# Patient Record
Sex: FEMALE | Race: WHITE | Employment: FULL TIME | URBAN - METROPOLITAN AREA
[De-identification: names, ages, dates, MRNs, and addresses within clinical notes are randomized per-mention and may not be internally consistent; named-entity substitution may affect disease eponyms.]

---

## 2017-01-06 ENCOUNTER — HOSPITAL ENCOUNTER (OUTPATIENT)
Dept: CT IMAGING | Age: 57
Discharge: HOME | End: 2017-01-06
Attending: INTERNAL MEDICINE
Payer: COMMERCIAL

## 2017-01-06 DIAGNOSIS — C78.2 BREAST CANCER METASTASIZED TO PLEURA, RIGHT (HCC): ICD-10-CM

## 2017-01-06 DIAGNOSIS — C50.911 BREAST CANCER METASTASIZED TO PLEURA, RIGHT (HCC): ICD-10-CM

## 2017-01-06 DIAGNOSIS — C79.51 BONE METASTASES (HCC): ICD-10-CM

## 2017-01-06 DIAGNOSIS — C50.011 MALIGNANT NEOPLASM OF NIPPLE OF RIGHT BREAST IN FEMALE (HCC): ICD-10-CM

## 2017-01-06 PROCEDURE — 74176 CT ABD & PELVIS W/O CONTRAST: CPT

## 2017-01-06 PROCEDURE — 71250 CT THORAX DX C-: CPT

## 2017-01-11 ENCOUNTER — PATIENT MESSAGE (OUTPATIENT)
Dept: FAMILY MEDICINE CLINIC | Facility: CLINIC | Age: 57
End: 2017-01-11

## 2017-01-12 ENCOUNTER — OFFICE VISIT (OUTPATIENT)
Dept: HEMATOLOGY/ONCOLOGY | Age: 57
End: 2017-01-12
Attending: INTERNAL MEDICINE
Payer: COMMERCIAL

## 2017-01-12 VITALS
RESPIRATION RATE: 20 BRPM | TEMPERATURE: 98 F | HEART RATE: 99 BPM | WEIGHT: 190.38 LBS | BODY MASS INDEX: 31 KG/M2 | SYSTOLIC BLOOD PRESSURE: 127 MMHG | DIASTOLIC BLOOD PRESSURE: 77 MMHG

## 2017-01-12 DIAGNOSIS — C79.51 BONE METASTASES (HCC): ICD-10-CM

## 2017-01-12 DIAGNOSIS — C78.2 BREAST CANCER METASTASIZED TO PLEURA, UNSPECIFIED LATERALITY (HCC): ICD-10-CM

## 2017-01-12 DIAGNOSIS — C50.011 MALIGNANT NEOPLASM OF NIPPLE OF RIGHT BREAST IN FEMALE (HCC): ICD-10-CM

## 2017-01-12 DIAGNOSIS — C79.51 BONE METASTASES (HCC): Primary | ICD-10-CM

## 2017-01-12 DIAGNOSIS — C50.911 BREAST CANCER METASTASIZED TO PLEURA, RIGHT (HCC): Primary | ICD-10-CM

## 2017-01-12 DIAGNOSIS — C78.2 BREAST CANCER METASTASIZED TO PLEURA, RIGHT (HCC): Primary | ICD-10-CM

## 2017-01-12 DIAGNOSIS — C78.2 BREAST CANCER METASTASIZED TO PLEURA, RIGHT (HCC): ICD-10-CM

## 2017-01-12 DIAGNOSIS — C50.911 BREAST CANCER METASTASIZED TO PLEURA, RIGHT (HCC): ICD-10-CM

## 2017-01-12 DIAGNOSIS — C50.919 BREAST CANCER METASTASIZED TO PLEURA, UNSPECIFIED LATERALITY (HCC): ICD-10-CM

## 2017-01-12 LAB
ALBUMIN SERPL-MCNC: 3.7 G/DL (ref 3.5–4.8)
ALP LIVER SERPL-CCNC: 96 U/L (ref 41–108)
ALT SERPL-CCNC: 102 U/L (ref 14–54)
AST SERPL-CCNC: 48 U/L (ref 15–41)
BASOPHILS # BLD AUTO: 0.11 X10(3) UL (ref 0–0.1)
BASOPHILS NFR BLD AUTO: 1.3 %
BILIRUB SERPL-MCNC: 0.3 MG/DL (ref 0.1–2)
BREAST CARCINOMA AG (CA2729): 16.4 U/ML (ref ?–38)
BUN BLD-MCNC: 10 MG/DL (ref 8–20)
CALCIUM BLD-MCNC: 8.6 MG/DL (ref 8.3–10.3)
CANCER AG 15-3 (CA15-3): 8.5 U/ML (ref ?–30)
CHLORIDE: 108 MMOL/L (ref 101–111)
CO2: 26 MMOL/L (ref 22–32)
CREAT BLD-MCNC: 0.83 MG/DL (ref 0.55–1.02)
EOSINOPHIL # BLD AUTO: 0.2 X10(3) UL (ref 0–0.3)
EOSINOPHIL NFR BLD AUTO: 2.3 %
ERYTHROCYTE [DISTWIDTH] IN BLOOD BY AUTOMATED COUNT: 13 % (ref 11.5–16)
GLUCOSE BLD-MCNC: 107 MG/DL (ref 70–99)
HCT VFR BLD AUTO: 40.7 % (ref 34–50)
HGB BLD-MCNC: 13.7 G/DL (ref 12–16)
IMMATURE GRANULOCYTE COUNT: 0.05 X10(3) UL (ref 0–1)
IMMATURE GRANULOCYTE RATIO %: 0.6 %
LYMPHOCYTES # BLD AUTO: 3.58 X10(3) UL (ref 0.9–4)
LYMPHOCYTES NFR BLD AUTO: 41.8 %
M PROTEIN MFR SERPL ELPH: 7.4 G/DL (ref 6.1–8.3)
MCH RBC QN AUTO: 31.8 PG (ref 27–33.2)
MCHC RBC AUTO-ENTMCNC: 33.7 G/DL (ref 31–37)
MCV RBC AUTO: 94.4 FL (ref 81–100)
MONOCYTES # BLD AUTO: 0.47 X10(3) UL (ref 0.1–0.6)
MONOCYTES NFR BLD AUTO: 5.5 %
NEUTROPHIL ABS PRELIM: 4.16 X10 (3) UL (ref 1.3–6.7)
NEUTROPHILS # BLD AUTO: 4.16 X10(3) UL (ref 1.3–6.7)
NEUTROPHILS NFR BLD AUTO: 48.5 %
PLATELET # BLD AUTO: 302 10(3)UL (ref 150–450)
POTASSIUM SERPL-SCNC: 3.7 MMOL/L (ref 3.6–5.1)
RBC # BLD AUTO: 4.31 X10(6)UL (ref 3.8–5.1)
RED CELL DISTRIBUTION WIDTH-SD: 44.7 FL (ref 35.1–46.3)
SODIUM SERPL-SCNC: 141 MMOL/L (ref 136–144)
WBC # BLD AUTO: 8.6 X10(3) UL (ref 4–13)

## 2017-01-12 PROCEDURE — 96401 CHEMO ANTI-NEOPL SQ/IM: CPT

## 2017-01-12 PROCEDURE — 99213 OFFICE O/P EST LOW 20 MIN: CPT | Performed by: INTERNAL MEDICINE

## 2017-01-12 PROCEDURE — 96372 THER/PROPH/DIAG INJ SC/IM: CPT

## 2017-01-12 RX ORDER — HYDROCODONE BITARTRATE AND IBUPROFEN 7.5; 2 MG/1; MG/1
1 TABLET, FILM COATED ORAL EVERY 8 HOURS PRN
Qty: 30 TABLET | Refills: 0 | Status: SHIPPED | OUTPATIENT
Start: 2017-01-12 | End: 2017-04-13

## 2017-01-12 NOTE — TELEPHONE ENCOUNTER
Patient last seen 8/29/2016. Last refill 10/28/2016. Patient requesting 120 tablets.   Please approve if appropriate, thanks

## 2017-01-12 NOTE — TELEPHONE ENCOUNTER
From: Antoni Lin  To: Nickolas Roberts MD  Sent: 1/11/2017 11:01 AM CST  Subject: Prescription Question    I am requesting another refill on my hydrocodone/ibuprofen 7.5-200 mg tab. The rx was for 90 pills but I take about 4 a day.  Can you do a refil

## 2017-01-12 NOTE — PROGRESS NOTES
Education Record    Learner:  Patient and Spouse    Disease / Diagnosis:    Barriers / Limitations:  None   Comments:    Method:  Discussion   Comments:    General Topics:  Medication, Side effects and symptom management and Plan of care reviewed   Comment

## 2017-01-12 NOTE — PROGRESS NOTES
Cancer Center Progress Note  Patient Name: Lorena Hunter   YOB: 1960   Medical Record Number: [id number]     Attending Physician: Nicolasa Mensah M.D. Date of Visit: 1/12/2017    Chief Complaint:  Patient presents with:   Follow - Up Social History Main Topics   Smoking status: Current Every Day Smoker  0.50 Packs/Day     Last Attempt to Quit: 05/18/2015    Smokeless tobacco: Never Used    Alcohol Use: Yes    Comment: rarely    Drug Use: No    Sexual Activity: Not on file   Not on oz)      Physical Examination:  Constitutional Normal - Mood and affect appropriate. Appears close to chronological age. Well nourished. Well developed. Eyes Normal - Conjunctivae and sclerae are clear and without icterus.  Pupils are reactive and equal. she is pre-menopausal.   We discussed Tamoxifen vs a GNRH antagonist and an AI. She is tolerating the Zoladex and arimidex well. Tumor Markers are improving and CT is stable. I will see her back in 3 months with repeat tumor markers and CT.  Continue Mon

## 2017-01-16 RX ORDER — HYDROCODONE BITARTRATE AND IBUPROFEN 7.5; 2 MG/1; MG/1
1 TABLET, FILM COATED ORAL EVERY 8 HOURS PRN
Qty: 90 TABLET | Refills: 0 | Status: CANCELLED | OUTPATIENT
Start: 2017-01-16

## 2017-01-16 NOTE — TELEPHONE ENCOUNTER
From: Palak Lambert  To:  Carlos Carroll MD  Sent: 1/10/2017 12:34 PM CST  Subject: Medication Renewal Request    Original authorizing provider: MD Palak Phoenix would like a refill of the following medications:  hydrocodone-ibupr

## 2017-01-21 ENCOUNTER — OFFICE VISIT (OUTPATIENT)
Dept: FAMILY MEDICINE CLINIC | Facility: CLINIC | Age: 57
End: 2017-01-21

## 2017-01-21 VITALS
BODY MASS INDEX: 30 KG/M2 | RESPIRATION RATE: 16 BRPM | DIASTOLIC BLOOD PRESSURE: 68 MMHG | WEIGHT: 188 LBS | TEMPERATURE: 99 F | SYSTOLIC BLOOD PRESSURE: 108 MMHG | HEART RATE: 88 BPM

## 2017-01-21 DIAGNOSIS — E78.5 HYPERLIPIDEMIA, UNSPECIFIED HYPERLIPIDEMIA TYPE: Primary | ICD-10-CM

## 2017-01-21 DIAGNOSIS — M72.2 PLANTAR FASCIITIS, BILATERAL: ICD-10-CM

## 2017-01-21 DIAGNOSIS — H60.392 BACTERIAL EXTERNAL EAR INFECTION, LEFT: ICD-10-CM

## 2017-01-21 DIAGNOSIS — C79.51 BONE METASTASES (HCC): ICD-10-CM

## 2017-01-21 DIAGNOSIS — E03.9 HYPOTHYROIDISM, UNSPECIFIED TYPE: ICD-10-CM

## 2017-01-21 DIAGNOSIS — C50.911 BREAST CANCER METASTASIZED TO PLEURA, RIGHT (HCC): ICD-10-CM

## 2017-01-21 DIAGNOSIS — C78.2 BREAST CANCER METASTASIZED TO PLEURA, RIGHT (HCC): ICD-10-CM

## 2017-01-21 DIAGNOSIS — C50.011 MALIGNANT NEOPLASM OF NIPPLE OF RIGHT BREAST IN FEMALE (HCC): ICD-10-CM

## 2017-01-21 PROCEDURE — 99215 OFFICE O/P EST HI 40 MIN: CPT | Performed by: FAMILY MEDICINE

## 2017-01-21 RX ORDER — HYDROCODONE BITARTRATE AND ACETAMINOPHEN 7.5; 325 MG/1; MG/1
1 TABLET ORAL EVERY 8 HOURS PRN
Qty: 90 TABLET | Refills: 0 | Status: SHIPPED | OUTPATIENT
Start: 2017-01-21 | End: 2017-03-20

## 2017-01-21 RX ORDER — HYDROCODONE BITARTRATE AND IBUPROFEN 7.5; 2 MG/1; MG/1
1 TABLET, FILM COATED ORAL EVERY 8 HOURS PRN
Qty: 30 TABLET | Refills: 0 | Status: CANCELLED | OUTPATIENT
Start: 2017-01-21

## 2017-01-21 RX ORDER — TEMAZEPAM 15 MG/1
15 CAPSULE ORAL NIGHTLY PRN
Qty: 30 CAPSULE | Refills: 1 | Status: SHIPPED | OUTPATIENT
Start: 2017-01-21 | End: 2017-09-09

## 2017-01-21 RX ORDER — AMOXICILLIN AND CLAVULANATE POTASSIUM 875; 125 MG/1; MG/1
1 TABLET, FILM COATED ORAL 2 TIMES DAILY
Qty: 14 TABLET | Refills: 0 | Status: SHIPPED | OUTPATIENT
Start: 2017-01-21 | End: 2017-01-28

## 2017-01-21 RX ORDER — NEOMYCIN SULFATE, POLYMYXIN B SULFATE AND HYDROCORTISONE 10; 3.5; 1 MG/ML; MG/ML; [USP'U]/ML
4 SUSPENSION/ DROPS AURICULAR (OTIC) 3 TIMES DAILY
Qty: 10 ML | Refills: 0 | Status: SHIPPED | OUTPATIENT
Start: 2017-01-21 | End: 2017-05-01

## 2017-01-21 NOTE — PATIENT INSTRUCTIONS
Try hydrocodone/acetaminophen for pain as needed. Use eardrops 3-4 times per day left ear. Augmentin 1 tablet twice a day taken with food. Take probiotic daily. Start temazepam 15 mg 1 tablet at bedtime-this is sleep helping medication.   Do fasting blo

## 2017-01-21 NOTE — PROGRESS NOTES
Lorena Hunter is a 49year old female. cc  hyperlipidemia, hypothyroidism, breast cancer, muscle aches, bilateral feet pain, left ear pain  HPI:   Patient is coming for follow-up of hyperlipidemia she says that she was taking pravastatin but not regularly. 1 tablet by mouth every 8 (eight) hours as needed for Pain. Disp: 90 tablet Rfl: 0   temazepam 15 MG Oral Cap Take 1 capsule (15 mg total) by mouth nightly as needed for Sleep.  Disp: 30 capsule Rfl: 1   Neomycin-Polymyxin-HC 3.5-62523-5 Otic Suspension Esthela 108/68 mmHg  Pulse 88  Temp(Src) 98.8 °F (37.1 °C) (Oral)  Resp 16  Wt 188 lb  Breastfeeding?  No  GENERAL: well developed, well nourished,in no apparent distress  SKIN: no rashes,no suspicious lesions  HEENT: atraumatic, normocephalic,ears - there is redne results. Imaging & Consults:  None    The patient indicates understanding of these issues and agrees to the plan. The patient is asked to return in 3 months or sooner prn.    Time spent was 40 min, more than 50% was spent on counseling regarding medical

## 2017-02-17 ENCOUNTER — OFFICE VISIT (OUTPATIENT)
Dept: HEMATOLOGY/ONCOLOGY | Age: 57
End: 2017-02-17
Attending: INTERNAL MEDICINE
Payer: COMMERCIAL

## 2017-02-17 DIAGNOSIS — C50.911 BREAST CANCER METASTASIZED TO PLEURA, RIGHT (HCC): ICD-10-CM

## 2017-02-17 DIAGNOSIS — C79.51 BONE METASTASES (HCC): Primary | ICD-10-CM

## 2017-02-17 DIAGNOSIS — C78.2 BREAST CANCER METASTASIZED TO PLEURA, RIGHT (HCC): ICD-10-CM

## 2017-02-17 DIAGNOSIS — C50.919 BREAST CANCER METASTASIZED TO PLEURA, UNSPECIFIED LATERALITY (HCC): ICD-10-CM

## 2017-02-17 DIAGNOSIS — C78.2 BREAST CANCER METASTASIZED TO PLEURA, UNSPECIFIED LATERALITY (HCC): ICD-10-CM

## 2017-02-17 PROCEDURE — 96401 CHEMO ANTI-NEOPL SQ/IM: CPT

## 2017-02-17 PROCEDURE — 96372 THER/PROPH/DIAG INJ SC/IM: CPT

## 2017-02-17 NOTE — PROGRESS NOTES
Education Record    Learner:  Patient    Disease / [facility] cancer    Barriers / Limitations:  None    Method:  Brief focused, printed material and  reinforcement    General Topics:  Plan of care reviewed    Outcome:  Shows understanding

## 2017-02-27 RX ORDER — ANASTROZOLE 1 MG/1
TABLET ORAL
Qty: 90 TABLET | Refills: 2 | Status: SHIPPED | OUTPATIENT
Start: 2017-02-27 | End: 2017-07-06

## 2017-03-17 ENCOUNTER — OFFICE VISIT (OUTPATIENT)
Dept: HEMATOLOGY/ONCOLOGY | Age: 57
End: 2017-03-17
Attending: INTERNAL MEDICINE
Payer: COMMERCIAL

## 2017-03-17 DIAGNOSIS — C50.911 BREAST CANCER METASTASIZED TO PLEURA, RIGHT (HCC): ICD-10-CM

## 2017-03-17 DIAGNOSIS — C78.2 BREAST CANCER METASTASIZED TO PLEURA, RIGHT (HCC): ICD-10-CM

## 2017-03-17 DIAGNOSIS — C79.51 BONE METASTASES (HCC): Primary | ICD-10-CM

## 2017-03-17 DIAGNOSIS — C50.919 BREAST CANCER METASTASIZED TO PLEURA, UNSPECIFIED LATERALITY (HCC): ICD-10-CM

## 2017-03-17 DIAGNOSIS — C78.2 BREAST CANCER METASTASIZED TO PLEURA, UNSPECIFIED LATERALITY (HCC): ICD-10-CM

## 2017-03-17 PROCEDURE — 96402 CHEMO HORMON ANTINEOPL SQ/IM: CPT

## 2017-03-17 PROCEDURE — 96372 THER/PROPH/DIAG INJ SC/IM: CPT

## 2017-03-18 ENCOUNTER — LAB ENCOUNTER (OUTPATIENT)
Dept: LAB | Age: 57
End: 2017-03-18
Attending: FAMILY MEDICINE
Payer: COMMERCIAL

## 2017-03-18 DIAGNOSIS — E78.5 HYPERLIPIDEMIA, UNSPECIFIED HYPERLIPIDEMIA TYPE: ICD-10-CM

## 2017-03-18 DIAGNOSIS — E03.9 HYPOTHYROIDISM, UNSPECIFIED TYPE: ICD-10-CM

## 2017-03-18 DIAGNOSIS — E03.9 ACQUIRED HYPOTHYROIDISM: ICD-10-CM

## 2017-03-18 LAB
ALBUMIN SERPL-MCNC: 3.8 G/DL (ref 3.5–4.8)
ALP LIVER SERPL-CCNC: 102 U/L (ref 41–108)
ALT SERPL-CCNC: 128 U/L (ref 14–54)
AST SERPL-CCNC: 101 U/L (ref 15–41)
BILIRUB SERPL-MCNC: 0.4 MG/DL (ref 0.1–2)
BUN BLD-MCNC: 6 MG/DL (ref 8–20)
CALCIUM BLD-MCNC: 9.5 MG/DL (ref 8.3–10.3)
CHLORIDE: 109 MMOL/L (ref 101–111)
CHOLEST SMN-MCNC: 310 MG/DL (ref ?–200)
CO2: 29 MMOL/L (ref 22–32)
CREAT BLD-MCNC: 0.79 MG/DL (ref 0.55–1.02)
FREE T4: 1.1 NG/DL (ref 0.9–1.8)
GLUCOSE BLD-MCNC: 107 MG/DL (ref 70–99)
HDLC SERPL-MCNC: 30 MG/DL (ref 45–?)
HDLC SERPL: 10.33 {RATIO} (ref ?–4.44)
LDLC SERPL DIRECT ASSAY-MCNC: 173 MG/DL (ref ?–100)
M PROTEIN MFR SERPL ELPH: 7.4 G/DL (ref 6.1–8.3)
NONHDLC SERPL-MCNC: 280 MG/DL (ref ?–130)
POTASSIUM SERPL-SCNC: 4.4 MMOL/L (ref 3.6–5.1)
SODIUM SERPL-SCNC: 142 MMOL/L (ref 136–144)
TRIGLYCERIDES: 582 MG/DL (ref ?–150)
TSI SER-ACNC: 11 MIU/ML (ref 0.35–5.5)

## 2017-03-18 PROCEDURE — 84443 ASSAY THYROID STIM HORMONE: CPT

## 2017-03-18 PROCEDURE — 36415 COLL VENOUS BLD VENIPUNCTURE: CPT

## 2017-03-18 PROCEDURE — 80061 LIPID PANEL: CPT

## 2017-03-18 PROCEDURE — 83721 ASSAY OF BLOOD LIPOPROTEIN: CPT

## 2017-03-18 PROCEDURE — 80053 COMPREHEN METABOLIC PANEL: CPT

## 2017-03-18 PROCEDURE — 84439 ASSAY OF FREE THYROXINE: CPT

## 2017-03-20 DIAGNOSIS — E03.9 HYPOTHYROIDISM, UNSPECIFIED TYPE: Primary | ICD-10-CM

## 2017-03-20 DIAGNOSIS — E78.5 HYPERLIPIDEMIA, UNSPECIFIED HYPERLIPIDEMIA TYPE: Primary | ICD-10-CM

## 2017-03-20 RX ORDER — HYDROCODONE BITARTRATE AND ACETAMINOPHEN 7.5; 325 MG/1; MG/1
1 TABLET ORAL EVERY 8 HOURS PRN
Qty: 90 TABLET | Refills: 0 | Status: SHIPPED | OUTPATIENT
Start: 2017-03-20 | End: 2017-06-26

## 2017-03-20 RX ORDER — LEVOTHYROXINE SODIUM 0.12 MG/1
125 TABLET ORAL
Qty: 90 TABLET | Refills: 0 | Status: SHIPPED | OUTPATIENT
Start: 2017-03-20 | End: 2017-03-20

## 2017-03-20 RX ORDER — OMEGA-3-ACID ETHYL ESTERS 1 G/1
CAPSULE, LIQUID FILLED ORAL
Qty: 120 CAPSULE | Refills: 2 | Status: SHIPPED | OUTPATIENT
Start: 2017-03-20 | End: 2017-06-26

## 2017-03-20 RX ORDER — LEVOTHYROXINE SODIUM 0.12 MG/1
125 TABLET ORAL
Qty: 90 TABLET | Refills: 0 | Status: ON HOLD | OUTPATIENT
Start: 2017-03-31 | End: 2017-06-26

## 2017-03-21 ENCOUNTER — PATIENT MESSAGE (OUTPATIENT)
Dept: FAMILY MEDICINE CLINIC | Facility: CLINIC | Age: 57
End: 2017-03-21

## 2017-03-21 ENCOUNTER — TELEPHONE (OUTPATIENT)
Dept: FAMILY MEDICINE CLINIC | Facility: CLINIC | Age: 57
End: 2017-03-21

## 2017-03-21 NOTE — TELEPHONE ENCOUNTER
Received request for a PA for pt's Omega-3-acid Ethyl Kerry 1 g from [address]. PA completed in [employer]. Approval received from 2/19/17 through 3/20/20. Archived in [employer].

## 2017-03-22 ENCOUNTER — TELEPHONE (OUTPATIENT)
Dept: FAMILY MEDICINE CLINIC | Facility: CLINIC | Age: 57
End: 2017-03-22

## 2017-03-22 NOTE — TELEPHONE ENCOUNTER
Call from yissel/spouse-sts pt has difficulty swallowing pills-is afraid she will choke. sts lovaza is too big for pt to swallow. Discussed trying to take in applesauce, yogurt or pudding-usually makes it easier to swallow larger pills.    Nora Giang pt does th

## 2017-03-22 NOTE — TELEPHONE ENCOUNTER
Yes ok to chew the capsule if cannot swallow medications. This is safer for the liver done other possible meds.

## 2017-03-23 NOTE — TELEPHONE ENCOUNTER
From: Johnny Gonzalez  To: Dirk Lyn MD  Sent: 3/21/2017 5:46 PM CDT  Subject: Test Results Question    I was trying to look up the test results from my blood work that was done on March 18th so I can see the results. They are not posted yet.  Do yo

## 2017-04-08 ENCOUNTER — HOSPITAL ENCOUNTER (OUTPATIENT)
Dept: CT IMAGING | Age: 57
Discharge: HOME | End: 2017-04-08
Attending: INTERNAL MEDICINE
Payer: COMMERCIAL

## 2017-04-08 DIAGNOSIS — C50.911 BREAST CANCER METASTASIZED TO PLEURA, RIGHT (HCC): ICD-10-CM

## 2017-04-08 DIAGNOSIS — C79.51 BONE METASTASES (HCC): ICD-10-CM

## 2017-04-08 DIAGNOSIS — C78.2 BREAST CANCER METASTASIZED TO PLEURA, RIGHT (HCC): ICD-10-CM

## 2017-04-08 DIAGNOSIS — C50.011 MALIGNANT NEOPLASM OF NIPPLE OF RIGHT BREAST IN FEMALE (HCC): ICD-10-CM

## 2017-04-08 PROCEDURE — 71250 CT THORAX DX C-: CPT

## 2017-04-08 PROCEDURE — 74176 CT ABD & PELVIS W/O CONTRAST: CPT

## 2017-04-13 ENCOUNTER — OFFICE VISIT (OUTPATIENT)
Dept: HEMATOLOGY/ONCOLOGY | Age: 57
End: 2017-04-13
Attending: INTERNAL MEDICINE
Payer: COMMERCIAL

## 2017-04-13 VITALS
SYSTOLIC BLOOD PRESSURE: 119 MMHG | TEMPERATURE: 100 F | HEART RATE: 89 BPM | WEIGHT: 185.19 LBS | OXYGEN SATURATION: 99 % | BODY MASS INDEX: 30 KG/M2 | RESPIRATION RATE: 20 BRPM | DIASTOLIC BLOOD PRESSURE: 73 MMHG

## 2017-04-13 DIAGNOSIS — C79.51 BONE METASTASES (HCC): ICD-10-CM

## 2017-04-13 DIAGNOSIS — C78.2 BREAST CANCER METASTASIZED TO PLEURA, RIGHT (HCC): ICD-10-CM

## 2017-04-13 DIAGNOSIS — C50.911 BREAST CANCER METASTASIZED TO PLEURA, RIGHT (HCC): ICD-10-CM

## 2017-04-13 DIAGNOSIS — C50.919 BREAST CANCER METASTASIZED TO PLEURA, UNSPECIFIED LATERALITY (HCC): ICD-10-CM

## 2017-04-13 DIAGNOSIS — C79.51 BONE METASTASES (HCC): Primary | ICD-10-CM

## 2017-04-13 DIAGNOSIS — C78.2 BREAST CANCER METASTASIZED TO PLEURA, UNSPECIFIED LATERALITY (HCC): ICD-10-CM

## 2017-04-13 DIAGNOSIS — C50.011 MALIGNANT NEOPLASM OF NIPPLE OF RIGHT BREAST IN FEMALE (HCC): ICD-10-CM

## 2017-04-13 PROCEDURE — 96402 CHEMO HORMON ANTINEOPL SQ/IM: CPT

## 2017-04-13 PROCEDURE — 96372 THER/PROPH/DIAG INJ SC/IM: CPT

## 2017-04-13 PROCEDURE — 99213 OFFICE O/P EST LOW 20 MIN: CPT | Performed by: INTERNAL MEDICINE

## 2017-04-13 NOTE — PROGRESS NOTES
Cancer Center Progress Note  Patient Name: Rachel Sinha   YOB: 1960   Medical Record Number: [id number]     Attending Physician: Janelle Mcdowell M.D. Date of Visit: 4/13/2017    Chief Complaint:  No chief complaint on file.        O Cigarettes    Smokeless tobacco: Never Used    Alcohol Use: Yes  0.0 oz/week    0 Standard drinks or equivalent per week         Comment: 2 drinks/yr     Drug Use: No    Sexual Activity: Not on file   Not on file  Other Topics Concern    Caffeine Concern Y erica or mood swings. Vital Signs:  /73 mmHg  Pulse 89  Temp(Src) 99.5 °F (37.5 °C) (Tympanic)  Resp 20  Wt 84.006 kg (185 lb 3.2 oz)  SpO2 99%      Physical Examination:  Constitutional Normal - Mood and affect appropriate.  Appears close to ch controlled with endocrine therapy alone. Her labs indicate that she is pre-menopausal.   We discussed Tamoxifen vs a GNRH antagonist and an AI. She is tolerating the Zoladex and arimidex well. Tumor Markers are improving and CT is stable.    I will see he

## 2017-04-14 ENCOUNTER — APPOINTMENT (OUTPATIENT)
Dept: HEMATOLOGY/ONCOLOGY | Age: 57
End: 2017-04-14
Attending: INTERNAL MEDICINE
Payer: COMMERCIAL

## 2017-04-17 ENCOUNTER — PATIENT MESSAGE (OUTPATIENT)
Dept: FAMILY MEDICINE CLINIC | Facility: CLINIC | Age: 57
End: 2017-04-17

## 2017-04-18 RX ORDER — ALPRAZOLAM 0.25 MG/1
0.25 TABLET ORAL 2 TIMES DAILY PRN
Qty: 30 TABLET | Refills: 0 | Status: SHIPPED | OUTPATIENT
Start: 2017-04-18 | End: 2017-06-26 | Stop reason: ALTCHOICE

## 2017-04-18 NOTE — TELEPHONE ENCOUNTER
Contact made with patient, information and recommendations given, understanding verbalized. Appointment scheduled 5/6/2017.   Prescription pending approval.

## 2017-04-18 NOTE — TELEPHONE ENCOUNTER
I Suggest for the patient to set up an appointment so we will can discuss her anxiety.   In the meantime we can try low dose of alprazolam 0.25 mg 1 tablet twice a day as needed, however make her sleepy and it could be addictive okay to call 30 tablets no r

## 2017-04-18 NOTE — TELEPHONE ENCOUNTER
Outgoing call to patient concerning MyChart message. States she has increased stress at home, with additional family members, states she is very mccarthy and short tempered.   No thoughts of harm to self or others, no depression, states she has never taken th

## 2017-05-06 PROBLEM — Z17.0 MALIGNANT NEOPLASM OF NIPPLE OF RIGHT BREAST IN FEMALE, ESTROGEN RECEPTOR POSITIVE (HCC): Status: ACTIVE | Noted: 2017-05-06

## 2017-05-06 PROBLEM — C50.011 MALIGNANT NEOPLASM OF NIPPLE OF RIGHT BREAST IN FEMALE, ESTROGEN RECEPTOR POSITIVE (HCC): Status: ACTIVE | Noted: 2017-05-06

## 2017-05-06 PROBLEM — C50.011: Status: ACTIVE | Noted: 2017-05-06

## 2017-05-06 PROBLEM — Z17.0 MALIGNANT NEOPLASM OF NIPPLE OF RIGHT BREAST IN FEMALE, ESTROGEN RECEPTOR POSITIVE: Status: ACTIVE | Noted: 2017-05-06

## 2017-05-06 PROBLEM — Z17.0: Status: ACTIVE | Noted: 2017-05-06

## 2017-05-06 PROBLEM — C50.011 MALIGNANT NEOPLASM OF NIPPLE OF RIGHT BREAST IN FEMALE, ESTROGEN RECEPTOR POSITIVE: Status: ACTIVE | Noted: 2017-05-06

## 2017-05-06 NOTE — PROGRESS NOTES
Freddy Thapa is a 49year old female. cc anxiety, feet pain, left ear pain breast cancer  HPI:   Patient coming to discuss anxiety she says that she is wearing more she is more irritable getting anxious not being short tempered.   It is happening for couple MG Oral Tab Take 1 tablet (0.25 mg total) by mouth 2 (two) times daily as needed for Sleep.  Disp: 30 tablet Rfl: 0   Omega-3-acid Ethyl Esters (LOVAZA) 1 g Oral Cap Take 2 capsule (total 2G) twice a day Disp: 120 capsule Rfl: 2      Past Medical History B12    Meds & Refills for this Visit:  Signed Prescriptions Disp Refills    escitalopram 10 MG Oral Tab 30 tablet 1      Sig: Take 1 tablet (10 mg total) by mouth daily.          Start escitalopram 10 mg one half of the tablet once a day for 4 days then 1 t

## 2017-05-06 NOTE — PATIENT INSTRUCTIONS
Start escitalopram 10 mg one half of the tablet once a day for 4 days then 1 tablet daily. Use alprazolam as needed. See food Dr.  if needed. Continue ear drops to the left ear as needed. Jelena Murray

## 2017-05-11 ENCOUNTER — OFFICE VISIT (OUTPATIENT)
Dept: HEMATOLOGY/ONCOLOGY | Age: 57
End: 2017-05-11
Attending: INTERNAL MEDICINE
Payer: COMMERCIAL

## 2017-05-11 DIAGNOSIS — C79.51 BONE METASTASES (HCC): Primary | ICD-10-CM

## 2017-05-11 DIAGNOSIS — C78.2 BREAST CANCER METASTASIZED TO PLEURA, UNSPECIFIED LATERALITY (HCC): ICD-10-CM

## 2017-05-11 DIAGNOSIS — C50.919 BREAST CANCER METASTASIZED TO PLEURA, UNSPECIFIED LATERALITY (HCC): ICD-10-CM

## 2017-05-11 DIAGNOSIS — C50.911 BREAST CANCER METASTASIZED TO PLEURA, RIGHT (HCC): ICD-10-CM

## 2017-05-11 DIAGNOSIS — C78.2 BREAST CANCER METASTASIZED TO PLEURA, RIGHT (HCC): ICD-10-CM

## 2017-05-11 PROCEDURE — 96402 CHEMO HORMON ANTINEOPL SQ/IM: CPT

## 2017-05-11 PROCEDURE — 96372 THER/PROPH/DIAG INJ SC/IM: CPT

## 2017-05-18 ENCOUNTER — APPOINTMENT (OUTPATIENT)
Dept: HEMATOLOGY/ONCOLOGY | Age: 57
End: 2017-05-18
Attending: INTERNAL MEDICINE
Payer: COMMERCIAL

## 2017-06-15 ENCOUNTER — OFFICE VISIT (OUTPATIENT)
Dept: HEMATOLOGY/ONCOLOGY | Age: 57
End: 2017-06-15
Attending: INTERNAL MEDICINE
Payer: COMMERCIAL

## 2017-06-15 DIAGNOSIS — C78.2 BREAST CANCER METASTASIZED TO PLEURA, RIGHT (HCC): ICD-10-CM

## 2017-06-15 DIAGNOSIS — C78.2 BREAST CANCER METASTASIZED TO PLEURA, UNSPECIFIED LATERALITY (HCC): ICD-10-CM

## 2017-06-15 DIAGNOSIS — C79.51 BONE METASTASES (HCC): Primary | ICD-10-CM

## 2017-06-15 DIAGNOSIS — C50.919 BREAST CANCER METASTASIZED TO PLEURA, UNSPECIFIED LATERALITY (HCC): ICD-10-CM

## 2017-06-15 DIAGNOSIS — C50.911 BREAST CANCER METASTASIZED TO PLEURA, RIGHT (HCC): ICD-10-CM

## 2017-06-15 PROCEDURE — 96372 THER/PROPH/DIAG INJ SC/IM: CPT

## 2017-06-15 PROCEDURE — 96402 CHEMO HORMON ANTINEOPL SQ/IM: CPT

## 2017-06-21 RX ORDER — ESCITALOPRAM OXALATE 10 MG/1
TABLET ORAL
Qty: 30 TABLET | Refills: 0 | Status: SHIPPED | OUTPATIENT
Start: 2017-06-21 | End: 2017-06-26

## 2017-06-24 ENCOUNTER — APPOINTMENT (OUTPATIENT)
Dept: LAB | Age: 57
End: 2017-06-24
Attending: FAMILY MEDICINE
Payer: COMMERCIAL

## 2017-06-24 DIAGNOSIS — M79.671 PAIN IN BOTH FEET: ICD-10-CM

## 2017-06-24 DIAGNOSIS — M79.672 PAIN IN BOTH FEET: ICD-10-CM

## 2017-06-24 DIAGNOSIS — E03.9 HYPOTHYROIDISM, UNSPECIFIED TYPE: ICD-10-CM

## 2017-06-24 DIAGNOSIS — E78.5 HYPERLIPIDEMIA, UNSPECIFIED HYPERLIPIDEMIA TYPE: ICD-10-CM

## 2017-06-24 PROCEDURE — 80053 COMPREHEN METABOLIC PANEL: CPT

## 2017-06-24 PROCEDURE — 84443 ASSAY THYROID STIM HORMONE: CPT

## 2017-06-24 PROCEDURE — 82607 VITAMIN B-12: CPT

## 2017-06-24 PROCEDURE — 36415 COLL VENOUS BLD VENIPUNCTURE: CPT

## 2017-06-24 PROCEDURE — 84439 ASSAY OF FREE THYROXINE: CPT

## 2017-06-24 PROCEDURE — 80061 LIPID PANEL: CPT

## 2017-06-26 ENCOUNTER — OFFICE VISIT (OUTPATIENT)
Dept: FAMILY MEDICINE CLINIC | Facility: CLINIC | Age: 57
End: 2017-06-26

## 2017-06-26 VITALS
WEIGHT: 172 LBS | BODY MASS INDEX: 28 KG/M2 | OXYGEN SATURATION: 97 % | DIASTOLIC BLOOD PRESSURE: 72 MMHG | RESPIRATION RATE: 16 BRPM | SYSTOLIC BLOOD PRESSURE: 122 MMHG | TEMPERATURE: 99 F | HEART RATE: 97 BPM

## 2017-06-26 DIAGNOSIS — R79.89 ELEVATED LIVER FUNCTION TESTS: ICD-10-CM

## 2017-06-26 DIAGNOSIS — E78.2 MIXED HYPERLIPIDEMIA: Primary | ICD-10-CM

## 2017-06-26 DIAGNOSIS — H92.02 LEFT EAR PAIN: ICD-10-CM

## 2017-06-26 DIAGNOSIS — E03.9 ACQUIRED HYPOTHYROIDISM: ICD-10-CM

## 2017-06-26 DIAGNOSIS — C50.919 METASTATIC BREAST CANCER (HCC): ICD-10-CM

## 2017-06-26 PROCEDURE — 99214 OFFICE O/P EST MOD 30 MIN: CPT | Performed by: FAMILY MEDICINE

## 2017-06-26 RX ORDER — HYDROCODONE BITARTRATE AND ACETAMINOPHEN 7.5; 325 MG/1; MG/1
1 TABLET ORAL EVERY 8 HOURS PRN
Qty: 90 TABLET | Refills: 0 | Status: SHIPPED | OUTPATIENT
Start: 2017-06-26 | End: 2017-09-09

## 2017-06-26 RX ORDER — ESCITALOPRAM OXALATE 10 MG/1
TABLET ORAL
Qty: 30 TABLET | Refills: 5 | Status: SHIPPED | OUTPATIENT
Start: 2017-06-26 | End: 2017-08-25

## 2017-06-26 NOTE — PROGRESS NOTES
Amanda Singh is a 49year old female. cc hyperlipidemia, hypothyroidism, metastatic breast cancer, left ear pain, elevated liver test depression/anxiety,   HPI:   Patient coming for follow-up of hyperlipidemia.   Her triglycerides decreased but they are sti temazepam 15 MG Oral Cap Take 1 capsule (15 mg total) by mouth nightly as needed for Sleep.  Disp: 30 capsule Rfl: 1      Past Medical History:   Diagnosis Date   • Breast cancer ([facility] [address]    • Hypothyroid    • Other and unspecified hyperlipidemia       Socia mg/dL   VLDL 78 (H) 5 - 40 mg/dL   Chol/HDL Ratio 11.85 (H) <4.44   Non HDL Chol 293 (H) <130 mg/dL   -COMP METABOLIC PANEL (14)   Result Value Ref Range   Glucose 101 (H) 70 - 99 mg/dL   BUN 7 (L) 8 - 20 mg/dL   Creatinine 0.77 0.55 - 1.02 mg/dL   GFR 88

## 2017-06-26 NOTE — PATIENT INSTRUCTIONS
Continue current meds. Watch diet for fats and carbs. Stay active. Do fasting blood work one week prior next visit. Use Norco with caution for pain. Call ENT Dr. Nicole Ross for evaluation of the left ear pain.

## 2017-06-27 ENCOUNTER — APPOINTMENT (OUTPATIENT)
Dept: GENERAL RADIOLOGY | Facility: HOSPITAL | Age: 57
DRG: 158 | End: 2017-06-27
Attending: DENTIST
Payer: COMMERCIAL

## 2017-06-27 ENCOUNTER — APPOINTMENT (OUTPATIENT)
Dept: CT IMAGING | Facility: HOSPITAL | Age: 57
DRG: 158 | End: 2017-06-27
Attending: EMERGENCY MEDICINE
Payer: COMMERCIAL

## 2017-06-27 ENCOUNTER — HOSPITAL ENCOUNTER (INPATIENT)
Facility: HOSPITAL | Age: 57
LOS: 6 days | Discharge: HOME | DRG: 158 | End: 2017-07-03
Attending: EMERGENCY MEDICINE | Admitting: STUDENT IN AN ORGANIZED HEALTH CARE EDUCATION/TRAINING PROGRAM
Payer: COMMERCIAL

## 2017-06-27 DIAGNOSIS — R60.9 SUBMANDIBULAR GLAND SWELLING: ICD-10-CM

## 2017-06-27 DIAGNOSIS — L02.91 ABSCESS: ICD-10-CM

## 2017-06-27 DIAGNOSIS — K04.7 DENTAL INFECTION: Primary | ICD-10-CM

## 2017-06-27 DIAGNOSIS — K04.7 PERIAPICAL ABSCESS: ICD-10-CM

## 2017-06-27 PROBLEM — R60.0 SUBMANDIBULAR GLAND SWELLING: Status: ACTIVE | Noted: 2017-06-27

## 2017-06-27 LAB
ALBUMIN SERPL-MCNC: 3.5 G/DL (ref 3.5–4.8)
ALP LIVER SERPL-CCNC: 135 U/L (ref 41–108)
ALT SERPL-CCNC: 109 U/L (ref 14–54)
APTT PPP: 26.7 SECONDS (ref 25–34)
AST SERPL-CCNC: 50 U/L (ref 15–41)
BASOPHILS # BLD AUTO: 0.1 X10(3) UL (ref 0–0.1)
BASOPHILS NFR BLD AUTO: 0.9 %
BILIRUB SERPL-MCNC: 0.9 MG/DL (ref 0.1–2)
BUN BLD-MCNC: 7 MG/DL (ref 8–20)
CALCIUM BLD-MCNC: 9.2 MG/DL (ref 8.3–10.3)
CHLORIDE: 105 MMOL/L (ref 101–111)
CO2: 23 MMOL/L (ref 22–32)
CREAT BLD-MCNC: 0.73 MG/DL (ref 0.55–1.02)
EOSINOPHIL # BLD AUTO: 0.15 X10(3) UL (ref 0–0.3)
EOSINOPHIL NFR BLD AUTO: 1.3 %
ERYTHROCYTE [DISTWIDTH] IN BLOOD BY AUTOMATED COUNT: 13 % (ref 11.5–16)
GLUCOSE BLD-MCNC: 110 MG/DL (ref 70–99)
HCT VFR BLD AUTO: 41.8 % (ref 34–50)
HGB BLD-MCNC: 13.9 G/DL (ref 12–16)
IMMATURE GRANULOCYTE COUNT: 0.05 X10(3) UL (ref 0–1)
IMMATURE GRANULOCYTE RATIO %: 0.4 %
INR BLD: 1.12 (ref 0.89–1.11)
LYMPHOCYTES # BLD AUTO: 3.08 X10(3) UL (ref 0.9–4)
LYMPHOCYTES NFR BLD AUTO: 26.3 %
M PROTEIN MFR SERPL ELPH: 8.4 G/DL (ref 6.1–8.3)
MCH RBC QN AUTO: 30.8 PG (ref 27–33.2)
MCHC RBC AUTO-ENTMCNC: 33.3 G/DL (ref 31–37)
MCV RBC AUTO: 92.7 FL (ref 81–100)
MONOCYTES # BLD AUTO: 0.8 X10(3) UL (ref 0.1–0.6)
MONOCYTES NFR BLD AUTO: 6.8 %
MONOSCREEN: NEGATIVE
NEUTROPHIL ABS PRELIM: 7.55 X10 (3) UL (ref 1.3–6.7)
NEUTROPHILS # BLD AUTO: 7.55 X10(3) UL (ref 1.3–6.7)
NEUTROPHILS NFR BLD AUTO: 64.3 %
PLATELET # BLD AUTO: 346 10(3)UL (ref 150–450)
POTASSIUM SERPL-SCNC: 4.1 MMOL/L (ref 3.6–5.1)
PSA SERPL DL<=0.01 NG/ML-MCNC: 14.5 SECONDS (ref 12–14.3)
RBC # BLD AUTO: 4.51 X10(6)UL (ref 3.8–5.1)
RED CELL DISTRIBUTION WIDTH-SD: 43.8 FL (ref 35.1–46.3)
SODIUM SERPL-SCNC: 136 MMOL/L (ref 136–144)
WBC # BLD AUTO: 11.7 X10(3) UL (ref 4–13)

## 2017-06-27 PROCEDURE — 99223 1ST HOSP IP/OBS HIGH 75: CPT | Performed by: STUDENT IN AN ORGANIZED HEALTH CARE EDUCATION/TRAINING PROGRAM

## 2017-06-27 PROCEDURE — 86403 PARTICLE AGGLUT ANTBDY SCRN: CPT | Performed by: EMERGENCY MEDICINE

## 2017-06-27 PROCEDURE — 70355 PANORAMIC X-RAY OF JAWS: CPT | Performed by: DENTIST

## 2017-06-27 PROCEDURE — 85730 THROMBOPLASTIN TIME PARTIAL: CPT | Performed by: EMERGENCY MEDICINE

## 2017-06-27 PROCEDURE — 80053 COMPREHEN METABOLIC PANEL: CPT | Performed by: EMERGENCY MEDICINE

## 2017-06-27 PROCEDURE — 70490 CT SOFT TISSUE NECK W/O DYE: CPT | Performed by: EMERGENCY MEDICINE

## 2017-06-27 PROCEDURE — 85610 PROTHROMBIN TIME: CPT | Performed by: EMERGENCY MEDICINE

## 2017-06-27 RX ORDER — DEXAMETHASONE SODIUM PHOSPHATE 4 MG/ML
10 VIAL (ML) INJECTION ONCE
Status: COMPLETED | OUTPATIENT
Start: 2017-06-27 | End: 2017-06-27

## 2017-06-27 RX ORDER — SODIUM CHLORIDE 9 MG/ML
1000 INJECTION, SOLUTION INTRAVENOUS ONCE
Status: COMPLETED | OUTPATIENT
Start: 2017-06-27 | End: 2017-06-27

## 2017-06-27 RX ORDER — ANASTROZOLE 1 MG/1
1 TABLET ORAL
Status: DISCONTINUED | OUTPATIENT
Start: 2017-06-28 | End: 2017-07-03

## 2017-06-27 RX ORDER — NEOMYCIN SULFATE, POLYMYXIN B SULFATE AND HYDROCORTISONE 10; 3.5; 1 MG/ML; MG/ML; [USP'U]/ML
4 SUSPENSION/ DROPS AURICULAR (OTIC) 3 TIMES DAILY
Status: DISCONTINUED | OUTPATIENT
Start: 2017-06-28 | End: 2017-07-03

## 2017-06-27 RX ORDER — NICOTINE 21 MG/24HR
1 PATCH, TRANSDERMAL 24 HOURS TRANSDERMAL DAILY
Status: DISCONTINUED | OUTPATIENT
Start: 2017-06-27 | End: 2017-07-03

## 2017-06-27 RX ORDER — TEMAZEPAM 15 MG/1
15 CAPSULE ORAL NIGHTLY PRN
Status: DISCONTINUED | OUTPATIENT
Start: 2017-06-27 | End: 2017-07-03

## 2017-06-27 RX ORDER — KETOROLAC TROMETHAMINE 30 MG/ML
30 INJECTION, SOLUTION INTRAMUSCULAR; INTRAVENOUS EVERY 6 HOURS PRN
Status: ACTIVE | OUTPATIENT
Start: 2017-06-27 | End: 2017-06-29

## 2017-06-27 RX ORDER — SODIUM CHLORIDE 9 MG/ML
INJECTION, SOLUTION INTRAVENOUS CONTINUOUS
Status: CANCELLED | OUTPATIENT
Start: 2017-06-27 | End: 2017-06-27

## 2017-06-27 RX ORDER — SODIUM CHLORIDE 9 MG/ML
INJECTION, SOLUTION INTRAVENOUS CONTINUOUS
Status: DISCONTINUED | OUTPATIENT
Start: 2017-06-27 | End: 2017-06-29

## 2017-06-27 RX ORDER — MORPHINE SULFATE 2 MG/ML
0.5 INJECTION, SOLUTION INTRAMUSCULAR; INTRAVENOUS EVERY 4 HOURS PRN
Status: DISCONTINUED | OUTPATIENT
Start: 2017-06-27 | End: 2017-06-29

## 2017-06-27 RX ORDER — ESCITALOPRAM OXALATE 10 MG/1
10 TABLET ORAL EVERY MORNING
Status: DISCONTINUED | OUTPATIENT
Start: 2017-06-28 | End: 2017-07-03

## 2017-06-27 RX ORDER — ENOXAPARIN SODIUM 100 MG/ML
40 INJECTION SUBCUTANEOUS NIGHTLY
Status: DISCONTINUED | OUTPATIENT
Start: 2017-06-27 | End: 2017-07-03

## 2017-06-27 RX ORDER — ONDANSETRON 2 MG/ML
4 INJECTION INTRAMUSCULAR; INTRAVENOUS EVERY 6 HOURS PRN
Status: DISCONTINUED | OUTPATIENT
Start: 2017-06-27 | End: 2017-07-03

## 2017-06-27 RX ORDER — LEVOTHYROXINE SODIUM 0.12 MG/1
125 TABLET ORAL
Status: DISCONTINUED | OUTPATIENT
Start: 2017-06-28 | End: 2017-07-03

## 2017-06-27 RX ORDER — ONDANSETRON 2 MG/ML
4 INJECTION INTRAMUSCULAR; INTRAVENOUS EVERY 4 HOURS PRN
Status: CANCELLED | OUTPATIENT
Start: 2017-06-27

## 2017-06-27 RX ORDER — KETOROLAC TROMETHAMINE 30 MG/ML
30 INJECTION, SOLUTION INTRAMUSCULAR; INTRAVENOUS ONCE
Status: COMPLETED | OUTPATIENT
Start: 2017-06-27 | End: 2017-06-27

## 2017-06-27 NOTE — H&P
[facility]  History and Physical     Freddy Thapa Patient Status:  Emergency    1960 MRN [id number]   Location [facility] Attending Marylu Cervantes MD   Hosp Day # 0 PCP Sandra Mancia MD     Chief Complain Disp: 90 tablet Rfl: 0   Neomycin-Polymyxin-HC 3.5-76310-1 Otic Suspension Place 4 drops into the left ear 3 (three) times daily.  Disp: 10 mL Rfl: 0   Levothyroxine Sodium 125 MCG Oral Tab Take 1 tablet (125 mcg total) by mouth before breakfast. Disp: 90 t Estimated Creatinine Clearance: 82.5 mL/min (based on SCr of 0.73 mg/dL). Recent Labs   Lab  06/27/17   1536   PTP  14.5*   INR  1.12*       No results for input(s): TROP, CK in the last 72 hours. Imaging: Imaging data reviewed in Epic.   CT hea

## 2017-06-27 NOTE — PROGRESS NOTES
ICU  Critical Care APN Progress Note    NAME: Corrine Carias - ROOM: A1/A1 - MRN: [id number] - Age: 49year old - :1960    History Of Present Illness:  Corrine Carias is a 49year old female with PMHx significant for metastatic breast CA, HPOL and hyp active all four quadrants, no masses, no organomegaly  Extremities: Extremities normal, atraumatic, no cyanosis or edema,capillary refill <3 sec.     Pulses: 2+ and symmetric all extremities  Skin: Skin color, texture, turgor normal for ethnicity, no rashes significant for metastatic breast CA, HPOL and hypothyroidism who presented to the ED from her dentist's office for left sided neck and facial swelling. Admitted to ICU for close monitoring of airway.      Left-sided facial swelling  -CT showed possible too

## 2017-06-27 NOTE — PROGRESS NOTES
[facility] Pharmacy Note:  Renal Adjustment for Unasyn (ampicillin/sulbactam)    Johnny Gonzalez is a 49year old female who has been prescribed Unasyn (ampicillin/sulbactam) 3 gm every 24 hrs.   CrCl is estimated creatinine clearance is 82.5 mL/min (based on SCr of

## 2017-06-27 NOTE — ED INITIAL ASSESSMENT (HPI)
Pt woke up with left sided facial swelling saw dentist today and was sent to er for possible Elias Angina

## 2017-06-27 NOTE — ED PROVIDER NOTES
Patient Seen in: [facility] Emergency Department    History   Patient presents with:  Swelling Edema (cardiovascular, metabolic)    Stated Complaint: swelling to left side of face, sent to r/o Elias scales.     HPI    Patient is a 61-year-old female Family History   Problem Relation Age of Onset   • Adopted:  Yes   • Family history unknown: Yes       Smoking status: Current Every Day Smoker                                                   Packs/day: 0.50      Years: 0.00         Types: Cigarettes  S or S4. No murmur. ABDOMEN: There is no focal tenderness to palpation appreciated anywhere throughout the abdomen. There is no guarding, no rebound, no mass, and no organomegaly appreciated. There is normoactive bowel sounds. There is no hernia.   EXTREMITI CBC W/ DIFFERENTIAL[[id number]]          Abnormal            Final result                 Please view results for these tests on the individual orders.    PROTHROMBIN TIME (PT)   BLOOD CULTURE   BLOOD CULTURE   MRSA CULTURE ONLY       ================ diagnosis)  Periapical abscess  Submandibular gland swelling    Disposition:  Admit    Follow-up:  No follow-up provider specified.     Medications Prescribed:  Current Discharge Medication List        Present on Admission  Date Reviewed: 5/6/2017

## 2017-06-28 ENCOUNTER — ANESTHESIA (OUTPATIENT)
Dept: SURGERY | Facility: HOSPITAL | Age: 57
DRG: 158 | End: 2017-06-28
Payer: COMMERCIAL

## 2017-06-28 ENCOUNTER — ANESTHESIA EVENT (OUTPATIENT)
Dept: SURGERY | Facility: HOSPITAL | Age: 57
DRG: 158 | End: 2017-06-28
Payer: COMMERCIAL

## 2017-06-28 ENCOUNTER — SURGERY (OUTPATIENT)
Age: 57
End: 2017-06-28

## 2017-06-28 LAB
BUN BLD-MCNC: 12 MG/DL (ref 8–20)
CALCIUM BLD-MCNC: 8.5 MG/DL (ref 8.3–10.3)
CHLORIDE: 113 MMOL/L (ref 101–111)
CO2: 25 MMOL/L (ref 22–32)
CREAT BLD-MCNC: 0.56 MG/DL (ref 0.55–1.02)
ERYTHROCYTE [DISTWIDTH] IN BLOOD BY AUTOMATED COUNT: 12.8 % (ref 11.5–16)
GLUCOSE BLD-MCNC: 136 MG/DL (ref 70–99)
HCT VFR BLD AUTO: 38.2 % (ref 34–50)
HGB BLD-MCNC: 12.4 G/DL (ref 12–16)
MCH RBC QN AUTO: 30.4 PG (ref 27–33.2)
MCHC RBC AUTO-ENTMCNC: 32.5 G/DL (ref 31–37)
MCV RBC AUTO: 93.6 FL (ref 81–100)
PLATELET # BLD AUTO: 275 10(3)UL (ref 150–450)
POTASSIUM SERPL-SCNC: 3.9 MMOL/L (ref 3.6–5.1)
RBC # BLD AUTO: 4.08 X10(6)UL (ref 3.8–5.1)
RED CELL DISTRIBUTION WIDTH-SD: 44 FL (ref 35.1–46.3)
SODIUM SERPL-SCNC: 144 MMOL/L (ref 136–144)
WBC # BLD AUTO: 7.1 X10(3) UL (ref 4–13)

## 2017-06-28 PROCEDURE — 87075 CULTR BACTERIA EXCEPT BLOOD: CPT | Performed by: DENTIST

## 2017-06-28 PROCEDURE — 87076 CULTURE ANAEROBE IDENT EACH: CPT | Performed by: DENTIST

## 2017-06-28 PROCEDURE — 99232 SBSQ HOSP IP/OBS MODERATE 35: CPT | Performed by: HOSPITALIST

## 2017-06-28 PROCEDURE — 0NBV0ZX EXCISION OF LEFT MANDIBLE, OPEN APPROACH, DIAGNOSTIC: ICD-10-PCS | Performed by: DENTIST

## 2017-06-28 PROCEDURE — 0CDXXZ1 EXTRACTION OF LOWER TOOTH, MULTIPLE, EXTERNAL APPROACH: ICD-10-PCS | Performed by: DENTIST

## 2017-06-28 PROCEDURE — 0NCV0ZZ EXTIRPATION OF MATTER FROM LEFT MANDIBLE, OPEN APPROACH: ICD-10-PCS | Performed by: DENTIST

## 2017-06-28 RX ORDER — SODIUM CHLORIDE, SODIUM LACTATE, POTASSIUM CHLORIDE, CALCIUM CHLORIDE 600; 310; 30; 20 MG/100ML; MG/100ML; MG/100ML; MG/100ML
INJECTION, SOLUTION INTRAVENOUS CONTINUOUS
Status: DISCONTINUED | OUTPATIENT
Start: 2017-06-28 | End: 2017-06-29

## 2017-06-28 RX ORDER — LIDOCAINE HYDROCHLORIDE AND EPINEPHRINE 10; 10 MG/ML; UG/ML
INJECTION, SOLUTION INFILTRATION; PERINEURAL AS NEEDED
Status: DISCONTINUED | OUTPATIENT
Start: 2017-06-28 | End: 2017-06-28 | Stop reason: HOSPADM

## 2017-06-28 RX ORDER — HYDROMORPHONE HYDROCHLORIDE 1 MG/ML
0.4 INJECTION, SOLUTION INTRAMUSCULAR; INTRAVENOUS; SUBCUTANEOUS EVERY 5 MIN PRN
Status: DISCONTINUED | OUTPATIENT
Start: 2017-06-28 | End: 2017-06-29 | Stop reason: HOSPADM

## 2017-06-28 RX ORDER — HYDROCODONE BITARTRATE AND ACETAMINOPHEN 10; 325 MG/1; MG/1
2 TABLET ORAL AS NEEDED
Status: DISCONTINUED | OUTPATIENT
Start: 2017-06-28 | End: 2017-06-29 | Stop reason: HOSPADM

## 2017-06-28 RX ORDER — MIDAZOLAM HYDROCHLORIDE 1 MG/ML
1 INJECTION INTRAMUSCULAR; INTRAVENOUS EVERY 5 MIN PRN
Status: DISCONTINUED | OUTPATIENT
Start: 2017-06-28 | End: 2017-06-29 | Stop reason: HOSPADM

## 2017-06-28 RX ORDER — HYDROCODONE BITARTRATE AND ACETAMINOPHEN 10; 325 MG/1; MG/1
1 TABLET ORAL AS NEEDED
Status: DISCONTINUED | OUTPATIENT
Start: 2017-06-28 | End: 2017-06-29 | Stop reason: HOSPADM

## 2017-06-28 RX ORDER — BUPIVACAINE HYDROCHLORIDE 5 MG/ML
INJECTION, SOLUTION EPIDURAL; INTRACAUDAL AS NEEDED
Status: DISCONTINUED | OUTPATIENT
Start: 2017-06-28 | End: 2017-06-28 | Stop reason: HOSPADM

## 2017-06-28 RX ORDER — MEPERIDINE HYDROCHLORIDE 25 MG/ML
12.5 INJECTION INTRAMUSCULAR; INTRAVENOUS; SUBCUTANEOUS AS NEEDED
Status: DISCONTINUED | OUTPATIENT
Start: 2017-06-28 | End: 2017-06-29 | Stop reason: HOSPADM

## 2017-06-28 RX ORDER — LEVOTHYROXINE SODIUM 0.12 MG/1
TABLET ORAL
Qty: 90 TABLET | Refills: 0 | Status: SHIPPED | OUTPATIENT
Start: 2017-06-28 | End: 2017-09-09

## 2017-06-28 RX ORDER — NALOXONE HYDROCHLORIDE 0.4 MG/ML
80 INJECTION, SOLUTION INTRAMUSCULAR; INTRAVENOUS; SUBCUTANEOUS AS NEEDED
Status: DISCONTINUED | OUTPATIENT
Start: 2017-06-28 | End: 2017-06-29 | Stop reason: HOSPADM

## 2017-06-28 NOTE — PAYOR COMM NOTE
Progress Notes signed by Denny Angela MD at 6/28/2017  2:08 PM     Author: Denny Angela MD Service: Hospitalist Author Type: Physician   Filed: 6/28/2017  2:08 PM Date of Service: 6/28/2017  2:03 PM Status: Signed   : Denny Angela MD (Physic 1536   PTP  14.5*   INR  1.12*         No results for input(s): TROP, CK in the last 72 hours.           Imaging: Imaging data reviewed in Epic.     Medications:   • enoxaparin  40 mg Subcutaneous Nightly   • ampicillin-sulbactam  3 g Intravenous Q8H   • n

## 2017-06-28 NOTE — PROGRESS NOTES
[facility]  Progress Note     Linda Martínez Patient Status:  Inpatient    1960 MRN [id number]   [facility] 4SW-A Attending Chad Barrera MD   Hosp Day # 1 PCP Patty Jane MD     Chief Complaint: facial cellulitis    S: mg Oral Daily   • escitalopram  10 mg Oral QAM   • Levothyroxine Sodium  125 mcg Oral Before breakfast   • Neomycin-Polymyxin-HC  4 drop Left Ear TID       ASSESSMENT / PLAN:     1. Left Odotontic abscess/celllulitis  1. Cont. unasyn  2. To OR today  3.  Co

## 2017-06-28 NOTE — PAYOR COMM NOTE
--------------  ADMISSION REVIEW     Payor: [address] [place] [address] #:  [id number]  Authorization Number: N/A    Admit date: 6/27/2017  2:22 PM       Admitting Physician: Hugh Montelongo MD  Attending Physician:  Dave Winston MD  [facility] complaint: swelling to left side of face, sent to r/o Elias angina. Other systems are as noted in HPI. Constitutional and vital signs reviewed. All other systems reviewed and negative except as noted above.     PSFH elements reviewed from today and awake, alert and oriented to time place and person. Motor strength is 5 over 5 in all 4 extremities. There are no gross motor or sensory deficits appreciated. Cranial nerves II through XII are intact.   Patient is ambulatory in the ER with steady gait and n Course  ------------------------------------------------------------[OG. 2]  [facility][JZ.1]   CT soft tissue neck shows evidence of what appears to be periapical abscesses around the area to the lower molars on the left side with a significant amount of edema no ICD-10-CM Noted POA    * (Principal)Dental infection K04.7 6/27/2017 Unknown    Periapical abscess K04.7 6/27/2017     Submandibular gland swelling R60.9 6/27/2017[JZ.2]                 Signed by Marylu Cervantes MD on 6/27/2017  6:41 PM   Attribution K encounter. Current Outpatient Prescriptions on File Prior to Encounter:  Goserelin Acetate (ZOLADEX SC) Inject into the skin. Disp:  Rfl:    Denosumab (XGEVA SC) Inject into the skin.  Disp:  Rfl:    escitalopram 10 MG Oral Tab TAKE 1 TABLET(10 MG) BY LORRAINE 1536   WBC  11.7   --    HGB  13.9   --    MCV  92.7   --    PLT  346.0   --    INR   --   1.12*       Recent Labs   Lab  06/27/17   1443  06/27/17   1536   GLU  110*   --    BUN  7*   --    CREATSERUM  0.73   --    CA  9.2   --    ALB  3.5   --    NA  136 potential deep space infection of the neck  5. Unasyn IV  2. Hypothyroid  1. Continue levothyroxine  3. Mets breast ca[GS.1]  1. Continue vazquez anastrazole[GS.3]   4.  Elevated LFT due to 3      Quality:  · DVT Prophylaxis: lovenox  · CODE status: full  · Fo infusion     Date Action Dose Route User    6/27/2017 1726 New Bag 1000 mL Intravenous Ever uHston RN      0.9%  NaCl infusion     Date Action Dose Route User    6/27/2017 2000 Rate/Dose Verify (none) Intravenous Kami Washburn RN    6/27/201

## 2017-06-28 NOTE — PLAN OF CARE
NURSING ADMISSION NOTE      Patient admitted via Cart  Oriented to room. Safety precautions initiated. Bed in low position. Call light in reach. Received patient from ED. A&Ox4. VSS. Denies pain, states face/neck only painful with palpation.  [facility]

## 2017-06-28 NOTE — CONSULTS
Pulmonary / Critical Care H&P/Consult       NAME: [address] [place] [address] Street: [address] - MRN: [id number] - Age: 49year old - :  1960    Date of Admission: 2017  2:22 PM  Admission Diagnosis: Periapical abscess [K04.7]  Dental infection [K04.7]  Sub MOUTH DAILY Disp: 30 tablet Rfl: 5   hydrocodone-acetaminophen (NORCO) 7.5-325 MG Oral Tab Take 1 tablet by mouth every 8 (eight) hours as needed for Pain.  Disp: 90 tablet Rfl: 0   Neomycin-Polymyxin-HC 3.5-48563-8 Otic Suspension Place 4 drops into the le appears stated age   Head:    Normocephalic, without obvious abnormality, atraumatic   Eyes:    PERRL, conjunctiva/corneas clear, EOM's intact   Throat:   Swelling of L neck / parotid area.     Neck:   No stridor   Back:     Symmetric, no curvature, ROM nor Hayder Hensley M.D.  [address] Pulmonary / Mike Doan  6/28/2017

## 2017-06-28 NOTE — CONSULTS
Name: Eleonora Baker  Consulting Physician Shaka Brothers of note sent to]: Dr. Candy Noyola  Reason for Request: Neck swelling    Onset Date: 6/27/17    Medications: (reviewed EMR)   Allergies: (reviewed EMR) Radiology Contrast Iodinated Dyes    PMH/PSH: (reviwed EMR)  F Rfl: 2   temazepam 15 MG Oral Cap Take 1 capsule (15 mg total) by mouth nightly as needed for Sleep.  Disp: 30 capsule Rfl: 1     ALL:   Radiology Contrast *    Hives, Itching      Social History  Social History   Marital status:   Spouse name: N/A midline. PROCEDURE: fiberoptic laryngoscopy. Consent obtained. Topical afrin/lido. See results above. IMAGING: CT neck without contrast.  There is a periapical lucency associated with tooth #18. Tooth #17 has been extracted.  There may be cortical ROBERTO Burns

## 2017-06-28 NOTE — CONSULTS
INFECTIOUS DISEASE CONSULTATION    Jayantjana Topete Patient Status:  Inpatient    1960 MRN [id number]   [facility] 4SW-A Attending Pernell Castleman, MD   [facility] Day # 1 ELDER Feldman Levothyroxine Sodium (SYNTHROID, LEVOTHROID) tab 125 mcg, 125 mcg, Oral, Before breakfast  •  Neomycin-Polymyxin-HC (CORTISPORIN) 3.5-17910-4 otic suspension 4 drop, 4 drop, Left Ear, TID  •  temazepam (RESTORIL) cap 15 mg, 15 mg, Oral, Nightly PRN    Revi chemistry     Allergic rhinitis, cause unspecified     Hyperlipidemia     Hypothyroidism     Pleural effusion     Hypokalemia     At risk for falling     Nipple lesion     Breast mass in female     Breast cancer metastasized to pleura [facility])     Bone metast

## 2017-06-28 NOTE — CONSULTS
[facility]    Report of Consultation    Dottie Poon Patient Status:  Inpatient    1960 MRN [id number]   [facility] 4SW-A Attending Tawanna Griffin MD   Hosp Day # 0 PCP Abhay Robbins MD     Date of Admission:  2017  Kaleb Her temporal temperature is 99.7 °F (37.6 °C). Her blood pressure is 122/82 and her pulse is 84. Her respiration is 21 and oxygen saturation is 93%.    Physical Exam    Results:     Lab Results  Component Value Date   WBC 11.7 06/27/2017   HGB 13.9 06/27/20 Panoramic Of Jaws (flv=40973)    Addendum Date: 6/27/2017    CORRECTION Corrected on: 6/27/2017;   PROCEDURE:  XR PANORAMIC OF JAWS (CPT=70355)  TECHNIQUE:  Single panelipse view of the mandible was obtained. COMPARISON:  None.   INDICATIONS:  swelling to will defer to ENT  2) ok to have a liquid B-Fast in am, NPO after that and will plan for OR removal of teeth and I&D in the afternoon 6/28/17. 3) Anesthesia to preop - poss awake fiberoptic since limited opening.   Montse Silva, CEES  6/27/2017

## 2017-06-28 NOTE — ANESTHESIA PREPROCEDURE EVALUATION
PRE-OP EVALUATION    Patient Name: Jeannette Kenney    Pre-op Diagnosis:    Procedure(s):  ODONTECTOMY    Surgeon(s) and Role:     Max Peers DDS - Primary    Pre-op vitals reviewed.   Temp: 98.2 °F (36.8 °C)  Pulse: 68  Resp: 18  BP: 101/42  SpO2: 93 % hydrocodone-acetaminophen (NORCO) 7.5-325 MG Oral Tab Take 1 tablet by mouth every 8 (eight) hours as needed for Pain. Disp: 90 tablet Rfl: 0   Neomycin-Polymyxin-HC 3.5-63306-0 Otic Suspension Place 4 drops into the left ear 3 (three) times daily.  Disp: 1 Exercise tolerance: good     MET: >4    (+) obesity     (+) hyperlipidemia                                  Endo/Other           (+) hypothyroidism                (+) arthritis (adhesive capsulitis L shoulder)       Pulmonary        COPD: smoker.      Pneum Plan/risks discussed with: patient

## 2017-06-28 NOTE — PROGRESS NOTES
S: patient feels pain and swelling is slightly better. No problems breathing. Tolerated diet without issues yesterday. O:    06/28/17  0600   BP: 108/54   Pulse: 76   Resp: 24   Temp:      Awake and alert. No SOB or stridor. AU: clear. Nares: clear.

## 2017-06-28 NOTE — PLAN OF CARE
Impaired Swallowing    • Minimize aspiration risk Progressing        Patient/Family Goals    • Patient/Family Long Term Goal Progressing    • Patient/Family Short Term Goal Progressing        RESPIRATORY - ADULT    • Achieves optimal ventilation and oxygen

## 2017-06-29 PROBLEM — C50.919 MALIGNANT NEOPLASM OF FEMALE BREAST (HCC): Status: ACTIVE | Noted: 2017-05-06

## 2017-06-29 LAB
ALBUMIN SERPL-MCNC: 2.9 G/DL (ref 3.5–4.8)
ALP LIVER SERPL-CCNC: 109 U/L (ref 41–108)
ALT SERPL-CCNC: 95 U/L (ref 14–54)
AST SERPL-CCNC: 95 U/L (ref 15–41)
BASOPHILS # BLD AUTO: 0.03 X10(3) UL (ref 0–0.1)
BASOPHILS NFR BLD AUTO: 0.3 %
BILIRUB SERPL-MCNC: 0.4 MG/DL (ref 0.1–2)
BUN BLD-MCNC: 8 MG/DL (ref 8–20)
CALCIUM BLD-MCNC: 7.7 MG/DL (ref 8.3–10.3)
CHLORIDE: 113 MMOL/L (ref 101–111)
CO2: 23 MMOL/L (ref 22–32)
CREAT BLD-MCNC: 0.59 MG/DL (ref 0.55–1.02)
EOSINOPHIL # BLD AUTO: 0 X10(3) UL (ref 0–0.3)
EOSINOPHIL NFR BLD AUTO: 0 %
ERYTHROCYTE [DISTWIDTH] IN BLOOD BY AUTOMATED COUNT: 12.7 % (ref 11.5–16)
GLUCOSE BLD-MCNC: 142 MG/DL (ref 70–99)
HAV IGM SER QL: 2.6 MG/DL (ref 1.7–3)
HAV IGM SER QL: NONREACTIVE
HBV CORE IGM SER QL: NONREACTIVE
HBV SURFACE AG SERPL QL IA: NONREACTIVE
HCT VFR BLD AUTO: 37.5 % (ref 34–50)
HEPATITIS C VIRUS AB INTERPRETATION: NONREACTIVE
HGB BLD-MCNC: 12.1 G/DL (ref 12–16)
IMMATURE GRANULOCYTE COUNT: 0.09 X10(3) UL (ref 0–1)
IMMATURE GRANULOCYTE RATIO %: 0.8 %
LYMPHOCYTES # BLD AUTO: 1.1 X10(3) UL (ref 0.9–4)
LYMPHOCYTES NFR BLD AUTO: 9.5 %
M PROTEIN MFR SERPL ELPH: 6.8 G/DL (ref 6.1–8.3)
MCH RBC QN AUTO: 30 PG (ref 27–33.2)
MCHC RBC AUTO-ENTMCNC: 32.3 G/DL (ref 31–37)
MCV RBC AUTO: 93.1 FL (ref 81–100)
MONOCYTES # BLD AUTO: 0.33 X10(3) UL (ref 0.1–0.6)
MONOCYTES NFR BLD AUTO: 2.9 %
NEUTROPHIL ABS PRELIM: 10.01 X10 (3) UL (ref 1.3–6.7)
NEUTROPHILS # BLD AUTO: 10.01 X10(3) UL (ref 1.3–6.7)
NEUTROPHILS NFR BLD AUTO: 86.5 %
PLATELET # BLD AUTO: 321 10(3)UL (ref 150–450)
POTASSIUM SERPL-SCNC: 3.7 MMOL/L (ref 3.6–5.1)
RBC # BLD AUTO: 4.03 X10(6)UL (ref 3.8–5.1)
RED CELL DISTRIBUTION WIDTH-SD: 43.8 FL (ref 35.1–46.3)
SODIUM SERPL-SCNC: 144 MMOL/L (ref 136–144)
WBC # BLD AUTO: 11.6 X10(3) UL (ref 4–13)

## 2017-06-29 PROCEDURE — 99232 SBSQ HOSP IP/OBS MODERATE 35: CPT | Performed by: HOSPITALIST

## 2017-06-29 RX ORDER — MORPHINE SULFATE 4 MG/ML
0.5 INJECTION, SOLUTION INTRAMUSCULAR; INTRAVENOUS EVERY 4 HOURS PRN
Status: DISCONTINUED | OUTPATIENT
Start: 2017-06-29 | End: 2017-07-03

## 2017-06-29 RX ORDER — DOCUSATE SODIUM 100 MG/1
100 CAPSULE, LIQUID FILLED ORAL 2 TIMES DAILY
Status: DISCONTINUED | OUTPATIENT
Start: 2017-06-29 | End: 2017-07-03

## 2017-06-29 RX ORDER — POLYETHYLENE GLYCOL 3350 17 G/17G
17 POWDER, FOR SOLUTION ORAL DAILY
Status: DISCONTINUED | OUTPATIENT
Start: 2017-06-29 | End: 2017-07-03

## 2017-06-29 NOTE — PROGRESS NOTES
[facility]  Progress Note     Mary Murphy Patient Status:  Inpatient    1960 MRN [id number]   [facility] 4SW-A Attending Pasquale Celaya MD   Hosp Day # 2 PCP Kami Gamble MD     Chief Complaint: facial cellulitis    S: Epic.    Medications:   • enoxaparin  40 mg Subcutaneous Nightly   • ampicillin-sulbactam  3 g Intravenous Q8H   • nicotine  1 patch Transdermal Daily   • anastrozole  1 mg Oral Daily   • escitalopram  10 mg Oral QAM   • Levothyroxine Sodium  125 mcg Oral

## 2017-06-29 NOTE — ANESTHESIA POSTPROCEDURE EVALUATION
Wes Patient Status:  Inpatient   Age/Gender 49year old female MRN [id number]   [facility] SURGERY Attending Lu Lamb 148 Day # 1 PCP Janet Victor MD       Anesthesia Post-op Note    Procedure

## 2017-06-29 NOTE — PROGRESS NOTES
S: patient feels pain and swelling is better. Had surgery last night though so unsure if post surgical pain and swelling  No problems breathing. Feels jaw opening may be a bit better.    O:    06/29/17  0406   BP: 101/52   Pulse: 63   Resp: 18   Temp: 98

## 2017-06-29 NOTE — PLAN OF CARE
Patient c/o mild pain on oral incision. Patient afebrile. Full liquid diet tolerated well. Scant serosanguinous drainage noted on incision site, penrose drain intact. Respirations non-labored, lungs sound clear. Left side of face red and swollen.  Patient e

## 2017-06-29 NOTE — PROGRESS NOTES
[facility]                INFECTIOUS DISEASE PROGRESS NOTE    Libiaelver Jefferyqian Patient Status:  Inpatient    1960 MRN [id number]   [facility] 4NW-A Attending Lu Masterson 148 Day # 2 PCP Juana Diaz MD     Ant Preliminary result Updated: 06/29/17 1158   Specimen: Other from Abdomen     Aerobic Culture Result Pending    Aerobic Smear Moderate Neutrophils seen     Many Gram Negative Rods     Many Gram Positive Rods     Anaerobic Culture Once [[id number]] Collected:

## 2017-06-29 NOTE — BRIEF OP NOTE
Pre-Operative Diagnosis: Abscess [L02.91]     Post-Operative Diagnosis: mandibular abscess     Procedure Performed:   Procedure(s):  Removal three teeth lower left jaw , irrigation and debridement left mandibular abscess, biopsy of left mandibular tissu

## 2017-06-29 NOTE — OPERATIVE REPORT
[address]    PATIENT'S NAME: Dayron [place]   ATTENDING PHYSICIAN: Oniel Galindo. LATOSHA Fitzpatrick   OPERATING PHYSICIAN: Oniel Galindo.  LATOSHA Fitzpatrick   PATIENT ACCOUNT#:   [id number]    LOCATION:  PACU 140[facility] PACU 2 EDWP 10  MEDICAL RECORD #:   [id number]       DATE OF BI lateral to tooth #18 looked soft and mottled. Subsequent to this, then debridement of the area was done, and the bone was collected.   This section of bone as well as soft tissue is to be submitted for pathology evaluation to rule out the possibility of me

## 2017-06-30 LAB
ALBUMIN SERPL-MCNC: 2.7 G/DL (ref 3.5–4.8)
ALP LIVER SERPL-CCNC: 94 U/L (ref 41–108)
ALT SERPL-CCNC: 73 U/L (ref 14–54)
AST SERPL-CCNC: 47 U/L (ref 15–41)
BILIRUB SERPL-MCNC: 0.3 MG/DL (ref 0.1–2)
BUN BLD-MCNC: 9 MG/DL (ref 8–20)
CALCIUM BLD-MCNC: 8 MG/DL (ref 8.3–10.3)
CHLORIDE: 113 MMOL/L (ref 101–111)
CO2: 25 MMOL/L (ref 22–32)
CREAT BLD-MCNC: 0.53 MG/DL (ref 0.55–1.02)
GLUCOSE BLD-MCNC: 91 MG/DL (ref 70–99)
HAV IGM SER QL: 2.7 MG/DL (ref 1.7–3)
M PROTEIN MFR SERPL ELPH: 6.3 G/DL (ref 6.1–8.3)
POTASSIUM SERPL-SCNC: 3.5 MMOL/L (ref 3.6–5.1)
SODIUM SERPL-SCNC: 145 MMOL/L (ref 136–144)

## 2017-06-30 PROCEDURE — 99232 SBSQ HOSP IP/OBS MODERATE 35: CPT | Performed by: HOSPITALIST

## 2017-06-30 RX ORDER — AMOXICILLIN AND CLAVULANATE POTASSIUM 875; 125 MG/1; MG/1
1 TABLET, FILM COATED ORAL 2 TIMES DAILY
Qty: 20 TABLET | Refills: 0 | Status: SHIPPED | OUTPATIENT
Start: 2017-06-30 | End: 2017-07-03

## 2017-06-30 NOTE — PROGRESS NOTES
[facility]  Progress Note     Alvin Gomez Patient Status:  Inpatient    1960 MRN [id number]   [facility] 4SW-A Attending Ihsan Nichole MD   Hosp Day # 3 PCP Mohan Shaffer MD     Chief Complaint: facial cellulitis    S: values in this interval not displayed. Recent Labs   Lab  06/27/17   1536   PTP  14.5*   INR  1.12*       No results for input(s): TROP, CK in the last 72 hours. Imaging: Imaging data reviewed in Epic.     Medications:   • docusate sodium  100

## 2017-06-30 NOTE — CM/SW NOTE
06/30/17 1300   CM/SW Screening   Referral Source Social Work (self-referral)   [address] staff; Chart review   Patient's [address]   Patient lives with Spouse   Discharge Needs   Anticipated D/C needs To be determined   Pt has

## 2017-06-30 NOTE — PLAN OF CARE
Impaired Swallowing    • Minimize aspiration risk Progressing        RESPIRATORY - ADULT    • Achieves optimal ventilation and oxygenation Progressing        SKIN/TISSUE INTEGRITY - ADULT    • Skin integrity remains intact Progressing    • Incision(s), wou

## 2017-06-30 NOTE — PROGRESS NOTES
[facility]                INFECTIOUS DISEASE PROGRESS NOTE    Alvin Gomez Patient Status:  Inpatient    1960 MRN [id number]   [facility] 4NW-A Attending Lu [address] Day # 3 PCP Mohan Shaffer MD     Ant values in this interval not displayed.        No results found for: [facility]  Microbiology    Procedure Component Value Units Date/Time   Aerobic Bacterial Culture Once [[id number]] Collected: 06/28/17 2122   Order Status: Completed Lab Status: Final result Upd capsulitis of left shoulder     Malignant neoplasm of female breast ([address]     Dental infection     Dental abscess     Periapical abscess     Submandibular gland swelling      ASSESSMENT/PLAN:  1.  SP I/D left mandibular abscess and biopsy of hard tissue  -g

## 2017-06-30 NOTE — PROGRESS NOTES
S: patient continues to feel improvement. Feels neck swelling is down. No issues breathing. O:    06/30/17  0555   BP: 113/65   Pulse: 61   Resp: 18   Temp: 98.1 °F (36.7 °C)     Awake and alert. No SOB or stridor. AU: clear. Nares: clear.   OC/OP:i

## 2017-06-30 NOTE — PROGRESS NOTES
[facility]    Progress Note    Luis Samuels Patient Status:  Inpatient    1960 MRN [id number]   [facility] 4NW-A Attending Katlyn Grover DDS   Hosp Day # 3 PCP Virgen Hutton MD     Subjective:   Subjective: Feeling bet monitor recovery                 Amaris Darden DDS  6/30/2017

## 2017-07-01 PROCEDURE — 99232 SBSQ HOSP IP/OBS MODERATE 35: CPT | Performed by: INTERNAL MEDICINE

## 2017-07-01 NOTE — PROGRESS NOTES
[facility]  Progress Note     Alvin Gomez Patient Status:  Inpatient    1960 MRN [id number]   [facility] 4SW-A Attending Ihasn Nichole MD   Hosp Day # 4 PCP Mohan Shaffer MD     Chief Complaint: facial cellulitis    S: TID       ASSESSMENT / PLAN:     1. Left Odotontic abscess/celllulitis s/p I&D evac of abscess teeth #20, #19, and #18 with debridement of left madndible on 6/28/17  1. Cont. unasyn  2. Cont. Pain control  3. [address] [place] [address] pathology  4.  Concern for possible osteo

## 2017-07-01 NOTE — DISCHARGE SUMMARY
[facility]  DISCHARGE SUMMARY     Arelis Wray Patient Status:  Inpatient    1960 MRN [id number]   [facility] 4NW-A Attending Ger Gomez MD   Hosp Day # 4 PCP Steffany Howard MD     Date of Admission: 2017  Date debrideent of left mandible    Incidental or significant findings and recommendations (brief descriptions):  • none    Lab/Test results pending at Discharge:   · none    Consultants:  • OMFS, ID    Discharge Medication List:     Discharge Medications [facility] [address]    Phone:  [phone]   · Levothyroxine Sodium 125 MCG Tabs     Please  your prescriptions at the location directed by your doctor or nurse    Bring a paper prescription for each of these medications  · Amoxicillin-Pot Clavulanat

## 2017-07-01 NOTE — PROGRESS NOTES
Assumed care at 0730;pt.asleep;awakened to give am meds;tolerated; rounded and removed penrose drain;MD said pt.  Can eat regular diet;tolerated;no bleeding noted and no pain verbalized;

## 2017-07-01 NOTE — PLAN OF CARE
Pt.alert and oriented;denies any pain in swallowing nor bleeding;tolerating regular diet; called this nurse and said patient has to stay overnite for more antibiotic;informed patient; said she called the patient and spouse to tell them PO

## 2017-07-02 PROCEDURE — 99232 SBSQ HOSP IP/OBS MODERATE 35: CPT | Performed by: INTERNAL MEDICINE

## 2017-07-02 NOTE — PROGRESS NOTES
[facility]                INFECTIOUS DISEASE PROGRESS NOTE    Luis Samuels Patient Status:  Inpatient    1960 MRN [id number]   [facility] 4NW-A Attending Katlyn Grover DDS   Hosp Day # 5 PCP Virgen Hutton MD     Ant Order Status: Completed Lab Status: Preliminary result Updated: 06/29/17 1800   Specimen: Blood from Blood,peripheral     Blood Culture Result No Growth 2 Days   Blood Culture FREQ X 2 [[id number]] Collected: 06/27/17 1740   Order Status: Completed Lab [address] .  All questions answered     MD Aniket [facility]

## 2017-07-02 NOTE — PROGRESS NOTES
Afebrile, remain on IV ABT for dental abscess, tolerating. Mouth pain is improved. Taking  meds with apple sauce but no swallowing difficulty,  Verbalized understanding regarding  Discharge planning, MD. Awaiting pathology result.    Patient stated no luis carlos

## 2017-07-02 NOTE — PROGRESS NOTES
[facility]  Progress Note     Jayantjana Topete Patient Status:  Inpatient    1960 MRN [id number]   [facility] 4SW-A Attending Pernell Castleman, MD   Hosp Day # 5 PCP Sakshi Feldman MD     Chief Complaint: facial cellulitis    S: TID       ASSESSMENT / PLAN:     1. Left Odotontic abscess/celllulitis s/p I&D evac of abscess teeth #20, #19, and #18 with debridement of left madndible on 6/28/17  1. Cont. unasyn  2. Cont. Pain control  3. [address] [place] [address] pathology  4.  Concern for possible osteo

## 2017-07-03 VITALS
RESPIRATION RATE: 18 BRPM | BODY MASS INDEX: 27.56 KG/M2 | SYSTOLIC BLOOD PRESSURE: 107 MMHG | OXYGEN SATURATION: 98 % | TEMPERATURE: 99 F | WEIGHT: 171.5 LBS | HEART RATE: 72 BPM | HEIGHT: 66 IN | DIASTOLIC BLOOD PRESSURE: 57 MMHG

## 2017-07-03 PROCEDURE — 99239 HOSP IP/OBS DSCHRG MGMT >30: CPT | Performed by: INTERNAL MEDICINE

## 2017-07-03 RX ORDER — CLINDAMYCIN HYDROCHLORIDE 300 MG/1
300 CAPSULE ORAL 4 TIMES DAILY
Qty: 56 CAPSULE | Refills: 0 | Status: SHIPPED | OUTPATIENT
Start: 2017-07-03 | End: 2017-07-17

## 2017-07-03 NOTE — PROGRESS NOTES
[facility]                INFECTIOUS DISEASE PROGRESS NOTE    Vannessa Severe Patient Status:  Inpatient    1960 MRN [id number]   [facility] 4NW-A Attending Huber Martin DDS   Hosp Day # 6 PCP Hardin Cooks, MD     Ant Lab Status: Final result Updated: 07/02/17 1704   Specimen: Other from Mandible, left     Anaerobic Culture 1+ growth Prevotella melaninogenica (A)   Aerobic Bacterial Culture Once [[id number]] Collected: 06/28/17 2122   Order Status: Completed Lab Status:

## 2017-07-03 NOTE — PROGRESS NOTES
[facility]  Progress Note     Antoni Lin Patient Status:  Inpatient    1960 MRN [id number]   [facility] 4SW-A Attending Shira Dee MD   Hosp Day # 6 PCP Bryon Smith MD     Chief Complaint: facial cellulitis    S: Odotontic abscess/celllulitis s/p I&D evac of abscess teeth #20, #19, and #18 with debridement of left madndible on 6/28/17  1. Cont. Unasyn, transition to oral abx  2. Cont. Pain control  3. [address] [place] [address] pathology  4.  Concern for possible osteomylitis vs osteon

## 2017-07-03 NOTE — PROGRESS NOTES
Reviewed all discharge instructions with patient and her  explained all medications and follow-up appts. Patient verbalized her understanding of teaching.

## 2017-07-06 RX ORDER — ANASTROZOLE 1 MG/1
1 TABLET ORAL
Qty: 10 TABLET | Refills: 0 | Status: SHIPPED | OUTPATIENT
Start: 2017-07-06 | End: 2017-09-14

## 2017-07-07 ENCOUNTER — HOSPITAL ENCOUNTER (OUTPATIENT)
Dept: CT IMAGING | Age: 57
Discharge: HOME | End: 2017-07-07
Attending: INTERNAL MEDICINE
Payer: COMMERCIAL

## 2017-07-07 DIAGNOSIS — C79.51 BONE METASTASES (HCC): ICD-10-CM

## 2017-07-07 DIAGNOSIS — C50.911 BREAST CANCER METASTASIZED TO PLEURA, RIGHT (HCC): ICD-10-CM

## 2017-07-07 DIAGNOSIS — C50.011 MALIGNANT NEOPLASM OF NIPPLE OF RIGHT BREAST IN FEMALE (HCC): ICD-10-CM

## 2017-07-07 DIAGNOSIS — C78.2 BREAST CANCER METASTASIZED TO PLEURA, RIGHT (HCC): ICD-10-CM

## 2017-07-07 PROCEDURE — 71250 CT THORAX DX C-: CPT | Performed by: INTERNAL MEDICINE

## 2017-07-07 PROCEDURE — 74176 CT ABD & PELVIS W/O CONTRAST: CPT | Performed by: INTERNAL MEDICINE

## 2017-07-11 ENCOUNTER — TELEPHONE (OUTPATIENT)
Dept: FAMILY MEDICINE CLINIC | Facility: CLINIC | Age: 57
End: 2017-07-11

## 2017-07-11 DIAGNOSIS — L02.91 ABSCESS: Primary | ICD-10-CM

## 2017-07-11 DIAGNOSIS — M86.10 ACUTE OSTEOMYELITIS (HCC): ICD-10-CM

## 2017-07-11 DIAGNOSIS — M27.2 ABSCESS OF JAW: ICD-10-CM

## 2017-07-11 NOTE — TELEPHONE ENCOUNTER
Tigre Ruiz called and relayed that the pt needs a medical referral to Dr. Bernie Mir DDS, as she spoke w/Rylie at Dr. Antonia Wheeler office. 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     P

## 2017-07-11 NOTE — TELEPHONE ENCOUNTER
Message received from [facility] today: Please see message below and advise if okay to refer. This is an urgent request, as pt has an appt today w/Dr. Castro Busing at 3:00, per [facility] [place] Referrals.     Reason for the order/referral:

## 2017-07-13 ENCOUNTER — OFFICE VISIT (OUTPATIENT)
Dept: HEMATOLOGY/ONCOLOGY | Age: 57
End: 2017-07-13
Attending: INTERNAL MEDICINE
Payer: COMMERCIAL

## 2017-07-13 VITALS
SYSTOLIC BLOOD PRESSURE: 131 MMHG | RESPIRATION RATE: 20 BRPM | TEMPERATURE: 98 F | BODY MASS INDEX: 27 KG/M2 | WEIGHT: 168 LBS | DIASTOLIC BLOOD PRESSURE: 74 MMHG | HEART RATE: 88 BPM | OXYGEN SATURATION: 98 %

## 2017-07-13 DIAGNOSIS — C50.919 BREAST CANCER METASTASIZED TO PLEURA, UNSPECIFIED LATERALITY (HCC): ICD-10-CM

## 2017-07-13 DIAGNOSIS — C78.2 BREAST CANCER METASTASIZED TO PLEURA, RIGHT (HCC): ICD-10-CM

## 2017-07-13 DIAGNOSIS — C79.51 BONE METASTASES (HCC): ICD-10-CM

## 2017-07-13 DIAGNOSIS — C50.911 BREAST CANCER METASTASIZED TO PLEURA, RIGHT (HCC): ICD-10-CM

## 2017-07-13 DIAGNOSIS — C79.51 BONE METASTASES (HCC): Primary | ICD-10-CM

## 2017-07-13 DIAGNOSIS — C78.2 BREAST CANCER METASTASIZED TO PLEURA, UNSPECIFIED LATERALITY (HCC): ICD-10-CM

## 2017-07-13 DIAGNOSIS — C50.011 MALIGNANT NEOPLASM OF NIPPLE OF RIGHT BREAST IN FEMALE (HCC): ICD-10-CM

## 2017-07-13 LAB
ALBUMIN SERPL-MCNC: 3.6 G/DL (ref 3.5–4.8)
ALP LIVER SERPL-CCNC: 109 U/L (ref 41–108)
ALT SERPL-CCNC: 151 U/L (ref 14–54)
AST SERPL-CCNC: 95 U/L (ref 15–41)
BASOPHILS # BLD AUTO: 0.12 X10(3) UL (ref 0–0.1)
BASOPHILS NFR BLD AUTO: 2 %
BILIRUB SERPL-MCNC: 0.3 MG/DL (ref 0.1–2)
BUN BLD-MCNC: 13 MG/DL (ref 8–20)
CALCIUM BLD-MCNC: 10.1 MG/DL (ref 8.3–10.3)
CHLORIDE: 109 MMOL/L (ref 101–111)
CO2: 27 MMOL/L (ref 22–32)
CREAT BLD-MCNC: 0.78 MG/DL (ref 0.55–1.02)
EOSINOPHIL # BLD AUTO: 0.17 X10(3) UL (ref 0–0.3)
EOSINOPHIL NFR BLD AUTO: 2.8 %
ERYTHROCYTE [DISTWIDTH] IN BLOOD BY AUTOMATED COUNT: 13.3 % (ref 11.5–16)
GLUCOSE BLD-MCNC: 106 MG/DL (ref 70–99)
HCT VFR BLD AUTO: 39.8 % (ref 34–50)
HGB BLD-MCNC: 13.3 G/DL (ref 12–16)
IMMATURE GRANULOCYTE COUNT: 0.01 X10(3) UL (ref 0–1)
IMMATURE GRANULOCYTE RATIO %: 0.2 %
LYMPHOCYTES # BLD AUTO: 2.58 X10(3) UL (ref 0.9–4)
LYMPHOCYTES NFR BLD AUTO: 43.1 %
M PROTEIN MFR SERPL ELPH: 7.4 G/DL (ref 6.1–8.3)
MCH RBC QN AUTO: 31.3 PG (ref 27–33.2)
MCHC RBC AUTO-ENTMCNC: 33.4 G/DL (ref 31–37)
MCV RBC AUTO: 93.6 FL (ref 81–100)
MONOCYTES # BLD AUTO: 0.5 X10(3) UL (ref 0.1–0.6)
MONOCYTES NFR BLD AUTO: 8.4 %
NEUTROPHIL ABS PRELIM: 2.6 X10 (3) UL (ref 1.3–6.7)
NEUTROPHILS # BLD AUTO: 2.6 X10(3) UL (ref 1.3–6.7)
NEUTROPHILS NFR BLD AUTO: 43.5 %
PLATELET # BLD AUTO: 278 10(3)UL (ref 150–450)
POTASSIUM SERPL-SCNC: 4.4 MMOL/L (ref 3.6–5.1)
RBC # BLD AUTO: 4.25 X10(6)UL (ref 3.8–5.1)
RED CELL DISTRIBUTION WIDTH-SD: 45.5 FL (ref 35.1–46.3)
SODIUM SERPL-SCNC: 141 MMOL/L (ref 136–144)
WBC # BLD AUTO: 6 X10(3) UL (ref 4–13)

## 2017-07-13 PROCEDURE — 99213 OFFICE O/P EST LOW 20 MIN: CPT | Performed by: INTERNAL MEDICINE

## 2017-07-13 PROCEDURE — 96402 CHEMO HORMON ANTINEOPL SQ/IM: CPT

## 2017-07-13 NOTE — PROGRESS NOTES
Patient here for MD visit and Zoladex/Xgeva. She was hospitalized recently for tooth issues. Was told that it is related to the Rosemarie-barre injection. Would like to discuss with Dr. Gonzales  today. Oral Chemotherapy Monitoring and Adherence  1.  When and h

## 2017-07-13 NOTE — PATIENT INSTRUCTIONS
For Dr. Yonathan Ron nurse line, call [phone], Monday through Friday 8-4:30. After hours or weekends for emergent needs call [phone].

## 2017-07-13 NOTE — PROGRESS NOTES
Cancer Center Progress Note  Patient Name: Bailey Enamorado   YOB: 1960   Medical Record Number: [id number]     Attending Physician: Pao Renae M.D. Date of Visit: 7/13/2017    Chief Complaint:  No chief complaint on file.        O children: N/A     Occupational History  None on file     Social History Main Topics   Smoking status: Current Every Day Smoker  0.50 Packs/day     Types: Cigarettes    Smokeless tobacco: Never Used    Alcohol use Yes  0.0 oz/week     Comment: 2 drinks/yr No nausea, vomiting, diarrhea, GI bleeding, or constipation. Genitorurinary  No hematuria, dysuria, or incontinence. No abnormal bleeding.    Integumentary No rashes or yellowing of the skin   Neurologic No headache, blurred vision, and no areas of focal have slightly decreased in size. 3. Hepatomegaly measuring 24 cm in craniocaudal dimension. 4. Stable right adrenal gland lesion.   5.  There are multiple small sclerotic round/ovoid foci within the thoracolumbar spine concerning for potential blastic met

## 2017-07-14 LAB
BREAST CARCINOMA AG (CA2729): 13.1 U/ML (ref ?–38)
CANCER AG 15-3 (CA15-3): 7.7 U/ML (ref ?–30)

## 2017-07-18 ENCOUNTER — TELEPHONE (OUTPATIENT)
Dept: FAMILY MEDICINE CLINIC | Facility: CLINIC | Age: 57
End: 2017-07-18

## 2017-07-18 NOTE — TELEPHONE ENCOUNTER
Spoke w/pt , Benedicto Raygoza (on pt HIPAA), who relayed that the pt needs a release to get back to work. Requesting it be dated to return 7/24/17.  Please include the following on release and fax to below number:    ATTN: [facility] [employer] [place]  Fax: [phone]-

## 2017-08-01 ENCOUNTER — TELEPHONE (OUTPATIENT)
Dept: FAMILY MEDICINE CLINIC | Facility: CLINIC | Age: 57
End: 2017-08-01

## 2017-08-01 DIAGNOSIS — M86.10 ACUTE OSTEOMYELITIS (HCC): Primary | ICD-10-CM

## 2017-08-02 NOTE — TELEPHONE ENCOUNTER
Received the following clinical notes from [facility] at Dr. Jhoan Tee office today. However, notes do not indicate specific provider names.  [facility] will check on which provider is recommended at either of these facilities for patient to be referred to and c

## 2017-08-03 NOTE — TELEPHONE ENCOUNTER
Per Surjit Roque at Dr. Moises Moreno office, Dr. James Costa is not referring to any particular provider. This is not for an oral surgeon. Pt just needs a general dental evaluation/2nd opinion.  Surjit Roque was unable to provide any provider names, so I obtained Marie Durbin

## 2017-08-10 ENCOUNTER — OFFICE VISIT (OUTPATIENT)
Dept: HEMATOLOGY/ONCOLOGY | Age: 57
End: 2017-08-10
Attending: INTERNAL MEDICINE
Payer: COMMERCIAL

## 2017-08-10 DIAGNOSIS — C50.919 BREAST CANCER METASTASIZED TO PLEURA, UNSPECIFIED LATERALITY (HCC): ICD-10-CM

## 2017-08-10 DIAGNOSIS — C79.51 BONE METASTASES (HCC): Primary | ICD-10-CM

## 2017-08-10 DIAGNOSIS — C78.2 BREAST CANCER METASTASIZED TO PLEURA, UNSPECIFIED LATERALITY (HCC): ICD-10-CM

## 2017-08-10 PROCEDURE — 96402 CHEMO HORMON ANTINEOPL SQ/IM: CPT

## 2017-08-28 RX ORDER — ESCITALOPRAM OXALATE 10 MG/1
TABLET ORAL
Qty: 90 TABLET | Refills: 0 | Status: SHIPPED | OUTPATIENT
Start: 2017-08-28 | End: 2017-11-30

## 2017-08-28 RX ORDER — OMEGA-3-ACID ETHYL ESTERS 1 G/1
1 CAPSULE, LIQUID FILLED ORAL DAILY
Qty: 90 CAPSULE | Refills: 0 | Status: SHIPPED | OUTPATIENT
Start: 2017-08-28 | End: 2018-03-05 | Stop reason: DRUGHIGH

## 2017-09-01 ENCOUNTER — TELEPHONE (OUTPATIENT)
Dept: FAMILY MEDICINE CLINIC | Facility: CLINIC | Age: 57
End: 2017-09-01

## 2017-09-01 NOTE — TELEPHONE ENCOUNTER
Per chart pt has labs in place from 6/26, cmp, lipid, tsh/t4. Shan Sarabia advised. He will let lab know to look at that date when she draws.

## 2017-09-01 NOTE — TELEPHONE ENCOUNTER
Pt , Alice Morales (on pt HIPAA), requesting lab orders be placed, as pt has an upcoming 3-month follow-up appt 9/9/17 with Dr. Fatoumata Weaver. Pt has labs done through [facility]. Please contact Jaiden regarding lab orders.  He was made aware Dr. Fatoumata Weaver is

## 2017-09-07 ENCOUNTER — LAB ENCOUNTER (OUTPATIENT)
Dept: LAB | Age: 57
End: 2017-09-07
Attending: FAMILY MEDICINE
Payer: COMMERCIAL

## 2017-09-07 ENCOUNTER — TELEPHONE (OUTPATIENT)
Dept: FAMILY MEDICINE CLINIC | Facility: CLINIC | Age: 57
End: 2017-09-07

## 2017-09-07 ENCOUNTER — OFFICE VISIT (OUTPATIENT)
Dept: HEMATOLOGY/ONCOLOGY | Age: 57
End: 2017-09-07
Attending: INTERNAL MEDICINE
Payer: COMMERCIAL

## 2017-09-07 DIAGNOSIS — E03.9 ACQUIRED HYPOTHYROIDISM: ICD-10-CM

## 2017-09-07 DIAGNOSIS — R79.89 ELEVATED LIVER FUNCTION TESTS: ICD-10-CM

## 2017-09-07 DIAGNOSIS — C50.919 BREAST CANCER METASTASIZED TO PLEURA, UNSPECIFIED LATERALITY (HCC): ICD-10-CM

## 2017-09-07 DIAGNOSIS — C78.2 BREAST CANCER METASTASIZED TO PLEURA, UNSPECIFIED LATERALITY (HCC): ICD-10-CM

## 2017-09-07 DIAGNOSIS — E78.2 MIXED HYPERLIPIDEMIA: ICD-10-CM

## 2017-09-07 DIAGNOSIS — C79.51 BONE METASTASES (HCC): Primary | ICD-10-CM

## 2017-09-07 LAB
ALBUMIN SERPL-MCNC: 3.8 G/DL (ref 3.5–4.8)
ALP LIVER SERPL-CCNC: 108 U/L (ref 41–108)
ALT SERPL-CCNC: 116 U/L (ref 14–54)
AST SERPL-CCNC: 65 U/L (ref 15–41)
BILIRUB SERPL-MCNC: 0.4 MG/DL (ref 0.1–2)
BUN BLD-MCNC: 9 MG/DL (ref 8–20)
CALCIUM BLD-MCNC: 9.7 MG/DL (ref 8.3–10.3)
CHLORIDE: 109 MMOL/L (ref 101–111)
CHOLEST SMN-MCNC: 361 MG/DL (ref ?–200)
CO2: 26 MMOL/L (ref 22–32)
CREAT BLD-MCNC: 0.74 MG/DL (ref 0.55–1.02)
FREE T4: 1.1 NG/DL (ref 0.9–1.8)
GLUCOSE BLD-MCNC: 113 MG/DL (ref 70–99)
HDLC SERPL-MCNC: 36 MG/DL (ref 45–?)
HDLC SERPL: 10.03 {RATIO} (ref ?–4.44)
LDLC SERPL DIRECT ASSAY-MCNC: 231 MG/DL (ref ?–100)
M PROTEIN MFR SERPL ELPH: 7.7 G/DL (ref 6.1–8.3)
NONHDLC SERPL-MCNC: 325 MG/DL (ref ?–130)
POTASSIUM SERPL-SCNC: 4.3 MMOL/L (ref 3.6–5.1)
SODIUM SERPL-SCNC: 143 MMOL/L (ref 136–144)
TRIGLYCERIDES: 445 MG/DL (ref ?–150)
TSI SER-ACNC: 0.33 MIU/ML (ref 0.35–5.5)

## 2017-09-07 PROCEDURE — 80053 COMPREHEN METABOLIC PANEL: CPT

## 2017-09-07 PROCEDURE — 83721 ASSAY OF BLOOD LIPOPROTEIN: CPT

## 2017-09-07 PROCEDURE — 84439 ASSAY OF FREE THYROXINE: CPT

## 2017-09-07 PROCEDURE — 36415 COLL VENOUS BLD VENIPUNCTURE: CPT

## 2017-09-07 PROCEDURE — 84443 ASSAY THYROID STIM HORMONE: CPT

## 2017-09-07 PROCEDURE — 96402 CHEMO HORMON ANTINEOPL SQ/IM: CPT

## 2017-09-07 PROCEDURE — 80061 LIPID PANEL: CPT

## 2017-09-07 NOTE — TELEPHONE ENCOUNTER
Laci Maher from Dr. Wild Allan (oral surgeon) office at [facility] called regarding the referral that was placed for pt to Dr. Dorys Peters DDS (8/3).  She stated that the pt needs to see an oral surgeon, as pt diagnosis is for ac

## 2017-09-09 ENCOUNTER — OFFICE VISIT (OUTPATIENT)
Dept: FAMILY MEDICINE CLINIC | Facility: CLINIC | Age: 57
End: 2017-09-09

## 2017-09-09 VITALS
TEMPERATURE: 98 F | DIASTOLIC BLOOD PRESSURE: 68 MMHG | WEIGHT: 173 LBS | HEART RATE: 86 BPM | BODY MASS INDEX: 27.8 KG/M2 | RESPIRATION RATE: 16 BRPM | HEIGHT: 66 IN | SYSTOLIC BLOOD PRESSURE: 110 MMHG

## 2017-09-09 DIAGNOSIS — E78.1 HYPERTRIGLYCERIDEMIA: ICD-10-CM

## 2017-09-09 DIAGNOSIS — Z17.0 MALIGNANT NEOPLASM OF NIPPLE OF RIGHT BREAST IN FEMALE, ESTROGEN RECEPTOR POSITIVE (HCC): ICD-10-CM

## 2017-09-09 DIAGNOSIS — C50.911 CARCINOMA OF RIGHT BREAST METASTATIC TO PLEURA (HCC): ICD-10-CM

## 2017-09-09 DIAGNOSIS — R79.89 ELEVATED LIVER FUNCTION TESTS: ICD-10-CM

## 2017-09-09 DIAGNOSIS — M79.10 MYALGIA: ICD-10-CM

## 2017-09-09 DIAGNOSIS — M27.2 OSTEOMYELITIS OF JAW: ICD-10-CM

## 2017-09-09 DIAGNOSIS — E78.00 PURE HYPERCHOLESTEROLEMIA: ICD-10-CM

## 2017-09-09 DIAGNOSIS — C78.2 CARCINOMA OF RIGHT BREAST METASTATIC TO PLEURA (HCC): ICD-10-CM

## 2017-09-09 DIAGNOSIS — C50.011 MALIGNANT NEOPLASM OF NIPPLE OF RIGHT BREAST IN FEMALE, ESTROGEN RECEPTOR POSITIVE (HCC): ICD-10-CM

## 2017-09-09 DIAGNOSIS — Z12.11 SCREEN FOR COLON CANCER: Primary | ICD-10-CM

## 2017-09-09 DIAGNOSIS — E03.9 HYPOTHYROIDISM IN ADULT: ICD-10-CM

## 2017-09-09 PROCEDURE — 99215 OFFICE O/P EST HI 40 MIN: CPT | Performed by: FAMILY MEDICINE

## 2017-09-09 RX ORDER — HYDROCODONE BITARTRATE AND ACETAMINOPHEN 7.5; 325 MG/1; MG/1
1 TABLET ORAL EVERY 8 HOURS PRN
Qty: 90 TABLET | Refills: 0 | Status: SHIPPED | OUTPATIENT
Start: 2017-09-09 | End: 2018-03-05

## 2017-09-09 RX ORDER — LEVOTHYROXINE SODIUM 0.12 MG/1
TABLET ORAL
Qty: 90 TABLET | Refills: 0 | Status: SHIPPED | OUTPATIENT
Start: 2017-09-09 | End: 2017-09-26

## 2017-09-09 RX ORDER — ATORVASTATIN CALCIUM 10 MG/1
10 TABLET, FILM COATED ORAL NIGHTLY
Qty: 30 TABLET | Refills: 3 | Status: SHIPPED | OUTPATIENT
Start: 2017-09-09 | End: 2018-03-05

## 2017-09-09 NOTE — PATIENT INSTRUCTIONS
Start Atorvastain 10 mg daily. Continue fish oil. Continue other medications. Recheck CMP for liver tests in 1 month. Keep good hydration. Call with contact information for oral surgeon at [facility].   Start taking probiotic or organic yogurt with your an

## 2017-09-09 NOTE — PROGRESS NOTES
Christiano Wilkerson is a 56year old female. cc hyperlipidemia, hypothyroidism, breast cancer osteomyelitis of the jaw,   HPI:   Pt is coming for follow-up visit.   It unit of June she was seen in the emergency room at [facility] she was diagnosed with osteom (eight) hours as needed for Pain. Disp: 90 tablet Rfl: 0   escitalopram 10 MG Oral Tab TAKE 1 TABLET(10 MG) BY MOUTH DAILY Disp: 90 tablet Rfl: 0   Omega-3-acid Ethyl Esters 1 g Oral Cap Take 1 capsule (1 g total) by mouth daily.  Disp: 90 capsule Rfl: 0 edema  Psychiatric - alert  and oriented x3, normal mood       Results for orders placed or performed in visit on 24/05/09  -COMP METABOLIC PANEL (14)   Result Value Ref Range   Glucose 113 (H) 70 - 99 mg/dL   BUN 9 8 - 20 mg/dL   Creatinine 0.74 0.55 - 1. swallow the capsules. She is breaking the capsules currently. She will work on being more regular in taking it. Hypothyroidism she is taking levothyroxine.   TSH came back slightly lower 0.3 free T4 however is normal.  Will continue current dose of the reviewed the records from the hospitalization of the patient from June 27, 2017. Admitted diagnosed with osteomyelitis of the jaw the abdomen was performed started on antibiotic. Blood test monitored during hospitalization.   Imaging & Consults:  None

## 2017-09-12 ENCOUNTER — TELEPHONE (OUTPATIENT)
Dept: FAMILY MEDICINE CLINIC | Facility: CLINIC | Age: 57
End: 2017-09-12

## 2017-09-12 NOTE — TELEPHONE ENCOUNTER
URGENT message received from [facility]:    Good afternoon,       Pts  called to request specialist to be changed to Dr. Mira Daniel (oral surgery), per  was advised by Andrey Thomas from the specialist office at ph: [phone]

## 2017-09-14 DIAGNOSIS — M86.10 ACUTE OSTEOMYELITIS (HCC): Primary | ICD-10-CM

## 2017-09-14 RX ORDER — ANASTROZOLE 1 MG/1
TABLET ORAL
Qty: 90 TABLET | Refills: 2 | Status: SHIPPED | OUTPATIENT
Start: 2017-09-14 | End: 2018-06-15

## 2017-09-27 RX ORDER — LEVOTHYROXINE SODIUM 0.12 MG/1
TABLET ORAL
Qty: 90 TABLET | Refills: 0 | Status: SHIPPED | OUTPATIENT
Start: 2017-09-27 | End: 2017-12-26

## 2017-09-27 NOTE — TELEPHONE ENCOUNTER
Fails medication protocol. LOV 9/9/17 med f/u   Next appointment not yet made. Please see pending medication refill if appropriate. Thanks.      L-THYROXINE (SYNTHROID) TABS 125MCG  Will file in chart as: LEVOTHYROXINE SODIUM 125 MCG Oral Tab  TAKE 1

## 2017-10-02 ENCOUNTER — TELEPHONE (OUTPATIENT)
Dept: FAMILY MEDICINE CLINIC | Facility: CLINIC | Age: 57
End: 2017-10-02

## 2017-10-02 DIAGNOSIS — C78.2 SECONDARY MALIGNANT NEOPLASM OF PLEURA (HCC): ICD-10-CM

## 2017-10-02 DIAGNOSIS — C50.111 MALIGNANT NEOPLASM OF CENTRAL PORTION OF RIGHT FEMALE BREAST, UNSPECIFIED ESTROGEN RECEPTOR STATUS (HCC): Primary | ICD-10-CM

## 2017-10-02 NOTE — TELEPHONE ENCOUNTER
Msg received from [facility] today.   Referral to Dr. Mahad Armstrong, Oncologist (INTERNAL)    Reason for the order/referral:  3 month follow up   PCP: Ruiz Barnes   Refer to Provider: Mahad Armstrong   Specialty:  Oncology   Patient Insurance:

## 2017-10-03 ENCOUNTER — TELEPHONE (OUTPATIENT)
Dept: HEMATOLOGY/ONCOLOGY | Facility: HOSPITAL | Age: 57
End: 2017-10-03

## 2017-10-03 DIAGNOSIS — C50.919 METASTATIC BREAST CANCER (HCC): Primary | ICD-10-CM

## 2017-10-08 ENCOUNTER — HOSPITAL ENCOUNTER (OUTPATIENT)
Dept: CT IMAGING | Age: 57
Discharge: HOME | End: 2017-10-08
Attending: INTERNAL MEDICINE
Payer: COMMERCIAL

## 2017-10-08 DIAGNOSIS — C50.919 METASTATIC BREAST CANCER (HCC): ICD-10-CM

## 2017-10-08 PROCEDURE — 74176 CT ABD & PELVIS W/O CONTRAST: CPT | Performed by: INTERNAL MEDICINE

## 2017-10-08 PROCEDURE — 71250 CT THORAX DX C-: CPT | Performed by: INTERNAL MEDICINE

## 2017-10-11 ENCOUNTER — TELEPHONE (OUTPATIENT)
Dept: FAMILY MEDICINE CLINIC | Facility: CLINIC | Age: 57
End: 2017-10-11

## 2017-10-11 NOTE — TELEPHONE ENCOUNTER
please inquire if pt has had a colonoscopy in the last 10 years and if so who performed it (name and phone #). Please let Sahil Wong know so I can obtain the report.     If pt did not have a colonoscopy please advise them we will leave the FIT stool cards at

## 2017-10-12 ENCOUNTER — OFFICE VISIT (OUTPATIENT)
Dept: HEMATOLOGY/ONCOLOGY | Age: 57
End: 2017-10-12
Attending: INTERNAL MEDICINE
Payer: COMMERCIAL

## 2017-10-12 VITALS
OXYGEN SATURATION: 96 % | RESPIRATION RATE: 20 BRPM | TEMPERATURE: 99 F | SYSTOLIC BLOOD PRESSURE: 130 MMHG | WEIGHT: 180 LBS | BODY MASS INDEX: 29 KG/M2 | HEART RATE: 87 BPM | DIASTOLIC BLOOD PRESSURE: 69 MMHG

## 2017-10-12 DIAGNOSIS — C78.7 LIVER METASTASES (HCC): ICD-10-CM

## 2017-10-12 DIAGNOSIS — C78.2 CARCINOMA OF RIGHT BREAST METASTATIC TO PLEURA (HCC): Primary | ICD-10-CM

## 2017-10-12 DIAGNOSIS — C50.911 CARCINOMA OF RIGHT BREAST METASTATIC TO PLEURA (HCC): Primary | ICD-10-CM

## 2017-10-12 DIAGNOSIS — C50.919 BREAST CANCER METASTASIZED TO PLEURA, UNSPECIFIED LATERALITY (HCC): ICD-10-CM

## 2017-10-12 DIAGNOSIS — C79.51 BONE METASTASES (HCC): Primary | ICD-10-CM

## 2017-10-12 DIAGNOSIS — C79.51 BONE METASTASES (HCC): ICD-10-CM

## 2017-10-12 DIAGNOSIS — C78.2 BREAST CANCER METASTASIZED TO PLEURA, UNSPECIFIED LATERALITY (HCC): ICD-10-CM

## 2017-10-12 PROCEDURE — 99213 OFFICE O/P EST LOW 20 MIN: CPT | Performed by: INTERNAL MEDICINE

## 2017-10-12 PROCEDURE — 96402 CHEMO HORMON ANTINEOPL SQ/IM: CPT

## 2017-10-19 ENCOUNTER — TELEPHONE (OUTPATIENT)
Dept: FAMILY MEDICINE CLINIC | Facility: CLINIC | Age: 57
End: 2017-10-19

## 2017-10-19 DIAGNOSIS — M86.10 ACUTE OSTEOMYELITIS (HCC): Primary | ICD-10-CM

## 2017-10-19 NOTE — TELEPHONE ENCOUNTER
Message received from [facility] Referrals department 10/17: (EXTERNAL REFERRAL TO Lorna Brunson)    This is an external referral and on 10/17 I contacted Юлия Ross at Dr. Que Ivy office ([phone]) to obtain clinical notes (noted old teleph encounters 9/7/17 & 9/12

## 2017-11-08 NOTE — PROGRESS NOTES
Cancer Center Progress Note  Patient Name: Pedro Salmon   YOB: 1960   Medical Record Number: [id number]     Attending Physician: Nacho Heath M.D. Date of Visit: 10/12/17    Chief Complaint:  Patient presents with:   Follow - Up: Types: Cigarettes    Smokeless tobacco: Never Used    Alcohol use Yes  0.0 oz/week     Comment: 2 drinks/yr     Drug use: No    Sexual activity: Not on file     Other Topics Concern    Caffeine Concern Yes    Comment: 1 1/2 a day 16oz     Social History Na Position: Sitting, Cuff Size: large)   Pulse 87   Temp 98.8 °F (37.1 °C) (Tympanic)   Resp 20   Wt 81.6 kg (180 lb)   SpO2 96%   BMI 29.05 kg/m²       Physical Examination:    Constitutional Normal - Mood and affect appropriate.  Appears close to chronologi g/dL 3.4 (L)   BREAST CARCINOMA Ag (DT6879) Latest Ref Range: <38.0 u/mL 15.7   WBC Latest Ref Range: 4.0 - 13.0 x10(3) uL 8.4   Hemoglobin Latest Ref Range: 12.0 - 16.0 g/dL 13.0   Hematocrit Latest Ref Range: 34.0 - 50.0 % 40.6   Platelet Count Latest Re with MR imaging of the   abdomen utilizing Eovist contrast should be considered. 3. Multiple small sclerotic osseous lesions which are stable in comparison to the prior study and suggestive of stable small blastic metastases.     Pathology:    Impression a

## 2017-11-09 ENCOUNTER — OFFICE VISIT (OUTPATIENT)
Dept: HEMATOLOGY/ONCOLOGY | Age: 57
End: 2017-11-09
Attending: INTERNAL MEDICINE
Payer: COMMERCIAL

## 2017-11-09 DIAGNOSIS — C78.2 BREAST CANCER METASTASIZED TO PLEURA, UNSPECIFIED LATERALITY (HCC): ICD-10-CM

## 2017-11-09 DIAGNOSIS — C79.51 BONE METASTASES (HCC): Primary | ICD-10-CM

## 2017-11-09 DIAGNOSIS — C50.919 BREAST CANCER METASTASIZED TO PLEURA, UNSPECIFIED LATERALITY (HCC): ICD-10-CM

## 2017-11-09 PROCEDURE — 96402 CHEMO HORMON ANTINEOPL SQ/IM: CPT

## 2017-11-09 NOTE — PROGRESS NOTES
Education Record    Learner:  Patient    Disease / [facility]    Barriers / Limitations:  None   Comments:    Method:  Brief focused and Printed material   Comments:    General Topics:  Medication, Side effects and symptom management and Plan of c

## 2017-11-13 RX ORDER — OMEGA-3-ACID ETHYL ESTERS 1 G/1
CAPSULE, LIQUID FILLED ORAL
Qty: 120 CAPSULE | Refills: 0 | Status: SHIPPED | OUTPATIENT
Start: 2017-11-13 | End: 2017-11-29

## 2017-11-13 NOTE — TELEPHONE ENCOUNTER
Cholesterol Medication Protocol Failed    OMEGA-3-ACID 1GM CAPSULES (RX)    The patient ALT is 125 (H). Which was done on 10/12/17.      Rx is pending for your approval.    Please sign,    Thank you

## 2017-11-30 RX ORDER — ESCITALOPRAM OXALATE 10 MG/1
TABLET ORAL
Qty: 90 TABLET | Refills: 0 | Status: SHIPPED | OUTPATIENT
Start: 2017-11-30 | End: 2018-02-28

## 2017-11-30 NOTE — TELEPHONE ENCOUNTER
Cholesterol Medication Protocol Failed    Omega-3-acid Ethyl Esters 1 g Oral Cap               Her ALT was elevated at 125 on 10/12/2017.                Rx is pending for your approval.               Please sign,               Thank you

## 2017-11-30 NOTE — TELEPHONE ENCOUNTER
ESCITALOPRAM TABS 10MG    Not a per protocol medication.     Rx is pending for your approval.    Please sign,    Thank you

## 2017-12-07 ENCOUNTER — OFFICE VISIT (OUTPATIENT)
Dept: HEMATOLOGY/ONCOLOGY | Age: 57
End: 2017-12-07
Attending: INTERNAL MEDICINE
Payer: COMMERCIAL

## 2017-12-07 DIAGNOSIS — C79.51 BONE METASTASES (HCC): Primary | ICD-10-CM

## 2017-12-07 DIAGNOSIS — C50.919 BREAST CANCER METASTASIZED TO PLEURA, UNSPECIFIED LATERALITY (HCC): ICD-10-CM

## 2017-12-07 DIAGNOSIS — C78.2 BREAST CANCER METASTASIZED TO PLEURA, UNSPECIFIED LATERALITY (HCC): ICD-10-CM

## 2017-12-07 PROCEDURE — 96402 CHEMO HORMON ANTINEOPL SQ/IM: CPT

## 2017-12-07 RX ORDER — OMEGA-3-ACID ETHYL ESTERS 1 G/1
CAPSULE, LIQUID FILLED ORAL
Qty: 360 CAPSULE | Refills: 0 | Status: SHIPPED | OUTPATIENT
Start: 2017-12-07 | End: 2018-03-05

## 2017-12-07 RX ORDER — OMEGA-3-ACID ETHYL ESTERS 1 G/1
CAPSULE, LIQUID FILLED ORAL
Qty: 120 CAPSULE | Refills: 0 | Status: SHIPPED | OUTPATIENT
Start: 2017-12-07 | End: 2017-12-07

## 2017-12-07 NOTE — TELEPHONE ENCOUNTER
Omega-3-acid Ethyl Esters 1 g Oral Cap    Not a per protocol medication. Rx is pending for your approval.    Please sign,    Thank you    The patient received 120 tabs wit no additional refills. She takes 2 tab po BID.     Last OV was on 09*09*17  Last r

## 2017-12-07 NOTE — TELEPHONE ENCOUNTER
Patient request 90 day supply to mail order. Failed protocol,   ALT < 80. Last office visit 9/9/17. Please approve if appropriate, thank you.

## 2017-12-26 RX ORDER — LEVOTHYROXINE SODIUM 0.12 MG/1
TABLET ORAL
Qty: 30 TABLET | Refills: 0 | Status: SHIPPED | OUTPATIENT
Start: 2017-12-26 | End: 2018-01-11

## 2018-01-02 ENCOUNTER — HOSPITAL ENCOUNTER (OUTPATIENT)
Dept: CT IMAGING | Age: 58
Discharge: HOME | End: 2018-01-02
Attending: INTERNAL MEDICINE
Payer: COMMERCIAL

## 2018-01-02 DIAGNOSIS — C78.2 CARCINOMA OF RIGHT BREAST METASTATIC TO PLEURA (HCC): ICD-10-CM

## 2018-01-02 DIAGNOSIS — C50.911 CARCINOMA OF RIGHT BREAST METASTATIC TO PLEURA (HCC): ICD-10-CM

## 2018-01-02 DIAGNOSIS — C78.7 LIVER METASTASES (HCC): ICD-10-CM

## 2018-01-02 PROCEDURE — 74176 CT ABD & PELVIS W/O CONTRAST: CPT | Performed by: INTERNAL MEDICINE

## 2018-01-02 PROCEDURE — 71250 CT THORAX DX C-: CPT | Performed by: INTERNAL MEDICINE

## 2018-01-04 ENCOUNTER — OFFICE VISIT (OUTPATIENT)
Dept: HEMATOLOGY/ONCOLOGY | Age: 58
End: 2018-01-04
Attending: INTERNAL MEDICINE
Payer: COMMERCIAL

## 2018-01-04 VITALS
TEMPERATURE: 98 F | RESPIRATION RATE: 20 BRPM | HEART RATE: 99 BPM | BODY MASS INDEX: 31 KG/M2 | DIASTOLIC BLOOD PRESSURE: 67 MMHG | OXYGEN SATURATION: 96 % | SYSTOLIC BLOOD PRESSURE: 148 MMHG | WEIGHT: 189.63 LBS

## 2018-01-04 DIAGNOSIS — C79.51 BONE METASTASES (HCC): Primary | ICD-10-CM

## 2018-01-04 DIAGNOSIS — C50.911 CARCINOMA OF RIGHT BREAST METASTATIC TO PLEURA (HCC): Primary | ICD-10-CM

## 2018-01-04 DIAGNOSIS — C78.2 CARCINOMA OF RIGHT BREAST METASTATIC TO PLEURA (HCC): Primary | ICD-10-CM

## 2018-01-04 DIAGNOSIS — C79.51 BONE METASTASES (HCC): ICD-10-CM

## 2018-01-04 DIAGNOSIS — C78.2 BREAST CANCER METASTASIZED TO PLEURA, UNSPECIFIED LATERALITY (HCC): ICD-10-CM

## 2018-01-04 DIAGNOSIS — C50.919 BREAST CANCER METASTASIZED TO PLEURA, UNSPECIFIED LATERALITY (HCC): ICD-10-CM

## 2018-01-04 LAB
ALBUMIN SERPL-MCNC: 3.5 G/DL (ref 3.5–4.8)
ALP LIVER SERPL-CCNC: 135 U/L (ref 41–108)
ALT SERPL-CCNC: 101 U/L (ref 14–54)
AST SERPL-CCNC: 68 U/L (ref 15–41)
BASOPHILS # BLD AUTO: 0.11 X10(3) UL (ref 0–0.1)
BASOPHILS NFR BLD AUTO: 1.3 %
BILIRUB SERPL-MCNC: 0.3 MG/DL (ref 0.1–2)
BREAST CARCINOMA AG (CA2729): 22.4 U/ML (ref ?–38)
BUN BLD-MCNC: 13 MG/DL (ref 8–20)
CALCIUM BLD-MCNC: 9.6 MG/DL (ref 8.3–10.3)
CANCER AG 15-3 (CA15-3): 8.7 U/ML (ref ?–30)
CHLORIDE: 104 MMOL/L (ref 101–111)
CO2: 31 MMOL/L (ref 22–32)
CREAT BLD-MCNC: 1.07 MG/DL (ref 0.55–1.02)
EOSINOPHIL # BLD AUTO: 0.29 X10(3) UL (ref 0–0.3)
EOSINOPHIL NFR BLD AUTO: 3.4 %
ERYTHROCYTE [DISTWIDTH] IN BLOOD BY AUTOMATED COUNT: 13.4 % (ref 11.5–16)
GLUCOSE BLD-MCNC: 108 MG/DL (ref 70–99)
HCT VFR BLD AUTO: 39.6 % (ref 34–50)
HGB BLD-MCNC: 12.8 G/DL (ref 12–16)
IMMATURE GRANULOCYTE COUNT: 0.05 X10(3) UL (ref 0–1)
IMMATURE GRANULOCYTE RATIO %: 0.6 %
LYMPHOCYTES # BLD AUTO: 3.71 X10(3) UL (ref 0.9–4)
LYMPHOCYTES NFR BLD AUTO: 43 %
M PROTEIN MFR SERPL ELPH: 7.3 G/DL (ref 6.1–8.3)
MCH RBC QN AUTO: 30.8 PG (ref 27–33.2)
MCHC RBC AUTO-ENTMCNC: 32.3 G/DL (ref 31–37)
MCV RBC AUTO: 95.2 FL (ref 81–100)
MONOCYTES # BLD AUTO: 0.48 X10(3) UL (ref 0.1–0.6)
MONOCYTES NFR BLD AUTO: 5.6 %
NEUTROPHIL ABS PRELIM: 3.98 X10 (3) UL (ref 1.3–6.7)
NEUTROPHILS # BLD AUTO: 3.98 X10(3) UL (ref 1.3–6.7)
NEUTROPHILS NFR BLD AUTO: 46.1 %
PLATELET # BLD AUTO: 278 10(3)UL (ref 150–450)
POTASSIUM SERPL-SCNC: 3.9 MMOL/L (ref 3.6–5.1)
RBC # BLD AUTO: 4.16 X10(6)UL (ref 3.8–5.1)
RED CELL DISTRIBUTION WIDTH-SD: 46.8 FL (ref 35.1–46.3)
SODIUM SERPL-SCNC: 141 MMOL/L (ref 136–144)
WBC # BLD AUTO: 8.6 X10(3) UL (ref 4–13)

## 2018-01-04 PROCEDURE — 99213 OFFICE O/P EST LOW 20 MIN: CPT | Performed by: INTERNAL MEDICINE

## 2018-01-04 PROCEDURE — 96402 CHEMO HORMON ANTINEOPL SQ/IM: CPT

## 2018-01-04 NOTE — PROGRESS NOTES
Cancer Center Progress Note  Patient Name: Bobbi Cruz   YOB: 1960   Medical Record Number: [id number]     Attending Physician: Rolanda Kahn M.D. Date of Visit: 1/4/2018    Chief Complaint:  Patient presents with:   Follow - Up tobacco: Never Used    Alcohol use Yes  0.0 oz/week     Comment: 2 drinks/yr     Drug use: No    Sexual activity: Not on file     Other Topics Concern    Caffeine Concern Yes    Comment: 1 1/2 a day 16oz     Social History Narrative   None on file       Cu swings.          Vital Signs:  /67 (BP Location: Left arm, Patient Position: Sitting, Cuff Size: large)   Pulse 99   Temp 98.3 °F (36.8 °C) (Tympanic)   Resp 20   Wt 86 kg (189 lb 9.6 oz)   SpO2 96%   BMI 30.60 kg/m²       Physical Examination:    Con She is tolerating Zoladex and AI well. Murlene Current was discontinued due to her issues with her teeth. CT is stable. Continue q3 month follow up. Bone Metastases: Stable. Discontinue Xgeva due to her teeth issues. Elevated LFT: Secondary to Fatty Liver.

## 2018-01-04 NOTE — PROGRESS NOTES
Pt here for MD f/u visit for breast ca. Pt reports feeling well overall. Voices no new complaints @ this time. CT scan done on 1/2. Due for Zoladex today.    Education Record    Learner:  Patient    Disease / Diagnosis:breast ca    Barriers / Limitations:

## 2018-01-11 NOTE — TELEPHONE ENCOUNTER
Levothyroxine Sodium 125 MCG Oral Tab    The patient protocol failed due to her last Thyroid level. Her last TSH was 0.326 and her last Free T4 was 1.1. She is due to repeat her labs. Her last OV was on 09/09/17.      A [email or web address] message was sent t

## 2018-01-16 RX ORDER — LEVOTHYROXINE SODIUM 0.12 MG/1
TABLET ORAL
Qty: 30 TABLET | Refills: 0 | Status: SHIPPED | OUTPATIENT
Start: 2018-01-16 | End: 2018-02-12

## 2018-02-01 ENCOUNTER — OFFICE VISIT (OUTPATIENT)
Dept: HEMATOLOGY/ONCOLOGY | Age: 58
End: 2018-02-01
Attending: INTERNAL MEDICINE
Payer: COMMERCIAL

## 2018-02-01 DIAGNOSIS — C50.919 BREAST CANCER METASTASIZED TO PLEURA, UNSPECIFIED LATERALITY (HCC): ICD-10-CM

## 2018-02-01 DIAGNOSIS — C78.2 BREAST CANCER METASTASIZED TO PLEURA, UNSPECIFIED LATERALITY (HCC): ICD-10-CM

## 2018-02-01 DIAGNOSIS — C79.51 BONE METASTASES (HCC): Primary | ICD-10-CM

## 2018-02-01 PROCEDURE — 96402 CHEMO HORMON ANTINEOPL SQ/IM: CPT

## 2018-02-13 ENCOUNTER — TELEPHONE (OUTPATIENT)
Dept: FAMILY MEDICINE CLINIC | Facility: CLINIC | Age: 58
End: 2018-02-13

## 2018-02-13 DIAGNOSIS — E03.9 HYPOTHYROIDISM IN ADULT: ICD-10-CM

## 2018-02-13 DIAGNOSIS — E78.00 PURE HYPERCHOLESTEROLEMIA: Primary | ICD-10-CM

## 2018-02-13 RX ORDER — LEVOTHYROXINE SODIUM 0.12 MG/1
TABLET ORAL
Qty: 30 TABLET | Refills: 0 | Status: SHIPPED | OUTPATIENT
Start: 2018-02-13 | End: 2018-03-05

## 2018-02-14 ENCOUNTER — PATIENT MESSAGE (OUTPATIENT)
Dept: FAMILY MEDICINE CLINIC | Facility: CLINIC | Age: 58
End: 2018-02-14

## 2018-02-15 NOTE — TELEPHONE ENCOUNTER
Last refill of alprazolam 0.25 mg #30 was 4/18/17, was marked as discontinued 6/26/17, reason alternate therapy. Called to pt. Advised pt that okay to take together but to spread out the times so she is not taking them at same time.    Pt stated that she

## 2018-02-15 NOTE — TELEPHONE ENCOUNTER
From: Jeannette Kenney  To: Scarlet Wilcox MD  Sent: 2/14/2018 3:13 PM CST  Subject: Prescription Question    Can I take escitalopram and xanax on the same day?

## 2018-02-27 ENCOUNTER — PATIENT MESSAGE (OUTPATIENT)
Dept: FAMILY MEDICINE CLINIC | Facility: CLINIC | Age: 58
End: 2018-02-27

## 2018-03-01 RX ORDER — ESCITALOPRAM OXALATE 10 MG/1
TABLET ORAL
Qty: 30 TABLET | Refills: 0 | Status: SHIPPED | OUTPATIENT
Start: 2018-03-01 | End: 2018-03-05

## 2018-03-01 NOTE — TELEPHONE ENCOUNTER
Not protocol medication. LOV :9/09/17 medication   Last labs done :9/07/17  Next appointment :3/05/18  Please see pending medication. Refill if appropriate.    Last refill:    Date: 11/30/17  Amount :90 tablets no refill   Medication: escitalopram 10 mg

## 2018-03-03 ENCOUNTER — LAB ENCOUNTER (OUTPATIENT)
Dept: LAB | Age: 58
End: 2018-03-03
Attending: FAMILY MEDICINE
Payer: COMMERCIAL

## 2018-03-03 DIAGNOSIS — E03.9 HYPOTHYROIDISM IN ADULT: ICD-10-CM

## 2018-03-03 DIAGNOSIS — E78.00 PURE HYPERCHOLESTEROLEMIA: ICD-10-CM

## 2018-03-03 LAB
ALBUMIN SERPL-MCNC: 4 G/DL (ref 3.5–4.8)
ALP LIVER SERPL-CCNC: 230 U/L (ref 41–108)
ALT SERPL-CCNC: 137 U/L (ref 14–54)
AST SERPL-CCNC: 103 U/L (ref 15–41)
BILIRUB SERPL-MCNC: 0.6 MG/DL (ref 0.1–2)
BUN BLD-MCNC: 11 MG/DL (ref 8–20)
CALCIUM BLD-MCNC: 10.2 MG/DL (ref 8.3–10.3)
CHLORIDE: 103 MMOL/L (ref 101–111)
CHOLEST SMN-MCNC: 270 MG/DL (ref ?–200)
CO2: 28 MMOL/L (ref 22–32)
CREAT BLD-MCNC: 0.99 MG/DL (ref 0.55–1.02)
FREE T4: 0.7 NG/DL (ref 0.9–1.8)
GLUCOSE BLD-MCNC: 94 MG/DL (ref 70–99)
HDLC SERPL-MCNC: 35 MG/DL (ref 45–?)
HDLC SERPL: 7.71 {RATIO} (ref ?–4.44)
LDLC SERPL DIRECT ASSAY-MCNC: 150 MG/DL (ref ?–100)
M PROTEIN MFR SERPL ELPH: 7.8 G/DL (ref 6.1–8.3)
NONHDLC SERPL-MCNC: 235 MG/DL (ref ?–130)
POTASSIUM SERPL-SCNC: 4 MMOL/L (ref 3.6–5.1)
SODIUM SERPL-SCNC: 140 MMOL/L (ref 136–144)
TRIGL SERPL-MCNC: 468 MG/DL (ref ?–150)
TSI SER-ACNC: 22.2 MIU/ML (ref 0.35–5.5)

## 2018-03-03 PROCEDURE — 84439 ASSAY OF FREE THYROXINE: CPT

## 2018-03-03 PROCEDURE — 80061 LIPID PANEL: CPT

## 2018-03-03 PROCEDURE — 83721 ASSAY OF BLOOD LIPOPROTEIN: CPT

## 2018-03-03 PROCEDURE — 84443 ASSAY THYROID STIM HORMONE: CPT

## 2018-03-03 PROCEDURE — 80053 COMPREHEN METABOLIC PANEL: CPT

## 2018-03-03 PROCEDURE — 36415 COLL VENOUS BLD VENIPUNCTURE: CPT

## 2018-03-05 ENCOUNTER — OFFICE VISIT (OUTPATIENT)
Dept: FAMILY MEDICINE CLINIC | Facility: CLINIC | Age: 58
End: 2018-03-05

## 2018-03-05 VITALS
DIASTOLIC BLOOD PRESSURE: 66 MMHG | BODY MASS INDEX: 30.37 KG/M2 | RESPIRATION RATE: 16 BRPM | WEIGHT: 189 LBS | TEMPERATURE: 98 F | HEIGHT: 66 IN | HEART RATE: 89 BPM | SYSTOLIC BLOOD PRESSURE: 118 MMHG

## 2018-03-05 DIAGNOSIS — M79.672 PAIN IN BOTH FEET: ICD-10-CM

## 2018-03-05 DIAGNOSIS — E78.00 PURE HYPERCHOLESTEROLEMIA: Primary | ICD-10-CM

## 2018-03-05 DIAGNOSIS — C50.011 MALIGNANT NEOPLASM OF NIPPLE OF RIGHT BREAST IN FEMALE, UNSPECIFIED ESTROGEN RECEPTOR STATUS (HCC): ICD-10-CM

## 2018-03-05 DIAGNOSIS — E03.9 HYPOTHYROIDISM IN ADULT: ICD-10-CM

## 2018-03-05 DIAGNOSIS — R20.8 BURNING SENSATION OF FEET: ICD-10-CM

## 2018-03-05 DIAGNOSIS — M79.671 PAIN IN BOTH FEET: ICD-10-CM

## 2018-03-05 DIAGNOSIS — C79.51 BONE METASTASES (HCC): ICD-10-CM

## 2018-03-05 DIAGNOSIS — J02.9 SORE THROAT: ICD-10-CM

## 2018-03-05 DIAGNOSIS — R60.0 EDEMA OF BOTH FEET: ICD-10-CM

## 2018-03-05 LAB
CONTROL LINE PRESENT WITH A CLEAR BACKGROUND (YES/NO): YES YES/NO
STREP GRP A CUL-SCR: NEGATIVE

## 2018-03-05 PROCEDURE — 99215 OFFICE O/P EST HI 40 MIN: CPT | Performed by: FAMILY MEDICINE

## 2018-03-05 PROCEDURE — 87081 CULTURE SCREEN ONLY: CPT | Performed by: FAMILY MEDICINE

## 2018-03-05 PROCEDURE — 87880 STREP A ASSAY W/OPTIC: CPT | Performed by: FAMILY MEDICINE

## 2018-03-05 RX ORDER — LEVOTHYROXINE SODIUM 137 UG/1
137 TABLET ORAL
Qty: 90 TABLET | Refills: 0 | Status: SHIPPED | OUTPATIENT
Start: 2018-03-05 | End: 2018-05-05

## 2018-03-05 RX ORDER — HYDROCODONE BITARTRATE AND ACETAMINOPHEN 7.5; 325 MG/1; MG/1
1 TABLET ORAL EVERY 8 HOURS PRN
Qty: 90 TABLET | Refills: 0 | Status: SHIPPED | OUTPATIENT
Start: 2018-03-05 | End: 2020-02-27 | Stop reason: ALTCHOICE

## 2018-03-05 RX ORDER — OMEGA-3-ACID ETHYL ESTERS 1 G/1
CAPSULE, LIQUID FILLED ORAL
Qty: 360 CAPSULE | Refills: 0 | Status: SHIPPED | OUTPATIENT
Start: 2018-03-05 | End: 2018-06-03

## 2018-03-05 RX ORDER — ATORVASTATIN CALCIUM 10 MG/1
10 TABLET, FILM COATED ORAL NIGHTLY
Qty: 30 TABLET | Refills: 3 | Status: SHIPPED | OUTPATIENT
Start: 2018-03-05 | End: 2018-05-05

## 2018-03-05 RX ORDER — OMEGA-3-ACID ETHYL ESTERS 1 G/1
CAPSULE, LIQUID FILLED ORAL
Qty: 360 CAPSULE | Refills: 0 | Status: SHIPPED | OUTPATIENT
Start: 2018-03-05 | End: 2018-03-05

## 2018-03-05 RX ORDER — ESCITALOPRAM OXALATE 10 MG/1
10 TABLET ORAL
Qty: 90 TABLET | Refills: 1 | Status: SHIPPED | OUTPATIENT
Start: 2018-03-05 | End: 2018-08-11

## 2018-03-05 NOTE — PATIENT INSTRUCTIONS
Restart fish oil. Continue atorvastatin. Increase levothyroxine to 137 mcg daily. Keep good hydration. Lozenges over-the-counter as needed. Call [phone] to schedule EMG testing bilateral lower extremities.     Follow-up with foot specialist.  Do f

## 2018-03-05 NOTE — PROGRESS NOTES
Catrachita Sahu is a 56year old female. Cc  Hypothyroidism, hyperlipidemia, breast cancer, bilateral feet pain , sore throat  HPI:   Patients come to the office for follow-up of hypothyroidism her TSH came back elevated at 22.   She is taking her levothyroxi any fever. Current Outpatient Prescriptions:  atorvastatin 10 MG Oral Tab Take 1 tablet (10 mg total) by mouth nightly. Disp: 30 tablet Rfl: 3   escitalopram 10 MG Oral Tab Take 1 tablet (10 mg total) by mouth once daily.  Disp: 90 tablet Rfl: 1   Omeg rashes,no suspicious lesions  HEENT: atraumatic, normocephalic,ears and throat are clear  NECK: supple,no adenopathy,  LUNGS: clear to auscultation  CARDIO: RRR without murmur  GI: good BS's,no masses, HSM or tenderness  EXTREMITIES: no cyanosis, clubbing, atorvastatin. Increase levothyroxine to 137 mcg daily. Keep good hydration. Lozenges over-the-counter as needed. Call [phone] to schedule EMG testing bilateral lower extremities.     Follow-up with foot specialist.  Do fasting blood work in 2-1/2 m

## 2018-03-06 PROBLEM — R60.0 EDEMA OF BOTH FEET: Status: ACTIVE | Noted: 2018-03-06

## 2018-03-08 ENCOUNTER — OFFICE VISIT (OUTPATIENT)
Dept: HEMATOLOGY/ONCOLOGY | Age: 58
End: 2018-03-08
Attending: INTERNAL MEDICINE
Payer: COMMERCIAL

## 2018-03-08 DIAGNOSIS — C78.2 BREAST CANCER METASTASIZED TO PLEURA, UNSPECIFIED LATERALITY (HCC): ICD-10-CM

## 2018-03-08 DIAGNOSIS — C50.919 BREAST CANCER METASTASIZED TO PLEURA, UNSPECIFIED LATERALITY (HCC): ICD-10-CM

## 2018-03-08 DIAGNOSIS — C79.51 BONE METASTASES (HCC): Primary | ICD-10-CM

## 2018-03-08 PROCEDURE — 96402 CHEMO HORMON ANTINEOPL SQ/IM: CPT

## 2018-03-09 RX ORDER — LEVOTHYROXINE SODIUM 0.12 MG/1
TABLET ORAL
Qty: 30 TABLET | Refills: 0 | OUTPATIENT
Start: 2018-03-09

## 2018-03-12 ENCOUNTER — TELEPHONE (OUTPATIENT)
Dept: FAMILY MEDICINE CLINIC | Facility: CLINIC | Age: 58
End: 2018-03-12

## 2018-03-12 DIAGNOSIS — M79.672 LEFT FOOT PAIN: ICD-10-CM

## 2018-03-12 DIAGNOSIS — M79.671 RIGHT FOOT PAIN: Primary | ICD-10-CM

## 2018-03-12 NOTE — TELEPHONE ENCOUNTER
Called to pt and spoke with  Michele Coronel, listed on [facility]. Saba Dyson that there is another number to call for the EMG studies, [phone]. Michele Coronel voiced understanding and agreed to plan.

## 2018-03-12 NOTE — TELEPHONE ENCOUNTER
Msg received from [facility].   Referral to Podiatry (please recommend INTERNAL)    Patient Name: Lakshmi Mims   : [birth year]   Reason for the order/referral: burning of both feet   PCP: Yg Noe MD   Refer to Provider (first and last nam

## 2018-03-12 NOTE — TELEPHONE ENCOUNTER
Patient's  states that Dr. Tristen Hollingsworth recommended a bilateral EMG of lower extremities. When they tried to schedule it is not in the system.   Please advise at [phone]

## 2018-03-29 ENCOUNTER — OFFICE VISIT (OUTPATIENT)
Dept: ELECTROPHYSIOLOGY | Facility: HOSPITAL | Age: 58
End: 2018-03-29
Attending: FAMILY MEDICINE
Payer: COMMERCIAL

## 2018-03-29 DIAGNOSIS — M79.672 PAIN IN BOTH FEET: ICD-10-CM

## 2018-03-29 DIAGNOSIS — M79.671 PAIN IN BOTH FEET: ICD-10-CM

## 2018-03-29 DIAGNOSIS — R20.8 BURNING SENSATION OF FEET: ICD-10-CM

## 2018-03-29 PROCEDURE — 95886 MUSC TEST DONE W/N TEST COMP: CPT | Performed by: OTHER

## 2018-03-29 PROCEDURE — 95909 NRV CNDJ TST 5-6 STUDIES: CPT | Performed by: OTHER

## 2018-03-29 NOTE — PROCEDURES
[facility], [address] [place] [address]      PATIENT'S NAME: Abhay Ahuja   REFERRING PHYSICIAN: Samy Lyons M.D.    PATIENT ACCOUNT #: [de-identified] LOCATION: [facility]   MEDICAL RECORD #: [id number] DATE OF BIRTH: [birth year]

## 2018-04-05 ENCOUNTER — PATIENT MESSAGE (OUTPATIENT)
Dept: FAMILY MEDICINE CLINIC | Facility: CLINIC | Age: 58
End: 2018-04-05

## 2018-04-05 DIAGNOSIS — M79.671 PAIN IN BOTH FEET: Primary | ICD-10-CM

## 2018-04-05 DIAGNOSIS — R20.0 NUMBNESS AND TINGLING OF UPPER AND LOWER EXTREMITIES OF BOTH SIDES: ICD-10-CM

## 2018-04-05 DIAGNOSIS — R20.2 NUMBNESS AND TINGLING OF UPPER AND LOWER EXTREMITIES OF BOTH SIDES: ICD-10-CM

## 2018-04-05 DIAGNOSIS — M79.672 PAIN IN BOTH FEET: Primary | ICD-10-CM

## 2018-04-06 ENCOUNTER — PATIENT MESSAGE (OUTPATIENT)
Dept: NEUROLOGY | Facility: CLINIC | Age: 58
End: 2018-04-06

## 2018-04-06 NOTE — TELEPHONE ENCOUNTER
From: Georgina Severe  To: Galen Jeans, MD  Sent: 4/6/2018 4:45 PM CDT  Subject: Test Results Question    I had a Test done on March 29 and they told me the results would be in within two days.  Today is the eighth day and you have not sent any results to my do

## 2018-04-08 NOTE — TELEPHONE ENCOUNTER
From: Mallory Rome  To: Isabella Majano MD  Sent: 4/5/2018 10:10 AM CDT  Subject: Test Results Question    Good Morning. I went and had the EMG test done on my feet and legs etc last Thursday. They said they would have the results in about 2 days.  It

## 2018-04-08 NOTE — TELEPHONE ENCOUNTER
EMG showed sensory neuropathy and uperimposed bilateral lower lumbosacral radiculopathy at L4-5 and L5-S1 levels on both sides to a chronic mild to moderate degree.   I  Would  like patient to get MRI of the lumbar spine without contrast for evaluation of f

## 2018-04-09 ENCOUNTER — TELEPHONE (OUTPATIENT)
Dept: HEMATOLOGY/ONCOLOGY | Facility: HOSPITAL | Age: 58
End: 2018-04-09

## 2018-04-09 DIAGNOSIS — C50.911 CARCINOMA OF RIGHT BREAST METASTATIC TO PLEURA (HCC): Primary | ICD-10-CM

## 2018-04-09 DIAGNOSIS — C78.7 METASTASIS TO LIVER (HCC): ICD-10-CM

## 2018-04-09 DIAGNOSIS — C78.2 CARCINOMA OF RIGHT BREAST METASTATIC TO PLEURA (HCC): Primary | ICD-10-CM

## 2018-04-09 NOTE — TELEPHONE ENCOUNTER
Information and recommendations given to patient  okay per hippa, understanding verbalized. Order entered.

## 2018-04-09 NOTE — TELEPHONE ENCOUNTER
[phone]. Spoke with [employer] and explained the problem. She connected me with Beth [facility].   Eliucourt Glen states that pt and her  were notified of results via phone at 1:04pm this afternoon by their staff (

## 2018-04-09 NOTE — TELEPHONE ENCOUNTER
Pt's spouse left  stating they were trying to schedule CT scan, but there was no order on file. Pt is scheduled to see MD on Thursday. Okay to place order per Dr. Ivon Avery. Spouse notified.

## 2018-04-09 NOTE — TELEPHONE ENCOUNTER
Call from [facility] RN/dr murphy gonzalez's ofc-sts pt contacted their ofc for emg results stating she called our ofc for results but was told to contact dr Gino Sherman. Dania sts EMG was ordered by us, not their ofc. sts our ofc should provide results to pt.   Record

## 2018-04-09 NOTE — TELEPHONE ENCOUNTER
Spoke with patient's  Mila Arshad. He states he spoke with Dr. Jeannette Judge office, and was told that the EMG was ordered by Dr. Lora Barriga, and therefore she would need to provide the results.       Dr. Abelino Fernandes notes from 3/5/18 indicate she int

## 2018-04-10 ENCOUNTER — TELEPHONE (OUTPATIENT)
Dept: FAMILY MEDICINE CLINIC | Facility: CLINIC | Age: 58
End: 2018-04-10

## 2018-04-10 RX ORDER — ALPRAZOLAM 0.25 MG/1
TABLET ORAL
Qty: 10 TABLET | Refills: 0 | OUTPATIENT
Start: 2018-04-10 | End: 2019-07-01

## 2018-04-10 NOTE — TELEPHONE ENCOUNTER
Pt's /Jaiden called our office and requested if his wife could be prescribed a Sedative for her test tomorrow @ [facility]. Pt is having an MRI - 3:30pm tomorrow - and she is claustrophobic.   Pt[facility] [address]

## 2018-04-10 NOTE — TELEPHONE ENCOUNTER
Call to yissel/spouse-listed on hipaa consent-advised of orders noted below from dr Kellie Lobo. Informed will call med order to [facility] as requested. Ashley rodríguez pt has not taken this med previously.  Advised pt to start with one tablet approx 45-60 prior

## 2018-04-11 ENCOUNTER — HOSPITAL ENCOUNTER (OUTPATIENT)
Dept: CT IMAGING | Age: 58
Discharge: HOME | End: 2018-04-11
Attending: INTERNAL MEDICINE
Payer: COMMERCIAL

## 2018-04-11 ENCOUNTER — HOSPITAL ENCOUNTER (OUTPATIENT)
Dept: MRI IMAGING | Age: 58
Discharge: HOME | End: 2018-04-11
Attending: FAMILY MEDICINE
Payer: COMMERCIAL

## 2018-04-11 DIAGNOSIS — C50.911 CARCINOMA OF RIGHT BREAST METASTATIC TO PLEURA (HCC): ICD-10-CM

## 2018-04-11 DIAGNOSIS — M79.671 PAIN IN BOTH FEET: ICD-10-CM

## 2018-04-11 DIAGNOSIS — C78.2 CARCINOMA OF RIGHT BREAST METASTATIC TO PLEURA (HCC): ICD-10-CM

## 2018-04-11 DIAGNOSIS — M79.672 PAIN IN BOTH FEET: ICD-10-CM

## 2018-04-11 DIAGNOSIS — R20.0 NUMBNESS AND TINGLING OF UPPER AND LOWER EXTREMITIES OF BOTH SIDES: ICD-10-CM

## 2018-04-11 DIAGNOSIS — R20.2 NUMBNESS AND TINGLING OF UPPER AND LOWER EXTREMITIES OF BOTH SIDES: ICD-10-CM

## 2018-04-11 DIAGNOSIS — C78.7 METASTASIS TO LIVER (HCC): ICD-10-CM

## 2018-04-11 PROCEDURE — 74176 CT ABD & PELVIS W/O CONTRAST: CPT | Performed by: INTERNAL MEDICINE

## 2018-04-11 PROCEDURE — 71250 CT THORAX DX C-: CPT | Performed by: INTERNAL MEDICINE

## 2018-04-11 PROCEDURE — 72148 MRI LUMBAR SPINE W/O DYE: CPT | Performed by: FAMILY MEDICINE

## 2018-04-12 ENCOUNTER — APPOINTMENT (OUTPATIENT)
Dept: HEMATOLOGY/ONCOLOGY | Age: 58
End: 2018-04-12
Attending: INTERNAL MEDICINE
Payer: COMMERCIAL

## 2018-04-12 ENCOUNTER — OFFICE VISIT (OUTPATIENT)
Dept: HEMATOLOGY/ONCOLOGY | Age: 58
End: 2018-04-12
Attending: INTERNAL MEDICINE
Payer: COMMERCIAL

## 2018-04-12 DIAGNOSIS — R93.89 ABNORMAL MRI: Primary | ICD-10-CM

## 2018-04-12 DIAGNOSIS — C78.2 BREAST CANCER METASTASIZED TO PLEURA, UNSPECIFIED LATERALITY (HCC): ICD-10-CM

## 2018-04-12 DIAGNOSIS — C50.911 CARCINOMA OF RIGHT BREAST METASTATIC TO PLEURA (HCC): Primary | ICD-10-CM

## 2018-04-12 DIAGNOSIS — C50.919 BREAST CANCER METASTASIZED TO PLEURA, UNSPECIFIED LATERALITY (HCC): ICD-10-CM

## 2018-04-12 DIAGNOSIS — C78.2 CARCINOMA OF RIGHT BREAST METASTATIC TO PLEURA (HCC): Primary | ICD-10-CM

## 2018-04-12 DIAGNOSIS — C79.51 BONE METASTASES (HCC): ICD-10-CM

## 2018-04-12 PROCEDURE — 96402 CHEMO HORMON ANTINEOPL SQ/IM: CPT

## 2018-04-12 PROCEDURE — 86300 IMMUNOASSAY TUMOR CA 15-3: CPT

## 2018-04-12 PROCEDURE — 85025 COMPLETE CBC W/AUTO DIFF WBC: CPT

## 2018-04-12 PROCEDURE — 80053 COMPREHEN METABOLIC PANEL: CPT

## 2018-04-12 NOTE — PROGRESS NOTES
Call to pt reaches  Bisi Trejo, listed on HIPPA. Advised yissel of findings and recommendations. Bisi Trejo voiced understanding and agreed to pass information along, contact information given for Dr Florin Haile. Referral for Dr Efrain Chu pended to provider.

## 2018-04-16 ENCOUNTER — TELEPHONE (OUTPATIENT)
Dept: FAMILY MEDICINE CLINIC | Facility: CLINIC | Age: 58
End: 2018-04-16

## 2018-04-16 DIAGNOSIS — C50.111 MALIGNANT NEOPLASM OF CENTRAL PORTION OF RIGHT FEMALE BREAST, UNSPECIFIED ESTROGEN RECEPTOR STATUS (HCC): Primary | ICD-10-CM

## 2018-04-16 DIAGNOSIS — C78.2 SECONDARY MALIGNANT NEOPLASM OF PLEURA (HCC): ICD-10-CM

## 2018-04-16 NOTE — TELEPHONE ENCOUNTER
Dr. Roque Khan office is calling for a follow up referral for pt. Pt has an appointment scheduled for tomorrow at 3:30pm.  Verbal from Dr. Tristen Hollingsworth received ok to enter referral order. Referral has been placed and is approved.

## 2018-04-17 ENCOUNTER — OFFICE VISIT (OUTPATIENT)
Dept: HEMATOLOGY/ONCOLOGY | Facility: HOSPITAL | Age: 58
End: 2018-04-17
Attending: INTERNAL MEDICINE
Payer: COMMERCIAL

## 2018-04-17 VITALS
SYSTOLIC BLOOD PRESSURE: 129 MMHG | WEIGHT: 191 LBS | DIASTOLIC BLOOD PRESSURE: 73 MMHG | HEART RATE: 94 BPM | BODY MASS INDEX: 30.7 KG/M2 | TEMPERATURE: 97 F | HEIGHT: 65.98 IN | RESPIRATION RATE: 18 BRPM | OXYGEN SATURATION: 95 %

## 2018-04-17 DIAGNOSIS — C50.111 MALIGNANT NEOPLASM OF CENTRAL PORTION OF RIGHT FEMALE BREAST, UNSPECIFIED ESTROGEN RECEPTOR STATUS (HCC): Primary | ICD-10-CM

## 2018-04-17 DIAGNOSIS — C78.7 LIVER METASTASES (HCC): ICD-10-CM

## 2018-04-17 DIAGNOSIS — C78.2 SECONDARY MALIGNANT NEOPLASM OF PLEURA (HCC): ICD-10-CM

## 2018-04-17 PROCEDURE — 99213 OFFICE O/P EST LOW 20 MIN: CPT | Performed by: INTERNAL MEDICINE

## 2018-04-26 NOTE — PROGRESS NOTES
Cancer Center Progress Note  Patient Name: Franny Mckeon   YOB: 1960   Medical Record Number: [id number]     Attending Physician: Sandra Otniveros M.D. Date of Visit: 4/17/18    Chief Complaint:  Patient presents with:   Follow - Up tobacco: Never Used    Alcohol use Yes  0.0 oz/week     Comment: 2 drinks/yr     Drug use: No    Sexual activity: Not on file     Other Topics Concern    Caffeine Concern Yes    Comment: 1 1/2 a day 16oz     Social History Narrative   None on file       Cu Psychiatric No insomnia, depression, erica or mood swings. Vital Signs:  /73 (BP Location: Left arm, Patient Position: Sitting, Cuff Size: large)   Pulse 94   Temp (!) 97.3 °F (36.3 °C) (Tympanic)   Resp 18   Ht 1.676 m (5' 5.98\")   Wt 86. Non- Latest Ref Range: >=60   64 64   GFR, -American Latest Ref Range: >=60   74 73   GFR Latest Ref Range: >=60  59 (L)     ALKALINE PHOSPHATASE Latest Ref Range: 41 - 108 U/L 135 (H) 230 (H) 206 (H)   AST (SGOT) Latest Ref Range: 1 her issues with her teeth. CT appears stable, though it is somewhat difficult to interpret without contrast. Her markers, which have been an accurate measurement of disease activity in the past, are lower. Continue q3 month follow up.     Bone Metastases: S

## 2018-04-28 ENCOUNTER — LAB ENCOUNTER (OUTPATIENT)
Dept: LAB | Age: 58
End: 2018-04-28
Attending: FAMILY MEDICINE
Payer: COMMERCIAL

## 2018-04-28 DIAGNOSIS — E78.00 PURE HYPERCHOLESTEROLEMIA: ICD-10-CM

## 2018-04-28 DIAGNOSIS — E03.9 HYPOTHYROIDISM IN ADULT: ICD-10-CM

## 2018-04-28 PROCEDURE — 83721 ASSAY OF BLOOD LIPOPROTEIN: CPT

## 2018-04-28 PROCEDURE — 36415 COLL VENOUS BLD VENIPUNCTURE: CPT

## 2018-04-28 PROCEDURE — 84443 ASSAY THYROID STIM HORMONE: CPT

## 2018-04-28 PROCEDURE — 80061 LIPID PANEL: CPT

## 2018-04-28 PROCEDURE — 84439 ASSAY OF FREE THYROXINE: CPT

## 2018-04-28 PROCEDURE — 80053 COMPREHEN METABOLIC PANEL: CPT

## 2018-05-02 ENCOUNTER — OFFICE VISIT (OUTPATIENT)
Dept: SURGERY | Facility: CLINIC | Age: 58
End: 2018-05-02

## 2018-05-02 VITALS — SYSTOLIC BLOOD PRESSURE: 124 MMHG | HEART RATE: 80 BPM | DIASTOLIC BLOOD PRESSURE: 70 MMHG

## 2018-05-02 DIAGNOSIS — M48.061 LUMBAR FORAMINAL STENOSIS: ICD-10-CM

## 2018-05-02 DIAGNOSIS — G62.9 NEUROPATHY: Primary | ICD-10-CM

## 2018-05-02 PROCEDURE — 99204 OFFICE O/P NEW MOD 45 MIN: CPT | Performed by: PHYSICIAN ASSISTANT

## 2018-05-02 RX ORDER — GABAPENTIN 300 MG/1
CAPSULE ORAL
Qty: 60 CAPSULE | Refills: 0 | Status: SHIPPED | OUTPATIENT
Start: 2018-05-02 | End: 2018-05-20

## 2018-05-02 NOTE — PROGRESS NOTES
Location of Pain: back pain, leg pain radiating into feet, numbness and tingling in feet    Date Pain Began: yrs ago worsening in the past yr    Work Related:   No        Receiving Work Comp/Disability:   No    Numeric Rating Scale:  Pain at Present:  8

## 2018-05-02 NOTE — H&P
Neurosurgery Clinic Visit  2018    Kelin Sharp PCP:  Hever Hernandez MD    1960 MRN [id number]       CC:   Foot Pain    HPI:    Little Starch is a very pleasant 56year old female with PMH of metastatic breast CA who presents with chronic b/l foot thyroid    • Hypothyroid    • Other and unspecified hyperlipidemia      Social History    Marital status:              Spouse name:                       Years of education:                 Number of children:               Social History Main Topic Finger Abduction Finger Extension   Right 5 5 5 5 5 5 5   Left 5 5 5 5 5 5 5     Lower extremity strength:    Iliopsoas Quad Hamstring D-Flexion EHL P-Flexion Eversion Inversion   Right 5 5 5 5 5 5 5 5   Left 5 5 5 5 5 5 5 5     A/P:    1. Neuropathy    2.

## 2018-05-02 NOTE — PATIENT INSTRUCTIONS
Refill policies:    • Allow 2-3 business days for refills; controlled substances may take longer.   • Contact your pharmacy at least 5 days prior to running out of medication and have them send an electronic request or submit request through the “request re entire amount billed. Precertification and Prior Authorizations: If your physician has recommended that you have a procedure or additional testing performed.   [facility]) will contact your insurance carrier to obtain pre-certi

## 2018-05-05 ENCOUNTER — HOSPITAL ENCOUNTER (OUTPATIENT)
Dept: GENERAL RADIOLOGY | Facility: HOSPITAL | Age: 58
Discharge: HOME | End: 2018-05-05
Attending: FAMILY MEDICINE
Payer: COMMERCIAL

## 2018-05-05 ENCOUNTER — HOSPITAL ENCOUNTER (OUTPATIENT)
Dept: GENERAL RADIOLOGY | Facility: HOSPITAL | Age: 58
Discharge: HOME | End: 2018-05-05
Attending: PHYSICIAN ASSISTANT
Payer: COMMERCIAL

## 2018-05-05 ENCOUNTER — OFFICE VISIT (OUTPATIENT)
Dept: FAMILY MEDICINE CLINIC | Facility: CLINIC | Age: 58
End: 2018-05-05

## 2018-05-05 VITALS
DIASTOLIC BLOOD PRESSURE: 60 MMHG | WEIGHT: 192 LBS | HEIGHT: 66 IN | TEMPERATURE: 98 F | RESPIRATION RATE: 16 BRPM | BODY MASS INDEX: 30.86 KG/M2 | HEART RATE: 80 BPM | SYSTOLIC BLOOD PRESSURE: 128 MMHG

## 2018-05-05 DIAGNOSIS — M48.061 LUMBAR FORAMINAL STENOSIS: ICD-10-CM

## 2018-05-05 DIAGNOSIS — M25.561 ACUTE PAIN OF RIGHT KNEE: ICD-10-CM

## 2018-05-05 DIAGNOSIS — C78.2 CARCINOMA OF RIGHT BREAST METASTATIC TO PLEURA (HCC): ICD-10-CM

## 2018-05-05 DIAGNOSIS — M54.16 LUMBAR RADICULOPATHY: ICD-10-CM

## 2018-05-05 DIAGNOSIS — G62.9 PERIPHERAL POLYNEUROPATHY: ICD-10-CM

## 2018-05-05 DIAGNOSIS — G62.9 NEUROPATHY: ICD-10-CM

## 2018-05-05 DIAGNOSIS — C79.51 BONE METASTASES (HCC): ICD-10-CM

## 2018-05-05 DIAGNOSIS — R79.89 ELEVATED LIVER FUNCTION TESTS: ICD-10-CM

## 2018-05-05 DIAGNOSIS — Z17.0 MALIGNANT NEOPLASM OF NIPPLE OF RIGHT BREAST IN FEMALE, ESTROGEN RECEPTOR POSITIVE (HCC): Primary | ICD-10-CM

## 2018-05-05 DIAGNOSIS — M25.461 SWELLING OF JOINT, KNEE, RIGHT: ICD-10-CM

## 2018-05-05 DIAGNOSIS — C50.011 MALIGNANT NEOPLASM OF NIPPLE OF RIGHT BREAST IN FEMALE, ESTROGEN RECEPTOR POSITIVE (HCC): Primary | ICD-10-CM

## 2018-05-05 DIAGNOSIS — F41.9 ANXIETY: ICD-10-CM

## 2018-05-05 DIAGNOSIS — E78.2 HYPERLIPIDEMIA, MIXED: ICD-10-CM

## 2018-05-05 DIAGNOSIS — E03.9 HYPOTHYROIDISM IN ADULT: ICD-10-CM

## 2018-05-05 DIAGNOSIS — R60.0 EDEMA OF BOTH FEET: ICD-10-CM

## 2018-05-05 DIAGNOSIS — C50.911 CARCINOMA OF RIGHT BREAST METASTATIC TO PLEURA (HCC): ICD-10-CM

## 2018-05-05 PROCEDURE — 99215 OFFICE O/P EST HI 40 MIN: CPT | Performed by: FAMILY MEDICINE

## 2018-05-05 PROCEDURE — 72114 X-RAY EXAM L-S SPINE BENDING: CPT | Performed by: PHYSICIAN ASSISTANT

## 2018-05-05 PROCEDURE — 73560 X-RAY EXAM OF KNEE 1 OR 2: CPT | Performed by: FAMILY MEDICINE

## 2018-05-05 RX ORDER — HYDROCODONE BITARTRATE AND ACETAMINOPHEN 7.5; 325 MG/1; MG/1
1 TABLET ORAL EVERY 8 HOURS PRN
Qty: 90 TABLET | Refills: 0 | Status: SHIPPED | OUTPATIENT
Start: 2018-05-05 | End: 2018-06-04

## 2018-05-05 RX ORDER — MOMETASONE FUROATE 1 MG/G
1 CREAM TOPICAL 2 TIMES DAILY PRN
Qty: 30 G | Refills: 1 | Status: SHIPPED | OUTPATIENT
Start: 2018-05-05 | End: 2019-07-01 | Stop reason: ALTCHOICE

## 2018-05-05 RX ORDER — LEVOTHYROXINE SODIUM 0.15 MG/1
150 TABLET ORAL
Qty: 30 TABLET | Refills: 3 | Status: SHIPPED | OUTPATIENT
Start: 2018-05-05 | End: 2018-06-21

## 2018-05-05 RX ORDER — HYDROCODONE BITARTRATE AND ACETAMINOPHEN 7.5; 325 MG/1; MG/1
1 TABLET ORAL EVERY 8 HOURS PRN
Qty: 30 TABLET | Refills: 0 | Status: SHIPPED | OUTPATIENT
Start: 2018-06-04 | End: 2018-06-13

## 2018-05-05 RX ORDER — ATORVASTATIN CALCIUM 20 MG/1
20 TABLET, FILM COATED ORAL NIGHTLY
Qty: 30 TABLET | Refills: 3 | Status: SHIPPED | OUTPATIENT
Start: 2018-05-05 | End: 2018-07-25

## 2018-05-05 NOTE — PATIENT INSTRUCTIONS
Continue gabapentin as prescribed by neurosurgical office. Use [place] with caution as needed for pain. Increase atorvastatin 20 mg daily. Increase Lovaza to 2 capsules in the morning and 2 capsules at night. Try oatmeal and flaxseed daily.   Increase lev

## 2018-05-05 NOTE — PROGRESS NOTES
Luis Samuels is a 56year old female. cc hyperlipidemia, hypothyroidism rash on the elbow, burning pain of the feet, right knee pain, breast cancer elevated liver test.,   HPI:   Patient is coming to the office for follow-up visit.   She has hypercholestero work and she says that she bumped the knee on the metal desk a few times but she is not sure if that happened recently. The  Knee bothers her especially when she is walking. It is swollen.   She would like to have it checked there is only a little bit of Oral Tab tab TAKE 1 TABLET DAILY Disp: 90 tablet Rfl: 2   Goserelin Acetate (ZOLADEX SC) Inject into the skin.  Disp:  Rfl:       Past Medical History:   Diagnosis Date   • Breast cancer ([address]    • Disorder of thyroid    • Hypothyroid    • Other and unspeci cyanosis, clubbing plus 1 edema bilateral lower extremities    PROCEDURE:  MRI SPINE LUMBAR (CPT=72148)     COMPARISON:  [facility], SPINE LUMBOSACRAL COMPL W/ OBL, 12/14/2009, 15:58.      INDICATIONS:  R20.2 Paresthesia of skin R20.0 Anesthesia of skin M79 descending left S1 nerve root and may be impinging on the right S1 nerve root. Right paracentral disc bulge at L4-L5 may be minimally abutting the right L5 nerve root. No high-grade spinal canal or neural foraminal stenosis.            Dictated by: in female, estrogen receptor positive (hcc)  (primary encounter diagnosis)  Carcinoma of right breast metastatic to pleura (hcc)  Bone metastases (hcc)  Acute pain of right knee  Swelling of joint, knee, right  Hyperlipidemia, mixed  Edema of both feet  Pe hydrocodone-acetaminophen 7.5-325 MG Oral Tab 90 tablet 0      Sig: Take 1 tablet by mouth every 8 (eight) hours as needed for Pain.      hydrocodone-acetaminophen 7.5-325 MG Oral Tab 30 tablet 0      Sig: Take 1 tablet by mouth every 8 (eight) hours as ne

## 2018-05-17 ENCOUNTER — OFFICE VISIT (OUTPATIENT)
Dept: HEMATOLOGY/ONCOLOGY | Age: 58
End: 2018-05-17
Attending: INTERNAL MEDICINE
Payer: COMMERCIAL

## 2018-05-17 DIAGNOSIS — C78.2 BREAST CANCER METASTASIZED TO PLEURA, UNSPECIFIED LATERALITY (HCC): ICD-10-CM

## 2018-05-17 DIAGNOSIS — C50.919 BREAST CANCER METASTASIZED TO PLEURA, UNSPECIFIED LATERALITY (HCC): ICD-10-CM

## 2018-05-17 DIAGNOSIS — C79.51 BONE METASTASES (HCC): Primary | ICD-10-CM

## 2018-05-17 PROCEDURE — 96402 CHEMO HORMON ANTINEOPL SQ/IM: CPT

## 2018-05-17 RX ORDER — LEVOTHYROXINE SODIUM 137 UG/1
TABLET ORAL
Qty: 90 TABLET | Refills: 0 | OUTPATIENT
Start: 2018-05-17

## 2018-05-17 NOTE — TELEPHONE ENCOUNTER
L-THYROXINE (SYNTHROID) TABS 137MCG    Thyroid Supplements Protocol Failed    Her last Free T4 and TSH was done on 22*78*96. Labs was discussed at [facility] on 05*05*18. Her Free T4 was 0.7 (L). Her TSH was 10.500 (H).     Rx is pending for your approva

## 2018-05-25 ENCOUNTER — TELEPHONE (OUTPATIENT)
Dept: SURGERY | Facility: CLINIC | Age: 58
End: 2018-05-25

## 2018-05-25 NOTE — TELEPHONE ENCOUNTER
Called pt regarding Gabapentin script:  Attempted cell and home #    Spoke to pt:  Taking 3 tablets daily(1 tablet TID): this dosage did work for her, she experienced no negative side effects and would like to continue on this dose.

## 2018-05-27 RX ORDER — GABAPENTIN 300 MG/1
300 CAPSULE ORAL 3 TIMES DAILY
Qty: 90 CAPSULE | Refills: 1 | Status: SHIPPED | OUTPATIENT
Start: 2018-05-27 | End: 2018-08-11

## 2018-06-06 RX ORDER — OMEGA-3-ACID ETHYL ESTERS 1 G/1
CAPSULE, LIQUID FILLED ORAL
Qty: 360 CAPSULE | Refills: 0 | Status: SHIPPED | OUTPATIENT
Start: 2018-06-06 | End: 2018-08-31

## 2018-06-06 NOTE — TELEPHONE ENCOUNTER
OMEGA 3 ETHYL ANGELA(LOVAZA)CP 1GM    Cholesterol Medication Protocol Failed    Not a per protocol medication.     Rx is pending for your approval.    Please sign,    Thank you

## 2018-06-13 ENCOUNTER — OFFICE VISIT (OUTPATIENT)
Dept: SURGERY | Facility: CLINIC | Age: 58
End: 2018-06-13

## 2018-06-13 VITALS — SYSTOLIC BLOOD PRESSURE: 110 MMHG | DIASTOLIC BLOOD PRESSURE: 60 MMHG | HEART RATE: 82 BPM

## 2018-06-13 DIAGNOSIS — G62.9 NEUROPATHY: Primary | ICD-10-CM

## 2018-06-13 DIAGNOSIS — M79.671 PAIN IN BOTH FEET: ICD-10-CM

## 2018-06-13 DIAGNOSIS — M79.672 PAIN IN BOTH FEET: ICD-10-CM

## 2018-06-13 PROCEDURE — 99213 OFFICE O/P EST LOW 20 MIN: CPT | Performed by: NEUROLOGICAL SURGERY

## 2018-06-13 RX ORDER — GABAPENTIN 300 MG/1
CAPSULE ORAL
Qty: 540 CAPSULE | Refills: 0 | Status: SHIPPED | OUTPATIENT
Start: 2018-06-13 | End: 2019-01-17 | Stop reason: ALTCHOICE

## 2018-06-13 NOTE — PROGRESS NOTES
Pt is here for 6 week follow up with gabapentin and had some xrays done on her spine 5/5/18. Pt states that gabapentin is helping.

## 2018-06-13 NOTE — PROGRESS NOTES
Neurosurgery Clinic Visit  2018    Corrine Carias PCP:  Janet Victor MD    1960 MRN [id number]         CC: Foot Pain    HPI:    Kiley Donato is here for f/u after starting gabapentin. Her foot pain is much better, about 50%.   She can sleep bet may be impinging on the right S1 nerve root.     Right paracentral disc bulge at L4-L5 may be minimally abutting the right L5 nerve root.     No high-grade spinal canal or neural foraminal stenosis.          Past Medical History:   Diagnosis Date   • Brady Non-tender  Thoracic Spinous Processes Non-tender, B/L Thoracic Paraspinals Non-tender  Lumbar Spinous Processes Non-tender, B/L Lumbar Paraspinals Non-tender, B/L SI Joint Non-tender, B/L Trochanter Non-tender  Mild distal LE edema, chronic  Upper extremi

## 2018-06-14 ENCOUNTER — OFFICE VISIT (OUTPATIENT)
Dept: HEMATOLOGY/ONCOLOGY | Age: 58
End: 2018-06-14
Attending: INTERNAL MEDICINE
Payer: COMMERCIAL

## 2018-06-14 DIAGNOSIS — C79.51 BONE METASTASES (HCC): Primary | ICD-10-CM

## 2018-06-14 DIAGNOSIS — C78.2 BREAST CANCER METASTASIZED TO PLEURA, UNSPECIFIED LATERALITY (HCC): ICD-10-CM

## 2018-06-14 DIAGNOSIS — C50.919 BREAST CANCER METASTASIZED TO PLEURA, UNSPECIFIED LATERALITY (HCC): ICD-10-CM

## 2018-06-14 PROCEDURE — 96402 CHEMO HORMON ANTINEOPL SQ/IM: CPT

## 2018-06-18 RX ORDER — ANASTROZOLE 1 MG/1
TABLET ORAL
Qty: 90 TABLET | Refills: 2 | Status: SHIPPED | OUTPATIENT
Start: 2018-06-18 | End: 2019-02-14 | Stop reason: ALTCHOICE

## 2018-06-23 RX ORDER — LEVOTHYROXINE SODIUM 0.15 MG/1
TABLET ORAL
Qty: 30 TABLET | Refills: 0 | OUTPATIENT
Start: 2018-06-23

## 2018-07-06 ENCOUNTER — HOSPITAL ENCOUNTER (OUTPATIENT)
Dept: CT IMAGING | Age: 58
Discharge: HOME | End: 2018-07-06
Attending: INTERNAL MEDICINE
Payer: COMMERCIAL

## 2018-07-06 DIAGNOSIS — C78.7 LIVER METASTASES (HCC): ICD-10-CM

## 2018-07-06 DIAGNOSIS — C50.111 MALIGNANT NEOPLASM OF CENTRAL PORTION OF RIGHT FEMALE BREAST, UNSPECIFIED ESTROGEN RECEPTOR STATUS (HCC): ICD-10-CM

## 2018-07-06 DIAGNOSIS — C78.2 SECONDARY MALIGNANT NEOPLASM OF PLEURA (HCC): ICD-10-CM

## 2018-07-06 PROCEDURE — 74176 CT ABD & PELVIS W/O CONTRAST: CPT | Performed by: INTERNAL MEDICINE

## 2018-07-06 PROCEDURE — 71250 CT THORAX DX C-: CPT | Performed by: INTERNAL MEDICINE

## 2018-07-12 ENCOUNTER — OFFICE VISIT (OUTPATIENT)
Dept: HEMATOLOGY/ONCOLOGY | Age: 58
End: 2018-07-12
Attending: INTERNAL MEDICINE
Payer: COMMERCIAL

## 2018-07-12 VITALS
DIASTOLIC BLOOD PRESSURE: 65 MMHG | RESPIRATION RATE: 20 BRPM | OXYGEN SATURATION: 97 % | HEART RATE: 81 BPM | WEIGHT: 200 LBS | SYSTOLIC BLOOD PRESSURE: 110 MMHG | BODY MASS INDEX: 32 KG/M2 | TEMPERATURE: 99 F

## 2018-07-12 DIAGNOSIS — C50.919 BREAST CANCER METASTASIZED TO PLEURA, UNSPECIFIED LATERALITY (HCC): ICD-10-CM

## 2018-07-12 DIAGNOSIS — C78.2 SECONDARY MALIGNANT NEOPLASM OF PLEURA (HCC): ICD-10-CM

## 2018-07-12 DIAGNOSIS — C78.7 LIVER METASTASES (HCC): ICD-10-CM

## 2018-07-12 DIAGNOSIS — C79.51 BONE METASTASES (HCC): Primary | ICD-10-CM

## 2018-07-12 DIAGNOSIS — C50.111 MALIGNANT NEOPLASM OF CENTRAL PORTION OF RIGHT FEMALE BREAST, UNSPECIFIED ESTROGEN RECEPTOR STATUS (HCC): ICD-10-CM

## 2018-07-12 DIAGNOSIS — C78.2 BREAST CANCER METASTASIZED TO PLEURA, UNSPECIFIED LATERALITY (HCC): ICD-10-CM

## 2018-07-12 LAB
ALBUMIN SERPL-MCNC: 3.6 G/DL (ref 3.5–4.8)
ALP LIVER SERPL-CCNC: 278 U/L (ref 41–108)
ALT SERPL-CCNC: 162 U/L (ref 14–54)
AST SERPL-CCNC: 97 U/L (ref 15–41)
BASOPHILS # BLD AUTO: 0.08 X10(3) UL (ref 0–0.1)
BASOPHILS NFR BLD AUTO: 1 %
BILIRUB SERPL-MCNC: 0.3 MG/DL (ref 0.1–2)
BUN BLD-MCNC: 8 MG/DL (ref 8–20)
CALCIUM BLD-MCNC: 9.1 MG/DL (ref 8.3–10.3)
CHLORIDE: 107 MMOL/L (ref 101–111)
CO2: 26 MMOL/L (ref 22–32)
CREAT BLD-MCNC: 0.89 MG/DL (ref 0.55–1.02)
EOSINOPHIL # BLD AUTO: 0.25 X10(3) UL (ref 0–0.3)
EOSINOPHIL NFR BLD AUTO: 3.1 %
ERYTHROCYTE [DISTWIDTH] IN BLOOD BY AUTOMATED COUNT: 13.7 % (ref 11.5–16)
GLUCOSE BLD-MCNC: 187 MG/DL (ref 70–99)
HCT VFR BLD AUTO: 38.9 % (ref 34–50)
HGB BLD-MCNC: 12.8 G/DL (ref 12–16)
IMMATURE GRANULOCYTE COUNT: 0.04 X10(3) UL (ref 0–1)
IMMATURE GRANULOCYTE RATIO %: 0.5 %
LYMPHOCYTES # BLD AUTO: 3.85 X10(3) UL (ref 0.9–4)
LYMPHOCYTES NFR BLD AUTO: 48.1 %
M PROTEIN MFR SERPL ELPH: 7.3 G/DL (ref 6.1–8.3)
MCH RBC QN AUTO: 31 PG (ref 27–33.2)
MCHC RBC AUTO-ENTMCNC: 32.9 G/DL (ref 31–37)
MCV RBC AUTO: 94.2 FL (ref 81–100)
MONOCYTES # BLD AUTO: 0.44 X10(3) UL (ref 0.1–1)
MONOCYTES NFR BLD AUTO: 5.5 %
NEUTROPHIL ABS PRELIM: 3.34 X10 (3) UL (ref 1.3–6.7)
NEUTROPHILS # BLD AUTO: 3.34 X10(3) UL (ref 1.3–6.7)
NEUTROPHILS NFR BLD AUTO: 41.8 %
PLATELET # BLD AUTO: 244 10(3)UL (ref 150–450)
POTASSIUM SERPL-SCNC: 3.6 MMOL/L (ref 3.6–5.1)
RBC # BLD AUTO: 4.13 X10(6)UL (ref 3.8–5.1)
RED CELL DISTRIBUTION WIDTH-SD: 46.9 FL (ref 35.1–46.3)
SODIUM SERPL-SCNC: 140 MMOL/L (ref 136–144)
WBC # BLD AUTO: 8 X10(3) UL (ref 4–13)

## 2018-07-12 PROCEDURE — 96402 CHEMO HORMON ANTINEOPL SQ/IM: CPT

## 2018-07-12 PROCEDURE — 99213 OFFICE O/P EST LOW 20 MIN: CPT | Performed by: INTERNAL MEDICINE

## 2018-07-12 NOTE — PROGRESS NOTES
Cancer Center Progress Note  Patient Name: Toi Lang   YOB: 1960   Medical Record Number: [id number]     Attending Physician: Mariza Mora M.D. Date of Visit: 4/17/18    Chief Complaint:  Patient presents with:   Follow - Up Never Used    Alcohol use Yes  0.0 oz/week     Comment: 2 drinks/yr     Drug use: No    Sexual activity: Not on file     Other Topics Concern    Caffeine Concern Yes    Comment: 1 1/2 a day 16oz     Social History Narrative   None on file       Current Med Gastrointestinal No nausea, vomiting, diarrhea, GI bleeding, or constipation. NL appetite, No Early Satiety. Genitorurinary No hematuria, dysuria, abnormal bleeding. Integumentary No rashes, No yellowing.    Neurologic No headache, blurred vision, and hepatic metastatic deposits are difficult to delineate on this noncontrast exam.  Morphologically these are likely stable. MRI liver protocol may be helpful. 2.  Pulmonary parenchymal abnormalities and pleural thickening are stable.      3.  Numerous o

## 2018-07-13 LAB
BREAST CARCINOMA AG (CA2729): 19.7 U/ML (ref ?–38)
CANCER AG 15-3: 8.3 U/ML (ref ?–35)

## 2018-07-27 RX ORDER — ATORVASTATIN CALCIUM 20 MG/1
TABLET, FILM COATED ORAL
Qty: 90 TABLET | Refills: 0 | Status: SHIPPED | OUTPATIENT
Start: 2018-07-27 | End: 2018-10-11 | Stop reason: ALTCHOICE

## 2018-07-27 NOTE — TELEPHONE ENCOUNTER
Atorvastatin Calcium Oral Tablet 20 MG    The patient failed protocol due to her ALT being  162 on 07/12/2018.     Rx is pending for your approval.    Please sign,    Thank you

## 2018-08-04 ENCOUNTER — LAB ENCOUNTER (OUTPATIENT)
Dept: LAB | Age: 58
End: 2018-08-04
Attending: FAMILY MEDICINE
Payer: COMMERCIAL

## 2018-08-04 DIAGNOSIS — R79.89 ELEVATED LIVER FUNCTION TESTS: Primary | ICD-10-CM

## 2018-08-06 ENCOUNTER — TELEPHONE (OUTPATIENT)
Dept: FAMILY MEDICINE CLINIC | Facility: CLINIC | Age: 58
End: 2018-08-06

## 2018-08-06 DIAGNOSIS — E78.2 HYPERLIPIDEMIA, MIXED: Primary | ICD-10-CM

## 2018-08-06 DIAGNOSIS — E03.9 HYPOTHYROIDISM IN ADULT: ICD-10-CM

## 2018-08-06 NOTE — TELEPHONE ENCOUNTER
Patients  called requesting Lab order for thryoid and triglycerides, he states they were supposed to be ordered at last visit with Dr. Tristen Hollingsworth (may 5) and she has an appointment with  On Saturday and they should be done prior to this.     Abida

## 2018-08-09 ENCOUNTER — OFFICE VISIT (OUTPATIENT)
Dept: HEMATOLOGY/ONCOLOGY | Age: 58
End: 2018-08-09
Attending: INTERNAL MEDICINE
Payer: COMMERCIAL

## 2018-08-09 ENCOUNTER — LAB ENCOUNTER (OUTPATIENT)
Dept: LAB | Age: 58
End: 2018-08-09
Attending: FAMILY MEDICINE
Payer: COMMERCIAL

## 2018-08-09 DIAGNOSIS — C50.919 BREAST CANCER METASTASIZED TO PLEURA, UNSPECIFIED LATERALITY (HCC): ICD-10-CM

## 2018-08-09 DIAGNOSIS — C78.2 BREAST CANCER METASTASIZED TO PLEURA, UNSPECIFIED LATERALITY (HCC): ICD-10-CM

## 2018-08-09 DIAGNOSIS — E78.2 HYPERLIPIDEMIA, MIXED: ICD-10-CM

## 2018-08-09 DIAGNOSIS — E03.9 HYPOTHYROIDISM IN ADULT: ICD-10-CM

## 2018-08-09 DIAGNOSIS — C79.51 BONE METASTASES (HCC): Primary | ICD-10-CM

## 2018-08-09 LAB
ALBUMIN SERPL-MCNC: 4 G/DL (ref 3.5–4.8)
ALBUMIN/GLOB SERPL: 1.2 {RATIO} (ref 1–2)
ALP LIVER SERPL-CCNC: 309 U/L (ref 46–118)
ALT SERPL-CCNC: 190 U/L (ref 14–54)
ANION GAP SERPL CALC-SCNC: 7 MMOL/L (ref 0–18)
AST SERPL-CCNC: 151 U/L (ref 15–41)
BILIRUB SERPL-MCNC: 0.6 MG/DL (ref 0.1–2)
BUN BLD-MCNC: 7 MG/DL (ref 8–20)
BUN/CREAT SERPL: 8.8 (ref 10–20)
CALCIUM BLD-MCNC: 9.8 MG/DL (ref 8.3–10.3)
CHLORIDE SERPL-SCNC: 108 MMOL/L (ref 101–111)
CHOLEST SMN-MCNC: 324 MG/DL (ref ?–200)
CO2 SERPL-SCNC: 28 MMOL/L (ref 22–32)
CREAT BLD-MCNC: 0.8 MG/DL (ref 0.55–1.02)
GLOBULIN PLAS-MCNC: 3.4 G/DL (ref 2.5–3.7)
GLUCOSE BLD-MCNC: 148 MG/DL (ref 70–99)
HDLC SERPL-MCNC: 25 MG/DL (ref 40–59)
LDLC SERPL DIRECT ASSAY-MCNC: 154 MG/DL (ref ?–100)
M PROTEIN MFR SERPL ELPH: 7.4 G/DL (ref 6.1–8.3)
NONHDLC SERPL-MCNC: 299 MG/DL (ref ?–130)
OSMOLALITY SERPL CALC.SUM OF ELEC: 297 MOSM/KG (ref 275–295)
POTASSIUM SERPL-SCNC: 4.3 MMOL/L (ref 3.6–5.1)
SODIUM SERPL-SCNC: 143 MMOL/L (ref 136–144)
T4 FREE SERPL-MCNC: 0.7 NG/DL (ref 0.9–1.8)
TRIGL SERPL-MCNC: 750 MG/DL (ref 30–149)
TSI SER-ACNC: 42.3 MIU/ML (ref 0.35–5.5)

## 2018-08-09 PROCEDURE — 80053 COMPREHEN METABOLIC PANEL: CPT

## 2018-08-09 PROCEDURE — 84439 ASSAY OF FREE THYROXINE: CPT

## 2018-08-09 PROCEDURE — 96402 CHEMO HORMON ANTINEOPL SQ/IM: CPT

## 2018-08-09 PROCEDURE — 84443 ASSAY THYROID STIM HORMONE: CPT

## 2018-08-09 PROCEDURE — 83721 ASSAY OF BLOOD LIPOPROTEIN: CPT

## 2018-08-09 PROCEDURE — 36415 COLL VENOUS BLD VENIPUNCTURE: CPT

## 2018-08-09 PROCEDURE — 80061 LIPID PANEL: CPT

## 2018-08-11 NOTE — PATIENT INSTRUCTIONS
Stop atorvastatin. Start rosuvastatin 5 mg 1 tablet daily. Increase levothyroxine to 175 mcg daily. Increase the escitalopram to 20 mg daily. Continue using Norco for pain as needed. Healthy diet. Low-fat. Try to have tea instead of coffee.   Call or

## 2018-08-11 NOTE — PROGRESS NOTES
Mary Murphy is a 66year old female. cc neuropathy, right hip pain, breast cancer, hypercholesterolemia, elevated liver function test, hypothyroidism, anxiety  HPI:   Patient is coming to the office for follow-up of multiple medical problems.   Patient has high triglycerides including pancreatitis. Elevated liver function tests are going even higher. She has multiple liver lesions. We will continue monitoring.   Most probably liver tests would be related to combination of metastatic process may be choles Furoate 0.1 % External Cream Apply 1 Application topically 2 (two) times daily as needed. Disp: 30 g Rfl: 1   ALPRAZolam 0.25 MG Oral Tab Take one to two tablets by mouth as directed prior to testing.  Disp: 10 tablet Rfl: 0   hydrocodone-acetaminophen (NOR masses, HSM or tenderness patient has only tenderness at the umbilical hernia,   EXTREMITIES: no cyanosis, clubbing , trace edema bilateral lower extremities.     Musculoskeletal limited  right hip flexion, there is tenderness to palpation lateral right hip polyneuropathy  Hypothyroidism in adult  Acute right hip pain  Metaplastic carcinoma of breast (hcc)      Orders Placed This Encounter      LIPID PANEL      COMP METABOLIC PANEL      TSH and Free T4 [E]    Meds & Refills for this Visit:  Signed Prescriptio

## 2018-08-28 NOTE — TELEPHONE ENCOUNTER
Medication: Gabapentin 300 mg 2 tabs TID    Date of last refill: 6/13/18 #540  [place] 3[place]    Last office visit: 6/13/18   Due back to clinic per last office note:  Return if symptoms worsen or fail to improve, for D

## 2018-08-29 RX ORDER — GABAPENTIN 300 MG/1
CAPSULE ORAL
Qty: 540 CAPSULE | Refills: 0 | OUTPATIENT
Start: 2018-08-29

## 2018-08-29 NOTE — TELEPHONE ENCOUNTER
Spoke with patient's  (Mary Bergman per [employer]) and informed him of below message.  understood and stated they will contact PCP office to take over gabapentin script long term. No further questions at this time. Rx denied.

## 2018-08-29 NOTE — TELEPHONE ENCOUNTER
At last visit we discussed getting refills from PCP if she would like to continue medication long term.

## 2018-08-31 ENCOUNTER — TELEPHONE (OUTPATIENT)
Dept: FAMILY MEDICINE CLINIC | Facility: CLINIC | Age: 58
End: 2018-08-31

## 2018-08-31 NOTE — TELEPHONE ENCOUNTER
Spoke with the patient  and informed him per  Dr. Bing Rubin that there was no Ascites seen on the pt CT scan of her chest, abdomen and pelvis.

## 2018-09-05 RX ORDER — OMEGA-3-ACID ETHYL ESTERS 1 G/1
CAPSULE, LIQUID FILLED ORAL
Qty: 360 CAPSULE | Refills: 0 | Status: SHIPPED | OUTPATIENT
Start: 2018-09-05 | End: 2018-12-02

## 2018-09-05 RX ORDER — ESCITALOPRAM OXALATE 10 MG/1
TABLET ORAL
Qty: 90 TABLET | Refills: 1 | OUTPATIENT
Start: 2018-09-05

## 2018-09-05 NOTE — TELEPHONE ENCOUNTER
OMEGA 3 ACID ETHYL ANGELA CAPS 1GM    Cholesterol Medication Protocol Failed    Pt failed protocol due to her most recent ALT. Her most recent lab was done on 08/09/18 with a level of 190.     Rx is pending for your approval.    Please sign,     Thank yo

## 2018-09-06 ENCOUNTER — OFFICE VISIT (OUTPATIENT)
Dept: HEMATOLOGY/ONCOLOGY | Age: 58
End: 2018-09-06
Attending: INTERNAL MEDICINE
Payer: COMMERCIAL

## 2018-09-06 DIAGNOSIS — C50.919 BREAST CANCER METASTASIZED TO PLEURA, UNSPECIFIED LATERALITY (HCC): ICD-10-CM

## 2018-09-06 DIAGNOSIS — C79.51 BONE METASTASES (HCC): Primary | ICD-10-CM

## 2018-09-06 DIAGNOSIS — C78.2 BREAST CANCER METASTASIZED TO PLEURA, UNSPECIFIED LATERALITY (HCC): ICD-10-CM

## 2018-09-06 PROCEDURE — 96402 CHEMO HORMON ANTINEOPL SQ/IM: CPT

## 2018-09-13 NOTE — TELEPHONE ENCOUNTER
L-THYROXINE (SYNTHROID) TABS 150MCG    Thyroid Supplements Protocol Failed    The pt most recent thyroid test was done on 08/09/18. Free T4 was 0.7    TSH was 42.300    Results were discussed in the office.

## 2018-09-17 RX ORDER — LEVOTHYROXINE SODIUM 0.15 MG/1
TABLET ORAL
Qty: 90 TABLET | Refills: 0 | OUTPATIENT
Start: 2018-09-17

## 2018-10-08 ENCOUNTER — HOSPITAL ENCOUNTER (OUTPATIENT)
Dept: CT IMAGING | Age: 58
Discharge: HOME | End: 2018-10-08
Attending: INTERNAL MEDICINE
Payer: COMMERCIAL

## 2018-10-08 DIAGNOSIS — C78.2 SECONDARY MALIGNANT NEOPLASM OF PLEURA (HCC): ICD-10-CM

## 2018-10-08 DIAGNOSIS — C50.111 MALIGNANT NEOPLASM OF CENTRAL PORTION OF RIGHT FEMALE BREAST (HCC): Primary | ICD-10-CM

## 2018-10-08 DIAGNOSIS — C78.7 LIVER METASTASES (HCC): ICD-10-CM

## 2018-10-08 DIAGNOSIS — C50.111 MALIGNANT NEOPLASM OF CENTRAL PORTION OF RIGHT FEMALE BREAST (HCC): ICD-10-CM

## 2018-10-08 DIAGNOSIS — C78.7 SECONDARY MALIGNANT NEOPLASM OF LIVER (HCC): ICD-10-CM

## 2018-10-08 PROCEDURE — 74176 CT ABD & PELVIS W/O CONTRAST: CPT | Performed by: INTERNAL MEDICINE

## 2018-10-08 PROCEDURE — 71250 CT THORAX DX C-: CPT | Performed by: INTERNAL MEDICINE

## 2018-10-11 ENCOUNTER — OFFICE VISIT (OUTPATIENT)
Dept: HEMATOLOGY/ONCOLOGY | Age: 58
End: 2018-10-11
Attending: INTERNAL MEDICINE
Payer: COMMERCIAL

## 2018-10-11 VITALS
TEMPERATURE: 99 F | BODY MASS INDEX: 33 KG/M2 | WEIGHT: 202.38 LBS | SYSTOLIC BLOOD PRESSURE: 120 MMHG | HEART RATE: 75 BPM | RESPIRATION RATE: 20 BRPM | DIASTOLIC BLOOD PRESSURE: 67 MMHG | OXYGEN SATURATION: 97 %

## 2018-10-11 DIAGNOSIS — C50.919 BREAST CANCER METASTASIZED TO PLEURA, UNSPECIFIED LATERALITY (HCC): ICD-10-CM

## 2018-10-11 DIAGNOSIS — E66.09 OBESITY DUE TO EXCESS CALORIES WITHOUT SERIOUS COMORBIDITY, UNSPECIFIED CLASSIFICATION: ICD-10-CM

## 2018-10-11 DIAGNOSIS — C78.2 BREAST CANCER METASTASIZED TO PLEURA, UNSPECIFIED LATERALITY (HCC): ICD-10-CM

## 2018-10-11 DIAGNOSIS — Z17.0 MALIGNANT NEOPLASM OF NIPPLE OF RIGHT BREAST IN FEMALE, ESTROGEN RECEPTOR POSITIVE (HCC): ICD-10-CM

## 2018-10-11 DIAGNOSIS — C50.011 MALIGNANT NEOPLASM OF NIPPLE OF RIGHT BREAST IN FEMALE (HCC): ICD-10-CM

## 2018-10-11 DIAGNOSIS — K76.0 FATTY LIVER: Primary | ICD-10-CM

## 2018-10-11 DIAGNOSIS — C79.51 BONE METASTASES (HCC): Primary | ICD-10-CM

## 2018-10-11 DIAGNOSIS — C78.7 LIVER METASTASES (HCC): ICD-10-CM

## 2018-10-11 DIAGNOSIS — C50.011 MALIGNANT NEOPLASM OF NIPPLE OF RIGHT BREAST IN FEMALE, ESTROGEN RECEPTOR POSITIVE (HCC): ICD-10-CM

## 2018-10-11 PROCEDURE — 99214 OFFICE O/P EST MOD 30 MIN: CPT | Performed by: INTERNAL MEDICINE

## 2018-10-11 PROCEDURE — 96402 CHEMO HORMON ANTINEOPL SQ/IM: CPT

## 2018-10-11 NOTE — PROGRESS NOTES
Cancer Center Progress Note  Patient Name: Franny Mckeon   YOB: 1960   Medical Record Number: [id number]     Attending Physician: Sandra Ontiveros M.D. Date of Visit: 10/11/2018    Chief Complaint:  Patient presents with:   Follow - U Number of children: Not on file      Years of education: Not on file      Highest education level: Not on file    Social Needs      Financial resource strain: Not on file      Food insecurity - worry: Not on file      Food insecurity - inability: Not on Cream, Apply 1 Application topically 2 (two) times daily as needed. , Disp: 30 g, Rfl: 1  •  ALPRAZolam 0.25 MG Oral Tab, Take one to two tablets by mouth as directed prior to testing., Disp: 10 tablet, Rfl: 0  •  hydrocodone-acetaminophen (NORCO) 7.5-325 M wheezing. Cardiovascular Normal - RRR, no murmurs, gallops or rubs. Abdomen Normal - Non-tender, non-distended, no masses, ascites or hepatosplenomegaly.     Extremities Normal - No C/C/E    Integumentary Normal - No rashes, No Jaundice   Neurologic Nor this illness. She has strongly ER positive disease, raising the possibility that it could be controlled with endocrine therapy alone. Her labs indicated that she was pre-menopausal.   We discussed Tamoxifen vs a GNRH antagonist and an AI.   She is tolerat

## 2018-10-24 ENCOUNTER — TELEPHONE (OUTPATIENT)
Dept: FAMILY MEDICINE CLINIC | Facility: CLINIC | Age: 58
End: 2018-10-24

## 2018-10-24 NOTE — TELEPHONE ENCOUNTER
See below. Pt asking for rx vs a visit. Needs evaluation. Are you able to work in? [facility]?

## 2018-10-24 NOTE — TELEPHONE ENCOUNTER
Pt  called and said his wife sore throat/cough/cold  Wants dr Janelle Mafyield to call in prescription  Told him she has to see  for that   No openning

## 2018-10-24 NOTE — TELEPHONE ENCOUNTER
Pt needs to be seen. May need CXR, has metastatic breast ca. Urgent care will be good place to start .

## 2018-10-24 NOTE — TELEPHONE ENCOUNTER
Discussed below with . He voiced understanding. He said he does not think she will do this but will talk with her and have her go. Reports family is sick at home and wonders if she has it now. I did advise she still needs eval amanda with her hx.

## 2018-10-31 RX ORDER — ATORVASTATIN CALCIUM 20 MG/1
TABLET, FILM COATED ORAL
Qty: 90 TABLET | Refills: 0 | OUTPATIENT
Start: 2018-10-31

## 2018-11-05 ENCOUNTER — OFFICE VISIT (OUTPATIENT)
Dept: FAMILY MEDICINE CLINIC | Facility: CLINIC | Age: 58
End: 2018-11-05

## 2018-11-05 ENCOUNTER — HOSPITAL ENCOUNTER (OUTPATIENT)
Dept: GENERAL RADIOLOGY | Age: 58
Discharge: HOME | End: 2018-11-05
Attending: NURSE PRACTITIONER
Payer: COMMERCIAL

## 2018-11-05 VITALS
DIASTOLIC BLOOD PRESSURE: 72 MMHG | SYSTOLIC BLOOD PRESSURE: 114 MMHG | RESPIRATION RATE: 18 BRPM | HEIGHT: 66 IN | WEIGHT: 199 LBS | OXYGEN SATURATION: 98 % | TEMPERATURE: 98 F | BODY MASS INDEX: 31.98 KG/M2 | HEART RATE: 80 BPM

## 2018-11-05 DIAGNOSIS — F17.200 TOBACCO USE DISORDER: ICD-10-CM

## 2018-11-05 DIAGNOSIS — C50.919 MALIGNANT NEOPLASM OF FEMALE BREAST, UNSPECIFIED ESTROGEN RECEPTOR STATUS, UNSPECIFIED LATERALITY, UNSPECIFIED SITE OF BREAST (HCC): ICD-10-CM

## 2018-11-05 DIAGNOSIS — J40 BRONCHITIS: ICD-10-CM

## 2018-11-05 DIAGNOSIS — J40 BRONCHITIS: Primary | ICD-10-CM

## 2018-11-05 PROCEDURE — 71046 X-RAY EXAM CHEST 2 VIEWS: CPT | Performed by: NURSE PRACTITIONER

## 2018-11-05 PROCEDURE — 99214 OFFICE O/P EST MOD 30 MIN: CPT | Performed by: NURSE PRACTITIONER

## 2018-11-05 RX ORDER — AMOXICILLIN AND CLAVULANATE POTASSIUM 875; 125 MG/1; MG/1
1 TABLET, FILM COATED ORAL 2 TIMES DAILY
Qty: 20 TABLET | Refills: 0 | Status: SHIPPED | OUTPATIENT
Start: 2018-11-05 | End: 2018-11-15 | Stop reason: ALTCHOICE

## 2018-11-05 RX ORDER — BENZONATATE 200 MG/1
200 CAPSULE ORAL 3 TIMES DAILY PRN
Qty: 20 CAPSULE | Refills: 0 | Status: SHIPPED | OUTPATIENT
Start: 2018-11-05 | End: 2018-11-12

## 2018-11-05 RX ORDER — ALBUTEROL SULFATE 90 UG/1
2 AEROSOL, METERED RESPIRATORY (INHALATION) EVERY 4 HOURS PRN
Qty: 1 INHALER | Refills: 0 | Status: SHIPPED | OUTPATIENT
Start: 2018-11-05 | End: 2019-01-03 | Stop reason: ALTCHOICE

## 2018-11-05 RX ORDER — IPRATROPIUM BROMIDE AND ALBUTEROL SULFATE 2.5; .5 MG/3ML; MG/3ML
3 SOLUTION RESPIRATORY (INHALATION) ONCE
Status: DISCONTINUED | OUTPATIENT
Start: 2018-11-05 | End: 2019-07-03 | Stop reason: ALTCHOICE

## 2018-11-05 RX ORDER — PREDNISONE 20 MG/1
TABLET ORAL
Qty: 10 TABLET | Refills: 0 | Status: SHIPPED | OUTPATIENT
Start: 2018-11-05 | End: 2018-11-15 | Stop reason: ALTCHOICE

## 2018-11-05 RX ORDER — LEVOTHYROXINE SODIUM 175 UG/1
TABLET ORAL
Qty: 90 TABLET | Refills: 0 | Status: SHIPPED | OUTPATIENT
Start: 2018-11-05 | End: 2019-01-29

## 2018-11-05 NOTE — PATIENT INSTRUCTIONS
Tumor, Uncertain Cause  The body is constantly growing new cells to replace cells that wear out or are damaged in some way. A tumor occurs when cells of the body begin to grow abnormally and make more cells than needed.   A benign tumor is a growth that i · Unexpected weight loss (more than 10 pounds in less than 3 months)  · Fever or frequent colds or infections  · A skin sore that doesn’t heal  · New lump in the breast or changes in the nipple or skin of your breast  · Indigestion or trouble swallowing  ·

## 2018-11-05 NOTE — TELEPHONE ENCOUNTER
LEVOTHYROXINE 0.175MG (175MCG)TABS  Thyroid Supplements Protocol Failed    Patient is due for her Thyroid. Her last TSH was done on 08/09/2018 with a TSH level of 42.300  And her Free T4 level was 0.7. Please see pended medications.     Please sign i

## 2018-11-08 ENCOUNTER — OFFICE VISIT (OUTPATIENT)
Dept: HEMATOLOGY/ONCOLOGY | Age: 58
End: 2018-11-08
Attending: INTERNAL MEDICINE
Payer: COMMERCIAL

## 2018-11-08 ENCOUNTER — TELEPHONE (OUTPATIENT)
Dept: FAMILY MEDICINE CLINIC | Facility: CLINIC | Age: 58
End: 2018-11-08

## 2018-11-08 DIAGNOSIS — C78.2 BREAST CANCER METASTASIZED TO PLEURA, UNSPECIFIED LATERALITY (HCC): ICD-10-CM

## 2018-11-08 DIAGNOSIS — C79.51 BONE METASTASES (HCC): Primary | ICD-10-CM

## 2018-11-08 DIAGNOSIS — C50.919 BREAST CANCER METASTASIZED TO PLEURA, UNSPECIFIED LATERALITY (HCC): ICD-10-CM

## 2018-11-08 PROCEDURE — 96402 CHEMO HORMON ANTINEOPL SQ/IM: CPT

## 2018-11-08 NOTE — TELEPHONE ENCOUNTER
Left a message for the patient advising to call the office back regarding her  Telephone message. She stated that she may need a note to return to work. Dr. Maury Yun didn't see the pt for her acute illness.     If she needs a note for work she will ne

## 2018-11-09 ENCOUNTER — OFFICE VISIT (OUTPATIENT)
Dept: FAMILY MEDICINE CLINIC | Facility: CLINIC | Age: 58
End: 2018-11-09

## 2018-11-09 VITALS
HEIGHT: 66 IN | RESPIRATION RATE: 16 BRPM | SYSTOLIC BLOOD PRESSURE: 128 MMHG | DIASTOLIC BLOOD PRESSURE: 46 MMHG | BODY MASS INDEX: 32.3 KG/M2 | TEMPERATURE: 99 F | HEART RATE: 79 BPM | WEIGHT: 201 LBS

## 2018-11-09 DIAGNOSIS — J01.00 ACUTE NON-RECURRENT MAXILLARY SINUSITIS: ICD-10-CM

## 2018-11-09 DIAGNOSIS — J20.9 ACUTE BRONCHITIS WITH BRONCHOSPASM: Primary | ICD-10-CM

## 2018-11-09 DIAGNOSIS — C50.911 CARCINOMA OF RIGHT BREAST METASTATIC TO PLEURA (HCC): ICD-10-CM

## 2018-11-09 DIAGNOSIS — C78.2 CARCINOMA OF RIGHT BREAST METASTATIC TO PLEURA (HCC): ICD-10-CM

## 2018-11-09 PROCEDURE — 94640 AIRWAY INHALATION TREATMENT: CPT | Performed by: FAMILY MEDICINE

## 2018-11-09 PROCEDURE — 99214 OFFICE O/P EST MOD 30 MIN: CPT | Performed by: FAMILY MEDICINE

## 2018-11-09 RX ORDER — PREDNISONE 10 MG/1
TABLET ORAL
Qty: 42 TABLET | Refills: 0 | Status: SHIPPED | OUTPATIENT
Start: 2018-11-09 | End: 2019-01-03 | Stop reason: ALTCHOICE

## 2018-11-09 RX ORDER — FLUTICASONE PROPIONATE 50 MCG
2 SPRAY, SUSPENSION (ML) NASAL DAILY
Qty: 1 BOTTLE | Refills: 1 | Status: SHIPPED | OUTPATIENT
Start: 2018-11-09 | End: 2019-07-01 | Stop reason: ALTCHOICE

## 2018-11-09 RX ORDER — BUDESONIDE AND FORMOTEROL FUMARATE DIHYDRATE 160; 4.5 UG/1; UG/1
2 AEROSOL RESPIRATORY (INHALATION) 2 TIMES DAILY
Qty: 1 INHALER | Refills: 0 | COMMUNITY
Start: 2018-11-09 | End: 2019-07-01 | Stop reason: ALTCHOICE

## 2018-11-09 RX ORDER — AZITHROMYCIN 250 MG/1
TABLET, FILM COATED ORAL
Qty: 6 TABLET | Refills: 0 | Status: SHIPPED | OUTPATIENT
Start: 2018-11-09 | End: 2018-11-15 | Stop reason: ALTCHOICE

## 2018-11-09 RX ORDER — ALBUTEROL SULFATE 2.5 MG/3ML
2.5 SOLUTION RESPIRATORY (INHALATION) ONCE
Status: COMPLETED | OUTPATIENT
Start: 2018-11-09 | End: 2018-11-09

## 2018-11-09 RX ADMIN — ALBUTEROL SULFATE 2.5 MG: 2.5 SOLUTION RESPIRATORY (INHALATION) at 11:56:00

## 2018-11-09 NOTE — PATIENT INSTRUCTIONS
Start prednisone 10 mg tablets and take per directions daily try to take them earlier during the day so prevent you from sleeping problems. Start azithromycin today and take per directions. Finish course of Augmentin.   Use Symbicort 2 puffs twice a day u

## 2018-11-09 NOTE — PROGRESS NOTES
Corrine Carias is a 66year old female. cc cough congestion does not feel better. HPI:   Patient is coming to the office complaining of being sick for the past 2 weeks. She is coughing. Having chest congestion wheezing chest stuffiness.   Her chest is hurt PO QAM x 5 days Disp: 10 tablet Rfl: 0   OMEGA-3-ACID ETHYL ESTERS 1 g Oral Cap TAKE 2 CAPSULES TWICE A DAY Disp: 360 capsule Rfl: 0   Rosuvastatin Calcium 5 MG Oral Tab Take 1 tablet (5 mg total) by mouth nightly.  Disp: 90 tablet Rfl: 1   escitalopram 20 Ht 66\"   Wt 201 lb   Breastfeeding?  No   BMI 32.44 kg/m²   GENERAL: well developed, well nourished, looking tired congested  SKIN: no rashes,no suspicious lesions  HEENT: atraumatic, normocephalic,ears-fluid behind bilateral tympanic members, irritation o right breast metastatic to pleura (hcc)    No orders of the defined types were placed in this encounter.       Meds & Refills for this Visit:  Requested Prescriptions     Signed Prescriptions Disp Refills   • predniSONE 10 MG Oral Tab 42 tablet 0     Sig: T

## 2018-11-15 ENCOUNTER — OFFICE VISIT (OUTPATIENT)
Dept: FAMILY MEDICINE CLINIC | Facility: CLINIC | Age: 58
End: 2018-11-15

## 2018-11-15 VITALS
RESPIRATION RATE: 16 BRPM | DIASTOLIC BLOOD PRESSURE: 76 MMHG | TEMPERATURE: 98 F | HEART RATE: 67 BPM | SYSTOLIC BLOOD PRESSURE: 126 MMHG | HEIGHT: 66 IN | WEIGHT: 198 LBS | BODY MASS INDEX: 31.82 KG/M2

## 2018-11-15 DIAGNOSIS — J20.9 ACUTE BRONCHITIS WITH BRONCHOSPASM: Primary | ICD-10-CM

## 2018-11-15 DIAGNOSIS — J01.00 ACUTE NON-RECURRENT MAXILLARY SINUSITIS: ICD-10-CM

## 2018-11-15 DIAGNOSIS — J02.9 SORE THROAT: ICD-10-CM

## 2018-11-15 DIAGNOSIS — B37.0 ORAL THRUSH: ICD-10-CM

## 2018-11-15 PROCEDURE — 99214 OFFICE O/P EST MOD 30 MIN: CPT | Performed by: FAMILY MEDICINE

## 2018-11-15 PROCEDURE — 87081 CULTURE SCREEN ONLY: CPT | Performed by: FAMILY MEDICINE

## 2018-11-15 PROCEDURE — 87880 STREP A ASSAY W/OPTIC: CPT | Performed by: FAMILY MEDICINE

## 2018-11-15 NOTE — PROGRESS NOTES
Layla Del Rosario is a 66year old female. cc cough congestion does not feel better. HPI:   Patient is coming to the office for f/u , Last visit  complaining of being sick for the past 2 weeks. She was  coughing.   Having chest congestion wheezing chest stuffi Take 1 tablet (5 mg total) by mouth nightly. Disp: 90 tablet Rfl: 1   escitalopram 20 MG Oral Tab Take 1 tablet (20 mg total) by mouth daily.  Disp: 90 tablet Rfl: 1   ANASTROZOLE 1 MG Oral Tab tab TAKE 1 TABLET DAILY Disp: 90 tablet Rfl: 2   gabapentin 300 atraumatic, normocephalic,ears-fluid behind bilateral tympanic members, irritation of the posterior throat and irritation of the nasal mucosa pressure of the sinuses maxillary and frontal mucosa, white coat on tongue,   NECK: supple,no adenopathy,  LUNGS:

## 2018-11-15 NOTE — PATIENT INSTRUCTIONS
Start nystatin 5 cc after meals swish and spit 4 times per day. Use Symbicort 1 puffs twice a day for 3 days then 1 puff daily for 3 days. Monitor symptoms. Keep good hydration. Take probiotic daily like organic yogurt.   Fluticasone nasal spray 2 spr

## 2018-12-05 RX ORDER — OMEGA-3-ACID ETHYL ESTERS 1 G/1
CAPSULE, LIQUID FILLED ORAL
Qty: 360 CAPSULE | Refills: 0 | Status: SHIPPED | OUTPATIENT
Start: 2018-12-05 | End: 2019-03-04

## 2018-12-05 NOTE — TELEPHONE ENCOUNTER
OMEGA 3 ACID ETHYL ANGELA CAPS 1GM    The pt failed protocol due to her ALT coming back at a 176 which was done on 10/11/2018. Please see pended medications. Please sign if appropriate.       Thank you

## 2018-12-06 ENCOUNTER — OFFICE VISIT (OUTPATIENT)
Dept: HEMATOLOGY/ONCOLOGY | Age: 58
End: 2018-12-06
Attending: INTERNAL MEDICINE
Payer: COMMERCIAL

## 2018-12-06 DIAGNOSIS — C50.919 BREAST CANCER METASTASIZED TO PLEURA, UNSPECIFIED LATERALITY (HCC): ICD-10-CM

## 2018-12-06 DIAGNOSIS — C79.51 BONE METASTASES (HCC): Primary | ICD-10-CM

## 2018-12-06 DIAGNOSIS — C78.2 BREAST CANCER METASTASIZED TO PLEURA, UNSPECIFIED LATERALITY (HCC): ICD-10-CM

## 2018-12-06 PROCEDURE — 96402 CHEMO HORMON ANTINEOPL SQ/IM: CPT

## 2018-12-28 ENCOUNTER — HOSPITAL ENCOUNTER (OUTPATIENT)
Dept: CT IMAGING | Age: 58
Discharge: HOME | End: 2018-12-28
Attending: INTERNAL MEDICINE
Payer: COMMERCIAL

## 2018-12-28 DIAGNOSIS — C78.7 LIVER METASTASES (HCC): ICD-10-CM

## 2018-12-28 DIAGNOSIS — C50.011 MALIGNANT NEOPLASM OF NIPPLE OF RIGHT BREAST IN FEMALE (HCC): ICD-10-CM

## 2018-12-28 PROCEDURE — 74176 CT ABD & PELVIS W/O CONTRAST: CPT | Performed by: INTERNAL MEDICINE

## 2018-12-28 PROCEDURE — 71250 CT THORAX DX C-: CPT | Performed by: INTERNAL MEDICINE

## 2019-01-03 ENCOUNTER — OFFICE VISIT (OUTPATIENT)
Dept: HEMATOLOGY/ONCOLOGY | Age: 59
End: 2019-01-03
Attending: INTERNAL MEDICINE
Payer: COMMERCIAL

## 2019-01-03 VITALS
DIASTOLIC BLOOD PRESSURE: 60 MMHG | HEART RATE: 78 BPM | RESPIRATION RATE: 20 BRPM | WEIGHT: 195 LBS | OXYGEN SATURATION: 97 % | TEMPERATURE: 98 F | BODY MASS INDEX: 31 KG/M2 | SYSTOLIC BLOOD PRESSURE: 118 MMHG

## 2019-01-03 DIAGNOSIS — C50.919 BREAST CANCER METASTASIZED TO PLEURA, UNSPECIFIED LATERALITY (HCC): ICD-10-CM

## 2019-01-03 DIAGNOSIS — C79.51 BONE METASTASES (HCC): Primary | ICD-10-CM

## 2019-01-03 DIAGNOSIS — C50.011 MALIGNANT NEOPLASM OF NIPPLE OF RIGHT BREAST IN FEMALE (HCC): Primary | ICD-10-CM

## 2019-01-03 DIAGNOSIS — C78.7 LIVER METASTASES (HCC): ICD-10-CM

## 2019-01-03 DIAGNOSIS — C78.2 BREAST CANCER METASTASIZED TO PLEURA, UNSPECIFIED LATERALITY (HCC): ICD-10-CM

## 2019-01-03 LAB
ALBUMIN SERPL-MCNC: 3.8 G/DL (ref 3.1–4.5)
ALBUMIN/GLOB SERPL: 1.1 {RATIO} (ref 1–2)
ALP LIVER SERPL-CCNC: 216 U/L (ref 46–118)
ALT SERPL-CCNC: 125 U/L (ref 14–54)
ANION GAP SERPL CALC-SCNC: 3 MMOL/L (ref 0–18)
AST SERPL-CCNC: 111 U/L (ref 15–41)
BASOPHILS # BLD AUTO: 0.08 X10(3) UL (ref 0–0.1)
BASOPHILS NFR BLD AUTO: 1 %
BILIRUB SERPL-MCNC: 0.4 MG/DL (ref 0.1–2)
BRCA1 C.185DELAG BLD/T QL: 18.4 U/ML (ref ?–38)
BUN BLD-MCNC: 8 MG/DL (ref 8–20)
BUN/CREAT SERPL: 9.2 (ref 10–20)
CALCIUM BLD-MCNC: 9.2 MG/DL (ref 8.3–10.3)
CHLORIDE SERPL-SCNC: 105 MMOL/L (ref 101–111)
CO2 SERPL-SCNC: 32 MMOL/L (ref 22–32)
CREAT BLD-MCNC: 0.87 MG/DL (ref 0.55–1.02)
EOSINOPHIL # BLD AUTO: 0.93 X10(3) UL (ref 0–0.3)
EOSINOPHIL NFR BLD AUTO: 11.1 %
ERYTHROCYTE [DISTWIDTH] IN BLOOD BY AUTOMATED COUNT: 13.8 % (ref 11.5–16)
ESTRADIOL: 13.3 PG/ML
GLOBULIN PLAS-MCNC: 3.6 G/DL (ref 2.8–4.4)
GLUCOSE BLD-MCNC: 142 MG/DL (ref 70–99)
HCT VFR BLD AUTO: 41.7 % (ref 34–50)
HGB BLD-MCNC: 13.1 G/DL (ref 12–16)
IMMATURE GRANULOCYTE COUNT: 0.02 X10(3) UL (ref 0–1)
IMMATURE GRANULOCYTE RATIO %: 0.2 %
LYMPHOCYTES # BLD AUTO: 3.04 X10(3) UL (ref 0.9–4)
LYMPHOCYTES NFR BLD AUTO: 36.2 %
M PROTEIN MFR SERPL ELPH: 7.4 G/DL (ref 6.4–8.2)
MCH RBC QN AUTO: 30.8 PG (ref 27–33.2)
MCHC RBC AUTO-ENTMCNC: 31.4 G/DL (ref 31–37)
MCV RBC AUTO: 97.9 FL (ref 81–100)
MONOCYTES # BLD AUTO: 0.41 X10(3) UL (ref 0.1–1)
MONOCYTES NFR BLD AUTO: 4.9 %
NEUTROPHIL ABS PRELIM: 3.92 X10 (3) UL (ref 1.3–6.7)
NEUTROPHILS # BLD AUTO: 3.92 X10(3) UL (ref 1.3–6.7)
NEUTROPHILS NFR BLD AUTO: 46.6 %
OSMOLALITY SERPL CALC.SUM OF ELEC: 291 MOSM/KG (ref 275–295)
PLATELET # BLD AUTO: 243 10(3)UL (ref 150–450)
POTASSIUM SERPL-SCNC: 4 MMOL/L (ref 3.6–5.1)
RBC # BLD AUTO: 4.26 X10(6)UL (ref 3.8–5.1)
RED CELL DISTRIBUTION WIDTH-SD: 49.9 FL (ref 35.1–46.3)
SODIUM SERPL-SCNC: 140 MMOL/L (ref 136–144)
WBC # BLD AUTO: 8.4 X10(3) UL (ref 4–13)

## 2019-01-03 PROCEDURE — 96402 CHEMO HORMON ANTINEOPL SQ/IM: CPT

## 2019-01-03 PROCEDURE — 99214 OFFICE O/P EST MOD 30 MIN: CPT | Performed by: INTERNAL MEDICINE

## 2019-01-03 NOTE — PROGRESS NOTES
Cancer Center Progress Note  Patient Name: Francisco J Kimbrough   YOB: 1960   Medical Record Number: [id number]     Attending Physician: Santos Don M.D. Date of Visit: 1/3/2019    Chief Complaint:  Patient presents with:   Follow - Up children: Not on file      Years of education: Not on file      Highest education level: Not on file    Social Needs      Financial resource strain: Not on file      Food insecurity - worry: Not on file      Food insecurity - inability: Not on file      Tr tablet (20 mg total) by mouth daily. , Disp: 90 tablet, Rfl: 1  •  ANASTROZOLE 1 MG Oral Tab tab, TAKE 1 TABLET DAILY, Disp: 90 tablet, Rfl: 2  •  gabapentin 300 MG Oral Cap, 2 tabs po TID, Disp: 540 capsule, Rfl: 0  •  Mometasone Furoate 0.1 % External Cre icterus. Pupils are reactive and equal.   Hematologic/Lymphatic Normal - No petechiae or purpura. No tender or palpable lymph nodes in the cervical, supraclavicular, axillary or inguinal area.    Respiratory Normal - Lungs are clear to auscultation, no rho strongly ER positive disease, raising the possibility that it could be controlled with endocrine therapy alone. Her labs indicated that she was pre-menopausal.   We discussed Tamoxifen vs a GNRH antagonist and an AI.   She is tolerating Zoladex and AI well

## 2019-01-07 ENCOUNTER — TELEPHONE (OUTPATIENT)
Dept: HEMATOLOGY/ONCOLOGY | Facility: HOSPITAL | Age: 59
End: 2019-01-07

## 2019-01-07 DIAGNOSIS — C79.51 BONE METASTASES (HCC): ICD-10-CM

## 2019-01-07 DIAGNOSIS — Z17.0 MALIGNANT NEOPLASM OF NIPPLE OF RIGHT BREAST IN FEMALE, ESTROGEN RECEPTOR POSITIVE (HCC): Primary | ICD-10-CM

## 2019-01-07 DIAGNOSIS — C78.2 CARCINOMA OF RIGHT BREAST METASTATIC TO PLEURA (HCC): ICD-10-CM

## 2019-01-07 DIAGNOSIS — C50.011 MALIGNANT NEOPLASM OF NIPPLE OF RIGHT BREAST IN FEMALE, ESTROGEN RECEPTOR POSITIVE (HCC): Primary | ICD-10-CM

## 2019-01-07 DIAGNOSIS — C50.911 CARCINOMA OF RIGHT BREAST METASTATIC TO PLEURA (HCC): ICD-10-CM

## 2019-01-07 NOTE — TELEPHONE ENCOUNTER
Spouse calling to see if MD has reviewed the recent lab work. Pt is awaiting to here next steps in POC. Will have MD advise.

## 2019-01-09 DIAGNOSIS — C78.2 CARCINOMA OF RIGHT BREAST METASTATIC TO PLEURA (HCC): ICD-10-CM

## 2019-01-09 DIAGNOSIS — C50.011 MALIGNANT NEOPLASM OF NIPPLE OF RIGHT BREAST IN FEMALE, ESTROGEN RECEPTOR POSITIVE (HCC): ICD-10-CM

## 2019-01-09 DIAGNOSIS — Z17.0 MALIGNANT NEOPLASM OF NIPPLE OF RIGHT BREAST IN FEMALE, ESTROGEN RECEPTOR POSITIVE (HCC): ICD-10-CM

## 2019-01-09 DIAGNOSIS — C50.911 CARCINOMA OF RIGHT BREAST METASTATIC TO PLEURA (HCC): ICD-10-CM

## 2019-01-09 DIAGNOSIS — C79.51 BONE METASTASES (HCC): ICD-10-CM

## 2019-01-10 DIAGNOSIS — C50.011 MALIGNANT NEOPLASM OF NIPPLE OF RIGHT BREAST IN FEMALE, ESTROGEN RECEPTOR POSITIVE (HCC): ICD-10-CM

## 2019-01-10 DIAGNOSIS — C50.911 CARCINOMA OF RIGHT BREAST METASTATIC TO PLEURA (HCC): ICD-10-CM

## 2019-01-10 DIAGNOSIS — Z17.0 MALIGNANT NEOPLASM OF NIPPLE OF RIGHT BREAST IN FEMALE, ESTROGEN RECEPTOR POSITIVE (HCC): ICD-10-CM

## 2019-01-10 DIAGNOSIS — C78.2 CARCINOMA OF RIGHT BREAST METASTATIC TO PLEURA (HCC): ICD-10-CM

## 2019-01-10 DIAGNOSIS — C79.51 BONE METASTASES (HCC): ICD-10-CM

## 2019-01-17 ENCOUNTER — OFFICE VISIT (OUTPATIENT)
Dept: HEMATOLOGY/ONCOLOGY | Age: 59
End: 2019-01-17
Attending: INTERNAL MEDICINE
Payer: COMMERCIAL

## 2019-01-17 DIAGNOSIS — C79.51 BONE METASTASES (HCC): ICD-10-CM

## 2019-01-17 DIAGNOSIS — C50.011 MALIGNANT NEOPLASM OF NIPPLE OF RIGHT BREAST IN FEMALE, ESTROGEN RECEPTOR POSITIVE (HCC): ICD-10-CM

## 2019-01-17 DIAGNOSIS — C50.911 CARCINOMA OF RIGHT BREAST METASTATIC TO PLEURA (HCC): ICD-10-CM

## 2019-01-17 DIAGNOSIS — Z17.0 MALIGNANT NEOPLASM OF NIPPLE OF RIGHT BREAST IN FEMALE, ESTROGEN RECEPTOR POSITIVE (HCC): ICD-10-CM

## 2019-01-17 DIAGNOSIS — R20.8 BURNING SENSATION OF FEET: Primary | ICD-10-CM

## 2019-01-17 DIAGNOSIS — C78.7 LIVER METASTASES (HCC): ICD-10-CM

## 2019-01-17 DIAGNOSIS — C78.2 CARCINOMA OF RIGHT BREAST METASTATIC TO PLEURA (HCC): ICD-10-CM

## 2019-01-17 DIAGNOSIS — Z17.0 MALIGNANT NEOPLASM OF NIPPLE OF RIGHT BREAST IN FEMALE, ESTROGEN RECEPTOR POSITIVE (HCC): Primary | ICD-10-CM

## 2019-01-17 DIAGNOSIS — C50.011 MALIGNANT NEOPLASM OF NIPPLE OF RIGHT BREAST IN FEMALE, ESTROGEN RECEPTOR POSITIVE (HCC): Primary | ICD-10-CM

## 2019-01-17 DIAGNOSIS — Z71.9 ENCOUNTER FOR HEALTH EDUCATION: ICD-10-CM

## 2019-01-17 PROCEDURE — 99215 OFFICE O/P EST HI 40 MIN: CPT | Performed by: CLINICAL NURSE SPECIALIST

## 2019-01-17 PROCEDURE — 96402 CHEMO HORMON ANTINEOPL SQ/IM: CPT

## 2019-01-17 RX ORDER — PREGABALIN 75 MG/1
75 CAPSULE ORAL 2 TIMES DAILY
Qty: 60 CAPSULE | Refills: 0 | Status: SHIPPED | OUTPATIENT
Start: 2019-01-17 | End: 2019-03-06

## 2019-01-17 RX ORDER — LAMOTRIGINE 25 MG/1
500 TABLET ORAL ONCE
Status: COMPLETED | OUTPATIENT
Start: 2019-01-17 | End: 2019-01-17

## 2019-01-17 RX ORDER — LAMOTRIGINE 25 MG/1
500 TABLET ORAL ONCE
Status: CANCELLED | OUTPATIENT
Start: 2019-01-30

## 2019-01-17 RX ORDER — LOPERAMIDE HYDROCHLORIDE 2 MG/1
2 TABLET ORAL AS NEEDED
Qty: 30 TABLET | Refills: 0 | Status: SHIPPED | OUTPATIENT
Start: 2019-01-17 | End: 2019-06-20

## 2019-01-17 RX ORDER — ONDANSETRON HYDROCHLORIDE 8 MG/1
8 TABLET, FILM COATED ORAL EVERY 8 HOURS PRN
Qty: 30 TABLET | Refills: 3 | Status: SHIPPED | OUTPATIENT
Start: 2019-01-17 | End: 2019-06-20

## 2019-01-17 RX ORDER — PROCHLORPERAZINE MALEATE 10 MG
10 TABLET ORAL EVERY 6 HOURS PRN
Qty: 30 TABLET | Refills: 3 | Status: SHIPPED | OUTPATIENT
Start: 2019-01-17 | End: 2019-06-20

## 2019-01-17 RX ORDER — LAMOTRIGINE 25 MG/1
500 TABLET ORAL ONCE
Status: CANCELLED | OUTPATIENT
Start: 2019-01-17

## 2019-01-17 RX ADMIN — LAMOTRIGINE 500 MG: 25 TABLET ORAL at 16:16:00

## 2019-01-17 NOTE — PROGRESS NOTES
ORAL CHEMOTHERAPY EDUCATION RECORD  Learner:  Patient and     Barriers / Limitations: None     Diagnosis:   Metastatic ER + Breast Cancer     Medication Name:   Verzenio    Dose:    150 mg     Frequency:  BID    Administration Guidelines:  Take With that time was spent counseling patient regarding the above documented side effects and management, when to call provider and contact information.      Hussein Martell ANP-BC  Nurse Practitioner  [facility]  [address]

## 2019-01-21 RX ORDER — ATORVASTATIN CALCIUM 20 MG/1
TABLET, FILM COATED ORAL
Qty: 30 TABLET | Refills: 2 | OUTPATIENT
Start: 2019-01-21

## 2019-01-23 ENCOUNTER — PATIENT MESSAGE (OUTPATIENT)
Dept: FAMILY MEDICINE CLINIC | Facility: CLINIC | Age: 59
End: 2019-01-23

## 2019-01-23 DIAGNOSIS — C50.011 MALIGNANT NEOPLASM OF NIPPLE AND AREOLA OF FEMALE BREAST, RIGHT (HCC): Primary | ICD-10-CM

## 2019-01-23 DIAGNOSIS — C50.911 CARCINOMA OF RIGHT BREAST METASTATIC TO PLEURA (HCC): ICD-10-CM

## 2019-01-23 DIAGNOSIS — C50.911 CARCINOMA OF RIGHT FEMALE BREAST, UNSPECIFIED ESTROGEN RECEPTOR STATUS, UNSPECIFIED SITE OF BREAST (HCC): ICD-10-CM

## 2019-01-23 DIAGNOSIS — Z17.0 ESTROGEN RECEPTOR POSITIVE: ICD-10-CM

## 2019-01-23 DIAGNOSIS — C78.2 CARCINOMA OF RIGHT BREAST METASTATIC TO PLEURA (HCC): ICD-10-CM

## 2019-01-23 DIAGNOSIS — C79.51 BONE METASTASIS (HCC): ICD-10-CM

## 2019-01-24 NOTE — TELEPHONE ENCOUNTER
From: Vidhya Liang  To: Arturo Tsai MD  Sent: 1/23/2019 8:54 AM CST  Subject: Non-Urgent Medical Question    Good morning.  I just received a call from Dr Leah Sim telling me I need more referrals in order to see him for my appointment on [facility]

## 2019-01-26 RX ORDER — ATORVASTATIN CALCIUM 20 MG/1
TABLET, FILM COATED ORAL
Qty: 30 TABLET | Refills: 2 | OUTPATIENT
Start: 2019-01-26

## 2019-01-29 RX ORDER — ROSUVASTATIN CALCIUM 5 MG/1
TABLET, COATED ORAL
Qty: 30 TABLET | Refills: 0 | Status: SHIPPED | OUTPATIENT
Start: 2019-01-29 | End: 2019-02-27

## 2019-01-29 RX ORDER — ESCITALOPRAM OXALATE 20 MG/1
TABLET ORAL
Qty: 30 TABLET | Refills: 0 | Status: SHIPPED | OUTPATIENT
Start: 2019-01-29 | End: 2019-02-27

## 2019-01-29 RX ORDER — LEVOTHYROXINE SODIUM 175 UG/1
TABLET ORAL
Qty: 90 TABLET | Refills: 0 | Status: SHIPPED | OUTPATIENT
Start: 2019-01-29 | End: 2019-03-25 | Stop reason: DRUGHIGH

## 2019-01-29 NOTE — TELEPHONE ENCOUNTER
ROSUVASTATIN 5MG TABLETS  Cholesterol Medication Protocol Failed    She failed protocol due to her elevated ALT level. Please see pended medications. Please sign if appropriate.       Thank you

## 2019-01-31 ENCOUNTER — OFFICE VISIT (OUTPATIENT)
Dept: HEMATOLOGY/ONCOLOGY | Age: 59
End: 2019-01-31
Attending: INTERNAL MEDICINE
Payer: COMMERCIAL

## 2019-01-31 VITALS
RESPIRATION RATE: 20 BRPM | BODY MASS INDEX: 31 KG/M2 | DIASTOLIC BLOOD PRESSURE: 70 MMHG | TEMPERATURE: 98 F | OXYGEN SATURATION: 97 % | SYSTOLIC BLOOD PRESSURE: 120 MMHG | WEIGHT: 191.63 LBS | HEART RATE: 75 BPM

## 2019-01-31 DIAGNOSIS — Z17.0 MALIGNANT NEOPLASM OF NIPPLE OF RIGHT BREAST IN FEMALE, ESTROGEN RECEPTOR POSITIVE (HCC): Primary | ICD-10-CM

## 2019-01-31 DIAGNOSIS — K52.1 CHEMOTHERAPY INDUCED DIARRHEA: ICD-10-CM

## 2019-01-31 DIAGNOSIS — C79.51 BONE METASTASES (HCC): ICD-10-CM

## 2019-01-31 DIAGNOSIS — C78.2 BREAST CANCER METASTASIZED TO PLEURA, UNSPECIFIED LATERALITY (HCC): ICD-10-CM

## 2019-01-31 DIAGNOSIS — C78.2 CARCINOMA OF RIGHT BREAST METASTATIC TO PLEURA (HCC): ICD-10-CM

## 2019-01-31 DIAGNOSIS — R79.89 ELEVATED LFTS: ICD-10-CM

## 2019-01-31 DIAGNOSIS — T45.1X5A CHEMOTHERAPY INDUCED DIARRHEA: ICD-10-CM

## 2019-01-31 DIAGNOSIS — C50.919 BREAST CANCER METASTASIZED TO PLEURA, UNSPECIFIED LATERALITY (HCC): ICD-10-CM

## 2019-01-31 DIAGNOSIS — C50.011 MALIGNANT NEOPLASM OF NIPPLE OF RIGHT BREAST IN FEMALE, ESTROGEN RECEPTOR POSITIVE (HCC): Primary | ICD-10-CM

## 2019-01-31 DIAGNOSIS — C50.911 CARCINOMA OF RIGHT BREAST METASTATIC TO PLEURA (HCC): ICD-10-CM

## 2019-01-31 LAB
ALBUMIN SERPL-MCNC: 3.9 G/DL (ref 3.1–4.5)
ALBUMIN/GLOB SERPL: 1.1 {RATIO} (ref 1–2)
ALP LIVER SERPL-CCNC: 191 U/L (ref 46–118)
ALT SERPL-CCNC: 146 U/L (ref 14–54)
ANION GAP SERPL CALC-SCNC: 6 MMOL/L (ref 0–18)
AST SERPL-CCNC: 151 U/L (ref 15–41)
BASOPHILS # BLD AUTO: 0.08 X10(3) UL (ref 0–0.2)
BASOPHILS NFR BLD AUTO: 1.2 %
BILIRUB SERPL-MCNC: 0.7 MG/DL (ref 0.1–2)
BUN BLD-MCNC: 9 MG/DL (ref 8–20)
BUN/CREAT SERPL: 8.6 (ref 10–20)
CALCIUM BLD-MCNC: 9.1 MG/DL (ref 8.3–10.3)
CHLORIDE SERPL-SCNC: 109 MMOL/L (ref 101–111)
CO2 SERPL-SCNC: 26 MMOL/L (ref 22–32)
CREAT BLD-MCNC: 1.05 MG/DL (ref 0.55–1.02)
DEPRECATED RDW RBC AUTO: 49.6 FL (ref 35.1–46.3)
EOSINOPHIL # BLD AUTO: 0.11 X10(3) UL (ref 0–0.7)
EOSINOPHIL NFR BLD AUTO: 1.7 %
ERYTHROCYTE [DISTWIDTH] IN BLOOD BY AUTOMATED COUNT: 13.7 % (ref 11–15)
GLOBULIN PLAS-MCNC: 3.7 G/DL (ref 2.8–4.4)
GLUCOSE BLD-MCNC: 94 MG/DL (ref 70–99)
HCT VFR BLD AUTO: 41.3 % (ref 35–48)
HGB BLD-MCNC: 13 G/DL (ref 12–16)
IMM GRANULOCYTES # BLD AUTO: 0.02 X10(3) UL (ref 0–1)
IMM GRANULOCYTES NFR BLD: 0.3 %
LYMPHOCYTES # BLD AUTO: 3.47 X10(3) UL (ref 1–4)
LYMPHOCYTES NFR BLD AUTO: 52.7 %
M PROTEIN MFR SERPL ELPH: 7.6 G/DL (ref 6.4–8.2)
MCH RBC QN AUTO: 30.8 PG (ref 26–34)
MCHC RBC AUTO-ENTMCNC: 31.5 G/DL (ref 31–37)
MCV RBC AUTO: 97.9 FL (ref 80–100)
MONOCYTES # BLD AUTO: 0.11 X10(3) UL (ref 0.1–1)
MONOCYTES NFR BLD AUTO: 1.7 %
NEUTROPHILS # BLD AUTO: 2.8 X10 (3) UL (ref 1.5–7.7)
NEUTROPHILS # BLD AUTO: 2.8 X10(3) UL (ref 1.5–7.7)
NEUTROPHILS NFR BLD AUTO: 42.4 %
OSMOLALITY SERPL CALC.SUM OF ELEC: 290 MOSM/KG (ref 275–295)
PLATELET # BLD AUTO: 212 10(3)UL (ref 150–450)
POTASSIUM SERPL-SCNC: 3.8 MMOL/L (ref 3.6–5.1)
RBC # BLD AUTO: 4.22 X10(6)UL (ref 3.8–5.3)
SODIUM SERPL-SCNC: 141 MMOL/L (ref 136–144)
WBC # BLD AUTO: 6.6 X10(3) UL (ref 4–11)

## 2019-01-31 PROCEDURE — 96402 CHEMO HORMON ANTINEOPL SQ/IM: CPT

## 2019-01-31 PROCEDURE — 99214 OFFICE O/P EST MOD 30 MIN: CPT | Performed by: INTERNAL MEDICINE

## 2019-01-31 RX ORDER — LAMOTRIGINE 25 MG/1
500 TABLET ORAL ONCE
Status: COMPLETED | OUTPATIENT
Start: 2019-01-31 | End: 2019-01-31

## 2019-01-31 RX ADMIN — LAMOTRIGINE 500 MG: 25 TABLET ORAL at 15:38:00

## 2019-02-09 NOTE — PROGRESS NOTES
Cancer Center Progress Note  Patient Name: Jose Grande   YOB: 1960   Medical Record Number: [id number]     Attending Physician: Mariza Mora M.D. Date of Visit: 1/31/19    Chief Complaint:  Patient presents with:   Follow - Up History:  Social History    Socioeconomic History      Marital status:       Spouse name: Not on file      Number of children: Not on file      Years of education: Not on file      Highest education level: Not on file    Social Needs      Financial Take 1 tablet (8 mg total) by mouth every 8 (eight) hours as needed for Nausea., Disp: 30 tablet, Rfl: 3  •  Loperamide HCl (IMODIUM A-D) 2 MG Oral Tab, Take 1 tablet (2 mg total) by mouth as needed for Diarrhea.  Begin taking at first sign of loose stools, anginal chest pain, palpitations or orthopnea. Gastrointestinal No nausea, vomiting, GI bleeding, or constipation. NL appetite, No Early Satiety. Genitorurinary No hematuria, dysuria, abnormal bleeding. Integumentary No rashes, No yellowing.    Neurol 0.4   TP  7.4     Radiology:    Pathology:    Impression and Plan:  Breast Cancer: Stage IV, ER+ WI+ HER-2 Neg. She was transitioned to Faslodex and Palbociclib and is tolerating it reasonably well. Continue and monitor the diarrhea.   Continue goserelin

## 2019-02-14 ENCOUNTER — OFFICE VISIT (OUTPATIENT)
Dept: HEMATOLOGY/ONCOLOGY | Age: 59
End: 2019-02-14
Attending: INTERNAL MEDICINE
Payer: COMMERCIAL

## 2019-02-14 VITALS
OXYGEN SATURATION: 97 % | TEMPERATURE: 98 F | HEART RATE: 69 BPM | RESPIRATION RATE: 20 BRPM | BODY MASS INDEX: 32 KG/M2 | WEIGHT: 200.13 LBS | SYSTOLIC BLOOD PRESSURE: 118 MMHG | DIASTOLIC BLOOD PRESSURE: 72 MMHG

## 2019-02-14 DIAGNOSIS — C78.2 CARCINOMA OF RIGHT BREAST METASTATIC TO PLEURA (HCC): Primary | ICD-10-CM

## 2019-02-14 DIAGNOSIS — K59.1 FUNCTIONAL DIARRHEA: ICD-10-CM

## 2019-02-14 DIAGNOSIS — C78.2 CARCINOMA OF RIGHT BREAST METASTATIC TO PLEURA (HCC): ICD-10-CM

## 2019-02-14 DIAGNOSIS — Z17.0 MALIGNANT NEOPLASM OF NIPPLE OF RIGHT BREAST IN FEMALE, ESTROGEN RECEPTOR POSITIVE (HCC): Primary | ICD-10-CM

## 2019-02-14 DIAGNOSIS — C79.51 BONE METASTASES (HCC): ICD-10-CM

## 2019-02-14 DIAGNOSIS — E66.09 OBESITY DUE TO EXCESS CALORIES WITHOUT SERIOUS COMORBIDITY, UNSPECIFIED CLASSIFICATION: ICD-10-CM

## 2019-02-14 DIAGNOSIS — C50.911 CARCINOMA OF RIGHT BREAST METASTATIC TO PLEURA (HCC): ICD-10-CM

## 2019-02-14 DIAGNOSIS — C50.011 MALIGNANT NEOPLASM OF NIPPLE OF RIGHT BREAST IN FEMALE, ESTROGEN RECEPTOR POSITIVE (HCC): ICD-10-CM

## 2019-02-14 DIAGNOSIS — C50.911 CARCINOMA OF RIGHT BREAST METASTATIC TO PLEURA (HCC): Primary | ICD-10-CM

## 2019-02-14 DIAGNOSIS — R79.89 ELEVATED LFTS: ICD-10-CM

## 2019-02-14 DIAGNOSIS — C50.011 MALIGNANT NEOPLASM OF NIPPLE OF RIGHT BREAST IN FEMALE, ESTROGEN RECEPTOR POSITIVE (HCC): Primary | ICD-10-CM

## 2019-02-14 DIAGNOSIS — Z17.0 MALIGNANT NEOPLASM OF NIPPLE OF RIGHT BREAST IN FEMALE, ESTROGEN RECEPTOR POSITIVE (HCC): ICD-10-CM

## 2019-02-14 LAB
ALBUMIN SERPL-MCNC: 3.4 G/DL (ref 3.4–5)
ALBUMIN/GLOB SERPL: 1 {RATIO} (ref 1–2)
ALP LIVER SERPL-CCNC: 168 U/L (ref 46–118)
ALT SERPL-CCNC: 91 U/L (ref 13–56)
ANION GAP SERPL CALC-SCNC: 10 MMOL/L (ref 0–18)
AST SERPL-CCNC: 93 U/L (ref 15–37)
BASOPHILS # BLD AUTO: 0.05 X10(3) UL (ref 0–0.2)
BASOPHILS NFR BLD AUTO: 1.1 %
BILIRUB SERPL-MCNC: 0.3 MG/DL (ref 0.1–2)
BRCA1 C.185DELAG BLD/T QL: 15.4 U/ML (ref ?–38)
BUN BLD-MCNC: 9 MG/DL (ref 7–18)
BUN/CREAT SERPL: 10.1 (ref 10–20)
CALCIUM BLD-MCNC: 8.4 MG/DL (ref 8.5–10.1)
CHLORIDE SERPL-SCNC: 108 MMOL/L (ref 98–107)
CO2 SERPL-SCNC: 26 MMOL/L (ref 21–32)
CREAT BLD-MCNC: 0.89 MG/DL (ref 0.55–1.02)
DEPRECATED RDW RBC AUTO: 52.1 FL (ref 35.1–46.3)
EOSINOPHIL # BLD AUTO: 0.11 X10(3) UL (ref 0–0.7)
EOSINOPHIL NFR BLD AUTO: 2.3 %
ERYTHROCYTE [DISTWIDTH] IN BLOOD BY AUTOMATED COUNT: 14.6 % (ref 11–15)
GLOBULIN PLAS-MCNC: 3.4 G/DL (ref 2.8–4.4)
GLUCOSE BLD-MCNC: 116 MG/DL (ref 70–99)
HCT VFR BLD AUTO: 31.9 % (ref 35–48)
HGB BLD-MCNC: 10.4 G/DL (ref 12–16)
IMM GRANULOCYTES # BLD AUTO: 0.01 X10(3) UL (ref 0–1)
IMM GRANULOCYTES NFR BLD: 0.2 %
LYMPHOCYTES # BLD AUTO: 2.59 X10(3) UL (ref 1–4)
LYMPHOCYTES NFR BLD AUTO: 54.5 %
M PROTEIN MFR SERPL ELPH: 6.8 G/DL (ref 6.4–8.2)
MCH RBC QN AUTO: 32.1 PG (ref 26–34)
MCHC RBC AUTO-ENTMCNC: 32.6 G/DL (ref 31–37)
MCV RBC AUTO: 98.5 FL (ref 80–100)
MONOCYTES # BLD AUTO: 0.11 X10(3) UL (ref 0.1–1)
MONOCYTES NFR BLD AUTO: 2.3 %
NEUTROPHILS # BLD AUTO: 1.88 X10 (3) UL (ref 1.5–7.7)
NEUTROPHILS # BLD AUTO: 1.88 X10(3) UL (ref 1.5–7.7)
NEUTROPHILS NFR BLD AUTO: 39.6 %
OSMOLALITY SERPL CALC.SUM OF ELEC: 298 MOSM/KG (ref 275–295)
PLATELET # BLD AUTO: 187 10(3)UL (ref 150–450)
POTASSIUM SERPL-SCNC: 3.7 MMOL/L (ref 3.5–5.1)
RBC # BLD AUTO: 3.24 X10(6)UL (ref 3.8–5.3)
SODIUM SERPL-SCNC: 144 MMOL/L (ref 136–145)
WBC # BLD AUTO: 4.8 X10(3) UL (ref 4–11)

## 2019-02-14 PROCEDURE — 96402 CHEMO HORMON ANTINEOPL SQ/IM: CPT

## 2019-02-14 PROCEDURE — 99215 OFFICE O/P EST HI 40 MIN: CPT | Performed by: INTERNAL MEDICINE

## 2019-02-14 RX ORDER — LAMOTRIGINE 25 MG/1
500 TABLET ORAL ONCE
Status: CANCELLED | OUTPATIENT
Start: 2019-02-14

## 2019-02-14 RX ORDER — LAMOTRIGINE 25 MG/1
500 TABLET ORAL ONCE
Status: COMPLETED | OUTPATIENT
Start: 2019-02-14 | End: 2019-02-14

## 2019-02-14 RX ADMIN — LAMOTRIGINE 500 MG: 25 TABLET ORAL at 15:51:00

## 2019-02-14 NOTE — PROGRESS NOTES
Pt here for MD f/u. Pt is taking abemaciclib BID. Pt c/o diarrhea usually 2-3 times a day. She takes imodium. Increase bilateral foot pain.    Outpatient Oncology Care Plan  Problem list:  knowledge deficit     Problems related to:    disease/disease progre

## 2019-02-23 NOTE — PROGRESS NOTES
Cancer Center Progress Note  Patient Name: Jose Grande   YOB: 1960   Medical Record Number: [id number]     Attending Physician: Mariza Mora M.D. Date of Visit: 2/14/19    Chief Complaint:  Patient presents with:   Follow - Up History:  Social History    Socioeconomic History      Marital status:       Spouse name: Not on file      Number of children: Not on file      Years of education: Not on file      Highest education level: Not on file    Occupational History      No Asked    Social History Narrative      Not on file      Current Medications:    Current Outpatient Medications:   •  Abemaciclib 150 MG Oral Tab, Take 1 tablet (150 mg total) by mouth 2 (two) times daily.  may take with or without food, Disp: 56 tablet, Rfl 8 (eight) hours as needed for Pain., Disp: 90 tablet, Rfl: 0  •  Goserelin Acetate (ZOLADEX SC), Inject into the skin., Disp: , Rfl:     Allergies:    Radiology Contrast *    HIVES, ITCHING     Review of Systems:    Constitutional No fevers, chills, night function, normal gait, cranial nerves intact. Psychiatric Normal - A&Ox3, Coherent speech. Verbalizes understanding of our discussions today. Results for Ofe Rob (MRN [id number]) as of 2/23/2019 17:06   Ref.  Range 2/14/2019 14:54   Glucose Latest Range:  1.50 - 7.70 x10 (3) uL 1.88   Neutrophils Absolute Latest Ref Range: 1.50 - 7.70 x10(3) uL 1.88   Lymphocytes Absolute Latest Ref Range: 1.00 - 4.00 x10(3) uL 2.59   Monocytes Absolute Latest Ref Range: 0.10 - 1.00 x10(3) uL 0.11   Eosinophils Absol

## 2019-02-28 RX ORDER — ESCITALOPRAM OXALATE 20 MG/1
TABLET ORAL
Qty: 30 TABLET | Refills: 0 | Status: SHIPPED | OUTPATIENT
Start: 2019-02-28 | End: 2019-03-27

## 2019-02-28 RX ORDER — ROSUVASTATIN CALCIUM 5 MG/1
TABLET, COATED ORAL
Qty: 30 TABLET | Refills: 0 | Status: SHIPPED | OUTPATIENT
Start: 2019-02-28 | End: 2019-03-27

## 2019-02-28 NOTE — TELEPHONE ENCOUNTER
Last seen in Nov.I believe cancelling appointments due to chemo appointments. Should I call her? Just refill?

## 2019-03-04 NOTE — TELEPHONE ENCOUNTER
Please call patient to schedule follow up for anxiety, thyroid, hip pain, cholesterol appointment with Dr. Tristen Hollingsworth. LOV 8/11/18. Thanks!

## 2019-03-07 RX ORDER — PREGABALIN 75 MG/1
75 CAPSULE ORAL 2 TIMES DAILY
Qty: 60 CAPSULE | Refills: 0 | Status: SHIPPED | OUTPATIENT
Start: 2019-03-07 | End: 2019-04-10

## 2019-03-13 RX ORDER — ANASTROZOLE 1 MG/1
TABLET ORAL
Qty: 90 TABLET | Refills: 2 | OUTPATIENT
Start: 2019-03-13

## 2019-03-14 ENCOUNTER — OFFICE VISIT (OUTPATIENT)
Dept: HEMATOLOGY/ONCOLOGY | Age: 59
End: 2019-03-14
Attending: INTERNAL MEDICINE
Payer: COMMERCIAL

## 2019-03-14 VITALS
SYSTOLIC BLOOD PRESSURE: 126 MMHG | RESPIRATION RATE: 20 BRPM | WEIGHT: 195.13 LBS | HEART RATE: 81 BPM | BODY MASS INDEX: 31 KG/M2 | OXYGEN SATURATION: 95 % | DIASTOLIC BLOOD PRESSURE: 66 MMHG | TEMPERATURE: 99 F

## 2019-03-14 DIAGNOSIS — C50.011 MALIGNANT NEOPLASM OF NIPPLE OF RIGHT BREAST IN FEMALE, ESTROGEN RECEPTOR POSITIVE (HCC): Primary | ICD-10-CM

## 2019-03-14 DIAGNOSIS — C78.7 LIVER METASTASES (HCC): ICD-10-CM

## 2019-03-14 DIAGNOSIS — T45.1X5A ANEMIA ASSOCIATED WITH CHEMOTHERAPY: ICD-10-CM

## 2019-03-14 DIAGNOSIS — C79.51 BONE METASTASES (HCC): ICD-10-CM

## 2019-03-14 DIAGNOSIS — Z17.0 MALIGNANT NEOPLASM OF NIPPLE OF RIGHT BREAST IN FEMALE, ESTROGEN RECEPTOR POSITIVE (HCC): Primary | ICD-10-CM

## 2019-03-14 DIAGNOSIS — C50.911 CARCINOMA OF RIGHT BREAST METASTATIC TO PLEURA (HCC): ICD-10-CM

## 2019-03-14 DIAGNOSIS — K59.1 FUNCTIONAL DIARRHEA: ICD-10-CM

## 2019-03-14 DIAGNOSIS — D64.81 ANEMIA ASSOCIATED WITH CHEMOTHERAPY: ICD-10-CM

## 2019-03-14 DIAGNOSIS — C78.2 CARCINOMA OF RIGHT BREAST METASTATIC TO PLEURA (HCC): ICD-10-CM

## 2019-03-14 LAB
ALBUMIN SERPL-MCNC: 3.6 G/DL (ref 3.4–5)
ALBUMIN/GLOB SERPL: 1 {RATIO} (ref 1–2)
ALP LIVER SERPL-CCNC: 197 U/L (ref 46–118)
ALT SERPL-CCNC: 71 U/L (ref 13–56)
ANION GAP SERPL CALC-SCNC: 6 MMOL/L (ref 0–18)
AST SERPL-CCNC: 81 U/L (ref 15–37)
BASOPHILS # BLD AUTO: 0.1 X10(3) UL (ref 0–0.2)
BASOPHILS NFR BLD AUTO: 1.7 %
BILIRUB SERPL-MCNC: 0.4 MG/DL (ref 0.1–2)
BUN BLD-MCNC: 9 MG/DL (ref 7–18)
BUN/CREAT SERPL: 10.2 (ref 10–20)
CALCIUM BLD-MCNC: 8.8 MG/DL (ref 8.5–10.1)
CHLORIDE SERPL-SCNC: 109 MMOL/L (ref 98–107)
CO2 SERPL-SCNC: 29 MMOL/L (ref 21–32)
CREAT BLD-MCNC: 0.88 MG/DL (ref 0.55–1.02)
DEPRECATED RDW RBC AUTO: 66.9 FL (ref 35.1–46.3)
EOSINOPHIL # BLD AUTO: 0.11 X10(3) UL (ref 0–0.7)
EOSINOPHIL NFR BLD AUTO: 1.9 %
ERYTHROCYTE [DISTWIDTH] IN BLOOD BY AUTOMATED COUNT: 18.1 % (ref 11–15)
GLOBULIN PLAS-MCNC: 3.5 G/DL (ref 2.8–4.4)
GLUCOSE BLD-MCNC: 107 MG/DL (ref 70–99)
HCT VFR BLD AUTO: 27.5 % (ref 35–48)
HGB BLD-MCNC: 8.9 G/DL (ref 12–16)
IMM GRANULOCYTES # BLD AUTO: 0.02 X10(3) UL (ref 0–1)
IMM GRANULOCYTES NFR BLD: 0.3 %
LYMPHOCYTES # BLD AUTO: 3.02 X10(3) UL (ref 1–4)
LYMPHOCYTES NFR BLD AUTO: 51.4 %
M PROTEIN MFR SERPL ELPH: 7.1 G/DL (ref 6.4–8.2)
MCH RBC QN AUTO: 33.3 PG (ref 26–34)
MCHC RBC AUTO-ENTMCNC: 32.4 G/DL (ref 31–37)
MCV RBC AUTO: 103 FL (ref 80–100)
MONOCYTES # BLD AUTO: 0.19 X10(3) UL (ref 0.1–1)
MONOCYTES NFR BLD AUTO: 3.2 %
NEUTROPHILS # BLD AUTO: 2.44 X10 (3) UL (ref 1.5–7.7)
NEUTROPHILS # BLD AUTO: 2.44 X10(3) UL (ref 1.5–7.7)
NEUTROPHILS NFR BLD AUTO: 41.5 %
OSMOLALITY SERPL CALC.SUM OF ELEC: 297 MOSM/KG (ref 275–295)
PLATELET # BLD AUTO: 226 10(3)UL (ref 150–450)
PLATELET MORPHOLOGY: NORMAL
POTASSIUM SERPL-SCNC: 3 MMOL/L (ref 3.5–5.1)
RBC # BLD AUTO: 2.67 X10(6)UL (ref 3.8–5.3)
SODIUM SERPL-SCNC: 144 MMOL/L (ref 136–145)
WBC # BLD AUTO: 5.9 X10(3) UL (ref 4–11)

## 2019-03-14 PROCEDURE — 99215 OFFICE O/P EST HI 40 MIN: CPT | Performed by: INTERNAL MEDICINE

## 2019-03-14 PROCEDURE — 96402 CHEMO HORMON ANTINEOPL SQ/IM: CPT

## 2019-03-14 RX ORDER — LAMOTRIGINE 25 MG/1
500 TABLET ORAL ONCE
Status: CANCELLED | OUTPATIENT
Start: 2019-03-14

## 2019-03-14 RX ORDER — LAMOTRIGINE 25 MG/1
500 TABLET ORAL ONCE
Status: COMPLETED | OUTPATIENT
Start: 2019-03-14 | End: 2019-03-14

## 2019-03-14 RX ORDER — OMEGA-3-ACID ETHYL ESTERS 1 G/1
CAPSULE, LIQUID FILLED ORAL
Qty: 360 CAPSULE | Refills: 0 | Status: SHIPPED | OUTPATIENT
Start: 2019-03-14 | End: 2019-06-03

## 2019-03-14 RX ADMIN — LAMOTRIGINE 500 MG: 25 TABLET ORAL at 15:31:00

## 2019-03-14 NOTE — PROGRESS NOTES
Cancer Center Progress Note  Patient Name: Ventura Mora   YOB: 1960   Medical Record Number: [id number]     Attending Physician: Christopher Mccarthy M.D. Date of Visit: 3/14/2019    Chief Complaint:  Patient presents with:   Follow - Up Social History:  Social History    Socioeconomic History      Marital status:       Spouse name: Not on file      Number of children: Not on file      Years of education: Not on file      Highest education level: Not on file    Occupational Hi Self-Exams: Not Asked    Social History Narrative      Not on file      Current Medications:    Current Outpatient Medications:   •  OMEGA-3-ACID ETHYL ESTERS 1 g Oral Cap, TAKE 2 CAPSULES TWICE A DAY, Disp: 360 capsule, Rfl: 0  •  pregabalin 75 MG Oral Ca mouth every 8 (eight) hours as needed for Pain., Disp: 90 tablet, Rfl: 0  •  Goserelin Acetate (ZOLADEX SC), Inject into the skin., Disp: , Rfl:     Allergies:    Radiology Contrast *    HIVES, ITCHING     Review of Systems:    Constitutional No fevers, ch Integumentary Normal - No rashes, No Jaundice   Neurologic Normal - No sensory or motor deficits, normal cerebellar function, normal gait, cranial nerves intact. Psychiatric Normal - Alert and oriented times three. Coherent speech.  Verbalizes [employer]

## 2019-03-23 ENCOUNTER — APPOINTMENT (OUTPATIENT)
Dept: LAB | Age: 59
End: 2019-03-23
Attending: FAMILY MEDICINE
Payer: COMMERCIAL

## 2019-03-23 DIAGNOSIS — E78.2 HYPERLIPIDEMIA, MIXED: ICD-10-CM

## 2019-03-23 DIAGNOSIS — E03.9 HYPOTHYROIDISM IN ADULT: ICD-10-CM

## 2019-03-23 LAB
ALBUMIN SERPL-MCNC: 3.4 G/DL (ref 3.4–5)
ALBUMIN/GLOB SERPL: 1 {RATIO} (ref 1–2)
ALP LIVER SERPL-CCNC: 188 U/L (ref 46–118)
ALT SERPL-CCNC: 62 U/L (ref 13–56)
ANION GAP SERPL CALC-SCNC: 6 MMOL/L (ref 0–18)
AST SERPL-CCNC: 92 U/L (ref 15–37)
BILIRUB SERPL-MCNC: 0.4 MG/DL (ref 0.1–2)
BUN BLD-MCNC: 12 MG/DL (ref 7–18)
BUN/CREAT SERPL: 13.6 (ref 10–20)
CALCIUM BLD-MCNC: 8.6 MG/DL (ref 8.5–10.1)
CHLORIDE SERPL-SCNC: 111 MMOL/L (ref 98–107)
CHOLEST SMN-MCNC: 158 MG/DL (ref ?–200)
CO2 SERPL-SCNC: 26 MMOL/L (ref 21–32)
CREAT BLD-MCNC: 0.88 MG/DL (ref 0.55–1.02)
GLOBULIN PLAS-MCNC: 3.4 G/DL (ref 2.8–4.4)
GLUCOSE BLD-MCNC: 94 MG/DL (ref 70–99)
HDLC SERPL-MCNC: 23 MG/DL (ref 40–59)
LDLC SERPL CALC-MCNC: 66 MG/DL (ref ?–100)
M PROTEIN MFR SERPL ELPH: 6.8 G/DL (ref 6.4–8.2)
NONHDLC SERPL-MCNC: 135 MG/DL (ref ?–130)
OSMOLALITY SERPL CALC.SUM OF ELEC: 296 MOSM/KG (ref 275–295)
POTASSIUM SERPL-SCNC: 3.6 MMOL/L (ref 3.5–5.1)
SODIUM SERPL-SCNC: 143 MMOL/L (ref 136–145)
T4 FREE SERPL-MCNC: 0.8 NG/DL (ref 0.8–1.7)
TRIGL SERPL-MCNC: 345 MG/DL (ref 30–149)
TSI SER-ACNC: 25.4 MIU/ML (ref 0.36–3.74)
VLDLC SERPL CALC-MCNC: 69 MG/DL (ref 0–30)

## 2019-03-23 PROCEDURE — 84439 ASSAY OF FREE THYROXINE: CPT

## 2019-03-23 PROCEDURE — 80061 LIPID PANEL: CPT

## 2019-03-23 PROCEDURE — 80053 COMPREHEN METABOLIC PANEL: CPT

## 2019-03-23 PROCEDURE — 84443 ASSAY THYROID STIM HORMONE: CPT

## 2019-03-23 PROCEDURE — 36415 COLL VENOUS BLD VENIPUNCTURE: CPT

## 2019-03-25 RX ORDER — LEVOTHYROXINE SODIUM 0.2 MG/1
200 TABLET ORAL
Qty: 30 TABLET | Refills: 0 | Status: SHIPPED | OUTPATIENT
Start: 2019-03-25 | End: 2019-04-06

## 2019-03-27 RX ORDER — ESCITALOPRAM OXALATE 20 MG/1
TABLET ORAL
Qty: 30 TABLET | Refills: 0 | Status: SHIPPED | OUTPATIENT
Start: 2019-03-27 | End: 2019-04-06

## 2019-03-27 RX ORDER — ROSUVASTATIN CALCIUM 5 MG/1
TABLET, COATED ORAL
Qty: 30 TABLET | Refills: 0 | Status: SHIPPED | OUTPATIENT
Start: 2019-03-27 | End: 2019-03-29

## 2019-03-27 NOTE — TELEPHONE ENCOUNTER
ESCITALOPRAM 20MG TABLETS    Non protocol medication. Please see pended medications. Please sign & print if appropriate. Thank you    The pt has an upcoming appt on 04/06/2019.

## 2019-03-29 RX ORDER — ROSUVASTATIN CALCIUM 5 MG/1
TABLET, COATED ORAL
Qty: 30 TABLET | Refills: 0 | Status: SHIPPED | OUTPATIENT
Start: 2019-03-29 | End: 2019-04-06

## 2019-03-29 NOTE — TELEPHONE ENCOUNTER
Patient's  called, this script is a 30 day supply, they need it to be a 80 day script for insurance purposes. ROSUVASTATIN CALCIUM 5 MG Oral Tab    Thank you.

## 2019-04-06 ENCOUNTER — OFFICE VISIT (OUTPATIENT)
Dept: FAMILY MEDICINE CLINIC | Facility: CLINIC | Age: 59
End: 2019-04-06

## 2019-04-06 VITALS
SYSTOLIC BLOOD PRESSURE: 120 MMHG | RESPIRATION RATE: 16 BRPM | HEART RATE: 78 BPM | HEIGHT: 66 IN | WEIGHT: 198 LBS | TEMPERATURE: 98 F | BODY MASS INDEX: 31.82 KG/M2 | OXYGEN SATURATION: 99 % | DIASTOLIC BLOOD PRESSURE: 60 MMHG

## 2019-04-06 DIAGNOSIS — Z17.0 MALIGNANT NEOPLASM OF NIPPLE OF RIGHT BREAST IN FEMALE, ESTROGEN RECEPTOR POSITIVE (HCC): ICD-10-CM

## 2019-04-06 DIAGNOSIS — E03.9 HYPOTHYROIDISM IN ADULT: ICD-10-CM

## 2019-04-06 DIAGNOSIS — C50.911 CARCINOMA OF RIGHT BREAST METASTATIC TO PLEURA (HCC): ICD-10-CM

## 2019-04-06 DIAGNOSIS — R79.89 ELEVATED LIVER FUNCTION TESTS: ICD-10-CM

## 2019-04-06 DIAGNOSIS — R60.0 EDEMA OF BOTH LEGS: ICD-10-CM

## 2019-04-06 DIAGNOSIS — R89.9 ABNORMAL LABORATORY TEST: ICD-10-CM

## 2019-04-06 DIAGNOSIS — C50.011 MALIGNANT NEOPLASM OF NIPPLE OF RIGHT BREAST IN FEMALE, ESTROGEN RECEPTOR POSITIVE (HCC): ICD-10-CM

## 2019-04-06 DIAGNOSIS — G62.9 PERIPHERAL POLYNEUROPATHY: ICD-10-CM

## 2019-04-06 DIAGNOSIS — F41.9 ANXIETY: ICD-10-CM

## 2019-04-06 DIAGNOSIS — C78.2 CARCINOMA OF RIGHT BREAST METASTATIC TO PLEURA (HCC): ICD-10-CM

## 2019-04-06 DIAGNOSIS — E78.2 HYPERLIPIDEMIA, MIXED: ICD-10-CM

## 2019-04-06 DIAGNOSIS — E78.1 HYPERTRIGLYCERIDEMIA: ICD-10-CM

## 2019-04-06 DIAGNOSIS — R60.0 EDEMA OF BOTH FEET: ICD-10-CM

## 2019-04-06 DIAGNOSIS — M79.672 PAIN IN BOTH FEET: ICD-10-CM

## 2019-04-06 DIAGNOSIS — C79.51 BONE METASTASES (HCC): ICD-10-CM

## 2019-04-06 DIAGNOSIS — M79.671 PAIN IN BOTH FEET: ICD-10-CM

## 2019-04-06 DIAGNOSIS — L60.0 INGROWING NAIL, RIGHT GREAT TOE: Primary | ICD-10-CM

## 2019-04-06 PROCEDURE — 99215 OFFICE O/P EST HI 40 MIN: CPT | Performed by: FAMILY MEDICINE

## 2019-04-06 RX ORDER — FLUTICASONE PROPIONATE 50 MCG
2 SPRAY, SUSPENSION (ML) NASAL DAILY
Qty: 1 BOTTLE | Refills: 1 | Status: SHIPPED | OUTPATIENT
Start: 2019-04-06 | End: 2019-04-11

## 2019-04-06 RX ORDER — HYDROCODONE BITARTRATE AND ACETAMINOPHEN 5; 325 MG/1; MG/1
1 TABLET ORAL EVERY 8 HOURS PRN
Qty: 30 TABLET | Refills: 0 | Status: SHIPPED | OUTPATIENT
Start: 2019-04-06 | End: 2019-05-03

## 2019-04-06 RX ORDER — ROSUVASTATIN CALCIUM 5 MG/1
TABLET, COATED ORAL
Qty: 90 TABLET | Refills: 0 | Status: SHIPPED | OUTPATIENT
Start: 2019-04-06 | End: 2019-07-01

## 2019-04-06 RX ORDER — LEVOTHYROXINE SODIUM 0.03 MG/1
25 TABLET ORAL
Qty: 30 TABLET | Refills: 3 | Status: SHIPPED | OUTPATIENT
Start: 2019-04-06 | End: 2019-05-03

## 2019-04-06 RX ORDER — ESCITALOPRAM OXALATE 20 MG/1
20 TABLET ORAL
Qty: 90 TABLET | Refills: 0 | Status: SHIPPED | OUTPATIENT
Start: 2019-04-06 | End: 2019-07-01

## 2019-04-06 RX ORDER — HYDROCODONE BITARTRATE AND ACETAMINOPHEN 5; 325 MG/1; MG/1
1 TABLET ORAL EVERY 8 HOURS PRN
Qty: 30 TABLET | Refills: 0 | Status: SHIPPED | OUTPATIENT
Start: 2019-06-07 | End: 2019-04-11

## 2019-04-06 RX ORDER — LEVOTHYROXINE SODIUM 0.2 MG/1
200 TABLET ORAL
Qty: 30 TABLET | Refills: 3 | Status: SHIPPED | OUTPATIENT
Start: 2019-04-06 | End: 2019-05-03

## 2019-04-06 RX ORDER — AZITHROMYCIN 250 MG/1
TABLET, FILM COATED ORAL
Qty: 6 TABLET | Refills: 0 | Status: SHIPPED | OUTPATIENT
Start: 2019-04-06 | End: 2019-05-08 | Stop reason: ALTCHOICE

## 2019-04-06 RX ORDER — HYDROCODONE BITARTRATE AND ACETAMINOPHEN 5; 325 MG/1; MG/1
1 TABLET ORAL EVERY 8 HOURS PRN
Qty: 30 TABLET | Refills: 0 | Status: SHIPPED | OUTPATIENT
Start: 2019-05-07 | End: 2019-04-11

## 2019-04-06 NOTE — PROGRESS NOTES
Riddhi Bender is a 66year old female. cc neuropathy, breast cancer, hypercholesterolemia, elevated liver function test, hypothyroidism, anxiety, ingrown toenail, edema of the legs,cough, congestion  HPI:   Patient is coming to the office for follow-up of mul legs.   Will increase dose of levothyroxine. Recheck blood work in 3 months    Anxiety she is feeling more nervous and anxious worrying about things feeling more down. She thinks that medication is wearing off when she is coming home at nigh from work. Take 1 tablet by mouth every 8 (eight) hours as needed for Pain. Disp: 30 tablet Rfl: 0   Fluticasone Furoate-Vilanterol (BREO ELLIPTA) 100-25 MCG/INH Inhalation Aerosol Powder, Breath Activated Inhale 1 puff into the lungs daily.  Disp: 1 each Rfl: 0   mimi (eight) hours as needed for Pain.  Disp: 90 tablet Rfl: 0      Past Medical History:   Diagnosis Date   • Breast cancer ([facility] [address]    • Disorder of thyroid    • Hypothyroid    • Other and unspecified hyperlipidemia       Social History:  Social History    Tobacc Musculoskeletal limited  right hip flexion, there is tenderness to palpation lateral right hip, strength is symmetric bilaterally.     Neurologic symmetric sensation of the extremities with results and sensitivity of the feet bilateral lower extremities tests  Anxiety  Edema of both legs    Orders Placed This Encounter      Lipid Panel      TSH and Free T4 [E]      Comp Metabolic Panel (14) [E]      Hemoglobin A1C [E]      Meds & Refills for this Visit:  Requested Prescriptions     Signed Prescriptions Rachael Becerra spray.  Elevate legs. Try compressive stockings. Imaging & Consults:  PODIATRY - INTERNAL  DME - EXTERNAL     The patient indicates understanding of these issues and agrees to the plan. The patient is asked to return in 3 months.   Time spent was 40 mi

## 2019-04-06 NOTE — PATIENT INSTRUCTIONS
Increase levothyroxine to 225 mcg daily. Recheck blood work before next visit. Healthy diet. Elevate your legs. Soak your feet in soapy warm water. Call Dr. Javon Morelos for evaluation of the ingrowing toenail there is no improvement in your symptoms.   Con

## 2019-04-09 ENCOUNTER — HOSPITAL ENCOUNTER (OUTPATIENT)
Dept: CT IMAGING | Age: 59
Discharge: HOME | End: 2019-04-09
Attending: INTERNAL MEDICINE
Payer: COMMERCIAL

## 2019-04-09 DIAGNOSIS — C79.51 BONE METASTASES (HCC): ICD-10-CM

## 2019-04-09 DIAGNOSIS — Z17.0 MALIGNANT NEOPLASM OF NIPPLE OF RIGHT BREAST IN FEMALE, ESTROGEN RECEPTOR POSITIVE (HCC): ICD-10-CM

## 2019-04-09 DIAGNOSIS — C50.911 CARCINOMA OF RIGHT BREAST METASTATIC TO PLEURA (HCC): ICD-10-CM

## 2019-04-09 DIAGNOSIS — C78.7 LIVER METASTASES (HCC): ICD-10-CM

## 2019-04-09 DIAGNOSIS — C50.011 MALIGNANT NEOPLASM OF NIPPLE OF RIGHT BREAST IN FEMALE, ESTROGEN RECEPTOR POSITIVE (HCC): ICD-10-CM

## 2019-04-09 DIAGNOSIS — C78.2 CARCINOMA OF RIGHT BREAST METASTATIC TO PLEURA (HCC): ICD-10-CM

## 2019-04-09 PROCEDURE — 71250 CT THORAX DX C-: CPT | Performed by: INTERNAL MEDICINE

## 2019-04-09 PROCEDURE — 74176 CT ABD & PELVIS W/O CONTRAST: CPT | Performed by: INTERNAL MEDICINE

## 2019-04-10 ENCOUNTER — TELEPHONE (OUTPATIENT)
Dept: HEMATOLOGY/ONCOLOGY | Facility: HOSPITAL | Age: 59
End: 2019-04-10

## 2019-04-10 ENCOUNTER — TELEPHONE (OUTPATIENT)
Dept: FAMILY MEDICINE CLINIC | Facility: CLINIC | Age: 59
End: 2019-04-10

## 2019-04-10 RX ORDER — PREGABALIN 100 MG/1
100 CAPSULE ORAL 3 TIMES DAILY
Qty: 90 CAPSULE | Refills: 0 | Status: CANCELLED | OUTPATIENT
Start: 2019-04-10

## 2019-04-10 RX ORDER — PREGABALIN 150 MG/1
150 CAPSULE ORAL 3 TIMES DAILY
Qty: 90 CAPSULE | Refills: 0 | Status: CANCELLED | OUTPATIENT
Start: 2019-04-10

## 2019-04-10 RX ORDER — PREGABALIN 75 MG/1
75 CAPSULE ORAL 2 TIMES DAILY
Qty: 180 CAPSULE | Refills: 3 | Status: SHIPPED | OUTPATIENT
Start: 2019-04-10 | End: 2019-04-25

## 2019-04-10 RX ORDER — PREGABALIN 50 MG/1
50 CAPSULE ORAL 3 TIMES DAILY
Qty: 90 CAPSULE | Refills: 0 | Status: CANCELLED | OUTPATIENT
Start: 2019-04-10

## 2019-04-10 RX ORDER — PREGABALIN 75 MG/1
75 CAPSULE ORAL 2 TIMES DAILY
Qty: 180 CAPSULE | Refills: 0 | Status: CANCELLED | OUTPATIENT
Start: 2019-04-10

## 2019-04-10 NOTE — TELEPHONE ENCOUNTER
Spoke w/. Advised referral was approved today and gave him number for Vandriels to call and schedule an appt.

## 2019-04-10 NOTE — TELEPHONE ENCOUNTER
Referral done 4/6/2019 by Dr. Osvaldo Vicente, printed and given to [facility]ID[facility].

## 2019-04-10 NOTE — TELEPHONE ENCOUNTER
Pt  calling stating he spoke to insurance about the compression socks and the insurance stated that a referral should be placed under DME. Please advise.

## 2019-04-11 ENCOUNTER — OFFICE VISIT (OUTPATIENT)
Dept: HEMATOLOGY/ONCOLOGY | Age: 59
End: 2019-04-11
Attending: INTERNAL MEDICINE
Payer: COMMERCIAL

## 2019-04-11 VITALS
RESPIRATION RATE: 20 BRPM | HEART RATE: 72 BPM | DIASTOLIC BLOOD PRESSURE: 48 MMHG | SYSTOLIC BLOOD PRESSURE: 88 MMHG | BODY MASS INDEX: 31 KG/M2 | WEIGHT: 191 LBS | TEMPERATURE: 99 F | OXYGEN SATURATION: 98 %

## 2019-04-11 VITALS
RESPIRATION RATE: 18 BRPM | HEART RATE: 81 BPM | SYSTOLIC BLOOD PRESSURE: 116 MMHG | OXYGEN SATURATION: 96 % | DIASTOLIC BLOOD PRESSURE: 64 MMHG

## 2019-04-11 DIAGNOSIS — C78.2 CARCINOMA OF RIGHT BREAST METASTATIC TO PLEURA (HCC): ICD-10-CM

## 2019-04-11 DIAGNOSIS — C78.2 BREAST CANCER METASTASIZED TO PLEURA, UNSPECIFIED LATERALITY (HCC): ICD-10-CM

## 2019-04-11 DIAGNOSIS — C50.911 CARCINOMA OF RIGHT BREAST METASTATIC TO PLEURA (HCC): ICD-10-CM

## 2019-04-11 DIAGNOSIS — D64.9 ANEMIA, UNSPECIFIED TYPE: Primary | ICD-10-CM

## 2019-04-11 DIAGNOSIS — E87.6 HYPOKALEMIA: ICD-10-CM

## 2019-04-11 DIAGNOSIS — Z17.0 MALIGNANT NEOPLASM OF NIPPLE OF RIGHT BREAST IN FEMALE, ESTROGEN RECEPTOR POSITIVE (HCC): Primary | ICD-10-CM

## 2019-04-11 DIAGNOSIS — C79.51 BONE METASTASES (HCC): ICD-10-CM

## 2019-04-11 DIAGNOSIS — E86.0 DEHYDRATION: ICD-10-CM

## 2019-04-11 DIAGNOSIS — C50.011 MALIGNANT NEOPLASM OF NIPPLE OF RIGHT BREAST IN FEMALE, ESTROGEN RECEPTOR POSITIVE (HCC): ICD-10-CM

## 2019-04-11 DIAGNOSIS — C50.919 BREAST CANCER METASTASIZED TO PLEURA, UNSPECIFIED LATERALITY (HCC): ICD-10-CM

## 2019-04-11 DIAGNOSIS — C78.7 LIVER METASTASES (HCC): ICD-10-CM

## 2019-04-11 DIAGNOSIS — Z17.0 MALIGNANT NEOPLASM OF NIPPLE OF RIGHT BREAST IN FEMALE, ESTROGEN RECEPTOR POSITIVE (HCC): ICD-10-CM

## 2019-04-11 DIAGNOSIS — K52.1 CHEMOTHERAPY INDUCED DIARRHEA: ICD-10-CM

## 2019-04-11 DIAGNOSIS — C50.011 MALIGNANT NEOPLASM OF NIPPLE OF RIGHT BREAST IN FEMALE, ESTROGEN RECEPTOR POSITIVE (HCC): Primary | ICD-10-CM

## 2019-04-11 DIAGNOSIS — T45.1X5A CHEMOTHERAPY INDUCED DIARRHEA: ICD-10-CM

## 2019-04-11 PROCEDURE — 99215 OFFICE O/P EST HI 40 MIN: CPT | Performed by: INTERNAL MEDICINE

## 2019-04-11 PROCEDURE — 96402 CHEMO HORMON ANTINEOPL SQ/IM: CPT

## 2019-04-11 PROCEDURE — 96360 HYDRATION IV INFUSION INIT: CPT

## 2019-04-11 RX ORDER — LAMOTRIGINE 25 MG/1
500 TABLET ORAL ONCE
Status: CANCELLED | OUTPATIENT
Start: 2019-04-11

## 2019-04-11 RX ORDER — POTASSIUM CHLORIDE 1500 MG/1
TABLET, FILM COATED, EXTENDED RELEASE ORAL
Qty: 30 TABLET | Refills: 0 | Status: SHIPPED | OUTPATIENT
Start: 2019-04-11 | End: 2019-06-20

## 2019-04-11 RX ORDER — LAMOTRIGINE 25 MG/1
500 TABLET ORAL ONCE
Status: COMPLETED | OUTPATIENT
Start: 2019-04-11 | End: 2019-04-11

## 2019-04-11 RX ADMIN — LAMOTRIGINE 500 MG: 25 TABLET ORAL at 16:10:00

## 2019-04-11 NOTE — PATIENT INSTRUCTIONS
Potassium-  from pharmacy and Take 2 tabs today, then 1 tab daily. Imodium- Take 2 tablets with next episode of diarrhea then 1 tablet with each additional episode. May take up to 8 tablets in 24hrs.       For Dr. Donavon Callander nurse line, call [phone]

## 2019-04-11 NOTE — PROGRESS NOTES
Education Record    Learner:  Patient and spouse    Disease / David castillo    Barriers / Limitations:  None   Comments:    Method:  Brief focused and Printed material   Comments:    General Topics:  Medication, Side effects and symptom management an

## 2019-04-11 NOTE — PROGRESS NOTES
Cancer Center Progress Note  Patient Name: Delfina Michaud   YOB: 1960   Medical Record Number: [id number]     Attending Physician: Candice Newman M.D. Date of Visit: 4/11/2019      Chief Complaint:  Patient presents with:   Follow - Up THORACOSCOPY/VATS Right 5/24/2015    Performed by Benita Purdy, Kristin Chiu MD at [address]  05/2015       Family History:  Family History   Adopted: Yes   Family history unknown: Yes       Social History:  Social History    Socioecono Stress Concern: Not Asked        Weight Concern: Not Asked        Special Diet: Not Asked        Back Care: Not Asked        Exercise: Not Asked        Bike Helmet: Not Asked        Seat Belt: Not Asked        Self-Exams: Not Asked    Social History Daly Villalba mouth as needed for Diarrhea. Begin taking at first sign of loose stools, increase oral intake, and contact Physician for further instructions. , Disp: 30 tablet, Rfl: 0  •  Abemaciclib 150 MG Oral Tab, Take 1 tablet (150 mg total) by mouth 2 (two) times da BMI 30.83 kg/m²       Physical Examination:    Constitutional Normal - Mood and affect appropriate. Appears close to chronological age. Well nourished. Well developed. Eyes Normal - Conjunctivae and sclerae are clear and without icterus.  Pupils are react metastatic disease in the chest, abdomen or pelvis. 8. Details as above. Continued clinical correlation recommended. Pathology:    Impression and Plan:  Breast Cancer: Stage IV, ER+ LA+ HER-2 Neg.   She was transitioned to Faslodex and Abemiciclib, b

## 2019-04-18 ENCOUNTER — OFFICE VISIT (OUTPATIENT)
Dept: HEMATOLOGY/ONCOLOGY | Age: 59
End: 2019-04-18
Attending: INTERNAL MEDICINE
Payer: COMMERCIAL

## 2019-04-18 VITALS
HEART RATE: 74 BPM | DIASTOLIC BLOOD PRESSURE: 51 MMHG | WEIGHT: 190.81 LBS | SYSTOLIC BLOOD PRESSURE: 89 MMHG | OXYGEN SATURATION: 98 % | BODY MASS INDEX: 31 KG/M2 | RESPIRATION RATE: 18 BRPM | TEMPERATURE: 99 F

## 2019-04-18 DIAGNOSIS — E87.6 HYPOKALEMIA: ICD-10-CM

## 2019-04-18 DIAGNOSIS — Z17.0 MALIGNANT NEOPLASM OF NIPPLE OF RIGHT BREAST IN FEMALE, ESTROGEN RECEPTOR POSITIVE (HCC): ICD-10-CM

## 2019-04-18 DIAGNOSIS — C79.51 BONE METASTASES (HCC): ICD-10-CM

## 2019-04-18 DIAGNOSIS — C78.7 METASTASIS TO LIVER (HCC): ICD-10-CM

## 2019-04-18 DIAGNOSIS — C50.919 BREAST CANCER METASTASIZED TO PLEURA, UNSPECIFIED LATERALITY (HCC): ICD-10-CM

## 2019-04-18 DIAGNOSIS — E86.0 DEHYDRATION: Primary | ICD-10-CM

## 2019-04-18 DIAGNOSIS — K52.1 CHEMOTHERAPY INDUCED DIARRHEA: ICD-10-CM

## 2019-04-18 DIAGNOSIS — D64.81 ANTINEOPLASTIC CHEMOTHERAPY INDUCED ANEMIA: ICD-10-CM

## 2019-04-18 DIAGNOSIS — E86.0 DEHYDRATION: ICD-10-CM

## 2019-04-18 DIAGNOSIS — T45.1X5A CHEMOTHERAPY INDUCED DIARRHEA: ICD-10-CM

## 2019-04-18 DIAGNOSIS — C79.51 BONE METASTASES (HCC): Primary | ICD-10-CM

## 2019-04-18 DIAGNOSIS — R60.0 BILATERAL LEG EDEMA: ICD-10-CM

## 2019-04-18 DIAGNOSIS — C78.2 BREAST CANCER METASTASIZED TO PLEURA, UNSPECIFIED LATERALITY (HCC): ICD-10-CM

## 2019-04-18 DIAGNOSIS — C50.011 MALIGNANT NEOPLASM OF NIPPLE OF RIGHT BREAST IN FEMALE, ESTROGEN RECEPTOR POSITIVE (HCC): ICD-10-CM

## 2019-04-18 DIAGNOSIS — E83.42 HYPOMAGNESEMIA: ICD-10-CM

## 2019-04-18 DIAGNOSIS — T45.1X5A ANTINEOPLASTIC CHEMOTHERAPY INDUCED ANEMIA: ICD-10-CM

## 2019-04-18 DIAGNOSIS — Z51.11 ENCOUNTER FOR CHEMOTHERAPY MANAGEMENT: ICD-10-CM

## 2019-04-18 DIAGNOSIS — G62.0 DRUG-INDUCED POLYNEUROPATHY (HCC): ICD-10-CM

## 2019-04-18 PROCEDURE — 99215 OFFICE O/P EST HI 40 MIN: CPT | Performed by: CLINICAL NURSE SPECIALIST

## 2019-04-18 PROCEDURE — 96365 THER/PROPH/DIAG IV INF INIT: CPT

## 2019-04-18 PROCEDURE — 96366 THER/PROPH/DIAG IV INF ADDON: CPT

## 2019-04-18 RX ORDER — DIPHENOXYLATE HYDROCHLORIDE AND ATROPINE SULFATE 2.5; .025 MG/1; MG/1
1-2 TABLET ORAL 4 TIMES DAILY PRN
Qty: 30 TABLET | Refills: 0 | Status: SHIPPED | OUTPATIENT
Start: 2019-04-18 | End: 2019-06-20

## 2019-04-18 NOTE — PROGRESS NOTES
Education Record    Learner:  Patient and Spouse    Disease / Diagnosis: iv hydration/electrolytes    Barriers / Limitations:  None   Comments:    Method:  Brief focused and Discussion   Comments:    General Topics:  Medication, Side effects and symptom ma

## 2019-04-18 NOTE — PROGRESS NOTES
Patient is here today with some dehydration symptoms. Patient state that she has not stopped her oral chemo. Patient is having frequent diarrhea. Every time she eats with in minutes she is going to the bathroom. Patient state she has increased her imodium.

## 2019-04-18 NOTE — PROGRESS NOTES
ANP Visit Note    Patient Name: Jeyson Wall   YOB: 1960   Medical Record Number: [id number]   CSN: [id number]   Date of visit: 4/18/2019       Chief Complaint/Reason for Visit:  Metastatic Breast Cancer     Oncologic History:  5/18/15: pleural • Disorder of thyroid    • Hypothyroid    • Other and unspecified hyperlipidemia        Surgical History:  Past Surgical History:   Procedure Laterality Date   • MOUTH ODONTECTOMY Left 6/28/2017    Performed by Sera Knott DDS at [facility] MAIN OR   • [facility] [address] Forced sexual activity: Not on file    Other Topics      Concerns:         Service: Not Asked        Blood Transfusions: Not Asked        Caffeine Concern: Yes          1 1/2 a day 16oz        Occupational Exposure: Not Asked        Hobby Hazards: every 6 (six) hours as needed for Nausea., Disp: 30 tablet, Rfl: 3  •  Ondansetron HCl (ZOFRAN) 8 MG tablet, Take 1 tablet (8 mg total) by mouth every 8 (eight) hours as needed for Nausea., Disp: 30 tablet, Rfl: 3  •  Loperamide HCl (IMODIUM A-D) 2 MG Oral non tender with good bowel sounds. Extremities: Pedal pulses are present. Bilateral 2+ LE edema. Neurological: Grossly intact. Lymphatics: There is no palpable lymphadenopathy throughout in the cervical,supraclavicular, axillary, or inguinal regions. Neutrophil % 49.5 %    Lymphocyte % 43.9 %    Monocyte % 3.7 %    Eosinophil % 1.7 %    Basophil % 0.8 %    Immature Granulocyte % 0.4 %   SCAN SLIDE    Collection Time: 04/18/19  1:44 PM   Result Value Ref Range    Slide Review Slide reviewed,morphology r

## 2019-04-25 ENCOUNTER — APPOINTMENT (OUTPATIENT)
Dept: HEMATOLOGY/ONCOLOGY | Age: 59
End: 2019-04-25
Attending: INTERNAL MEDICINE
Payer: COMMERCIAL

## 2019-04-25 ENCOUNTER — OFFICE VISIT (OUTPATIENT)
Dept: HEMATOLOGY/ONCOLOGY | Age: 59
End: 2019-04-25
Attending: INTERNAL MEDICINE
Payer: COMMERCIAL

## 2019-04-25 VITALS
HEART RATE: 80 BPM | BODY MASS INDEX: 30.35 KG/M2 | WEIGHT: 188.88 LBS | TEMPERATURE: 99 F | HEIGHT: 65.98 IN | SYSTOLIC BLOOD PRESSURE: 114 MMHG | RESPIRATION RATE: 18 BRPM | OXYGEN SATURATION: 99 % | DIASTOLIC BLOOD PRESSURE: 63 MMHG

## 2019-04-25 DIAGNOSIS — C79.51 BONE METASTASES (HCC): Primary | ICD-10-CM

## 2019-04-25 DIAGNOSIS — K59.1 FUNCTIONAL DIARRHEA: ICD-10-CM

## 2019-04-25 DIAGNOSIS — G62.0 DRUG-INDUCED POLYNEUROPATHY (HCC): ICD-10-CM

## 2019-04-25 DIAGNOSIS — Z17.0 MALIGNANT NEOPLASM OF NIPPLE OF RIGHT BREAST IN FEMALE, ESTROGEN RECEPTOR POSITIVE (HCC): ICD-10-CM

## 2019-04-25 DIAGNOSIS — E87.6 HYPOKALEMIA: ICD-10-CM

## 2019-04-25 DIAGNOSIS — Z51.11 ENCOUNTER FOR CHEMOTHERAPY MANAGEMENT: ICD-10-CM

## 2019-04-25 DIAGNOSIS — F32.9 REACTIVE DEPRESSION: ICD-10-CM

## 2019-04-25 DIAGNOSIS — C78.7 METASTASIS TO LIVER (HCC): ICD-10-CM

## 2019-04-25 DIAGNOSIS — C50.011 MALIGNANT NEOPLASM OF NIPPLE OF RIGHT BREAST IN FEMALE, ESTROGEN RECEPTOR POSITIVE (HCC): ICD-10-CM

## 2019-04-25 PROCEDURE — 99215 OFFICE O/P EST HI 40 MIN: CPT | Performed by: CLINICAL NURSE SPECIALIST

## 2019-04-25 RX ORDER — PREGABALIN 75 MG/1
150 CAPSULE ORAL 2 TIMES DAILY
Qty: 360 CAPSULE | Refills: 3 | Status: SHIPPED | OUTPATIENT
Start: 2019-04-25 | End: 2019-08-29

## 2019-04-25 RX ORDER — PREGABALIN 75 MG/1
150 CAPSULE ORAL 2 TIMES DAILY
Qty: 180 CAPSULE | Refills: 3 | Status: SHIPPED | OUTPATIENT
Start: 2019-04-25 | End: 2019-04-25

## 2019-04-25 NOTE — PROGRESS NOTES
ANP Visit Note    Patient Name: Maciej Marquez   YOB: 1960   Medical Record Number: [id number]   CSN: [id number]   Date of visit: 4/25/2019       Chief Complaint/Reason for Visit:  Metastatic Breast Cancer      Oncologic History:  5/18/15: pleura radiculopathy     Elevated liver function tests     Anxiety     Peripheral polyneuropathy     Dehydration       Medical History:  Past Medical History:   Diagnosis Date   • Breast cancer ([facility] [address]    • Disorder of thyroid    • Hypothyroid    • Other and unspec Relationship status: Not on file      Intimate partner violence:        Fear of current or ex partner: Not on file        Emotionally abused: Not on file        Physically abused: Not on file        Forced sexual activity: Not on file    Other Topics lungs daily. , Disp: 1 each, Rfl: 0  •  azithromycin 250 MG Oral Tab, Take 2 tablets on day#1, then take 1 tablet daily from day#2-5., Disp: 6 tablet, Rfl: 0  •  OMEGA-3-ACID ETHYL ESTERS 1 g Oral Cap, TAKE 2 CAPSULES TWICE A DAY, Disp: 360 capsule, Rfl: 0 114/63 (BP Location: Left arm, Patient Position: Sitting, Cuff Size: adult)   Pulse 80   Temp 99.3 °F (37.4 °C) (Tympanic)   Resp 18   Ht 1.676 m (5' 5.98\")   Wt 85.7 kg (188 lb 14.4 oz)   SpO2 99%   BMI 30.50 kg/m²   HEENT: EOMs intact. PERRL.  Oropharynx 75.1 (H) 35.1 - 46.3 fL    Neutrophil Absolute Prelim 2.27 1.50 - 7.70 x10 (3) uL    Neutrophil Absolute 2.27 1.50 - 7.70 x10(3) uL    Lymphocyte Absolute 1.94 1.00 - 4.00 x10(3) uL    Monocyte Absolute 0.23 0.10 - 1.00 x10(3) uL    Eosinophil Absolute 0.0

## 2019-04-29 RX ORDER — LEVOTHYROXINE SODIUM 175 UG/1
TABLET ORAL
Qty: 90 TABLET | Refills: 0 | OUTPATIENT
Start: 2019-04-29

## 2019-04-29 NOTE — TELEPHONE ENCOUNTER
Disp Refills Start End    Levothyroxine Sodium 200 MCG Oral Tab 30 tablet 3 4/6/2019     Sig - Route:  Take 1 tablet (200 mcg total) by mouth before breakfast. - Oral    Sent to pharmacy as: Levothyroxine Sodium 200 MCG Oral Tablet    E-Prescribing Status:

## 2019-05-03 RX ORDER — LEVOTHYROXINE SODIUM 0.03 MG/1
25 TABLET ORAL
Qty: 90 TABLET | Refills: 0 | Status: SHIPPED | OUTPATIENT
Start: 2019-05-03 | End: 2019-05-10 | Stop reason: DRUGHIGH

## 2019-05-03 RX ORDER — HYDROCODONE BITARTRATE AND ACETAMINOPHEN 5; 325 MG/1; MG/1
1 TABLET ORAL EVERY 8 HOURS PRN
Qty: 90 TABLET | Refills: 0 | Status: SHIPPED | OUTPATIENT
Start: 2019-05-03 | End: 2019-06-02

## 2019-05-03 RX ORDER — LEVOTHYROXINE SODIUM 0.2 MG/1
200 TABLET ORAL
Qty: 90 TABLET | Refills: 0 | Status: SHIPPED | OUTPATIENT
Start: 2019-05-03 | End: 2019-05-10 | Stop reason: DRUGHIGH

## 2019-05-03 NOTE — TELEPHONE ENCOUNTER
Pt's spouse called. He states his wife needs a refill for the [facility]othyroxine [facility] and it needs to be a 90 day supply rather than 30 day supply  Pt takes 3 pills a day.     Also pt needs the hydrocodone refill for 90 pills rather than 3

## 2019-05-03 NOTE — TELEPHONE ENCOUNTER
See below. I have sent thyroid rx's for her since she had refills to local pharmacy. Pt asking for 90 tabs vs 30 on her norco.    Last fill 5/6 #30, takes q8hr prn. Are you OK in giving her 80? Rx pended.

## 2019-05-08 ENCOUNTER — OFFICE VISIT (OUTPATIENT)
Dept: HEMATOLOGY/ONCOLOGY | Age: 59
End: 2019-05-08
Attending: INTERNAL MEDICINE
Payer: COMMERCIAL

## 2019-05-08 VITALS
HEART RATE: 80 BPM | SYSTOLIC BLOOD PRESSURE: 94 MMHG | OXYGEN SATURATION: 97 % | DIASTOLIC BLOOD PRESSURE: 52 MMHG | TEMPERATURE: 99 F | RESPIRATION RATE: 18 BRPM | BODY MASS INDEX: 29 KG/M2 | WEIGHT: 179.63 LBS

## 2019-05-08 DIAGNOSIS — Z17.0 MALIGNANT NEOPLASM OF NIPPLE OF RIGHT BREAST IN FEMALE, ESTROGEN RECEPTOR POSITIVE (HCC): Primary | ICD-10-CM

## 2019-05-08 DIAGNOSIS — C50.911 CARCINOMA OF RIGHT BREAST METASTATIC TO PLEURA (HCC): ICD-10-CM

## 2019-05-08 DIAGNOSIS — R19.7 DIARRHEA, UNSPECIFIED TYPE: ICD-10-CM

## 2019-05-08 DIAGNOSIS — R89.9 ABNORMAL LABORATORY TEST: ICD-10-CM

## 2019-05-08 DIAGNOSIS — C79.51 BONE METASTASES (HCC): ICD-10-CM

## 2019-05-08 DIAGNOSIS — D64.81 ANEMIA ASSOCIATED WITH CHEMOTHERAPY: ICD-10-CM

## 2019-05-08 DIAGNOSIS — E03.9 HYPOTHYROIDISM IN ADULT: ICD-10-CM

## 2019-05-08 DIAGNOSIS — T45.1X5A ANEMIA ASSOCIATED WITH CHEMOTHERAPY: ICD-10-CM

## 2019-05-08 DIAGNOSIS — C78.2 CARCINOMA OF RIGHT BREAST METASTATIC TO PLEURA (HCC): ICD-10-CM

## 2019-05-08 DIAGNOSIS — C50.011 MALIGNANT NEOPLASM OF NIPPLE OF RIGHT BREAST IN FEMALE, ESTROGEN RECEPTOR POSITIVE (HCC): Primary | ICD-10-CM

## 2019-05-08 DIAGNOSIS — E78.2 HYPERLIPIDEMIA, MIXED: ICD-10-CM

## 2019-05-08 DIAGNOSIS — C50.919 MALIGNANT NEOPLASM OF FEMALE BREAST (HCC): ICD-10-CM

## 2019-05-08 PROCEDURE — 96402 CHEMO HORMON ANTINEOPL SQ/IM: CPT

## 2019-05-08 PROCEDURE — 99215 OFFICE O/P EST HI 40 MIN: CPT | Performed by: INTERNAL MEDICINE

## 2019-05-08 RX ORDER — LAMOTRIGINE 25 MG/1
500 TABLET ORAL ONCE
Status: CANCELLED | OUTPATIENT
Start: 2019-05-08

## 2019-05-08 RX ORDER — LAMOTRIGINE 25 MG/1
500 TABLET ORAL ONCE
Status: COMPLETED | OUTPATIENT
Start: 2019-05-08 | End: 2019-05-08

## 2019-05-08 RX ADMIN — LAMOTRIGINE 500 MG: 25 TABLET ORAL at 14:26:00

## 2019-05-09 NOTE — PROGRESS NOTES
Cancer Center Progress Note  Patient Name: Maciej Marquez   YOB: 1960   Medical Record Number: [id number]     Attending Physician: Maria M Adam M.D. Date of Visit: 5/8/19      Chief Complaint:  Patient presents with:   Follow - Up 05/2015       Family History:  Family History   Adopted: Yes   Family history unknown: Yes       Social History:  Social History    Socioeconomic History      Marital status:       Spouse name: Not on file      Number of children: Not on file Asked        Exercise: Not Asked        Bike Helmet: Not Asked        Seat Belt: Not Asked        Self-Exams: Not Asked    Social History Narrative      Not on file      Current Medications:    Current Outpatient Medications:   •  Levothyroxine Sodium 200 sign of loose stools, increase oral intake, and contact Physician for further instructions. , Disp: 30 tablet, Rfl: 0  •  Abemaciclib 150 MG Oral Tab, Take 1 tablet (150 mg total) by mouth 2 (two) times daily.  may take with or without food, Disp: 56 tablet, BMI 29.00 kg/m²       Physical Examination:    Constitutional Normal - Mood and affect appropriate. Appears close to chronological age. Well nourished. Well developed. Eyes Normal - Conjunctivae and sclerae are clear and without icterus.  Pupils are react therapy. Continue to monitor. Obesity: the patient was counseled regarding monitoring her intake of high fat and high sugar foods and to increase her exercise. Diarrhea: Unclear etiology.   It is intermittent and appears to be independent of holding

## 2019-05-10 ENCOUNTER — OFFICE VISIT (OUTPATIENT)
Dept: HEMATOLOGY/ONCOLOGY | Age: 59
End: 2019-05-10
Attending: INTERNAL MEDICINE
Payer: COMMERCIAL

## 2019-05-10 VITALS
SYSTOLIC BLOOD PRESSURE: 107 MMHG | RESPIRATION RATE: 18 BRPM | DIASTOLIC BLOOD PRESSURE: 65 MMHG | TEMPERATURE: 99 F | HEART RATE: 89 BPM

## 2019-05-10 DIAGNOSIS — C50.919 MALIGNANT NEOPLASM OF FEMALE BREAST, UNSPECIFIED ESTROGEN RECEPTOR STATUS, UNSPECIFIED LATERALITY, UNSPECIFIED SITE OF BREAST (HCC): Primary | ICD-10-CM

## 2019-05-10 DIAGNOSIS — E03.9 HYPOTHYROIDISM IN ADULT: Primary | ICD-10-CM

## 2019-05-10 DIAGNOSIS — R94.6 ABNORMAL THYROID FUNCTION TEST: ICD-10-CM

## 2019-05-10 PROCEDURE — 36430 TRANSFUSION BLD/BLD COMPNT: CPT

## 2019-05-10 RX ORDER — LEVOTHYROXINE SODIUM 0.2 MG/1
200 TABLET ORAL
Refills: 0 | COMMUNITY
Start: 2019-05-10 | End: 2019-07-17

## 2019-05-10 NOTE — PROGRESS NOTES
Pt here for transfusion.   Arrives Ambulating independently, accompanied by Self           Modifications in dose or schedule: No     Frequency of blood return and site check throughout administration: Prior to administration   Discharged to Home, Ambulating

## 2019-06-03 RX ORDER — OMEGA-3-ACID ETHYL ESTERS 1 G/1
CAPSULE, LIQUID FILLED ORAL
Qty: 360 CAPSULE | Refills: 0 | Status: SHIPPED | OUTPATIENT
Start: 2019-06-03 | End: 2020-01-13

## 2019-06-06 ENCOUNTER — OFFICE VISIT (OUTPATIENT)
Dept: HEMATOLOGY/ONCOLOGY | Age: 59
End: 2019-06-06
Attending: INTERNAL MEDICINE
Payer: COMMERCIAL

## 2019-06-06 VITALS
WEIGHT: 172.13 LBS | RESPIRATION RATE: 18 BRPM | HEART RATE: 74 BPM | BODY MASS INDEX: 28 KG/M2 | OXYGEN SATURATION: 97 % | DIASTOLIC BLOOD PRESSURE: 61 MMHG | SYSTOLIC BLOOD PRESSURE: 121 MMHG | TEMPERATURE: 100 F

## 2019-06-06 DIAGNOSIS — C50.911 CARCINOMA OF RIGHT BREAST METASTATIC TO PLEURA (HCC): ICD-10-CM

## 2019-06-06 DIAGNOSIS — C50.011 MALIGNANT NEOPLASM OF NIPPLE OF RIGHT BREAST IN FEMALE, ESTROGEN RECEPTOR POSITIVE (HCC): Primary | ICD-10-CM

## 2019-06-06 DIAGNOSIS — Z17.0 MALIGNANT NEOPLASM OF NIPPLE OF RIGHT BREAST IN FEMALE, ESTROGEN RECEPTOR POSITIVE (HCC): Primary | ICD-10-CM

## 2019-06-06 DIAGNOSIS — C78.2 CARCINOMA OF RIGHT BREAST METASTATIC TO PLEURA (HCC): ICD-10-CM

## 2019-06-06 DIAGNOSIS — E66.09 CLASS 2 OBESITY DUE TO EXCESS CALORIES WITHOUT SERIOUS COMORBIDITY IN ADULT, UNSPECIFIED BMI: ICD-10-CM

## 2019-06-06 DIAGNOSIS — D64.81 ANEMIA ASSOCIATED WITH CHEMOTHERAPY: ICD-10-CM

## 2019-06-06 DIAGNOSIS — T45.1X5A ANEMIA ASSOCIATED WITH CHEMOTHERAPY: ICD-10-CM

## 2019-06-06 DIAGNOSIS — C79.51 BONE METASTASES (HCC): ICD-10-CM

## 2019-06-06 PROCEDURE — 96402 CHEMO HORMON ANTINEOPL SQ/IM: CPT

## 2019-06-06 PROCEDURE — 99214 OFFICE O/P EST MOD 30 MIN: CPT | Performed by: INTERNAL MEDICINE

## 2019-06-06 RX ORDER — LAMOTRIGINE 25 MG/1
500 TABLET ORAL ONCE
Status: CANCELLED | OUTPATIENT
Start: 2019-06-06

## 2019-06-06 RX ORDER — LAMOTRIGINE 25 MG/1
500 TABLET ORAL ONCE
Status: COMPLETED | OUTPATIENT
Start: 2019-06-06 | End: 2019-06-06

## 2019-06-06 RX ADMIN — LAMOTRIGINE 500 MG: 25 TABLET ORAL at 15:29:00

## 2019-06-07 ENCOUNTER — TELEPHONE (OUTPATIENT)
Dept: HEMATOLOGY/ONCOLOGY | Facility: HOSPITAL | Age: 59
End: 2019-06-07

## 2019-06-07 NOTE — TELEPHONE ENCOUNTER
Received call from [place] at [address] for [employer] sent to [address] [place] [address] Dr Abreu yesterday. They are not contracted with pt's insurance. RX was transferred to [address] [place] [address] recommending add'l copy by faxed to [facility]. RX generated and faxed.   Copy in

## 2019-06-13 ENCOUNTER — TELEPHONE (OUTPATIENT)
Dept: HEMATOLOGY/ONCOLOGY | Facility: HOSPITAL | Age: 59
End: 2019-06-13

## 2019-06-13 NOTE — TELEPHONE ENCOUNTER
Pt spouse calling, have not received Verzenio or a phone call from [facility]. States pharmacy needs additional info. RN called [facility]- Rx needs clarification. Reviewed with Dr. Ivon Avery, pt to be taking Verzenio 100mg twice a day.  Rx clarific

## 2019-06-20 DIAGNOSIS — C50.911 CARCINOMA OF RIGHT BREAST METASTATIC TO PLEURA (HCC): ICD-10-CM

## 2019-06-20 DIAGNOSIS — Z17.0 MALIGNANT NEOPLASM OF NIPPLE OF RIGHT BREAST IN FEMALE, ESTROGEN RECEPTOR POSITIVE: ICD-10-CM

## 2019-06-20 DIAGNOSIS — C50.011 MALIGNANT NEOPLASM OF NIPPLE OF RIGHT BREAST IN FEMALE, ESTROGEN RECEPTOR POSITIVE: ICD-10-CM

## 2019-06-20 DIAGNOSIS — C78.2 CARCINOMA OF RIGHT BREAST METASTATIC TO PLEURA (HCC): ICD-10-CM

## 2019-06-20 DIAGNOSIS — C79.51 BONE METASTASES: ICD-10-CM

## 2019-06-20 RX ORDER — PROCHLORPERAZINE MALEATE 10 MG
10 TABLET ORAL EVERY 6 HOURS PRN
Qty: 30 TABLET | Refills: 3 | Status: SHIPPED | OUTPATIENT
Start: 2019-06-20 | End: 2019-07-01 | Stop reason: ALTCHOICE

## 2019-06-20 RX ORDER — DIPHENOXYLATE HYDROCHLORIDE AND ATROPINE SULFATE 2.5; .025 MG/1; MG/1
1-2 TABLET ORAL 4 TIMES DAILY PRN
Qty: 30 TABLET | Refills: 0 | Status: SHIPPED | OUTPATIENT
Start: 2019-06-20 | End: 2019-06-23

## 2019-06-20 RX ORDER — LOPERAMIDE HYDROCHLORIDE 2 MG/1
2 TABLET ORAL AS NEEDED
Qty: 30 TABLET | Refills: 0 | Status: SHIPPED | OUTPATIENT
Start: 2019-06-20 | End: 2019-07-01 | Stop reason: ALTCHOICE

## 2019-06-20 RX ORDER — POTASSIUM CHLORIDE 1500 MG/1
TABLET, EXTENDED RELEASE ORAL
Qty: 30 TABLET | Refills: 0 | Status: SHIPPED | OUTPATIENT
Start: 2019-06-20 | End: 2019-09-12 | Stop reason: ALTCHOICE

## 2019-06-20 RX ORDER — ONDANSETRON HYDROCHLORIDE 8 MG/1
8 TABLET, FILM COATED ORAL EVERY 8 HOURS PRN
Qty: 30 TABLET | Refills: 3 | Status: SHIPPED | OUTPATIENT
Start: 2019-06-20 | End: 2019-07-01 | Stop reason: ALTCHOICE

## 2019-06-22 ENCOUNTER — APPOINTMENT (OUTPATIENT)
Dept: LAB | Age: 59
End: 2019-06-22
Attending: FAMILY MEDICINE
Payer: COMMERCIAL

## 2019-06-22 DIAGNOSIS — R94.6 ABNORMAL THYROID FUNCTION TEST: ICD-10-CM

## 2019-06-22 DIAGNOSIS — E03.9 HYPOTHYROIDISM IN ADULT: ICD-10-CM

## 2019-06-22 PROCEDURE — 84439 ASSAY OF FREE THYROXINE: CPT

## 2019-06-22 PROCEDURE — 36415 COLL VENOUS BLD VENIPUNCTURE: CPT

## 2019-06-22 PROCEDURE — 84443 ASSAY THYROID STIM HORMONE: CPT

## 2019-06-24 RX ORDER — DIPHENOXYLATE HYDROCHLORIDE AND ATROPINE SULFATE 2.5; .025 MG/1; MG/1
TABLET ORAL
Qty: 30 TABLET | Refills: 0 | Status: SHIPPED
Start: 2019-06-24 | End: 2019-07-22

## 2019-07-01 ENCOUNTER — OFFICE VISIT (OUTPATIENT)
Dept: FAMILY MEDICINE CLINIC | Facility: CLINIC | Age: 59
End: 2019-07-01

## 2019-07-01 VITALS
BODY MASS INDEX: 26.84 KG/M2 | TEMPERATURE: 97 F | DIASTOLIC BLOOD PRESSURE: 72 MMHG | SYSTOLIC BLOOD PRESSURE: 116 MMHG | HEART RATE: 69 BPM | HEIGHT: 66 IN | WEIGHT: 167 LBS | OXYGEN SATURATION: 98 % | RESPIRATION RATE: 18 BRPM

## 2019-07-01 DIAGNOSIS — C50.011 MALIGNANT NEOPLASM OF NIPPLE OF RIGHT BREAST IN FEMALE, ESTROGEN RECEPTOR POSITIVE (HCC): ICD-10-CM

## 2019-07-01 DIAGNOSIS — E78.2 HYPERLIPIDEMIA, MIXED: Primary | ICD-10-CM

## 2019-07-01 DIAGNOSIS — C78.2 CARCINOMA OF RIGHT BREAST METASTATIC TO PLEURA (HCC): ICD-10-CM

## 2019-07-01 DIAGNOSIS — E03.9 HYPOTHYROIDISM IN ADULT: ICD-10-CM

## 2019-07-01 DIAGNOSIS — G62.9 PERIPHERAL POLYNEUROPATHY: ICD-10-CM

## 2019-07-01 DIAGNOSIS — R60.0 EDEMA OF BOTH FEET: ICD-10-CM

## 2019-07-01 DIAGNOSIS — Z17.0 MALIGNANT NEOPLASM OF NIPPLE OF RIGHT BREAST IN FEMALE, ESTROGEN RECEPTOR POSITIVE (HCC): ICD-10-CM

## 2019-07-01 DIAGNOSIS — C50.911 CARCINOMA OF RIGHT BREAST METASTATIC TO PLEURA (HCC): ICD-10-CM

## 2019-07-01 DIAGNOSIS — H92.01 OTALGIA, RIGHT EAR: ICD-10-CM

## 2019-07-01 DIAGNOSIS — R79.89 ELEVATED LIVER FUNCTION TESTS: ICD-10-CM

## 2019-07-01 DIAGNOSIS — C79.51 BONE METASTASES (HCC): ICD-10-CM

## 2019-07-01 DIAGNOSIS — R01.1 HEART MURMUR: ICD-10-CM

## 2019-07-01 PROCEDURE — 87101 SKIN FUNGI CULTURE: CPT | Performed by: PODIATRIST

## 2019-07-01 PROCEDURE — 88312 SPECIAL STAINS GROUP 1: CPT | Performed by: PODIATRIST

## 2019-07-01 PROCEDURE — 99215 OFFICE O/P EST HI 40 MIN: CPT | Performed by: FAMILY MEDICINE

## 2019-07-01 PROCEDURE — 88304 TISSUE EXAM BY PATHOLOGIST: CPT | Performed by: PODIATRIST

## 2019-07-01 RX ORDER — HYDROCODONE BITARTRATE AND ACETAMINOPHEN 7.5; 325 MG/1; MG/1
1 TABLET ORAL EVERY 8 HOURS PRN
Qty: 30 TABLET | Refills: 0 | Status: SHIPPED | OUTPATIENT
Start: 2019-08-01 | End: 2019-07-03

## 2019-07-01 RX ORDER — NEOMYCIN SULFATE, POLYMYXIN B SULFATE AND HYDROCORTISONE 10; 3.5; 1 MG/ML; MG/ML; [USP'U]/ML
4 SUSPENSION/ DROPS AURICULAR (OTIC) 3 TIMES DAILY
Qty: 10 ML | Refills: 0 | Status: SHIPPED | OUTPATIENT
Start: 2019-07-01 | End: 2019-10-07 | Stop reason: ALTCHOICE

## 2019-07-01 RX ORDER — HYDROCODONE BITARTRATE AND ACETAMINOPHEN 7.5; 325 MG/1; MG/1
1 TABLET ORAL EVERY 8 HOURS PRN
Qty: 30 TABLET | Refills: 0 | Status: SHIPPED | OUTPATIENT
Start: 2019-07-01 | End: 2019-07-03

## 2019-07-01 RX ORDER — ALPRAZOLAM 0.25 MG/1
TABLET ORAL
Qty: 20 TABLET | Refills: 0 | Status: SHIPPED | OUTPATIENT
Start: 2019-07-01 | End: 2019-11-29

## 2019-07-01 RX ORDER — HYDROCODONE BITARTRATE AND ACETAMINOPHEN 7.5; 325 MG/1; MG/1
1 TABLET ORAL EVERY 8 HOURS PRN
Qty: 30 TABLET | Refills: 0 | Status: SHIPPED | OUTPATIENT
Start: 2019-09-01 | End: 2019-07-03

## 2019-07-01 RX ORDER — HYDROCODONE BITARTRATE AND ACETAMINOPHEN 7.5; 325 MG/1; MG/1
1 TABLET ORAL EVERY 8 HOURS PRN
Qty: 90 TABLET | Refills: 0 | Status: SHIPPED | OUTPATIENT
Start: 2019-07-01 | End: 2019-07-03

## 2019-07-01 RX ORDER — ESCITALOPRAM OXALATE 20 MG/1
20 TABLET ORAL
Qty: 90 TABLET | Refills: 0 | Status: SHIPPED | OUTPATIENT
Start: 2019-07-01 | End: 2019-10-09

## 2019-07-01 RX ORDER — HYDROCODONE BITARTRATE AND ACETAMINOPHEN 7.5; 325 MG/1; MG/1
1 TABLET ORAL EVERY 8 HOURS PRN
Qty: 90 TABLET | Refills: 0 | Status: SHIPPED | OUTPATIENT
Start: 2019-08-01 | End: 2019-07-03

## 2019-07-01 RX ORDER — ROSUVASTATIN CALCIUM 5 MG/1
TABLET, COATED ORAL
Qty: 90 TABLET | Refills: 0 | OUTPATIENT
Start: 2019-07-01

## 2019-07-01 RX ORDER — ROSUVASTATIN CALCIUM 5 MG/1
TABLET, COATED ORAL
Qty: 90 TABLET | Refills: 1 | Status: SHIPPED | OUTPATIENT
Start: 2019-07-01 | End: 2020-01-13

## 2019-07-01 RX ORDER — HYDROCODONE BITARTRATE AND ACETAMINOPHEN 7.5; 325 MG/1; MG/1
1 TABLET ORAL EVERY 8 HOURS PRN
Qty: 90 TABLET | Refills: 0 | Status: SHIPPED | OUTPATIENT
Start: 2019-09-01 | End: 2019-07-03

## 2019-07-01 NOTE — PROGRESS NOTES
Jose Grande is a 66year old female. cc neuropathy, right hip pain, breast cancer, hypercholesterolemia, elevated liver function test, hypothyroidism, anxiety  HPI:   Patient is coming to the office for follow-up of multiple medical problems.   Patient has m 20 mg that helps. .  She is in a lot of pain also. Will increase her escitalopram to 20 mg daily. Patient is using alprazolam only sporadically. Would like refill of the medication.     Breast cancer with metastasis to the pleura and bones continue hydro HYDROcodone-acetaminophen 7.5-325 MG Oral Tab Take 1 tablet by mouth every 8 (eight) hours as needed for Pain.  Disp: 90 tablet Rfl: 0   DIPHENOXYLATE-ATROPINE 2.5-0.025 MG Oral Tab TAKE 1-2 TABLETS BY MOUTH 4 TIMES DAILY AS NEEDED FOR DIARRHEA Disp: 30 tab the flexion. Psych -More anxious more down. Will increase medication.       EXAM:   /72 (BP Location: Left arm, Patient Position: Sitting, Cuff Size: adult)   Pulse 69   Temp 97.1 °F (36.2 °C) (Oral)   Resp 18   Ht 66\"   Wt 167 lb   SpO2 98%   [place] Oral Tab 20 tablet 0     Sig: Take one to two tablets by mouth as directed prior to testing. • HYDROcodone-acetaminophen 7.5-325 MG Oral Tab 30 tablet 0     Sig: Take 1 tablet by mouth every 8 (eight) hours as needed for Pain.    • HYDROcodone-acetaminoph

## 2019-07-02 ENCOUNTER — LAB REQUISITION (OUTPATIENT)
Dept: LAB | Facility: HOSPITAL | Age: 59
End: 2019-07-02
Payer: COMMERCIAL

## 2019-07-02 DIAGNOSIS — B35.1 TINEA UNGUIUM: ICD-10-CM

## 2019-07-03 ENCOUNTER — OFFICE VISIT (OUTPATIENT)
Dept: HEMATOLOGY/ONCOLOGY | Age: 59
End: 2019-07-03
Attending: INTERNAL MEDICINE
Payer: COMMERCIAL

## 2019-07-03 VITALS
TEMPERATURE: 99 F | WEIGHT: 168.81 LBS | OXYGEN SATURATION: 97 % | HEART RATE: 85 BPM | DIASTOLIC BLOOD PRESSURE: 62 MMHG | SYSTOLIC BLOOD PRESSURE: 132 MMHG | HEIGHT: 65.98 IN | RESPIRATION RATE: 18 BRPM | BODY MASS INDEX: 27.13 KG/M2

## 2019-07-03 DIAGNOSIS — C78.2 CARCINOMA OF RIGHT BREAST METASTATIC TO PLEURA (HCC): ICD-10-CM

## 2019-07-03 DIAGNOSIS — C79.51 BONE METASTASES (HCC): ICD-10-CM

## 2019-07-03 DIAGNOSIS — C50.911 CARCINOMA OF RIGHT BREAST METASTATIC TO PLEURA (HCC): ICD-10-CM

## 2019-07-03 DIAGNOSIS — C50.011 MALIGNANT NEOPLASM OF NIPPLE OF RIGHT BREAST IN FEMALE, ESTROGEN RECEPTOR POSITIVE (HCC): Primary | ICD-10-CM

## 2019-07-03 DIAGNOSIS — Z17.0 MALIGNANT NEOPLASM OF NIPPLE OF RIGHT BREAST IN FEMALE, ESTROGEN RECEPTOR POSITIVE (HCC): Primary | ICD-10-CM

## 2019-07-03 LAB
ALBUMIN SERPL-MCNC: 3.4 G/DL (ref 3.4–5)
ALBUMIN/GLOB SERPL: 0.9 {RATIO} (ref 1–2)
ALP LIVER SERPL-CCNC: 186 U/L (ref 46–118)
ALT SERPL-CCNC: 49 U/L (ref 13–56)
ANION GAP SERPL CALC-SCNC: 5 MMOL/L (ref 0–18)
AST SERPL-CCNC: 50 U/L (ref 15–37)
BASOPHILS # BLD AUTO: 0.04 X10(3) UL (ref 0–0.2)
BASOPHILS NFR BLD AUTO: 0.9 %
BILIRUB SERPL-MCNC: 0.5 MG/DL (ref 0.1–2)
BUN BLD-MCNC: 10 MG/DL (ref 7–18)
BUN/CREAT SERPL: 13.5 (ref 10–20)
CALCIUM BLD-MCNC: 9.2 MG/DL (ref 8.5–10.1)
CHLORIDE SERPL-SCNC: 111 MMOL/L (ref 98–112)
CO2 SERPL-SCNC: 29 MMOL/L (ref 21–32)
CREAT BLD-MCNC: 0.74 MG/DL (ref 0.55–1.02)
DEPRECATED RDW RBC AUTO: 58 FL (ref 35.1–46.3)
EOSINOPHIL # BLD AUTO: 0.15 X10(3) UL (ref 0–0.7)
EOSINOPHIL NFR BLD AUTO: 3.2 %
ERYTHROCYTE [DISTWIDTH] IN BLOOD BY AUTOMATED COUNT: 15.2 % (ref 11–15)
GLOBULIN PLAS-MCNC: 3.6 G/DL (ref 2.8–4.4)
GLUCOSE BLD-MCNC: 122 MG/DL (ref 70–99)
HCT VFR BLD AUTO: 28.6 % (ref 35–48)
HGB BLD-MCNC: 8.7 G/DL (ref 12–16)
IMM GRANULOCYTES # BLD AUTO: 0.02 X10(3) UL (ref 0–1)
IMM GRANULOCYTES NFR BLD: 0.4 %
LYMPHOCYTES # BLD AUTO: 2.46 X10(3) UL (ref 1–4)
LYMPHOCYTES NFR BLD AUTO: 52.6 %
M PROTEIN MFR SERPL ELPH: 7 G/DL (ref 6.4–8.2)
MCH RBC QN AUTO: 31.9 PG (ref 26–34)
MCHC RBC AUTO-ENTMCNC: 30.4 G/DL (ref 31–37)
MCV RBC AUTO: 104.8 FL (ref 80–100)
MONOCYTES # BLD AUTO: 0.14 X10(3) UL (ref 0.1–1)
MONOCYTES NFR BLD AUTO: 3 %
NEUTROPHILS # BLD AUTO: 1.87 X10 (3) UL (ref 1.5–7.7)
NEUTROPHILS # BLD AUTO: 1.87 X10(3) UL (ref 1.5–7.7)
NEUTROPHILS NFR BLD AUTO: 39.9 %
OSMOLALITY SERPL CALC.SUM OF ELEC: 300 MOSM/KG (ref 275–295)
PLATELET # BLD AUTO: 122 10(3)UL (ref 150–450)
POTASSIUM SERPL-SCNC: 3.5 MMOL/L (ref 3.5–5.1)
RBC # BLD AUTO: 2.73 X10(6)UL (ref 3.8–5.3)
SODIUM SERPL-SCNC: 145 MMOL/L (ref 136–145)
WBC # BLD AUTO: 4.7 X10(3) UL (ref 4–11)

## 2019-07-03 PROCEDURE — 99214 OFFICE O/P EST MOD 30 MIN: CPT | Performed by: NURSE PRACTITIONER

## 2019-07-03 PROCEDURE — 36415 COLL VENOUS BLD VENIPUNCTURE: CPT

## 2019-07-03 PROCEDURE — 96402 CHEMO HORMON ANTINEOPL SQ/IM: CPT

## 2019-07-03 RX ORDER — ESCITALOPRAM OXALATE 20 MG/1
TABLET ORAL
Qty: 90 TABLET | Refills: 0 | OUTPATIENT
Start: 2019-07-03

## 2019-07-03 RX ORDER — LAMOTRIGINE 25 MG/1
500 TABLET ORAL ONCE
Status: CANCELLED | OUTPATIENT
Start: 2019-07-03

## 2019-07-03 RX ORDER — LAMOTRIGINE 25 MG/1
500 TABLET ORAL ONCE
Status: COMPLETED | OUTPATIENT
Start: 2019-07-03 | End: 2019-07-03

## 2019-07-03 RX ADMIN — LAMOTRIGINE 500 MG: 25 TABLET ORAL at 15:33:00

## 2019-07-03 NOTE — PROGRESS NOTES
ANP Visit Note    Patient Name: Theresa Angela   YOB: 1960   Medical Record Number: [id number]   CSN: [id number]   Date of visit: 7/3/2019       Chief Complaint/Reason for Visit:  Follow up/ Metastatic Breast Cancer     Oncologic History:  [place] Malignant neoplasm of female breast ([address]     Dental infection     Dental abscess     Periapical abscess     Submandibular gland swelling     Myalgia     Osteomyelitis of jaw     Hypertriglyceridemia     Edema of both feet     Burning sensation of feet Stress: Not on file    Relationships      Social connections:        Talks on phone: Not on file        Gets together: Not on file        Attends Mandaen service: Not on file        Active member of club or organization: Not on file        Attends meetin DAY, Disp: 360 capsule, Rfl: 0  •  Levothyroxine Sodium 200 MCG Oral Tab, Take 1 tablet (200 mcg total) by mouth before breakfast., Disp: , Rfl: 0  •  pregabalin 75 MG Oral Cap, Take 2 capsules (150 mg total) by mouth 2 (two) times daily. , Disp: 360 capsul Specimen:    Nail, Nail for PAS stain                                                                 Final Diagnosis:                              Date: 07/01/2019  Value:               Status: Final problems.     Planned Follow Up: 7/31/19 MD labs chemo     Risk Level: HIGH Metastatic Breast     Electronically Signed by:    Alysa Alvarado FNP-BC  Nurse Practitioner  THE [facility]

## 2019-07-18 RX ORDER — LEVOTHYROXINE SODIUM 0.2 MG/1
TABLET ORAL
Qty: 90 TABLET | Refills: 0 | Status: SHIPPED | OUTPATIENT
Start: 2019-07-18 | End: 2019-09-15

## 2019-07-18 NOTE — TELEPHONE ENCOUNTER
Maura Blunt L-THYROXINE (SYNTHROID) TABS 200MCG    Thyroid Supplements Protocol Failed    Her last Thyroid labs was 06/22/2019. Please see pended medications. Please sign if appropriate.       Thank you

## 2019-07-22 ENCOUNTER — TELEPHONE (OUTPATIENT)
Dept: HEMATOLOGY/ONCOLOGY | Facility: HOSPITAL | Age: 59
End: 2019-07-22

## 2019-07-22 RX ORDER — DIPHENOXYLATE HYDROCHLORIDE AND ATROPINE SULFATE 2.5; .025 MG/1; MG/1
1 TABLET ORAL 4 TIMES DAILY
Qty: 120 TABLET | Refills: 0 | Status: SHIPPED
Start: 2019-07-22 | End: 2019-08-29

## 2019-07-22 NOTE — TELEPHONE ENCOUNTER
called asking for refill on Lomotil.  states she takes it 4x/day, so need to increase script for her. She has been out since last week.

## 2019-08-02 ENCOUNTER — OFFICE VISIT (OUTPATIENT)
Dept: HEMATOLOGY/ONCOLOGY | Age: 59
End: 2019-08-02
Attending: INTERNAL MEDICINE
Payer: COMMERCIAL

## 2019-08-02 VITALS
OXYGEN SATURATION: 97 % | WEIGHT: 167.31 LBS | SYSTOLIC BLOOD PRESSURE: 130 MMHG | HEART RATE: 73 BPM | TEMPERATURE: 98 F | RESPIRATION RATE: 18 BRPM | DIASTOLIC BLOOD PRESSURE: 64 MMHG | BODY MASS INDEX: 27 KG/M2

## 2019-08-02 DIAGNOSIS — C78.2 CARCINOMA OF RIGHT BREAST METASTATIC TO PLEURA (HCC): ICD-10-CM

## 2019-08-02 DIAGNOSIS — C50.011 MALIGNANT NEOPLASM OF NIPPLE OF RIGHT BREAST IN FEMALE, ESTROGEN RECEPTOR POSITIVE (HCC): Primary | ICD-10-CM

## 2019-08-02 DIAGNOSIS — Z17.0 MALIGNANT NEOPLASM OF NIPPLE OF RIGHT BREAST IN FEMALE, ESTROGEN RECEPTOR POSITIVE (HCC): Primary | ICD-10-CM

## 2019-08-02 DIAGNOSIS — C79.51 BONE METASTASES (HCC): ICD-10-CM

## 2019-08-02 DIAGNOSIS — C50.911 CARCINOMA OF RIGHT BREAST METASTATIC TO PLEURA (HCC): ICD-10-CM

## 2019-08-02 PROCEDURE — 36415 COLL VENOUS BLD VENIPUNCTURE: CPT

## 2019-08-02 PROCEDURE — 96402 CHEMO HORMON ANTINEOPL SQ/IM: CPT

## 2019-08-02 RX ORDER — LAMOTRIGINE 25 MG/1
500 TABLET ORAL ONCE
Status: COMPLETED | OUTPATIENT
Start: 2019-08-02 | End: 2019-08-02

## 2019-08-02 RX ADMIN — LAMOTRIGINE 500 MG: 25 TABLET ORAL at 15:09:00

## 2019-08-03 RX ORDER — ESCITALOPRAM OXALATE 20 MG/1
TABLET ORAL
Qty: 30 TABLET | Refills: 0 | Status: CANCELLED | OUTPATIENT
Start: 2019-08-03

## 2019-08-05 RX ORDER — ESCITALOPRAM OXALATE 20 MG/1
TABLET ORAL
Qty: 30 TABLET | Refills: 0 | OUTPATIENT
Start: 2019-08-05

## 2019-08-05 NOTE — TELEPHONE ENCOUNTER
Not protocol medication. LOV :7/01/19 med check/anxiety discussed. Last labs done :5/8/19  Next appointment :10/07/19  Please see pending medication. Refill if appropriate.    Last refill:    Date:7/01/19  Amount :90 tablets no refills  Medication: escit

## 2019-08-29 ENCOUNTER — OFFICE VISIT (OUTPATIENT)
Dept: HEMATOLOGY/ONCOLOGY | Age: 59
End: 2019-08-29
Attending: INTERNAL MEDICINE
Payer: COMMERCIAL

## 2019-08-29 VITALS
DIASTOLIC BLOOD PRESSURE: 54 MMHG | OXYGEN SATURATION: 97 % | BODY MASS INDEX: 27 KG/M2 | HEART RATE: 75 BPM | RESPIRATION RATE: 18 BRPM | WEIGHT: 169.31 LBS | SYSTOLIC BLOOD PRESSURE: 129 MMHG | TEMPERATURE: 99 F

## 2019-08-29 DIAGNOSIS — C79.51 BONE METASTASES (HCC): ICD-10-CM

## 2019-08-29 DIAGNOSIS — L27.0 DRUG RASH: ICD-10-CM

## 2019-08-29 DIAGNOSIS — D64.81 ANEMIA ASSOCIATED WITH CHEMOTHERAPY: ICD-10-CM

## 2019-08-29 DIAGNOSIS — C50.911 CARCINOMA OF RIGHT BREAST METASTATIC TO PLEURA (HCC): ICD-10-CM

## 2019-08-29 DIAGNOSIS — R79.89 ELEVATED LFTS: ICD-10-CM

## 2019-08-29 DIAGNOSIS — C50.011 MALIGNANT NEOPLASM OF NIPPLE OF RIGHT BREAST IN FEMALE, ESTROGEN RECEPTOR POSITIVE (HCC): Primary | ICD-10-CM

## 2019-08-29 DIAGNOSIS — Z17.0 MALIGNANT NEOPLASM OF NIPPLE OF RIGHT BREAST IN FEMALE, ESTROGEN RECEPTOR POSITIVE (HCC): Primary | ICD-10-CM

## 2019-08-29 DIAGNOSIS — T45.1X5A ANEMIA ASSOCIATED WITH CHEMOTHERAPY: ICD-10-CM

## 2019-08-29 DIAGNOSIS — E87.6 HYPOKALEMIA: ICD-10-CM

## 2019-08-29 DIAGNOSIS — C78.2 CARCINOMA OF RIGHT BREAST METASTATIC TO PLEURA (HCC): ICD-10-CM

## 2019-08-29 LAB
ALBUMIN SERPL-MCNC: 3.1 G/DL (ref 3.4–5)
ALBUMIN/GLOB SERPL: 0.9 {RATIO} (ref 1–2)
ALP LIVER SERPL-CCNC: 143 U/L (ref 46–118)
ALT SERPL-CCNC: 19 U/L (ref 13–56)
ANION GAP SERPL CALC-SCNC: 4 MMOL/L (ref 0–18)
AST SERPL-CCNC: 19 U/L (ref 15–37)
BASOPHILS # BLD AUTO: 0.05 X10(3) UL (ref 0–0.2)
BASOPHILS NFR BLD AUTO: 1.2 %
BILIRUB SERPL-MCNC: 0.4 MG/DL (ref 0.1–2)
BUN BLD-MCNC: 15 MG/DL (ref 7–18)
BUN/CREAT SERPL: 23.4 (ref 10–20)
CALCIUM BLD-MCNC: 8.7 MG/DL (ref 8.5–10.1)
CHLORIDE SERPL-SCNC: 110 MMOL/L (ref 98–112)
CO2 SERPL-SCNC: 30 MMOL/L (ref 21–32)
CREAT BLD-MCNC: 0.64 MG/DL (ref 0.55–1.02)
DEPRECATED RDW RBC AUTO: 76.2 FL (ref 35.1–46.3)
EOSINOPHIL # BLD AUTO: 0.04 X10(3) UL (ref 0–0.7)
EOSINOPHIL NFR BLD AUTO: 1 %
ERYTHROCYTE [DISTWIDTH] IN BLOOD BY AUTOMATED COUNT: 20.3 % (ref 11–15)
GLOBULIN PLAS-MCNC: 3.6 G/DL (ref 2.8–4.4)
GLUCOSE BLD-MCNC: 106 MG/DL (ref 70–99)
HCT VFR BLD AUTO: 25.6 % (ref 35–48)
HGB BLD-MCNC: 8.5 G/DL (ref 12–16)
IMM GRANULOCYTES # BLD AUTO: 0.01 X10(3) UL (ref 0–1)
IMM GRANULOCYTES NFR BLD: 0.2 %
LYMPHOCYTES # BLD AUTO: 2.3 X10(3) UL (ref 1–4)
LYMPHOCYTES NFR BLD AUTO: 56.7 %
M PROTEIN MFR SERPL ELPH: 6.7 G/DL (ref 6.4–8.2)
MCH RBC QN AUTO: 34.1 PG (ref 26–34)
MCHC RBC AUTO-ENTMCNC: 33.2 G/DL (ref 31–37)
MCV RBC AUTO: 102.8 FL (ref 80–100)
MONOCYTES # BLD AUTO: 0.16 X10(3) UL (ref 0.1–1)
MONOCYTES NFR BLD AUTO: 3.9 %
NEUTROPHILS # BLD AUTO: 1.5 X10 (3) UL (ref 1.5–7.7)
NEUTROPHILS # BLD AUTO: 1.5 X10(3) UL (ref 1.5–7.7)
NEUTROPHILS NFR BLD AUTO: 37 %
OSMOLALITY SERPL CALC.SUM OF ELEC: 299 MOSM/KG (ref 275–295)
PLATELET # BLD AUTO: 130 10(3)UL (ref 150–450)
PLATELET MORPHOLOGY: NORMAL
POTASSIUM SERPL-SCNC: 2.9 MMOL/L (ref 3.5–5.1)
RBC # BLD AUTO: 2.49 X10(6)UL (ref 3.8–5.3)
SODIUM SERPL-SCNC: 144 MMOL/L (ref 136–145)
WBC # BLD AUTO: 4.1 X10(3) UL (ref 4–11)

## 2019-08-29 PROCEDURE — 99215 OFFICE O/P EST HI 40 MIN: CPT | Performed by: INTERNAL MEDICINE

## 2019-08-29 PROCEDURE — 96402 CHEMO HORMON ANTINEOPL SQ/IM: CPT

## 2019-08-29 RX ORDER — POTASSIUM CHLORIDE 20 MEQ/1
20 TABLET, EXTENDED RELEASE ORAL 2 TIMES DAILY
Qty: 30 TABLET | Refills: 1 | Status: SHIPPED | OUTPATIENT
Start: 2019-08-29 | End: 2019-10-07 | Stop reason: ALTCHOICE

## 2019-08-29 RX ORDER — LAMOTRIGINE 25 MG/1
500 TABLET ORAL ONCE
Status: COMPLETED | OUTPATIENT
Start: 2019-08-29 | End: 2019-08-29

## 2019-08-29 RX ORDER — LAMOTRIGINE 25 MG/1
500 TABLET ORAL ONCE
Status: CANCELLED | OUTPATIENT
Start: 2019-08-29

## 2019-08-29 RX ORDER — DIPHENOXYLATE HYDROCHLORIDE AND ATROPINE SULFATE 2.5; .025 MG/1; MG/1
1 TABLET ORAL 4 TIMES DAILY
Qty: 360 TABLET | Refills: 1 | Status: SHIPPED | OUTPATIENT
Start: 2019-08-29 | End: 2020-10-05 | Stop reason: ALTCHOICE

## 2019-08-29 RX ORDER — PREGABALIN 75 MG/1
150 CAPSULE ORAL 2 TIMES DAILY
Qty: 270 CAPSULE | Refills: 1 | Status: SHIPPED | OUTPATIENT
Start: 2019-08-29 | End: 2020-02-06

## 2019-08-29 RX ADMIN — LAMOTRIGINE 500 MG: 25 TABLET ORAL at 16:00:00

## 2019-08-29 NOTE — PROGRESS NOTES
Cancer Center Progress Note  Patient Name: Jeyson Wall   YOB: 1960   Medical Record Number: [id number]     Attending Physician: Olesya Galeano M.D. Date of Visit: 8/29/2019    Chief Complaint:  Patient presents with:   Follow - Up Benita Purdy, Neno Erickson MD at [address] [place] [address]  05/2015       Family History:  Family History   Adopted: Yes   Family history unknown: Yes       Social History:  Social History    Socioeconomic History      Marital status:       Spou Concern: Not Asked        Special Diet: Not Asked        Back Care: Not Asked        Exercise: Not Asked        Bike Helmet: Not Asked        Seat Belt: Not Asked        Self-Exams: Not Asked    Social History Narrative      Not on file      Current Medica difficulties. No diplopia. No yellowing. Hematologic/Lymphatic No easy bruising or bleeding. Denies tender or palpable lymph nodes. Respiratory No dyspnea, pleuritic chest pain, cough or hemoptysis.    Cardiovascular No anginal chest pain, palpitations NEPRELIM 1.50   WBC 4.1   .0 L     Recent Labs     08/29/19  1453    H   BUN 15   CREATSERUM 0.64   GFRAA 115   GFRNAA 99   CA 8.7   ALB 3.1 L      K 2.9 LL      CO2 30.0   ALKPHO 143 H   AST 19   ALT 19   BILT 0.4   TP 6.7

## 2019-08-29 NOTE — PROGRESS NOTES
Education Record    Learner:  Patient    Disease / Diagnosis: Breast cancer    Barriers / Limitations:  None   Comments:    Method:  Brief focused   Comments:    General Topics:  Plan of care reviewed   Comments:    Outcome:  Shows understanding   Comments

## 2019-09-06 ENCOUNTER — HOSPITAL ENCOUNTER (OUTPATIENT)
Dept: CT IMAGING | Age: 59
Discharge: HOME | End: 2019-09-06
Attending: INTERNAL MEDICINE
Payer: COMMERCIAL

## 2019-09-06 DIAGNOSIS — C78.2 CARCINOMA OF RIGHT BREAST METASTATIC TO PLEURA (HCC): ICD-10-CM

## 2019-09-06 DIAGNOSIS — C50.911 CARCINOMA OF RIGHT BREAST METASTATIC TO PLEURA (HCC): ICD-10-CM

## 2019-09-06 DIAGNOSIS — C79.51 BONE METASTASES (HCC): ICD-10-CM

## 2019-09-06 DIAGNOSIS — Z17.0 MALIGNANT NEOPLASM OF NIPPLE OF RIGHT BREAST IN FEMALE, ESTROGEN RECEPTOR POSITIVE (HCC): ICD-10-CM

## 2019-09-06 DIAGNOSIS — C50.011 MALIGNANT NEOPLASM OF NIPPLE OF RIGHT BREAST IN FEMALE, ESTROGEN RECEPTOR POSITIVE (HCC): ICD-10-CM

## 2019-09-06 PROCEDURE — 74176 CT ABD & PELVIS W/O CONTRAST: CPT | Performed by: INTERNAL MEDICINE

## 2019-09-06 PROCEDURE — 71250 CT THORAX DX C-: CPT | Performed by: INTERNAL MEDICINE

## 2019-09-11 RX ORDER — ABEMACICLIB 100 MG/1
TABLET ORAL
Qty: 56 TABLET | Refills: 13 | Status: SHIPPED | OUTPATIENT
Start: 2019-09-11 | End: 2020-08-10

## 2019-09-12 ENCOUNTER — OFFICE VISIT (OUTPATIENT)
Dept: HEMATOLOGY/ONCOLOGY | Age: 59
End: 2019-09-12
Attending: INTERNAL MEDICINE
Payer: COMMERCIAL

## 2019-09-12 VITALS
TEMPERATURE: 98 F | HEART RATE: 72 BPM | SYSTOLIC BLOOD PRESSURE: 108 MMHG | WEIGHT: 163.5 LBS | DIASTOLIC BLOOD PRESSURE: 53 MMHG | OXYGEN SATURATION: 97 % | BODY MASS INDEX: 26 KG/M2 | RESPIRATION RATE: 18 BRPM

## 2019-09-12 DIAGNOSIS — C78.7 METASTASIS TO LIVER (HCC): ICD-10-CM

## 2019-09-12 DIAGNOSIS — C50.011 MALIGNANT NEOPLASM OF NIPPLE OF RIGHT BREAST IN FEMALE, ESTROGEN RECEPTOR POSITIVE (HCC): Primary | ICD-10-CM

## 2019-09-12 DIAGNOSIS — C79.51 BONE METASTASES (HCC): ICD-10-CM

## 2019-09-12 DIAGNOSIS — E87.6 HYPOKALEMIA: ICD-10-CM

## 2019-09-12 DIAGNOSIS — Z17.0 MALIGNANT NEOPLASM OF NIPPLE OF RIGHT BREAST IN FEMALE, ESTROGEN RECEPTOR POSITIVE (HCC): Primary | ICD-10-CM

## 2019-09-12 LAB
ALBUMIN SERPL-MCNC: 3.5 G/DL (ref 3.4–5)
ALBUMIN/GLOB SERPL: 0.9 {RATIO} (ref 1–2)
ALP LIVER SERPL-CCNC: 176 U/L (ref 46–118)
ALT SERPL-CCNC: 42 U/L (ref 13–56)
ANION GAP SERPL CALC-SCNC: 7 MMOL/L (ref 0–18)
AST SERPL-CCNC: 48 U/L (ref 15–37)
BILIRUB SERPL-MCNC: 0.5 MG/DL (ref 0.1–2)
BUN BLD-MCNC: 12 MG/DL (ref 7–18)
BUN/CREAT SERPL: 16.2 (ref 10–20)
CALCIUM BLD-MCNC: 8.8 MG/DL (ref 8.5–10.1)
CHLORIDE SERPL-SCNC: 105 MMOL/L (ref 98–112)
CO2 SERPL-SCNC: 29 MMOL/L (ref 21–32)
CREAT BLD-MCNC: 0.74 MG/DL (ref 0.55–1.02)
GLOBULIN PLAS-MCNC: 3.7 G/DL (ref 2.8–4.4)
GLUCOSE BLD-MCNC: 123 MG/DL (ref 70–99)
M PROTEIN MFR SERPL ELPH: 7.2 G/DL (ref 6.4–8.2)
OSMOLALITY SERPL CALC.SUM OF ELEC: 293 MOSM/KG (ref 275–295)
POTASSIUM SERPL-SCNC: 3.4 MMOL/L (ref 3.5–5.1)
SODIUM SERPL-SCNC: 141 MMOL/L (ref 136–145)

## 2019-09-12 PROCEDURE — 99214 OFFICE O/P EST MOD 30 MIN: CPT | Performed by: INTERNAL MEDICINE

## 2019-09-12 RX ORDER — DIPHENHYDRAMINE HCL 25 MG
25 CAPSULE ORAL EVERY 6 HOURS PRN
COMMUNITY
End: 2019-11-29 | Stop reason: ALTCHOICE

## 2019-09-12 NOTE — PROGRESS NOTES
Cancer Center Progress Note  Patient Name: Lacey Mccoy   YOB: 1960   Medical Record Number: [id number]     Attending Physician: Vitor Mcduffie M.D. Date of Visit: 9/12/2019    Chief Complaint:  No chief complaint on file.        Onc Marital status:       Spouse name: Not on file      Number of children: Not on file      Years of education: Not on file      Highest education level: Not on file    Occupational History      Not on file    Social Needs      Financial resource strai Not on file      Current Medications:    Current Outpatient Medications:   •  VERZENIO 100 MG Oral Tab, TAKE 1 TABLET TWICE A DAY WITH OR WITHOUT FOOD, Disp: 56 tablet, Rfl: 13  •  Potassium Chloride ER 20 MEQ Oral Tab CR, Take 1 tablet (20 mEq total) by m orthopnea. Gastrointestinal No nausea, vomiting, diarrhea, GI bleeding, or constipation. NL appetite, No Early Satiety. Genitorurinary No hematuria, dysuria, abnormal bleeding. Integumentary No yellowing.    Neurologic No headache, blurred vision, and ER+ VA+ HER-2 Neg. She was transitioned to Faslodex and Abemiciclib. Rash improved after holding the Abemaciclib. CT is stable to improved. Hold for another 2 weeks and then resume at a dose reduction. Bone Metastases: Stable.   Discontinued [employer]

## 2019-09-16 RX ORDER — LEVOTHYROXINE SODIUM 0.2 MG/1
200 TABLET ORAL
Qty: 90 TABLET | Refills: 0 | Status: SHIPPED | OUTPATIENT
Start: 2019-09-16 | End: 2019-10-07

## 2019-09-26 ENCOUNTER — OFFICE VISIT (OUTPATIENT)
Dept: HEMATOLOGY/ONCOLOGY | Age: 59
End: 2019-09-26
Attending: INTERNAL MEDICINE
Payer: COMMERCIAL

## 2019-09-26 VITALS
WEIGHT: 164.88 LBS | DIASTOLIC BLOOD PRESSURE: 62 MMHG | HEART RATE: 87 BPM | RESPIRATION RATE: 18 BRPM | SYSTOLIC BLOOD PRESSURE: 116 MMHG | BODY MASS INDEX: 27 KG/M2 | OXYGEN SATURATION: 96 % | TEMPERATURE: 98 F

## 2019-09-26 DIAGNOSIS — C78.2 CARCINOMA OF RIGHT BREAST METASTATIC TO PLEURA (HCC): ICD-10-CM

## 2019-09-26 DIAGNOSIS — D63.0 ANEMIA COMPLICATING NEOPLASTIC DISEASE: ICD-10-CM

## 2019-09-26 DIAGNOSIS — L27.0 DRUG RASH: ICD-10-CM

## 2019-09-26 DIAGNOSIS — C79.51 BONE METASTASES (HCC): ICD-10-CM

## 2019-09-26 DIAGNOSIS — C78.2 CARCINOMA OF RIGHT BREAST METASTATIC TO PLEURA (HCC): Primary | ICD-10-CM

## 2019-09-26 DIAGNOSIS — C78.7 LIVER METASTASES (HCC): ICD-10-CM

## 2019-09-26 DIAGNOSIS — C50.011 MALIGNANT NEOPLASM OF NIPPLE OF RIGHT BREAST IN FEMALE, ESTROGEN RECEPTOR POSITIVE (HCC): ICD-10-CM

## 2019-09-26 DIAGNOSIS — C50.911 CARCINOMA OF RIGHT BREAST METASTATIC TO PLEURA (HCC): Primary | ICD-10-CM

## 2019-09-26 DIAGNOSIS — C50.911 CARCINOMA OF RIGHT BREAST METASTATIC TO PLEURA (HCC): ICD-10-CM

## 2019-09-26 DIAGNOSIS — C50.011 MALIGNANT NEOPLASM OF NIPPLE OF RIGHT BREAST IN FEMALE, ESTROGEN RECEPTOR POSITIVE (HCC): Primary | ICD-10-CM

## 2019-09-26 DIAGNOSIS — Z17.0 MALIGNANT NEOPLASM OF NIPPLE OF RIGHT BREAST IN FEMALE, ESTROGEN RECEPTOR POSITIVE (HCC): Primary | ICD-10-CM

## 2019-09-26 DIAGNOSIS — Z17.0 MALIGNANT NEOPLASM OF NIPPLE OF RIGHT BREAST IN FEMALE, ESTROGEN RECEPTOR POSITIVE (HCC): ICD-10-CM

## 2019-09-26 LAB
ALBUMIN SERPL-MCNC: 3.5 G/DL (ref 3.4–5)
ALBUMIN/GLOB SERPL: 0.9 {RATIO} (ref 1–2)
ALP LIVER SERPL-CCNC: 177 U/L (ref 46–118)
ALT SERPL-CCNC: 34 U/L (ref 13–56)
ANION GAP SERPL CALC-SCNC: 8 MMOL/L (ref 0–18)
AST SERPL-CCNC: 32 U/L (ref 15–37)
BASOPHILS # BLD AUTO: 0.13 X10(3) UL (ref 0–0.2)
BASOPHILS NFR BLD AUTO: 1.7 %
BILIRUB SERPL-MCNC: 0.3 MG/DL (ref 0.1–2)
BUN BLD-MCNC: 10 MG/DL (ref 7–18)
BUN/CREAT SERPL: 13.7 (ref 10–20)
CALCIUM BLD-MCNC: 9.2 MG/DL (ref 8.5–10.1)
CHLORIDE SERPL-SCNC: 107 MMOL/L (ref 98–112)
CO2 SERPL-SCNC: 28 MMOL/L (ref 21–32)
CREAT BLD-MCNC: 0.73 MG/DL (ref 0.55–1.02)
DEPRECATED RDW RBC AUTO: 64.6 FL (ref 35.1–46.3)
EOSINOPHIL # BLD AUTO: 0.28 X10(3) UL (ref 0–0.7)
EOSINOPHIL NFR BLD AUTO: 3.7 %
ERYTHROCYTE [DISTWIDTH] IN BLOOD BY AUTOMATED COUNT: 16.6 % (ref 11–15)
GLOBULIN PLAS-MCNC: 3.7 G/DL (ref 2.8–4.4)
GLUCOSE BLD-MCNC: 106 MG/DL (ref 70–99)
HCT VFR BLD AUTO: 31.5 % (ref 35–48)
HGB BLD-MCNC: 10 G/DL (ref 12–16)
IMM GRANULOCYTES # BLD AUTO: 0.05 X10(3) UL (ref 0–1)
IMM GRANULOCYTES NFR BLD: 0.7 %
LYMPHOCYTES # BLD AUTO: 2.86 X10(3) UL (ref 1–4)
LYMPHOCYTES NFR BLD AUTO: 37.4 %
M PROTEIN MFR SERPL ELPH: 7.2 G/DL (ref 6.4–8.2)
MCH RBC QN AUTO: 33.4 PG (ref 26–34)
MCHC RBC AUTO-ENTMCNC: 31.7 G/DL (ref 31–37)
MCV RBC AUTO: 105.4 FL (ref 80–100)
MONOCYTES # BLD AUTO: 0.68 X10(3) UL (ref 0.1–1)
MONOCYTES NFR BLD AUTO: 8.9 %
NEUTROPHILS # BLD AUTO: 3.65 X10 (3) UL (ref 1.5–7.7)
NEUTROPHILS # BLD AUTO: 3.65 X10(3) UL (ref 1.5–7.7)
NEUTROPHILS NFR BLD AUTO: 47.6 %
OSMOLALITY SERPL CALC.SUM OF ELEC: 295 MOSM/KG (ref 275–295)
PLATELET # BLD AUTO: 208 10(3)UL (ref 150–450)
POTASSIUM SERPL-SCNC: 3.7 MMOL/L (ref 3.5–5.1)
RBC # BLD AUTO: 2.99 X10(6)UL (ref 3.8–5.3)
SODIUM SERPL-SCNC: 143 MMOL/L (ref 136–145)
WBC # BLD AUTO: 7.7 X10(3) UL (ref 4–11)

## 2019-09-26 PROCEDURE — 99214 OFFICE O/P EST MOD 30 MIN: CPT | Performed by: INTERNAL MEDICINE

## 2019-09-26 PROCEDURE — 96402 CHEMO HORMON ANTINEOPL SQ/IM: CPT

## 2019-09-26 RX ORDER — LAMOTRIGINE 25 MG/1
500 TABLET ORAL ONCE
Status: COMPLETED | OUTPATIENT
Start: 2019-09-26 | End: 2019-09-26

## 2019-09-26 RX ORDER — LAMOTRIGINE 25 MG/1
500 TABLET ORAL ONCE
Status: CANCELLED | OUTPATIENT
Start: 2019-09-26

## 2019-09-26 RX ADMIN — LAMOTRIGINE 500 MG: 25 TABLET ORAL at 15:48:00

## 2019-09-26 NOTE — PROGRESS NOTES
Cancer Center Progress Note  Patient Name: Dianne Abrams   YOB: 1960   Medical Record Number: [id number]     Attending Physician: Pari Santiago M.D. Date of Visit: 9/26/2019      Chief Complaint:  Patient presents with:   Follow - Up name: Not on file      Number of children: Not on file      Years of education: Not on file      Highest education level: Not on file    Occupational History      Not on file    Social Needs      Financial resource strain: Not on file      Food insecurity: Medications:    Current Outpatient Medications:   •  Levothyroxine Sodium 200 MCG Oral Tab, Take 1 tablet (200 mcg total) by mouth before breakfast., Disp: 90 tablet, Rfl: 0  •  diphenhydrAMINE HCl 25 MG Oral Cap, Take 25 mg by mouth every 6 (six) hours as Gastrointestinal No nausea, vomiting, diarrhea, GI bleeding, or constipation. NL appetite. Genitorurinary  No hematuria, dysuria, abnormal bleeding, or incontinence.    Integumentary No yellowing of the skin   Neurologic No headache, blurred vision, and teeth issues. Elevated LFT: Secondary to mets. Improved. Continue to monitor. Diarrhea: resolved. Likely secondary to Abemaciclib. Anemia: Secondary to chemotherapy. Rash: Likely a drug eruption. Nearly resolved.   Trial of reintroducing th

## 2019-10-05 ENCOUNTER — APPOINTMENT (OUTPATIENT)
Dept: LAB | Age: 59
End: 2019-10-05
Attending: FAMILY MEDICINE
Payer: COMMERCIAL

## 2019-10-05 DIAGNOSIS — E03.9 HYPOTHYROIDISM IN ADULT: ICD-10-CM

## 2019-10-05 DIAGNOSIS — E78.2 HYPERLIPIDEMIA, MIXED: ICD-10-CM

## 2019-10-05 PROCEDURE — 84439 ASSAY OF FREE THYROXINE: CPT

## 2019-10-05 PROCEDURE — 36415 COLL VENOUS BLD VENIPUNCTURE: CPT

## 2019-10-05 PROCEDURE — 84443 ASSAY THYROID STIM HORMONE: CPT

## 2019-10-05 PROCEDURE — 80053 COMPREHEN METABOLIC PANEL: CPT

## 2019-10-05 PROCEDURE — 80061 LIPID PANEL: CPT

## 2019-10-07 ENCOUNTER — OFFICE VISIT (OUTPATIENT)
Dept: FAMILY MEDICINE CLINIC | Facility: CLINIC | Age: 59
End: 2019-10-07

## 2019-10-07 VITALS
TEMPERATURE: 98 F | OXYGEN SATURATION: 97 % | SYSTOLIC BLOOD PRESSURE: 102 MMHG | DIASTOLIC BLOOD PRESSURE: 54 MMHG | WEIGHT: 166 LBS | HEART RATE: 71 BPM | RESPIRATION RATE: 16 BRPM | BODY MASS INDEX: 26.68 KG/M2 | HEIGHT: 66 IN

## 2019-10-07 DIAGNOSIS — R79.89 ELEVATED LIVER FUNCTION TESTS: ICD-10-CM

## 2019-10-07 DIAGNOSIS — C50.011 MALIGNANT NEOPLASM OF NIPPLE OF RIGHT BREAST IN FEMALE, ESTROGEN RECEPTOR POSITIVE (HCC): ICD-10-CM

## 2019-10-07 DIAGNOSIS — C79.51 BONE METASTASES (HCC): ICD-10-CM

## 2019-10-07 DIAGNOSIS — G62.9 PERIPHERAL POLYNEUROPATHY: ICD-10-CM

## 2019-10-07 DIAGNOSIS — F41.9 ANXIETY: ICD-10-CM

## 2019-10-07 DIAGNOSIS — C50.911 CARCINOMA OF RIGHT BREAST METASTATIC TO PLEURA (HCC): ICD-10-CM

## 2019-10-07 DIAGNOSIS — C78.2 CARCINOMA OF RIGHT BREAST METASTATIC TO PLEURA (HCC): ICD-10-CM

## 2019-10-07 DIAGNOSIS — E03.9 HYPOTHYROIDISM IN ADULT: ICD-10-CM

## 2019-10-07 DIAGNOSIS — E78.00 PURE HYPERCHOLESTEROLEMIA: Primary | ICD-10-CM

## 2019-10-07 DIAGNOSIS — Z17.0 MALIGNANT NEOPLASM OF NIPPLE OF RIGHT BREAST IN FEMALE, ESTROGEN RECEPTOR POSITIVE (HCC): ICD-10-CM

## 2019-10-07 PROCEDURE — 99214 OFFICE O/P EST MOD 30 MIN: CPT | Performed by: FAMILY MEDICINE

## 2019-10-07 RX ORDER — LEVOTHYROXINE SODIUM 175 UG/1
175 TABLET ORAL
Qty: 90 TABLET | Refills: 0 | Status: SHIPPED | OUTPATIENT
Start: 2019-10-07 | End: 2019-11-20

## 2019-10-07 RX ORDER — BUPROPION HYDROCHLORIDE 150 MG/1
TABLET, EXTENDED RELEASE ORAL
Qty: 60 TABLET | Refills: 0 | Status: SHIPPED | OUTPATIENT
Start: 2019-10-07 | End: 2019-11-03

## 2019-10-07 RX ORDER — HYDROCODONE BITARTRATE AND ACETAMINOPHEN 7.5; 325 MG/1; MG/1
1 TABLET ORAL EVERY 8 HOURS PRN
Qty: 90 TABLET | Refills: 0 | Status: SHIPPED | OUTPATIENT
Start: 2019-12-08 | End: 2019-10-24 | Stop reason: ALTCHOICE

## 2019-10-07 RX ORDER — HYDROCODONE BITARTRATE AND ACETAMINOPHEN 7.5; 325 MG/1; MG/1
1 TABLET ORAL EVERY 8 HOURS PRN
Qty: 90 TABLET | Refills: 0 | Status: SHIPPED | OUTPATIENT
Start: 2019-11-07 | End: 2019-10-24 | Stop reason: ALTCHOICE

## 2019-10-07 RX ORDER — ALPRAZOLAM 0.25 MG/1
0.25 TABLET ORAL EVERY 6 HOURS PRN
Qty: 30 TABLET | Refills: 1 | Status: SHIPPED | OUTPATIENT
Start: 2019-10-07 | End: 2020-01-13

## 2019-10-07 RX ORDER — HYDROCODONE BITARTRATE AND ACETAMINOPHEN 7.5; 325 MG/1; MG/1
1 TABLET ORAL EVERY 8 HOURS PRN
Qty: 90 TABLET | Refills: 0 | Status: SHIPPED | OUTPATIENT
Start: 2019-10-07 | End: 2019-10-24 | Stop reason: ALTCHOICE

## 2019-10-07 NOTE — PROGRESS NOTES
Christiano Wilkerson is a 64year old female. cc neuropathy, right hip pain, breast cancer, hypercholesterolemia, elevated liver function test, hypothyroidism, anxiety  HPI:   Patient is coming to the office for follow-up of multiple medical problems.   Patient has is in a lot of pain also. Will increase her escitalopram to 20 mg daily. Patient is using alprazolam more frequently like now after work. She has a kids living with her it helps.     Breast ca with metastasis to the pleura and bones continue hydrocodone daily. Disp: 90 tablet Rfl: 0   ALPRAZolam 0.25 MG Oral Tab Take one to two tablets by mouth as directed prior to testing.  Disp: 20 tablet Rfl: 0   OMEGA-3-ACID ETHYL ESTERS 1 g Oral Cap TAKE 2 CAPSULES TWICE A DAY Disp: 360 capsule Rfl: 0   hydrocodone-ac patient has only tenderness at the umbilical hernia,   EXTREMITIES: , + 1 edema bilateral lower extremities. Musculoskeletal-bilateral lower extremities,   Psychiatric - alert  and oriented x3, normal mood patient is here with her .     Results fo Disp Refills   • Levothyroxine Sodium 175 MCG Oral Tab 90 tablet 0     Sig: Take 1 tablet (175 mcg total) by mouth before breakfast.   • HYDROcodone-acetaminophen 7.5-325 MG Oral Tab 90 tablet 0     Sig: Take 1 tablet by mouth every 8 (eight) hours as need

## 2019-10-07 NOTE — PATIENT INSTRUCTIONS
Continue using Norco as needed for pain. Alprazolam 0.25 mg only as needed for stress/anxiety. Start Wellbutrin 1 tablet daily for 3 days then 1 tablet twice a day looking cessation. Decrease levothyroxine to 175 mcg daily. Healthy diet.   Continue othe

## 2019-10-10 RX ORDER — ESCITALOPRAM OXALATE 20 MG/1
TABLET ORAL
Qty: 90 TABLET | Refills: 1 | Status: SHIPPED | OUTPATIENT
Start: 2019-10-10 | End: 2020-09-14

## 2019-10-24 ENCOUNTER — OFFICE VISIT (OUTPATIENT)
Dept: HEMATOLOGY/ONCOLOGY | Age: 59
End: 2019-10-24
Attending: INTERNAL MEDICINE
Payer: COMMERCIAL

## 2019-10-24 VITALS
RESPIRATION RATE: 18 BRPM | OXYGEN SATURATION: 98 % | SYSTOLIC BLOOD PRESSURE: 112 MMHG | HEART RATE: 68 BPM | BODY MASS INDEX: 27.1 KG/M2 | DIASTOLIC BLOOD PRESSURE: 61 MMHG | TEMPERATURE: 98 F | WEIGHT: 168.63 LBS | HEIGHT: 65.98 IN

## 2019-10-24 DIAGNOSIS — C50.011 MALIGNANT NEOPLASM OF NIPPLE OF RIGHT BREAST IN FEMALE, ESTROGEN RECEPTOR POSITIVE (HCC): Primary | ICD-10-CM

## 2019-10-24 DIAGNOSIS — C79.51 BONE METASTASES (HCC): ICD-10-CM

## 2019-10-24 DIAGNOSIS — Z17.0 MALIGNANT NEOPLASM OF NIPPLE OF RIGHT BREAST IN FEMALE, ESTROGEN RECEPTOR POSITIVE (HCC): Primary | ICD-10-CM

## 2019-10-24 DIAGNOSIS — C50.011 MALIGNANT NEOPLASM OF NIPPLE OF RIGHT BREAST IN FEMALE, ESTROGEN RECEPTOR POSITIVE (HCC): ICD-10-CM

## 2019-10-24 DIAGNOSIS — R79.89 ELEVATED LFTS: Primary | ICD-10-CM

## 2019-10-24 DIAGNOSIS — C50.911 CARCINOMA OF RIGHT BREAST METASTATIC TO PLEURA (HCC): ICD-10-CM

## 2019-10-24 DIAGNOSIS — C78.2 CARCINOMA OF RIGHT BREAST METASTATIC TO PLEURA (HCC): ICD-10-CM

## 2019-10-24 DIAGNOSIS — Z17.0 MALIGNANT NEOPLASM OF NIPPLE OF RIGHT BREAST IN FEMALE, ESTROGEN RECEPTOR POSITIVE (HCC): ICD-10-CM

## 2019-10-24 DIAGNOSIS — L27.0 RASH, DRUG: ICD-10-CM

## 2019-10-24 PROCEDURE — 96402 CHEMO HORMON ANTINEOPL SQ/IM: CPT

## 2019-10-24 PROCEDURE — 99214 OFFICE O/P EST MOD 30 MIN: CPT | Performed by: INTERNAL MEDICINE

## 2019-10-24 RX ORDER — LAMOTRIGINE 25 MG/1
500 TABLET ORAL ONCE
Status: COMPLETED | OUTPATIENT
Start: 2019-10-24 | End: 2019-10-24

## 2019-10-24 RX ORDER — LAMOTRIGINE 25 MG/1
500 TABLET ORAL ONCE
Status: CANCELLED | OUTPATIENT
Start: 2019-10-24

## 2019-10-24 RX ADMIN — LAMOTRIGINE 500 MG: 25 TABLET ORAL at 15:44:00

## 2019-10-30 NOTE — PROGRESS NOTES
Cancer Center Progress Note  Patient Name: Heidi Mcrae   YOB: 1960   Medical Record Number: [id number]     Attending Physician: Dayami Patel M.D. Date of Visit: 10/24/19      Chief Complaint:  Patient presents with:   Follow - U Spouse name: Not on file      Number of children: Not on file      Years of education: Not on file      Highest education level: Not on file    Occupational History      Not on file    Social Needs      Financial resource strain: Not on file      Food inse Medications:    Current Outpatient Medications:   •  ESCITALOPRAM 20 MG Oral Tab, TAKE 1 TABLET DAILY, Disp: 90 tablet, Rfl: 1  •  Levothyroxine Sodium 175 MCG Oral Tab, Take 1 tablet (175 mcg total) by mouth before breakfast., Disp: 90 tablet, Rfl: 0  • Gastrointestinal No nausea, vomiting, diarrhea, GI bleeding, or constipation. Genitorurinary  No hematuria, dysuria, or incontinence. No abnormal bleeding.    Integumentary No rashes or yellowing of the skin   Neurologic No headache, blurred vision, and Dioxide, Total Latest Ref Range: 21.0 - 32.0 mmol/L 28.0   BUN Latest Ref Range: 7 - 18 mg/dL 10   CREATININE Latest Ref Range: 0.55 - 1.02 mg/dL 0.80   CALCIUM Latest Ref Range: 8.5 - 10.1 mg/dL 8.9   BUN/CREAT Ratio Latest Ref Range: 10.0 - 20.0  12.5 Monocytes % Latest Units: % 5.4   Eosinophils % Latest Units: % 3.1   Basophils % Latest Units: % 1.5   Immature Granulocyte % Latest Units: % 0.2       Radiology:    Pathology:    Impression and Plan:  Breast Cancer: Stage IV, ER+ AZ+ HER-2 Neg.   She wa

## 2019-11-06 RX ORDER — BUPROPION HYDROCHLORIDE 150 MG/1
TABLET, EXTENDED RELEASE ORAL
Qty: 180 TABLET | Refills: 0 | Status: SHIPPED | OUTPATIENT
Start: 2019-11-06 | End: 2020-02-11

## 2019-11-06 NOTE — TELEPHONE ENCOUNTER
BUPROPION SR 150MG TABLETS (12 H)    Non protocol medication. Please see pended medications. Please sign if appropriate. Thank you    Her last OV was on 10/07/2019.

## 2019-11-20 ENCOUNTER — HOSPITAL ENCOUNTER (OUTPATIENT)
Facility: HOSPITAL | Age: 59
Setting detail: OBSERVATION
Discharge: HOME | End: 2019-11-23
Attending: EMERGENCY MEDICINE | Admitting: HOSPITALIST
Payer: COMMERCIAL

## 2019-11-20 ENCOUNTER — APPOINTMENT (OUTPATIENT)
Dept: GENERAL RADIOLOGY | Facility: HOSPITAL | Age: 59
End: 2019-11-20
Attending: EMERGENCY MEDICINE
Payer: COMMERCIAL

## 2019-11-20 DIAGNOSIS — R42 DIZZINESS: ICD-10-CM

## 2019-11-20 DIAGNOSIS — J18.9 COMMUNITY ACQUIRED PNEUMONIA OF RIGHT LOWER LOBE OF LUNG: Primary | ICD-10-CM

## 2019-11-20 DIAGNOSIS — C50.919 MALIGNANT NEOPLASM OF FEMALE BREAST, UNSPECIFIED ESTROGEN RECEPTOR STATUS, UNSPECIFIED LATERALITY, UNSPECIFIED SITE OF BREAST (HCC): ICD-10-CM

## 2019-11-20 DIAGNOSIS — R09.02 HYPOXIA: ICD-10-CM

## 2019-11-20 DIAGNOSIS — Z91.81 AT RISK FOR FALLING: ICD-10-CM

## 2019-11-20 DIAGNOSIS — D50.9 IRON DEFICIENCY ANEMIA, UNSPECIFIED IRON DEFICIENCY ANEMIA TYPE: ICD-10-CM

## 2019-11-20 PROCEDURE — 71046 X-RAY EXAM CHEST 2 VIEWS: CPT | Performed by: EMERGENCY MEDICINE

## 2019-11-20 RX ORDER — ALBUTEROL SULFATE 2.5 MG/3ML
2.5 SOLUTION RESPIRATORY (INHALATION) ONCE
Status: COMPLETED | OUTPATIENT
Start: 2019-11-20 | End: 2019-11-20

## 2019-11-20 RX ORDER — LEVOTHYROXINE SODIUM 175 UG/1
175 TABLET ORAL
Qty: 90 TABLET | Refills: 0 | Status: SHIPPED | OUTPATIENT
Start: 2019-11-20 | End: 2019-12-05

## 2019-11-20 RX ORDER — LAMOTRIGINE 25 MG/1
500 TABLET ORAL ONCE
Status: CANCELLED | OUTPATIENT
Start: 2019-11-20

## 2019-11-20 NOTE — TELEPHONE ENCOUNTER
Levothyroxine Sodium 175 MCG Oral Tab    The pt last Thyroid test was done on 10/05/2019. Her TSH was 0.009. Please see pended medications. Please sign if appropriate. Thank you    Her last OV was on 10/017/2019.

## 2019-11-21 ENCOUNTER — APPOINTMENT (OUTPATIENT)
Dept: HEMATOLOGY/ONCOLOGY | Age: 59
End: 2019-11-21
Attending: INTERNAL MEDICINE
Payer: COMMERCIAL

## 2019-11-21 ENCOUNTER — APPOINTMENT (OUTPATIENT)
Dept: MRI IMAGING | Facility: HOSPITAL | Age: 59
End: 2019-11-21
Attending: INTERNAL MEDICINE
Payer: COMMERCIAL

## 2019-11-21 PROBLEM — C50.919 METASTATIC BREAST CANCER: Status: ACTIVE | Noted: 2019-11-21

## 2019-11-21 PROBLEM — C50.919 MALIGNANT NEOPLASM OF FEMALE BREAST, UNSPECIFIED ESTROGEN RECEPTOR STATUS, UNSPECIFIED LATERALITY, UNSPECIFIED SITE OF BREAST (HCC): Status: ACTIVE | Noted: 2019-11-21

## 2019-11-21 PROBLEM — E86.0 DEHYDRATION: Status: ACTIVE | Noted: 2019-11-21

## 2019-11-21 PROBLEM — C50.919 METASTATIC BREAST CANCER (HCC): Status: ACTIVE | Noted: 2019-11-21

## 2019-11-21 PROBLEM — R42 DIZZINESS: Status: ACTIVE | Noted: 2019-11-21

## 2019-11-21 PROBLEM — J18.9 COMMUNITY ACQUIRED PNEUMONIA OF RIGHT LOWER LOBE OF LUNG: Status: ACTIVE | Noted: 2019-11-21

## 2019-11-21 PROBLEM — R09.02 HYPOXIA: Status: ACTIVE | Noted: 2019-11-21

## 2019-11-21 PROBLEM — D61.818 PANCYTOPENIA (HCC): Status: ACTIVE | Noted: 2019-11-21

## 2019-11-21 PROCEDURE — 70553 MRI BRAIN STEM W/O & W/DYE: CPT | Performed by: INTERNAL MEDICINE

## 2019-11-21 PROCEDURE — 99244 OFF/OP CNSLTJ NEW/EST MOD 40: CPT | Performed by: INTERNAL MEDICINE

## 2019-11-21 PROCEDURE — 99220 INITIAL OBSERVATION CARE,LEVL III: CPT | Performed by: HOSPITALIST

## 2019-11-21 RX ORDER — POTASSIUM CHLORIDE 20 MEQ/1
40 TABLET, EXTENDED RELEASE ORAL EVERY 4 HOURS
Status: COMPLETED | OUTPATIENT
Start: 2019-11-21 | End: 2019-11-21

## 2019-11-21 RX ORDER — BUPROPION HYDROCHLORIDE 150 MG/1
150 TABLET, EXTENDED RELEASE ORAL 2 TIMES DAILY
Status: DISCONTINUED | OUTPATIENT
Start: 2019-11-21 | End: 2019-11-23

## 2019-11-21 RX ORDER — ESCITALOPRAM OXALATE 20 MG/1
20 TABLET ORAL
Status: DISCONTINUED | OUTPATIENT
Start: 2019-11-21 | End: 2019-11-23

## 2019-11-21 RX ORDER — ACETAMINOPHEN 325 MG/1
650 TABLET ORAL EVERY 6 HOURS PRN
Status: DISCONTINUED | OUTPATIENT
Start: 2019-11-21 | End: 2019-11-23

## 2019-11-21 RX ORDER — DOCUSATE SODIUM 100 MG/1
100 CAPSULE, LIQUID FILLED ORAL 2 TIMES DAILY
Status: DISCONTINUED | OUTPATIENT
Start: 2019-11-21 | End: 2019-11-23

## 2019-11-21 RX ORDER — ALPRAZOLAM 0.25 MG/1
0.25 TABLET ORAL EVERY 6 HOURS PRN
Status: DISCONTINUED | OUTPATIENT
Start: 2019-11-21 | End: 2019-11-23

## 2019-11-21 RX ORDER — POLYETHYLENE GLYCOL 3350 17 G/17G
17 POWDER, FOR SOLUTION ORAL DAILY PRN
Status: DISCONTINUED | OUTPATIENT
Start: 2019-11-21 | End: 2019-11-23

## 2019-11-21 RX ORDER — DIPHENOXYLATE HYDROCHLORIDE AND ATROPINE SULFATE 2.5; .025 MG/1; MG/1
1 TABLET ORAL 4 TIMES DAILY
Status: DISCONTINUED | OUTPATIENT
Start: 2019-11-21 | End: 2019-11-23

## 2019-11-21 RX ORDER — BISACODYL 10 MG
10 SUPPOSITORY, RECTAL RECTAL
Status: DISCONTINUED | OUTPATIENT
Start: 2019-11-21 | End: 2019-11-23

## 2019-11-21 RX ORDER — SODIUM PHOSPHATE, DIBASIC AND SODIUM PHOSPHATE, MONOBASIC 7; 19 G/133ML; G/133ML
1 ENEMA RECTAL ONCE AS NEEDED
Status: DISCONTINUED | OUTPATIENT
Start: 2019-11-21 | End: 2019-11-23

## 2019-11-21 RX ORDER — POTASSIUM CHLORIDE 1.5 G/1.77G
40 POWDER, FOR SOLUTION ORAL EVERY 4 HOURS
Status: COMPLETED | OUTPATIENT
Start: 2019-11-21 | End: 2019-11-21

## 2019-11-21 RX ORDER — METOCLOPRAMIDE HYDROCHLORIDE 5 MG/ML
10 INJECTION INTRAMUSCULAR; INTRAVENOUS EVERY 8 HOURS PRN
Status: DISCONTINUED | OUTPATIENT
Start: 2019-11-21 | End: 2019-11-23

## 2019-11-21 RX ORDER — ONDANSETRON 2 MG/ML
4 INJECTION INTRAMUSCULAR; INTRAVENOUS EVERY 6 HOURS PRN
Status: DISCONTINUED | OUTPATIENT
Start: 2019-11-21 | End: 2019-11-23

## 2019-11-21 RX ORDER — ENOXAPARIN SODIUM 100 MG/ML
40 INJECTION SUBCUTANEOUS DAILY
Status: DISCONTINUED | OUTPATIENT
Start: 2019-11-21 | End: 2019-11-23

## 2019-11-21 RX ORDER — ROSUVASTATIN CALCIUM 5 MG/1
5 TABLET, COATED ORAL NIGHTLY
Status: DISCONTINUED | OUTPATIENT
Start: 2019-11-21 | End: 2019-11-23

## 2019-11-21 RX ORDER — PREGABALIN 75 MG/1
150 CAPSULE ORAL 2 TIMES DAILY
Status: DISCONTINUED | OUTPATIENT
Start: 2019-11-21 | End: 2019-11-23

## 2019-11-21 NOTE — PROGRESS NOTES
NURSING ADMISSION NOTE      Patient admitted via Cart from ER for Pneumonia, accompanied by her  and daughter, c/o fatigued, appeared drowsy but answer questions appropriately, currently denies pain, feverish- 101.  Abdomen is softly distended ,O

## 2019-11-21 NOTE — H&P
[facility]  History and Physical     Freddy Thapa Patient Status:  Observation    1960 MRN [id number]   [facility] 4NW-A Attending Quenten Lesches [address] Day # 0 PCP Sandra Mancia MD     Chief Complaint: fever TABLET 2 TIMES A DAY (Patient taking differently: 150 mg 2 (two) times daily.  TAKE 1 TABLET BY MOUTH EVERY DAY FOR 3 DAYS, THEN INCREASE TO 1 TABLET 2 TIMES A DAY ), Disp: 180 tablet, Rfl: 0  ESCITALOPRAM 20 MG Oral Tab, TAKE 1 TABLET DAILY, Disp: 90 table rhonchi. Cardiovascular: S1, S2. Regular rate and rhythm. No murmurs, rubs or gallops. Equal pulses. Chest and Back: No tenderness or deformity. Abdomen: Soft, nontender, nondistended. Positive bowel sounds. No rebound, guarding or organomegaly.   Jaguar Lobo

## 2019-11-21 NOTE — PLAN OF CARE
Pt. Alert and oriented x4, RA, VSS. K+ 3.6 this am; replaced PO per protocol. Lomotil not given this shift per pt complaint of constipation. Notified MD and asked to switch order to PRN, but still waiting on adjustment. Flu panel negative.  Per night RN, pt ordered  - Evaluate effectiveness of ordered antiemetic medications  - Provide nonpharmacologic comfort measures as appropriate  - Advance diet as tolerated, if ordered  - Obtain nutritional consult as needed  - Evaluate fluid balance  Outcome: Progressing

## 2019-11-21 NOTE — CM/SW NOTE
11/21/19 1100   CM/SW Referral Data   Referral Source Social Work (self-referral)   Reason for Referral Discharge planning   Informant Spouse   Patient Info   Patient's Mental Status Alert;Oriented   Patient's Home Environment House   Patient lives with

## 2019-11-21 NOTE — CONSULTS
[facility]  Report of Consultation    Juan Diego Stringer Patient Status:  Observation    1960 MRN [id number]   [facility] 4NW-A Attending Marcia Ji MD   Hosp Day # 0 PCP Pushpa Covington MD     Reason for Consultation:    Fever, mcg Oral Before breakfast   • pregabalin  150 mg Oral BID   • Rosuvastatin Calcium  5 mg Oral Nightly   • Abemaciclib  100 mg Oral Daily   • enoxaparin  40 mg Subcutaneous Daily   • docusate sodium  100 mg Oral BID   • cefTRIAXone  1 g Intravenous Q24H   • lower chest wall. There appears to be some soft tissue thickening in the right chest wall. There are changes of previous right mastectomy. Heart size is normal.  There is some patchy retrocardiac right lower lobe infiltrate.   There is no pulmonary edema

## 2019-11-21 NOTE — ED PROVIDER NOTES
Patient Seen in: [facility] Emergency Department      History   Patient presents with:  Fever (infectious)    Stated Complaint: fever    HPI    28-year-old female presents emergency department and states his been having some fever fatigue chills.   P 2008 99.9 °F (37.7 °C)   Temp src 11/20/19 2058 Tympanic   SpO2 11/20/19 2008 93 %   O2 Device 11/20/19 2008 None (Room air)       Current:/40 (BP Location: Right arm)   Pulse 76   Temp 99.7 °F (37.6 °C)   Resp 18   Ht 167.6 cm (5' 6\")   Wt 75.6 kg following components:    Iron 20 (*)     Total Iron Binding Capacity 478 (*)     % Saturation 4 (*)     All other components within normal limits   CBC W/ DIFFERENTIAL - Abnormal; Notable for the following components:    RBC 3.54 (*)     HGB 11.7 (*)     P reflect pneumonia in the proper clinical setting. 2. Stable hyperinflation of the lungs suggesting possible obstructive pulmonary disease. 3. There is some wall thickening and or pleural thickening in the lateral right lower lung field.   An underlying pleu of right lower lobe of lung J18.9 11/21/2019 Unknown    Anxiety F41.9 5/5/2018 Yes    Breast cancer metastasized to pleura (HCC) C50.919, C78.2 6/4/2015 Yes    Dehydration E86.0 11/21/2019 Unknown    Dizziness R42 11/21/2019 Unknown    Hyperlipidemia E78.5

## 2019-11-22 PROCEDURE — 99225 SUBSEQUENT OBSERVATION CARE: CPT | Performed by: INTERNAL MEDICINE

## 2019-11-22 RX ORDER — AZITHROMYCIN 250 MG/1
500 TABLET, FILM COATED ORAL
Status: DISCONTINUED | OUTPATIENT
Start: 2019-11-22 | End: 2019-11-23

## 2019-11-22 RX ORDER — MELATONIN
325
Refills: 0 | Status: SHIPPED | COMMUNITY
Start: 2019-11-22 | End: 2020-10-05 | Stop reason: ALTCHOICE

## 2019-11-22 RX ORDER — AMOXICILLIN 500 MG/1
500 CAPSULE ORAL EVERY 8 HOURS
Status: DISCONTINUED | OUTPATIENT
Start: 2019-11-22 | End: 2019-11-23

## 2019-11-22 RX ORDER — MELATONIN
325
Status: DISCONTINUED | OUTPATIENT
Start: 2019-11-22 | End: 2019-11-23

## 2019-11-22 NOTE — PHYSICAL THERAPY NOTE
PHYSICAL THERAPY EVALUATION - INPATIENT     Room Number: 426/426-A  Evaluation Date: 11/22/2019  Type of Evaluation: Initial  Physician Order: PT Eval and Treat    Presenting Problem: pneumonia  Reason for Therapy: Mobility Dysfunction and Discharge was ind PTA. 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 Pt drives self to/from work. Pt reports 2 near-falls in time leading up to admit where she felt dizzy and had to hold onto walls to keep from falling.   Pt reports onset of dizziness during dri (AM-PAC Scale): 53.28   CMS Modifier (G-Code): CJ    FUNCTIONAL ABILITY STATUS  Gait Assessment   Gait Assistance: Contact guard assist  Distance (ft): 350  Assistive Device: None  Pattern: (high guard positioning, decreased arm swing and head turning) moderate. These impairments and comorbidities manifest themselves as functional limitations in independent bed mobility, transfers, and gait.   The patient is below baseline and would benefit from skilled inpatient PT to address the above deficits to janine

## 2019-11-22 NOTE — OCCUPATIONAL THERAPY NOTE
OCCUPATIONAL THERAPY QUICK EVALUATION - INPATIENT    Room Number: 426/426-A  Evaluation Date: 11/22/2019     Type of Evaluation: Quick Eval  Presenting Problem: pneumonia    Physician Order: IP Consult to Occupational Therapy  Reason for Therapy:  ADL/IADL station  Hand Dominance: Right  Drives: Yes  Patient Regularly Uses: Reading glasses    Prior Level of Function: Independent with all ADL/IADLs and functional mobility without use of AD. Patient works full time, drives. SUBJECTIVE   \"I didn't fall. on OT role, goals, plan of care, recommendations and safety.     Orthostatic BP taken: supine: 112/41; sittin/62; standin/39; sitting after 3 min of standing activity: 116/48    Patient noted to maintain head fixed in flexion while walking and re Assessment/Performance Deficits  LOW - No comorbidities nor modifications of tasks    Clinical Decision Making  LOW - Analysis of occupational profile, problem-focused assessments, limited treatment options    Overall Complexity  LOW     OT Discharge Recom

## 2019-11-22 NOTE — PLAN OF CARE
Pt care assumed @ 1930. Pt resting in bed. VSS. Fever of 100.1. Pt sats in low 90s during night on room air. 2L 02 as needed. Monitoring effects of Tylenol. Aox4. Pt drowsy/lethargic but participating in conversation w/ staff.  Pt's  and day RN said ordered antiemetic medications  - Provide nonpharmacologic comfort measures as appropriate  - Advance diet as tolerated, if ordered  - Obtain nutritional consult as needed  - Evaluate fluid balance  Outcome: Progressing     Problem: METABOLIC/FLUID AND PAWAN

## 2019-11-22 NOTE — DIETARY NOTE
[place]     Admitting diagnosis:  Hypoxia [R09.02]  Malignant neoplasm of female breast, unspecified estrogen receptor status, unspecified laterality, unspecified site of breast ([facility] Jermain

## 2019-11-22 NOTE — PROGRESS NOTES
Patient has been up to bathroom several times gait has been steady. Denies any c/o at this time  has been at bedside. Appetite has been good denies any c/o nausea.

## 2019-11-22 NOTE — PROGRESS NOTES
[facility]  Progress Note    Oswaldo Ji Patient Status:  Observation    1960 MRN [id number]   [facility] 4NW-A Attending Jaime Schultz MD   Hosp Day # 0 PCP Cynthia Armstrong MD       SUBJECTIVE:    Feels better but still fat ALPRAZolam, acetaminophen, ondansetron HCl, Metoclopramide HCl, PEG 3350, magnesium hydroxide, bisacodyl, FLEET ENEMA    PHYSICAL EXAM:    General: Patient is alert and oriented x 3, not in acute distress.  at bedside.   Chest: Clear to auscultat

## 2019-11-22 NOTE — PROGRESS NOTES
[facility]  Progress Note    Pedro Salmon Patient Status:  Observation    1960 MRN [id number]   [facility] 4NW-A Attending Donovan Bolaños MD   Hosp Day # 0 PCP Juana Diaz MD     CC: Fever    SUBJECTIVE:  Low-grade fever w and with infusion. PATIENT STATED HISTORY:(As transcribed by Technologist)  The patient has metastatic breast cancer with current dizzy spells. CONTRAST USED:  21 mL of Dotarem     FINDINGS:  Patient motion noted.      The ventricles and sulci are bilateral apical pleural thickening. There is also some pleural thickening along the right lateral mid to lower chest wall. There appears to be some soft tissue thickening in the right chest   wall. There are changes of previous right mastectomy.      He Daily PRN  FLEET ENEMA (FLEET) 7-19 GM/118ML enema 133 mL, 1 enema, Rectal, Once PRN  CefTRIAXone Sodium (ROCEPHIN) 1 g in sodium chloride 0.9% 100 mL MBP/ADD-vantage, 1 g, Intravenous, Q24H  azithromycin (ZITHROMAX) 500 mg in sodium chloride 0.9% 250 mL I

## 2019-11-23 VITALS
OXYGEN SATURATION: 93 % | DIASTOLIC BLOOD PRESSURE: 41 MMHG | WEIGHT: 167.38 LBS | RESPIRATION RATE: 18 BRPM | TEMPERATURE: 98 F | HEART RATE: 64 BPM | SYSTOLIC BLOOD PRESSURE: 107 MMHG | HEIGHT: 66 IN | BODY MASS INDEX: 26.9 KG/M2

## 2019-11-23 PROCEDURE — 99217 OBSERVATION CARE DISCHARGE: CPT | Performed by: INTERNAL MEDICINE

## 2019-11-23 PROCEDURE — 99226 SUBSEQUENT OBSERVATION CARE: CPT | Performed by: SPECIALIST

## 2019-11-23 RX ORDER — AMOXICILLIN 500 MG/1
500 CAPSULE ORAL EVERY 8 HOURS
Qty: 21 CAPSULE | Refills: 0 | Status: SHIPPED | OUTPATIENT
Start: 2019-11-23 | End: 2019-11-30

## 2019-11-23 NOTE — PROGRESS NOTES
[facility]  Progress Note    Oswaldo Ji Patient Status:  Observation    1960 MRN [id number]   [facility] 4NW-A Attending Jaime Schultz MD   Hosp Day # 0 PCP Cynthia Armstrong MD       SUBJECTIVE:  Patient feels well.  Cough im

## 2019-11-23 NOTE — PROGRESS NOTES
[facility]  Progress Note    Mary Murphy Patient Status:  Observation    1960 MRN [id number]   [facility] 4NW-A Attending Shay العراقي MD   Hosp Day # 0 PCP Kami Gamble MD     CC: Fever    SUBJECTIVE: Feeling better tod transcribed by Technologist)  The patient has metastatic breast cancer with current dizzy spells. CONTRAST USED:  21 mL of Dotarem     FINDINGS:  Patient motion noted. The ventricles and sulci are within normal limits.   There is no midline shift o some pleural thickening along the right lateral mid to lower chest wall. There appears to be some soft tissue thickening in the right chest   wall. There are changes of previous right mastectomy.      Heart size is normal.  There is some patchy retrocardi PRN  bisacodyl (DULCOLAX) rectal suppository 10 mg, 10 mg, Rectal, Daily PRN  FLEET ENEMA (FLEET) 7-19 GM/118ML enema 133 mL, 1 enema, Rectal, Once PRN  Abemaciclib TABS 100 mg, 100 mg, Oral, Daily        ASSESSMENT / PLAN:     1.  Right lower lobe pneumoni

## 2019-11-23 NOTE — DISCHARGE SUMMARY
[facility]  Discharge Summary    Corrine Carias Patient Status:  Observation    1960 MRN [id number]   [facility] 4NW-A Attending Jacey Santos MD   Hosp Day # 0 PCP Janet Victor MD     Date of Admission: 2019    Date o discharge from Hospital: Pneumonia    Lace+ Score: 59  59-90 High Risk  29-58 Medium Risk  0-28   Low Risk. TCM Follow-Up Recommendation:Martha Tobias   LACE > 58:  High Risk of readmission after discharge from the hospital.      PCP: Uri Malagon, Itching. Refills:  0     diphenoxylate-atropine 2.5-0.025 MG Tabs  Commonly known as:  LOMOTIL      Take 1 tablet by mouth 4 (four) times daily.    Quantity:  360 tablet  Refills:  1     escitalopram 20 MG Tabs  Commonly known as:  LEXAPRO      TAKE 1 TAB Ht 5' 6\" (1.676 m)   Wt 167 lb 6.4 oz (75.9 kg)   SpO2 93%   BMI 27.02 kg/m²       General appearance: alert and cooperative  Head: Normocephalic, without obvious abnormality, atraumatic  Throat: oral mucosa moist  Neck: no adenopathy, no carotid bruit, n

## 2019-11-23 NOTE — PLAN OF CARE
Problem: RESPIRATORY - ADULT  Goal: Achieves optimal ventilation and oxygenation  Description  INTERVENTIONS:  - Assess for changes in respiratory status  - Assess for changes in mentation and behavior  - Position to facilitate oxygenation and minimize r lab results as appropriate  - Fluid restriction as ordered  - Instruct patient on fluid and nutrition restrictions as appropriate  Outcome: Adequate for Discharge     Problem: HEMATOLOGIC - ADULT  Goal: Maintains hematologic stability  Description  INTERVE

## 2019-11-23 NOTE — PLAN OF CARE
On room air with no distress noted. Denies pain. Afebrile overnight. Slept well.  at the bedside.    Problem: RESPIRATORY - ADULT  Goal: Achieves optimal ventilation and oxygenation  Description  INTERVENTIONS:  - Assess for changes in respiratory st Visit Information Date & Time Provider Department Dept. Phone Encounter #  
 2/20/2017  8:45 AM [facility] [id number] Follow-up Instructions Return in about 2 weeks (around 3/6/2017) for Follow up pharyngitis cough fever . Upcoming Health Maintenance Date Due Hepatitis A Peds Age 1-18 (1 of 2 - Standard Series) 5/3/2012 INFLUENZA PEDS 6M-8Y (1) 8/1/2016 MCV through Age 25 (1 of 2) 5/3/2022 DTaP/Tdap/Td series (6 - Tdap) 5/3/2022 Allergies as of 2/20/2017  Review Complete On: 2/20/2017 By: Nina Kenyon MD  
 No Known Allergies Current Immunizations  Reviewed on 12/21/2016 Name Date DTAP Vaccine 11/9/2012, 2011 DTAP/HIB/IPV Combined Vaccine 2011, 2011 DTaP 9/25/2015 HIB Vaccine 8/3/2012, 2011 Hepatitis B Vaccine 2/6/2012, 2011, 2011 IPV 9/25/2015, 11/9/2012 Influenza Vaccine Split 12/10/2012, 11/9/2012 MMR Vaccine 8/3/2012 MMRV 9/25/2015 PPD 5/4/2012 Pneumococcal Vaccine (Pcv) 2011 Pneumococcal Vaccine (Unspecified Type) 2011 Prevnar 13 8/3/2012, 2011 Varicella Virus Vaccine Live 5/4/2012 Not reviewed this visit You Were Diagnosed With   
  
 Codes Comments Fever, unspecified fever cause    -  Primary ICD-10-CM: R50.9 ICD-9-CM: 780.60 Fever in other diseases     ICD-10-CM: R50.81 ICD-9-CM: 780.61 Cough     ICD-10-CM: R05 ICD-9-CM: 786.2 Nasal congestion     ICD-10-CM: R09.81 ICD-9-CM: 478.19 Pharyngitis, unspecified etiology     ICD-10-CM: J02.9 ICD-9-CM: 802 Influenza     ICD-10-CM: J11.1 ICD-9-CM: 487. 1 Vitals BP Pulse Temp Resp Height(growth percentile) Weight(growth percentile) 97/62 (58 %/ 71 %)* 139 99.6 °F (37.6 °C) (Tympanic) 20 (!) 3' 9\" (1.143 m) (58 %, Z= 0.20) 42 lb (19.1 kg) (40 %, Z= -0.25) BMI Smoking Status 14.58 kg/m2 (32 %, Z= -0.48) Never Smoker *BP percentiles are based on NHBPEP's 4th Report Growth percentiles are based on CDC 2-20 Years data. Vitals History BMI and BSA Data Body Mass Index Body Surface Area 14.58 kg/m 2 0.78 m 2 Preferred Pharmacy Pharmacy Name Phone [id number] [phone] Your Updated Medication List  
  
   
This list is accurate as of: 2/20/17  9:57 AM.  Always use your most recent med list.  
  
  
  
  
 amoxicillin 400 mg/5 mL suspension Commonly known as:  AMOXIL Take 6 mL by mouth two (2) times a day for 10 days. Indications: PHARYNGITIS  
  
 cetirizine 1 mg/mL solution Commonly known as:  ZYRTEC  
TAKE 3.8 ML BY MOUTH DAILY MULTIVITAMIN PO Take  by mouth daily. nystatin 100,000 unit/gram ointment Commonly known as:  MYCOSTATIN Apply  to affected area three (3) times daily. oseltamivir 6 mg/mL suspension Commonly known as:  TAMIFLU Take 5 mL by mouth two (2) times a day for 5 days. Indications: INFLUENZA Prescriptions Sent to Pharmacy Refills  
 amoxicillin (AMOXIL) 400 mg/5 mL suspension 0 Sig: Take 6 mL by mouth two (2) times a day for 10 days. Indications: PHARYNGITIS Class: Normal  
 [facility]: [phone] Route: Oral  
 oseltamivir (TAMIFLU) 6 mg/mL suspension 0 Sig: Take 5 mL by mouth two (2) times a day for 5 days. Indications: INFLUENZA Class: Normal  
 [facility]  Ph #: [phone] Route: Oral  
  
We Performed the Following AMB POC RAPID STREP A [45484 CPT(R)] AMB POC STEFANI INFLUENZA A/B TEST [19721 CPT(R)] Follow-up Instructions Return in about 2 weeks (around 3/6/2017) for Follow up pharyngitis cough fever . Patient Instructions Cough in Children: Care Instructions Your Care Instructions A cough is how your child's body responds to something that bothers his or her throat or airways. Many things can cause a cough. Your child might cough because of a cold or the flu, bronchitis, or asthma. Cigarette smoke, postnasal drip, allergies, and stomach acid that backs up into the throat also can cause coughs. A cough is a symptom, not a disease. Most coughs stop when the cause, such as a cold, goes away. You can take a few steps at home to help your child cough less and feel better. Follow-up care is a key part of your child's treatment and safety. Be sure to make and go to all appointments, and call your doctor if your child is having problems. It's also a good idea to know your child's test results and keep a list of the medicines your child takes. How can you care for your child at home? · Have your child drink plenty of water and other fluids. This may help soothe a dry or sore throat. Honey or lemon juice in hot water or tea may ease a dry cough. Do not give honey to a child younger than 3year old. It may contain bacteria that are harmful to infants. · Be careful with cough and cold medicines. Don't give them to children younger than 6, because they don't work for children that age and can even be harmful. For children 6 and older, always follow all the instructions carefully. Make sure you know how much medicine to give and how long to use it. And use the dosing device if one is included. · Keep your child away from smoke. Do not smoke or let anyone else smoke around your child or in your house. · Help your child avoid exposure to smoke, dust, or other pollutants, or have your child wear a face mask. Check with your doctor or pharmacist to find out which type of face mask will give your child the most benefit. When should you call for help? Call 911 anytime you think your child may need emergency care. For example, call if: 
· Your child has severe trouble breathing. Symptoms may include: ¨ Using the belly muscles to breathe. ¨ The chest sinking in or the nostrils flaring when your child struggles to breathe. · Your child's skin and fingernails are gray or blue. · Your child coughs up large amounts of blood or what looks like coffee grounds. Call your doctor now or seek immediate medical care if: 
· Your child coughs up blood. · Your child has new or worse trouble breathing. · Your child has a new or higher fever. Watch closely for changes in your child's health, and be sure to contact your doctor if: 
· Your child has a new symptom, such as an earache or a rash. · Your child coughs more deeply or more often, especially if you notice more mucus or a change in the color of the mucus. · Your child does not get better as expected. Where can you learn more? Go to http://kasia-sonny.info/. Enter Z465 in the search box to learn more about \"Cough in Children: Care Instructions. \" Current as of: June 30, 2016 Content Version: 11.1 © 5347-4411 Veotag. Care instructions adapted under license by VB R[employer] (which disclaims liability or warranty for this information). If you have questions about a medical condition or this instruction, always ask your healthcare professional. [place] [address] any warranty or liability for your use of this information. Fever in Children 4 Years and Older: Care Instructions Your Care Instructions A fever is a high body temperature. Fever is the body's normal reaction to infection and other illnesses, both minor and serious. Fevers help the body fight infection. In most cases, fever means your child has a minor illness.  Often you must look at your child's other symptoms to determine how serious the illness is. Children with a fever often have an infection caused by a virus, such as a cold or the flu. Infections caused by bacteria, such as strep throat or an ear infection, also can cause a fever. Follow-up care is a key part of your child's treatment and safety. Be sure to make and go to all appointments, and call your doctor if your child is having problems. It's also a good idea to know your child's test results and keep a list of the medicines your child takes. How can you care for your child at home? · Don't use temperature alone to  how sick your child is. Instead, look at how your child acts. Care at home is often all that is needed if your child is: ¨ Comfortable and alert. ¨ Eating well. ¨ Drinking enough fluid. ¨ Urinating as usual. 
¨ Starting to feel better. · Give your child extra fluids or flavored ice pops to suck on. This will help prevent dehydration. · Dress your child in light clothes or pajamas. Don't wrap your child in blankets. · If your child has a fever and is uncomfortable, give an over-the-counter medicine such as acetaminophen (Tylenol) or ibuprofen (Advil, Motrin). Be safe with medicines. Read and follow all instructions on the label. Do not give aspirin to anyone younger than 20. It has been linked to Reye syndrome, a serious illness. · Be careful when giving your child over-the-counter cold or flu medicines and Tylenol at the same time. Many of these medicines have acetaminophen, which is Tylenol. Read the labels to make sure that you are not giving your child more than the recommended dose. Too much acetaminophen (Tylenol) can be harmful. When should you call for help? Call 911 anytime you think your child may need emergency care. For example, call if: 
· Your child seems very sick or is hard to wake up. Call your doctor now or seek immediate medical care if: 
· Your child seems to be getting sicker. · The fever gets much higher. · There are new or worse symptoms along with the fever. These may include a cough, a rash, or ear pain. Watch closely for changes in your child's health, and be sure to contact your doctor if: · The fever hasn't gone down after 48 hours. · Your child does not get better as expected. Where can you learn more? Go to http://kasia-sonny.info/. Enter I729 in the search box to learn more about \"Fever in Children 4 Years and Older: Care Instructions. \" Current as of: May 27, 2016 Content Version: 11.1 © 7629-3417 Flomio. Care instructions adapted under license by AxisRooms (which disclaims liability or warranty for this information). If you have questions about a medical condition or this instruction, always ask your healthcare professional. [address] any warranty or liability for your use of this information. Strep Throat in Children: Care Instructions Your Care Instructions Strep throat is a bacterial infection that causes a sudden, severe sore throat. Antibiotics are used to treat strep throat and prevent rare but serious complications. Your child should feel better in a few days. Your child can spread strep throat to others until 24 hours after he or she starts taking antibiotics. Keep your child out of school or day care until 1 full day after he or she starts taking antibiotics. Follow-up care is a key part of your child's treatment and safety. Be sure to make and go to all appointments, and call your doctor if your child is having problems. It's also a good idea to know your child's test results and keep a list of the medicines your child takes. How can you care for your child at home? · Give your child antibiotics as directed. Do not stop using them just because your child feels better. Your child needs to take the full course of antibiotics. · Keep your child at home and away from other people for 24 hours after starting the antibiotics. Wash your hands and your child's hands often. Keep drinking glasses and eating utensils separate, and wash these items well in hot, soapy water. · Give your child acetaminophen (Tylenol) or ibuprofen (Advil, Motrin) for fever or pain. Be safe with medicines. Read and follow all instructions on the label. Do not give aspirin to anyone younger than 20. It has been linked to Reye syndrome, a serious illness. · Do not give your child two or more pain medicines at the same time unless the doctor told you to. Many pain medicines have acetaminophen, which is Tylenol. Too much acetaminophen (Tylenol) can be harmful. · Try an over-the-counter anesthetic throat spray or throat lozenges, which may help relieve throat pain. Do not give lozenges to children younger than age 3. If your child is younger than age 3, ask your doctor if you can give your child numbing medicines. · Have your child drink lots of water and other clear liquids. Frozen ice treats, ice cream, and sherbet also can make his or her throat feel better. · Soft foods, such as scrambled eggs and gelatin dessert, may be easier for your child to eat. · Make sure your child gets lots of rest. 
· Keep your child away from smoke. Smoke irritates the throat. · Place a humidifier by your child's bed or close to your child. Follow the directions for cleaning the machine. When should you call for help? Call your doctor now or seek immediate medical care if: 
· Your child has a fever with a stiff neck or a severe headache. · Your child has any trouble breathing. · Your child's fever gets worse. · Your child cannot swallow or cannot drink enough because of throat pain. · Your child coughs up colored or bloody mucus. Watch closely for changes in your child's health, and be sure to contact your doctor if: · Your child's fever returns after several days of having a normal temperature. · Your child has any new symptoms, such as a rash, joint pain, an earache, vomiting, or nausea. · Your child is not getting better after 2 days of antibiotics. Where can you learn more? Go to http://kasia-sonny.info/. Enter L346 in the search box to learn more about \"Strep Throat in Children: Care Instructions. \" Current as of: July 29, 2016 Content Version: 11.1 © 1917-2750 ISpeak. Care instructions adapted under license by Showcase-TV (which disclaims liability or warranty for this information). If you have questions about a medical condition or this instruction, always ask your healthcare professional. [address] any warranty or liability for your use of this information. Introducing [facility] & HEALTH SERVICES! Dear Parent or Guardian, Thank you for requesting a PlotWatt account for your child. With PlotWatt, you can view your childs hospital or ER discharge instructions, current allergies, immunizations and much more. In order to access your childs information, we require a signed consent on file. Please see the [employer] department or call [phone] for instructions on completing a PlotWatt Proxy request.   
Additional Information If you have questions, please visit the Frequently Asked Questions section of the PlotWatt website at [email or web address] [email or web address] Range [email or web address] Remember, PlotWatt is NOT to be used for urgent needs. For medical emergencies, dial 911. Now available from your iPhone and Android! Please provide this summary of care documentation to your next provider. Your primary care clinician is listed as Juan Bush. If you have any questions after today's visit, please call [phone].

## 2019-11-26 ENCOUNTER — OFFICE VISIT (OUTPATIENT)
Dept: HEMATOLOGY/ONCOLOGY | Age: 59
End: 2019-11-26
Attending: INTERNAL MEDICINE
Payer: COMMERCIAL

## 2019-11-26 DIAGNOSIS — Z17.0 MALIGNANT NEOPLASM OF NIPPLE OF RIGHT BREAST IN FEMALE, ESTROGEN RECEPTOR POSITIVE (HCC): Primary | ICD-10-CM

## 2019-11-26 DIAGNOSIS — C78.2 CARCINOMA OF RIGHT BREAST METASTATIC TO PLEURA (HCC): ICD-10-CM

## 2019-11-26 DIAGNOSIS — C50.911 CARCINOMA OF RIGHT BREAST METASTATIC TO PLEURA (HCC): ICD-10-CM

## 2019-11-26 DIAGNOSIS — C50.011 MALIGNANT NEOPLASM OF NIPPLE OF RIGHT BREAST IN FEMALE, ESTROGEN RECEPTOR POSITIVE (HCC): Primary | ICD-10-CM

## 2019-11-26 DIAGNOSIS — C79.51 BONE METASTASES (HCC): ICD-10-CM

## 2019-11-26 PROCEDURE — 85025 COMPLETE CBC W/AUTO DIFF WBC: CPT

## 2019-11-26 PROCEDURE — 80053 COMPREHEN METABOLIC PANEL: CPT

## 2019-11-26 PROCEDURE — 96402 CHEMO HORMON ANTINEOPL SQ/IM: CPT

## 2019-11-26 PROCEDURE — 36415 COLL VENOUS BLD VENIPUNCTURE: CPT

## 2019-11-26 RX ORDER — LAMOTRIGINE 25 MG/1
500 TABLET ORAL ONCE
Status: COMPLETED | OUTPATIENT
Start: 2019-11-26 | End: 2019-11-26

## 2019-11-26 RX ADMIN — LAMOTRIGINE 500 MG: 25 TABLET ORAL at 13:16:00

## 2019-11-29 ENCOUNTER — HOSPITAL ENCOUNTER (OUTPATIENT)
Dept: GENERAL RADIOLOGY | Age: 59
Discharge: HOME | End: 2019-11-29
Attending: FAMILY MEDICINE
Payer: COMMERCIAL

## 2019-11-29 ENCOUNTER — MED REC SCAN ONLY (OUTPATIENT)
Dept: FAMILY MEDICINE CLINIC | Facility: CLINIC | Age: 59
End: 2019-11-29

## 2019-11-29 ENCOUNTER — OFFICE VISIT (OUTPATIENT)
Dept: FAMILY MEDICINE CLINIC | Facility: CLINIC | Age: 59
End: 2019-11-29

## 2019-11-29 VITALS
DIASTOLIC BLOOD PRESSURE: 58 MMHG | WEIGHT: 167 LBS | SYSTOLIC BLOOD PRESSURE: 102 MMHG | BODY MASS INDEX: 27.49 KG/M2 | TEMPERATURE: 98 F | HEART RATE: 68 BPM | RESPIRATION RATE: 16 BRPM | HEIGHT: 65.5 IN

## 2019-11-29 DIAGNOSIS — C50.911 CARCINOMA OF RIGHT BREAST METASTATIC TO PLEURA (HCC): ICD-10-CM

## 2019-11-29 DIAGNOSIS — J18.9 COMMUNITY ACQUIRED PNEUMONIA OF RIGHT LOWER LOBE OF LUNG: Primary | ICD-10-CM

## 2019-11-29 DIAGNOSIS — R42 DIZZINESS: ICD-10-CM

## 2019-11-29 DIAGNOSIS — D50.0 IRON DEFICIENCY ANEMIA DUE TO CHRONIC BLOOD LOSS: ICD-10-CM

## 2019-11-29 DIAGNOSIS — C79.51 BONE METASTASES (HCC): ICD-10-CM

## 2019-11-29 DIAGNOSIS — G62.9 PERIPHERAL POLYNEUROPATHY: ICD-10-CM

## 2019-11-29 DIAGNOSIS — R53.1 WEAKNESS: ICD-10-CM

## 2019-11-29 DIAGNOSIS — J18.9 COMMUNITY ACQUIRED PNEUMONIA OF RIGHT LOWER LOBE OF LUNG: ICD-10-CM

## 2019-11-29 DIAGNOSIS — Z17.0 MALIGNANT NEOPLASM OF NIPPLE OF RIGHT BREAST IN FEMALE, ESTROGEN RECEPTOR POSITIVE (HCC): ICD-10-CM

## 2019-11-29 DIAGNOSIS — C50.011 MALIGNANT NEOPLASM OF NIPPLE OF RIGHT BREAST IN FEMALE, ESTROGEN RECEPTOR POSITIVE (HCC): ICD-10-CM

## 2019-11-29 DIAGNOSIS — C78.2 CARCINOMA OF RIGHT BREAST METASTATIC TO PLEURA (HCC): ICD-10-CM

## 2019-11-29 PROCEDURE — 99496 TRANSJ CARE MGMT HIGH F2F 7D: CPT | Performed by: FAMILY MEDICINE

## 2019-11-29 PROCEDURE — 71046 X-RAY EXAM CHEST 2 VIEWS: CPT | Performed by: FAMILY MEDICINE

## 2019-11-29 RX ORDER — CEFDINIR 300 MG/1
300 CAPSULE ORAL 2 TIMES DAILY
Qty: 14 CAPSULE | Refills: 0 | Status: SHIPPED | OUTPATIENT
Start: 2019-11-29 | End: 2020-01-13 | Stop reason: ALTCHOICE

## 2019-11-29 NOTE — PROGRESS NOTES
HPI:    Toi Lang is a 64year old female here today for hospital follow up.    She was discharged from Inpatient hospital, [facility] to Home   Admission Date: 11/20/19   Discharge Date: 11/23/19  Hospital Discharge Diagnoses (since 10/30/2019) Patient had imaging test chest x-ray done which showed right lower lobe pneumonia. Patient has a history of metastatic breast cancer with metastasis to the pleura. She is receiving chemotherapy.   Pt at the hospital was treated with IV takes IV Rocephin a total) by mouth every other day.  OVER THE COUNTER  Levothyroxine Sodium 175 MCG Oral Tab, Take 1 tablet (175 mcg total) by mouth before breakfast.  buPROPion HCl ER, SR, 150 MG Oral Tablet 12 Hr, TAKE 1 TABLET BY MOUTH EVERY DAY FOR 3 DAYS, THEN INCREASE T tired fatigue low energy  SKIN: denies any unusual skin lesions  EYES: denies blurred vision or double vision  HEENT: denies nasal congestion, sinus pain or ST  LUNGS: denies shortness of breath with exertion, having cough   cARDIOVASCULAR: denies chest pa mouth 2 (two) times daily. -     XR CHEST PA + LAT CHEST (CPT=71046);  Future    Dizziness    Weakness    Carcinoma of right breast metastatic to pleura [facility])    Peripheral polyneuropathy    Bone metastases (HCC)    Iron deficiency anemia due to chronic bl ibuprofen as needed for fever or pain.     Imaging & Consults:  XR CHEST PA + LAT CHEST (CPT=71046)      PROCEDURE:  XR CHEST PA + LAT CHEST (CPT=71046)     INDICATIONS:  J18.9 Pneumonia, unspecified organism     COMPARISON:  [facility] , XR, XR CHEST PA + LAT

## 2019-11-29 NOTE — PATIENT INSTRUCTIONS
Finish course of antibiotic. Mucinex DM over-the-counter as needed for cough. Keep good hydration. Monitor symptoms. Probiotic daily like organic yogurt.

## 2019-12-05 RX ORDER — LEVOTHYROXINE SODIUM 175 UG/1
175 TABLET ORAL
Qty: 90 TABLET | Refills: 0 | Status: SHIPPED | OUTPATIENT
Start: 2019-12-05 | End: 2020-01-13

## 2019-12-05 NOTE — TELEPHONE ENCOUNTER
Last OV : 10/7/19 med/lab review  Upcoming appt/reason:    Future Appointments   Date Time Provider Ana Shannon   12/26/2019  3:30 PM PF TX RN2 PF CHEMO I [place]   1/13/2020  2:45 PM Leonela Goel MD [facility] 11 [facility] [place]   1/16/2020  7:00 PM P

## 2019-12-12 RX ORDER — ROSUVASTATIN CALCIUM 5 MG/1
TABLET, COATED ORAL
Qty: 90 TABLET | Refills: 4 | OUTPATIENT
Start: 2019-12-12

## 2019-12-26 ENCOUNTER — OFFICE VISIT (OUTPATIENT)
Dept: HEMATOLOGY/ONCOLOGY | Age: 59
End: 2019-12-26
Attending: INTERNAL MEDICINE
Payer: COMMERCIAL

## 2019-12-26 DIAGNOSIS — C79.51 BONE METASTASES (HCC): ICD-10-CM

## 2019-12-26 DIAGNOSIS — C78.2 CARCINOMA OF RIGHT BREAST METASTATIC TO PLEURA (HCC): ICD-10-CM

## 2019-12-26 DIAGNOSIS — C50.011 MALIGNANT NEOPLASM OF NIPPLE OF RIGHT BREAST IN FEMALE, ESTROGEN RECEPTOR POSITIVE (HCC): Primary | ICD-10-CM

## 2019-12-26 DIAGNOSIS — C50.911 CARCINOMA OF RIGHT BREAST METASTATIC TO PLEURA (HCC): ICD-10-CM

## 2019-12-26 DIAGNOSIS — Z17.0 MALIGNANT NEOPLASM OF NIPPLE OF RIGHT BREAST IN FEMALE, ESTROGEN RECEPTOR POSITIVE (HCC): Primary | ICD-10-CM

## 2019-12-26 PROCEDURE — 36415 COLL VENOUS BLD VENIPUNCTURE: CPT

## 2019-12-26 PROCEDURE — 80053 COMPREHEN METABOLIC PANEL: CPT

## 2019-12-26 PROCEDURE — 96402 CHEMO HORMON ANTINEOPL SQ/IM: CPT

## 2019-12-26 PROCEDURE — 85025 COMPLETE CBC W/AUTO DIFF WBC: CPT

## 2019-12-26 RX ORDER — LAMOTRIGINE 25 MG/1
500 TABLET ORAL ONCE
Status: COMPLETED | OUTPATIENT
Start: 2019-12-26 | End: 2019-12-26

## 2019-12-26 RX ORDER — LAMOTRIGINE 25 MG/1
500 TABLET ORAL ONCE
Status: CANCELLED | OUTPATIENT
Start: 2019-12-26

## 2019-12-26 RX ADMIN — LAMOTRIGINE 500 MG: 25 TABLET ORAL at 15:01:00

## 2019-12-26 NOTE — PROGRESS NOTES
Education Record    Learner:  Patient and Spouse    Disease / Diagnosis: faslodex    Barriers / Limitations:  None   Comments:    Method:  Brief focused   Comments:    General Topics:  Plan of care reviewed   Comments:    Outcome:  Shows understanding   Co

## 2020-01-11 ENCOUNTER — HOSPITAL ENCOUNTER (OUTPATIENT)
Dept: GENERAL RADIOLOGY | Age: 60
Discharge: HOME | End: 2020-01-11
Attending: FAMILY MEDICINE
Payer: COMMERCIAL

## 2020-01-11 ENCOUNTER — APPOINTMENT (OUTPATIENT)
Dept: LAB | Age: 60
End: 2020-01-11
Attending: FAMILY MEDICINE
Payer: COMMERCIAL

## 2020-01-11 DIAGNOSIS — E78.00 PURE HYPERCHOLESTEROLEMIA: ICD-10-CM

## 2020-01-11 DIAGNOSIS — J18.9 COMMUNITY ACQUIRED PNEUMONIA OF RIGHT LOWER LOBE OF LUNG: ICD-10-CM

## 2020-01-11 DIAGNOSIS — E03.9 HYPOTHYROIDISM IN ADULT: ICD-10-CM

## 2020-01-11 LAB
ALBUMIN SERPL-MCNC: 3.5 G/DL (ref 3.4–5)
ALBUMIN/GLOB SERPL: 0.9 {RATIO} (ref 1–2)
ALP LIVER SERPL-CCNC: 206 U/L (ref 46–118)
ALT SERPL-CCNC: 73 U/L (ref 13–56)
ANION GAP SERPL CALC-SCNC: 3 MMOL/L (ref 0–18)
AST SERPL-CCNC: 66 U/L (ref 15–37)
BILIRUB SERPL-MCNC: 0.5 MG/DL (ref 0.1–2)
BUN BLD-MCNC: 10 MG/DL (ref 7–18)
BUN/CREAT SERPL: 10.6 (ref 10–20)
CALCIUM BLD-MCNC: 9.3 MG/DL (ref 8.5–10.1)
CHLORIDE SERPL-SCNC: 112 MMOL/L (ref 98–112)
CHOLEST SMN-MCNC: 183 MG/DL (ref ?–200)
CO2 SERPL-SCNC: 28 MMOL/L (ref 21–32)
CREAT BLD-MCNC: 0.94 MG/DL (ref 0.55–1.02)
GLOBULIN PLAS-MCNC: 3.9 G/DL (ref 2.8–4.4)
GLUCOSE BLD-MCNC: 111 MG/DL (ref 70–99)
HDLC SERPL-MCNC: 55 MG/DL (ref 40–59)
LDLC SERPL CALC-MCNC: 94 MG/DL (ref ?–100)
M PROTEIN MFR SERPL ELPH: 7.4 G/DL (ref 6.4–8.2)
NONHDLC SERPL-MCNC: 128 MG/DL (ref ?–130)
OSMOLALITY SERPL CALC.SUM OF ELEC: 296 MOSM/KG (ref 275–295)
PATIENT FASTING Y/N/NP: YES
PATIENT FASTING Y/N/NP: YES
POTASSIUM SERPL-SCNC: 4.6 MMOL/L (ref 3.5–5.1)
SODIUM SERPL-SCNC: 143 MMOL/L (ref 136–145)
T4 FREE SERPL-MCNC: 1 NG/DL (ref 0.8–1.7)
TRIGL SERPL-MCNC: 170 MG/DL (ref 30–149)
TSI SER-ACNC: 0.11 MIU/ML (ref 0.36–3.74)
VLDLC SERPL CALC-MCNC: 34 MG/DL (ref 0–30)

## 2020-01-11 PROCEDURE — 36415 COLL VENOUS BLD VENIPUNCTURE: CPT

## 2020-01-11 PROCEDURE — 84439 ASSAY OF FREE THYROXINE: CPT

## 2020-01-11 PROCEDURE — 71046 X-RAY EXAM CHEST 2 VIEWS: CPT | Performed by: FAMILY MEDICINE

## 2020-01-11 PROCEDURE — 80061 LIPID PANEL: CPT

## 2020-01-11 PROCEDURE — 84443 ASSAY THYROID STIM HORMONE: CPT

## 2020-01-11 PROCEDURE — 80053 COMPREHEN METABOLIC PANEL: CPT

## 2020-01-13 ENCOUNTER — MED REC SCAN ONLY (OUTPATIENT)
Dept: FAMILY MEDICINE CLINIC | Facility: CLINIC | Age: 60
End: 2020-01-13

## 2020-01-13 ENCOUNTER — OFFICE VISIT (OUTPATIENT)
Dept: FAMILY MEDICINE CLINIC | Facility: CLINIC | Age: 60
End: 2020-01-13

## 2020-01-13 VITALS
SYSTOLIC BLOOD PRESSURE: 118 MMHG | RESPIRATION RATE: 16 BRPM | HEIGHT: 65.5 IN | OXYGEN SATURATION: 98 % | TEMPERATURE: 99 F | WEIGHT: 176 LBS | HEART RATE: 71 BPM | BODY MASS INDEX: 28.97 KG/M2 | DIASTOLIC BLOOD PRESSURE: 60 MMHG

## 2020-01-13 DIAGNOSIS — C78.2 CARCINOMA OF RIGHT BREAST METASTATIC TO PLEURA (HCC): ICD-10-CM

## 2020-01-13 DIAGNOSIS — C50.911 CARCINOMA OF RIGHT BREAST METASTATIC TO PLEURA (HCC): ICD-10-CM

## 2020-01-13 DIAGNOSIS — C79.51 BONE METASTASES (HCC): ICD-10-CM

## 2020-01-13 DIAGNOSIS — F41.9 ANXIETY: ICD-10-CM

## 2020-01-13 DIAGNOSIS — F17.200 SMOKER: ICD-10-CM

## 2020-01-13 DIAGNOSIS — R60.0 EDEMA OF BOTH FEET: ICD-10-CM

## 2020-01-13 DIAGNOSIS — C50.919 MALIGNANT NEOPLASM OF FEMALE BREAST, UNSPECIFIED ESTROGEN RECEPTOR STATUS, UNSPECIFIED LATERALITY, UNSPECIFIED SITE OF BREAST (HCC): ICD-10-CM

## 2020-01-13 DIAGNOSIS — E78.00 PURE HYPERCHOLESTEROLEMIA: ICD-10-CM

## 2020-01-13 DIAGNOSIS — E78.2 HYPERLIPIDEMIA, MIXED: ICD-10-CM

## 2020-01-13 DIAGNOSIS — R73.9 HYPERGLYCEMIA: ICD-10-CM

## 2020-01-13 DIAGNOSIS — Z17.0 MALIGNANT NEOPLASM OF NIPPLE OF RIGHT BREAST IN FEMALE, ESTROGEN RECEPTOR POSITIVE (HCC): ICD-10-CM

## 2020-01-13 DIAGNOSIS — E03.9 HYPOTHYROIDISM IN ADULT: Primary | ICD-10-CM

## 2020-01-13 DIAGNOSIS — C50.011 MALIGNANT NEOPLASM OF NIPPLE OF RIGHT BREAST IN FEMALE, ESTROGEN RECEPTOR POSITIVE (HCC): ICD-10-CM

## 2020-01-13 DIAGNOSIS — G62.9 PERIPHERAL POLYNEUROPATHY: ICD-10-CM

## 2020-01-13 PROCEDURE — 99214 OFFICE O/P EST MOD 30 MIN: CPT | Performed by: FAMILY MEDICINE

## 2020-01-13 RX ORDER — LEVOTHYROXINE SODIUM 0.15 MG/1
150 TABLET ORAL
Qty: 90 TABLET | Refills: 1 | Status: SHIPPED | OUTPATIENT
Start: 2020-01-13 | End: 2020-04-07

## 2020-01-13 RX ORDER — FLUTICASONE PROPIONATE AND SALMETEROL 250; 50 UG/1; UG/1
1 POWDER RESPIRATORY (INHALATION) EVERY 12 HOURS SCHEDULED
Qty: 1 EACH | Refills: 0 | Status: SHIPPED | OUTPATIENT
Start: 2020-01-13 | End: 2020-02-10

## 2020-01-13 RX ORDER — HYDROCODONE BITARTRATE AND ACETAMINOPHEN 7.5; 325 MG/1; MG/1
1 TABLET ORAL EVERY 8 HOURS PRN
Qty: 30 TABLET | Refills: 0 | Status: SHIPPED | OUTPATIENT
Start: 2020-03-15 | End: 2020-01-23

## 2020-01-13 RX ORDER — ROSUVASTATIN CALCIUM 5 MG/1
TABLET, COATED ORAL
Qty: 90 TABLET | Refills: 1 | Status: SHIPPED | OUTPATIENT
Start: 2020-01-13 | End: 2020-04-07

## 2020-01-13 RX ORDER — HYDROCODONE BITARTRATE AND ACETAMINOPHEN 7.5; 325 MG/1; MG/1
1 TABLET ORAL EVERY 8 HOURS PRN
Qty: 30 TABLET | Refills: 0 | Status: SHIPPED | OUTPATIENT
Start: 2020-02-13 | End: 2020-01-23

## 2020-01-13 RX ORDER — OMEGA-3-ACID ETHYL ESTERS 1 G/1
CAPSULE, LIQUID FILLED ORAL
Qty: 360 CAPSULE | Refills: 1 | Status: SHIPPED | OUTPATIENT
Start: 2020-01-13 | End: 2020-04-07

## 2020-01-13 RX ORDER — ALPRAZOLAM 0.25 MG/1
0.25 TABLET ORAL EVERY 6 HOURS PRN
Qty: 30 TABLET | Refills: 2 | Status: SHIPPED | OUTPATIENT
Start: 2020-01-13 | End: 2020-04-13

## 2020-01-13 RX ORDER — HYDROCODONE BITARTRATE AND ACETAMINOPHEN 7.5; 325 MG/1; MG/1
1 TABLET ORAL EVERY 8 HOURS PRN
Qty: 30 TABLET | Refills: 0 | Status: SHIPPED | OUTPATIENT
Start: 2020-01-13 | End: 2020-01-23

## 2020-01-13 NOTE — PATIENT INSTRUCTIONS
Continue current medications. Decrease levothyroxine to 150 mcg daily  Healthy diet. Stay active. Start Advair discus 1 puff twice a day for 2 to 4 weeks.

## 2020-01-16 ENCOUNTER — HOSPITAL ENCOUNTER (OUTPATIENT)
Dept: CT IMAGING | Age: 60
Discharge: HOME | End: 2020-01-16
Attending: INTERNAL MEDICINE
Payer: COMMERCIAL

## 2020-01-16 DIAGNOSIS — C50.911 CARCINOMA OF RIGHT BREAST METASTATIC TO PLEURA (HCC): ICD-10-CM

## 2020-01-16 DIAGNOSIS — C78.2 CARCINOMA OF RIGHT BREAST METASTATIC TO PLEURA (HCC): ICD-10-CM

## 2020-01-16 PROCEDURE — 71250 CT THORAX DX C-: CPT | Performed by: INTERNAL MEDICINE

## 2020-01-16 PROCEDURE — 74176 CT ABD & PELVIS W/O CONTRAST: CPT | Performed by: INTERNAL MEDICINE

## 2020-01-23 ENCOUNTER — OFFICE VISIT (OUTPATIENT)
Dept: HEMATOLOGY/ONCOLOGY | Age: 60
End: 2020-01-23
Attending: INTERNAL MEDICINE
Payer: COMMERCIAL

## 2020-01-23 VITALS
TEMPERATURE: 99 F | BODY MASS INDEX: 28.8 KG/M2 | DIASTOLIC BLOOD PRESSURE: 68 MMHG | HEIGHT: 65.98 IN | SYSTOLIC BLOOD PRESSURE: 145 MMHG | RESPIRATION RATE: 18 BRPM | OXYGEN SATURATION: 98 % | WEIGHT: 179.19 LBS | HEART RATE: 86 BPM

## 2020-01-23 DIAGNOSIS — C50.011 MALIGNANT NEOPLASM OF NIPPLE OF RIGHT BREAST IN FEMALE, ESTROGEN RECEPTOR POSITIVE (HCC): Primary | ICD-10-CM

## 2020-01-23 DIAGNOSIS — C79.51 BONE METASTASES (HCC): ICD-10-CM

## 2020-01-23 DIAGNOSIS — C50.011 MALIGNANT NEOPLASM OF NIPPLE OF RIGHT BREAST IN FEMALE, ESTROGEN RECEPTOR POSITIVE (HCC): ICD-10-CM

## 2020-01-23 DIAGNOSIS — C50.911 CARCINOMA OF RIGHT BREAST METASTATIC TO PLEURA (HCC): Primary | ICD-10-CM

## 2020-01-23 DIAGNOSIS — T45.1X5A CHEMOTHERAPY-INDUCED THROMBOCYTOPENIA: ICD-10-CM

## 2020-01-23 DIAGNOSIS — C78.2 CARCINOMA OF RIGHT BREAST METASTATIC TO PLEURA (HCC): Primary | ICD-10-CM

## 2020-01-23 DIAGNOSIS — C50.911 CARCINOMA OF RIGHT BREAST METASTATIC TO PLEURA (HCC): ICD-10-CM

## 2020-01-23 DIAGNOSIS — D64.81 ANEMIA ASSOCIATED WITH CHEMOTHERAPY: ICD-10-CM

## 2020-01-23 DIAGNOSIS — Z17.0 MALIGNANT NEOPLASM OF NIPPLE OF RIGHT BREAST IN FEMALE, ESTROGEN RECEPTOR POSITIVE (HCC): Primary | ICD-10-CM

## 2020-01-23 DIAGNOSIS — T45.1X5A ANEMIA ASSOCIATED WITH CHEMOTHERAPY: ICD-10-CM

## 2020-01-23 DIAGNOSIS — Z17.0 MALIGNANT NEOPLASM OF NIPPLE OF RIGHT BREAST IN FEMALE, ESTROGEN RECEPTOR POSITIVE (HCC): ICD-10-CM

## 2020-01-23 DIAGNOSIS — D69.59 CHEMOTHERAPY-INDUCED THROMBOCYTOPENIA: ICD-10-CM

## 2020-01-23 DIAGNOSIS — C78.2 CARCINOMA OF RIGHT BREAST METASTATIC TO PLEURA (HCC): ICD-10-CM

## 2020-01-23 LAB
ALBUMIN SERPL-MCNC: 3.4 G/DL (ref 3.4–5)
ALBUMIN/GLOB SERPL: 0.9 {RATIO} (ref 1–2)
ALP LIVER SERPL-CCNC: 196 U/L (ref 46–118)
ALT SERPL-CCNC: 53 U/L (ref 13–56)
ANION GAP SERPL CALC-SCNC: 5 MMOL/L (ref 0–18)
AST SERPL-CCNC: 45 U/L (ref 15–37)
BASOPHILS # BLD AUTO: 0.07 X10(3) UL (ref 0–0.2)
BASOPHILS NFR BLD AUTO: 1.2 %
BILIRUB SERPL-MCNC: 0.4 MG/DL (ref 0.1–2)
BUN BLD-MCNC: 10 MG/DL (ref 7–18)
BUN/CREAT SERPL: 12.2 (ref 10–20)
CALCIUM BLD-MCNC: 9 MG/DL (ref 8.5–10.1)
CHLORIDE SERPL-SCNC: 109 MMOL/L (ref 98–112)
CO2 SERPL-SCNC: 29 MMOL/L (ref 21–32)
CREAT BLD-MCNC: 0.82 MG/DL (ref 0.55–1.02)
DEPRECATED RDW RBC AUTO: 57.4 FL (ref 35.1–46.3)
EOSINOPHIL # BLD AUTO: 0.16 X10(3) UL (ref 0–0.7)
EOSINOPHIL NFR BLD AUTO: 2.9 %
ERYTHROCYTE [DISTWIDTH] IN BLOOD BY AUTOMATED COUNT: 15 % (ref 11–15)
GLOBULIN PLAS-MCNC: 3.9 G/DL (ref 2.8–4.4)
GLUCOSE BLD-MCNC: 97 MG/DL (ref 70–99)
HCT VFR BLD AUTO: 32.8 % (ref 35–48)
HGB BLD-MCNC: 10.5 G/DL (ref 12–16)
IMM GRANULOCYTES # BLD AUTO: 0.02 X10(3) UL (ref 0–1)
IMM GRANULOCYTES NFR BLD: 0.4 %
LYMPHOCYTES # BLD AUTO: 2.46 X10(3) UL (ref 1–4)
LYMPHOCYTES NFR BLD AUTO: 43.9 %
M PROTEIN MFR SERPL ELPH: 7.3 G/DL (ref 6.4–8.2)
MCH RBC QN AUTO: 33.4 PG (ref 26–34)
MCHC RBC AUTO-ENTMCNC: 32 G/DL (ref 31–37)
MCV RBC AUTO: 104.5 FL (ref 80–100)
MONOCYTES # BLD AUTO: 0.41 X10(3) UL (ref 0.1–1)
MONOCYTES NFR BLD AUTO: 7.3 %
NEUTROPHILS # BLD AUTO: 2.49 X10 (3) UL (ref 1.5–7.7)
NEUTROPHILS # BLD AUTO: 2.49 X10(3) UL (ref 1.5–7.7)
NEUTROPHILS NFR BLD AUTO: 44.3 %
OSMOLALITY SERPL CALC.SUM OF ELEC: 295 MOSM/KG (ref 275–295)
PATIENT FASTING Y/N/NP: NO
PLATELET # BLD AUTO: 145 10(3)UL (ref 150–450)
POTASSIUM SERPL-SCNC: 3.9 MMOL/L (ref 3.5–5.1)
RBC # BLD AUTO: 3.14 X10(6)UL (ref 3.8–5.3)
SODIUM SERPL-SCNC: 143 MMOL/L (ref 136–145)
WBC # BLD AUTO: 5.6 X10(3) UL (ref 4–11)

## 2020-01-23 PROCEDURE — 99215 OFFICE O/P EST HI 40 MIN: CPT | Performed by: INTERNAL MEDICINE

## 2020-01-23 PROCEDURE — 96402 CHEMO HORMON ANTINEOPL SQ/IM: CPT

## 2020-01-23 RX ORDER — LAMOTRIGINE 25 MG/1
500 TABLET ORAL ONCE
Status: COMPLETED | OUTPATIENT
Start: 2020-01-23 | End: 2020-01-23

## 2020-01-23 RX ORDER — LAMOTRIGINE 25 MG/1
500 TABLET ORAL ONCE
Status: CANCELLED | OUTPATIENT
Start: 2020-01-23

## 2020-01-23 RX ADMIN — LAMOTRIGINE 500 MG: 25 TABLET ORAL at 15:31:00

## 2020-01-23 NOTE — PROGRESS NOTES
Cancer Center Progress Note  Patient Name: Mireya Flores   YOB: 1960   Medical Record Number: [id number]     Attending Physician: Shayne Vivar M.D. Date of Visit: 1/23/2020      Chief Complaint:  Patient presents with:   Follow - 05/2015       Family History:  Family History   Adopted: Yes   Family history unknown: Yes       Social History:  Social History    Socioeconomic History      Marital status:       Spouse name: Not on file      Number of children: Not on file      Y Back Care: Not Asked        Exercise: No        Bike Helmet: Not Asked        Seat Belt: Not Asked        Self-Exams: Not Asked    Social History Narrative      Not on file      Current Medications:    Current Outpatient Medications:   •  Levothyroxine Sod HIVES, ITCHING  Iodine (Topical)        OTHER (SEE COMMENTS)     Review of Systems:    Constitutional No fevers, chills, night sweats, excessive fatigue or weight loss. Eyes No significant visual difficulties. No diplopia. No yellowing.    Hematologic/Lym Verbalizes understanding of our discussions today.      Recent Labs     01/23/20  1442   RBC 3.14 L   HGB 10.5 L   HCT 32.8 L   .5 H   MCH 33.4   MCHC 32.0   RDW 15.0   NEPRELIM 2.49   WBC 5.6   .0 L     Recent Labs     01/23/20  1442   GLU 97

## 2020-02-06 RX ORDER — PREGABALIN 75 MG/1
150 CAPSULE ORAL 2 TIMES DAILY
Qty: 360 CAPSULE | Refills: 2 | Status: SHIPPED | OUTPATIENT
Start: 2020-02-06 | End: 2020-09-14

## 2020-02-10 RX ORDER — FLUTICASONE PROPIONATE AND SALMETEROL 50; 250 UG/1; UG/1
POWDER RESPIRATORY (INHALATION)
Qty: 60 EACH | Refills: 0 | Status: SHIPPED | OUTPATIENT
Start: 2020-02-10 | End: 2020-03-10

## 2020-02-10 NOTE — TELEPHONE ENCOUNTER
ADVAIR DISKUS 250/50MCG (YELLOW) 60    Asthma & COPD Medication Protocol Failed    Please see pended medications. Please sign if appropriate.       Thank you    Last OV: 01/13/2020    Last refill: 01/13/2020

## 2020-02-11 RX ORDER — BUPROPION HYDROCHLORIDE 150 MG/1
150 TABLET, EXTENDED RELEASE ORAL 2 TIMES DAILY
Qty: 180 TABLET | Refills: 0 | Status: SHIPPED | OUTPATIENT
Start: 2020-02-11 | End: 2020-04-26

## 2020-02-18 ENCOUNTER — PATIENT MESSAGE (OUTPATIENT)
Dept: FAMILY MEDICINE CLINIC | Facility: CLINIC | Age: 60
End: 2020-02-18

## 2020-02-18 RX ORDER — HYDROCODONE BITARTRATE AND ACETAMINOPHEN 7.5; 325 MG/1; MG/1
1 TABLET ORAL EVERY 8 HOURS PRN
Qty: 90 TABLET | Refills: 0 | Status: SHIPPED | OUTPATIENT
Start: 2020-02-18 | End: 2020-03-19

## 2020-02-18 NOTE — TELEPHONE ENCOUNTER
From: Lakshmi Mims  To: Madelyn Alvarenga MD  Sent: 2/18/2020 10:36 AM CST  Subject: Prescription Question    Good morning     I am writing to you about my prescription for Hydrocodone that is due now.  The first prescription was only for 30

## 2020-02-27 ENCOUNTER — OFFICE VISIT (OUTPATIENT)
Dept: HEMATOLOGY/ONCOLOGY | Age: 60
End: 2020-02-27
Attending: INTERNAL MEDICINE
Payer: COMMERCIAL

## 2020-02-27 VITALS
OXYGEN SATURATION: 98 % | RESPIRATION RATE: 18 BRPM | HEIGHT: 65.98 IN | TEMPERATURE: 99 F | HEART RATE: 69 BPM | DIASTOLIC BLOOD PRESSURE: 52 MMHG | WEIGHT: 186.38 LBS | BODY MASS INDEX: 29.95 KG/M2 | SYSTOLIC BLOOD PRESSURE: 110 MMHG

## 2020-02-27 DIAGNOSIS — D64.9 ANEMIA, UNSPECIFIED TYPE: ICD-10-CM

## 2020-02-27 DIAGNOSIS — T45.1X5A CHEMOTHERAPY-INDUCED THROMBOCYTOPENIA: Primary | ICD-10-CM

## 2020-02-27 DIAGNOSIS — R79.89 ELEVATED LFTS: ICD-10-CM

## 2020-02-27 DIAGNOSIS — C50.911 CARCINOMA OF RIGHT BREAST METASTATIC TO PLEURA (HCC): ICD-10-CM

## 2020-02-27 DIAGNOSIS — C50.011 MALIGNANT NEOPLASM OF NIPPLE OF RIGHT BREAST IN FEMALE, ESTROGEN RECEPTOR POSITIVE (HCC): Primary | ICD-10-CM

## 2020-02-27 DIAGNOSIS — Z17.0 MALIGNANT NEOPLASM OF NIPPLE OF RIGHT BREAST IN FEMALE, ESTROGEN RECEPTOR POSITIVE (HCC): Primary | ICD-10-CM

## 2020-02-27 DIAGNOSIS — C79.51 BONE METASTASES (HCC): ICD-10-CM

## 2020-02-27 DIAGNOSIS — C78.2 CARCINOMA OF RIGHT BREAST METASTATIC TO PLEURA (HCC): ICD-10-CM

## 2020-02-27 DIAGNOSIS — C50.011 MALIGNANT NEOPLASM OF NIPPLE OF RIGHT BREAST IN FEMALE, ESTROGEN RECEPTOR POSITIVE (HCC): ICD-10-CM

## 2020-02-27 DIAGNOSIS — D69.59 CHEMOTHERAPY-INDUCED THROMBOCYTOPENIA: Primary | ICD-10-CM

## 2020-02-27 DIAGNOSIS — Z17.0 MALIGNANT NEOPLASM OF NIPPLE OF RIGHT BREAST IN FEMALE, ESTROGEN RECEPTOR POSITIVE (HCC): ICD-10-CM

## 2020-02-27 LAB
ALBUMIN SERPL-MCNC: 3.4 G/DL (ref 3.4–5)
ALBUMIN/GLOB SERPL: 0.9 {RATIO} (ref 1–2)
ALP LIVER SERPL-CCNC: 169 U/L (ref 46–118)
ALT SERPL-CCNC: 59 U/L (ref 13–56)
ANION GAP SERPL CALC-SCNC: 3 MMOL/L (ref 0–18)
AST SERPL-CCNC: 56 U/L (ref 15–37)
BASOPHILS # BLD AUTO: 0.07 X10(3) UL (ref 0–0.2)
BASOPHILS NFR BLD AUTO: 1.3 %
BILIRUB SERPL-MCNC: 0.3 MG/DL (ref 0.1–2)
BUN BLD-MCNC: 13 MG/DL (ref 7–18)
BUN/CREAT SERPL: 13.8 (ref 10–20)
CALCIUM BLD-MCNC: 9.6 MG/DL (ref 8.5–10.1)
CHLORIDE SERPL-SCNC: 110 MMOL/L (ref 98–112)
CO2 SERPL-SCNC: 30 MMOL/L (ref 21–32)
CREAT BLD-MCNC: 0.94 MG/DL (ref 0.55–1.02)
DEPRECATED RDW RBC AUTO: 56.5 FL (ref 35.1–46.3)
EOSINOPHIL # BLD AUTO: 0.12 X10(3) UL (ref 0–0.7)
EOSINOPHIL NFR BLD AUTO: 2.2 %
ERYTHROCYTE [DISTWIDTH] IN BLOOD BY AUTOMATED COUNT: 14.4 % (ref 11–15)
GLOBULIN PLAS-MCNC: 3.8 G/DL (ref 2.8–4.4)
GLUCOSE BLD-MCNC: 127 MG/DL (ref 70–99)
HCT VFR BLD AUTO: 33.6 % (ref 35–48)
HGB BLD-MCNC: 10.6 G/DL (ref 12–16)
IMM GRANULOCYTES # BLD AUTO: 0.01 X10(3) UL (ref 0–1)
IMM GRANULOCYTES NFR BLD: 0.2 %
LYMPHOCYTES # BLD AUTO: 2.34 X10(3) UL (ref 1–4)
LYMPHOCYTES NFR BLD AUTO: 43.4 %
M PROTEIN MFR SERPL ELPH: 7.2 G/DL (ref 6.4–8.2)
MCH RBC QN AUTO: 34.2 PG (ref 26–34)
MCHC RBC AUTO-ENTMCNC: 31.5 G/DL (ref 31–37)
MCV RBC AUTO: 108.4 FL (ref 80–100)
MONOCYTES # BLD AUTO: 0.27 X10(3) UL (ref 0.1–1)
MONOCYTES NFR BLD AUTO: 5 %
NEUTROPHILS # BLD AUTO: 2.58 X10 (3) UL (ref 1.5–7.7)
NEUTROPHILS # BLD AUTO: 2.58 X10(3) UL (ref 1.5–7.7)
NEUTROPHILS NFR BLD AUTO: 47.9 %
OSMOLALITY SERPL CALC.SUM OF ELEC: 298 MOSM/KG (ref 275–295)
PATIENT FASTING Y/N/NP: NO
PLATELET # BLD AUTO: 145 10(3)UL (ref 150–450)
POTASSIUM SERPL-SCNC: 4.1 MMOL/L (ref 3.5–5.1)
RBC # BLD AUTO: 3.1 X10(6)UL (ref 3.8–5.3)
SODIUM SERPL-SCNC: 143 MMOL/L (ref 136–145)
WBC # BLD AUTO: 5.4 X10(3) UL (ref 4–11)

## 2020-02-27 PROCEDURE — 96402 CHEMO HORMON ANTINEOPL SQ/IM: CPT

## 2020-02-27 PROCEDURE — 99215 OFFICE O/P EST HI 40 MIN: CPT | Performed by: INTERNAL MEDICINE

## 2020-02-27 RX ORDER — LAMOTRIGINE 25 MG/1
500 TABLET ORAL ONCE
Status: CANCELLED | OUTPATIENT
Start: 2020-02-27

## 2020-02-27 RX ORDER — LAMOTRIGINE 25 MG/1
500 TABLET ORAL ONCE
Status: COMPLETED | OUTPATIENT
Start: 2020-02-27 | End: 2020-02-27

## 2020-02-27 RX ADMIN — LAMOTRIGINE 500 MG: 25 TABLET ORAL at 15:53:00

## 2020-02-27 NOTE — PROGRESS NOTES
Cancer Center Progress Note  Patient Name: Lakshmi Mims   YOB: 1960   Medical Record Number: [id number]     Attending Physician: Stevie Pollock M.D. Date of Visit: 2/27/2020    Chief Complaint:  Patient presents with:   Follow - Up 05/2015       Family History:  Family History   Adopted: Yes   Family history unknown: Yes       Social History:  Social History    Socioeconomic History      Marital status:       Spouse name: Not on file      Number of children: Not on file      Y Back Care: Not Asked        Exercise: No        Bike Helmet: Not Asked        Seat Belt: Not Asked        Self-Exams: Not Asked    Social History Narrative      Not on file      Current Medications:    Current Outpatient Medications:   •  HYDROcodone-aceta No significant visual difficulties. No diplopia. No yellowing of the eyes. Hematologic/Lymphatic No easy bruising or bleeding. No any tender or palpable lymph nodes. Respiratory No dyspnea, Pleuritic chest pain, cough or hemoptysis.    Cardiovascular N Labs     02/27/20  1511   RBC 3.10 L   HGB 10.6 L   HCT 33.6 L   .4 H   MCH 34.2 H   MCHC 31.5   RDW 14.4   NEPRELIM 2.58   WBC 5.4   .0 L     Recent Labs     02/27/20  1511    H   BUN 13   CREATSERUM 0.94   GFRAA 78   GFRNAA 68   CA 9

## 2020-03-10 RX ORDER — FLUTICASONE PROPIONATE AND SALMETEROL 50; 250 UG/1; UG/1
POWDER RESPIRATORY (INHALATION)
Qty: 60 EACH | Refills: 0 | Status: SHIPPED | OUTPATIENT
Start: 2020-03-10 | End: 2020-04-13

## 2020-03-10 NOTE — TELEPHONE ENCOUNTER
Fails protocol due to no AAP or ACT  LOV: 1/13/2020 med check    Advair  Last refill: 2/10/2020 60 each    Please refill if appropriate. Thank you.

## 2020-03-12 RX ORDER — LEVOTHYROXINE SODIUM 175 UG/1
TABLET ORAL
Qty: 90 TABLET | Refills: 3 | OUTPATIENT
Start: 2020-03-12

## 2020-03-23 ENCOUNTER — TELEPHONE (OUTPATIENT)
Dept: FAMILY MEDICINE CLINIC | Facility: CLINIC | Age: 60
End: 2020-03-23

## 2020-03-23 NOTE — TELEPHONE ENCOUNTER
Received request for a PA to be completed for pt's Omega-3-acid Ethyl Esters 1 GM capsules.   Form completed on covermymeds and sent to plan with lab documentation

## 2020-03-25 RX ORDER — LAMOTRIGINE 25 MG/1
500 TABLET ORAL ONCE
Status: CANCELLED | OUTPATIENT
Start: 2020-03-26

## 2020-03-26 ENCOUNTER — OFFICE VISIT (OUTPATIENT)
Dept: HEMATOLOGY/ONCOLOGY | Age: 60
End: 2020-03-26
Attending: INTERNAL MEDICINE
Payer: COMMERCIAL

## 2020-03-26 DIAGNOSIS — C50.011 MALIGNANT NEOPLASM OF NIPPLE OF RIGHT BREAST IN FEMALE, ESTROGEN RECEPTOR POSITIVE (HCC): Primary | ICD-10-CM

## 2020-03-26 DIAGNOSIS — C50.911 CARCINOMA OF RIGHT BREAST METASTATIC TO PLEURA (HCC): ICD-10-CM

## 2020-03-26 DIAGNOSIS — C79.51 BONE METASTASES (HCC): ICD-10-CM

## 2020-03-26 DIAGNOSIS — C78.2 CARCINOMA OF RIGHT BREAST METASTATIC TO PLEURA (HCC): ICD-10-CM

## 2020-03-26 DIAGNOSIS — Z17.0 MALIGNANT NEOPLASM OF NIPPLE OF RIGHT BREAST IN FEMALE, ESTROGEN RECEPTOR POSITIVE (HCC): Primary | ICD-10-CM

## 2020-03-26 LAB
ALBUMIN SERPL-MCNC: 3.7 G/DL (ref 3.4–5)
ALBUMIN/GLOB SERPL: 0.9 {RATIO} (ref 1–2)
ALP LIVER SERPL-CCNC: 183 U/L (ref 46–118)
ALT SERPL-CCNC: 88 U/L (ref 13–56)
ANION GAP SERPL CALC-SCNC: 3 MMOL/L (ref 0–18)
AST SERPL-CCNC: 96 U/L (ref 15–37)
BASOPHILS # BLD AUTO: 0.09 X10(3) UL (ref 0–0.2)
BASOPHILS NFR BLD AUTO: 1.5 %
BILIRUB SERPL-MCNC: 0.5 MG/DL (ref 0.1–2)
BUN BLD-MCNC: 11 MG/DL (ref 7–18)
BUN/CREAT SERPL: 9.6 (ref 10–20)
CALCIUM BLD-MCNC: 9.8 MG/DL (ref 8.5–10.1)
CHLORIDE SERPL-SCNC: 105 MMOL/L (ref 98–112)
CO2 SERPL-SCNC: 32 MMOL/L (ref 21–32)
CREAT BLD-MCNC: 1.14 MG/DL (ref 0.55–1.02)
DEPRECATED RDW RBC AUTO: 58 FL (ref 35.1–46.3)
EOSINOPHIL # BLD AUTO: 0.13 X10(3) UL (ref 0–0.7)
EOSINOPHIL NFR BLD AUTO: 2.1 %
ERYTHROCYTE [DISTWIDTH] IN BLOOD BY AUTOMATED COUNT: 14.6 % (ref 11–15)
GLOBULIN PLAS-MCNC: 4 G/DL (ref 2.8–4.4)
GLUCOSE BLD-MCNC: 108 MG/DL (ref 70–99)
HCT VFR BLD AUTO: 32.1 % (ref 35–48)
HGB BLD-MCNC: 10.2 G/DL (ref 12–16)
IMM GRANULOCYTES # BLD AUTO: 0.02 X10(3) UL (ref 0–1)
IMM GRANULOCYTES NFR BLD: 0.3 %
LYMPHOCYTES # BLD AUTO: 2.92 X10(3) UL (ref 1–4)
LYMPHOCYTES NFR BLD AUTO: 47.9 %
M PROTEIN MFR SERPL ELPH: 7.7 G/DL (ref 6.4–8.2)
MCH RBC QN AUTO: 34.7 PG (ref 26–34)
MCHC RBC AUTO-ENTMCNC: 31.8 G/DL (ref 31–37)
MCV RBC AUTO: 109.2 FL (ref 80–100)
MONOCYTES # BLD AUTO: 0.28 X10(3) UL (ref 0.1–1)
MONOCYTES NFR BLD AUTO: 4.6 %
NEUTROPHILS # BLD AUTO: 2.65 X10 (3) UL (ref 1.5–7.7)
NEUTROPHILS # BLD AUTO: 2.65 X10(3) UL (ref 1.5–7.7)
NEUTROPHILS NFR BLD AUTO: 43.6 %
OSMOLALITY SERPL CALC.SUM OF ELEC: 290 MOSM/KG (ref 275–295)
PATIENT FASTING Y/N/NP: NO
PLATELET # BLD AUTO: 156 10(3)UL (ref 150–450)
POTASSIUM SERPL-SCNC: 4 MMOL/L (ref 3.5–5.1)
RBC # BLD AUTO: 2.94 X10(6)UL (ref 3.8–5.3)
SODIUM SERPL-SCNC: 140 MMOL/L (ref 136–145)
WBC # BLD AUTO: 6.1 X10(3) UL (ref 4–11)

## 2020-03-26 PROCEDURE — 85025 COMPLETE CBC W/AUTO DIFF WBC: CPT

## 2020-03-26 PROCEDURE — 36415 COLL VENOUS BLD VENIPUNCTURE: CPT

## 2020-03-26 PROCEDURE — 80053 COMPREHEN METABOLIC PANEL: CPT

## 2020-03-26 PROCEDURE — 96402 CHEMO HORMON ANTINEOPL SQ/IM: CPT

## 2020-03-26 RX ORDER — LAMOTRIGINE 25 MG/1
500 TABLET ORAL ONCE
Status: COMPLETED | OUTPATIENT
Start: 2020-03-26 | End: 2020-03-26

## 2020-03-26 RX ADMIN — LAMOTRIGINE 500 MG: 25 TABLET ORAL at 15:35:00

## 2020-03-26 NOTE — PROGRESS NOTES
Education Record    Learner:  Patient    Disease / Diagnosis: faslodex    Barriers / Limitations:  None   Comments:    Method:  Brief focused   Comments:    General Topics:  Plan of care reviewed   Comments:    Outcome:  Shows understanding   Comments:

## 2020-03-26 NOTE — TELEPHONE ENCOUNTER
Looking at Covermymeds the PA was approved for pt's Omega-3-acid Ethyl Esters 1GM capsules. 2/22/20-3/23/23 Case ID [id number]    Called to [facility], spoke with noel Kilgore who voiced understanding and agreed to plan.

## 2020-04-03 ENCOUNTER — TELEPHONE (OUTPATIENT)
Dept: HEMATOLOGY/ONCOLOGY | Facility: HOSPITAL | Age: 60
End: 2020-04-03

## 2020-04-03 NOTE — TELEPHONE ENCOUNTER
Due to COVID 19 patient rescheduled to [facility] with Dr. Fortino Still. Pt's  informed.

## 2020-04-07 DIAGNOSIS — E78.1 HYPERTRIGLYCERIDEMIA: Primary | ICD-10-CM

## 2020-04-07 DIAGNOSIS — E78.2 HYPERLIPIDEMIA, MIXED: ICD-10-CM

## 2020-04-07 RX ORDER — OMEGA-3-ACID ETHYL ESTERS 1 G/1
CAPSULE, LIQUID FILLED ORAL
Qty: 360 CAPSULE | Refills: 1 | Status: SHIPPED | OUTPATIENT
Start: 2020-04-07 | End: 2020-06-21

## 2020-04-07 RX ORDER — LEVOTHYROXINE SODIUM 0.15 MG/1
150 TABLET ORAL
Qty: 90 TABLET | Refills: 1 | Status: SHIPPED | OUTPATIENT
Start: 2020-04-07 | End: 2020-07-09

## 2020-04-07 RX ORDER — ROSUVASTATIN CALCIUM 5 MG/1
TABLET, COATED ORAL
Qty: 90 TABLET | Refills: 1 | Status: SHIPPED | OUTPATIENT
Start: 2020-04-07 | End: 2020-12-17

## 2020-04-07 NOTE — TELEPHONE ENCOUNTER
Received request for refills of pt's Rosuvastatin 5mg, Levothyroxine 150mcg and Omega-3 1 GM.   Last OV 1/13/2020 last refills 1/13/2020

## 2020-04-13 ENCOUNTER — VIRTUAL PHONE E/M (OUTPATIENT)
Dept: FAMILY MEDICINE CLINIC | Facility: CLINIC | Age: 60
End: 2020-04-13

## 2020-04-13 DIAGNOSIS — R60.0 EDEMA OF BOTH LEGS: ICD-10-CM

## 2020-04-13 DIAGNOSIS — E78.2 HYPERLIPIDEMIA, MIXED: Primary | ICD-10-CM

## 2020-04-13 DIAGNOSIS — C79.51 BONE METASTASES (HCC): ICD-10-CM

## 2020-04-13 DIAGNOSIS — C50.011 MALIGNANT NEOPLASM OF NIPPLE OF RIGHT BREAST IN FEMALE, ESTROGEN RECEPTOR POSITIVE (HCC): ICD-10-CM

## 2020-04-13 DIAGNOSIS — F41.9 ANXIETY: ICD-10-CM

## 2020-04-13 DIAGNOSIS — C78.2 CARCINOMA OF RIGHT BREAST METASTATIC TO PLEURA (HCC): ICD-10-CM

## 2020-04-13 DIAGNOSIS — C50.911 CARCINOMA OF RIGHT BREAST METASTATIC TO PLEURA (HCC): ICD-10-CM

## 2020-04-13 DIAGNOSIS — R79.89 ELEVATED LIVER FUNCTION TESTS: ICD-10-CM

## 2020-04-13 DIAGNOSIS — Z17.0 MALIGNANT NEOPLASM OF NIPPLE OF RIGHT BREAST IN FEMALE, ESTROGEN RECEPTOR POSITIVE (HCC): ICD-10-CM

## 2020-04-13 DIAGNOSIS — R05.9 COUGH: ICD-10-CM

## 2020-04-13 DIAGNOSIS — R73.9 HYPERGLYCEMIA: ICD-10-CM

## 2020-04-13 DIAGNOSIS — M79.672 PAIN IN BOTH FEET: ICD-10-CM

## 2020-04-13 DIAGNOSIS — M79.671 PAIN IN BOTH FEET: ICD-10-CM

## 2020-04-13 DIAGNOSIS — E03.9 HYPOTHYROIDISM IN ADULT: ICD-10-CM

## 2020-04-13 PROCEDURE — 99214 OFFICE O/P EST MOD 30 MIN: CPT | Performed by: FAMILY MEDICINE

## 2020-04-13 RX ORDER — HYDROCODONE BITARTRATE AND ACETAMINOPHEN 7.5; 325 MG/1; MG/1
1 TABLET ORAL EVERY 8 HOURS PRN
Qty: 90 TABLET | Refills: 0 | Status: SHIPPED | OUTPATIENT
Start: 2020-06-14 | End: 2020-07-14

## 2020-04-13 RX ORDER — FLUTICASONE PROPIONATE AND SALMETEROL 250; 50 UG/1; UG/1
1 POWDER RESPIRATORY (INHALATION) EVERY 12 HOURS
Qty: 60 EACH | Refills: 0 | Status: SHIPPED | OUTPATIENT
Start: 2020-04-13 | End: 2020-08-20 | Stop reason: ALTCHOICE

## 2020-04-13 RX ORDER — ALPRAZOLAM 0.25 MG/1
0.25 TABLET ORAL EVERY 6 HOURS PRN
Qty: 30 TABLET | Refills: 2 | Status: SHIPPED | OUTPATIENT
Start: 2020-04-13 | End: 2020-09-03

## 2020-04-13 RX ORDER — HYDROCODONE BITARTRATE AND ACETAMINOPHEN 7.5; 325 MG/1; MG/1
1 TABLET ORAL EVERY 8 HOURS PRN
Qty: 90 TABLET | Refills: 0 | Status: SHIPPED | OUTPATIENT
Start: 2020-05-14 | End: 2020-06-13

## 2020-04-13 RX ORDER — HYDROCODONE BITARTRATE AND ACETAMINOPHEN 7.5; 325 MG/1; MG/1
1 TABLET ORAL EVERY 8 HOURS PRN
Qty: 90 TABLET | Refills: 0 | Status: SHIPPED | OUTPATIENT
Start: 2020-04-13 | End: 2020-05-13

## 2020-04-13 NOTE — PROGRESS NOTES
Virtual/Telephone Check-In    Freddy Thapa verbally consents to a [employer] on 04/13/20. Patient understands and accepts financial responsibility for any deductible, co-insurance and/or co-pays associated with this service.  This v has been performed and is otherwise negative and/or non-contributory, except as described above.     Current Outpatient Medications   Medication Sig Dispense Refill   • HYDROcodone-acetaminophen 7.5-325 MG Oral Tab Take 1 tablet by mouth every 8 (eight) alivia Pleural effusion     Hypokalemia     At risk for falling     Nipple lesion     Breast mass in female     Breast cancer metastasized to pleura [facility])     Bone metastases (HCC)     Malignant neoplasm of nipple of right breast in female [facility])     Adhesive caps 3.4 - 5.0 g/dL    Globulin  4.0 2.8 - 4.4 g/dL    A/G Ratio 0.9 (L) 1.0 - 2.0    FASTING No    CBC W/ DIFFERENTIAL   Result Value Ref Range    WBC 6.1 4.0 - 11.0 x10(3) uL    RBC 2.94 (L) 3.80 - 5.30 x10(6)uL    HGB 10.2 (L) 12.0 - 16.0 g/dL    HCT 32.1 (L pending test results    Pain in both feet continue using Norco use with caution    Cough continue Advair Diskus recommended to try Mucinex DM and see if that would help to bring the phlegm up. Follow up: 3 months.    Duration of service, in minutes: 25 m

## 2020-04-13 NOTE — PATIENT INSTRUCTIONS
Continue current meds. Watch diet for fats and carbs. Stay active. Continue Advair Diskus 1 puff 20 yesterday. Start Mucinex DM over-the-counter as needed for cough.   Do fasting blood work at cancer center please do not eat for 5 hours before doing

## 2020-04-22 ENCOUNTER — TELEPHONE (OUTPATIENT)
Dept: HEMATOLOGY/ONCOLOGY | Facility: HOSPITAL | Age: 60
End: 2020-04-22

## 2020-04-22 NOTE — TELEPHONE ENCOUNTER
Martha's  called saying she needs to have a CT scan done, and he would like to know where she is supposed to  her varium.  Please call

## 2020-04-26 RX ORDER — BUPROPION HYDROCHLORIDE 150 MG/1
TABLET, EXTENDED RELEASE ORAL
Qty: 180 TABLET | Refills: 1 | Status: SHIPPED | OUTPATIENT
Start: 2020-04-26 | End: 2020-10-23

## 2020-04-29 ENCOUNTER — HOSPITAL ENCOUNTER (OUTPATIENT)
Dept: CT IMAGING | Age: 60
Discharge: HOME | End: 2020-04-29
Attending: INTERNAL MEDICINE
Payer: COMMERCIAL

## 2020-04-29 DIAGNOSIS — C50.011 MALIGNANT NEOPLASM OF NIPPLE OF RIGHT BREAST IN FEMALE, ESTROGEN RECEPTOR POSITIVE (HCC): ICD-10-CM

## 2020-04-29 DIAGNOSIS — Z17.0 MALIGNANT NEOPLASM OF NIPPLE OF RIGHT BREAST IN FEMALE, ESTROGEN RECEPTOR POSITIVE (HCC): ICD-10-CM

## 2020-04-29 PROCEDURE — 71250 CT THORAX DX C-: CPT | Performed by: INTERNAL MEDICINE

## 2020-04-29 PROCEDURE — 74176 CT ABD & PELVIS W/O CONTRAST: CPT | Performed by: INTERNAL MEDICINE

## 2020-04-30 ENCOUNTER — APPOINTMENT (OUTPATIENT)
Dept: HEMATOLOGY/ONCOLOGY | Facility: HOSPITAL | Age: 60
End: 2020-04-30
Attending: INTERNAL MEDICINE
Payer: COMMERCIAL

## 2020-05-01 ENCOUNTER — OFFICE VISIT (OUTPATIENT)
Dept: HEMATOLOGY/ONCOLOGY | Facility: HOSPITAL | Age: 60
End: 2020-05-01
Attending: INTERNAL MEDICINE
Payer: COMMERCIAL

## 2020-05-01 VITALS
BODY MASS INDEX: 31.18 KG/M2 | OXYGEN SATURATION: 98 % | RESPIRATION RATE: 18 BRPM | DIASTOLIC BLOOD PRESSURE: 56 MMHG | HEART RATE: 82 BPM | TEMPERATURE: 99 F | HEIGHT: 65.98 IN | SYSTOLIC BLOOD PRESSURE: 124 MMHG | WEIGHT: 194 LBS

## 2020-05-01 DIAGNOSIS — C50.011 MALIGNANT NEOPLASM OF NIPPLE OF RIGHT BREAST IN FEMALE, ESTROGEN RECEPTOR POSITIVE (HCC): Primary | ICD-10-CM

## 2020-05-01 DIAGNOSIS — C78.2 CARCINOMA OF RIGHT BREAST METASTATIC TO PLEURA (HCC): ICD-10-CM

## 2020-05-01 DIAGNOSIS — D64.81 ANEMIA ASSOCIATED WITH CHEMOTHERAPY: ICD-10-CM

## 2020-05-01 DIAGNOSIS — Z17.0 MALIGNANT NEOPLASM OF NIPPLE OF RIGHT BREAST IN FEMALE, ESTROGEN RECEPTOR POSITIVE (HCC): Primary | ICD-10-CM

## 2020-05-01 DIAGNOSIS — C50.911 CARCINOMA OF RIGHT BREAST METASTATIC TO PLEURA (HCC): ICD-10-CM

## 2020-05-01 DIAGNOSIS — C79.51 BONE METASTASES (HCC): ICD-10-CM

## 2020-05-01 DIAGNOSIS — R79.89 ELEVATED LFTS: ICD-10-CM

## 2020-05-01 DIAGNOSIS — T45.1X5A ANEMIA ASSOCIATED WITH CHEMOTHERAPY: ICD-10-CM

## 2020-05-01 PROCEDURE — 99215 OFFICE O/P EST HI 40 MIN: CPT | Performed by: INTERNAL MEDICINE

## 2020-05-01 PROCEDURE — 96402 CHEMO HORMON ANTINEOPL SQ/IM: CPT

## 2020-05-01 RX ORDER — LAMOTRIGINE 25 MG/1
500 TABLET ORAL ONCE
Status: COMPLETED | OUTPATIENT
Start: 2020-05-01 | End: 2020-05-01

## 2020-05-01 RX ORDER — LAMOTRIGINE 25 MG/1
500 TABLET ORAL ONCE
Status: CANCELLED | OUTPATIENT
Start: 2020-05-01

## 2020-05-01 RX ADMIN — LAMOTRIGINE 500 MG: 25 TABLET ORAL at 15:14:00

## 2020-05-01 NOTE — PROGRESS NOTES
Pt here for C 17 D 1 faslodex.   Arrives Ambulating independently, accompanied by Self           Patient reports possible pregnancy since last therapy cycle: Not Applicable    Modifications in dose or schedule: No     Frequency of blood return and site chec

## 2020-05-09 NOTE — PROGRESS NOTES
Cancer Center Progress Note  Patient Name: Layla Del Rosario   YOB: 1960   Medical Record Number: [id number]     Attending Physician: Vitor Mcduffie M.D. Date of Visit: 5/1/20    Chief Complaint:  No chief complaint on file.        [employer] HARISH,L[employer],BIOPSY  05/2015       Family History:  Family History   Adopted: Yes   Family history unknown: Yes       Social History:  Social History    Socioeconomic History      Marital status:       Spouse name: Not on file      Number of chil Special Diet: Not Asked        Back Care: Not Asked        Exercise: No        Bike Helmet: Not Asked        Seat Belt: Not Asked        Self-Exams: Not Asked    Social History Narrative      Not on file      Current Medications:    Current Outpatient Medi TABLET DAILY, Disp: 90 tablet, Rfl: 1  •  VERZENIO 100 MG Oral Tab, TAKE 1 TABLET TWICE A DAY WITH OR WITHOUT FOOD (Patient taking differently: daily.  ), Disp: 56 tablet, Rfl: 13  •  diphenoxylate-atropine 2.5-0.025 MG Oral Tab, Take 1 tablet by mouth 4 ( Abdomen Normal - Non-tender, non-distended, no masses, ascites or hepatosplenomegaly. Extremities Normal - No cyanosis, clubbing or edema.     Integumentary Normal - No rashes and noJaundice   Neurologic Normal - No sensory or motor deficits, normal ce 26.0 - 34.0 pg 34.3 (H)   MCHC Latest Ref Range: 31.0 - 37.0 g/dL 31.2   MCV Latest Ref Range: 80.0 - 100.0 fL 110.0 (H)   RDW Latest Ref Range: 11.0 - 15.0 % 13.8   RDW-SD Latest Ref Range: 35.1 - 46.3 fL 55.6 (H)   Prelim Neutrophil Abs Latest Ref Range: was spent counseling the patient and/or on coordination of care. The diagnosis, prognosis, and general treatment was explained to the patient and the family. Electronically Signed by: Malaika Abreu M.D.   THE [facility]

## 2020-05-28 DIAGNOSIS — C79.51 BONE METASTASES (HCC): ICD-10-CM

## 2020-05-28 DIAGNOSIS — Z17.0 MALIGNANT NEOPLASM OF NIPPLE OF RIGHT BREAST IN FEMALE, ESTROGEN RECEPTOR POSITIVE (HCC): Primary | ICD-10-CM

## 2020-05-28 DIAGNOSIS — C50.911 CARCINOMA OF RIGHT BREAST METASTATIC TO PLEURA (HCC): ICD-10-CM

## 2020-05-28 DIAGNOSIS — C50.011 MALIGNANT NEOPLASM OF NIPPLE OF RIGHT BREAST IN FEMALE, ESTROGEN RECEPTOR POSITIVE (HCC): Primary | ICD-10-CM

## 2020-05-28 DIAGNOSIS — C78.2 CARCINOMA OF RIGHT BREAST METASTATIC TO PLEURA (HCC): ICD-10-CM

## 2020-05-29 ENCOUNTER — OFFICE VISIT (OUTPATIENT)
Dept: HEMATOLOGY/ONCOLOGY | Facility: HOSPITAL | Age: 60
End: 2020-05-29
Attending: INTERNAL MEDICINE
Payer: COMMERCIAL

## 2020-05-29 VITALS
RESPIRATION RATE: 18 BRPM | DIASTOLIC BLOOD PRESSURE: 44 MMHG | TEMPERATURE: 99 F | HEART RATE: 85 BPM | BODY MASS INDEX: 32.27 KG/M2 | HEIGHT: 65.98 IN | SYSTOLIC BLOOD PRESSURE: 100 MMHG | WEIGHT: 200.81 LBS

## 2020-05-29 DIAGNOSIS — C50.911 CARCINOMA OF RIGHT BREAST METASTATIC TO PLEURA (HCC): ICD-10-CM

## 2020-05-29 DIAGNOSIS — C50.011 MALIGNANT NEOPLASM OF NIPPLE OF RIGHT BREAST IN FEMALE, ESTROGEN RECEPTOR POSITIVE (HCC): Primary | ICD-10-CM

## 2020-05-29 DIAGNOSIS — D64.9 ANEMIA, UNSPECIFIED TYPE: ICD-10-CM

## 2020-05-29 DIAGNOSIS — C79.51 BONE METASTASES (HCC): ICD-10-CM

## 2020-05-29 DIAGNOSIS — Z17.0 MALIGNANT NEOPLASM OF NIPPLE OF RIGHT BREAST IN FEMALE, ESTROGEN RECEPTOR POSITIVE (HCC): Primary | ICD-10-CM

## 2020-05-29 DIAGNOSIS — C78.2 CARCINOMA OF RIGHT BREAST METASTATIC TO PLEURA (HCC): ICD-10-CM

## 2020-05-29 PROCEDURE — 96402 CHEMO HORMON ANTINEOPL SQ/IM: CPT

## 2020-05-29 PROCEDURE — 82607 VITAMIN B-12: CPT

## 2020-05-29 PROCEDURE — 83540 ASSAY OF IRON: CPT

## 2020-05-29 PROCEDURE — 85025 COMPLETE CBC W/AUTO DIFF WBC: CPT

## 2020-05-29 PROCEDURE — 80053 COMPREHEN METABOLIC PANEL: CPT

## 2020-05-29 PROCEDURE — 82728 ASSAY OF FERRITIN: CPT

## 2020-05-29 PROCEDURE — 36415 COLL VENOUS BLD VENIPUNCTURE: CPT

## 2020-05-29 PROCEDURE — 83550 IRON BINDING TEST: CPT

## 2020-05-29 PROCEDURE — 85045 AUTOMATED RETICULOCYTE COUNT: CPT

## 2020-05-29 RX ORDER — LAMOTRIGINE 25 MG/1
500 TABLET ORAL ONCE
Status: COMPLETED | OUTPATIENT
Start: 2020-05-29 | End: 2020-05-29

## 2020-05-29 RX ADMIN — LAMOTRIGINE 500 MG: 25 TABLET ORAL at 14:54:00

## 2020-06-01 ENCOUNTER — TELEPHONE (OUTPATIENT)
Dept: HEMATOLOGY/ONCOLOGY | Facility: HOSPITAL | Age: 60
End: 2020-06-01

## 2020-06-01 NOTE — TELEPHONE ENCOUNTER
Merline Mitchell, MD  P Edw Darian Munroe Rns             Needs to get IV iron at her next visit. This can wait until Monday to call her. Reviewed with patients . Will add iron infusion to her next visit 6/26/2020  Questions addressed.

## 2020-06-20 DIAGNOSIS — E78.1 HYPERTRIGLYCERIDEMIA: ICD-10-CM

## 2020-06-20 DIAGNOSIS — E78.2 HYPERLIPIDEMIA, MIXED: ICD-10-CM

## 2020-06-21 RX ORDER — OMEGA-3-ACID ETHYL ESTERS 1 G/1
CAPSULE, LIQUID FILLED ORAL
Qty: 360 CAPSULE | Refills: 1 | Status: SHIPPED | OUTPATIENT
Start: 2020-06-21 | End: 2020-12-17

## 2020-06-26 ENCOUNTER — OFFICE VISIT (OUTPATIENT)
Dept: HEMATOLOGY/ONCOLOGY | Facility: HOSPITAL | Age: 60
End: 2020-06-26
Attending: INTERNAL MEDICINE
Payer: COMMERCIAL

## 2020-06-26 DIAGNOSIS — C50.911 CARCINOMA OF RIGHT BREAST METASTATIC TO PLEURA (HCC): ICD-10-CM

## 2020-06-26 DIAGNOSIS — D50.0 IRON DEFICIENCY ANEMIA DUE TO CHRONIC BLOOD LOSS: ICD-10-CM

## 2020-06-26 DIAGNOSIS — C79.51 BONE METASTASES (HCC): ICD-10-CM

## 2020-06-26 DIAGNOSIS — C50.011 MALIGNANT NEOPLASM OF NIPPLE OF RIGHT BREAST IN FEMALE, ESTROGEN RECEPTOR POSITIVE (HCC): Primary | ICD-10-CM

## 2020-06-26 DIAGNOSIS — C78.2 BREAST CANCER METASTASIZED TO PLEURA, UNSPECIFIED LATERALITY (HCC): ICD-10-CM

## 2020-06-26 DIAGNOSIS — C78.2 CARCINOMA OF RIGHT BREAST METASTATIC TO PLEURA (HCC): ICD-10-CM

## 2020-06-26 DIAGNOSIS — Z17.0 MALIGNANT NEOPLASM OF NIPPLE OF RIGHT BREAST IN FEMALE, ESTROGEN RECEPTOR POSITIVE (HCC): Primary | ICD-10-CM

## 2020-06-26 DIAGNOSIS — E86.0 DEHYDRATION: ICD-10-CM

## 2020-06-26 DIAGNOSIS — C50.919 BREAST CANCER METASTASIZED TO PLEURA, UNSPECIFIED LATERALITY (HCC): ICD-10-CM

## 2020-06-26 PROCEDURE — 80053 COMPREHEN METABOLIC PANEL: CPT

## 2020-06-26 PROCEDURE — 96402 CHEMO HORMON ANTINEOPL SQ/IM: CPT

## 2020-06-26 PROCEDURE — 96374 THER/PROPH/DIAG INJ IV PUSH: CPT

## 2020-06-26 PROCEDURE — 85025 COMPLETE CBC W/AUTO DIFF WBC: CPT

## 2020-06-26 RX ORDER — LAMOTRIGINE 25 MG/1
500 TABLET ORAL ONCE
Status: COMPLETED | OUTPATIENT
Start: 2020-06-26 | End: 2020-06-26

## 2020-06-26 RX ADMIN — LAMOTRIGINE 500 MG: 25 TABLET ORAL at 15:40:00

## 2020-06-26 NOTE — PROGRESS NOTES
Pt here for venfer/faslodex.   Arrives Ambulating independently, accompanied by Self           Patient reports possible pregnancy since last therapy cycle: No    Modifications in dose or schedule: No     Frequency of blood return and site check throughout a

## 2020-07-09 DIAGNOSIS — E78.2 HYPERLIPIDEMIA, MIXED: ICD-10-CM

## 2020-07-09 DIAGNOSIS — E78.1 HYPERTRIGLYCERIDEMIA: ICD-10-CM

## 2020-07-09 NOTE — TELEPHONE ENCOUNTER
LEVOTHYROXINE 0.150MG (150MCG) TAB    The pt medication failed protocol due to no recent TSH level has been done. Please see pended medications. Please sign if appropriate.       Thank you    A MyChart message was sent to the pt to call an scheduled a

## 2020-07-10 ENCOUNTER — TELEPHONE (OUTPATIENT)
Dept: HEMATOLOGY/ONCOLOGY | Facility: HOSPITAL | Age: 60
End: 2020-07-10

## 2020-07-10 RX ORDER — LEVOTHYROXINE SODIUM 0.15 MG/1
TABLET ORAL
Qty: 90 TABLET | Refills: 0 | Status: SHIPPED | OUTPATIENT
Start: 2020-07-10 | End: 2020-09-03

## 2020-07-13 ENCOUNTER — OFFICE VISIT (OUTPATIENT)
Dept: HEMATOLOGY/ONCOLOGY | Age: 60
End: 2020-07-13
Attending: INTERNAL MEDICINE
Payer: COMMERCIAL

## 2020-07-13 VITALS
TEMPERATURE: 99 F | DIASTOLIC BLOOD PRESSURE: 67 MMHG | HEART RATE: 69 BPM | OXYGEN SATURATION: 96 % | RESPIRATION RATE: 16 BRPM | SYSTOLIC BLOOD PRESSURE: 124 MMHG

## 2020-07-13 DIAGNOSIS — E86.0 DEHYDRATION: ICD-10-CM

## 2020-07-13 DIAGNOSIS — D50.0 IRON DEFICIENCY ANEMIA DUE TO CHRONIC BLOOD LOSS: Primary | ICD-10-CM

## 2020-07-13 DIAGNOSIS — C78.2 BREAST CANCER METASTASIZED TO PLEURA, UNSPECIFIED LATERALITY (HCC): ICD-10-CM

## 2020-07-13 DIAGNOSIS — C50.919 BREAST CANCER METASTASIZED TO PLEURA, UNSPECIFIED LATERALITY (HCC): ICD-10-CM

## 2020-07-13 DIAGNOSIS — C79.51 BONE METASTASES (HCC): ICD-10-CM

## 2020-07-13 PROCEDURE — 96374 THER/PROPH/DIAG INJ IV PUSH: CPT

## 2020-07-15 ENCOUNTER — OFFICE VISIT (OUTPATIENT)
Dept: HEMATOLOGY/ONCOLOGY | Age: 60
End: 2020-07-15
Attending: INTERNAL MEDICINE
Payer: COMMERCIAL

## 2020-07-15 VITALS
OXYGEN SATURATION: 99 % | TEMPERATURE: 99 F | RESPIRATION RATE: 16 BRPM | DIASTOLIC BLOOD PRESSURE: 61 MMHG | SYSTOLIC BLOOD PRESSURE: 108 MMHG | HEART RATE: 76 BPM

## 2020-07-15 DIAGNOSIS — D50.0 IRON DEFICIENCY ANEMIA DUE TO CHRONIC BLOOD LOSS: Primary | ICD-10-CM

## 2020-07-15 DIAGNOSIS — E86.0 DEHYDRATION: ICD-10-CM

## 2020-07-15 DIAGNOSIS — C79.51 BONE METASTASES (HCC): ICD-10-CM

## 2020-07-15 DIAGNOSIS — C78.2 BREAST CANCER METASTASIZED TO PLEURA, UNSPECIFIED LATERALITY (HCC): ICD-10-CM

## 2020-07-15 DIAGNOSIS — C50.919 BREAST CANCER METASTASIZED TO PLEURA, UNSPECIFIED LATERALITY (HCC): ICD-10-CM

## 2020-07-15 PROCEDURE — 96374 THER/PROPH/DIAG INJ IV PUSH: CPT

## 2020-07-17 ENCOUNTER — TELEPHONE (OUTPATIENT)
Dept: HEMATOLOGY/ONCOLOGY | Facility: HOSPITAL | Age: 60
End: 2020-07-17

## 2020-07-17 ENCOUNTER — OFFICE VISIT (OUTPATIENT)
Dept: HEMATOLOGY/ONCOLOGY | Age: 60
End: 2020-07-17
Attending: INTERNAL MEDICINE
Payer: COMMERCIAL

## 2020-07-17 VITALS
OXYGEN SATURATION: 98 % | SYSTOLIC BLOOD PRESSURE: 120 MMHG | HEART RATE: 79 BPM | RESPIRATION RATE: 18 BRPM | DIASTOLIC BLOOD PRESSURE: 61 MMHG | TEMPERATURE: 99 F

## 2020-07-17 DIAGNOSIS — C78.2 BREAST CANCER METASTASIZED TO PLEURA, UNSPECIFIED LATERALITY (HCC): ICD-10-CM

## 2020-07-17 DIAGNOSIS — D50.0 IRON DEFICIENCY ANEMIA DUE TO CHRONIC BLOOD LOSS: Primary | ICD-10-CM

## 2020-07-17 DIAGNOSIS — C50.919 BREAST CANCER METASTASIZED TO PLEURA, UNSPECIFIED LATERALITY (HCC): ICD-10-CM

## 2020-07-17 DIAGNOSIS — E86.0 DEHYDRATION: ICD-10-CM

## 2020-07-17 DIAGNOSIS — C79.51 BONE METASTASES (HCC): ICD-10-CM

## 2020-07-17 PROCEDURE — 96374 THER/PROPH/DIAG INJ IV PUSH: CPT

## 2020-07-17 NOTE — TELEPHONE ENCOUNTER
Malaika Do called asking what infusion Russ Dukes was having 7/23. He says they thought her last infusion would be this week. Returned call.    7/23 APN, and injection

## 2020-07-17 NOTE — PROGRESS NOTES
Education Record    Learner:  Patient    Disease / Diagnosis: JOHN - Venofer    Barriers / Limitations:  None   Comments:    Method:  Discussion   Comments:    General Topics:  Medication, Side effects and symptom management and Plan of care reviewed   Comm

## 2020-07-20 ENCOUNTER — OFFICE VISIT (OUTPATIENT)
Dept: HEMATOLOGY/ONCOLOGY | Age: 60
End: 2020-07-20
Attending: INTERNAL MEDICINE
Payer: COMMERCIAL

## 2020-07-20 VITALS
DIASTOLIC BLOOD PRESSURE: 61 MMHG | RESPIRATION RATE: 18 BRPM | TEMPERATURE: 100 F | SYSTOLIC BLOOD PRESSURE: 111 MMHG | HEART RATE: 79 BPM | OXYGEN SATURATION: 96 %

## 2020-07-20 DIAGNOSIS — C78.2 BREAST CANCER METASTASIZED TO PLEURA, UNSPECIFIED LATERALITY (HCC): ICD-10-CM

## 2020-07-20 DIAGNOSIS — C79.51 BONE METASTASES (HCC): ICD-10-CM

## 2020-07-20 DIAGNOSIS — C50.919 BREAST CANCER METASTASIZED TO PLEURA, UNSPECIFIED LATERALITY (HCC): ICD-10-CM

## 2020-07-20 DIAGNOSIS — D50.0 IRON DEFICIENCY ANEMIA DUE TO CHRONIC BLOOD LOSS: Primary | ICD-10-CM

## 2020-07-20 DIAGNOSIS — E86.0 DEHYDRATION: ICD-10-CM

## 2020-07-20 PROCEDURE — 96374 THER/PROPH/DIAG INJ IV PUSH: CPT

## 2020-07-20 NOTE — PROGRESS NOTES
Education Record    Learner:  Patient    Disease / Diagnosis: Venofer - JOHN    Barriers / Limitations:  None   Comments:    Method:  Brief focused   Comments:    General Topics:  Medication and Plan of care reviewed   Comments:    Outcome:  Shows [place]

## 2020-07-23 ENCOUNTER — OFFICE VISIT (OUTPATIENT)
Dept: HEMATOLOGY/ONCOLOGY | Age: 60
End: 2020-07-23
Attending: INTERNAL MEDICINE
Payer: COMMERCIAL

## 2020-07-23 VITALS
DIASTOLIC BLOOD PRESSURE: 55 MMHG | HEIGHT: 65.98 IN | SYSTOLIC BLOOD PRESSURE: 100 MMHG | HEART RATE: 87 BPM | TEMPERATURE: 99 F | RESPIRATION RATE: 18 BRPM | OXYGEN SATURATION: 97 % | BODY MASS INDEX: 31.5 KG/M2 | WEIGHT: 196 LBS

## 2020-07-23 DIAGNOSIS — Z17.0 MALIGNANT NEOPLASM OF NIPPLE OF RIGHT BREAST IN FEMALE, ESTROGEN RECEPTOR POSITIVE (HCC): Primary | ICD-10-CM

## 2020-07-23 DIAGNOSIS — C50.011 MALIGNANT NEOPLASM OF NIPPLE OF RIGHT BREAST IN FEMALE, ESTROGEN RECEPTOR POSITIVE (HCC): Primary | ICD-10-CM

## 2020-07-23 DIAGNOSIS — C50.911 CARCINOMA OF RIGHT BREAST METASTATIC TO PLEURA (HCC): ICD-10-CM

## 2020-07-23 DIAGNOSIS — K62.5 RECTAL BLEEDING: ICD-10-CM

## 2020-07-23 DIAGNOSIS — C78.2 CARCINOMA OF RIGHT BREAST METASTATIC TO PLEURA (HCC): ICD-10-CM

## 2020-07-23 DIAGNOSIS — C79.51 BONE METASTASES (HCC): ICD-10-CM

## 2020-07-23 DIAGNOSIS — D50.0 IRON DEFICIENCY ANEMIA DUE TO CHRONIC BLOOD LOSS: ICD-10-CM

## 2020-07-23 LAB
ALBUMIN SERPL-MCNC: 2.9 G/DL (ref 3.4–5)
ALBUMIN/GLOB SERPL: 0.8 {RATIO} (ref 1–2)
ALP LIVER SERPL-CCNC: 195 U/L (ref 46–118)
ALT SERPL-CCNC: 31 U/L (ref 13–56)
ANION GAP SERPL CALC-SCNC: 4 MMOL/L (ref 0–18)
AST SERPL-CCNC: 59 U/L (ref 15–37)
BASOPHILS # BLD AUTO: 0.07 X10(3) UL (ref 0–0.2)
BASOPHILS NFR BLD AUTO: 1.2 %
BILIRUB SERPL-MCNC: 0.5 MG/DL (ref 0.1–2)
BUN BLD-MCNC: 7 MG/DL (ref 7–18)
BUN/CREAT SERPL: 7.4 (ref 10–20)
CALCIUM BLD-MCNC: 8.2 MG/DL (ref 8.5–10.1)
CHLORIDE SERPL-SCNC: 111 MMOL/L (ref 98–112)
CO2 SERPL-SCNC: 26 MMOL/L (ref 21–32)
CREAT BLD-MCNC: 0.94 MG/DL (ref 0.55–1.02)
DEPRECATED HBV CORE AB SER IA-ACNC: 194.2 NG/ML (ref 18–340)
DEPRECATED RDW RBC AUTO: 78 FL (ref 35.1–46.3)
EOSINOPHIL # BLD AUTO: 0.12 X10(3) UL (ref 0–0.7)
EOSINOPHIL NFR BLD AUTO: 2.1 %
ERYTHROCYTE [DISTWIDTH] IN BLOOD BY AUTOMATED COUNT: 18.6 % (ref 11–15)
GLOBULIN PLAS-MCNC: 3.6 G/DL (ref 2.8–4.4)
GLUCOSE BLD-MCNC: 189 MG/DL (ref 70–99)
HCT VFR BLD AUTO: 23.6 % (ref 35–48)
HGB BLD-MCNC: 7.2 G/DL (ref 12–16)
IMM GRANULOCYTES # BLD AUTO: 0.02 X10(3) UL (ref 0–1)
IMM GRANULOCYTES NFR BLD: 0.3 %
IRON SATURATION: 16 % (ref 15–50)
IRON SERPL-MCNC: 82 UG/DL (ref 50–170)
LYMPHOCYTES # BLD AUTO: 2.42 X10(3) UL (ref 1–4)
LYMPHOCYTES NFR BLD AUTO: 42.1 %
M PROTEIN MFR SERPL ELPH: 6.5 G/DL (ref 6.4–8.2)
MCH RBC QN AUTO: 35.5 PG (ref 26–34)
MCHC RBC AUTO-ENTMCNC: 30.5 G/DL (ref 31–37)
MCV RBC AUTO: 116.3 FL (ref 80–100)
MONOCYTES # BLD AUTO: 0.26 X10(3) UL (ref 0.1–1)
MONOCYTES NFR BLD AUTO: 4.5 %
NEUTROPHILS # BLD AUTO: 2.86 X10 (3) UL (ref 1.5–7.7)
NEUTROPHILS # BLD AUTO: 2.86 X10(3) UL (ref 1.5–7.7)
NEUTROPHILS NFR BLD AUTO: 49.8 %
OSMOLALITY SERPL CALC.SUM OF ELEC: 295 MOSM/KG (ref 275–295)
PATIENT FASTING Y/N/NP: NO
PLATELET # BLD AUTO: 115 10(3)UL (ref 150–450)
PLATELET MORPHOLOGY: NORMAL
POTASSIUM SERPL-SCNC: 3.3 MMOL/L (ref 3.5–5.1)
RBC # BLD AUTO: 2.03 X10(6)UL (ref 3.8–5.3)
SODIUM SERPL-SCNC: 141 MMOL/L (ref 136–145)
TOTAL IRON BINDING CAPACITY: 507 UG/DL (ref 240–450)
TRANSFERRIN SERPL-MCNC: 340 MG/DL (ref 200–360)
WBC # BLD AUTO: 5.8 X10(3) UL (ref 4–11)

## 2020-07-23 PROCEDURE — 99214 OFFICE O/P EST MOD 30 MIN: CPT | Performed by: CLINICAL NURSE SPECIALIST

## 2020-07-23 PROCEDURE — 96402 CHEMO HORMON ANTINEOPL SQ/IM: CPT

## 2020-07-23 RX ORDER — LAMOTRIGINE 25 MG/1
500 TABLET ORAL ONCE
Status: COMPLETED | OUTPATIENT
Start: 2020-07-23 | End: 2020-07-23

## 2020-07-23 RX ORDER — LAMOTRIGINE 25 MG/1
500 TABLET ORAL ONCE
Status: CANCELLED | OUTPATIENT
Start: 2020-07-24

## 2020-07-23 RX ADMIN — LAMOTRIGINE 500 MG: 25 TABLET ORAL at 14:02:00

## 2020-07-23 NOTE — PROGRESS NOTES
ANP Visit Note    Patient Name: Toi Lang   YOB: 1960   Medical Record Number: [id number]   CSN: [id number]   Date of visit: 7/23/2020   Uri Malagon MD   No primary care provider on file.      Chief Complaint/Reason for Visit:  Adria Lozano Metastatic breast cancer (HCC)     Dizziness     Dehydration     Pancytopenia (HCC)     Iron deficiency anemia due to chronic blood loss       Medical History:  Past Medical History:   Diagnosis Date   • Breast cancer ([facility] [address]    • Disorder of thyroid    • connections:        Talks on phone: Not on file        Gets together: Not on file        Attends [employer] service: Not on file        Active member of club or organization: Not on file        Attends meetings of clubs or organizations: Not on file TABLET(5 MG) BY MOUTH EVERY NIGHT, Disp: 90 tablet, Rfl: 1  •  pregabalin 75 MG Oral Cap, Take 2 capsules (150 mg total) by mouth 2 (two) times daily. , Disp: 360 capsule, Rfl: 2  •  ferrous sulfate 325 (65 FE) MG Oral Tab EC, Take 1 tablet (325 mg total) b Creatinine 0.94 0.55 - 1.02 mg/dL    BUN/CREA Ratio 7.4 (L) 10.0 - 20.0    Calcium, Total 8.2 (L) 8.5 - 10.1 mg/dL    Calculated Osmolality 295 275 - 295 mOsm/kg    GFR, Non- 67 >=60    GFR, -American 77 >=60    AST 59 (H) 15 - 37 U/ IV IRon, will refer to GI. Discussed at length with patient and she is willing to see [facility] GI to evaluation. Will repeat iron studies today. 3, Anemia: multifactorial - some chemo effect, GI evaluation for BRBPR with bowel movements.    4. Elevated LF

## 2020-07-23 NOTE — PROGRESS NOTES
Outpatient Oncology Care Plan  Problem list:  pain  respiratory  fatigue  knowledge deficit  nutrition  peripheral neuropathy  rash, rectal bleeding, sporadic bruising  Problems related to:    chemotherapy  disease/disease progression  self care  side effe

## 2020-07-24 ENCOUNTER — APPOINTMENT (OUTPATIENT)
Dept: HEMATOLOGY/ONCOLOGY | Age: 60
End: 2020-07-24
Attending: INTERNAL MEDICINE
Payer: COMMERCIAL

## 2020-07-27 ENCOUNTER — TELEPHONE (OUTPATIENT)
Dept: FAMILY MEDICINE CLINIC | Facility: CLINIC | Age: 60
End: 2020-07-27

## 2020-07-27 DIAGNOSIS — K62.5 BRBPR (BRIGHT RED BLOOD PER RECTUM): Primary | ICD-10-CM

## 2020-07-27 NOTE — TELEPHONE ENCOUNTER
I have received message from oncology as below. Please put a referral for Brannon HEREDIA for the patient # 3 visits to be evaluated for blood in the stool. I would like patient to contact [address] [place] [address] as soon as possible.   Please refer patient to see Dr. Wen Gautam

## 2020-07-27 NOTE — TELEPHONE ENCOUNTER
Referral order placed. Per chart her appt is with Dr Mariee Hill 7.31-per [facility] in network. I have placed referral order.

## 2020-08-10 ENCOUNTER — LAB ENCOUNTER (OUTPATIENT)
Dept: LAB | Facility: HOSPITAL | Age: 60
End: 2020-08-10
Attending: INTERNAL MEDICINE
Payer: COMMERCIAL

## 2020-08-10 DIAGNOSIS — Z01.818 PRE-OP TESTING: ICD-10-CM

## 2020-08-10 RX ORDER — ABEMACICLIB 100 MG/1
TABLET ORAL
Qty: 56 TABLET | Refills: 12 | Status: ON HOLD | OUTPATIENT
Start: 2020-08-10 | End: 2020-08-21

## 2020-08-11 LAB — SARS-COV-2 RNA RESP QL NAA+PROBE: NOT DETECTED

## 2020-08-12 PROBLEM — D12.0 BENIGN NEOPLASM OF CECUM: Status: ACTIVE | Noted: 2020-08-12

## 2020-08-12 PROBLEM — D12.2 BENIGN NEOPLASM OF ASCENDING COLON: Status: ACTIVE | Noted: 2020-08-12

## 2020-08-12 PROBLEM — K21.9 GASTROESOPHAGEAL REFLUX DISEASE WITHOUT ESOPHAGITIS: Status: ACTIVE | Noted: 2020-08-12

## 2020-08-12 PROCEDURE — 88305 TISSUE EXAM BY PATHOLOGIST: CPT | Performed by: INTERNAL MEDICINE

## 2020-08-20 ENCOUNTER — HOSPITAL ENCOUNTER (INPATIENT)
Facility: HOSPITAL | Age: 60
LOS: 1 days | Discharge: HOME | DRG: 812 | End: 2020-08-21
Attending: EMERGENCY MEDICINE | Admitting: HOSPITALIST
Payer: COMMERCIAL

## 2020-08-20 ENCOUNTER — OFFICE VISIT (OUTPATIENT)
Dept: HEMATOLOGY/ONCOLOGY | Age: 60
End: 2020-08-20
Attending: INTERNAL MEDICINE
Payer: COMMERCIAL

## 2020-08-20 VITALS
SYSTOLIC BLOOD PRESSURE: 99 MMHG | WEIGHT: 200.19 LBS | OXYGEN SATURATION: 99 % | HEART RATE: 77 BPM | DIASTOLIC BLOOD PRESSURE: 40 MMHG | BODY MASS INDEX: 32.17 KG/M2 | HEIGHT: 65.98 IN | TEMPERATURE: 98 F

## 2020-08-20 DIAGNOSIS — C50.919 METASTATIC BREAST CANCER (HCC): ICD-10-CM

## 2020-08-20 DIAGNOSIS — C78.2 CARCINOMA OF RIGHT BREAST METASTATIC TO PLEURA (HCC): ICD-10-CM

## 2020-08-20 DIAGNOSIS — D64.9 ANEMIA, UNSPECIFIED TYPE: ICD-10-CM

## 2020-08-20 DIAGNOSIS — Z17.0 MALIGNANT NEOPLASM OF NIPPLE OF RIGHT BREAST IN FEMALE, ESTROGEN RECEPTOR POSITIVE (HCC): Primary | ICD-10-CM

## 2020-08-20 DIAGNOSIS — C79.51 BONE METASTASES (HCC): ICD-10-CM

## 2020-08-20 DIAGNOSIS — Z17.0 MALIGNANT NEOPLASM OF NIPPLE OF RIGHT BREAST IN FEMALE, ESTROGEN RECEPTOR POSITIVE (HCC): ICD-10-CM

## 2020-08-20 DIAGNOSIS — C50.911 CARCINOMA OF RIGHT BREAST METASTATIC TO PLEURA (HCC): ICD-10-CM

## 2020-08-20 DIAGNOSIS — D50.0 IRON DEFICIENCY ANEMIA DUE TO CHRONIC BLOOD LOSS: Primary | ICD-10-CM

## 2020-08-20 DIAGNOSIS — K64.2 GRADE III HEMORRHOIDS: ICD-10-CM

## 2020-08-20 DIAGNOSIS — C50.011 MALIGNANT NEOPLASM OF NIPPLE OF RIGHT BREAST IN FEMALE, ESTROGEN RECEPTOR POSITIVE (HCC): Primary | ICD-10-CM

## 2020-08-20 DIAGNOSIS — C50.011 MALIGNANT NEOPLASM OF NIPPLE OF RIGHT BREAST IN FEMALE, ESTROGEN RECEPTOR POSITIVE (HCC): ICD-10-CM

## 2020-08-20 LAB
ALBUMIN SERPL-MCNC: 2.7 G/DL (ref 3.4–5)
ALBUMIN/GLOB SERPL: 0.8 {RATIO} (ref 1–2)
ALP LIVER SERPL-CCNC: 153 U/L (ref 46–118)
ALT SERPL-CCNC: 22 U/L (ref 13–56)
ANION GAP SERPL CALC-SCNC: 6 MMOL/L (ref 0–18)
ANTIBODY SCREEN: NEGATIVE
AST SERPL-CCNC: 38 U/L (ref 15–37)
BASOPHILS # BLD AUTO: 0.03 X10(3) UL (ref 0–0.2)
BASOPHILS NFR BLD AUTO: 0.6 %
BILIRUB SERPL-MCNC: 0.3 MG/DL (ref 0.1–2)
BUN BLD-MCNC: 9 MG/DL (ref 7–18)
BUN/CREAT SERPL: 12 (ref 10–20)
CALCIUM BLD-MCNC: 8.2 MG/DL (ref 8.5–10.1)
CHLORIDE SERPL-SCNC: 111 MMOL/L (ref 98–112)
CO2 SERPL-SCNC: 26 MMOL/L (ref 21–32)
CREAT BLD-MCNC: 0.75 MG/DL (ref 0.55–1.02)
DEPRECATED HBV CORE AB SER IA-ACNC: 31.3 NG/ML (ref 18–340)
DEPRECATED RDW RBC AUTO: 74.4 FL (ref 35.1–46.3)
EOSINOPHIL # BLD AUTO: 0.08 X10(3) UL (ref 0–0.7)
EOSINOPHIL NFR BLD AUTO: 1.7 %
ERYTHROCYTE [DISTWIDTH] IN BLOOD BY AUTOMATED COUNT: 17.2 % (ref 11–15)
GLOBULIN PLAS-MCNC: 3.2 G/DL (ref 2.8–4.4)
GLUCOSE BLD-MCNC: 94 MG/DL (ref 70–99)
HCT VFR BLD AUTO: 18.3 % (ref 35–48)
HGB BLD-MCNC: 5.3 G/DL (ref 12–16)
HGB RETIC QN AUTO: 31 PG (ref 28.2–36.6)
IMM GRANULOCYTES # BLD AUTO: 0.01 X10(3) UL (ref 0–1)
IMM GRANULOCYTES NFR BLD: 0.2 %
IMM RETICS NFR: 0.27 RATIO (ref 0.1–0.3)
IRON SATURATION: 44 % (ref 15–50)
IRON SERPL-MCNC: 235 UG/DL (ref 50–170)
LYMPHOCYTES # BLD AUTO: 2.07 X10(3) UL (ref 1–4)
LYMPHOCYTES NFR BLD AUTO: 44.3 %
M PROTEIN MFR SERPL ELPH: 5.9 G/DL (ref 6.4–8.2)
MCH RBC QN AUTO: 34.9 PG (ref 26–34)
MCHC RBC AUTO-ENTMCNC: 29 G/DL (ref 31–37)
MCV RBC AUTO: 120.4 FL (ref 80–100)
MONOCYTES # BLD AUTO: 0.26 X10(3) UL (ref 0.1–1)
MONOCYTES NFR BLD AUTO: 5.6 %
NEUTROPHILS # BLD AUTO: 2.22 X10 (3) UL (ref 1.5–7.7)
NEUTROPHILS # BLD AUTO: 2.22 X10(3) UL (ref 1.5–7.7)
NEUTROPHILS NFR BLD AUTO: 47.6 %
OSMOLALITY SERPL CALC.SUM OF ELEC: 294 MOSM/KG (ref 275–295)
PATIENT FASTING Y/N/NP: NO
PLATELET # BLD AUTO: 110 10(3)UL (ref 150–450)
PLATELET MORPHOLOGY: NORMAL
POTASSIUM SERPL-SCNC: 3.2 MMOL/L (ref 3.5–5.1)
RBC # BLD AUTO: 1.52 X10(6)UL (ref 3.8–5.3)
RETICS # AUTO: 58.6 X10(3) UL (ref 22.5–147.5)
RETICS/RBC NFR AUTO: 3.9 % (ref 0.5–2.5)
RH BLOOD TYPE: NEGATIVE
SARS-COV-2 RNA RESP QL NAA+PROBE: NOT DETECTED
SODIUM SERPL-SCNC: 143 MMOL/L (ref 136–145)
TOTAL IRON BINDING CAPACITY: 539 UG/DL (ref 240–450)
TRANSFERRIN SERPL-MCNC: 362 MG/DL (ref 200–360)
WBC # BLD AUTO: 4.7 X10(3) UL (ref 4–11)

## 2020-08-20 PROCEDURE — 30233N1 TRANSFUSION OF NONAUTOLOGOUS RED BLOOD CELLS INTO PERIPHERAL VEIN, PERCUTANEOUS APPROACH: ICD-10-PCS | Performed by: HOSPITALIST

## 2020-08-20 PROCEDURE — 99215 OFFICE O/P EST HI 40 MIN: CPT | Performed by: INTERNAL MEDICINE

## 2020-08-20 PROCEDURE — 99223 1ST HOSP IP/OBS HIGH 75: CPT | Performed by: HOSPITALIST

## 2020-08-20 PROCEDURE — 96402 CHEMO HORMON ANTINEOPL SQ/IM: CPT

## 2020-08-20 RX ORDER — ACETAMINOPHEN 325 MG/1
650 TABLET ORAL EVERY 6 HOURS PRN
Status: DISCONTINUED | OUTPATIENT
Start: 2020-08-20 | End: 2020-08-21

## 2020-08-20 RX ORDER — LAMOTRIGINE 25 MG/1
500 TABLET ORAL ONCE
Status: CANCELLED | OUTPATIENT
Start: 2020-08-21

## 2020-08-20 RX ORDER — LAMOTRIGINE 25 MG/1
500 TABLET ORAL ONCE
Status: COMPLETED | OUTPATIENT
Start: 2020-08-20 | End: 2020-08-20

## 2020-08-20 RX ORDER — ROSUVASTATIN CALCIUM 5 MG/1
5 TABLET, COATED ORAL NIGHTLY
Status: DISCONTINUED | OUTPATIENT
Start: 2020-08-20 | End: 2020-08-21

## 2020-08-20 RX ORDER — BUPROPION HYDROCHLORIDE 150 MG/1
150 TABLET, EXTENDED RELEASE ORAL 2 TIMES DAILY
Status: DISCONTINUED | OUTPATIENT
Start: 2020-08-20 | End: 2020-08-21

## 2020-08-20 RX ORDER — LEVOTHYROXINE SODIUM 0.15 MG/1
150 TABLET ORAL
Status: DISCONTINUED | OUTPATIENT
Start: 2020-08-21 | End: 2020-08-21

## 2020-08-20 RX ORDER — HYDROCODONE BITARTRATE AND ACETAMINOPHEN 7.5; 325 MG/1; MG/1
1 TABLET ORAL EVERY 6 HOURS PRN
COMMUNITY
End: 2020-09-03

## 2020-08-20 RX ORDER — ESCITALOPRAM OXALATE 20 MG/1
20 TABLET ORAL DAILY
Status: DISCONTINUED | OUTPATIENT
Start: 2020-08-20 | End: 2020-08-21

## 2020-08-20 RX ORDER — POTASSIUM CHLORIDE 20 MEQ/1
40 TABLET, EXTENDED RELEASE ORAL EVERY 4 HOURS
Status: COMPLETED | OUTPATIENT
Start: 2020-08-20 | End: 2020-08-21

## 2020-08-20 RX ORDER — METOCLOPRAMIDE HYDROCHLORIDE 5 MG/ML
10 INJECTION INTRAMUSCULAR; INTRAVENOUS EVERY 8 HOURS PRN
Status: DISCONTINUED | OUTPATIENT
Start: 2020-08-20 | End: 2020-08-21

## 2020-08-20 RX ORDER — SODIUM CHLORIDE 9 MG/ML
INJECTION, SOLUTION INTRAVENOUS ONCE
Status: COMPLETED | OUTPATIENT
Start: 2020-08-20 | End: 2020-08-20

## 2020-08-20 RX ORDER — ALPRAZOLAM 0.25 MG/1
0.25 TABLET ORAL 4 TIMES DAILY PRN
Status: DISCONTINUED | OUTPATIENT
Start: 2020-08-20 | End: 2020-08-21

## 2020-08-20 RX ORDER — PREGABALIN 75 MG/1
150 CAPSULE ORAL 2 TIMES DAILY
Status: DISCONTINUED | OUTPATIENT
Start: 2020-08-20 | End: 2020-08-21

## 2020-08-20 RX ORDER — ONDANSETRON 2 MG/ML
4 INJECTION INTRAMUSCULAR; INTRAVENOUS EVERY 6 HOURS PRN
Status: DISCONTINUED | OUTPATIENT
Start: 2020-08-20 | End: 2020-08-21

## 2020-08-20 RX ORDER — ACETAMINOPHEN 325 MG/1
650 TABLET ORAL ONCE
Status: COMPLETED | OUTPATIENT
Start: 2020-08-20 | End: 2020-08-20

## 2020-08-20 RX ORDER — DIPHENHYDRAMINE HCL 50 MG
50 CAPSULE ORAL ONCE
Status: COMPLETED | OUTPATIENT
Start: 2020-08-20 | End: 2020-08-20

## 2020-08-20 RX ORDER — MELATONIN
325
Status: DISCONTINUED | OUTPATIENT
Start: 2020-08-21 | End: 2020-08-21

## 2020-08-20 RX ORDER — PANTOPRAZOLE SODIUM 20 MG/1
20 TABLET, DELAYED RELEASE ORAL
Status: DISCONTINUED | OUTPATIENT
Start: 2020-08-21 | End: 2020-08-21

## 2020-08-20 RX ADMIN — LAMOTRIGINE 500 MG: 25 TABLET ORAL at 15:47:00

## 2020-08-20 NOTE — H&P
[facility]ITAL[facility]  History and Physical     Johnny Gonzalez Patient Status:  Emergency    1960 MRN [id number]   Location [facility] Attending Mary Morillo, [facility] Day # 0 PCP Zena Nolan MD     Chief Complaint: we previous alcohol use. She reports that she does not use drugs. Family History:   Family History   Adopted: Yes   Family history unknown:  Yes     Allergies:   Radiology Contrast *    HIVES, ITCHING  Iodine (Topical)        OTHER (SEE COMMENTS)  Medications Resp 18   Ht 167.6 cm (5' 6\")   Wt 200 lb (90.7 kg)   SpO2 100%   BMI 32.28 kg/m²   General: No acute distress. Alert and oriented x 3. HEENT: Normocephalic, atraumatic. EOM-I. Anicteric. Positive pallor  Neck: No lymphadenopathy. No JVD.  No carotid br

## 2020-08-20 NOTE — PROGRESS NOTES
Cancer Center Progress Note  Patient Name: Amanda Singh   YOB: 1960   Medical Record Number: [id number]     Attending Physician: Peyton Sol M.D. Date of Visit: 8/20/2020    Chief Complaint:  Patient presents with:   Follow - Up Procedure Laterality Date   • MOUTH ODONTECTOMY Left 6/28/2017    Performed by Huber Martin DDS at [facility] MAIN OR   • ORAL SURGERY  2017   • THORACOSCOPY/VATS Right 5/24/2015    Performed by Benita Purdy, Martha Bean MD at [address] [place] [address] Blood Transfusions: Not Asked        Caffeine Concern: Yes          1 1/2 a day 16oz        Occupational Exposure: Not Asked        Hobby Hazards: Not Asked        Sleep Concern: Not Asked        Stress Concern: Not Asked        Weight Concern: Not Aske (four) times daily. , Disp: 360 tablet, Rfl: 1    Allergies:    Radiology Contrast *    HIVES, ITCHING  Iodine (Topical)        OTHER (SEE COMMENTS)     Review of Systems:    Constitutional No fevers, chills, night sweats, or weight loss.    Eyes No signific Psychiatric Normal - A&Ox3, Coherent speech. Verbalizes understanding of our discussions today.        Recent Labs     08/20/20  1449   RBC 1.52 L   HGB 5.3 LL   HCT 18.3 L   .4 H   MCH 34.9 H   MCHC 29.0 L   RDW 17.2 H   NEPRELIM 2.22   WBC 4.7

## 2020-08-20 NOTE — ED INITIAL ASSESSMENT (HPI)
Pt here today for hgb of 5 ~ reports over the past few months has had increased fatigue and sleepiness. Three months ago Hgb was 8. Denies dark tarry stools.      Pt is currently being tx for breast cancer w/ oral chemo

## 2020-08-20 NOTE — ED PROVIDER NOTES
Patient Seen in: [facility] Emergency Department      History   Patient presents with:  Abnormal Result    Stated Complaint:     HPI    61-year-old female with metastatic breast cancer presents from oncology office for blood transfusion.   Patient ha systems reviewed and negative except as noted above.     Physical Exam     ED Triage Vitals [08/20/20 1731]   /39   Pulse 78   Resp 18   Temp 99.2 °F (37.3 °C)   Temp src Temporal   SpO2 100 %   O2 Device None (Room air)       Current:/39   Puls diagnosis)  Anemia, unspecified type  Metastatic breast cancer [facility])    Disposition:  Admit  8/20/2020  6:16 pm    Follow-up:  No follow-up provider specified.         Medications Prescribed:  Current Discharge Medication List                       Present

## 2020-08-20 NOTE — PROGRESS NOTES
Education Record    Learner:  Patient    Disease / Diagnosis: faslodex injections given    Barriers / Limitations:  None   Comments:    Method:  Brief focused   Comments:    General Topics:  Plan of care reviewed   Comments:    Outcome:  Shows understandin

## 2020-08-21 VITALS
DIASTOLIC BLOOD PRESSURE: 46 MMHG | WEIGHT: 199.81 LBS | RESPIRATION RATE: 18 BRPM | OXYGEN SATURATION: 96 % | BODY MASS INDEX: 32.11 KG/M2 | TEMPERATURE: 98 F | SYSTOLIC BLOOD PRESSURE: 125 MMHG | HEIGHT: 66 IN | HEART RATE: 75 BPM

## 2020-08-21 LAB
FOLATE SERPL-MCNC: 13.9 NG/ML (ref 8.7–?)
GLUCOSE BLD-MCNC: 114 MG/DL (ref 70–99)
HCT VFR BLD AUTO: 23.4 % (ref 35–48)
HGB BLD-MCNC: 7.1 G/DL (ref 12–16)
MCH RBC QN AUTO: 33 PG (ref 26–34)
MCHC RBC AUTO-ENTMCNC: 30.3 G/DL (ref 31–37)
MCV RBC AUTO: 108.8 FL (ref 80–100)
PLATELET # BLD AUTO: 104 10(3)UL (ref 150–450)
POTASSIUM SERPL-SCNC: 3.9 MMOL/L (ref 3.5–5.1)
RBC # BLD AUTO: 2.15 X10(6)UL (ref 3.8–5.3)
VIT B12 SERPL-MCNC: 363 PG/ML (ref 193–986)
WBC # BLD AUTO: 4.2 X10(3) UL (ref 4–11)

## 2020-08-21 PROCEDURE — 99239 HOSP IP/OBS DSCHRG MGMT >30: CPT | Performed by: HOSPITALIST

## 2020-08-21 PROCEDURE — 99254 IP/OBS CNSLTJ NEW/EST MOD 60: CPT | Performed by: INTERNAL MEDICINE

## 2020-08-21 RX ORDER — SODIUM CHLORIDE 9 MG/ML
INJECTION, SOLUTION INTRAVENOUS ONCE
Status: COMPLETED | OUTPATIENT
Start: 2020-08-21 | End: 2020-08-21

## 2020-08-21 RX ORDER — HYDROCODONE BITARTRATE AND ACETAMINOPHEN 5; 325 MG/1; MG/1
1.5 TABLET ORAL EVERY 6 HOURS PRN
Status: DISCONTINUED | OUTPATIENT
Start: 2020-08-21 | End: 2020-08-21

## 2020-08-21 NOTE — ED NOTES
Report called to floor bed ready pt to be transported to floor with RN pt with no complaints at this time

## 2020-08-21 NOTE — CONSULTS
THE MEDICAL [facility] Hematology and Oncology Consult Note    Reason for Consult: Breast Cancer, Anemia   Medical Record Number: [id number]   CSN: [id number]   Referring Physician: No ref.  provider found  PCP: Uri Malagon MD    History of Present Illness: 61F with a Abdominal distention 2012   • Anemia    • Anxiety    • Back pain    • Breast cancer [facility])    • Disorder of thyroid    • Fatigue 2019   • Feeling lonely Recent   • Hemorrhoids Unk   • High cholesterol    • Hyperlipidemia 6/29/2012   • Hypothyroid    • Leg s hepatosplenomegaly  Neuro: CN: II-XII grossly intact    Results:  Lab Results   Component Value Date    WBC 4.7 08/20/2020    HGB 5.3 (LL) 08/20/2020    HCT 18.3 (L) 08/20/2020    .4 (H) 08/20/2020    .0 (L) 08/20/2020     Lab Results   [place]

## 2020-08-21 NOTE — PLAN OF CARE
NURSING ADMISSION NOTE      Patient admitted via Cart  Oriented to room. Safety precautions initiated. Bed in low position. Call light in reach. Pt arrived from ER with  at bedside, alert and oriented x4 vitals stable, RA, denies SOB.  Pt hgb
NURSING DISCHARGE NOTE    Discharged Home via Wheelchair. Accompanied by Spouse  Belongings Taken by patient/family.
Pt AOx4. VSS. Hgb 7.1 this AM and transfused 1u PRBCs. Patient tolerated well. Hopes to d/c after transfusion completed. Patient resting comfortably in bed with  at bedside. Will continue to monitor.
Withdrawal symptoms include negative mood, urges to smoke, and difficulty concentrating

## 2020-08-21 NOTE — DISCHARGE SUMMARY
[facility] PSYCHIATRIC [place] HOSPITALIST  DISCHARGE SUMMARY      Patient Status:  Inpatient    1960 MRN [id number]   [facility] 4NW-A Attending Migue Arreguin MD   Hosp Day # 1 PCP Zamzam Pozo MD     Date of Admission: 2020  D total) by mouth every other day.  OVER THE COUNTER   Refills:  0        STOP taking these medications    Verzenio 100 MG Tabs  Generic drug:  Abemaciclib           ASK your doctor about these medications      Instructions Prescription details   ALPRAZolam 0 tablet  Refills:  1            [place]    Follow-up appointment:   No follow-up provider specified.   Appointments for Next 30 Days 8/21/2020 - 9/20/2020      Date Arrival Time Visit Type Length Department Provider     8/26/2020  9:00 AM  FOLLOW floor.   **Important Info for Public [employer] [facility]** Because the safety and protection of patients, staff, physicians and community is an absolute priority for [place] [address], we have put the following visitor restrictions in place during the [facility] area, to be dropped off at the door. Staff will be available to escort patients inside as needed. Family/friends will be notified when your loved one is ready for pick-up. Thank you for your understanding.                     Vital signs:  Temp:  [97.5 °F

## 2020-08-22 LAB
BLOOD TYPE BARCODE: 600

## 2020-08-24 ENCOUNTER — TELEPHONE (OUTPATIENT)
Dept: HEMATOLOGY/ONCOLOGY | Facility: HOSPITAL | Age: 60
End: 2020-08-24

## 2020-08-24 ENCOUNTER — TELEPHONE (OUTPATIENT)
Dept: FAMILY MEDICINE CLINIC | Facility: CLINIC | Age: 60
End: 2020-08-24

## 2020-08-24 DIAGNOSIS — K64.9 HEMORRHOIDS, UNSPECIFIED HEMORRHOID TYPE: Primary | ICD-10-CM

## 2020-08-24 NOTE — TELEPHONE ENCOUNTER
Pt was given the number for Dr. Prince So. He is out of the office for 2 weeks. I pended a referral to you for Dr. Jessica Zuniga. Please authorize if you are ok with this.

## 2020-08-24 NOTE — TELEPHONE ENCOUNTER
Returned call, and let him know Dr Donavon Callander would recommend any of the partners in [facility] group. With HMO insurance the referral will have to come from PCP. He verbalized understanding, and will call that office.

## 2020-08-24 NOTE — TELEPHONE ENCOUNTER
Lendell Goon called saying Ghazala Ring is out for 2 1/2 weeks, and that the referral from Dr. Oliva Morfin is only good for 1 visit. He is wondering if there was someone else  could refer Tiny Minors to, and if the referral could be written for ore that 1 visit.

## 2020-08-24 NOTE — TELEPHONE ENCOUNTER
Patient's  Nell Saba called states that patient needs a referral from her PCP for a surgical hemoroid removal.  Please see other  TE from today. Verified home number on file.

## 2020-08-24 NOTE — TELEPHONE ENCOUNTER
Lm for pt. s spouse Garo Elizabeth to [facility]. I also left a message saying  a referral was placed for pt to see Dr. Daniel Cross. I instructed him he should be able to see the information in Martha's my chart.  I did leave the contact information for Dr. Daniel Cross on the voice [place]

## 2020-08-25 DIAGNOSIS — D50.9 IRON DEFICIENCY ANEMIA, UNSPECIFIED IRON DEFICIENCY ANEMIA TYPE: Primary | ICD-10-CM

## 2020-08-25 DIAGNOSIS — C50.919 MALIGNANT NEOPLASM OF FEMALE BREAST, UNSPECIFIED ESTROGEN RECEPTOR STATUS, UNSPECIFIED LATERALITY, UNSPECIFIED SITE OF BREAST (HCC): ICD-10-CM

## 2020-08-26 ENCOUNTER — OFFICE VISIT (OUTPATIENT)
Dept: HEMATOLOGY/ONCOLOGY | Age: 60
End: 2020-08-26
Attending: INTERNAL MEDICINE
Payer: COMMERCIAL

## 2020-08-26 VITALS
TEMPERATURE: 98 F | OXYGEN SATURATION: 97 % | HEART RATE: 74 BPM | DIASTOLIC BLOOD PRESSURE: 47 MMHG | SYSTOLIC BLOOD PRESSURE: 115 MMHG | HEIGHT: 65.98 IN | WEIGHT: 195.38 LBS | RESPIRATION RATE: 18 BRPM | BODY MASS INDEX: 31.4 KG/M2

## 2020-08-26 DIAGNOSIS — R21 RASH: ICD-10-CM

## 2020-08-26 DIAGNOSIS — E78.1 HYPERTRIGLYCERIDEMIA: ICD-10-CM

## 2020-08-26 DIAGNOSIS — E03.9 HYPOTHYROIDISM, UNSPECIFIED TYPE: ICD-10-CM

## 2020-08-26 DIAGNOSIS — C50.919 MALIGNANT NEOPLASM OF FEMALE BREAST, UNSPECIFIED ESTROGEN RECEPTOR STATUS, UNSPECIFIED LATERALITY, UNSPECIFIED SITE OF BREAST (HCC): ICD-10-CM

## 2020-08-26 DIAGNOSIS — D50.9 IRON DEFICIENCY ANEMIA, UNSPECIFIED IRON DEFICIENCY ANEMIA TYPE: Primary | ICD-10-CM

## 2020-08-26 DIAGNOSIS — R60.0 LOWER LEG EDEMA: ICD-10-CM

## 2020-08-26 LAB
ALBUMIN SERPL-MCNC: 2.8 G/DL (ref 3.4–5)
ALBUMIN/GLOB SERPL: 0.8 {RATIO} (ref 1–2)
ALP LIVER SERPL-CCNC: 207 U/L (ref 46–118)
ALT SERPL-CCNC: 34 U/L (ref 13–56)
ANION GAP SERPL CALC-SCNC: 5 MMOL/L (ref 0–18)
AST SERPL-CCNC: 72 U/L (ref 15–37)
BASOPHILS # BLD AUTO: 0.06 X10(3) UL (ref 0–0.2)
BASOPHILS NFR BLD AUTO: 1.3 %
BILIRUB SERPL-MCNC: 0.7 MG/DL (ref 0.1–2)
BUN BLD-MCNC: 6 MG/DL (ref 7–18)
BUN/CREAT SERPL: 7.7 (ref 10–20)
CALCIUM BLD-MCNC: 8.6 MG/DL (ref 8.5–10.1)
CHLORIDE SERPL-SCNC: 112 MMOL/L (ref 98–112)
CHOLEST SMN-MCNC: 136 MG/DL (ref ?–200)
CO2 SERPL-SCNC: 28 MMOL/L (ref 21–32)
CREAT BLD-MCNC: 0.78 MG/DL (ref 0.55–1.02)
DEPRECATED RDW RBC AUTO: 89.3 FL (ref 35.1–46.3)
EOSINOPHIL # BLD AUTO: 0.09 X10(3) UL (ref 0–0.7)
EOSINOPHIL NFR BLD AUTO: 1.9 %
ERYTHROCYTE [DISTWIDTH] IN BLOOD BY AUTOMATED COUNT: 22.1 % (ref 11–15)
EST. AVERAGE GLUCOSE BLD GHB EST-MCNC: 91 MG/DL (ref 68–126)
GLOBULIN PLAS-MCNC: 3.5 G/DL (ref 2.8–4.4)
GLUCOSE BLD-MCNC: 109 MG/DL (ref 70–99)
HBA1C MFR BLD HPLC: 4.8 % (ref ?–5.7)
HCT VFR BLD AUTO: 28.5 % (ref 35–48)
HDLC SERPL-MCNC: 26 MG/DL (ref 40–59)
HGB BLD-MCNC: 8.7 G/DL (ref 12–16)
IMM GRANULOCYTES # BLD AUTO: 0.02 X10(3) UL (ref 0–1)
IMM GRANULOCYTES NFR BLD: 0.4 %
LDLC SERPL CALC-MCNC: 80 MG/DL (ref ?–100)
LYMPHOCYTES # BLD AUTO: 1.45 X10(3) UL (ref 1–4)
LYMPHOCYTES NFR BLD AUTO: 30.3 %
M PROTEIN MFR SERPL ELPH: 6.3 G/DL (ref 6.4–8.2)
MCH RBC QN AUTO: 33.6 PG (ref 26–34)
MCHC RBC AUTO-ENTMCNC: 30.5 G/DL (ref 31–37)
MCV RBC AUTO: 110 FL (ref 80–100)
MONOCYTES # BLD AUTO: 0.42 X10(3) UL (ref 0.1–1)
MONOCYTES NFR BLD AUTO: 8.8 %
NEUTROPHILS # BLD AUTO: 2.75 X10 (3) UL (ref 1.5–7.7)
NEUTROPHILS # BLD AUTO: 2.75 X10(3) UL (ref 1.5–7.7)
NEUTROPHILS NFR BLD AUTO: 57.3 %
NONHDLC SERPL-MCNC: 110 MG/DL (ref ?–130)
OSMOLALITY SERPL CALC.SUM OF ELEC: 298 MOSM/KG (ref 275–295)
PATIENT FASTING Y/N/NP: NO
PATIENT FASTING Y/N/NP: YES
PLATELET # BLD AUTO: 114 10(3)UL (ref 150–450)
PLATELET MORPHOLOGY: NORMAL
POTASSIUM SERPL-SCNC: 3.9 MMOL/L (ref 3.5–5.1)
RBC # BLD AUTO: 2.59 X10(6)UL (ref 3.8–5.3)
SODIUM SERPL-SCNC: 145 MMOL/L (ref 136–145)
T4 FREE SERPL-MCNC: 1.1 NG/DL (ref 0.8–1.7)
TRIGL SERPL-MCNC: 150 MG/DL (ref 30–149)
TSI SER-ACNC: 6.33 MIU/ML (ref 0.36–3.74)
VLDLC SERPL CALC-MCNC: 30 MG/DL (ref 0–30)
WBC # BLD AUTO: 4.8 X10(3) UL (ref 4–11)

## 2020-08-26 PROCEDURE — 99214 OFFICE O/P EST MOD 30 MIN: CPT | Performed by: NURSE PRACTITIONER

## 2020-08-26 RX ORDER — TRIAMCINOLONE ACETONIDE 0.25 MG/ML
1 LOTION TOPICAL DAILY
Qty: 60 ML | Refills: 0 | Status: SHIPPED | OUTPATIENT
Start: 2020-08-26 | End: 2020-12-21

## 2020-08-26 RX ORDER — TRIAMCINOLONE ACETONIDE 0.25 MG/ML
1 LOTION TOPICAL ONCE
Qty: 60 ML | Refills: 0 | Status: SHIPPED | OUTPATIENT
Start: 2020-08-26 | End: 2020-08-26

## 2020-08-26 NOTE — PROGRESS NOTES
ANP Visit Note    Patient Name: Mallory Rome   YOB: 1960   Medical Record Number: [id number]   CSN: [id number]   Date of visit: 8/26/2020     Chief Complaint/Reason for Visit:  Patient presents with:  Hospital F/U     History of Present Illne Iron deficiency anemia due to chronic blood loss     Gastroesophageal reflux disease without esophagitis     Benign neoplasm of cecum     Benign neoplasm of ascending colon     Anemia     Dyslipidemia     Anemia, unspecified type       Medical History: Smokeless tobacco: Never Used    Substance and Sexual Activity      Alcohol use: Not Currently        Alcohol/week: 0.0 standard drinks        Comment: 2 drinks/yr       Drug use: No      Sexual activity: Not Currently    Lifestyle      Physical activity: 360 capsule, Rfl: 1  •  BUPROPION HCL ER, SR, 150 MG Oral Tablet 12 Hr, TAKE 1 TABLET TWICE A DAY (2 TABLETS DAILY AS DIRECTED), Disp: 180 tablet, Rfl: 1  •  fluticasone-salmeterol 250-50 MCG/DOSE Inhalation Aerosol Powder, Breath Activated, Inhale 1 puff COMP METABOLIC PANEL (14)    Collection Time: 08/26/20  8:49 AM   Result Value Ref Range    Glucose 109 (H) 70 - 99 mg/dL    Sodium 145 136 - 145 mmol/L    Potassium 3.9 3.5 - 5.1 mmol/L    Chloride 112 98 - 112 mmol/L    CO2 28.0 21.0 - 32.0 mmol/L    A Normal Normal    Microcytosis 1+      Macrocytosis 2+ (A)      Tear Drop Cells 1+         Impression/Plan    Anemia: recently hospitalized with anemia requiring 3 units of PRBC. Pre-transfusion iron studies suggest mild iron deficiency.  Patient has follow

## 2020-08-27 ENCOUNTER — HOSPITAL ENCOUNTER (OUTPATIENT)
Dept: ULTRASOUND IMAGING | Age: 60
Discharge: HOME | End: 2020-08-27
Attending: NURSE PRACTITIONER
Payer: COMMERCIAL

## 2020-08-27 DIAGNOSIS — R60.0 LOWER LEG EDEMA: ICD-10-CM

## 2020-08-27 PROCEDURE — 93970 EXTREMITY STUDY: CPT | Performed by: NURSE PRACTITIONER

## 2020-08-28 ENCOUNTER — HOSPITAL ENCOUNTER (OUTPATIENT)
Dept: CT IMAGING | Age: 60
Discharge: HOME | End: 2020-08-28
Attending: INTERNAL MEDICINE
Payer: COMMERCIAL

## 2020-08-28 DIAGNOSIS — C79.51 BONE METASTASES (HCC): ICD-10-CM

## 2020-08-28 DIAGNOSIS — Z17.0 MALIGNANT NEOPLASM OF NIPPLE OF RIGHT BREAST IN FEMALE, ESTROGEN RECEPTOR POSITIVE (HCC): ICD-10-CM

## 2020-08-28 DIAGNOSIS — C50.011 MALIGNANT NEOPLASM OF NIPPLE OF RIGHT BREAST IN FEMALE, ESTROGEN RECEPTOR POSITIVE (HCC): ICD-10-CM

## 2020-08-28 PROCEDURE — 74176 CT ABD & PELVIS W/O CONTRAST: CPT | Performed by: INTERNAL MEDICINE

## 2020-08-28 PROCEDURE — 71250 CT THORAX DX C-: CPT | Performed by: INTERNAL MEDICINE

## 2020-08-31 ENCOUNTER — PATIENT MESSAGE (OUTPATIENT)
Dept: FAMILY MEDICINE CLINIC | Facility: CLINIC | Age: 60
End: 2020-08-31

## 2020-08-31 DIAGNOSIS — E78.2 HYPERLIPIDEMIA, MIXED: ICD-10-CM

## 2020-08-31 DIAGNOSIS — E78.1 HYPERTRIGLYCERIDEMIA: ICD-10-CM

## 2020-09-01 ENCOUNTER — OFFICE VISIT (OUTPATIENT)
Dept: SURGERY | Facility: CLINIC | Age: 60
End: 2020-09-01

## 2020-09-01 VITALS
SYSTOLIC BLOOD PRESSURE: 107 MMHG | DIASTOLIC BLOOD PRESSURE: 58 MMHG | HEART RATE: 70 BPM | TEMPERATURE: 97 F | WEIGHT: 195 LBS | BODY MASS INDEX: 31 KG/M2

## 2020-09-01 DIAGNOSIS — K64.4 EXTERNAL HEMORRHOID: Primary | ICD-10-CM

## 2020-09-01 DIAGNOSIS — K62.5 RECTAL BLEEDING: ICD-10-CM

## 2020-09-01 DIAGNOSIS — K64.8 INTERNAL HEMORRHOIDS: ICD-10-CM

## 2020-09-01 DIAGNOSIS — K42.9 UMBILICAL HERNIA WITHOUT OBSTRUCTION AND WITHOUT GANGRENE: ICD-10-CM

## 2020-09-01 DIAGNOSIS — M62.08 DIASTASIS RECTI: ICD-10-CM

## 2020-09-01 PROCEDURE — 3078F DIAST BP <80 MM HG: CPT | Performed by: SURGERY

## 2020-09-01 PROCEDURE — 99244 OFF/OP CNSLTJ NEW/EST MOD 40: CPT | Performed by: SURGERY

## 2020-09-01 PROCEDURE — 3074F SYST BP LT 130 MM HG: CPT | Performed by: SURGERY

## 2020-09-01 PROCEDURE — 1111F DSCHRG MED/CURRENT MED MERGE: CPT | Performed by: SURGERY

## 2020-09-01 NOTE — H&P
New Patient Visit Note       Active Problems      1. External hemorrhoid    2. Internal hemorrhoids    3. Rectal bleeding    4. Umbilical hernia without obstruction and without gangrene    5.  Diastasis recti        Chief Complaint   Patient presents with: 7.5-325 MG Oral Tab, Take 1 tablet by mouth every 6 (six) hours as needed for Pain., Disp: , Rfl:   •  omeprazole 20 MG Oral Capsule Delayed Release, Take 1 capsule (20 mg total) by mouth every morning before breakfast., Disp: 30 capsule, Rfl: 3  •  LEVOTH Positive for anal bleeding. Negative for abdominal distention, abdominal pain, blood in stool, constipation, diarrhea, nausea and vomiting. Genitourinary: Negative for difficulty urinating, dysuria and frequency.    Musculoskeletal: Negative for joint swe The patient does have a history of anemia. She was recently noted to have a hemoglobin of 4.3 and was admitted through the ED for transfusion. The patient does take iron supplements as well which is causing constipation.   She does note rectal bleeding in hydration to avoid constipation. As Evelina Delcid umbilical hernia and hemorrhoids are asymptomatic at this time, she does not wish to pursue any type of treatment. She will follow-up with me as needed.   She will return to for her routine care             The

## 2020-09-02 ENCOUNTER — OFFICE VISIT (OUTPATIENT)
Dept: HEMATOLOGY/ONCOLOGY | Age: 60
End: 2020-09-02
Attending: INTERNAL MEDICINE
Payer: COMMERCIAL

## 2020-09-02 VITALS
WEIGHT: 197.38 LBS | RESPIRATION RATE: 18 BRPM | OXYGEN SATURATION: 98 % | TEMPERATURE: 98 F | DIASTOLIC BLOOD PRESSURE: 62 MMHG | BODY MASS INDEX: 31.72 KG/M2 | HEART RATE: 78 BPM | SYSTOLIC BLOOD PRESSURE: 116 MMHG | HEIGHT: 65.98 IN

## 2020-09-02 DIAGNOSIS — R60.0 BILATERAL EDEMA OF LOWER EXTREMITY: ICD-10-CM

## 2020-09-02 DIAGNOSIS — L27.0 RASH, DRUG: ICD-10-CM

## 2020-09-02 DIAGNOSIS — C50.919 CARCINOMA OF BREAST METASTATIC TO BONE, UNSPECIFIED LATERALITY (HCC): ICD-10-CM

## 2020-09-02 DIAGNOSIS — R79.89 ELEVATED LFTS: ICD-10-CM

## 2020-09-02 DIAGNOSIS — C79.51 CARCINOMA OF BREAST METASTATIC TO BONE, UNSPECIFIED LATERALITY (HCC): ICD-10-CM

## 2020-09-02 DIAGNOSIS — D50.9 IRON DEFICIENCY ANEMIA, UNSPECIFIED IRON DEFICIENCY ANEMIA TYPE: Primary | ICD-10-CM

## 2020-09-02 LAB
ALBUMIN SERPL-MCNC: 2.9 G/DL (ref 3.4–5)
ALBUMIN/GLOB SERPL: 0.8 {RATIO} (ref 1–2)
ALP LIVER SERPL-CCNC: 226 U/L (ref 46–118)
ALT SERPL-CCNC: 38 U/L (ref 13–56)
ANION GAP SERPL CALC-SCNC: 3 MMOL/L (ref 0–18)
AST SERPL-CCNC: 75 U/L (ref 15–37)
BASOPHILS # BLD AUTO: 0.09 X10(3) UL (ref 0–0.2)
BASOPHILS NFR BLD AUTO: 1.6 %
BILIRUB SERPL-MCNC: 0.5 MG/DL (ref 0.1–2)
BUN BLD-MCNC: 8 MG/DL (ref 7–18)
BUN/CREAT SERPL: 10.8 (ref 10–20)
CALCIUM BLD-MCNC: 8.4 MG/DL (ref 8.5–10.1)
CHLORIDE SERPL-SCNC: 112 MMOL/L (ref 98–112)
CO2 SERPL-SCNC: 29 MMOL/L (ref 21–32)
CREAT BLD-MCNC: 0.74 MG/DL (ref 0.55–1.02)
DEPRECATED RDW RBC AUTO: 81.3 FL (ref 35.1–46.3)
EOSINOPHIL # BLD AUTO: 0.16 X10(3) UL (ref 0–0.7)
EOSINOPHIL NFR BLD AUTO: 2.8 %
ERYTHROCYTE [DISTWIDTH] IN BLOOD BY AUTOMATED COUNT: 20 % (ref 11–15)
GLOBULIN PLAS-MCNC: 3.5 G/DL (ref 2.8–4.4)
GLUCOSE BLD-MCNC: 119 MG/DL (ref 70–99)
HCT VFR BLD AUTO: 29.5 % (ref 35–48)
HGB BLD-MCNC: 8.9 G/DL (ref 12–16)
IMM GRANULOCYTES # BLD AUTO: 0 X10(3) UL (ref 0–1)
IMM GRANULOCYTES NFR BLD: 0 %
LYMPHOCYTES # BLD AUTO: 2.1 X10(3) UL (ref 1–4)
LYMPHOCYTES NFR BLD AUTO: 36.5 %
M PROTEIN MFR SERPL ELPH: 6.4 G/DL (ref 6.4–8.2)
MCH RBC QN AUTO: 33.3 PG (ref 26–34)
MCHC RBC AUTO-ENTMCNC: 30.2 G/DL (ref 31–37)
MCV RBC AUTO: 110.5 FL (ref 80–100)
MONOCYTES # BLD AUTO: 0.55 X10(3) UL (ref 0.1–1)
MONOCYTES NFR BLD AUTO: 9.5 %
NEUTROPHILS # BLD AUTO: 2.86 X10 (3) UL (ref 1.5–7.7)
NEUTROPHILS # BLD AUTO: 2.86 X10(3) UL (ref 1.5–7.7)
NEUTROPHILS NFR BLD AUTO: 49.6 %
OSMOLALITY SERPL CALC.SUM OF ELEC: 297 MOSM/KG (ref 275–295)
PATIENT FASTING Y/N/NP: NO
PLATELET # BLD AUTO: 158 10(3)UL (ref 150–450)
PLATELET MORPHOLOGY: NORMAL
POTASSIUM SERPL-SCNC: 3.8 MMOL/L (ref 3.5–5.1)
RBC # BLD AUTO: 2.67 X10(6)UL (ref 3.8–5.3)
SODIUM SERPL-SCNC: 144 MMOL/L (ref 136–145)
WBC # BLD AUTO: 5.8 X10(3) UL (ref 4–11)

## 2020-09-02 PROCEDURE — 99215 OFFICE O/P EST HI 40 MIN: CPT | Performed by: INTERNAL MEDICINE

## 2020-09-02 NOTE — PROCEDURES
Date of procedure:   September 1, 2020    Preop Diagnosis:  Hemorrhoidal disease-rectal bleeding    Postop diagnosis:   Same    Procedure:  Anoscopy    Surgeon:   Gloria Vázquez    Anesthesia:   None    Findings:   Multiple large protuberant circumferential exter

## 2020-09-02 NOTE — TELEPHONE ENCOUNTER
Pt never called back on this but looks like she saw Dr Jason Briceño ofc as Dr Santana Fillers for this.

## 2020-09-02 NOTE — PROGRESS NOTES
Cancer Center Progress Note  Patient Name: Rachel Sinha   YOB: 1960   Medical Record Number: [id number]     Attending Physician: Janelle Mcdowell M.D. Date of Visit: 9/2/20    Chief Complaint:  Patient presents with:   Follow - Up Past Surgical History:  Past Surgical History:   Procedure Laterality Date   • MOUTH ODONTECTOMY Left 6/28/2017    Performed by Dino Choudhury DDS at [facility] MAIN OR   • ORAL SURGERY  2017   • THORACOSCOPY/VATS Right 5/24/2015    Performed by Benita Topics      Concerns:         Service: Not Asked        Blood Transfusions: Not Asked        Caffeine Concern: Yes          1 1/2 a day 16oz        Occupational Exposure: Not Asked        Hobby Hazards: Not Asked        Sleep Concern: Not Asked other day. OVER THE COUNTER, Disp: , Rfl: 0  •  ESCITALOPRAM 20 MG Oral Tab, TAKE 1 TABLET DAILY, Disp: 90 tablet, Rfl: 1  •  diphenoxylate-atropine 2.5-0.025 MG Oral Tab, Take 1 tablet by mouth 4 (four) times daily. , Disp: 360 tablet, Rfl: 1    Allergies: Non-tender, non-distended, no masses, ascites or hepatosplenomegaly. Extremities Normal - No visible deformities, no cyanosis, clubbing or edema. Integumentary Improving rash of the BUE and torso.   No Jaundice   Neurologic Normal - No sensory or laberto (L)   MCH Latest Ref Range: 26.0 - 34.0 pg 33.3   MCHC Latest Ref Range: 31.0 - 37.0 g/dL 30.2 (L)   MCV Latest Ref Range: 80.0 - 100.0 fL 110.5 (H)   RDW Latest Ref Range: 11.0 - 15.0 % 20.0 (H)   RDW-SD Latest Ref Range: 35.1 - 46.3 fL 81.3 (H)   Prelim TAC ointment prn. Planned Follow Up:  2 weeks for MD visit with labs    I spent * minutes face to face with the patient. More than 50% of that time was spent counseling the patient and/or on coordination of care.   The diagnosis, prognosis, and genera

## 2020-09-03 NOTE — TELEPHONE ENCOUNTER
From: Jeane Fitzgerald  To: Ria Hawkins MD  Sent: 8/31/2020 12:02 PM CDT  Subject: Prescription Question    Hi Doctor    I see that I have an appointment scheduled with you in October and that my thyroid is elevated.  This is past my 3 month and was won

## 2020-09-04 RX ORDER — ALPRAZOLAM 0.25 MG/1
0.25 TABLET ORAL EVERY 6 HOURS PRN
Qty: 30 TABLET | Refills: 0 | Status: SHIPPED | OUTPATIENT
Start: 2020-09-04 | End: 2020-10-05

## 2020-09-04 RX ORDER — LEVOTHYROXINE SODIUM 0.15 MG/1
150 TABLET ORAL
Qty: 90 TABLET | Refills: 0 | Status: SHIPPED | OUTPATIENT
Start: 2020-09-04 | End: 2020-10-05

## 2020-09-04 RX ORDER — HYDROCODONE BITARTRATE AND ACETAMINOPHEN 7.5; 325 MG/1; MG/1
1 TABLET ORAL EVERY 6 HOURS PRN
Qty: 90 TABLET | Refills: 0 | Status: SHIPPED | OUTPATIENT
Start: 2020-09-04 | End: 2021-01-08

## 2020-09-09 ENCOUNTER — HOSPITAL ENCOUNTER (OUTPATIENT)
Dept: CV DIAGNOSTICS | Age: 60
Discharge: HOME | End: 2020-09-09
Attending: INTERNAL MEDICINE
Payer: COMMERCIAL

## 2020-09-09 DIAGNOSIS — R60.0 BILATERAL EDEMA OF LOWER EXTREMITY: ICD-10-CM

## 2020-09-09 PROCEDURE — 93306 TTE W/DOPPLER COMPLETE: CPT | Performed by: INTERNAL MEDICINE

## 2020-09-14 ENCOUNTER — PATIENT MESSAGE (OUTPATIENT)
Dept: FAMILY MEDICINE CLINIC | Facility: CLINIC | Age: 60
End: 2020-09-14

## 2020-09-14 RX ORDER — PREGABALIN 75 MG/1
150 CAPSULE ORAL 2 TIMES DAILY
Qty: 360 CAPSULE | Refills: 2 | Status: CANCELLED | OUTPATIENT
Start: 2020-09-14

## 2020-09-14 RX ORDER — ESCITALOPRAM OXALATE 20 MG/1
TABLET ORAL
Qty: 30 TABLET | Refills: 0 | Status: SHIPPED | OUTPATIENT
Start: 2020-09-14 | End: 2020-09-15

## 2020-09-14 RX ORDER — PREGABALIN 75 MG/1
150 CAPSULE ORAL 2 TIMES DAILY
Qty: 360 CAPSULE | Refills: 2 | Status: SHIPPED | OUTPATIENT
Start: 2020-09-14 | End: 2020-09-15

## 2020-09-14 NOTE — TELEPHONE ENCOUNTER
ESCITALOPRAM TABS 20MG    Non protocol medication. Please see pended medications. Please sign if appropriate. Thank you    Pt has an upcoming appt scheduled for 10/05/2020.

## 2020-09-15 RX ORDER — ESCITALOPRAM OXALATE 20 MG/1
20 TABLET ORAL DAILY
Qty: 90 TABLET | Refills: 0 | Status: SHIPPED | OUTPATIENT
Start: 2020-09-15 | End: 2020-10-05

## 2020-09-15 RX ORDER — PREGABALIN 75 MG/1
150 CAPSULE ORAL 2 TIMES DAILY
Qty: 360 CAPSULE | Refills: 0 | Status: SHIPPED | OUTPATIENT
Start: 2020-09-15 | End: 2021-02-25

## 2020-09-15 NOTE — TELEPHONE ENCOUNTER
From: Mary Murphy  To: Kami Gamble MD  Sent: 9/14/2020 11:31 AM CDT  Subject: Prescription Question    Good morning Dr Baldev Randle -    I do not have any more escalotapram (sp) and no more lyrica.  I am not sure if these are coming from [place] or expr

## 2020-09-23 ENCOUNTER — OFFICE VISIT (OUTPATIENT)
Dept: HEMATOLOGY/ONCOLOGY | Age: 60
End: 2020-09-23
Attending: INTERNAL MEDICINE
Payer: COMMERCIAL

## 2020-09-23 VITALS
OXYGEN SATURATION: 97 % | RESPIRATION RATE: 16 BRPM | TEMPERATURE: 97 F | DIASTOLIC BLOOD PRESSURE: 64 MMHG | WEIGHT: 194 LBS | HEART RATE: 76 BPM | BODY MASS INDEX: 31.18 KG/M2 | HEIGHT: 65.98 IN | SYSTOLIC BLOOD PRESSURE: 123 MMHG

## 2020-09-23 DIAGNOSIS — D64.9 ANEMIA, UNSPECIFIED TYPE: ICD-10-CM

## 2020-09-23 DIAGNOSIS — C78.2 CARCINOMA OF RIGHT BREAST METASTATIC TO PLEURA (HCC): ICD-10-CM

## 2020-09-23 DIAGNOSIS — Z17.0 MALIGNANT NEOPLASM OF NIPPLE OF RIGHT BREAST IN FEMALE, ESTROGEN RECEPTOR POSITIVE (HCC): Primary | ICD-10-CM

## 2020-09-23 DIAGNOSIS — D50.9 IRON DEFICIENCY ANEMIA, UNSPECIFIED IRON DEFICIENCY ANEMIA TYPE: ICD-10-CM

## 2020-09-23 DIAGNOSIS — C50.011 MALIGNANT NEOPLASM OF NIPPLE OF RIGHT BREAST IN FEMALE, ESTROGEN RECEPTOR POSITIVE (HCC): Primary | ICD-10-CM

## 2020-09-23 DIAGNOSIS — C79.51 BONE METASTASES (HCC): ICD-10-CM

## 2020-09-23 DIAGNOSIS — C50.911 CARCINOMA OF RIGHT BREAST METASTATIC TO PLEURA (HCC): ICD-10-CM

## 2020-09-23 DIAGNOSIS — L27.0 RASH, DRUG: ICD-10-CM

## 2020-09-23 PROCEDURE — 96402 CHEMO HORMON ANTINEOPL SQ/IM: CPT

## 2020-09-23 PROCEDURE — 99215 OFFICE O/P EST HI 40 MIN: CPT | Performed by: INTERNAL MEDICINE

## 2020-09-23 RX ORDER — LAMOTRIGINE 25 MG/1
500 TABLET ORAL ONCE
Status: COMPLETED | OUTPATIENT
Start: 2020-09-23 | End: 2020-09-23

## 2020-09-23 RX ORDER — LAMOTRIGINE 25 MG/1
500 TABLET ORAL ONCE
Status: CANCELLED | OUTPATIENT
Start: 2020-09-23

## 2020-09-23 RX ADMIN — LAMOTRIGINE 500 MG: 25 TABLET ORAL at 14:53:00

## 2020-09-23 NOTE — PROGRESS NOTES
Education Record    Learner:  Patient    Disease / Diagnosis: faslodex    Barriers / Limitations:  None   Comments:    Method:  Brief focused   Comments:    General Topics:  Medication and Plan of care reviewed   Comments:    Outcome:  Shows understanding

## 2020-09-24 ENCOUNTER — TELEPHONE (OUTPATIENT)
Dept: HEMATOLOGY/ONCOLOGY | Facility: HOSPITAL | Age: 60
End: 2020-09-24

## 2020-09-24 NOTE — TELEPHONE ENCOUNTER
Alex Castro MD  P Edw Darian Munroe Rns             She needs IV iron. Please set her up for InFed. At that point, she can stop the oral iron, which will help the constipation. Pt's  informed and transferred to [facility] to schedule.

## 2020-09-25 ENCOUNTER — OFFICE VISIT (OUTPATIENT)
Dept: HEMATOLOGY/ONCOLOGY | Age: 60
End: 2020-09-25
Attending: INTERNAL MEDICINE
Payer: COMMERCIAL

## 2020-09-25 VITALS
HEART RATE: 68 BPM | OXYGEN SATURATION: 99 % | DIASTOLIC BLOOD PRESSURE: 66 MMHG | SYSTOLIC BLOOD PRESSURE: 124 MMHG | RESPIRATION RATE: 18 BRPM | TEMPERATURE: 97 F

## 2020-09-25 DIAGNOSIS — C50.919 BREAST CANCER METASTASIZED TO PLEURA, UNSPECIFIED LATERALITY (HCC): ICD-10-CM

## 2020-09-25 DIAGNOSIS — C79.51 BONE METASTASES (HCC): ICD-10-CM

## 2020-09-25 DIAGNOSIS — D50.0 IRON DEFICIENCY ANEMIA DUE TO CHRONIC BLOOD LOSS: Primary | ICD-10-CM

## 2020-09-25 DIAGNOSIS — E86.0 DEHYDRATION: ICD-10-CM

## 2020-09-25 DIAGNOSIS — C78.2 BREAST CANCER METASTASIZED TO PLEURA, UNSPECIFIED LATERALITY (HCC): ICD-10-CM

## 2020-09-25 PROCEDURE — 96376 TX/PRO/DX INJ SAME DRUG ADON: CPT

## 2020-09-25 PROCEDURE — 96365 THER/PROPH/DIAG IV INF INIT: CPT

## 2020-10-05 ENCOUNTER — OFFICE VISIT (OUTPATIENT)
Dept: FAMILY MEDICINE CLINIC | Facility: CLINIC | Age: 60
End: 2020-10-05

## 2020-10-05 VITALS
BODY MASS INDEX: 31.6 KG/M2 | WEIGHT: 192 LBS | HEART RATE: 72 BPM | DIASTOLIC BLOOD PRESSURE: 68 MMHG | HEIGHT: 65.5 IN | OXYGEN SATURATION: 97 % | RESPIRATION RATE: 16 BRPM | SYSTOLIC BLOOD PRESSURE: 118 MMHG | TEMPERATURE: 97 F

## 2020-10-05 DIAGNOSIS — C50.911 CARCINOMA OF RIGHT BREAST METASTATIC TO PLEURA (HCC): ICD-10-CM

## 2020-10-05 DIAGNOSIS — R60.0 EDEMA OF BOTH FEET: ICD-10-CM

## 2020-10-05 DIAGNOSIS — K21.9 GASTROESOPHAGEAL REFLUX DISEASE WITHOUT ESOPHAGITIS: ICD-10-CM

## 2020-10-05 DIAGNOSIS — D50.0 IRON DEFICIENCY ANEMIA DUE TO CHRONIC BLOOD LOSS: ICD-10-CM

## 2020-10-05 DIAGNOSIS — C79.51 BONE METASTASES (HCC): ICD-10-CM

## 2020-10-05 DIAGNOSIS — E78.2 HYPERLIPIDEMIA, MIXED: Primary | ICD-10-CM

## 2020-10-05 DIAGNOSIS — R79.89 ELEVATED LIVER FUNCTION TESTS: ICD-10-CM

## 2020-10-05 DIAGNOSIS — F41.9 ANXIETY: ICD-10-CM

## 2020-10-05 DIAGNOSIS — C50.919 METASTATIC BREAST CANCER (HCC): ICD-10-CM

## 2020-10-05 DIAGNOSIS — G62.9 PERIPHERAL POLYNEUROPATHY: ICD-10-CM

## 2020-10-05 DIAGNOSIS — C78.2 CARCINOMA OF RIGHT BREAST METASTATIC TO PLEURA (HCC): ICD-10-CM

## 2020-10-05 DIAGNOSIS — R21 RASH AND NONSPECIFIC SKIN ERUPTION: ICD-10-CM

## 2020-10-05 DIAGNOSIS — R20.8 BURNING SENSATION OF FEET: ICD-10-CM

## 2020-10-05 DIAGNOSIS — D61.818 PANCYTOPENIA (HCC): ICD-10-CM

## 2020-10-05 DIAGNOSIS — F17.210 CIGARETTE SMOKER: ICD-10-CM

## 2020-10-05 DIAGNOSIS — C50.919 MALIGNANT NEOPLASM OF FEMALE BREAST, UNSPECIFIED ESTROGEN RECEPTOR STATUS, UNSPECIFIED LATERALITY, UNSPECIFIED SITE OF BREAST (HCC): ICD-10-CM

## 2020-10-05 PROCEDURE — 3078F DIAST BP <80 MM HG: CPT | Performed by: FAMILY MEDICINE

## 2020-10-05 PROCEDURE — 99215 OFFICE O/P EST HI 40 MIN: CPT | Performed by: FAMILY MEDICINE

## 2020-10-05 PROCEDURE — 90471 IMMUNIZATION ADMIN: CPT | Performed by: FAMILY MEDICINE

## 2020-10-05 PROCEDURE — 90732 PPSV23 VACC 2 YRS+ SUBQ/IM: CPT | Performed by: FAMILY MEDICINE

## 2020-10-05 PROCEDURE — 3074F SYST BP LT 130 MM HG: CPT | Performed by: FAMILY MEDICINE

## 2020-10-05 PROCEDURE — 3008F BODY MASS INDEX DOCD: CPT | Performed by: FAMILY MEDICINE

## 2020-10-05 RX ORDER — FLUTICASONE PROPIONATE AND SALMETEROL 250; 50 UG/1; UG/1
1 POWDER RESPIRATORY (INHALATION) EVERY 12 HOURS SCHEDULED
Qty: 3 PACKAGE | Refills: 1 | Status: SHIPPED | OUTPATIENT
Start: 2020-10-05 | End: 2021-05-13

## 2020-10-05 RX ORDER — HYDROCODONE BITARTRATE AND ACETAMINOPHEN 7.5; 325 MG/1; MG/1
1 TABLET ORAL EVERY 6 HOURS PRN
Qty: 120 TABLET | Refills: 0 | Status: SHIPPED | OUTPATIENT
Start: 2020-10-05 | End: 2020-10-22

## 2020-10-05 RX ORDER — ALPRAZOLAM 0.25 MG/1
0.25 TABLET ORAL EVERY 6 HOURS PRN
Qty: 30 TABLET | Refills: 2 | Status: SHIPPED | OUTPATIENT
Start: 2020-10-05 | End: 2021-06-02

## 2020-10-05 RX ORDER — HYDROCODONE BITARTRATE AND ACETAMINOPHEN 7.5; 325 MG/1; MG/1
1 TABLET ORAL EVERY 6 HOURS PRN
Qty: 90 TABLET | Refills: 0 | Status: CANCELLED | OUTPATIENT
Start: 2020-10-05

## 2020-10-05 RX ORDER — ESCITALOPRAM OXALATE 20 MG/1
20 TABLET ORAL DAILY
Qty: 90 TABLET | Refills: 1 | Status: SHIPPED | OUTPATIENT
Start: 2020-10-05 | End: 2021-06-02

## 2020-10-05 RX ORDER — HYDROCODONE BITARTRATE AND ACETAMINOPHEN 7.5; 325 MG/1; MG/1
1 TABLET ORAL EVERY 6 HOURS PRN
Qty: 120 TABLET | Refills: 0 | Status: SHIPPED | OUTPATIENT
Start: 2020-12-06 | End: 2020-10-22

## 2020-10-05 RX ORDER — OMEPRAZOLE 20 MG/1
20 CAPSULE, DELAYED RELEASE ORAL
Qty: 90 CAPSULE | Refills: 1 | Status: CANCELLED | OUTPATIENT
Start: 2020-10-05

## 2020-10-05 RX ORDER — HYDROCODONE BITARTRATE AND ACETAMINOPHEN 7.5; 325 MG/1; MG/1
1 TABLET ORAL EVERY 6 HOURS PRN
Qty: 120 TABLET | Refills: 0 | Status: SHIPPED | OUTPATIENT
Start: 2020-11-05 | End: 2020-10-22

## 2020-10-05 RX ORDER — LEVOTHYROXINE SODIUM 0.15 MG/1
150 TABLET ORAL
Qty: 90 TABLET | Refills: 1 | Status: SHIPPED | OUTPATIENT
Start: 2020-10-05 | End: 2021-03-10

## 2020-10-05 RX ORDER — ABEMACICLIB 100 MG/1
100 TABLET ORAL 2 TIMES DAILY
Status: ON HOLD | COMMUNITY
Start: 2020-09-08 | End: 2020-12-23

## 2020-10-05 NOTE — PROGRESS NOTES
Antoni Lin is a 64year old female. cc neuropathy,breast cancer, hypercholesterolemia, elevated liver function test, hypothyroidism, anxiety, anemia.,  Rash, hypothyroidism  HPI:   Patient is coming to the office for follow-up of multiple medical problem uncontrolled. Patient says that she takes her medication faithfully every day. TSH came back low. Will decrease dose of the medication. Ease levothyroxine. Patient is anxious worrying about things feeling more down.   She became previously when [facility] tablet (0.25 mg total) by mouth every 6 (six) hours as needed. 30 tablet 2   • fluticasone-salmeterol 250-50 MCG/DOSE Inhalation Aerosol Powder, Breath Activated Inhale 1 puff into the lungs every 12 (twelve) hours.  3 Package 1   • HYDROcodone-acetaminophe breath     Only exertion   • Stress    • Wears glasses    • Weight gain Unk      Social History:  Social History    Tobacco Use      Smoking status: Light Tobacco Smoker        Packs/day: 0.50        Years: 40.00        Pack years: 20        Types: Cigaret mmol/L    Potassium 3.5 3.5 - 5.1 mmol/L    Chloride 111 98 - 112 mmol/L    CO2 28.0 21.0 - 32.0 mmol/L    Anion Gap 4 0 - 18 mmol/L    BUN 7 7 - 18 mg/dL    Creatinine 0.76 0.55 - 1.02 mg/dL    BUN/CREA Ratio 9.2 (L) 10.0 - 20.0    Calcium, Total 8.7 8.5 0.00 - 0.20 x10(3) uL    Immature Granulocyte Absolute 0.02 0.00 - 1.00 x10(3) uL    Neutrophil % 51.7 %    Lymphocyte % 33.4 %    Monocyte % 8.8 %    Eosinophil % 4.6 %    Basophil % 1.2 %    Immature Granulocyte % 0.3 %       ASSESSMENT AND PLAN:   Hyper Pain.   Continue current medications. Healthy diet. Keep good hydration. Do blood work for thyroid function test in November. Use hydrocodone with caution. Call for blood work orders before next visit. Schedule follow-up appointment January 2021.   He

## 2020-10-05 NOTE — PATIENT INSTRUCTIONS
Continue current medications. Healthy diet. Keep good hydration. Do blood work for thyroid function test in November. Use hydrocodone with caution. Call for blood work orders before next visit. Schedule follow-up appointment January 2021.

## 2020-10-05 NOTE — PROGRESS NOTES
Cancer Center Progress Note  Patient Name: Juan Diego Stringer   YOB: 1960   Medical Record Number: [id number]     Attending Physician: Vince Reed M.D. Date of Visit: 9/23/20    Chief Complaint:  Patient presents with:   Follow - Up History:  Past Surgical History:   Procedure Laterality Date   • MOUTH ODONTECTOMY Left 6/28/2017    Performed by Nissa Kim DDS at [facility] MAIN OR   • ORAL SURGERY  2017   • THORACOSCOPY/VATS Right 5/24/2015    Performed by Benita Purdy, Shweta Jennings MD at   Service: Not Asked        Blood Transfusions: Not Asked        Caffeine Concern: Yes          1 1/2 a day 16oz        Occupational Exposure: Not Asked        Hobby Hazards: Not Asked        Sleep Concern: Not Asked        Stress Concern: Not Asked tablet, Rfl: 1  •  ferrous sulfate 325 (65 FE) MG Oral Tab EC, Take 1 tablet (325 mg total) by mouth every other day. OVER THE COUNTER, Disp: , Rfl: 0  •  diphenoxylate-atropine 2.5-0.025 MG Oral Tab, Take 1 tablet by mouth 4 (four) times daily. , Disp: 360 non-distended, no masses, ascites or hepatosplenomegaly. Extremities Normal - No cyanosis, clubbing or edema.     Integumentary Stable rash, and noJaundice   Neurologic Normal - No sensory or motor deficits, normal cerebellar function, normal gait, crani Range:  4.0 - 11.0 x10(3) uL 6.9   Hemoglobin Latest Ref Range: 12.0 - 16.0 g/dL 7.8 (L)   Hematocrit Latest Ref Range: 35.0 - 48.0 % 26.0 (L)   Platelet Count Latest Ref Range: 150.0 - 450.0 10(3)uL 179.0   RBC Latest Ref Range: 3.80 - 5.30 x10(6)uL 2.44 ( Elevated LFT: Secondary to mets. Continue to monitor. Anemia: Severe iron deficiency and chemo-induced anemia. C-Scope revealed severe hemorrhoids, but she did not elect for intervention. Her hgb has drifted down and she is iron deficient again.  Thamas Necessary

## 2020-10-22 ENCOUNTER — OFFICE VISIT (OUTPATIENT)
Dept: HEMATOLOGY/ONCOLOGY | Age: 60
End: 2020-10-22
Attending: INTERNAL MEDICINE
Payer: COMMERCIAL

## 2020-10-22 VITALS
SYSTOLIC BLOOD PRESSURE: 112 MMHG | OXYGEN SATURATION: 99 % | TEMPERATURE: 98 F | DIASTOLIC BLOOD PRESSURE: 42 MMHG | WEIGHT: 191.81 LBS | BODY MASS INDEX: 30.82 KG/M2 | HEIGHT: 65.98 IN | HEART RATE: 82 BPM | RESPIRATION RATE: 18 BRPM

## 2020-10-22 DIAGNOSIS — C79.51 BONE METASTASES (HCC): ICD-10-CM

## 2020-10-22 DIAGNOSIS — C78.2 CARCINOMA OF RIGHT BREAST METASTATIC TO PLEURA (HCC): ICD-10-CM

## 2020-10-22 DIAGNOSIS — Z17.0 MALIGNANT NEOPLASM OF NIPPLE OF RIGHT BREAST IN FEMALE, ESTROGEN RECEPTOR POSITIVE (HCC): Primary | ICD-10-CM

## 2020-10-22 DIAGNOSIS — C50.011 MALIGNANT NEOPLASM OF NIPPLE OF RIGHT BREAST IN FEMALE, ESTROGEN RECEPTOR POSITIVE (HCC): ICD-10-CM

## 2020-10-22 DIAGNOSIS — Z17.0 MALIGNANT NEOPLASM OF NIPPLE OF RIGHT BREAST IN FEMALE, ESTROGEN RECEPTOR POSITIVE (HCC): ICD-10-CM

## 2020-10-22 DIAGNOSIS — C50.011 MALIGNANT NEOPLASM OF NIPPLE OF RIGHT BREAST IN FEMALE, ESTROGEN RECEPTOR POSITIVE (HCC): Primary | ICD-10-CM

## 2020-10-22 DIAGNOSIS — C78.2 CARCINOMA OF RIGHT BREAST METASTATIC TO PLEURA (HCC): Primary | ICD-10-CM

## 2020-10-22 DIAGNOSIS — C50.911 CARCINOMA OF RIGHT BREAST METASTATIC TO PLEURA (HCC): ICD-10-CM

## 2020-10-22 DIAGNOSIS — C50.911 CARCINOMA OF RIGHT BREAST METASTATIC TO PLEURA (HCC): Primary | ICD-10-CM

## 2020-10-22 DIAGNOSIS — D50.9 IRON DEFICIENCY ANEMIA, UNSPECIFIED IRON DEFICIENCY ANEMIA TYPE: ICD-10-CM

## 2020-10-22 DIAGNOSIS — R60.0 BILATERAL EDEMA OF LOWER EXTREMITY: ICD-10-CM

## 2020-10-22 PROCEDURE — 96402 CHEMO HORMON ANTINEOPL SQ/IM: CPT

## 2020-10-22 PROCEDURE — 99215 OFFICE O/P EST HI 40 MIN: CPT | Performed by: INTERNAL MEDICINE

## 2020-10-22 RX ORDER — LAMOTRIGINE 25 MG/1
500 TABLET ORAL ONCE
Status: COMPLETED | OUTPATIENT
Start: 2020-10-22 | End: 2020-10-22

## 2020-10-22 RX ORDER — LAMOTRIGINE 25 MG/1
500 TABLET ORAL ONCE
Status: CANCELLED | OUTPATIENT
Start: 2020-10-22

## 2020-10-22 RX ADMIN — LAMOTRIGINE 500 MG: 25 TABLET ORAL at 15:38:00

## 2020-10-22 NOTE — PROGRESS NOTES
Cancer Center Progress Note  Patient Name: Rachel Sinha   YOB: 1960   Medical Record Number: [id number]     Attending Physician: Janelle Mcdowell M.D. Date of Visit: 10/22/2020    Chief Complaint:  Patient presents with:   Follow - U Surgical History:   Procedure Laterality Date   • MOUTH ODONTECTOMY Left 6/28/2017    Performed by Khalif Dahl DDS at [facility] MAIN OR   • ORAL SURGERY  2017   • THORACOSCOPY/VATS Right 5/24/2015    Performed by Benita Purdy, Dalia Bishop MD at [address] [place] [address] Not Asked        Blood Transfusions: Not Asked        Caffeine Concern: Yes          1 1/2 a day 16oz        Occupational Exposure: Not Asked        Hobby Hazards: Not Asked        Sleep Concern: Not Asked        Stress Concern: Not Asked        Weight Con VERZENIO 100 MG Oral Tab, Take 1 tablet by mouth daily. , Disp: , Rfl:     Allergies:    Radiology Contrast *    HIVES, ITCHING  Iodine (Topical)        OTHER (SEE COMMENTS)     Review of Systems:    Constitutional No fevers, chills, night sweats, excessive or motor deficits, normal cerebellar function, normal gait, cranial nerves intact. Psychiatric Normal - A&Ox3, Coherent speech. Verbalizes understanding of our discussions today.      Recent Labs     10/22/20  1437   RBC 2.54 L   HGB 8.1 L   HCT 27.4 L

## 2020-10-23 RX ORDER — BUPROPION HYDROCHLORIDE 150 MG/1
TABLET, EXTENDED RELEASE ORAL
Qty: 180 TABLET | Refills: 1 | Status: SHIPPED | OUTPATIENT
Start: 2020-10-23 | End: 2020-12-21

## 2020-10-23 NOTE — TELEPHONE ENCOUNTER
BUPROPION HCL SR TABS 150MG    Non protocol medication. Please see pended medications. Please sign if appropriate.       Thank you      Last OV: 10/05/2020

## 2020-10-26 ENCOUNTER — NURSE ONLY (OUTPATIENT)
Dept: FAMILY MEDICINE CLINIC | Facility: CLINIC | Age: 60
End: 2020-10-26

## 2020-10-26 ENCOUNTER — TELEPHONE (OUTPATIENT)
Dept: FAMILY MEDICINE CLINIC | Facility: CLINIC | Age: 60
End: 2020-10-26

## 2020-10-26 DIAGNOSIS — Z23 NEED FOR SHINGLES VACCINE: Primary | ICD-10-CM

## 2020-10-26 PROCEDURE — 90750 HZV VACC RECOMBINANT IM: CPT | Performed by: FAMILY MEDICINE

## 2020-10-26 PROCEDURE — 90471 IMMUNIZATION ADMIN: CPT | Performed by: FAMILY MEDICINE

## 2020-11-21 ENCOUNTER — HOSPITAL ENCOUNTER (OUTPATIENT)
Dept: CT IMAGING | Age: 60
Discharge: HOME | End: 2020-11-21
Attending: INTERNAL MEDICINE
Payer: COMMERCIAL

## 2020-11-21 DIAGNOSIS — C78.2 CARCINOMA OF RIGHT BREAST METASTATIC TO PLEURA (HCC): ICD-10-CM

## 2020-11-21 DIAGNOSIS — C50.911 CARCINOMA OF RIGHT BREAST METASTATIC TO PLEURA (HCC): ICD-10-CM

## 2020-11-21 DIAGNOSIS — C79.51 BONE METASTASES (HCC): ICD-10-CM

## 2020-11-21 PROCEDURE — 74176 CT ABD & PELVIS W/O CONTRAST: CPT | Performed by: INTERNAL MEDICINE

## 2020-11-21 PROCEDURE — 71250 CT THORAX DX C-: CPT | Performed by: INTERNAL MEDICINE

## 2020-11-25 ENCOUNTER — OFFICE VISIT (OUTPATIENT)
Dept: HEMATOLOGY/ONCOLOGY | Age: 60
End: 2020-11-25
Attending: INTERNAL MEDICINE
Payer: COMMERCIAL

## 2020-11-25 VITALS
HEART RATE: 80 BPM | WEIGHT: 194.19 LBS | BODY MASS INDEX: 31.21 KG/M2 | RESPIRATION RATE: 18 BRPM | SYSTOLIC BLOOD PRESSURE: 93 MMHG | DIASTOLIC BLOOD PRESSURE: 55 MMHG | TEMPERATURE: 98 F | HEIGHT: 65.98 IN | OXYGEN SATURATION: 99 %

## 2020-11-25 DIAGNOSIS — C50.911 CARCINOMA OF RIGHT BREAST METASTATIC TO PLEURA (HCC): ICD-10-CM

## 2020-11-25 DIAGNOSIS — C79.51 BONE METASTASES (HCC): ICD-10-CM

## 2020-11-25 DIAGNOSIS — Z17.0 MALIGNANT NEOPLASM OF NIPPLE OF RIGHT BREAST IN FEMALE, ESTROGEN RECEPTOR POSITIVE (HCC): Primary | ICD-10-CM

## 2020-11-25 DIAGNOSIS — R79.89 ELEVATED LFTS: ICD-10-CM

## 2020-11-25 DIAGNOSIS — C50.011 MALIGNANT NEOPLASM OF NIPPLE OF RIGHT BREAST IN FEMALE, ESTROGEN RECEPTOR POSITIVE (HCC): Primary | ICD-10-CM

## 2020-11-25 DIAGNOSIS — C78.2 CARCINOMA OF RIGHT BREAST METASTATIC TO PLEURA (HCC): ICD-10-CM

## 2020-11-25 DIAGNOSIS — D50.0 IRON DEFICIENCY ANEMIA DUE TO CHRONIC BLOOD LOSS: ICD-10-CM

## 2020-11-25 DIAGNOSIS — L27.0 RASH, DRUG: ICD-10-CM

## 2020-11-25 PROCEDURE — 99215 OFFICE O/P EST HI 40 MIN: CPT | Performed by: INTERNAL MEDICINE

## 2020-11-25 PROCEDURE — 96402 CHEMO HORMON ANTINEOPL SQ/IM: CPT

## 2020-11-25 RX ORDER — LAMOTRIGINE 25 MG/1
500 TABLET ORAL ONCE
Status: CANCELLED | OUTPATIENT
Start: 2020-11-25

## 2020-11-25 RX ORDER — LAMOTRIGINE 25 MG/1
500 TABLET ORAL ONCE
Status: COMPLETED | OUTPATIENT
Start: 2020-11-25 | End: 2020-11-25

## 2020-11-25 RX ADMIN — LAMOTRIGINE 500 MG: 25 TABLET ORAL at 14:41:00

## 2020-11-25 NOTE — PROGRESS NOTES
Education Record    Learner:  Patient    Disease / Diagnosis: Faslodex INJ    Barriers / Limitations:  None   Comments:    Method:  Brief focused   Comments:    General Topics:  Medication and Plan of care reviewed   Comments:    Outcome:  Shows understand

## 2020-12-04 NOTE — PROGRESS NOTES
Cancer Center Progress Note  Patient Name: Catrachita Sahu   YOB: 1960   Medical Record Number: [id number]     Attending Physician: Sparkle Jason M.D. Date of Visit: 11/25/20    Chief Complaint:  No chief complaint on file.        On ODONTECTOMY Left 6/28/2017    Performed by Hannah Wallis DDS at [facility] MAIN OR   • ORAL SURGERY  2017   • THORACOSCOPY/VATS Right 5/24/2015    Performed by Benita Purdy, Dk Izquierdo MD at [address] [place] [address]  05/2015       Family History:  Nikita Scherer Caffeine Concern: Yes          1 1/2 a day 16oz        Occupational Exposure: Not Asked        Hobby Hazards: Not Asked        Sleep Concern: Not Asked        Stress Concern: Not Asked        Weight Concern: Not Asked        Special Diet: Not Asked 90 tablet, Rfl: 1    Allergies:    Radiology Contrast *    HIVES, ITCHING  Iodine (Topical)        OTHER (SEE COMMENTS)     Review of Systems:    Constitutional No fevers, chills, night sweats, excessive fatigue or weight loss.    Eyes No significant visual - No sensory or motor deficits, normal cerebellar function, normal gait, cranial nerves intact. Psychiatric Normal - Alert and oriented times three. Coherent speech. Verbalizes understanding of our discussions today.      Results for Cuong CurrieMRN E Prelim Neutrophil Abs Latest Ref Range: 1.50 - 7.70 x10 (3) uL 2.78   Neutrophils Absolute Latest Ref Range: 1.50 - 7.70 x10(3) uL 2.78   Lymphocytes Absolute Latest Ref Range: 1.00 - 4.00 x10(3) uL 2.27   Monocytes Absolute Latest Ref Range: 0.10 - 1.00 face to face with the patient. More than 50% of that time was spent counseling the patient and/or on coordination of care. The diagnosis, prognosis, and general treatment was explained to the patient and the family. Electronically Signed by:     Capri Lopez

## 2020-12-16 DIAGNOSIS — E78.2 HYPERLIPIDEMIA, MIXED: ICD-10-CM

## 2020-12-16 DIAGNOSIS — E78.1 HYPERTRIGLYCERIDEMIA: ICD-10-CM

## 2020-12-17 RX ORDER — OMEGA-3-ACID ETHYL ESTERS 1 G/1
CAPSULE, LIQUID FILLED ORAL
Qty: 360 CAPSULE | Refills: 1 | Status: SHIPPED | OUTPATIENT
Start: 2020-12-17 | End: 2020-12-21

## 2020-12-17 RX ORDER — ROSUVASTATIN CALCIUM 5 MG/1
TABLET, COATED ORAL
Qty: 90 TABLET | Refills: 1 | Status: SHIPPED | OUTPATIENT
Start: 2020-12-17 | End: 2020-12-21

## 2020-12-21 ENCOUNTER — APPOINTMENT (OUTPATIENT)
Dept: GENERAL RADIOLOGY | Facility: HOSPITAL | Age: 60
DRG: 812 | End: 2020-12-21
Attending: EMERGENCY MEDICINE
Payer: COMMERCIAL

## 2020-12-21 ENCOUNTER — HOSPITAL ENCOUNTER (INPATIENT)
Facility: HOSPITAL | Age: 60
LOS: 3 days | Discharge: HOME | DRG: 812 | End: 2020-12-24
Attending: EMERGENCY MEDICINE | Admitting: INTERNAL MEDICINE
Payer: COMMERCIAL

## 2020-12-21 ENCOUNTER — TELEPHONE (OUTPATIENT)
Dept: HEMATOLOGY/ONCOLOGY | Age: 60
End: 2020-12-21

## 2020-12-21 DIAGNOSIS — D64.9 ANEMIA, UNSPECIFIED TYPE: Primary | ICD-10-CM

## 2020-12-21 DIAGNOSIS — C50.919 METASTATIC BREAST CANCER (HCC): ICD-10-CM

## 2020-12-21 LAB
ALBUMIN SERPL-MCNC: 2.9 G/DL (ref 3.4–5)
ALBUMIN/GLOB SERPL: 0.8 {RATIO} (ref 1–2)
ALP LIVER SERPL-CCNC: 109 U/L
ALT SERPL-CCNC: 16 U/L
ANION GAP SERPL CALC-SCNC: 7 MMOL/L (ref 0–18)
ANTIBODY SCREEN: NEGATIVE
AST SERPL-CCNC: 18 U/L (ref 15–37)
ATRIAL RATE: 82 BPM
BASOPHILS # BLD AUTO: 0 X10(3) UL (ref 0–0.2)
BASOPHILS NFR BLD AUTO: 0 %
BILIRUB SERPL-MCNC: 0.5 MG/DL (ref 0.1–2)
BUN BLD-MCNC: 11 MG/DL (ref 7–18)
BUN/CREAT SERPL: 10.1 (ref 10–20)
CALCIUM BLD-MCNC: 8.2 MG/DL (ref 8.5–10.1)
CHLORIDE SERPL-SCNC: 111 MMOL/L (ref 98–112)
CO2 SERPL-SCNC: 23 MMOL/L (ref 21–32)
CREAT BLD-MCNC: 1.09 MG/DL
D-DIMER: 0.44 UG/ML FEU (ref ?–0.58)
DEPRECATED HBV CORE AB SER IA-ACNC: 11.1 NG/ML
DEPRECATED RDW RBC AUTO: 68.8 FL (ref 35.1–46.3)
EOSINOPHIL # BLD AUTO: 0.01 X10(3) UL (ref 0–0.7)
EOSINOPHIL NFR BLD AUTO: 0.2 %
ERYTHROCYTE [DISTWIDTH] IN BLOOD BY AUTOMATED COUNT: 20.4 % (ref 11–15)
FOLATE SERPL-MCNC: 16.7 NG/ML (ref 8.7–?)
GLOBULIN PLAS-MCNC: 3.6 G/DL (ref 2.8–4.4)
GLUCOSE BLD-MCNC: 119 MG/DL (ref 70–99)
HCT VFR BLD AUTO: 9.6 %
HGB BLD-MCNC: 2.8 G/DL
IMM GRANULOCYTES # BLD AUTO: 0.05 X10(3) UL (ref 0–1)
IMM GRANULOCYTES NFR BLD: 1.1 %
IRON SATURATION: 7 %
IRON SERPL-MCNC: 39 UG/DL
LACTATE SERPL-SCNC: 1.1 MMOL/L (ref 0.4–2)
LYMPHOCYTES # BLD AUTO: 1.72 X10(3) UL (ref 1–4)
LYMPHOCYTES NFR BLD AUTO: 38.6 %
M PROTEIN MFR SERPL ELPH: 6.5 G/DL (ref 6.4–8.2)
MCH RBC QN AUTO: 27.7 PG (ref 26–34)
MCHC RBC AUTO-ENTMCNC: 29.2 G/DL (ref 31–37)
MCV RBC AUTO: 95 FL
MONOCYTES # BLD AUTO: 0.15 X10(3) UL (ref 0.1–1)
MONOCYTES NFR BLD AUTO: 3.4 %
NEUTROPHILS # BLD AUTO: 2.53 X10 (3) UL (ref 1.5–7.7)
NEUTROPHILS # BLD AUTO: 2.53 X10(3) UL (ref 1.5–7.7)
NEUTROPHILS NFR BLD AUTO: 56.7 %
NT-PROBNP SERPL-MCNC: 869 PG/ML (ref ?–125)
OSMOLALITY SERPL CALC.SUM OF ELEC: 293 MOSM/KG (ref 275–295)
P-R INTERVAL: 148 MS
PLATELET # BLD AUTO: 101 10(3)UL (ref 150–450)
PLATELET MORPHOLOGY: NORMAL
POTASSIUM SERPL-SCNC: 3.1 MMOL/L (ref 3.5–5.1)
Q-T INTERVAL: 298 MS
QRS DURATION: 94 MS
QTC CALCULATION (BEZET): 348 MS
R AXIS: 136 DEGREES
RBC # BLD AUTO: 1.01 X10(6)UL
RH BLOOD TYPE: NEGATIVE
SARS-COV-2 RNA RESP QL NAA+PROBE: NOT DETECTED
SODIUM SERPL-SCNC: 141 MMOL/L (ref 136–145)
T AXIS: 246 DEGREES
TOTAL IRON BINDING CAPACITY: 578 UG/DL (ref 240–450)
TRANSFERRIN SERPL-MCNC: 388 MG/DL (ref 200–360)
VENTRICULAR RATE: 82 BPM
VIT B12 SERPL-MCNC: 337 PG/ML (ref 193–986)
WBC # BLD AUTO: 4.5 X10(3) UL (ref 4–11)

## 2020-12-21 PROCEDURE — 71045 X-RAY EXAM CHEST 1 VIEW: CPT | Performed by: EMERGENCY MEDICINE

## 2020-12-21 PROCEDURE — 99255 IP/OBS CONSLTJ NEW/EST HI 80: CPT | Performed by: INTERNAL MEDICINE

## 2020-12-21 PROCEDURE — 99223 1ST HOSP IP/OBS HIGH 75: CPT | Performed by: INTERNAL MEDICINE

## 2020-12-21 PROCEDURE — 30233N1 TRANSFUSION OF NONAUTOLOGOUS RED BLOOD CELLS INTO PERIPHERAL VEIN, PERCUTANEOUS APPROACH: ICD-10-PCS | Performed by: HOSPITALIST

## 2020-12-21 RX ORDER — LEVOTHYROXINE SODIUM 0.15 MG/1
150 TABLET ORAL
Status: DISCONTINUED | OUTPATIENT
Start: 2020-12-22 | End: 2020-12-24

## 2020-12-21 RX ORDER — OMEGA-3-ACID ETHYL ESTERS 1 G/1
2 CAPSULE, LIQUID FILLED ORAL 2 TIMES DAILY
Status: DISCONTINUED | OUTPATIENT
Start: 2020-12-21 | End: 2020-12-22

## 2020-12-21 RX ORDER — PANTOPRAZOLE SODIUM 20 MG/1
20 TABLET, DELAYED RELEASE ORAL
Status: DISCONTINUED | OUTPATIENT
Start: 2020-12-22 | End: 2020-12-24

## 2020-12-21 RX ORDER — PREGABALIN 75 MG/1
150 CAPSULE ORAL 2 TIMES DAILY
Status: DISCONTINUED | OUTPATIENT
Start: 2020-12-21 | End: 2020-12-24

## 2020-12-21 RX ORDER — HYDROCODONE BITARTRATE AND ACETAMINOPHEN 10; 325 MG/1; MG/1
1 TABLET ORAL EVERY 6 HOURS PRN
Status: DISCONTINUED | OUTPATIENT
Start: 2020-12-21 | End: 2020-12-24

## 2020-12-21 RX ORDER — ROSUVASTATIN CALCIUM 5 MG/1
5 TABLET, COATED ORAL NIGHTLY
Status: DISCONTINUED | OUTPATIENT
Start: 2020-12-21 | End: 2020-12-24

## 2020-12-21 RX ORDER — ALBUTEROL SULFATE 90 UG/1
2 AEROSOL, METERED RESPIRATORY (INHALATION) AS NEEDED
Status: DISCONTINUED | OUTPATIENT
Start: 2020-12-21 | End: 2020-12-24

## 2020-12-21 RX ORDER — BUPROPION HYDROCHLORIDE 150 MG/1
150 TABLET, EXTENDED RELEASE ORAL 2 TIMES DAILY
COMMUNITY
End: 2021-04-23

## 2020-12-21 RX ORDER — BUPROPION HYDROCHLORIDE 150 MG/1
150 TABLET, EXTENDED RELEASE ORAL 2 TIMES DAILY WITH MEALS
Status: DISCONTINUED | OUTPATIENT
Start: 2020-12-21 | End: 2020-12-24

## 2020-12-21 RX ORDER — OMEPRAZOLE 20 MG/1
20 CAPSULE, DELAYED RELEASE ORAL
COMMUNITY
End: 2021-05-03

## 2020-12-21 RX ORDER — HYDROCODONE BITARTRATE AND ACETAMINOPHEN 7.5; 325 MG/1; MG/1
1 TABLET ORAL EVERY 6 HOURS PRN
Status: DISCONTINUED | OUTPATIENT
Start: 2020-12-21 | End: 2020-12-21 | Stop reason: ALTCHOICE

## 2020-12-21 RX ORDER — ONDANSETRON 2 MG/ML
4 INJECTION INTRAMUSCULAR; INTRAVENOUS EVERY 6 HOURS PRN
Status: DISCONTINUED | OUTPATIENT
Start: 2020-12-21 | End: 2020-12-24

## 2020-12-21 RX ORDER — ROSUVASTATIN CALCIUM 5 MG/1
5 TABLET, COATED ORAL NIGHTLY
COMMUNITY
End: 2021-06-02

## 2020-12-21 RX ORDER — MELATONIN
1000 DAILY
Status: DISCONTINUED | OUTPATIENT
Start: 2020-12-21 | End: 2020-12-24

## 2020-12-21 RX ORDER — OMEGA-3-ACID ETHYL ESTERS 1 G/1
2 CAPSULE, LIQUID FILLED ORAL 2 TIMES DAILY
COMMUNITY
End: 2021-06-18

## 2020-12-21 RX ORDER — ALPRAZOLAM 0.25 MG/1
0.25 TABLET ORAL EVERY 6 HOURS PRN
Status: DISCONTINUED | OUTPATIENT
Start: 2020-12-21 | End: 2020-12-24

## 2020-12-21 RX ORDER — ACETAMINOPHEN 325 MG/1
650 TABLET ORAL EVERY 6 HOURS PRN
Status: DISCONTINUED | OUTPATIENT
Start: 2020-12-21 | End: 2020-12-24

## 2020-12-21 RX ORDER — ESCITALOPRAM OXALATE 20 MG/1
20 TABLET ORAL DAILY
Status: DISCONTINUED | OUTPATIENT
Start: 2020-12-21 | End: 2020-12-24

## 2020-12-21 NOTE — CONSULTS
[facility]    Report of Consultation    Layla Juniorkika Patient Status:  Inpatient    1960 MRN [id number]   [facility] 3NE-A Attending Emely Irene MD   Hosp Day # 0 PCP Wilmer Lozano MD     Date of Admission:  2020 than usual in the past few days. She denies diarrhea or rash, which were the reasons the Verzenio was held in the past.  No F/C/NS. No yellowing of the eyes or skin. She is feeling better after the first unit of blood.      History:  Past Medical Histo Oral, BID with meals  •  Omega-3-acid Ethyl Esters (LOVAZA) cap 2 g, 2 g, Oral, BID  •  [START ON 12/22/2020] Pantoprazole Sodium (PROTONIX) EC tab 20 mg, 20 mg, Oral, QAM AC  •  Rosuvastatin Calcium (CRESTOR) tab 5 mg, 5 mg, Oral, Nightly  •  Albuterol Pulliam 12/21/2020     12/21/2020    CO2 23.0 12/21/2020     12/21/2020    CA 8.2 12/21/2020    ALB 2.9 12/21/2020    ALKPHO 109 12/21/2020    BILT 0.5 12/21/2020    TP 6.5 12/21/2020    AST 18 12/21/2020    ALT 16 12/21/2020    DDIMER 0.44 12/21/2020 she may benefit from IV iron, as well. Monitor her BMs for volume of blood loss. Hold Verzenio. She will require either a dose reduction or an alternate medication. Her B12 level is borderline. Will start oral supplementation.     Metastatic breast cance

## 2020-12-21 NOTE — ED PROVIDER NOTES
Patient Seen in: [facility] Emergency Department      History   Patient presents with:  Fever  Difficulty Breathing    Stated Complaint: fever, dyspnea, hx of breast cancer    HPI    Patient is a 53-year-old female with a history of metastatic breas Alcohol/week: 0.0 standard drinks      Comment: 2 drinks/yr     Drug use: No             Review of Systems    Positive for stated complaint: fever, dyspnea, hx of breast cancer  Other systems are as noted in HPI. Constitutional and vital signs reviewed. limits   PRO BETA NATRIURETIC PEPTIDE - Abnormal; Notable for the following components:    Pro-Beta Natriuretic Peptide 869 (*)     All other components within normal limits   CBC W/ DIFFERENTIAL - Abnormal; Notable for the following components:    RBC 1.0 MDM      74-year-old female with a history of metastatic breast cancer presenting with fatigue, exertional dyspnea. Symptoms been going on for about a week.   She started oral chemotherapy with Verzenio about 3 weeks ago and per her  had s Discharge Medication List                          Present on Admission  Date Reviewed: 12/4/2020          ICD-10-CM Noted POA    Anemia, unspecified type D64.9 8/20/2020 Unknown

## 2020-12-21 NOTE — H&P
[facility]                                                               History & Physical         Toi Lang Patient Status:  Inpatient    1960 MRN [id number]   [facility] 3NE-A Attending Tab Gallego MD   [facility] Day # 0 P bedside.   Patient found to have a hemoglobin of 2.8 in the emergency room and is being admitted    History:  Past Medical History:   Diagnosis Date   • Abdominal distention 2012   • Anemia    • Anxiety    • Back pain    • Breast cancer [facility])    • Disorder mouth nightly., Disp: , Rfl: , 12/20/2020 at 2200    •  Albuterol Sulfate (VENTOLIN HFA IN), Inhale into the lungs as needed. , Disp: , Rfl: , Past Week at Unknown time    •  VERZENIO 100 MG Oral Tab, Take 100 mg by mouth 2 (two) times daily.   , Disp: , Rfl tenderness  Neurologic: Awake alert oriented x3, no focal neurological deficits. Musculoskeletal: Full range of motion of all extremities. No swelling noted. Stage IV right breast cancer  Integument: No lesions. No erythema.   Pallor present  Psychiatric

## 2020-12-21 NOTE — ED NOTES
Pt's oxygen saturation dropped to 89-90% on room air, placed on 2L/NC with oxygen saturation increased to 100%

## 2020-12-21 NOTE — PROGRESS NOTES
Admitted patient from er a/o x4 w/ dx of anemia,  Came w/ hgb of 2.8, Ist unit of blood was started in Er,2nd unit hung on the floor,tolerating the infusion well Assessment was done and.made comfortable in bed, at bedside, Has hx of metastatic alexus

## 2020-12-21 NOTE — TELEPHONE ENCOUNTER
Toxicities: Verzenio 100mg by mouth BID      decreased dose to 100mg daily on 12/18/2020    Fever/Fatigue/Dyspnea/Anorexia/Dehydration/Edema    Fever: Grade 1 (Fever 100.2 at 9:55am. Has been feeling unwell for the last week.)  Fatigue: Grade 2 (Fee

## 2020-12-21 NOTE — TELEPHONE ENCOUNTER
Efra Tobias at [phone] is calling regarding his wife Paula Lover who's B/p is not normal 118/42 and she is lethargic, SOB, legs swollen. Dr Maame Schneider patient.

## 2020-12-21 NOTE — ED INITIAL ASSESSMENT (HPI)
Pt c/o fever x 6 days, FUNMILAYO over the weekend. Denies any concerns for covid. Denies c/o cough.  states the patient has been having these symptoms since 11/29 after restarting her versenia for cancer.  noted increased FUNMILAYO this weekend.   Has

## 2020-12-22 ENCOUNTER — SOCIAL WORK SERVICES (OUTPATIENT)
Dept: HEMATOLOGY/ONCOLOGY | Facility: HOSPITAL | Age: 60
End: 2020-12-22

## 2020-12-22 LAB
ANION GAP SERPL CALC-SCNC: 5 MMOL/L (ref 0–18)
BASOPHILS # BLD AUTO: 0.03 X10(3) UL (ref 0–0.2)
BASOPHILS # BLD AUTO: 0.03 X10(3) UL (ref 0–0.2)
BASOPHILS NFR BLD AUTO: 0.7 %
BASOPHILS NFR BLD AUTO: 0.7 %
BUN BLD-MCNC: 9 MG/DL (ref 7–18)
BUN/CREAT SERPL: 10.5 (ref 10–20)
CALCIUM BLD-MCNC: 8.9 MG/DL (ref 8.5–10.1)
CHLORIDE SERPL-SCNC: 113 MMOL/L (ref 98–112)
CO2 SERPL-SCNC: 23 MMOL/L (ref 21–32)
CREAT BLD-MCNC: 0.86 MG/DL
DEPRECATED RDW RBC AUTO: 51.1 FL (ref 35.1–46.3)
DEPRECATED RDW RBC AUTO: 51.2 FL (ref 35.1–46.3)
EOSINOPHIL # BLD AUTO: 0.02 X10(3) UL (ref 0–0.7)
EOSINOPHIL # BLD AUTO: 0.03 X10(3) UL (ref 0–0.7)
EOSINOPHIL NFR BLD AUTO: 0.5 %
EOSINOPHIL NFR BLD AUTO: 0.7 %
ERYTHROCYTE [DISTWIDTH] IN BLOOD BY AUTOMATED COUNT: 17 % (ref 11–15)
ERYTHROCYTE [DISTWIDTH] IN BLOOD BY AUTOMATED COUNT: 17.2 % (ref 11–15)
GLUCOSE BLD-MCNC: 96 MG/DL (ref 70–99)
HCT VFR BLD AUTO: 19.3 %
HCT VFR BLD AUTO: 22.8 %
HGB BLD-MCNC: 6.3 G/DL
HGB BLD-MCNC: 7.5 G/DL
HGB RETIC QN AUTO: 31.7 PG (ref 28.2–36.6)
IMM GRANULOCYTES # BLD AUTO: 0.03 X10(3) UL (ref 0–1)
IMM GRANULOCYTES # BLD AUTO: 0.04 X10(3) UL (ref 0–1)
IMM GRANULOCYTES NFR BLD: 0.7 %
IMM GRANULOCYTES NFR BLD: 1 %
IMM RETICS NFR: 0 RATIO (ref 0.1–0.3)
LYMPHOCYTES # BLD AUTO: 1.8 X10(3) UL (ref 1–4)
LYMPHOCYTES # BLD AUTO: 2.2 X10(3) UL (ref 1–4)
LYMPHOCYTES NFR BLD AUTO: 44.4 %
LYMPHOCYTES NFR BLD AUTO: 48.1 %
MCH RBC QN AUTO: 28.6 PG (ref 26–34)
MCH RBC QN AUTO: 29.3 PG (ref 26–34)
MCHC RBC AUTO-ENTMCNC: 32.6 G/DL (ref 31–37)
MCHC RBC AUTO-ENTMCNC: 32.9 G/DL (ref 31–37)
MCV RBC AUTO: 87.7 FL
MCV RBC AUTO: 89.1 FL
MONOCYTES # BLD AUTO: 0.13 X10(3) UL (ref 0.1–1)
MONOCYTES # BLD AUTO: 0.14 X10(3) UL (ref 0.1–1)
MONOCYTES NFR BLD AUTO: 2.8 %
MONOCYTES NFR BLD AUTO: 3.5 %
NEUTROPHILS # BLD AUTO: 2.02 X10 (3) UL (ref 1.5–7.7)
NEUTROPHILS # BLD AUTO: 2.02 X10(3) UL (ref 1.5–7.7)
NEUTROPHILS # BLD AUTO: 2.15 X10 (3) UL (ref 1.5–7.7)
NEUTROPHILS # BLD AUTO: 2.15 X10(3) UL (ref 1.5–7.7)
NEUTROPHILS NFR BLD AUTO: 47 %
NEUTROPHILS NFR BLD AUTO: 49.9 %
OSMOLALITY SERPL CALC.SUM OF ELEC: 291 MOSM/KG (ref 275–295)
PLATELET # BLD AUTO: 96 10(3)UL (ref 150–450)
PLATELET # BLD AUTO: 96 10(3)UL (ref 150–450)
POTASSIUM SERPL-SCNC: 3.2 MMOL/L (ref 3.5–5.1)
POTASSIUM SERPL-SCNC: 3.9 MMOL/L (ref 3.5–5.1)
RBC # BLD AUTO: 2.2 X10(6)UL
RBC # BLD AUTO: 2.56 X10(6)UL
RETICS # AUTO: 19.6 X10(3) UL (ref 22.5–147.5)
RETICS/RBC NFR AUTO: 1.9 %
SODIUM SERPL-SCNC: 141 MMOL/L (ref 136–145)
WBC # BLD AUTO: 4.1 X10(3) UL (ref 4–11)
WBC # BLD AUTO: 4.6 X10(3) UL (ref 4–11)

## 2020-12-22 PROCEDURE — 99232 SBSQ HOSP IP/OBS MODERATE 35: CPT | Performed by: NURSE PRACTITIONER

## 2020-12-22 PROCEDURE — 99232 SBSQ HOSP IP/OBS MODERATE 35: CPT | Performed by: INTERNAL MEDICINE

## 2020-12-22 RX ORDER — POTASSIUM CHLORIDE 20 MEQ/1
40 TABLET, EXTENDED RELEASE ORAL EVERY 4 HOURS
Status: COMPLETED | OUTPATIENT
Start: 2020-12-22 | End: 2020-12-22

## 2020-12-22 NOTE — PROGRESS NOTES
Hem/Onc Inpatient Note    Patient Name: Toi Lang   YOB: 1960   Medical Record Number: [id number]   CSN: [id number]   Attending oncology Physician: Dr Lara Garnica      S: Has now received 5 units PRBCs since yesterday. Repeat Hgb this AM 7. 5 2.56*   HGB 2.8* 6.3* 7.5*   HCT 9.6* 19.3* 22.8*   MCV 95.0 87.7 89.1   MCH 27.7 28.6 29.3   MCHC 29.2* 32.6 32.9   RDW 20.4* 17.2* 17.0*   NEPRELIM 2.53 2.15 2.02   WBC 4.5 4.6 4.1   .0* 96.0* 96.0*     Recent Labs   Lab 12/21/20  1139 12/22/20  0

## 2020-12-22 NOTE — PROGRESS NOTES
Sw met with spouse to discuss needed FMLA paperwork for patient. Patient needed two separate continuous leave forms completed (12/2/2020-12/13/2020 and 12/22/2020-1/11/2021). Lisbet completed and faxed to [address] [place] [address] at [phone].

## 2020-12-22 NOTE — PROGRESS NOTES
Had total of 5 units last night, hgb went to 6.3 after 4 units,Still w/ body weakness. hgb went up to 7.5 after 5 units, Md were aware, sched meds, Assisted needs.

## 2020-12-22 NOTE — PROGRESS NOTES
[facility]  Progress Note    Alvaro Espinal Patient Status:  Inpatient    1960 MRN [id number]   [facility] 3NE-A Attending Sandoval Toribio MD   Hosp Day # 1 PCP Maxx Stuart MD     CC:  Fever, shortness of breath, history of b CO2 23.0 12/22/2020    GLU 96 12/22/2020    CA 8.9 12/22/2020       Imaging:  Imaging reviewed in Epic.     Meds:     •  Potassium Chloride ER (K-DUR M20) CR tab 40 mEq, 40 mEq, Oral, Q4H    •  pregabalin (LYRICA) cap 150 mg, 150 mg, Oral, BID    •  escital consult        Quality:  · DVT Prophylaxis: SCD  · CODE status: full  · Jansen: no     Plan of care discussed with patient and patient's  at bedside    Will the patient be referred to [facility] on discharge?: yes  Estimated date of discharge: Tomorrow?   Briseida Noland

## 2020-12-22 NOTE — PROGRESS NOTES
Alert x 4.  2 L oxygen. . Tele, NSR. Voids. Continent. 1 episode loose stool. Cleaned and changed. ABD appears distended. Denies pain. Up 1 standby. Reg diet.   Currently giving 5th total unit of blood (3rd this shift, 2 previous units admin'd on p

## 2020-12-23 ENCOUNTER — APPOINTMENT (OUTPATIENT)
Dept: MRI IMAGING | Facility: HOSPITAL | Age: 60
DRG: 812 | End: 2020-12-23
Attending: NURSE PRACTITIONER
Payer: COMMERCIAL

## 2020-12-23 ENCOUNTER — APPOINTMENT (OUTPATIENT)
Dept: HEMATOLOGY/ONCOLOGY | Age: 60
End: 2020-12-23
Attending: INTERNAL MEDICINE
Payer: COMMERCIAL

## 2020-12-23 LAB
ALBUMIN SERPL-MCNC: 2.8 G/DL (ref 3.4–5)
ALBUMIN/GLOB SERPL: 0.8 {RATIO} (ref 1–2)
ALP LIVER SERPL-CCNC: 123 U/L
ALT SERPL-CCNC: 14 U/L
ANION GAP SERPL CALC-SCNC: 5 MMOL/L (ref 0–18)
AST SERPL-CCNC: 13 U/L (ref 15–37)
BASOPHILS # BLD AUTO: 0.02 X10(3) UL (ref 0–0.2)
BASOPHILS NFR BLD AUTO: 0.6 %
BILIRUB SERPL-MCNC: 1.3 MG/DL (ref 0.1–2)
BLOOD TYPE BARCODE: 600
BLOOD TYPE BARCODE: 600
BUN BLD-MCNC: 8 MG/DL (ref 7–18)
BUN/CREAT SERPL: 10.4 (ref 10–20)
CALCIUM BLD-MCNC: 8.5 MG/DL (ref 8.5–10.1)
CHLORIDE SERPL-SCNC: 113 MMOL/L (ref 98–112)
CO2 SERPL-SCNC: 22 MMOL/L (ref 21–32)
CREAT BLD-MCNC: 0.77 MG/DL
DEPRECATED RDW RBC AUTO: 52.2 FL (ref 35.1–46.3)
EOSINOPHIL # BLD AUTO: 0.02 X10(3) UL (ref 0–0.7)
EOSINOPHIL NFR BLD AUTO: 0.6 %
ERYTHROCYTE [DISTWIDTH] IN BLOOD BY AUTOMATED COUNT: 17.1 % (ref 11–15)
GLOBULIN PLAS-MCNC: 3.7 G/DL (ref 2.8–4.4)
GLUCOSE BLD-MCNC: 112 MG/DL (ref 70–99)
HCT VFR BLD AUTO: 23.1 %
HGB BLD-MCNC: 7.4 G/DL
IMM GRANULOCYTES # BLD AUTO: 0.05 X10(3) UL (ref 0–1)
IMM GRANULOCYTES NFR BLD: 1.4 %
LYMPHOCYTES # BLD AUTO: 1.56 X10(3) UL (ref 1–4)
LYMPHOCYTES NFR BLD AUTO: 45.2 %
M PROTEIN MFR SERPL ELPH: 6.5 G/DL (ref 6.4–8.2)
MCH RBC QN AUTO: 28.5 PG (ref 26–34)
MCHC RBC AUTO-ENTMCNC: 32 G/DL (ref 31–37)
MCV RBC AUTO: 88.8 FL
MONOCYTES # BLD AUTO: 0.14 X10(3) UL (ref 0.1–1)
MONOCYTES NFR BLD AUTO: 4.1 %
NEUTROPHILS # BLD AUTO: 1.66 X10 (3) UL (ref 1.5–7.7)
NEUTROPHILS # BLD AUTO: 1.66 X10(3) UL (ref 1.5–7.7)
NEUTROPHILS NFR BLD AUTO: 48.1 %
OSMOLALITY SERPL CALC.SUM OF ELEC: 289 MOSM/KG (ref 275–295)
PLATELET # BLD AUTO: 101 10(3)UL (ref 150–450)
POTASSIUM SERPL-SCNC: 4.1 MMOL/L (ref 3.5–5.1)
RBC # BLD AUTO: 2.6 X10(6)UL
SODIUM SERPL-SCNC: 140 MMOL/L (ref 136–145)
WBC # BLD AUTO: 3.5 X10(3) UL (ref 4–11)

## 2020-12-23 PROCEDURE — 72197 MRI PELVIS W/O & W/DYE: CPT | Performed by: NURSE PRACTITIONER

## 2020-12-23 PROCEDURE — 99232 SBSQ HOSP IP/OBS MODERATE 35: CPT | Performed by: HOSPITALIST

## 2020-12-23 PROCEDURE — 74183 MRI ABD W/O CNTR FLWD CNTR: CPT | Performed by: NURSE PRACTITIONER

## 2020-12-23 PROCEDURE — 99232 SBSQ HOSP IP/OBS MODERATE 35: CPT | Performed by: NURSE PRACTITIONER

## 2020-12-23 NOTE — PROGRESS NOTES
[facility]  Progress Note     Lorena Favors Patient Status:  Inpatient    1960 MRN [id number]   [facility] 3NE-A Attending Favio Hill MD   Hosp Day # 2 PCP Gilbert Rodarte MD     Chief Complaint: dyspnea    S: Patient f PCT in the last 168 hours. Cardiac  Recent Labs   Lab 12/21/20  1139   PBNP 869*       Creatinine Kinase  No results for input(s): CK in the last 168 hours.     Inflammatory Markers  Recent Labs   Lab 12/21/20  1139   AUGUSTINE 11.1*   DDIMER 0.44       Imagin

## 2020-12-23 NOTE — PLAN OF CARE
Patient is A&O 4.   Calm and cooperative. VSS. On tele-NSR. On RA. IV-SL. Denies any pain or discomfort. Electrolyte protocol. No BM during night. Hgb draw in AM.  Plan for GI to see. Bed alarm on. Bed at lowest position.    Call light within gali

## 2020-12-23 NOTE — PROGRESS NOTES
Hem/Onc Inpatient Note    Patient Name: Corrine Carias   YOB: 1960   Medical Record Number: [id number]   CSN: [id number]   Attending oncology Physician: Dr Gregg Guillen    S: Hgb stable this am. Resting comfortably.  Reports that her energy level i regions. Psych/Depression: mood and affect are appropriate     Labs:  Recent Labs   Lab 12/22/20  0307 12/22/20  0852 12/23/20  0746   RBC 2.20* 2.56* 2.60*   HGB 6.3* 7.5* 7.4*   HCT 19.3* 22.8* 23.1*   MCV 87.7 89.1 88.8   MCH 28.6 29.3 28.5   MCHC 32. 6

## 2020-12-23 NOTE — CONSULTS
[facility]                       Gastroenterology [address] [facility] Gastroenterology    [facility]    1960 MRN [id number]   [facility] 3NE-A Attending Gaudencio Knowles MD   [facility] Day to organism    • Rash    • Shortness of breath     Only exertion   • Stress    • Wears glasses    • Weight gain Unk       PSHx:   Past Surgical History:   Procedure Laterality Date   • MOUTH ODONTECTOMY Left 6/28/2017    Performed by Varun Booth DDS above:            Infectious Disease:  No chronic infections or recent fevers prior to the acute illness            Cardiovascular: + dyslipidemia             Respiratory: No shortness of breath, asthma, copd, recurrent pneumonia            Hematologic:  Devon Oshea CREATSERUM 0.77 12/23/2020    BUN 8 12/23/2020     12/23/2020    K 4.1 12/23/2020     12/23/2020    CO2 22.0 12/23/2020     12/23/2020    CA 8.5 12/23/2020    ALB 2.8 12/23/2020    ALKPHO 123 12/23/2020    BILT 1.3 12/23/2020    AST 13 1 ferritin 11) from a previous level of 7-8. Bi-direction endoscopy 8/2020 for JOHN (Hgb 5.3) revealed LA class B esophagitis, Grade 1 internal hemorrhoids, large external hemorrhoids, and two 2-3 sessile polyps (serrated and tubular adenoma).  Pt reports hema with a component due to her at the chemotherapy regimen along with known hemorrhoids  -Patient was supposed to get capsule endoscopy but never did this as part of her work-up for JOHN  -We will obtain MR E to rule out mass and depending on results will cons

## 2020-12-23 NOTE — PLAN OF CARE
A/O. RA. No respiratory distress noted. NSR on tele. Denies diarrhea or bleeding. Instructed to let RN know and see if any bleeding noted. Pt verbalized understanding. Voiding without difficulty. Denies pain. Up to bathroom. NPO all day for MRE.  To start c

## 2020-12-23 NOTE — PLAN OF CARE
NPO for MRE. Plan oral contrast per MRI. Pt has 1 hour to drink once MRI brings up and times given. Plan MRI later today. Pt and  informed. Glucagon 1mg IV for MRE ordered per MRI request per GI.

## 2020-12-24 VITALS
WEIGHT: 195 LBS | TEMPERATURE: 99 F | SYSTOLIC BLOOD PRESSURE: 123 MMHG | RESPIRATION RATE: 18 BRPM | HEIGHT: 66 IN | HEART RATE: 76 BPM | DIASTOLIC BLOOD PRESSURE: 42 MMHG | BODY MASS INDEX: 31.34 KG/M2 | OXYGEN SATURATION: 96 %

## 2020-12-24 LAB
BASOPHILS # BLD AUTO: 0.04 X10(3) UL (ref 0–0.2)
BASOPHILS NFR BLD AUTO: 1.1 %
DEPRECATED RDW RBC AUTO: 53.1 FL (ref 35.1–46.3)
EOSINOPHIL # BLD AUTO: 0.03 X10(3) UL (ref 0–0.7)
EOSINOPHIL NFR BLD AUTO: 0.8 %
ERYTHROCYTE [DISTWIDTH] IN BLOOD BY AUTOMATED COUNT: 16.9 % (ref 11–15)
HCT VFR BLD AUTO: 24.1 %
HGB BLD-MCNC: 7.8 G/DL
IMM GRANULOCYTES # BLD AUTO: 0.02 X10(3) UL (ref 0–1)
IMM GRANULOCYTES NFR BLD: 0.5 %
LYMPHOCYTES # BLD AUTO: 1.82 X10(3) UL (ref 1–4)
LYMPHOCYTES NFR BLD AUTO: 50 %
MCH RBC QN AUTO: 29.1 PG (ref 26–34)
MCHC RBC AUTO-ENTMCNC: 32.4 G/DL (ref 31–37)
MCV RBC AUTO: 89.9 FL
MONOCYTES # BLD AUTO: 0.14 X10(3) UL (ref 0.1–1)
MONOCYTES NFR BLD AUTO: 3.8 %
NEUTROPHILS # BLD AUTO: 1.59 X10 (3) UL (ref 1.5–7.7)
NEUTROPHILS # BLD AUTO: 1.59 X10(3) UL (ref 1.5–7.7)
NEUTROPHILS NFR BLD AUTO: 43.8 %
PLATELET # BLD AUTO: 110 10(3)UL (ref 150–450)
RBC # BLD AUTO: 2.68 X10(6)UL
WBC # BLD AUTO: 3.6 X10(3) UL (ref 4–11)

## 2020-12-24 PROCEDURE — 99239 HOSP IP/OBS DSCHRG MGMT >30: CPT | Performed by: HOSPITALIST

## 2020-12-24 PROCEDURE — 99232 SBSQ HOSP IP/OBS MODERATE 35: CPT | Performed by: INTERNAL MEDICINE

## 2020-12-24 NOTE — PLAN OF CARE
Patient is A&O x4.   Calm and cooperative. VSS. On tele-NSR. On RA. IV-SL. Denies any pain or discomfort. Electrolyte protocol. No BM during night. Hgb draw in AM.  Pt went to [facility], no acute findings. Bed alarm on. Bed at lowest position.    Call

## 2020-12-24 NOTE — PROGRESS NOTES
NURSING DISCHARGE NOTE    Discharged Home via Wheelchair. Accompanied by Support staff  Belongings Taken by patient/family. Patient and  educated on discharge paperwork and follow ups after discharge. Verbalized understanding of POC.  IV and

## 2020-12-24 NOTE — PLAN OF CARE
Patient A&O x 4,  at bedside. AM meds administered and POC discussed for the day. Awaiting GI recommendation for discharge plans. HGB maintaining, hematology s/o.  Possible outpatient capsule endoscopy as well as hemorrhoid removal. All needs met at

## 2020-12-24 NOTE — PROGRESS NOTES
Gastroenterology Progress Note  Varghese Solid Patient Status:  Inpatient    1960 MRN [id number]   [facility] 3NE-A Attending Theresa Frank, [address] Day # 3 PCP Haylie Batista MD mg of IV glucagon was given prior to imaging. Single shot fast spin echo breath hold and axial and coronal T-1 weighted 3-D gradient echo sequences with   additional steady state free precession imaging was performed.      PATIENT STATED HISTORY:(As transcr the umbilical hernia. PELVIC NODES:  Normal.  PELVIC ORGANS:  Normal for age. BONES:  No bony lesion or fracture. LUNG BASES:  There is a small left-sided pleural effusion. OTHER:  Negative.    =====  CONCLUSION:       1.   No evidence of mass lesion

## 2020-12-24 NOTE — DISCHARGE SUMMARY
[facility]  DISCHARGE SUMMARY     Juan Diego Stringer Patient Status:  Inpatient    1960 MRN [id number]   [facility] 3NE-A Attending Swati Noriega, [address] [facility] Day # 3 PCP Pushpa Covington MD     Date of Admission: 2020  Date o up for that as outpatient according to patient and patient's  at bedside.   Patient found to have a hemoglobin of 2.8 in the emergency room and is being admitted    Brief Synopsis: Patient admitted with acute anemia likely multifactorial secondary to known as: ADVAIR DISKUS      Inhale 1 puff into the lungs every 12 (twelve) hours. Quantity: 3 Package  Refills: 1     HYDROcodone-acetaminophen 7.5-325 MG Tabs  Commonly known as: NORCO      Take 1 tablet by mouth every 6 (six) hours as needed for Pain. endoscopy. The office will call you to arrange the date/time of your procedure     Sharri Campos MD  [address]  [facility] [place]  [address] [place] [address]  [phone]    Schedule an appointment as soon as possible for a visit      Appointments for Next [address] [place]. Please park at the [address] [facility] and enter through [address]. Once you arrive, please register at the [address] [facility] on the second floor.   **Important Info for Public Service [employer]** Because the safety and protection of patients, staff

## 2020-12-24 NOTE — PROGRESS NOTES
Hem/Onc Inpatient Note    Patient Name: Bobbi Cruz   YOB: 1960   Medical Record Number: [id number]   CSN: [id number]   Attending oncology Physician: Dr Raulito Landis    S: Feeling better. No bleeding source found on MR enterography.     Allergie 17.1* 16.9*   NEPRELIM 2.02 1.66 1.59   WBC 4.1 3.5* 3.6*   PLT 96.0* 101.0* 110.0*     Recent Labs   Lab 12/21/20  1139 12/22/20  0307 12/22/20 1956 12/23/20  0746   * 96  --  112*   BUN 11 9  --  8   CREATSERUM 1.09* 0.86  --  0.77   GFRAA 65 86 Secondary Intention Text (Leave Blank If You Do Not Want): The defect will heal with secondary intention.

## 2020-12-25 LAB — GLUCOSE BLD-MCNC: 153 MG/DL (ref 70–99)

## 2020-12-30 ENCOUNTER — TELEPHONE (OUTPATIENT)
Dept: HEMATOLOGY/ONCOLOGY | Age: 60
End: 2020-12-30

## 2020-12-30 ENCOUNTER — OFFICE VISIT (OUTPATIENT)
Dept: HEMATOLOGY/ONCOLOGY | Age: 60
End: 2020-12-30
Attending: INTERNAL MEDICINE
Payer: COMMERCIAL

## 2020-12-30 VITALS
TEMPERATURE: 99 F | DIASTOLIC BLOOD PRESSURE: 55 MMHG | RESPIRATION RATE: 18 BRPM | HEIGHT: 65.98 IN | WEIGHT: 185.38 LBS | OXYGEN SATURATION: 98 % | BODY MASS INDEX: 29.79 KG/M2 | HEART RATE: 77 BPM | SYSTOLIC BLOOD PRESSURE: 127 MMHG

## 2020-12-30 DIAGNOSIS — D50.0 IRON DEFICIENCY ANEMIA DUE TO CHRONIC BLOOD LOSS: ICD-10-CM

## 2020-12-30 DIAGNOSIS — C78.2 CARCINOMA OF RIGHT BREAST METASTATIC TO PLEURA (HCC): ICD-10-CM

## 2020-12-30 DIAGNOSIS — C50.911 CARCINOMA OF RIGHT BREAST METASTATIC TO PLEURA (HCC): ICD-10-CM

## 2020-12-30 DIAGNOSIS — C50.911 CARCINOMA OF RIGHT BREAST METASTATIC TO PLEURA (HCC): Primary | ICD-10-CM

## 2020-12-30 DIAGNOSIS — C79.51 BONE METASTASES (HCC): ICD-10-CM

## 2020-12-30 DIAGNOSIS — C78.2 CARCINOMA OF RIGHT BREAST METASTATIC TO PLEURA (HCC): Primary | ICD-10-CM

## 2020-12-30 DIAGNOSIS — Z17.0 MALIGNANT NEOPLASM OF NIPPLE OF RIGHT BREAST IN FEMALE, ESTROGEN RECEPTOR POSITIVE (HCC): Primary | ICD-10-CM

## 2020-12-30 DIAGNOSIS — L27.0 DRUG RASH: ICD-10-CM

## 2020-12-30 DIAGNOSIS — C50.011 MALIGNANT NEOPLASM OF NIPPLE OF RIGHT BREAST IN FEMALE, ESTROGEN RECEPTOR POSITIVE (HCC): Primary | ICD-10-CM

## 2020-12-30 DIAGNOSIS — K64.2 GRADE III HEMORRHOIDS: ICD-10-CM

## 2020-12-30 PROCEDURE — 99215 OFFICE O/P EST HI 40 MIN: CPT | Performed by: INTERNAL MEDICINE

## 2020-12-30 PROCEDURE — 96402 CHEMO HORMON ANTINEOPL SQ/IM: CPT

## 2020-12-30 RX ORDER — LAMOTRIGINE 25 MG/1
500 TABLET ORAL ONCE
Status: COMPLETED | OUTPATIENT
Start: 2020-12-30 | End: 2020-12-30

## 2020-12-30 RX ORDER — LAMOTRIGINE 25 MG/1
500 TABLET ORAL ONCE
Status: CANCELLED | OUTPATIENT
Start: 2020-12-30

## 2020-12-30 RX ADMIN — LAMOTRIGINE 500 MG: 25 TABLET ORAL at 11:02:00

## 2020-12-30 NOTE — PROGRESS NOTES
Pt here for faslodex injection. Pt d/c to home in stable condition post injections. Appt set up as requested on Thursday late in day 1/28.

## 2020-12-30 NOTE — TELEPHONE ENCOUNTER
I received a call from Claire Peabody from [facility] indicated that Dr. Patience Parker is having trouble restoring the CTA chest. From 5/18/15. The CTA chest is now available from Claire Peabody. Please call her at [phone] if you need to. Thank you.

## 2020-12-30 NOTE — PROGRESS NOTES
Cancer Center Progress Note  Patient Name: Lakshmi Mims   YOB: 1960   Medical Record Number: [id number]     Attending Physician: Stevie Pollock M.D. Date of Visit: 12/30/2020    Chief Complaint:  No chief complaint on file. Diagnosis Date   • Abdominal distention 2012   • Anemia    • Anxiety    • Back pain    • Breast cancer [facility])    • Disorder of thyroid    • Fatigue 2019   • Feeling lonely Recent   • Hemorrhoids Unk   • High cholesterol    • Hyperlipidemia 6/29/2012   • H Social connections        Talks on phone: Not on file        Gets together: Not on file        Attends [employer] service: Not on file        Active member of club or organization: Not on file        Attends meetings of clubs or organizations: Not on file (150 mcg total) by mouth every morning before breakfast., Disp: 90 tablet, Rfl: 1  •  ALPRAZolam 0.25 MG Oral Tab, Take 1 tablet (0.25 mg total) by mouth every 6 (six) hours as needed. , Disp: 30 tablet, Rfl: 2  •  fluticasone-salmeterol 250-50 MCG/DOSE Inh clear and without icterus. Pupils are reactive and equal.   Hematologic/Lymphatic Normal - No petechiae or purpura. No tender or palpable lymph nodes in the cervical, supraclavicular, axillary or inguinal area.    Respiratory Normal - Lungs are clear to au the Abemaciclib and continue Faslodex. Repeat MRI in 2 months to assess for progression. Bone Metastases: Stable. Discontinued Xgeva due to her teeth issues. Elevated LFT: Mild. Secondary to mets and fatty liver.  Continue to monitor with month

## 2021-01-06 ENCOUNTER — LAB ENCOUNTER (OUTPATIENT)
Dept: LAB | Age: 61
End: 2021-01-06
Attending: INTERNAL MEDICINE
Payer: COMMERCIAL

## 2021-01-06 DIAGNOSIS — E03.9 HYPOTHYROIDISM IN ADULT: ICD-10-CM

## 2021-01-06 DIAGNOSIS — D50.0 IRON DEFICIENCY ANEMIA DUE TO CHRONIC BLOOD LOSS: ICD-10-CM

## 2021-01-06 LAB
BASOPHILS # BLD AUTO: 0.08 X10(3) UL (ref 0–0.2)
BASOPHILS NFR BLD AUTO: 1.2 %
DEPRECATED RDW RBC AUTO: 64.4 FL (ref 35.1–46.3)
EOSINOPHIL # BLD AUTO: 0.04 X10(3) UL (ref 0–0.7)
EOSINOPHIL NFR BLD AUTO: 0.6 %
ERYTHROCYTE [DISTWIDTH] IN BLOOD BY AUTOMATED COUNT: 18.6 % (ref 11–15)
HCT VFR BLD AUTO: 24.5 %
HGB BLD-MCNC: 7.4 G/DL
IMM GRANULOCYTES # BLD AUTO: 0.03 X10(3) UL (ref 0–1)
IMM GRANULOCYTES NFR BLD: 0.4 %
LYMPHOCYTES # BLD AUTO: 2.37 X10(3) UL (ref 1–4)
LYMPHOCYTES NFR BLD AUTO: 35.3 %
MCH RBC QN AUTO: 29.2 PG (ref 26–34)
MCHC RBC AUTO-ENTMCNC: 30.2 G/DL (ref 31–37)
MCV RBC AUTO: 96.8 FL
MONOCYTES # BLD AUTO: 0.61 X10(3) UL (ref 0.1–1)
MONOCYTES NFR BLD AUTO: 9.1 %
NEUTROPHILS # BLD AUTO: 3.59 X10 (3) UL (ref 1.5–7.7)
NEUTROPHILS # BLD AUTO: 3.59 X10(3) UL (ref 1.5–7.7)
NEUTROPHILS NFR BLD AUTO: 53.4 %
PLATELET # BLD AUTO: 220 10(3)UL (ref 150–450)
RBC # BLD AUTO: 2.53 X10(6)UL
T4 FREE SERPL-MCNC: 0.9 NG/DL (ref 0.8–1.7)
TSI SER-ACNC: 5.01 MIU/ML (ref 0.36–3.74)
WBC # BLD AUTO: 6.7 X10(3) UL (ref 4–11)

## 2021-01-06 PROCEDURE — 84439 ASSAY OF FREE THYROXINE: CPT

## 2021-01-06 PROCEDURE — 84443 ASSAY THYROID STIM HORMONE: CPT

## 2021-01-06 PROCEDURE — 36415 COLL VENOUS BLD VENIPUNCTURE: CPT

## 2021-01-06 PROCEDURE — 85025 COMPLETE CBC W/AUTO DIFF WBC: CPT

## 2021-01-08 ENCOUNTER — OFFICE VISIT (OUTPATIENT)
Dept: FAMILY MEDICINE CLINIC | Facility: CLINIC | Age: 61
End: 2021-01-08

## 2021-01-08 ENCOUNTER — SOCIAL WORK SERVICES (OUTPATIENT)
Dept: HEMATOLOGY/ONCOLOGY | Facility: HOSPITAL | Age: 61
End: 2021-01-08

## 2021-01-08 VITALS
OXYGEN SATURATION: 95 % | HEART RATE: 73 BPM | WEIGHT: 183 LBS | BODY MASS INDEX: 30.49 KG/M2 | SYSTOLIC BLOOD PRESSURE: 116 MMHG | DIASTOLIC BLOOD PRESSURE: 52 MMHG | TEMPERATURE: 99 F | HEIGHT: 65 IN | RESPIRATION RATE: 16 BRPM

## 2021-01-08 DIAGNOSIS — G62.9 PERIPHERAL POLYNEUROPATHY: ICD-10-CM

## 2021-01-08 DIAGNOSIS — E87.6 HYPOKALEMIA: ICD-10-CM

## 2021-01-08 DIAGNOSIS — C50.911 CARCINOMA OF RIGHT BREAST METASTATIC TO PLEURA (HCC): ICD-10-CM

## 2021-01-08 DIAGNOSIS — Z17.0 MALIGNANT NEOPLASM OF NIPPLE OF RIGHT BREAST IN FEMALE, ESTROGEN RECEPTOR POSITIVE (HCC): ICD-10-CM

## 2021-01-08 DIAGNOSIS — C50.919 METASTATIC BREAST CANCER (HCC): ICD-10-CM

## 2021-01-08 DIAGNOSIS — C78.2 CARCINOMA OF RIGHT BREAST METASTATIC TO PLEURA (HCC): ICD-10-CM

## 2021-01-08 DIAGNOSIS — D50.0 IRON DEFICIENCY ANEMIA DUE TO CHRONIC BLOOD LOSS: Primary | ICD-10-CM

## 2021-01-08 DIAGNOSIS — R60.0 EDEMA OF BOTH FEET: ICD-10-CM

## 2021-01-08 DIAGNOSIS — Z23 NEED FOR VACCINATION: ICD-10-CM

## 2021-01-08 DIAGNOSIS — C50.011 MALIGNANT NEOPLASM OF NIPPLE OF RIGHT BREAST IN FEMALE, ESTROGEN RECEPTOR POSITIVE (HCC): ICD-10-CM

## 2021-01-08 DIAGNOSIS — E03.9 HYPOTHYROIDISM IN ADULT: ICD-10-CM

## 2021-01-08 PROCEDURE — 90750 HZV VACC RECOMBINANT IM: CPT | Performed by: FAMILY MEDICINE

## 2021-01-08 PROCEDURE — 3008F BODY MASS INDEX DOCD: CPT | Performed by: FAMILY MEDICINE

## 2021-01-08 PROCEDURE — 3078F DIAST BP <80 MM HG: CPT | Performed by: FAMILY MEDICINE

## 2021-01-08 PROCEDURE — 3074F SYST BP LT 130 MM HG: CPT | Performed by: FAMILY MEDICINE

## 2021-01-08 PROCEDURE — 90471 IMMUNIZATION ADMIN: CPT | Performed by: FAMILY MEDICINE

## 2021-01-08 PROCEDURE — 99215 OFFICE O/P EST HI 40 MIN: CPT | Performed by: FAMILY MEDICINE

## 2021-01-08 RX ORDER — HYDROCODONE BITARTRATE AND ACETAMINOPHEN 7.5; 325 MG/1; MG/1
1 TABLET ORAL EVERY 6 HOURS PRN
Qty: 90 TABLET | Refills: 0 | Status: SHIPPED | OUTPATIENT
Start: 2021-01-08 | End: 2021-02-18

## 2021-01-08 RX ORDER — MAGNESIUM OXIDE 400 MG (241.3 MG MAGNESIUM) TABLET
800 TABLET DAILY
COMMUNITY

## 2021-01-08 NOTE — PROGRESS NOTES
Lisbet met with patient and spouse. Leave extension requested through 1/25/2021. Lisbet completed and faxed to [address] [facility].

## 2021-01-08 NOTE — PATIENT INSTRUCTIONS
Continue current meds. Healthy diet. Stay active. Check blood work for electrolytes next week along with the blood work from his oncologist.  We will check thyroid function test in 2 months and adjust medication as needed.   Continue using Norco as need

## 2021-01-14 ENCOUNTER — LAB ENCOUNTER (OUTPATIENT)
Dept: LAB | Age: 61
End: 2021-01-14
Attending: FAMILY MEDICINE
Payer: COMMERCIAL

## 2021-01-14 DIAGNOSIS — C79.51 BONE METASTASES (HCC): ICD-10-CM

## 2021-01-14 DIAGNOSIS — C78.2 CARCINOMA OF RIGHT BREAST METASTATIC TO PLEURA (HCC): ICD-10-CM

## 2021-01-14 DIAGNOSIS — C50.911 CARCINOMA OF RIGHT BREAST METASTATIC TO PLEURA (HCC): ICD-10-CM

## 2021-01-14 DIAGNOSIS — E87.6 HYPOKALEMIA: ICD-10-CM

## 2021-01-14 DIAGNOSIS — D50.0 IRON DEFICIENCY ANEMIA DUE TO CHRONIC BLOOD LOSS: ICD-10-CM

## 2021-01-14 LAB
ALBUMIN SERPL-MCNC: 3.3 G/DL (ref 3.4–5)
ALBUMIN/GLOB SERPL: 0.9 {RATIO} (ref 1–2)
ALP LIVER SERPL-CCNC: 151 U/L
ALT SERPL-CCNC: 22 U/L
ANION GAP SERPL CALC-SCNC: 4 MMOL/L (ref 0–18)
AST SERPL-CCNC: 22 U/L (ref 15–37)
BASOPHILS # BLD AUTO: 0.13 X10(3) UL (ref 0–0.2)
BASOPHILS NFR BLD AUTO: 1.7 %
BILIRUB SERPL-MCNC: 0.6 MG/DL (ref 0.1–2)
BUN BLD-MCNC: 8 MG/DL (ref 7–18)
BUN/CREAT SERPL: 9.6 (ref 10–20)
CALCIUM BLD-MCNC: 8.7 MG/DL (ref 8.5–10.1)
CHLORIDE SERPL-SCNC: 113 MMOL/L (ref 98–112)
CO2 SERPL-SCNC: 27 MMOL/L (ref 21–32)
CREAT BLD-MCNC: 0.83 MG/DL
DEPRECATED HBV CORE AB SER IA-ACNC: 10.8 NG/ML
DEPRECATED RDW RBC AUTO: 66 FL (ref 35.1–46.3)
EOSINOPHIL # BLD AUTO: 0.16 X10(3) UL (ref 0–0.7)
EOSINOPHIL NFR BLD AUTO: 2.1 %
ERYTHROCYTE [DISTWIDTH] IN BLOOD BY AUTOMATED COUNT: 18.6 % (ref 11–15)
GLOBULIN PLAS-MCNC: 3.6 G/DL (ref 2.8–4.4)
GLUCOSE BLD-MCNC: 96 MG/DL (ref 70–99)
HCT VFR BLD AUTO: 23.3 %
HGB BLD-MCNC: 6.7 G/DL
IMM GRANULOCYTES # BLD AUTO: 0.09 X10(3) UL (ref 0–1)
IMM GRANULOCYTES NFR BLD: 1.2 %
IRON SATURATION: 6 %
IRON SERPL-MCNC: 29 UG/DL
LYMPHOCYTES # BLD AUTO: 2.13 X10(3) UL (ref 1–4)
LYMPHOCYTES NFR BLD AUTO: 27.7 %
M PROTEIN MFR SERPL ELPH: 6.9 G/DL (ref 6.4–8.2)
MCH RBC QN AUTO: 28.5 PG (ref 26–34)
MCHC RBC AUTO-ENTMCNC: 28.8 G/DL (ref 31–37)
MCV RBC AUTO: 99.1 FL
MONOCYTES # BLD AUTO: 0.77 X10(3) UL (ref 0.1–1)
MONOCYTES NFR BLD AUTO: 10 %
NEUTROPHILS # BLD AUTO: 4.42 X10 (3) UL (ref 1.5–7.7)
NEUTROPHILS # BLD AUTO: 4.42 X10(3) UL (ref 1.5–7.7)
NEUTROPHILS NFR BLD AUTO: 57.3 %
OSMOLALITY SERPL CALC.SUM OF ELEC: 296 MOSM/KG (ref 275–295)
PATIENT FASTING Y/N/NP: NO
PLATELET # BLD AUTO: 258 10(3)UL (ref 150–450)
PLATELET MORPHOLOGY: NORMAL
POTASSIUM SERPL-SCNC: 4 MMOL/L (ref 3.5–5.1)
RBC # BLD AUTO: 2.35 X10(6)UL
SODIUM SERPL-SCNC: 144 MMOL/L (ref 136–145)
TOTAL IRON BINDING CAPACITY: 472 UG/DL (ref 240–450)
TRANSFERRIN SERPL-MCNC: 317 MG/DL (ref 200–360)
WBC # BLD AUTO: 7.7 X10(3) UL (ref 4–11)

## 2021-01-14 PROCEDURE — 36415 COLL VENOUS BLD VENIPUNCTURE: CPT

## 2021-01-14 PROCEDURE — 83550 IRON BINDING TEST: CPT

## 2021-01-14 PROCEDURE — 80053 COMPREHEN METABOLIC PANEL: CPT

## 2021-01-14 PROCEDURE — 83540 ASSAY OF IRON: CPT

## 2021-01-14 PROCEDURE — 82728 ASSAY OF FERRITIN: CPT

## 2021-01-14 PROCEDURE — 85025 COMPLETE CBC W/AUTO DIFF WBC: CPT

## 2021-01-15 ENCOUNTER — OFFICE VISIT (OUTPATIENT)
Dept: SURGERY | Facility: CLINIC | Age: 61
End: 2021-01-15

## 2021-01-15 ENCOUNTER — TELEPHONE (OUTPATIENT)
Dept: HEMATOLOGY/ONCOLOGY | Facility: HOSPITAL | Age: 61
End: 2021-01-15

## 2021-01-15 ENCOUNTER — NURSE ONLY (OUTPATIENT)
Dept: HEMATOLOGY/ONCOLOGY | Facility: HOSPITAL | Age: 61
End: 2021-01-15
Attending: INTERNAL MEDICINE
Payer: COMMERCIAL

## 2021-01-15 VITALS — TEMPERATURE: 98 F

## 2021-01-15 DIAGNOSIS — D50.0 IRON DEFICIENCY ANEMIA DUE TO CHRONIC BLOOD LOSS: Primary | ICD-10-CM

## 2021-01-15 DIAGNOSIS — K62.5 RECTAL BLEEDING: ICD-10-CM

## 2021-01-15 DIAGNOSIS — C50.919 MALIGNANT NEOPLASM OF FEMALE BREAST, UNSPECIFIED ESTROGEN RECEPTOR STATUS, UNSPECIFIED LATERALITY, UNSPECIFIED SITE OF BREAST (HCC): ICD-10-CM

## 2021-01-15 DIAGNOSIS — K64.4 EXTERNAL HEMORRHOID: ICD-10-CM

## 2021-01-15 DIAGNOSIS — K64.8 INTERNAL HEMORRHOIDS: Primary | ICD-10-CM

## 2021-01-15 DIAGNOSIS — K42.9 UMBILICAL HERNIA WITHOUT OBSTRUCTION AND WITHOUT GANGRENE: ICD-10-CM

## 2021-01-15 LAB
ANTIBODY SCREEN: NEGATIVE
RH BLOOD TYPE: NEGATIVE

## 2021-01-15 PROCEDURE — 46221 LIGATION OF HEMORRHOID(S): CPT | Performed by: SURGERY

## 2021-01-15 PROCEDURE — 36415 COLL VENOUS BLD VENIPUNCTURE: CPT

## 2021-01-15 PROCEDURE — 86920 COMPATIBILITY TEST SPIN: CPT

## 2021-01-15 PROCEDURE — 86901 BLOOD TYPING SEROLOGIC RH(D): CPT

## 2021-01-15 PROCEDURE — 86850 RBC ANTIBODY SCREEN: CPT

## 2021-01-15 PROCEDURE — 86900 BLOOD TYPING SEROLOGIC ABO: CPT

## 2021-01-15 PROCEDURE — 99214 OFFICE O/P EST MOD 30 MIN: CPT | Performed by: SURGERY

## 2021-01-15 RX ORDER — ACETAMINOPHEN 325 MG/1
650 TABLET ORAL ONCE
Status: CANCELLED | OUTPATIENT
Start: 2021-01-15

## 2021-01-15 RX ORDER — DIPHENHYDRAMINE HCL 25 MG
25 CAPSULE ORAL ONCE
Status: CANCELLED | OUTPATIENT
Start: 2021-01-15

## 2021-01-15 NOTE — TELEPHONE ENCOUNTER
Tereasa Dakin, MD  P Edw Darian Munroe Rns             Needs a unit of blood and then IV iron. Pt's  informed. Pt is scheduled for banding today with Dr. Wing Ibrahim. Instructed to keep that appt.     He will call later today schedule for Mo

## 2021-01-17 NOTE — PROCEDURES
Date of Procedure:    1-    Pre op diagnosis: Internal Hemorrhoids    Indication for Surgery:  Symptomatic internal hemorrhoids    Post op diagnosis: Same    Procedure:  Anoscopy with O-ring Rubber Band Ligation of Internal Hemorrhoids in the anterio

## 2021-01-17 NOTE — PROGRESS NOTES
Follow Up Visit Note       Active Problems      1. Internal hemorrhoids    2. External hemorrhoid    3. Rectal bleeding    4.  Umbilical hernia without obstruction and without gangrene          Chief Complaint   Patient presents with:  Hemorrhoid Bandin every 6 (six) hours as needed for Pain., Disp: 90 tablet, Rfl: 0  •  Vitamin B-12 1000 MCG Oral Tab, Take 1 tablet (1,000 mcg total) by mouth daily. , Disp: 30 tablet, Rfl: 11  •  buPROPion HCl ER, SR, 150 MG Oral Tablet 12 Hr, Take 150 mg by mouth 2 (two) hemorrhoids  (primary encounter diagnosis)  External hemorrhoid  Rectal bleeding  Umbilical hernia without obstruction and without gangrene    Plan     Follow-up in 2 weeks for continued hemorrhoidal banding    Thank you for allowing me to participate in y

## 2021-01-18 ENCOUNTER — OFFICE VISIT (OUTPATIENT)
Dept: HEMATOLOGY/ONCOLOGY | Age: 61
End: 2021-01-18
Attending: INTERNAL MEDICINE
Payer: COMMERCIAL

## 2021-01-18 VITALS
OXYGEN SATURATION: 96 % | DIASTOLIC BLOOD PRESSURE: 42 MMHG | HEART RATE: 80 BPM | SYSTOLIC BLOOD PRESSURE: 130 MMHG | TEMPERATURE: 100 F | RESPIRATION RATE: 16 BRPM

## 2021-01-18 DIAGNOSIS — C50.919 BREAST CANCER METASTASIZED TO PLEURA, UNSPECIFIED LATERALITY (HCC): ICD-10-CM

## 2021-01-18 DIAGNOSIS — C50.919 MALIGNANT NEOPLASM OF FEMALE BREAST, UNSPECIFIED ESTROGEN RECEPTOR STATUS, UNSPECIFIED LATERALITY, UNSPECIFIED SITE OF BREAST (HCC): ICD-10-CM

## 2021-01-18 DIAGNOSIS — D50.0 IRON DEFICIENCY ANEMIA DUE TO CHRONIC BLOOD LOSS: Primary | ICD-10-CM

## 2021-01-18 DIAGNOSIS — C78.2 BREAST CANCER METASTASIZED TO PLEURA, UNSPECIFIED LATERALITY (HCC): ICD-10-CM

## 2021-01-18 DIAGNOSIS — E86.0 DEHYDRATION: ICD-10-CM

## 2021-01-18 DIAGNOSIS — C79.51 BONE METASTASES (HCC): ICD-10-CM

## 2021-01-18 LAB
ANTIBODY SCREEN: NEGATIVE
BLOOD TYPE BARCODE: 9500
RH BLOOD TYPE: NEGATIVE

## 2021-01-18 PROCEDURE — 86900 BLOOD TYPING SEROLOGIC ABO: CPT | Performed by: INTERNAL MEDICINE

## 2021-01-18 PROCEDURE — 86920 COMPATIBILITY TEST SPIN: CPT

## 2021-01-18 PROCEDURE — 86901 BLOOD TYPING SEROLOGIC RH(D): CPT | Performed by: INTERNAL MEDICINE

## 2021-01-18 PROCEDURE — 36430 TRANSFUSION BLD/BLD COMPNT: CPT

## 2021-01-18 PROCEDURE — 96365 THER/PROPH/DIAG IV INF INIT: CPT

## 2021-01-18 PROCEDURE — 86850 RBC ANTIBODY SCREEN: CPT | Performed by: INTERNAL MEDICINE

## 2021-01-18 RX ORDER — ACETAMINOPHEN 325 MG/1
650 TABLET ORAL ONCE
Status: CANCELLED | OUTPATIENT
Start: 2021-01-18

## 2021-01-18 RX ORDER — DIPHENHYDRAMINE HCL 25 MG
25 CAPSULE ORAL ONCE
Status: COMPLETED | OUTPATIENT
Start: 2021-01-18 | End: 2021-01-18

## 2021-01-18 RX ORDER — DIPHENHYDRAMINE HCL 25 MG
25 CAPSULE ORAL ONCE
Status: CANCELLED | OUTPATIENT
Start: 2021-01-18

## 2021-01-18 RX ORDER — ACETAMINOPHEN 325 MG/1
650 TABLET ORAL ONCE
Status: COMPLETED | OUTPATIENT
Start: 2021-01-18 | End: 2021-01-18

## 2021-01-18 RX ADMIN — ACETAMINOPHEN 650 MG: 325 TABLET ORAL at 12:27:00

## 2021-01-18 RX ADMIN — DIPHENHYDRAMINE HCL 25 MG: 25 MG CAPSULE ORAL at 12:27:00

## 2021-01-18 NOTE — PROGRESS NOTES
Education Record    Learner:  Patient    Disease / Diagnosis: pt here for prbc's and iron    Barriers / Limitations:  None    Method:  Brief focused, printed material and  reinforcement    General Topics:  Plan of care reviewed    Outcome:  Miguel Boyle

## 2021-01-19 LAB — BLOOD TYPE BARCODE: 9500

## 2021-01-20 ENCOUNTER — TELEPHONE (OUTPATIENT)
Dept: FAMILY MEDICINE CLINIC | Facility: CLINIC | Age: 61
End: 2021-01-20

## 2021-01-20 DIAGNOSIS — Z17.0 ESTROGEN RECEPTOR POSITIVE: ICD-10-CM

## 2021-01-20 DIAGNOSIS — C78.2 CARCINOMA OF RIGHT BREAST METASTATIC TO PLEURA (HCC): ICD-10-CM

## 2021-01-20 DIAGNOSIS — C50.011 MALIGNANT NEOPLASM OF NIPPLE AND AREOLA OF FEMALE BREAST, RIGHT (HCC): Primary | ICD-10-CM

## 2021-01-20 DIAGNOSIS — C50.911 CARCINOMA OF RIGHT BREAST METASTATIC TO PLEURA (HCC): ICD-10-CM

## 2021-01-20 DIAGNOSIS — C50.911 CARCINOMA OF RIGHT FEMALE BREAST, UNSPECIFIED ESTROGEN RECEPTOR STATUS, UNSPECIFIED SITE OF BREAST (HCC): ICD-10-CM

## 2021-01-20 NOTE — TELEPHONE ENCOUNTER
Referral to [facility] (INTERNAL)    Reason for the order/referral:  ONCOLOGY/F/UP   PCP:  TATE   Refer to Provider:  Amy Baer   Specialty:  ONCOLOGY   Patient [facility]: [facility] / Plan: Latha Jalloh / Product

## 2021-01-21 ENCOUNTER — LAB ENCOUNTER (OUTPATIENT)
Dept: LAB | Age: 61
End: 2021-01-21
Attending: INTERNAL MEDICINE
Payer: COMMERCIAL

## 2021-01-21 DIAGNOSIS — D50.0 IRON DEFICIENCY ANEMIA DUE TO CHRONIC BLOOD LOSS: ICD-10-CM

## 2021-01-21 LAB
BASOPHILS # BLD: 0 X10(3) UL (ref 0–0.2)
BASOPHILS NFR BLD: 0 %
DEPRECATED RDW RBC AUTO: 61.6 FL (ref 35.1–46.3)
EOSINOPHIL # BLD: 0.2 X10(3) UL (ref 0–0.7)
EOSINOPHIL NFR BLD: 2 %
ERYTHROCYTE [DISTWIDTH] IN BLOOD BY AUTOMATED COUNT: 18.9 % (ref 11–15)
HCT VFR BLD AUTO: 27 %
HGB BLD-MCNC: 7.8 G/DL
LYMPHOCYTES NFR BLD: 1.84 X10(3) UL (ref 1–4)
LYMPHOCYTES NFR BLD: 18 %
MCH RBC QN AUTO: 28.6 PG (ref 26–34)
MCHC RBC AUTO-ENTMCNC: 28.9 G/DL (ref 31–37)
MCV RBC AUTO: 98.9 FL
METAMYELOCYTES # BLD: 0.2 X10(3) UL
METAMYELOCYTES NFR BLD: 2 %
MONOCYTES # BLD: 0.61 X10(3) UL (ref 0.1–1)
MONOCYTES NFR BLD: 6 %
NEUTROPHILS # BLD AUTO: 6.53 X10 (3) UL (ref 1.5–7.7)
NEUTROPHILS NFR BLD: 68 %
NEUTS BAND NFR BLD: 4 %
NEUTS HYPERSEG # BLD: 7.34 X10(3) UL (ref 1.5–7.7)
NRBC BLD MANUAL-RTO: 1 %
PLATELET # BLD AUTO: 291 10(3)UL (ref 150–450)
PLATELET MORPHOLOGY: NORMAL
RBC # BLD AUTO: 2.73 X10(6)UL
TOTAL CELLS COUNTED: 100
WBC # BLD AUTO: 10.2 X10(3) UL (ref 4–11)

## 2021-01-21 PROCEDURE — 85007 BL SMEAR W/DIFF WBC COUNT: CPT

## 2021-01-21 PROCEDURE — 36415 COLL VENOUS BLD VENIPUNCTURE: CPT

## 2021-01-21 PROCEDURE — 85027 COMPLETE CBC AUTOMATED: CPT

## 2021-01-21 PROCEDURE — 85025 COMPLETE CBC W/AUTO DIFF WBC: CPT

## 2021-01-28 ENCOUNTER — OFFICE VISIT (OUTPATIENT)
Dept: HEMATOLOGY/ONCOLOGY | Age: 61
End: 2021-01-28
Attending: INTERNAL MEDICINE
Payer: COMMERCIAL

## 2021-01-28 VITALS
TEMPERATURE: 99 F | WEIGHT: 185.19 LBS | RESPIRATION RATE: 16 BRPM | DIASTOLIC BLOOD PRESSURE: 51 MMHG | HEART RATE: 69 BPM | BODY MASS INDEX: 29.76 KG/M2 | SYSTOLIC BLOOD PRESSURE: 115 MMHG | HEIGHT: 65.98 IN | OXYGEN SATURATION: 99 %

## 2021-01-28 DIAGNOSIS — D63.0 ANEMIA COMPLICATING NEOPLASTIC DISEASE: Primary | ICD-10-CM

## 2021-01-28 DIAGNOSIS — Z17.0 MALIGNANT NEOPLASM OF NIPPLE OF RIGHT BREAST IN FEMALE, ESTROGEN RECEPTOR POSITIVE (HCC): ICD-10-CM

## 2021-01-28 DIAGNOSIS — C50.911 CARCINOMA OF RIGHT BREAST METASTATIC TO PLEURA (HCC): ICD-10-CM

## 2021-01-28 DIAGNOSIS — C50.011 MALIGNANT NEOPLASM OF NIPPLE OF RIGHT BREAST IN FEMALE, ESTROGEN RECEPTOR POSITIVE (HCC): Primary | ICD-10-CM

## 2021-01-28 DIAGNOSIS — C78.2 CARCINOMA OF RIGHT BREAST METASTATIC TO PLEURA (HCC): ICD-10-CM

## 2021-01-28 DIAGNOSIS — C79.51 BONE METASTASES (HCC): ICD-10-CM

## 2021-01-28 DIAGNOSIS — Z17.0 MALIGNANT NEOPLASM OF NIPPLE OF RIGHT BREAST IN FEMALE, ESTROGEN RECEPTOR POSITIVE (HCC): Primary | ICD-10-CM

## 2021-01-28 DIAGNOSIS — C50.011 MALIGNANT NEOPLASM OF NIPPLE OF RIGHT BREAST IN FEMALE, ESTROGEN RECEPTOR POSITIVE (HCC): ICD-10-CM

## 2021-01-28 DIAGNOSIS — C78.7 LIVER METASTASES (HCC): ICD-10-CM

## 2021-01-28 LAB
ALBUMIN SERPL-MCNC: 3.3 G/DL (ref 3.4–5)
ALBUMIN/GLOB SERPL: 0.9 {RATIO} (ref 1–2)
ALP LIVER SERPL-CCNC: 180 U/L
ALT SERPL-CCNC: 54 U/L
ANION GAP SERPL CALC-SCNC: 6 MMOL/L (ref 0–18)
AST SERPL-CCNC: 83 U/L (ref 15–37)
BASOPHILS # BLD AUTO: 0.08 X10(3) UL (ref 0–0.2)
BASOPHILS NFR BLD AUTO: 1.3 %
BILIRUB SERPL-MCNC: 0.5 MG/DL (ref 0.1–2)
BUN BLD-MCNC: 9 MG/DL (ref 7–18)
BUN/CREAT SERPL: 13.4 (ref 10–20)
CALCIUM BLD-MCNC: 8.7 MG/DL (ref 8.5–10.1)
CHLORIDE SERPL-SCNC: 112 MMOL/L (ref 98–112)
CO2 SERPL-SCNC: 27 MMOL/L (ref 21–32)
CREAT BLD-MCNC: 0.67 MG/DL
DEPRECATED RDW RBC AUTO: 80.4 FL (ref 35.1–46.3)
EOSINOPHIL # BLD AUTO: 0.23 X10(3) UL (ref 0–0.7)
EOSINOPHIL NFR BLD AUTO: 3.9 %
ERYTHROCYTE [DISTWIDTH] IN BLOOD BY AUTOMATED COUNT: 22.1 % (ref 11–15)
GLOBULIN PLAS-MCNC: 3.5 G/DL (ref 2.8–4.4)
GLUCOSE BLD-MCNC: 93 MG/DL (ref 70–99)
HCT VFR BLD AUTO: 28.1 %
HGB BLD-MCNC: 8.3 G/DL
IMM GRANULOCYTES # BLD AUTO: 0.03 X10(3) UL (ref 0–1)
IMM GRANULOCYTES NFR BLD: 0.5 %
LYMPHOCYTES # BLD AUTO: 1.55 X10(3) UL (ref 1–4)
LYMPHOCYTES NFR BLD AUTO: 26.1 %
M PROTEIN MFR SERPL ELPH: 6.8 G/DL (ref 6.4–8.2)
MCH RBC QN AUTO: 30.3 PG (ref 26–34)
MCHC RBC AUTO-ENTMCNC: 29.5 G/DL (ref 31–37)
MCV RBC AUTO: 102.6 FL
MONOCYTES # BLD AUTO: 0.38 X10(3) UL (ref 0.1–1)
MONOCYTES NFR BLD AUTO: 6.4 %
NEUTROPHILS # BLD AUTO: 3.66 X10 (3) UL (ref 1.5–7.7)
NEUTROPHILS # BLD AUTO: 3.66 X10(3) UL (ref 1.5–7.7)
NEUTROPHILS NFR BLD AUTO: 61.8 %
OSMOLALITY SERPL CALC.SUM OF ELEC: 298 MOSM/KG (ref 275–295)
PATIENT FASTING Y/N/NP: NO
PLATELET # BLD AUTO: 214 10(3)UL (ref 150–450)
PLATELET MORPHOLOGY: NORMAL
POTASSIUM SERPL-SCNC: 3.6 MMOL/L (ref 3.5–5.1)
RBC # BLD AUTO: 2.74 X10(6)UL
SODIUM SERPL-SCNC: 145 MMOL/L (ref 136–145)
WBC # BLD AUTO: 5.9 X10(3) UL (ref 4–11)

## 2021-01-28 PROCEDURE — 96402 CHEMO HORMON ANTINEOPL SQ/IM: CPT

## 2021-01-28 PROCEDURE — 99215 OFFICE O/P EST HI 40 MIN: CPT | Performed by: INTERNAL MEDICINE

## 2021-01-28 RX ORDER — LAMOTRIGINE 25 MG/1
500 TABLET ORAL ONCE
Status: CANCELLED | OUTPATIENT
Start: 2021-01-28

## 2021-01-28 RX ORDER — LAMOTRIGINE 25 MG/1
500 TABLET ORAL ONCE
Status: COMPLETED | OUTPATIENT
Start: 2021-01-28 | End: 2021-01-28

## 2021-01-28 RX ADMIN — LAMOTRIGINE 500 MG: 25 TABLET ORAL at 15:30:00

## 2021-01-28 NOTE — PROGRESS NOTES
Cancer Center Progress Note  Patient Name: Guru Craft   YOB: 1960   Medical Record Number: [id number]     Attending Physician: Pari Santiago M.D. Date of Visit: 1/28/2021      Chief Complaint:  Patient presents with:   Follow - Diagnosis Date   • Abdominal distention 2012   • Anemia    • Anxiety    • Back pain    • Breast cancer [facility])    • Disorder of thyroid    • Fatigue 2019   • Feeling lonely Recent   • Hemorrhoids Unk   • High cholesterol    • Hyperlipidemia 6/29/2012   • H Social connections        Talks on phone: Not on file        Gets together: Not on file        Attends Orthodox service: Not on file        Active member of club or organization: Not on file        Attends meetings of clubs or organizations: Not on file Take 1 tablet (20 mg total) by mouth daily. , Disp: 90 tablet, Rfl: 1  •  Levothyroxine Sodium 150 MCG Oral Tab, Take 1 tablet (150 mcg total) by mouth every morning before breakfast., Disp: 90 tablet, Rfl: 1  •  ALPRAZolam 0.25 MG Oral Tab, Take 1 tablet ( clear and without icterus. Pupils are reactive and equal.   Hematologic/Lymphatic Normal - No petechiae or purpura. No tender or palpable lymph nodes in the cervical, supraclavicular, axillary or inguinal area.    Respiratory Normal - Lungs CTA, no rhonchi Discontinued Xgeva due to her teeth issues. Elevated LFT: Mild. Secondary to mets and fatty liver. Continue to monitor with monthly labs. Anemia: Acute blood loss,  Severe iron deficiency and chemo-induced anemia.   Improving after the hemorrhoid ban

## 2021-02-01 ENCOUNTER — TELEPHONE (OUTPATIENT)
Dept: SURGERY | Facility: CLINIC | Age: 61
End: 2021-02-01

## 2021-02-01 NOTE — TELEPHONE ENCOUNTER
called to cancel appt 2/2/21. I spoke with [place]. to confirm that pt should keep the appt to f/u from the 1st banding.  agreed.  He said he would CB if his wife decided not to continue for the 2nd banding and do only the exam.

## 2021-02-04 ENCOUNTER — OFFICE VISIT (OUTPATIENT)
Dept: SURGERY | Facility: CLINIC | Age: 61
End: 2021-02-04

## 2021-02-04 VITALS
TEMPERATURE: 98 F | HEART RATE: 73 BPM | WEIGHT: 183 LBS | HEIGHT: 66 IN | SYSTOLIC BLOOD PRESSURE: 105 MMHG | BODY MASS INDEX: 29.41 KG/M2 | DIASTOLIC BLOOD PRESSURE: 53 MMHG

## 2021-02-04 DIAGNOSIS — K64.4 EXTERNAL HEMORRHOID: ICD-10-CM

## 2021-02-04 DIAGNOSIS — K64.8 INTERNAL HEMORRHOIDS: Primary | ICD-10-CM

## 2021-02-04 PROCEDURE — 99214 OFFICE O/P EST MOD 30 MIN: CPT | Performed by: SURGERY

## 2021-02-04 PROCEDURE — 3074F SYST BP LT 130 MM HG: CPT | Performed by: SURGERY

## 2021-02-04 PROCEDURE — 3078F DIAST BP <80 MM HG: CPT | Performed by: SURGERY

## 2021-02-04 PROCEDURE — 3008F BODY MASS INDEX DOCD: CPT | Performed by: SURGERY

## 2021-02-05 NOTE — PROGRESS NOTES
Follow Up Visit Note       Active Problems      1. Internal hemorrhoids    2. External hemorrhoid          Chief Complaint   Patient presents with:  Hemorrhoid Banding: EP 2ND BANDING- PT denies rectal bleeding or pain. PT denies any concerns or issues. B-12 1000 MCG Oral Tab, Take 1 tablet (1,000 mcg total) by mouth daily. , Disp: 30 tablet, Rfl: 11  •  buPROPion HCl ER, SR, 150 MG Oral Tablet 12 Hr, Take 150 mg by mouth 2 (two) times daily. , Disp: , Rfl:   •  Omega-3-acid Ethyl Esters 1 g Oral Cap, Take for joint swelling and neck pain. Skin: Negative for color change and rash. Neurological: Negative for dizziness, syncope and light-headedness. Hematological: Negative for adenopathy. Does not bruise/bleed easily.    Psychiatric/Behavioral: Negative f bleeding recur. Strategies to prevent constipation including the use of dietary fiber, hydration and other remedies was discussed in detail    [facility] did have screening colonoscopy a few months ago.     Assessment   Internal hemorrhoids  (primary encounter

## 2021-02-19 RX ORDER — HYDROCODONE BITARTRATE AND ACETAMINOPHEN 7.5; 325 MG/1; MG/1
1 TABLET ORAL EVERY 6 HOURS PRN
Qty: 90 TABLET | Refills: 0 | Status: SHIPPED | OUTPATIENT
Start: 2021-02-19 | End: 2021-08-25

## 2021-02-19 NOTE — TELEPHONE ENCOUNTER
HYDROcodone-acetaminophen 7.5-325 MG Oral Tab    None protocol medication. Please see pended medications. Please sign if appropriate. Thank you      Last OV: 01/08/2021.

## 2021-02-25 ENCOUNTER — OFFICE VISIT (OUTPATIENT)
Dept: HEMATOLOGY/ONCOLOGY | Age: 61
End: 2021-02-25
Attending: INTERNAL MEDICINE
Payer: COMMERCIAL

## 2021-02-25 VITALS
SYSTOLIC BLOOD PRESSURE: 132 MMHG | RESPIRATION RATE: 18 BRPM | TEMPERATURE: 98 F | OXYGEN SATURATION: 98 % | DIASTOLIC BLOOD PRESSURE: 65 MMHG | HEART RATE: 69 BPM | WEIGHT: 185.81 LBS | HEIGHT: 65.98 IN | BODY MASS INDEX: 29.86 KG/M2

## 2021-02-25 DIAGNOSIS — D50.0 IRON DEFICIENCY ANEMIA DUE TO CHRONIC BLOOD LOSS: ICD-10-CM

## 2021-02-25 DIAGNOSIS — C79.51 BONE METASTASES (HCC): ICD-10-CM

## 2021-02-25 DIAGNOSIS — C78.7 LIVER METASTASES (HCC): ICD-10-CM

## 2021-02-25 DIAGNOSIS — C50.011 MALIGNANT NEOPLASM OF NIPPLE OF RIGHT BREAST IN FEMALE, ESTROGEN RECEPTOR POSITIVE (HCC): Primary | ICD-10-CM

## 2021-02-25 DIAGNOSIS — Z17.0 MALIGNANT NEOPLASM OF NIPPLE OF RIGHT BREAST IN FEMALE, ESTROGEN RECEPTOR POSITIVE (HCC): Primary | ICD-10-CM

## 2021-02-25 DIAGNOSIS — C50.911 CARCINOMA OF RIGHT BREAST METASTATIC TO PLEURA (HCC): ICD-10-CM

## 2021-02-25 DIAGNOSIS — C78.2 CARCINOMA OF RIGHT BREAST METASTATIC TO PLEURA (HCC): ICD-10-CM

## 2021-02-25 LAB
ALBUMIN SERPL-MCNC: 4 G/DL (ref 3.4–5)
ALBUMIN/GLOB SERPL: 1.2 {RATIO} (ref 1–2)
ALP LIVER SERPL-CCNC: 151 U/L
ALT SERPL-CCNC: 72 U/L
ANION GAP SERPL CALC-SCNC: 6 MMOL/L (ref 0–18)
AST SERPL-CCNC: 85 U/L (ref 15–37)
BASOPHILS # BLD AUTO: 0.07 X10(3) UL (ref 0–0.2)
BASOPHILS NFR BLD AUTO: 1.4 %
BILIRUB SERPL-MCNC: 0.6 MG/DL (ref 0.1–2)
BUN BLD-MCNC: 9 MG/DL (ref 7–18)
BUN/CREAT SERPL: 11.4 (ref 10–20)
CALCIUM BLD-MCNC: 9.5 MG/DL (ref 8.5–10.1)
CHLORIDE SERPL-SCNC: 106 MMOL/L (ref 98–112)
CO2 SERPL-SCNC: 28 MMOL/L (ref 21–32)
CREAT BLD-MCNC: 0.79 MG/DL
DEPRECATED HBV CORE AB SER IA-ACNC: 130 NG/ML
DEPRECATED RDW RBC AUTO: 67 FL (ref 35.1–46.3)
EOSINOPHIL # BLD AUTO: 0.24 X10(3) UL (ref 0–0.7)
EOSINOPHIL NFR BLD AUTO: 4.9 %
ERYTHROCYTE [DISTWIDTH] IN BLOOD BY AUTOMATED COUNT: 17.7 % (ref 11–15)
GLOBULIN PLAS-MCNC: 3.4 G/DL (ref 2.8–4.4)
GLUCOSE BLD-MCNC: 80 MG/DL (ref 70–99)
HCT VFR BLD AUTO: 33.1 %
HGB BLD-MCNC: 10 G/DL
IMM GRANULOCYTES # BLD AUTO: 0.01 X10(3) UL (ref 0–1)
IMM GRANULOCYTES NFR BLD: 0.2 %
IRON SATURATION: 16 %
IRON SERPL-MCNC: 65 UG/DL
LYMPHOCYTES # BLD AUTO: 1.94 X10(3) UL (ref 1–4)
LYMPHOCYTES NFR BLD AUTO: 40 %
M PROTEIN MFR SERPL ELPH: 7.4 G/DL (ref 6.4–8.2)
MCH RBC QN AUTO: 30.8 PG (ref 26–34)
MCHC RBC AUTO-ENTMCNC: 30.2 G/DL (ref 31–37)
MCV RBC AUTO: 101.8 FL
MONOCYTES # BLD AUTO: 0.31 X10(3) UL (ref 0.1–1)
MONOCYTES NFR BLD AUTO: 6.4 %
NEUTROPHILS # BLD AUTO: 2.28 X10 (3) UL (ref 1.5–7.7)
NEUTROPHILS # BLD AUTO: 2.28 X10(3) UL (ref 1.5–7.7)
NEUTROPHILS NFR BLD AUTO: 47.1 %
OSMOLALITY SERPL CALC.SUM OF ELEC: 288 MOSM/KG (ref 275–295)
PATIENT FASTING Y/N/NP: NO
PLATELET # BLD AUTO: 162 10(3)UL (ref 150–450)
PLATELET MORPHOLOGY: NORMAL
POTASSIUM SERPL-SCNC: 3.7 MMOL/L (ref 3.5–5.1)
RBC # BLD AUTO: 3.25 X10(6)UL
SODIUM SERPL-SCNC: 140 MMOL/L (ref 136–145)
TOTAL IRON BINDING CAPACITY: 413 UG/DL (ref 240–450)
TRANSFERRIN SERPL-MCNC: 277 MG/DL (ref 200–360)
WBC # BLD AUTO: 4.9 X10(3) UL (ref 4–11)

## 2021-02-25 PROCEDURE — 99215 OFFICE O/P EST HI 40 MIN: CPT | Performed by: INTERNAL MEDICINE

## 2021-02-25 PROCEDURE — 96402 CHEMO HORMON ANTINEOPL SQ/IM: CPT

## 2021-02-25 RX ORDER — LAMOTRIGINE 25 MG/1
500 TABLET ORAL ONCE
Status: CANCELLED | OUTPATIENT
Start: 2021-02-25

## 2021-02-25 RX ORDER — LAMOTRIGINE 25 MG/1
500 TABLET ORAL ONCE
Status: COMPLETED | OUTPATIENT
Start: 2021-02-25 | End: 2021-02-25

## 2021-02-25 RX ORDER — GABAPENTIN 300 MG/1
CAPSULE ORAL
Qty: 90 CAPSULE | Refills: 3 | Status: SHIPPED | OUTPATIENT
Start: 2021-02-25 | End: 2021-04-02

## 2021-02-25 RX ADMIN — LAMOTRIGINE 500 MG: 25 TABLET ORAL at 15:56:00

## 2021-02-25 NOTE — PROGRESS NOTES
Cancer Center Progress Note  Patient Name: Freddy Thapa   YOB: 1960   Medical Record Number: [id number]     Attending Physician: Caridad Buerger, M.D. Date of Visit: 2/25/2021    Chief Complaint:  Patient presents with:   Follow - Up lyrica.     Performance Status:  ECOG 1    Past Medical History:  Past Medical History:   Diagnosis Date   • Abdominal distention 2012   • Anemia    • Anxiety    • Back pain    • Breast cancer (HCC)    • Disorder of thyroid    • Fatigue 2019   • Feeling sujey file        Minutes per session: Not on file      Stress: Not on file    Relationships      Social connections        Talks on phone: Not on file        Gets together: Not on file        Attends Confucianism service: Not on file        Active member of club o HFA IN), Inhale into the lungs as needed. , Disp: , Rfl:   •  escitalopram 20 MG Oral Tab, Take 1 tablet (20 mg total) by mouth daily. , Disp: 90 tablet, Rfl: 1  •  Levothyroxine Sodium 150 MCG Oral Tab, Take 1 tablet (150 mcg total) by mouth every morning b age. Well nourished. Well developed. Eyes Normal - Conjunctivae and sclerae are clear and without icterus. Pupils are reactive and equal.   Hematologic/Lymphatic Normal - No petechiae or purpura.   No tender or palpable lymph nodes in the cervical, suprac non-con CT. Discontinued the Abemaciclib and continuing Faslodex. Repeat MRI in 1 months to assess for progression. Bone Metastases: Stable. Discontinued Xgeva due to her teeth issues. Elevated LFT: Mild. Secondary to mets and fatty liver.  Co

## 2021-03-09 DIAGNOSIS — E78.2 HYPERLIPIDEMIA, MIXED: ICD-10-CM

## 2021-03-10 RX ORDER — LEVOTHYROXINE SODIUM 0.15 MG/1
150 TABLET ORAL
Qty: 90 TABLET | Refills: 0 | Status: SHIPPED | OUTPATIENT
Start: 2021-03-10 | End: 2021-06-02

## 2021-03-20 ENCOUNTER — HOSPITAL ENCOUNTER (OUTPATIENT)
Dept: MRI IMAGING | Age: 61
Discharge: HOME | End: 2021-03-20
Attending: INTERNAL MEDICINE
Payer: COMMERCIAL

## 2021-03-20 DIAGNOSIS — C79.51 BONE METASTASES (HCC): ICD-10-CM

## 2021-03-20 DIAGNOSIS — Z17.0 MALIGNANT NEOPLASM OF NIPPLE OF RIGHT BREAST IN FEMALE, ESTROGEN RECEPTOR POSITIVE (HCC): ICD-10-CM

## 2021-03-20 DIAGNOSIS — Z23 NEED FOR VACCINATION: ICD-10-CM

## 2021-03-20 DIAGNOSIS — C50.011 MALIGNANT NEOPLASM OF NIPPLE OF RIGHT BREAST IN FEMALE, ESTROGEN RECEPTOR POSITIVE (HCC): ICD-10-CM

## 2021-03-20 DIAGNOSIS — C78.7 LIVER METASTASES (HCC): ICD-10-CM

## 2021-03-20 PROCEDURE — 74181 MRI ABDOMEN W/O CONTRAST: CPT | Performed by: INTERNAL MEDICINE

## 2021-03-25 ENCOUNTER — OFFICE VISIT (OUTPATIENT)
Dept: HEMATOLOGY/ONCOLOGY | Age: 61
End: 2021-03-25
Attending: INTERNAL MEDICINE
Payer: COMMERCIAL

## 2021-03-25 VITALS
TEMPERATURE: 99 F | RESPIRATION RATE: 18 BRPM | WEIGHT: 192 LBS | BODY MASS INDEX: 30.86 KG/M2 | HEIGHT: 65.98 IN | OXYGEN SATURATION: 97 % | HEART RATE: 80 BPM | SYSTOLIC BLOOD PRESSURE: 124 MMHG | DIASTOLIC BLOOD PRESSURE: 64 MMHG

## 2021-03-25 DIAGNOSIS — C78.2 CARCINOMA OF RIGHT BREAST METASTATIC TO PLEURA (HCC): ICD-10-CM

## 2021-03-25 DIAGNOSIS — C50.011 MALIGNANT NEOPLASM OF NIPPLE OF RIGHT BREAST IN FEMALE, ESTROGEN RECEPTOR POSITIVE (HCC): Primary | ICD-10-CM

## 2021-03-25 DIAGNOSIS — Z17.0 MALIGNANT NEOPLASM OF NIPPLE OF RIGHT BREAST IN FEMALE, ESTROGEN RECEPTOR POSITIVE (HCC): Primary | ICD-10-CM

## 2021-03-25 DIAGNOSIS — C79.51 BONE METASTASES (HCC): ICD-10-CM

## 2021-03-25 DIAGNOSIS — C50.911 CARCINOMA OF RIGHT BREAST METASTATIC TO PLEURA (HCC): ICD-10-CM

## 2021-03-25 DIAGNOSIS — C50.011 MALIGNANT NEOPLASM OF NIPPLE OF RIGHT BREAST IN FEMALE, ESTROGEN RECEPTOR POSITIVE (HCC): ICD-10-CM

## 2021-03-25 DIAGNOSIS — Z17.0 MALIGNANT NEOPLASM OF NIPPLE OF RIGHT BREAST IN FEMALE, ESTROGEN RECEPTOR POSITIVE (HCC): ICD-10-CM

## 2021-03-25 DIAGNOSIS — K76.9 LIVER LESION: Primary | ICD-10-CM

## 2021-03-25 LAB
ALBUMIN SERPL-MCNC: 3.7 G/DL (ref 3.4–5)
ALBUMIN/GLOB SERPL: 1 {RATIO} (ref 1–2)
ALP LIVER SERPL-CCNC: 162 U/L
ALT SERPL-CCNC: 67 U/L
ANION GAP SERPL CALC-SCNC: 4 MMOL/L (ref 0–18)
AST SERPL-CCNC: 69 U/L (ref 15–37)
BASOPHILS # BLD AUTO: 0.05 X10(3) UL (ref 0–0.2)
BASOPHILS NFR BLD AUTO: 1 %
BILIRUB SERPL-MCNC: 0.5 MG/DL (ref 0.1–2)
BUN BLD-MCNC: 9 MG/DL (ref 7–18)
BUN/CREAT SERPL: 11.1 (ref 10–20)
CALCIUM BLD-MCNC: 9.4 MG/DL (ref 8.5–10.1)
CHLORIDE SERPL-SCNC: 110 MMOL/L (ref 98–112)
CO2 SERPL-SCNC: 29 MMOL/L (ref 21–32)
CREAT BLD-MCNC: 0.81 MG/DL
DEPRECATED RDW RBC AUTO: 59.3 FL (ref 35.1–46.3)
EOSINOPHIL # BLD AUTO: 0.19 X10(3) UL (ref 0–0.7)
EOSINOPHIL NFR BLD AUTO: 3.8 %
ERYTHROCYTE [DISTWIDTH] IN BLOOD BY AUTOMATED COUNT: 16 % (ref 11–15)
GLOBULIN PLAS-MCNC: 3.6 G/DL (ref 2.8–4.4)
GLUCOSE BLD-MCNC: 123 MG/DL (ref 70–99)
HCT VFR BLD AUTO: 29.1 %
HGB BLD-MCNC: 9 G/DL
IMM GRANULOCYTES # BLD AUTO: 0.02 X10(3) UL (ref 0–1)
IMM GRANULOCYTES NFR BLD: 0.4 %
LYMPHOCYTES # BLD AUTO: 1.74 X10(3) UL (ref 1–4)
LYMPHOCYTES NFR BLD AUTO: 35.2 %
M PROTEIN MFR SERPL ELPH: 7.3 G/DL (ref 6.4–8.2)
MCH RBC QN AUTO: 30.9 PG (ref 26–34)
MCHC RBC AUTO-ENTMCNC: 30.9 G/DL (ref 31–37)
MCV RBC AUTO: 100 FL
MONOCYTES # BLD AUTO: 0.33 X10(3) UL (ref 0.1–1)
MONOCYTES NFR BLD AUTO: 6.7 %
NEUTROPHILS # BLD AUTO: 2.62 X10 (3) UL (ref 1.5–7.7)
NEUTROPHILS # BLD AUTO: 2.62 X10(3) UL (ref 1.5–7.7)
NEUTROPHILS NFR BLD AUTO: 52.9 %
OSMOLALITY SERPL CALC.SUM OF ELEC: 296 MOSM/KG (ref 275–295)
PATIENT FASTING Y/N/NP: NO
PLATELET # BLD AUTO: 169 10(3)UL (ref 150–450)
POTASSIUM SERPL-SCNC: 3.9 MMOL/L (ref 3.5–5.1)
RBC # BLD AUTO: 2.91 X10(6)UL
SODIUM SERPL-SCNC: 143 MMOL/L (ref 136–145)
WBC # BLD AUTO: 5 X10(3) UL (ref 4–11)

## 2021-03-25 PROCEDURE — 96402 CHEMO HORMON ANTINEOPL SQ/IM: CPT

## 2021-03-25 PROCEDURE — 99215 OFFICE O/P EST HI 40 MIN: CPT | Performed by: INTERNAL MEDICINE

## 2021-03-25 RX ORDER — LAMOTRIGINE 25 MG/1
500 TABLET ORAL ONCE
Status: CANCELLED | OUTPATIENT
Start: 2021-03-25

## 2021-03-25 RX ORDER — LAMOTRIGINE 25 MG/1
500 TABLET ORAL ONCE
Status: COMPLETED | OUTPATIENT
Start: 2021-03-25 | End: 2021-03-25

## 2021-03-25 RX ADMIN — LAMOTRIGINE 500 MG: 25 TABLET ORAL at 15:43:00

## 2021-03-25 NOTE — PROGRESS NOTES
Faslodex injection given per MD order w/out incident. Pt dc home in stable condition, no complaints.    Education Record    Learner:  Patient    Disease / Diagnosis:BRCA    Barriers / Limitations:  None   Comments:    Method:  Brief focused and Printed mate

## 2021-03-26 RX ORDER — BIOTIN 2500 MCG
1 CAPSULE ORAL EVERY EVENING
COMMUNITY

## 2021-03-28 NOTE — PROGRESS NOTES
Cancer Center Progress Note  Patient Name: Freddy Thapa   YOB: 1960   Medical Record Number: [id number]     Attending Physician: Caridad Buerger, M.D. Date of Visit: 3/25/21    Chief Complaint:  Patient presents with:   Follow - Up Multidisciplinary GI oncology tumor board, and biopsy of the L Lobe of the liver was recommended.      Performance Status:  ECOG 1    Past Medical History:  Past Medical History:   Diagnosis Date   • Abdominal distention 2012   • Anemia    • Anxiety    • Ba  Service: Not Asked        Blood Transfusions: Not Asked        Caffeine Concern: Yes          1 1/2 a day 16oz        Occupational Exposure: Not Asked        Hobby Hazards: Not Asked        Sleep Concern: Not Asked        Stress Concern: Not Asked tablet, Rfl: 0  •  folic acid 1 MG Oral Tab, Take 1 mg by mouth daily. , Disp: , Rfl:   •  Vitamin B-12 1000 MCG Oral Tab, Take 1 tablet (1,000 mcg total) by mouth daily. , Disp: 30 tablet, Rfl: 11  •  buPROPion HCl ER, SR, 150 MG Oral Tablet 12 Hr, Take 150 weakness. Normal gait. Psychiatric No insomnia, depression, erica or mood swings.        Vital Signs:  /64 (BP Location: Left arm, Patient Position: Sitting, Cuff Size: large)   Pulse 80   Temp 98.8 °F (37.1 °C) (Tympanic)   Resp 18   Ht 1.676 m (5' Stage IV, ER+ OR+ HER-2 Neg. She was transitioned to Faslodex and Abemaciclib. Abemaciclib dose reduced for diarrhea and rash, and then held for pancytopenia.  After restarting, she was hospitalized for severe anemia that was likely related to bleeding, ir

## 2021-04-05 ENCOUNTER — TELEPHONE (OUTPATIENT)
Dept: FAMILY MEDICINE CLINIC | Facility: CLINIC | Age: 61
End: 2021-04-05

## 2021-04-05 ENCOUNTER — LAB ENCOUNTER (OUTPATIENT)
Dept: LAB | Age: 61
End: 2021-04-05
Attending: INTERNAL MEDICINE
Payer: COMMERCIAL

## 2021-04-05 ENCOUNTER — OFFICE VISIT (OUTPATIENT)
Dept: FAMILY MEDICINE CLINIC | Facility: CLINIC | Age: 61
End: 2021-04-05

## 2021-04-05 VITALS
HEART RATE: 70 BPM | WEIGHT: 189 LBS | DIASTOLIC BLOOD PRESSURE: 68 MMHG | SYSTOLIC BLOOD PRESSURE: 120 MMHG | HEIGHT: 60 IN | BODY MASS INDEX: 37.11 KG/M2

## 2021-04-05 DIAGNOSIS — K76.9 LIVER LESION: ICD-10-CM

## 2021-04-05 DIAGNOSIS — E78.2 HYPERLIPIDEMIA, MIXED: Primary | ICD-10-CM

## 2021-04-05 DIAGNOSIS — E03.9 HYPOTHYROIDISM IN ADULT: ICD-10-CM

## 2021-04-05 DIAGNOSIS — R79.89 ELEVATED LIVER FUNCTION TESTS: ICD-10-CM

## 2021-04-05 DIAGNOSIS — Z01.818 PREOPERATIVE CLEARANCE: Primary | ICD-10-CM

## 2021-04-05 DIAGNOSIS — C50.919 METASTATIC BREAST CANCER (HCC): ICD-10-CM

## 2021-04-05 PROCEDURE — 3008F BODY MASS INDEX DOCD: CPT | Performed by: FAMILY MEDICINE

## 2021-04-05 PROCEDURE — 99243 OFF/OP CNSLTJ NEW/EST LOW 30: CPT | Performed by: FAMILY MEDICINE

## 2021-04-05 PROCEDURE — 3074F SYST BP LT 130 MM HG: CPT | Performed by: FAMILY MEDICINE

## 2021-04-05 PROCEDURE — 3078F DIAST BP <80 MM HG: CPT | Performed by: FAMILY MEDICINE

## 2021-04-05 NOTE — TELEPHONE ENCOUNTER
Pt has an appt in June. She would like to know if she needs any labs? I reviewed her chart. It looks like she needs a lipid panel and a TSH and Free T4. Please authorize if appropriate.

## 2021-04-05 NOTE — PROGRESS NOTES
[facility] Office Note  Chief Complaint:   Patient presents with:  Pre-Op Exam: 4/8/21 Dr Mohinder Inman      HPI:   This is a 64year old female coming in for preop medical clearance for US liver biopsy with Dr. Sheran Landau Smokeless tobacco: Never Used      Tobacco comment: 3-5 sticks/ day    Vaping Use      Vaping Use: Never used    Alcohol use: Not Currently      Alcohol/week: 0.0 standard drinks      Comment: 2 drinks/yr     Drug use: No    Family History:  Family History Eyes:  Denies visual loss, blurred vision, double vision or yellow sclerae. Ears, Nose, Throat:  Denies hearing loss, sneezing, congestion, runny nose or sore throat. CARDIOVASCULAR:  Denies chest pain, chest pressure or chest discomfort.  No palpitations procedure    2. Liver lesion  -  Patient to undergo above procedure  -  Further recs per heme/onc    3.  Metastatic breast cancer ([facility]  -  Managed by heme/onc  -  Liver mets to be ruled out with US liver biopsy      Meds & Refills for this Visit:  Requeste unspecified site [facility] breast ([facility]     Hypoxia     Metastatic breast cancer ([facility]     Dizziness     Dehydration     Pancytopenia (HCC)     Iron deficiency anemia due to chronic blood loss     Gastroesophageal reflux disease without esophagitis     Benign hemanth

## 2021-04-07 NOTE — TELEPHONE ENCOUNTER
Call to patient.  answered phone and states pt is unavailable. Per HIPAA, OK to speak with spouse. Advised orders were placed and she should have these a couple of days before appt. Spouse states he will advise pt.

## 2021-04-07 NOTE — TELEPHONE ENCOUNTER
Yes I would like patient to have a blood work done we will check her cholesterol liver test and thyroid needs to be fasting for those tests I would like her to do it a few days before her appointment.   Thank you

## 2021-04-08 ENCOUNTER — HOSPITAL ENCOUNTER (OUTPATIENT)
Dept: ULTRASOUND IMAGING | Facility: HOSPITAL | Age: 61
Discharge: HOME | End: 2021-04-08
Attending: INTERNAL MEDICINE
Payer: COMMERCIAL

## 2021-04-08 ENCOUNTER — NURSE ONLY (OUTPATIENT)
Dept: LAB | Facility: HOSPITAL | Age: 61
End: 2021-04-08
Attending: INTERNAL MEDICINE
Payer: COMMERCIAL

## 2021-04-08 VITALS
SYSTOLIC BLOOD PRESSURE: 115 MMHG | TEMPERATURE: 98 F | HEIGHT: 65 IN | HEART RATE: 74 BPM | DIASTOLIC BLOOD PRESSURE: 57 MMHG | RESPIRATION RATE: 18 BRPM | OXYGEN SATURATION: 97 % | WEIGHT: 192 LBS | BODY MASS INDEX: 31.99 KG/M2

## 2021-04-08 DIAGNOSIS — K76.9 LIVER LESION: ICD-10-CM

## 2021-04-08 DIAGNOSIS — C50.011 MALIGNANT NEOPLASM OF NIPPLE OF RIGHT BREAST IN FEMALE, ESTROGEN RECEPTOR POSITIVE (HCC): ICD-10-CM

## 2021-04-08 DIAGNOSIS — Z17.0 MALIGNANT NEOPLASM OF NIPPLE OF RIGHT BREAST IN FEMALE, ESTROGEN RECEPTOR POSITIVE (HCC): ICD-10-CM

## 2021-04-08 DIAGNOSIS — Z17.0 MALIGNANT NEOPLASM OF NIPPLE OF RIGHT BREAST IN FEMALE, ESTROGEN RECEPTOR POSITIVE (HCC): Primary | ICD-10-CM

## 2021-04-08 DIAGNOSIS — C50.011 MALIGNANT NEOPLASM OF NIPPLE OF RIGHT BREAST IN FEMALE, ESTROGEN RECEPTOR POSITIVE (HCC): Primary | ICD-10-CM

## 2021-04-08 PROCEDURE — 76942 ECHO GUIDE FOR BIOPSY: CPT | Performed by: INTERNAL MEDICINE

## 2021-04-08 PROCEDURE — 88341 IMHCHEM/IMCYTCHM EA ADD ANTB: CPT | Performed by: INTERNAL MEDICINE

## 2021-04-08 PROCEDURE — 85610 PROTHROMBIN TIME: CPT

## 2021-04-08 PROCEDURE — 88342 IMHCHEM/IMCYTCHM 1ST ANTB: CPT | Performed by: INTERNAL MEDICINE

## 2021-04-08 PROCEDURE — 36415 COLL VENOUS BLD VENIPUNCTURE: CPT

## 2021-04-08 PROCEDURE — 88307 TISSUE EXAM BY PATHOLOGIST: CPT | Performed by: INTERNAL MEDICINE

## 2021-04-08 PROCEDURE — 47000 NEEDLE BIOPSY OF LIVER PERQ: CPT | Performed by: INTERNAL MEDICINE

## 2021-04-08 PROCEDURE — 99152 MOD SED SAME PHYS/QHP 5/>YRS: CPT | Performed by: INTERNAL MEDICINE

## 2021-04-08 RX ORDER — MIDAZOLAM HYDROCHLORIDE 1 MG/ML
1 INJECTION INTRAMUSCULAR; INTRAVENOUS EVERY 5 MIN PRN
Status: ACTIVE | OUTPATIENT
Start: 2021-04-08 | End: 2021-04-08

## 2021-04-08 RX ORDER — SODIUM CHLORIDE 9 MG/ML
INJECTION, SOLUTION INTRAVENOUS CONTINUOUS
Status: DISCONTINUED | OUTPATIENT
Start: 2021-04-08 | End: 2021-04-10

## 2021-04-08 RX ORDER — NALOXONE HYDROCHLORIDE 0.4 MG/ML
80 INJECTION, SOLUTION INTRAMUSCULAR; INTRAVENOUS; SUBCUTANEOUS AS NEEDED
Status: DISCONTINUED | OUTPATIENT
Start: 2021-04-08 | End: 2021-04-10

## 2021-04-08 RX ORDER — FLUMAZENIL 0.1 MG/ML
0.2 INJECTION, SOLUTION INTRAVENOUS AS NEEDED
Status: DISCONTINUED | OUTPATIENT
Start: 2021-04-08 | End: 2021-04-10

## 2021-04-08 RX ORDER — NALOXONE HYDROCHLORIDE 0.4 MG/ML
INJECTION, SOLUTION INTRAMUSCULAR; INTRAVENOUS; SUBCUTANEOUS
Status: DISCONTINUED
Start: 2021-04-08 | End: 2021-04-08 | Stop reason: WASHOUT

## 2021-04-08 RX ORDER — FLUMAZENIL 0.1 MG/ML
INJECTION, SOLUTION INTRAVENOUS
Status: DISCONTINUED
Start: 2021-04-08 | End: 2021-04-08 | Stop reason: WASHOUT

## 2021-04-08 RX ORDER — MIDAZOLAM HYDROCHLORIDE 1 MG/ML
INJECTION INTRAMUSCULAR; INTRAVENOUS
Status: COMPLETED
Start: 2021-04-08 | End: 2021-04-08

## 2021-04-08 RX ADMIN — SODIUM CHLORIDE: 9 INJECTION, SOLUTION INTRAVENOUS at 10:56:00

## 2021-04-08 NOTE — IMAGING NOTE
NPO status verified  Pertinent labs and radiology imaging reviewed  Consent signed and on file    Patient tolerated procedural sedation without any immediate complications noted. Biopsy site to left upper abdomen with tegaderm dressing. C/D/I.  Patient den

## 2021-04-22 ENCOUNTER — OFFICE VISIT (OUTPATIENT)
Dept: HEMATOLOGY/ONCOLOGY | Age: 61
End: 2021-04-22
Attending: INTERNAL MEDICINE
Payer: COMMERCIAL

## 2021-04-22 VITALS
DIASTOLIC BLOOD PRESSURE: 57 MMHG | HEIGHT: 65.98 IN | SYSTOLIC BLOOD PRESSURE: 107 MMHG | WEIGHT: 196 LBS | RESPIRATION RATE: 18 BRPM | TEMPERATURE: 99 F | HEART RATE: 83 BPM | BODY MASS INDEX: 31.5 KG/M2 | OXYGEN SATURATION: 97 %

## 2021-04-22 DIAGNOSIS — C78.2 CARCINOMA OF RIGHT BREAST METASTATIC TO PLEURA (HCC): ICD-10-CM

## 2021-04-22 DIAGNOSIS — Z17.0 MALIGNANT NEOPLASM OF NIPPLE OF RIGHT BREAST IN FEMALE, ESTROGEN RECEPTOR POSITIVE (HCC): Primary | ICD-10-CM

## 2021-04-22 DIAGNOSIS — C50.911 CARCINOMA OF RIGHT BREAST METASTATIC TO PLEURA (HCC): ICD-10-CM

## 2021-04-22 DIAGNOSIS — C50.011 MALIGNANT NEOPLASM OF NIPPLE OF RIGHT BREAST IN FEMALE, ESTROGEN RECEPTOR POSITIVE (HCC): Primary | ICD-10-CM

## 2021-04-22 DIAGNOSIS — C79.51 BONE METASTASES (HCC): ICD-10-CM

## 2021-04-22 PROCEDURE — 99215 OFFICE O/P EST HI 40 MIN: CPT | Performed by: INTERNAL MEDICINE

## 2021-04-22 PROCEDURE — 96402 CHEMO HORMON ANTINEOPL SQ/IM: CPT

## 2021-04-22 RX ORDER — LAMOTRIGINE 25 MG/1
500 TABLET ORAL ONCE
Status: COMPLETED | OUTPATIENT
Start: 2021-04-22 | End: 2021-04-22

## 2021-04-22 RX ORDER — LAMOTRIGINE 25 MG/1
500 TABLET ORAL ONCE
Status: CANCELLED | OUTPATIENT
Start: 2021-04-22

## 2021-04-22 RX ADMIN — LAMOTRIGINE 500 MG: 25 TABLET ORAL at 15:33:00

## 2021-04-22 NOTE — PROGRESS NOTES
Cancer Center Progress Note  Patient Name: Arelis Wray   YOB: 1960   Medical Record Number: [id number]     Attending Physician: Oleg Oviedo M.D. Date of Visit: 4/22/2021      Chief Complaint:  Patient presents with:   Follow - History:   Diagnosis Date   • Abdominal distention 2012   • Anemia    • Anxiety    • Back pain    • Breast cancer (HCC)    • Disorder of thyroid    • Fatigue 2019   • Feeling lonely Recent   • Hemorrhoids Unk   • High cholesterol    • History of blood root Asked        Exercise: No        Bike Helmet: Not Asked        Seat Belt: Not Asked        Self-Exams: Not Asked    Social History Narrative      Not on file    Social Determinants of Health  Financial Resource Strain:       Difficulty of Paying Living Exp , Rfl:   •  Rosuvastatin Calcium 5 MG Oral Tab, Take 5 mg by mouth nightly., Disp: , Rfl:   •  Albuterol Sulfate (VENTOLIN HFA IN), Inhale into the lungs as needed. , Disp: , Rfl:   •  escitalopram 20 MG Oral Tab, Take 1 tablet (20 mg total) by mouth daily. equal.   Hematologic/Lymphatic Normal - No petechiae or purpura. No tender or palpable lymph nodes in the cervical, supraclavicular, axillary or inguinal area. Respiratory Normal - Lungs CTA, no rhonchi or wheezing.    Cardiovascular Normal - RRR, no mur of liver metastases. In an effort to definitively determine whether these are metastases, Liver biopsy was accomplished that revealed hemangioma. Will continue Faslodex and follow up imaging with CT chest and MRI liver. Bone Metastases: Stable.   Discon

## 2021-04-22 NOTE — PROGRESS NOTES
Faslodex given per MD order w/out incident. Pt dc home in stable condition, no new complaints. AVS provided.    Education Record    Learner:  Patient    Disease / Diagnosis:BRCA    Barriers / Limitations:  None   Comments:    Method:  Brief focused and Prin

## 2021-04-23 RX ORDER — BUPROPION HYDROCHLORIDE 150 MG/1
TABLET, EXTENDED RELEASE ORAL
Qty: 180 TABLET | Refills: 0 | Status: SHIPPED | OUTPATIENT
Start: 2021-04-23 | End: 2021-07-26

## 2021-05-13 RX ORDER — FLUTICASONE PROPIONATE AND SALMETEROL 250; 50 UG/1; UG/1
POWDER RESPIRATORY (INHALATION)
Qty: 60 EACH | Refills: 0 | Status: SHIPPED | OUTPATIENT
Start: 2021-05-13 | End: 2021-06-02

## 2021-05-20 ENCOUNTER — SOCIAL WORK SERVICES (OUTPATIENT)
Dept: HEMATOLOGY/ONCOLOGY | Facility: HOSPITAL | Age: 61
End: 2021-05-20

## 2021-05-20 ENCOUNTER — OFFICE VISIT (OUTPATIENT)
Dept: HEMATOLOGY/ONCOLOGY | Age: 61
End: 2021-05-20
Attending: INTERNAL MEDICINE
Payer: COMMERCIAL

## 2021-05-20 VITALS
TEMPERATURE: 99 F | BODY MASS INDEX: 32.76 KG/M2 | DIASTOLIC BLOOD PRESSURE: 58 MMHG | HEART RATE: 81 BPM | HEIGHT: 65.98 IN | WEIGHT: 203.81 LBS | SYSTOLIC BLOOD PRESSURE: 107 MMHG | OXYGEN SATURATION: 98 % | RESPIRATION RATE: 18 BRPM

## 2021-05-20 DIAGNOSIS — Z17.0 MALIGNANT NEOPLASM OF NIPPLE OF RIGHT BREAST IN FEMALE, ESTROGEN RECEPTOR POSITIVE (HCC): Primary | ICD-10-CM

## 2021-05-20 DIAGNOSIS — C50.011 MALIGNANT NEOPLASM OF NIPPLE OF RIGHT BREAST IN FEMALE, ESTROGEN RECEPTOR POSITIVE (HCC): Primary | ICD-10-CM

## 2021-05-20 DIAGNOSIS — C78.2 CARCINOMA OF RIGHT BREAST METASTATIC TO PLEURA (HCC): ICD-10-CM

## 2021-05-20 DIAGNOSIS — D50.0 IRON DEFICIENCY ANEMIA DUE TO CHRONIC BLOOD LOSS: ICD-10-CM

## 2021-05-20 DIAGNOSIS — F32.9 REACTIVE DEPRESSION: ICD-10-CM

## 2021-05-20 DIAGNOSIS — C79.51 BONE METASTASES (HCC): ICD-10-CM

## 2021-05-20 DIAGNOSIS — C50.911 CARCINOMA OF RIGHT BREAST METASTATIC TO PLEURA (HCC): ICD-10-CM

## 2021-05-20 PROCEDURE — 96402 CHEMO HORMON ANTINEOPL SQ/IM: CPT

## 2021-05-20 PROCEDURE — 99215 OFFICE O/P EST HI 40 MIN: CPT | Performed by: INTERNAL MEDICINE

## 2021-05-20 RX ORDER — LAMOTRIGINE 25 MG/1
500 TABLET ORAL ONCE
Status: COMPLETED | OUTPATIENT
Start: 2021-05-20 | End: 2021-05-20

## 2021-05-20 RX ORDER — LAMOTRIGINE 25 MG/1
500 TABLET ORAL ONCE
Status: CANCELLED | OUTPATIENT
Start: 2021-05-20

## 2021-05-20 RX ADMIN — LAMOTRIGINE 500 MG: 25 TABLET ORAL at 16:03:00

## 2021-05-20 NOTE — PROGRESS NOTES
Cancer Center Progress Note  Patient Name: Varghese Carvajal   YOB: 1960   Medical Record Number: [id number]     Attending Physician: Rena Quesada M.D. Date of Visit: 5/20/2021    Chief Complaint:  Patient presents with:   Follow - Up Status:  ECOG 1    Past Medical History:  Past Medical History:   Diagnosis Date   • Abdominal distention 2012   • Anemia    • Anxiety    • Back pain    • Breast cancer (HCC)    • Disorder of thyroid    • Fatigue 2019   • Feeling lonely Recent   • Hemorrho Special Diet: Not Asked        Back Care: Not Asked        Exercise: No        Bike Helmet: Not Asked        Seat Belt: Not Asked        Self-Exams: Not Asked    Social History Narrative      Not on file    Social Determinants of Health  Financial Reso Take 1 mg by mouth daily. , Disp: , Rfl:   •  Vitamin B-12 1000 MCG Oral Tab, Take 1 tablet (1,000 mcg total) by mouth daily. , Disp: 30 tablet, Rfl: 11  •  Omega-3-acid Ethyl Esters 1 g Oral Cap, Take 2 g by mouth 2 (two) times daily. , Disp: , Rfl:   •  Ros sclerae are clear and without icterus. Pupils are reactive and equal.   Hematologic/Lymphatic Normal - No petechiae or purpura. No tender or palpable lymph nodes in the cervical, supraclavicular, axillary or inguinal area.    Respiratory Normal - Lungs are Ref Range: 240 - 450 ug/dL 511 (H)   Iron Saturation Latest Ref Range: 15 - 50 % 6 (L)   FERRITIN Latest Ref Range: 18.0 - 340.0 ng/mL 16.1 (L)   A/G Ratio Latest Ref Range: 1.0 - 2.0  0.9 (L)   TOTAL PROTEIN Latest Ref Range: 6.4 - 8.2 g/dL 7.4   Albumin Plan:  Breast Cancer: Stage IV, ER+ GA+ HER-2 Neg. She was transitioned to Faslodex and Abemaciclib. Abemaciclib dose reduced for diarrhea and rash, and then held for pancytopenia.  After restarting, she was hospitalized for severe anemia that was likely r

## 2021-05-20 NOTE — PROGRESS NOTES
Faslodex injection given per MD order w/out incident. Pt dc home in stable condition, no complaints.    Education Record     Learner:  Patient     Disease / Diagnosis:BRCA     Barriers / Limitations:  None                Comments:     Method:  Brief focused

## 2021-05-20 NOTE — PROGRESS NOTES
Sw met with patient and spouse to discuss PHQ-9 score of 12. Patient is overwhelmed by family dynamics in home. Sw spent time discussing importance of boundaries and self care and tools patient can use to help her reclaim her peace in the home.  Patient is

## 2021-05-21 ENCOUNTER — TELEPHONE (OUTPATIENT)
Dept: HEMATOLOGY/ONCOLOGY | Facility: HOSPITAL | Age: 61
End: 2021-05-21

## 2021-05-26 ENCOUNTER — OFFICE VISIT (OUTPATIENT)
Dept: HEMATOLOGY/ONCOLOGY | Age: 61
End: 2021-05-26
Attending: INTERNAL MEDICINE
Payer: COMMERCIAL

## 2021-05-26 VITALS
HEART RATE: 79 BPM | RESPIRATION RATE: 16 BRPM | DIASTOLIC BLOOD PRESSURE: 76 MMHG | TEMPERATURE: 99 F | SYSTOLIC BLOOD PRESSURE: 144 MMHG | OXYGEN SATURATION: 98 %

## 2021-05-26 DIAGNOSIS — E86.0 DEHYDRATION: ICD-10-CM

## 2021-05-26 DIAGNOSIS — C78.2 BREAST CANCER METASTASIZED TO PLEURA, UNSPECIFIED LATERALITY (HCC): ICD-10-CM

## 2021-05-26 DIAGNOSIS — C79.51 BONE METASTASES (HCC): ICD-10-CM

## 2021-05-26 DIAGNOSIS — D50.0 IRON DEFICIENCY ANEMIA DUE TO CHRONIC BLOOD LOSS: Primary | ICD-10-CM

## 2021-05-26 DIAGNOSIS — C50.919 BREAST CANCER METASTASIZED TO PLEURA, UNSPECIFIED LATERALITY (HCC): ICD-10-CM

## 2021-05-26 PROCEDURE — 96365 THER/PROPH/DIAG IV INF INIT: CPT

## 2021-06-02 ENCOUNTER — OFFICE VISIT (OUTPATIENT)
Dept: FAMILY MEDICINE CLINIC | Facility: CLINIC | Age: 61
End: 2021-06-02

## 2021-06-02 VITALS
RESPIRATION RATE: 18 BRPM | TEMPERATURE: 98 F | BODY MASS INDEX: 33 KG/M2 | WEIGHT: 206 LBS | DIASTOLIC BLOOD PRESSURE: 60 MMHG | SYSTOLIC BLOOD PRESSURE: 120 MMHG | HEART RATE: 72 BPM

## 2021-06-02 DIAGNOSIS — E03.9 HYPOTHYROIDISM IN ADULT: ICD-10-CM

## 2021-06-02 DIAGNOSIS — E78.2 HYPERLIPIDEMIA, MIXED: ICD-10-CM

## 2021-06-02 DIAGNOSIS — K21.9 GASTROESOPHAGEAL REFLUX DISEASE WITHOUT ESOPHAGITIS: ICD-10-CM

## 2021-06-02 DIAGNOSIS — F41.9 ANXIETY: ICD-10-CM

## 2021-06-02 DIAGNOSIS — R21 RASH AND NONSPECIFIC SKIN ERUPTION: Primary | ICD-10-CM

## 2021-06-02 DIAGNOSIS — M25.561 RIGHT KNEE PAIN, UNSPECIFIED CHRONICITY: ICD-10-CM

## 2021-06-02 DIAGNOSIS — E78.1 HYPERTRIGLYCERIDEMIA: ICD-10-CM

## 2021-06-02 DIAGNOSIS — C50.919 METASTATIC BREAST CANCER (HCC): ICD-10-CM

## 2021-06-02 DIAGNOSIS — R79.89 ELEVATED LIVER FUNCTION TESTS: ICD-10-CM

## 2021-06-02 DIAGNOSIS — G62.9 PERIPHERAL POLYNEUROPATHY: ICD-10-CM

## 2021-06-02 DIAGNOSIS — M25.571 RIGHT ANKLE PAIN, UNSPECIFIED CHRONICITY: ICD-10-CM

## 2021-06-02 DIAGNOSIS — D50.0 IRON DEFICIENCY ANEMIA DUE TO CHRONIC BLOOD LOSS: ICD-10-CM

## 2021-06-02 PROCEDURE — 3074F SYST BP LT 130 MM HG: CPT | Performed by: FAMILY MEDICINE

## 2021-06-02 PROCEDURE — 3078F DIAST BP <80 MM HG: CPT | Performed by: FAMILY MEDICINE

## 2021-06-02 PROCEDURE — 99215 OFFICE O/P EST HI 40 MIN: CPT | Performed by: FAMILY MEDICINE

## 2021-06-02 RX ORDER — ROSUVASTATIN CALCIUM 5 MG/1
5 TABLET, COATED ORAL NIGHTLY
Qty: 90 TABLET | Refills: 1 | Status: SHIPPED | OUTPATIENT
Start: 2021-06-02 | End: 2021-11-30

## 2021-06-02 RX ORDER — ALPRAZOLAM 0.25 MG/1
0.25 TABLET ORAL EVERY 6 HOURS PRN
Qty: 30 TABLET | Refills: 2 | Status: CANCELLED | OUTPATIENT
Start: 2021-06-02

## 2021-06-02 RX ORDER — FLUTICASONE PROPIONATE AND SALMETEROL 250; 50 UG/1; UG/1
1 POWDER RESPIRATORY (INHALATION) EVERY 12 HOURS
Qty: 3 EACH | Refills: 1 | Status: SHIPPED | OUTPATIENT
Start: 2021-06-02 | End: 2021-11-16

## 2021-06-02 RX ORDER — GABAPENTIN 300 MG/1
CAPSULE ORAL
COMMUNITY
Start: 2021-05-17 | End: 2021-09-13

## 2021-06-02 RX ORDER — HYDROCODONE BITARTRATE AND ACETAMINOPHEN 7.5; 325 MG/1; MG/1
1 TABLET ORAL EVERY 6 HOURS PRN
Qty: 90 TABLET | Refills: 0 | Status: CANCELLED | OUTPATIENT
Start: 2021-06-02

## 2021-06-02 RX ORDER — ESCITALOPRAM OXALATE 20 MG/1
20 TABLET ORAL DAILY
Qty: 90 TABLET | Refills: 1 | Status: SHIPPED | OUTPATIENT
Start: 2021-06-02 | End: 2021-11-15

## 2021-06-02 RX ORDER — HYDROCODONE BITARTRATE AND ACETAMINOPHEN 7.5; 325 MG/1; MG/1
1 TABLET ORAL EVERY 8 HOURS PRN
Qty: 90 TABLET | Refills: 0 | Status: SHIPPED | OUTPATIENT
Start: 2021-08-03 | End: 2021-06-17

## 2021-06-02 RX ORDER — ALPRAZOLAM 0.25 MG/1
0.25 TABLET ORAL EVERY 6 HOURS PRN
Qty: 30 TABLET | Refills: 2 | Status: SHIPPED | OUTPATIENT
Start: 2021-06-02 | End: 2021-09-02

## 2021-06-02 RX ORDER — HYDROCODONE BITARTRATE AND ACETAMINOPHEN 7.5; 325 MG/1; MG/1
1 TABLET ORAL EVERY 8 HOURS PRN
Qty: 90 TABLET | Refills: 0 | Status: SHIPPED | OUTPATIENT
Start: 2021-07-03 | End: 2021-06-17

## 2021-06-02 RX ORDER — HYDROCODONE BITARTRATE AND ACETAMINOPHEN 7.5; 325 MG/1; MG/1
1 TABLET ORAL EVERY 8 HOURS PRN
Qty: 90 TABLET | Refills: 0 | Status: SHIPPED | OUTPATIENT
Start: 2021-06-02 | End: 2021-06-17

## 2021-06-02 RX ORDER — LEVOTHYROXINE SODIUM 0.15 MG/1
150 TABLET ORAL
Qty: 90 TABLET | Refills: 1 | Status: SHIPPED | OUTPATIENT
Start: 2021-06-02 | End: 2021-11-30

## 2021-06-02 NOTE — PATIENT INSTRUCTIONS
Continue current meds. Watch diet for fats and carbs. Stay active. Call [phone] to schedul x-ray of the right knee and right ankle. See dermatologist for evaluation. See orthopedic doctor for evaluation. Do fasting blood work.

## 2021-06-03 NOTE — PROGRESS NOTES
Linda Martínez is a 64year old female. cc neuropathy,breast cancer, hypercholesterolemia, elevated liver function test, hypothyroidism, anxiety, ankle pain, knee pain. HPI:   Patient is coming to the office for follow-up of multiple medical problems.   Kristina further recommendations. Edema bilateral lower legs patient will elevate legs, monitor. Cough/history of pneumonia patient still needs refill. It seems to be helping. Patient complains of having right knee pain it is going on for a while.  When she BEFORE BREAKFAST 30 capsule 0   • BUPROPION HCL ER, SR, 150 MG Oral Tablet 12 Hr TAKE 1 TABLET TWICE A DAY (2 TABLETS DAILY AS DIRECTED) 180 tablet 0   • Biotin 2500 MCG Oral Cap Take by mouth daily.      • HYDROcodone-acetaminophen 7.5-325 MG Oral Tab Take with exertion  CARDIOVASCULAR: denies chest pain on exertion  GI: denies abdominal pain and denies heartburn visible umbilical hernia with no tenderness no sign of incarceration.   NEURO: denies headaches, having pain bilateral feet numbness and tingling  M Total 0.4 0.1 - 2.0 mg/dL    Total Protein 7.4 6.4 - 8.2 g/dL    Albumin 3.6 3.4 - 5.0 g/dL    Globulin  3.8 2.8 - 4.4 g/dL    A/G Ratio 0.9 (L) 1.0 - 2.0    FASTING No    FERRITIN   Result Value Ref Range    Ferritin 16.1 (L) 18.0 - 340.0 ng/mL   IRON AND (Royal Schultz) 250-50 MCG/DOSE Inhalation Aerosol Powder, Breath Activated 3 each 1     Sig: Inhale 1 puff into the lungs Q12H.    • Levothyroxine Sodium 150 MCG Oral Tab 90 tablet 1     Sig: Take 1 tablet (150 mcg total) by mouth every morning before break

## 2021-06-05 ENCOUNTER — HOSPITAL ENCOUNTER (OUTPATIENT)
Dept: GENERAL RADIOLOGY | Age: 61
Discharge: HOME | End: 2021-06-05
Attending: FAMILY MEDICINE
Payer: COMMERCIAL

## 2021-06-05 ENCOUNTER — LAB ENCOUNTER (OUTPATIENT)
Dept: LAB | Age: 61
End: 2021-06-05
Attending: FAMILY MEDICINE
Payer: COMMERCIAL

## 2021-06-05 DIAGNOSIS — E03.9 HYPOTHYROIDISM IN ADULT: ICD-10-CM

## 2021-06-05 DIAGNOSIS — M25.561 RIGHT KNEE PAIN, UNSPECIFIED CHRONICITY: ICD-10-CM

## 2021-06-05 DIAGNOSIS — R79.89 ELEVATED LIVER FUNCTION TESTS: ICD-10-CM

## 2021-06-05 DIAGNOSIS — E78.2 HYPERLIPIDEMIA, MIXED: ICD-10-CM

## 2021-06-05 DIAGNOSIS — M25.571 RIGHT ANKLE PAIN, UNSPECIFIED CHRONICITY: ICD-10-CM

## 2021-06-05 PROCEDURE — 73564 X-RAY EXAM KNEE 4 OR MORE: CPT | Performed by: FAMILY MEDICINE

## 2021-06-05 PROCEDURE — 73610 X-RAY EXAM OF ANKLE: CPT | Performed by: FAMILY MEDICINE

## 2021-06-05 PROCEDURE — 36415 COLL VENOUS BLD VENIPUNCTURE: CPT

## 2021-06-05 PROCEDURE — 84443 ASSAY THYROID STIM HORMONE: CPT

## 2021-06-05 PROCEDURE — 80053 COMPREHEN METABOLIC PANEL: CPT

## 2021-06-05 PROCEDURE — 80061 LIPID PANEL: CPT

## 2021-06-05 PROCEDURE — 84439 ASSAY OF FREE THYROXINE: CPT

## 2021-06-15 ENCOUNTER — APPOINTMENT (OUTPATIENT)
Dept: GENERAL RADIOLOGY | Age: 61
End: 2021-06-15
Attending: EMERGENCY MEDICINE
Payer: COMMERCIAL

## 2021-06-15 ENCOUNTER — HOSPITAL ENCOUNTER (EMERGENCY)
Age: 61
Discharge: HOME | End: 2021-06-15
Attending: EMERGENCY MEDICINE
Payer: COMMERCIAL

## 2021-06-15 VITALS
DIASTOLIC BLOOD PRESSURE: 66 MMHG | OXYGEN SATURATION: 96 % | RESPIRATION RATE: 20 BRPM | HEART RATE: 77 BPM | TEMPERATURE: 98 F | SYSTOLIC BLOOD PRESSURE: 115 MMHG
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
## 2021-06-15 DIAGNOSIS — S40.012A CONTUSION OF LEFT SHOULDER, INITIAL ENCOUNTER: ICD-10-CM

## 2021-06-15 DIAGNOSIS — S20.211A CONTUSION OF RIGHT CHEST WALL, INITIAL ENCOUNTER: ICD-10-CM

## 2021-06-15 DIAGNOSIS — S63.502A SPRAIN OF LEFT WRIST, INITIAL ENCOUNTER: ICD-10-CM

## 2021-06-15 PROCEDURE — 99284 EMERGENCY DEPT VISIT MOD MDM: CPT

## 2021-06-15 PROCEDURE — 73110 X-RAY EXAM OF WRIST: CPT | Performed by: EMERGENCY MEDICINE

## 2021-06-15 PROCEDURE — 73030 X-RAY EXAM OF SHOULDER: CPT | Performed by: EMERGENCY MEDICINE

## 2021-06-15 PROCEDURE — 71101 X-RAY EXAM UNILAT RIBS/CHEST: CPT | Performed by: EMERGENCY MEDICINE
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
## 2021-06-17 ENCOUNTER — OFFICE VISIT (OUTPATIENT)
Dept: HEMATOLOGY/ONCOLOGY | Age: 61
End: 2021-06-17
Attending: INTERNAL MEDICINE
Payer: COMMERCIAL

## 2021-06-17 VITALS
HEIGHT: 65.98 IN | OXYGEN SATURATION: 98 % | WEIGHT: 201.31 LBS | TEMPERATURE: 99 F | BODY MASS INDEX: 32.35 KG/M2 | HEART RATE: 82 BPM | RESPIRATION RATE: 18 BRPM | DIASTOLIC BLOOD PRESSURE: 60 MMHG | SYSTOLIC BLOOD PRESSURE: 127 MMHG

## 2021-06-17 DIAGNOSIS — C50.011 MALIGNANT NEOPLASM OF NIPPLE OF RIGHT BREAST IN FEMALE, ESTROGEN RECEPTOR POSITIVE (HCC): Primary | ICD-10-CM

## 2021-06-17 DIAGNOSIS — C79.51 BONE METASTASES (HCC): ICD-10-CM

## 2021-06-17 DIAGNOSIS — C50.911 CARCINOMA OF RIGHT BREAST METASTATIC TO PLEURA (HCC): ICD-10-CM

## 2021-06-17 DIAGNOSIS — Z17.0 MALIGNANT NEOPLASM OF NIPPLE OF RIGHT BREAST IN FEMALE, ESTROGEN RECEPTOR POSITIVE (HCC): Primary | ICD-10-CM

## 2021-06-17 DIAGNOSIS — C78.2 CARCINOMA OF RIGHT BREAST METASTATIC TO PLEURA (HCC): ICD-10-CM

## 2021-06-17 PROCEDURE — 96402 CHEMO HORMON ANTINEOPL SQ/IM: CPT

## 2021-06-17 PROCEDURE — 99215 OFFICE O/P EST HI 40 MIN: CPT | Performed by: INTERNAL MEDICINE

## 2021-06-17 RX ORDER — LAMOTRIGINE 25 MG/1
500 TABLET ORAL ONCE
Status: CANCELLED | OUTPATIENT
Start: 2021-06-17

## 2021-06-17 RX ORDER — LAMOTRIGINE 25 MG/1
500 TABLET ORAL ONCE
Status: COMPLETED | OUTPATIENT
Start: 2021-06-17 | End: 2021-06-17

## 2021-06-17 RX ADMIN — LAMOTRIGINE 500 MG: 25 TABLET ORAL at 15:53:00

## 2021-06-17 NOTE — PROGRESS NOTES
Cancer Center Progress Note  Patient Name: Layla Del Rosario   YOB: 1960   Medical Record Number: [id number]     Attending Physician: Vitor Mcduffie M.D. Date of Visit: 6/17/2021      Chief Complaint:  Patient presents with:   Follow - Medical History:  Past Medical History:   Diagnosis Date   • Abdominal distention 2012   • Anemia    • Anxiety    • Back pain    • Breast cancer (HCC)    • Disorder of thyroid    • Fatigue 2019   • Feeling lonely Recent   • Hemorrhoids Unk   • High cholest Asked        Back Care: Not Asked        Exercise: No        Bike Helmet: Not Asked        Seat Belt: Not Asked        Self-Exams: Not Asked    Social History Narrative      Not on file    Social Determinants of Health  Financial Resource Strain:       Dif tablet, Rfl: 2  •  BUPROPION HCL ER, SR, 150 MG Oral Tablet 12 Hr, TAKE 1 TABLET TWICE A DAY (2 TABLETS DAILY AS DIRECTED), Disp: 180 tablet, Rfl: 0  •  Biotin 2500 MCG Oral Cap, Take by mouth daily. , Disp: , Rfl:   •  HYDROcodone-acetaminophen 7.5-325 MG chronological age. Well nourished. Well developed. Eyes Normal - Conjunctivae and sclerae are clear and without icterus. Pupils are reactive and equal.   Hematologic/Lymphatic Normal - No petechiae or purpura.   No tender or palpable lymph nodes in the ce metastases, Liver biopsy was accomplished that revealed hemangioma. Will continue Faslodex and follow up imaging with CT chest and MRI liver. Bone Metastases: Stable. Discontinued Xgeva due to her teeth issues. Elevated LFT: Mild.  Secondary to hem

## 2021-06-18 RX ORDER — OMEGA-3-ACID ETHYL ESTERS 1 G/1
CAPSULE, LIQUID FILLED ORAL
Qty: 360 CAPSULE | Refills: 1 | Status: SHIPPED | OUTPATIENT
Start: 2021-06-18 | End: 2021-09-02

## 2021-07-15 ENCOUNTER — OFFICE VISIT (OUTPATIENT)
Dept: HEMATOLOGY/ONCOLOGY | Age: 61
End: 2021-07-15
Attending: INTERNAL MEDICINE
Payer: COMMERCIAL

## 2021-07-15 VITALS
DIASTOLIC BLOOD PRESSURE: 58 MMHG | BODY MASS INDEX: 32.3 KG/M2 | RESPIRATION RATE: 20 BRPM | WEIGHT: 201 LBS | HEART RATE: 71 BPM | TEMPERATURE: 98 F | OXYGEN SATURATION: 98 % | HEIGHT: 65.98 IN | SYSTOLIC BLOOD PRESSURE: 109 MMHG

## 2021-07-15 DIAGNOSIS — C78.2 CARCINOMA OF RIGHT BREAST METASTATIC TO PLEURA (HCC): ICD-10-CM

## 2021-07-15 DIAGNOSIS — C50.011 MALIGNANT NEOPLASM OF NIPPLE OF RIGHT BREAST IN FEMALE, ESTROGEN RECEPTOR POSITIVE (HCC): Primary | ICD-10-CM

## 2021-07-15 DIAGNOSIS — D18.03 HEMANGIOMA OF LIVER: ICD-10-CM

## 2021-07-15 DIAGNOSIS — C50.911 CARCINOMA OF RIGHT BREAST METASTATIC TO PLEURA (HCC): ICD-10-CM

## 2021-07-15 DIAGNOSIS — C79.51 BONE METASTASES (HCC): ICD-10-CM

## 2021-07-15 DIAGNOSIS — D50.0 IRON DEFICIENCY ANEMIA DUE TO CHRONIC BLOOD LOSS: ICD-10-CM

## 2021-07-15 DIAGNOSIS — Z17.0 MALIGNANT NEOPLASM OF NIPPLE OF RIGHT BREAST IN FEMALE, ESTROGEN RECEPTOR POSITIVE (HCC): Primary | ICD-10-CM

## 2021-07-15 DIAGNOSIS — R16.0 LIVER MASSES: Primary | ICD-10-CM

## 2021-07-15 LAB
ALBUMIN SERPL-MCNC: 3.8 G/DL (ref 3.4–5)
ALBUMIN/GLOB SERPL: 1 {RATIO} (ref 1–2)
ALP LIVER SERPL-CCNC: 176 U/L
ALT SERPL-CCNC: 59 U/L
ANION GAP SERPL CALC-SCNC: 5 MMOL/L (ref 0–18)
AST SERPL-CCNC: 89 U/L (ref 15–37)
BASOPHILS # BLD AUTO: 0.05 X10(3) UL (ref 0–0.2)
BASOPHILS NFR BLD AUTO: 1 %
BILIRUB SERPL-MCNC: 0.7 MG/DL (ref 0.1–2)
BUN BLD-MCNC: 7 MG/DL (ref 7–18)
BUN/CREAT SERPL: 8.1 (ref 10–20)
CALCIUM BLD-MCNC: 9.2 MG/DL (ref 8.5–10.1)
CHLORIDE SERPL-SCNC: 107 MMOL/L (ref 98–112)
CO2 SERPL-SCNC: 27 MMOL/L (ref 21–32)
CREAT BLD-MCNC: 0.86 MG/DL
DEPRECATED RDW RBC AUTO: 72.1 FL (ref 35.1–46.3)
EOSINOPHIL # BLD AUTO: 0.18 X10(3) UL (ref 0–0.7)
EOSINOPHIL NFR BLD AUTO: 3.7 %
ERYTHROCYTE [DISTWIDTH] IN BLOOD BY AUTOMATED COUNT: 19.3 % (ref 11–15)
GLOBULIN PLAS-MCNC: 3.7 G/DL (ref 2.8–4.4)
GLUCOSE BLD-MCNC: 129 MG/DL (ref 70–99)
HCT VFR BLD AUTO: 28.5 %
HGB BLD-MCNC: 8.6 G/DL
IMM GRANULOCYTES # BLD AUTO: 0.03 X10(3) UL (ref 0–1)
IMM GRANULOCYTES NFR BLD: 0.6 %
LYMPHOCYTES # BLD AUTO: 1.68 X10(3) UL (ref 1–4)
LYMPHOCYTES NFR BLD AUTO: 34.4 %
M PROTEIN MFR SERPL ELPH: 7.5 G/DL (ref 6.4–8.2)
MCH RBC QN AUTO: 30.5 PG (ref 26–34)
MCHC RBC AUTO-ENTMCNC: 30.2 G/DL (ref 31–37)
MCV RBC AUTO: 101.1 FL
MONOCYTES # BLD AUTO: 0.25 X10(3) UL (ref 0.1–1)
MONOCYTES NFR BLD AUTO: 5.1 %
NEUTROPHILS # BLD AUTO: 2.7 X10 (3) UL (ref 1.5–7.7)
NEUTROPHILS # BLD AUTO: 2.7 X10(3) UL (ref 1.5–7.7)
NEUTROPHILS NFR BLD AUTO: 55.2 %
OSMOLALITY SERPL CALC.SUM OF ELEC: 288 MOSM/KG (ref 275–295)
PATIENT FASTING Y/N/NP: NO
PLATELET # BLD AUTO: 174 10(3)UL (ref 150–450)
PLATELET MORPHOLOGY: NORMAL
POTASSIUM SERPL-SCNC: 3.6 MMOL/L (ref 3.5–5.1)
RBC # BLD AUTO: 2.82 X10(6)UL
SODIUM SERPL-SCNC: 139 MMOL/L (ref 136–145)
WBC # BLD AUTO: 4.9 X10(3) UL (ref 4–11)

## 2021-07-15 PROCEDURE — 96402 CHEMO HORMON ANTINEOPL SQ/IM: CPT

## 2021-07-15 PROCEDURE — 99215 OFFICE O/P EST HI 40 MIN: CPT | Performed by: INTERNAL MEDICINE

## 2021-07-15 RX ORDER — LAMOTRIGINE 25 MG/1
500 TABLET ORAL ONCE
Status: CANCELLED | OUTPATIENT
Start: 2021-07-15

## 2021-07-15 RX ORDER — LAMOTRIGINE 25 MG/1
500 TABLET ORAL ONCE
Status: COMPLETED | OUTPATIENT
Start: 2021-07-15 | End: 2021-07-15

## 2021-07-15 RX ADMIN — LAMOTRIGINE 500 MG: 25 TABLET ORAL at 12:15:00

## 2021-07-15 NOTE — PROGRESS NOTES
Cancer Center Progress Note  Patient Name: Layla Del Rosario   YOB: 1960   Medical Record Number: [id number]     Attending Physician: Vitor Mcduffie M.D. Date of Visit: 7/15/2021        Chief Complaint:  Patient presents with:   Follow the eye lid.     Performance Status:  ECOG 1    Past Medical History:  Past Medical History:   Diagnosis Date   • Abdominal distention 2012   • Anemia    • Anxiety    • Back pain    • Breast cancer (HCC)    • Disorder of thyroid    • Fatigue 2019   • Feelin Weight Concern: Not Asked        Special Diet: Not Asked        Back Care: Not Asked        Exercise: No        Bike Helmet: Not Asked        Seat Belt: Not Asked        Self-Exams: Not Asked    Social History Narrative      Not on file    Social Determina nightly., Disp: 90 tablet, Rfl: 1  •  ALPRAZolam 0.25 MG Oral Tab, Take 1 tablet (0.25 mg total) by mouth every 6 (six) hours as needed. , Disp: 30 tablet, Rfl: 2  •  BUPROPION HCL ER, SR, 150 MG Oral Tablet 12 Hr, TAKE 1 TABLET TWICE A DAY (2 TABLETS DAILY appropriate. Appears close to chronological age. Well nourished. Well developed. Eyes Normal - Conjunctivae and sclerae are clear and without icterus. Pupils are reactive and equal.   Hematologic/Lymphatic Normal - No petechiae or purpura.   No tender or Continue to monitor with monthly labs. Anemia: Acute blood loss,  Severe iron deficiency and chemo-induced anemia. Improving after the hemorrhoid banding and discontinuing the Abemaciclib. She received IV iron with improvement. Continue to monitor.

## 2021-07-26 RX ORDER — BUPROPION HYDROCHLORIDE 150 MG/1
TABLET, EXTENDED RELEASE ORAL
Qty: 180 TABLET | Refills: 0 | Status: SHIPPED | OUTPATIENT
Start: 2021-07-26 | End: 2021-09-02

## 2021-08-12 ENCOUNTER — OFFICE VISIT (OUTPATIENT)
Dept: HEMATOLOGY/ONCOLOGY | Age: 61
End: 2021-08-12
Attending: INTERNAL MEDICINE
Payer: COMMERCIAL

## 2021-08-12 VITALS
SYSTOLIC BLOOD PRESSURE: 116 MMHG | BODY MASS INDEX: 32.51 KG/M2 | DIASTOLIC BLOOD PRESSURE: 65 MMHG | OXYGEN SATURATION: 98 % | HEART RATE: 75 BPM | TEMPERATURE: 99 F | WEIGHT: 202.31 LBS | HEIGHT: 65.98 IN | RESPIRATION RATE: 20 BRPM

## 2021-08-12 DIAGNOSIS — C50.911 CARCINOMA OF RIGHT BREAST METASTATIC TO PLEURA (HCC): ICD-10-CM

## 2021-08-12 DIAGNOSIS — C79.51 BONE METASTASES (HCC): ICD-10-CM

## 2021-08-12 DIAGNOSIS — Z17.0 MALIGNANT NEOPLASM OF NIPPLE OF RIGHT BREAST IN FEMALE, ESTROGEN RECEPTOR POSITIVE (HCC): Primary | ICD-10-CM

## 2021-08-12 DIAGNOSIS — C78.2 CARCINOMA OF RIGHT BREAST METASTATIC TO PLEURA (HCC): ICD-10-CM

## 2021-08-12 DIAGNOSIS — Z17.0 MALIGNANT NEOPLASM OF NIPPLE OF RIGHT BREAST IN FEMALE, ESTROGEN RECEPTOR POSITIVE (HCC): ICD-10-CM

## 2021-08-12 DIAGNOSIS — D50.0 IRON DEFICIENCY ANEMIA DUE TO CHRONIC BLOOD LOSS: Primary | ICD-10-CM

## 2021-08-12 DIAGNOSIS — C50.011 MALIGNANT NEOPLASM OF NIPPLE OF RIGHT BREAST IN FEMALE, ESTROGEN RECEPTOR POSITIVE (HCC): Primary | ICD-10-CM

## 2021-08-12 DIAGNOSIS — C50.011 MALIGNANT NEOPLASM OF NIPPLE OF RIGHT BREAST IN FEMALE, ESTROGEN RECEPTOR POSITIVE (HCC): ICD-10-CM

## 2021-08-12 LAB
ALBUMIN SERPL-MCNC: 3.9 G/DL (ref 3.4–5)
ALBUMIN/GLOB SERPL: 1 {RATIO} (ref 1–2)
ALP LIVER SERPL-CCNC: 161 U/L
ALT SERPL-CCNC: 71 U/L
ANION GAP SERPL CALC-SCNC: 6 MMOL/L (ref 0–18)
AST SERPL-CCNC: 75 U/L (ref 15–37)
BASOPHILS # BLD AUTO: 0.06 X10(3) UL (ref 0–0.2)
BASOPHILS NFR BLD AUTO: 1 %
BILIRUB SERPL-MCNC: 0.5 MG/DL (ref 0.1–2)
BUN BLD-MCNC: 7 MG/DL (ref 7–18)
CALCIUM BLD-MCNC: 9.1 MG/DL (ref 8.5–10.1)
CHLORIDE SERPL-SCNC: 107 MMOL/L (ref 98–112)
CO2 SERPL-SCNC: 27 MMOL/L (ref 21–32)
CREAT BLD-MCNC: 0.83 MG/DL
DEPRECATED HBV CORE AB SER IA-ACNC: 22.2 NG/ML
EOSINOPHIL # BLD AUTO: 0.2 X10(3) UL (ref 0–0.7)
EOSINOPHIL NFR BLD AUTO: 3.4 %
ERYTHROCYTE [DISTWIDTH] IN BLOOD BY AUTOMATED COUNT: 17.1 %
GLOBULIN PLAS-MCNC: 3.8 G/DL (ref 2.8–4.4)
GLUCOSE BLD-MCNC: 101 MG/DL (ref 70–99)
HCT VFR BLD AUTO: 26.4 %
HGB BLD-MCNC: 8.1 G/DL
IMM GRANULOCYTES # BLD AUTO: 0.02 X10(3) UL (ref 0–1)
IMM GRANULOCYTES NFR BLD: 0.3 %
IRON SATURATION: 7 %
IRON SERPL-MCNC: 35 UG/DL
LYMPHOCYTES # BLD AUTO: 1.74 X10(3) UL (ref 1–4)
LYMPHOCYTES NFR BLD AUTO: 29.9 %
M PROTEIN MFR SERPL ELPH: 7.7 G/DL (ref 6.4–8.2)
MCH RBC QN AUTO: 29.6 PG (ref 26–34)
MCHC RBC AUTO-ENTMCNC: 30.7 G/DL (ref 31–37)
MCV RBC AUTO: 96.4 FL
MONOCYTES # BLD AUTO: 0.37 X10(3) UL (ref 0.1–1)
MONOCYTES NFR BLD AUTO: 6.4 %
NEUTROPHILS # BLD AUTO: 3.43 X10 (3) UL (ref 1.5–7.7)
NEUTROPHILS # BLD AUTO: 3.43 X10(3) UL (ref 1.5–7.7)
NEUTROPHILS NFR BLD AUTO: 59 %
OSMOLALITY SERPL CALC.SUM OF ELEC: 288 MOSM/KG (ref 275–295)
PATIENT FASTING Y/N/NP: NO
PLATELET # BLD AUTO: 192 10(3)UL (ref 150–450)
POTASSIUM SERPL-SCNC: 3.7 MMOL/L (ref 3.5–5.1)
RBC # BLD AUTO: 2.74 X10(6)UL
SODIUM SERPL-SCNC: 140 MMOL/L (ref 136–145)
TOTAL IRON BINDING CAPACITY: 513 UG/DL (ref 240–450)
TRANSFERRIN SERPL-MCNC: 344 MG/DL (ref 200–360)
WBC # BLD AUTO: 5.8 X10(3) UL (ref 4–11)

## 2021-08-12 PROCEDURE — 96402 CHEMO HORMON ANTINEOPL SQ/IM: CPT

## 2021-08-12 PROCEDURE — 99215 OFFICE O/P EST HI 40 MIN: CPT | Performed by: INTERNAL MEDICINE

## 2021-08-12 RX ORDER — LAMOTRIGINE 25 MG/1
500 TABLET ORAL ONCE
Status: COMPLETED | OUTPATIENT
Start: 2021-08-12 | End: 2021-08-12

## 2021-08-12 RX ORDER — LAMOTRIGINE 25 MG/1
500 TABLET ORAL ONCE
Status: CANCELLED | OUTPATIENT
Start: 2021-08-12

## 2021-08-12 RX ADMIN — LAMOTRIGINE 500 MG: 25 TABLET ORAL at 15:15:00

## 2021-08-12 NOTE — PROGRESS NOTES
Cancer Center Progress Note  Patient Name: Corrine Carias   YOB: 1960   Medical Record Number: [id number]     Attending Physician: Madina Munoz M.D. Date of Visit: 8/12/2021    Chief Complaint:  Patient presents with:   Follow - Up imaging.      Performance Status:  ECOG 1    Past Medical History:  Past Medical History:   Diagnosis Date   • Abdominal distention 2012   • Anemia    • Anxiety    • Back pain    • Breast cancer (HCC)    • Disorder of thyroid    • Fatigue 2019   • Feeling l Weight Concern: Not Asked        Special Diet: Not Asked        Back Care: Not Asked        Exercise: No        Bike Helmet: Not Asked        Seat Belt: Not Asked        Self-Exams: Not Asked    Social History Narrative      Not on file    Social Determina tablet (20 mg total) by mouth daily. , Disp: 90 tablet, Rfl: 1  •  Rosuvastatin Calcium 5 MG Oral Tab, Take 1 tablet (5 mg total) by mouth nightly., Disp: 90 tablet, Rfl: 1  •  ALPRAZolam 0.25 MG Oral Tab, Take 1 tablet (0.25 mg total) by mouth every 6 (six affect appropriate. Appears close to chronological age. Well nourished. Well developed. Eyes Normal - Conjunctivae and sclerae are clear and without icterus. Pupils are reactive and equal.   Hematologic/Lymphatic Normal - No petechiae or purpura.   No ten CT chest and MRI liver. Bone Metastases: Stable. Discontinued Xgeva due to her teeth issues. Elevated LFT: Mild. Secondary to hemangiomas and fatty liver. Continue to monitor with monthly labs.     Anemia: Acute blood loss,  Severe iron deficiency

## 2021-08-13 ENCOUNTER — TELEPHONE (OUTPATIENT)
Dept: HEMATOLOGY/ONCOLOGY | Facility: HOSPITAL | Age: 61
End: 2021-08-13

## 2021-08-13 NOTE — TELEPHONE ENCOUNTER
Crawford Dance, MD  P [facility]  Martha needs more IV iron. Please call her to schedule. Pt's  informed and transferred to [facility] to schedule.

## 2021-08-18 ENCOUNTER — OFFICE VISIT (OUTPATIENT)
Dept: HEMATOLOGY/ONCOLOGY | Age: 61
End: 2021-08-18
Attending: INTERNAL MEDICINE
Payer: COMMERCIAL

## 2021-08-18 VITALS
OXYGEN SATURATION: 98 % | TEMPERATURE: 98 F | HEART RATE: 67 BPM | RESPIRATION RATE: 16 BRPM | SYSTOLIC BLOOD PRESSURE: 120 MMHG | DIASTOLIC BLOOD PRESSURE: 67 MMHG

## 2021-08-18 DIAGNOSIS — C78.2 BREAST CANCER METASTASIZED TO PLEURA, UNSPECIFIED LATERALITY (HCC): ICD-10-CM

## 2021-08-18 DIAGNOSIS — D50.0 IRON DEFICIENCY ANEMIA DUE TO CHRONIC BLOOD LOSS: Primary | ICD-10-CM

## 2021-08-18 DIAGNOSIS — C50.919 BREAST CANCER METASTASIZED TO PLEURA, UNSPECIFIED LATERALITY (HCC): ICD-10-CM

## 2021-08-18 DIAGNOSIS — E86.0 DEHYDRATION: ICD-10-CM

## 2021-08-18 DIAGNOSIS — C79.51 BONE METASTASES (HCC): ICD-10-CM

## 2021-08-18 PROCEDURE — 96365 THER/PROPH/DIAG IV INF INIT: CPT

## 2021-08-21 ENCOUNTER — LAB ENCOUNTER (OUTPATIENT)
Dept: LAB | Age: 61
End: 2021-08-21
Attending: FAMILY MEDICINE
Payer: COMMERCIAL

## 2021-08-23 ENCOUNTER — PATIENT MESSAGE (OUTPATIENT)
Dept: FAMILY MEDICINE CLINIC | Facility: CLINIC | Age: 61
End: 2021-08-23

## 2021-08-24 DIAGNOSIS — E03.9 HYPOTHYROIDISM IN ADULT: Primary | ICD-10-CM

## 2021-08-24 DIAGNOSIS — E78.2 HYPERLIPIDEMIA, MIXED: ICD-10-CM

## 2021-08-24 NOTE — TELEPHONE ENCOUNTER
Pt's spouse requesting order for blood work be placed in the system prior to pt's appt on 9/2/2021. Please advise.

## 2021-08-24 NOTE — TELEPHONE ENCOUNTER
S what would you like if any before this appointment,  A lot done through [address] [place] [address] Street.

## 2021-08-25 RX ORDER — HYDROCODONE BITARTRATE AND ACETAMINOPHEN 7.5; 325 MG/1; MG/1
1 TABLET ORAL EVERY 6 HOURS PRN
Qty: 90 TABLET | Refills: 0 | Status: SHIPPED | OUTPATIENT
Start: 2021-08-25 | End: 2021-12-14 | Stop reason: CLARIF

## 2021-08-25 NOTE — TELEPHONE ENCOUNTER
From: Mallory Rome  To: Liv Li MD  Sent: 8/23/2021 10:48 AM CDT  Subject: Referral Request    Good Morning. I went to do a blood draw for you prior to my appointment. They said there was no order in the system so they refused to do it.  Can you

## 2021-08-25 NOTE — TELEPHONE ENCOUNTER
Pt spouse aware of orders in. Said wife sent msg a few days ago about medication refill. Asking for norco refill. I advised msgs can take up to 3 days to be addressed. Last fill in feb #90    Please approve if appropriate. Thanks.

## 2021-08-31 ENCOUNTER — LAB ENCOUNTER (OUTPATIENT)
Dept: LAB | Age: 61
End: 2021-08-31
Attending: FAMILY MEDICINE
Payer: COMMERCIAL

## 2021-08-31 DIAGNOSIS — E03.9 HYPOTHYROIDISM IN ADULT: ICD-10-CM

## 2021-08-31 DIAGNOSIS — E78.2 HYPERLIPIDEMIA, MIXED: ICD-10-CM

## 2021-08-31 LAB
ALBUMIN SERPL-MCNC: 3.4 G/DL (ref 3.4–5)
ALBUMIN/GLOB SERPL: 1.1 {RATIO} (ref 1–2)
ALP LIVER SERPL-CCNC: 137 U/L
ALT SERPL-CCNC: 54 U/L
ANION GAP SERPL CALC-SCNC: 8 MMOL/L (ref 0–18)
AST SERPL-CCNC: 64 U/L (ref 15–37)
BILIRUB SERPL-MCNC: 0.4 MG/DL (ref 0.1–2)
BUN BLD-MCNC: 8 MG/DL (ref 7–18)
CALCIUM BLD-MCNC: 8.9 MG/DL (ref 8.5–10.1)
CHLORIDE SERPL-SCNC: 112 MMOL/L (ref 98–112)
CHOLEST SMN-MCNC: 215 MG/DL (ref ?–200)
CO2 SERPL-SCNC: 24 MMOL/L (ref 21–32)
CREAT BLD-MCNC: 0.75 MG/DL
GLOBULIN PLAS-MCNC: 3.2 G/DL (ref 2.8–4.4)
GLUCOSE BLD-MCNC: 136 MG/DL (ref 70–99)
HDLC SERPL-MCNC: 21 MG/DL (ref 40–59)
LDLC SERPL CALC-MCNC: 113 MG/DL (ref ?–100)
M PROTEIN MFR SERPL ELPH: 6.6 G/DL (ref 6.4–8.2)
NONHDLC SERPL-MCNC: 194 MG/DL (ref ?–130)
OSMOLALITY SERPL CALC.SUM OF ELEC: 298 MOSM/KG (ref 275–295)
POTASSIUM SERPL-SCNC: 3.8 MMOL/L (ref 3.5–5.1)
SODIUM SERPL-SCNC: 144 MMOL/L (ref 136–145)
T4 FREE SERPL-MCNC: 0.7 NG/DL (ref 0.8–1.7)
TRIGL SERPL-MCNC: 463 MG/DL (ref 30–149)
TSI SER-ACNC: 50.5 MIU/ML (ref 0.36–3.74)
VLDLC SERPL CALC-MCNC: 83 MG/DL (ref 0–30)

## 2021-08-31 PROCEDURE — 36415 COLL VENOUS BLD VENIPUNCTURE: CPT

## 2021-08-31 PROCEDURE — 80053 COMPREHEN METABOLIC PANEL: CPT

## 2021-08-31 PROCEDURE — 84443 ASSAY THYROID STIM HORMONE: CPT

## 2021-08-31 PROCEDURE — 84439 ASSAY OF FREE THYROXINE: CPT

## 2021-08-31 PROCEDURE — 80061 LIPID PANEL: CPT

## 2021-09-02 ENCOUNTER — OFFICE VISIT (OUTPATIENT)
Dept: FAMILY MEDICINE CLINIC | Facility: CLINIC | Age: 61
End: 2021-09-02

## 2021-09-02 VITALS
TEMPERATURE: 98 F | OXYGEN SATURATION: 97 % | HEART RATE: 76 BPM | WEIGHT: 199 LBS | DIASTOLIC BLOOD PRESSURE: 50 MMHG | RESPIRATION RATE: 16 BRPM | HEIGHT: 66 IN | SYSTOLIC BLOOD PRESSURE: 102 MMHG | BODY MASS INDEX: 31.98 KG/M2

## 2021-09-02 DIAGNOSIS — C78.2 CARCINOMA OF RIGHT BREAST METASTATIC TO PLEURA (HCC): ICD-10-CM

## 2021-09-02 DIAGNOSIS — C50.919 METASTATIC BREAST CANCER (HCC): ICD-10-CM

## 2021-09-02 DIAGNOSIS — E78.2 HYPERLIPIDEMIA, MIXED: ICD-10-CM

## 2021-09-02 DIAGNOSIS — K21.9 GASTROESOPHAGEAL REFLUX DISEASE WITHOUT ESOPHAGITIS: ICD-10-CM

## 2021-09-02 DIAGNOSIS — E78.1 HYPERTRIGLYCERIDEMIA: Primary | ICD-10-CM

## 2021-09-02 DIAGNOSIS — F41.9 ANXIETY: ICD-10-CM

## 2021-09-02 DIAGNOSIS — C50.911 CARCINOMA OF RIGHT BREAST METASTATIC TO PLEURA (HCC): ICD-10-CM

## 2021-09-02 DIAGNOSIS — E03.9 HYPOTHYROIDISM IN ADULT: ICD-10-CM

## 2021-09-02 DIAGNOSIS — D50.0 IRON DEFICIENCY ANEMIA DUE TO CHRONIC BLOOD LOSS: ICD-10-CM

## 2021-09-02 PROCEDURE — 3078F DIAST BP <80 MM HG: CPT | Performed by: FAMILY MEDICINE

## 2021-09-02 PROCEDURE — 3008F BODY MASS INDEX DOCD: CPT | Performed by: FAMILY MEDICINE

## 2021-09-02 PROCEDURE — 3074F SYST BP LT 130 MM HG: CPT | Performed by: FAMILY MEDICINE

## 2021-09-02 PROCEDURE — 99215 OFFICE O/P EST HI 40 MIN: CPT | Performed by: FAMILY MEDICINE

## 2021-09-02 RX ORDER — HYDROCODONE BITARTRATE AND ACETAMINOPHEN 7.5; 325 MG/1; MG/1
1 TABLET ORAL EVERY 6 HOURS PRN
Qty: 90 TABLET | Refills: 0 | Status: SHIPPED | OUTPATIENT
Start: 2021-10-03 | End: 2021-09-09

## 2021-09-02 RX ORDER — OMEPRAZOLE 20 MG/1
20 CAPSULE, DELAYED RELEASE ORAL
Qty: 90 CAPSULE | Refills: 0 | Status: SHIPPED | OUTPATIENT
Start: 2021-09-02 | End: 2021-09-29

## 2021-09-02 RX ORDER — BUPROPION HYDROCHLORIDE 150 MG/1
150 TABLET, EXTENDED RELEASE ORAL 2 TIMES DAILY
Qty: 180 TABLET | Refills: 0 | Status: SHIPPED | OUTPATIENT
Start: 2021-09-02 | End: 2021-10-20

## 2021-09-02 RX ORDER — OMEGA-3-ACID ETHYL ESTERS 1 G/1
2 CAPSULE, LIQUID FILLED ORAL 2 TIMES DAILY
Qty: 360 CAPSULE | Refills: 1 | Status: SHIPPED | OUTPATIENT
Start: 2021-09-02

## 2021-09-02 RX ORDER — HYDROCODONE BITARTRATE AND ACETAMINOPHEN 7.5; 325 MG/1; MG/1
1 TABLET ORAL EVERY 6 HOURS PRN
Qty: 90 TABLET | Refills: 0 | Status: SHIPPED | OUTPATIENT
Start: 2021-11-03 | End: 2021-09-09

## 2021-09-02 RX ORDER — HYDROCODONE BITARTRATE AND ACETAMINOPHEN 7.5; 325 MG/1; MG/1
1 TABLET ORAL EVERY 6 HOURS PRN
Qty: 90 TABLET | Refills: 0 | Status: SHIPPED | OUTPATIENT
Start: 2021-09-02 | End: 2021-09-09

## 2021-09-02 RX ORDER — ALBUTEROL SULFATE 90 UG/1
2 AEROSOL, METERED RESPIRATORY (INHALATION) EVERY 4 HOURS PRN
Qty: 1 EACH | Refills: 1 | Status: SHIPPED | OUTPATIENT
Start: 2021-09-02 | End: 2021-12-14 | Stop reason: CLARIF

## 2021-09-02 RX ORDER — ALPRAZOLAM 0.25 MG/1
0.25 TABLET ORAL EVERY 6 HOURS PRN
Qty: 30 TABLET | Refills: 2 | Status: SHIPPED | OUTPATIENT
Start: 2021-09-02

## 2021-09-02 NOTE — PATIENT INSTRUCTIONS
Continue current medications. Healthy diet. Low-fat. Recheck cholesterol levels in 2 weeks. We will have you to recheck thyroid test in 6 weeks from now.

## 2021-09-02 NOTE — PROGRESS NOTES
Mary Murphy is a 63year old female. cc neuropathy,breast cancer, hypercholesterolemia, elevated liver function test, hypothyroidism, anxiety  HPI:   Patient is coming to the office for follow-up of multiple medical problems.   Patient has metastatic breas pain.  She is seeing oncologist for further recommendations. Edema bilateral lower legs patient will elevate legs, monitor.       Current Outpatient Medications   Medication Sig Dispense Refill   • Albuterol Sulfate HFA (VENTOLIN HFA) 108 (90 Base) MCG/A mouth daily. • Vitamin B-12 1000 MCG Oral Tab Take 1 tablet (1,000 mcg total) by mouth daily.  30 tablet 11      Past Medical History:   Diagnosis Date   • Abdominal distention 2012   • Anemia    • Anxiety    • Back pain    • Breast cancer ([facility]    • Fady Thomas SpO2 97%   Breastfeeding No   BMI 32.12 kg/m²   GENERAL: well developed, well nourished,in no apparent distress, with her .   SKIN: no rashes,no suspicious lesions  HEENT: atraumatic, normocephalic,ears and throat are clear  NECK: supple,no adenopat ASSESSMENT AND PLAN:   Hypertriglyceridemia  (primary encounter diagnosis)  Hypothyroidism in adult  Hyperlipidemia, mixed  Metastatic breast cancer (hcc)  Iron deficiency anemia due to chronic blood loss  Anxiety  Gastroesophageal reflux disease wit using speech recognition software. Accuracy and grammar in transcription may be subject to error.

## 2021-09-07 ENCOUNTER — HOSPITAL ENCOUNTER (OUTPATIENT)
Dept: CT IMAGING | Age: 61
Discharge: HOME | End: 2021-09-07
Attending: INTERNAL MEDICINE
Payer: COMMERCIAL

## 2021-09-07 ENCOUNTER — HOSPITAL ENCOUNTER (OUTPATIENT)
Dept: MRI IMAGING | Age: 61
Discharge: HOME | End: 2021-09-07
Attending: INTERNAL MEDICINE
Payer: COMMERCIAL

## 2021-09-07 DIAGNOSIS — C78.2 CARCINOMA OF RIGHT BREAST METASTATIC TO PLEURA (HCC): ICD-10-CM

## 2021-09-07 DIAGNOSIS — R16.0 LIVER MASSES: ICD-10-CM

## 2021-09-07 DIAGNOSIS — C79.51 BONE METASTASES (HCC): ICD-10-CM

## 2021-09-07 DIAGNOSIS — C50.911 CARCINOMA OF RIGHT BREAST METASTATIC TO PLEURA (HCC): ICD-10-CM

## 2021-09-07 DIAGNOSIS — D18.03 HEMANGIOMA OF LIVER: ICD-10-CM

## 2021-09-07 PROCEDURE — 71250 CT THORAX DX C-: CPT | Performed by: INTERNAL MEDICINE

## 2021-09-07 PROCEDURE — 74181 MRI ABDOMEN W/O CONTRAST: CPT | Performed by: INTERNAL MEDICINE

## 2021-09-08 ENCOUNTER — HOSPITAL ENCOUNTER (OUTPATIENT)
Dept: MRI IMAGING | Age: 61
Discharge: HOME | End: 2021-09-08
Attending: INTERNAL MEDICINE
Payer: COMMERCIAL

## 2021-09-08 DIAGNOSIS — D18.03 HEMANGIOMA OF LIVER: ICD-10-CM

## 2021-09-09 ENCOUNTER — OFFICE VISIT (OUTPATIENT)
Dept: HEMATOLOGY/ONCOLOGY | Age: 61
End: 2021-09-09
Attending: INTERNAL MEDICINE
Payer: COMMERCIAL

## 2021-09-09 ENCOUNTER — HOSPITAL ENCOUNTER (OUTPATIENT)
Dept: ULTRASOUND IMAGING | Facility: HOSPITAL | Age: 61
Discharge: HOME | End: 2021-09-09
Attending: INTERNAL MEDICINE
Payer: COMMERCIAL

## 2021-09-09 VITALS
TEMPERATURE: 99 F | DIASTOLIC BLOOD PRESSURE: 40 MMHG | RESPIRATION RATE: 18 BRPM | SYSTOLIC BLOOD PRESSURE: 116 MMHG | HEART RATE: 89 BPM | BODY MASS INDEX: 32.89 KG/M2 | WEIGHT: 204.63 LBS | HEIGHT: 65.98 IN | OXYGEN SATURATION: 98 %

## 2021-09-09 DIAGNOSIS — C50.911 CARCINOMA OF RIGHT BREAST METASTATIC TO PLEURA (HCC): ICD-10-CM

## 2021-09-09 DIAGNOSIS — D18.03 HEPATIC HEMANGIOMA: ICD-10-CM

## 2021-09-09 DIAGNOSIS — K64.2 GRADE III HEMORRHOIDS: ICD-10-CM

## 2021-09-09 DIAGNOSIS — C50.919 MALIGNANT NEOPLASM OF FEMALE BREAST, UNSPECIFIED ESTROGEN RECEPTOR STATUS, UNSPECIFIED LATERALITY, UNSPECIFIED SITE OF BREAST (HCC): ICD-10-CM

## 2021-09-09 DIAGNOSIS — Z17.0 MALIGNANT NEOPLASM OF NIPPLE OF RIGHT BREAST IN FEMALE, ESTROGEN RECEPTOR POSITIVE (HCC): Primary | ICD-10-CM

## 2021-09-09 DIAGNOSIS — C50.011 MALIGNANT NEOPLASM OF NIPPLE OF RIGHT BREAST IN FEMALE, ESTROGEN RECEPTOR POSITIVE (HCC): ICD-10-CM

## 2021-09-09 DIAGNOSIS — Z17.0 MALIGNANT NEOPLASM OF NIPPLE OF RIGHT BREAST IN FEMALE, ESTROGEN RECEPTOR POSITIVE (HCC): ICD-10-CM

## 2021-09-09 DIAGNOSIS — C79.51 BONE METASTASES (HCC): ICD-10-CM

## 2021-09-09 DIAGNOSIS — C50.011 MALIGNANT NEOPLASM OF NIPPLE OF RIGHT BREAST IN FEMALE, ESTROGEN RECEPTOR POSITIVE (HCC): Primary | ICD-10-CM

## 2021-09-09 DIAGNOSIS — C78.2 CARCINOMA OF RIGHT BREAST METASTATIC TO PLEURA (HCC): ICD-10-CM

## 2021-09-09 DIAGNOSIS — R60.0 BILATERAL LEG EDEMA: ICD-10-CM

## 2021-09-09 DIAGNOSIS — R60.0 BILATERAL LEG EDEMA: Primary | ICD-10-CM

## 2021-09-09 DIAGNOSIS — D50.0 IRON DEFICIENCY ANEMIA DUE TO CHRONIC BLOOD LOSS: ICD-10-CM

## 2021-09-09 LAB
ALBUMIN SERPL-MCNC: 2.9 G/DL (ref 3.4–5)
ALBUMIN/GLOB SERPL: 0.9 {RATIO} (ref 1–2)
ALP LIVER SERPL-CCNC: 129 U/L
ALT SERPL-CCNC: 31 U/L
ANION GAP SERPL CALC-SCNC: 8 MMOL/L (ref 0–18)
ANTIBODY SCREEN: NEGATIVE
AST SERPL-CCNC: 50 U/L (ref 15–37)
BASOPHILS # BLD AUTO: 0.06 X10(3) UL (ref 0–0.2)
BASOPHILS NFR BLD AUTO: 0.9 %
BILIRUB SERPL-MCNC: 0.4 MG/DL (ref 0.1–2)
BUN BLD-MCNC: 9 MG/DL (ref 7–18)
CALCIUM BLD-MCNC: 8 MG/DL (ref 8.5–10.1)
CHLORIDE SERPL-SCNC: 109 MMOL/L (ref 98–112)
CO2 SERPL-SCNC: 23 MMOL/L (ref 21–32)
CREAT BLD-MCNC: 0.8 MG/DL
EOSINOPHIL # BLD AUTO: 0.18 X10(3) UL (ref 0–0.7)
EOSINOPHIL NFR BLD AUTO: 2.6 %
ERYTHROCYTE [DISTWIDTH] IN BLOOD BY AUTOMATED COUNT: 20.4 %
GLOBULIN PLAS-MCNC: 3.3 G/DL (ref 2.8–4.4)
GLUCOSE BLD-MCNC: 159 MG/DL (ref 70–99)
HCT VFR BLD AUTO: 17.8 %
HGB BLD-MCNC: 5.2 G/DL
IMM GRANULOCYTES # BLD AUTO: 0.05 X10(3) UL (ref 0–1)
IMM GRANULOCYTES NFR BLD: 0.7 %
LYMPHOCYTES # BLD AUTO: 2.11 X10(3) UL (ref 1–4)
LYMPHOCYTES NFR BLD AUTO: 30.6 %
M PROTEIN MFR SERPL ELPH: 6.2 G/DL (ref 6.4–8.2)
MCH RBC QN AUTO: 31.5 PG (ref 26–34)
MCHC RBC AUTO-ENTMCNC: 29.2 G/DL (ref 31–37)
MCV RBC AUTO: 107.9 FL
MONOCYTES # BLD AUTO: 0.29 X10(3) UL (ref 0.1–1)
MONOCYTES NFR BLD AUTO: 4.2 %
NEUTROPHILS # BLD AUTO: 4.21 X10 (3) UL (ref 1.5–7.7)
NEUTROPHILS # BLD AUTO: 4.21 X10(3) UL (ref 1.5–7.7)
NEUTROPHILS NFR BLD AUTO: 61 %
OSMOLALITY SERPL CALC.SUM OF ELEC: 292 MOSM/KG (ref 275–295)
PATIENT FASTING Y/N/NP: NO
PLATELET # BLD AUTO: 205 10(3)UL (ref 150–450)
POTASSIUM SERPL-SCNC: 4.2 MMOL/L (ref 3.5–5.1)
RBC # BLD AUTO: 1.65 X10(6)UL
RH BLOOD TYPE: NEGATIVE
SODIUM SERPL-SCNC: 140 MMOL/L (ref 136–145)
WBC # BLD AUTO: 6.9 X10(3) UL (ref 4–11)

## 2021-09-09 PROCEDURE — 96402 CHEMO HORMON ANTINEOPL SQ/IM: CPT

## 2021-09-09 PROCEDURE — 86920 COMPATIBILITY TEST SPIN: CPT

## 2021-09-09 PROCEDURE — 99215 OFFICE O/P EST HI 40 MIN: CPT | Performed by: INTERNAL MEDICINE

## 2021-09-09 PROCEDURE — 93970 EXTREMITY STUDY: CPT | Performed by: INTERNAL MEDICINE

## 2021-09-09 RX ORDER — LAMOTRIGINE 25 MG/1
500 TABLET ORAL ONCE
Status: COMPLETED | OUTPATIENT
Start: 2021-09-09 | End: 2021-09-09

## 2021-09-09 RX ORDER — LAMOTRIGINE 25 MG/1
500 TABLET ORAL ONCE
Status: CANCELLED | OUTPATIENT
Start: 2021-09-09

## 2021-09-09 RX ADMIN — LAMOTRIGINE 500 MG: 25 TABLET ORAL at 15:32:00

## 2021-09-09 NOTE — PROGRESS NOTES
Cancer Center Progress Note  Patient Name: Jeannette Kenney   YOB: 1960   Medical Record Number: [id number]     Attending Physician: Michelle Beltran M.D. Date of Visit: 9/9/2021      Chief Complaint:  Patient presents with:   Follow - U History:   Diagnosis Date   • Abdominal distention 2012   • Anemia    • Anxiety    • Back pain    • Breast cancer (HCC)    • Disorder of thyroid    • Fatigue 2019   • Feeling lonely Recent   • Hemorrhoids Unk   • High cholesterol    • History of blood root Asked        Exercise: No        Bike Helmet: Not Asked        Seat Belt: Not Asked        Self-Exams: Not Asked    Social History Narrative      Not on file    Social Determinants of Health  Financial Resource Strain:       Difficulty of Paying Living Exp [START ON 10/3/2021] HYDROcodone-acetaminophen 7.5-325 MG Oral Tab, Take 1 tablet by mouth every 6 (six) hours as needed for Pain., Disp: 90 tablet, Rfl: 0  •  [START ON 11/3/2021] HYDROcodone-acetaminophen 7.5-325 MG Oral Tab, Take 1 tablet by mouth every Integumentary No yellowing. Neurologic No headache, blurred vision, and no areas of focal weakness. Normal gait. Psychiatric No insomnia, depression, erica or mood swings.          Vital Signs:  /40 (BP Location: Left arm, Patient Position: Sitt Ref Range: >=60  81   eGFR AFRICAN AMERICAN Latest Ref Range: >=60  93   ANION GAP Latest Ref Range: 0 - 18 mmol/L 8   CALCULATED OSMOLALITY Latest Ref Range: 275 - 295 mOsm/kg 292   ALKALINE PHOSPHATASE Latest Ref Range: 46 - 118 U/L 129 (H)   AST (SGOT) identified, with the larger lesions appearing decreased in size from prior imaging.  The smaller identified lesions are relatively stable to the previous exam.  No new hepatic lesions are noted.    2. Mild degree of ascites in the suprahepatic space periphe chemo-induced anemia. Improved after the hemorrhoid banding and discontinuing the Abemaciclib. She received IV iron with improvement, but her hgb has fallen again. Will transfuse 2 units of PRBCs.     Depression: She will meet with DUNIA for counseling regard

## 2021-09-10 ENCOUNTER — OFFICE VISIT (OUTPATIENT)
Dept: HEMATOLOGY/ONCOLOGY | Age: 61
End: 2021-09-10
Attending: INTERNAL MEDICINE
Payer: COMMERCIAL

## 2021-09-10 VITALS
DIASTOLIC BLOOD PRESSURE: 74 MMHG | RESPIRATION RATE: 16 BRPM | BODY MASS INDEX: 33 KG/M2 | OXYGEN SATURATION: 98 % | TEMPERATURE: 98 F | HEART RATE: 76 BPM | WEIGHT: 204 LBS | SYSTOLIC BLOOD PRESSURE: 117 MMHG

## 2021-09-10 DIAGNOSIS — K64.2 GRADE III HEMORRHOIDS: Primary | ICD-10-CM

## 2021-09-10 DIAGNOSIS — C50.919 MALIGNANT NEOPLASM OF FEMALE BREAST, UNSPECIFIED ESTROGEN RECEPTOR STATUS, UNSPECIFIED LATERALITY, UNSPECIFIED SITE OF BREAST (HCC): ICD-10-CM

## 2021-09-10 DIAGNOSIS — D50.0 IRON DEFICIENCY ANEMIA DUE TO CHRONIC BLOOD LOSS: ICD-10-CM

## 2021-09-10 PROCEDURE — 30233N1 TRANSFUSION OF NONAUTOLOGOUS RED BLOOD CELLS INTO PERIPHERAL VEIN, PERCUTANEOUS APPROACH: ICD-10-PCS | Performed by: INTERNAL MEDICINE

## 2021-09-10 PROCEDURE — 36430 TRANSFUSION BLD/BLD COMPNT: CPT

## 2021-09-10 NOTE — PROGRESS NOTES
Education Record    Learner:  Patient    Disease / Diagnosis: anemia due to bleeding    Barriers / Limitations:  None   Comments:    Method:  Discussion, Printed material and Reinforcement   Comments:    General Topics:  Side effects and symptom management

## 2021-09-11 LAB — BLOOD TYPE BARCODE: 600

## 2021-09-13 RX ORDER — GABAPENTIN 300 MG/1
CAPSULE ORAL
Qty: 90 CAPSULE | Refills: 0 | Status: SHIPPED | OUTPATIENT
Start: 2021-09-13 | End: 2021-12-02

## 2021-09-30 ENCOUNTER — TELEPHONE (OUTPATIENT)
Dept: FAMILY MEDICINE CLINIC | Facility: CLINIC | Age: 61
End: 2021-09-30

## 2021-09-30 NOTE — TELEPHONE ENCOUNTER
S/W pt's  who called and he states pt is doing better and went back to work today. Need a note stating she missed M, T, W due to illness and ok to go back to work. Please pended letter and approve if appropriate.

## 2021-09-30 NOTE — TELEPHONE ENCOUNTER
Pt's  informed of letter and he states he will try it and see if the letter you stated is good enough. Otherwise, will go to [address] [place] [address] for her to go to work tomorrow.

## 2021-09-30 NOTE — TELEPHONE ENCOUNTER
I cannot write a note but patient is okay to return to work since I have not seen her. The only note we can write is that patient reported to us that she was sick and she reported that she is feeling better/condition resolved.    Thank you

## 2021-09-30 NOTE — TELEPHONE ENCOUNTER
Per Dr Alek Barnard please triage      Pt was off work 3 days in a row due to diarrhea/headache. 2 yr old granddaughter had RSV/flu. Pt returned to work today but needs doctor note. No other symptoms. Pls advise.  Thanks      Could we have a triage to talk to her fabián

## 2021-10-07 ENCOUNTER — OFFICE VISIT (OUTPATIENT)
Dept: HEMATOLOGY/ONCOLOGY | Age: 61
End: 2021-10-07
Attending: INTERNAL MEDICINE
Payer: COMMERCIAL

## 2021-10-07 VITALS
SYSTOLIC BLOOD PRESSURE: 125 MMHG | TEMPERATURE: 99 F | WEIGHT: 202.31 LBS | RESPIRATION RATE: 16 BRPM | HEART RATE: 80 BPM | HEIGHT: 65.98 IN | BODY MASS INDEX: 32.51 KG/M2 | DIASTOLIC BLOOD PRESSURE: 61 MMHG

## 2021-10-07 DIAGNOSIS — C79.51 BONE METASTASES (HCC): ICD-10-CM

## 2021-10-07 DIAGNOSIS — C78.2 CARCINOMA OF RIGHT BREAST METASTATIC TO PLEURA (HCC): ICD-10-CM

## 2021-10-07 DIAGNOSIS — C50.011 MALIGNANT NEOPLASM OF NIPPLE OF RIGHT BREAST IN FEMALE, ESTROGEN RECEPTOR POSITIVE (HCC): Primary | ICD-10-CM

## 2021-10-07 DIAGNOSIS — C50.911 CARCINOMA OF RIGHT BREAST METASTATIC TO PLEURA (HCC): ICD-10-CM

## 2021-10-07 DIAGNOSIS — Z17.0 MALIGNANT NEOPLASM OF NIPPLE OF RIGHT BREAST IN FEMALE, ESTROGEN RECEPTOR POSITIVE (HCC): ICD-10-CM

## 2021-10-07 DIAGNOSIS — C50.919 MALIGNANT NEOPLASM OF FEMALE BREAST, UNSPECIFIED ESTROGEN RECEPTOR STATUS, UNSPECIFIED LATERALITY, UNSPECIFIED SITE OF BREAST (HCC): ICD-10-CM

## 2021-10-07 DIAGNOSIS — D50.0 IRON DEFICIENCY ANEMIA DUE TO CHRONIC BLOOD LOSS: ICD-10-CM

## 2021-10-07 DIAGNOSIS — Z17.0 MALIGNANT NEOPLASM OF NIPPLE OF RIGHT BREAST IN FEMALE, ESTROGEN RECEPTOR POSITIVE (HCC): Primary | ICD-10-CM

## 2021-10-07 DIAGNOSIS — C50.011 MALIGNANT NEOPLASM OF NIPPLE OF RIGHT BREAST IN FEMALE, ESTROGEN RECEPTOR POSITIVE (HCC): ICD-10-CM

## 2021-10-07 DIAGNOSIS — D50.0 IRON DEFICIENCY ANEMIA DUE TO CHRONIC BLOOD LOSS: Primary | ICD-10-CM

## 2021-10-07 DIAGNOSIS — K64.2 GRADE III HEMORRHOIDS: ICD-10-CM

## 2021-10-07 PROCEDURE — 96402 CHEMO HORMON ANTINEOPL SQ/IM: CPT

## 2021-10-07 PROCEDURE — 99215 OFFICE O/P EST HI 40 MIN: CPT | Performed by: INTERNAL MEDICINE

## 2021-10-07 PROCEDURE — 86900 BLOOD TYPING SEROLOGIC ABO: CPT

## 2021-10-07 PROCEDURE — 86901 BLOOD TYPING SEROLOGIC RH(D): CPT

## 2021-10-07 PROCEDURE — 86920 COMPATIBILITY TEST SPIN: CPT

## 2021-10-07 PROCEDURE — 86850 RBC ANTIBODY SCREEN: CPT

## 2021-10-07 RX ORDER — ACETAMINOPHEN 325 MG/1
650 TABLET ORAL ONCE
Status: CANCELLED | OUTPATIENT
Start: 2021-10-07

## 2021-10-07 RX ORDER — DIPHENHYDRAMINE HCL 25 MG
25 CAPSULE ORAL ONCE
Status: CANCELLED | OUTPATIENT
Start: 2021-10-07

## 2021-10-07 RX ORDER — LAMOTRIGINE 25 MG/1
500 TABLET ORAL ONCE
Status: CANCELLED | OUTPATIENT
Start: 2021-10-07

## 2021-10-07 RX ORDER — LAMOTRIGINE 25 MG/1
500 TABLET ORAL ONCE
Status: COMPLETED | OUTPATIENT
Start: 2021-10-07 | End: 2021-10-07

## 2021-10-07 RX ADMIN — LAMOTRIGINE 500 MG: 25 TABLET ORAL at 16:13:00

## 2021-10-07 NOTE — PROGRESS NOTES
Education Record    Learner:  Patient    Disease / Diagnosis: metastatic breast cancer: Faslodex     Barriers / Limitations:  None   Comments:    Method:  Brief focused and Discussion   Comments:    General Topics:  Side effects and symptom management   Co

## 2021-10-07 NOTE — PROGRESS NOTES
Cancer Center Progress Note  Patient Name: Inocencio Keita   YOB: 1960   Medical Record Number: [id number]     Attending Physician: Timmy Price M.D. Date of Visit: 10/7/2021        Chief Complaint:  Patient presents with:   Follow below 7, again, and she is iron deficient, again. The rash and hemangiomas of the skin are unchanged, but she has not seen dermatology, as recommended. She has also not seen Hepatology, as recommended.       Performance Status:  ECOG 1    Past Medical Histo Caffeine Concern: Yes          1 1/2 a day 16oz        Occupational Exposure: Not Asked        Hobby Hazards: Not Asked        Sleep Concern: Not Asked        Stress Concern: Not Asked        Weight Concern: Not Asked        Special Diet: Not Asked CAPSULE BY MOUTH NIGHTLY FOR 3 DAYS, THEN 1 CAPSULE TWICE DAILY FOR 3 DAYS, THEN 2 CAPSULES TWICE DAILY, Disp: 90 capsule, Rfl: 0  •  Albuterol Sulfate HFA (VENTOLIN HFA) 108 (90 Base) MCG/ACT Inhalation Aero Soln, Inhale 2 puffs into the lungs every 4 (fo palpable lymph nodes. Respiratory No dyspnea, pleuritic chest pain, cough or hemoptysis. Cardiovascular No anginal chest pain, palpitations or orthopnea. Gastrointestinal No nausea, vomiting, diarrhea, GI bleeding, or constipation.  NL appetite, No Ea Range 10/7/2021 15:11   Iron, Serum Latest Ref Range: 50 - 170 ug/dL 23 (L)   Transferrin Latest Ref Range: 200 - 360 mg/dL 268   Iron Bind. Cap.(TIBC) Latest Ref Range: 240 - 450 ug/dL 399   Iron Saturation Latest Ref Range: 15 - 50 % 6 (L)   FERRITIN Late question of liver metastases. In an effort to definitively determine whether these are metastases, Liver biopsy was accomplished that revealed hemangioma. CT Chest and MRI Abd are stable, indicating that her breast cancer is controlled on Faslodex alone.

## 2021-10-08 ENCOUNTER — OFFICE VISIT (OUTPATIENT)
Dept: HEMATOLOGY/ONCOLOGY | Age: 61
End: 2021-10-08
Attending: INTERNAL MEDICINE
Payer: COMMERCIAL

## 2021-10-08 VITALS
RESPIRATION RATE: 18 BRPM | DIASTOLIC BLOOD PRESSURE: 69 MMHG | SYSTOLIC BLOOD PRESSURE: 122 MMHG | HEART RATE: 69 BPM | OXYGEN SATURATION: 95 % | TEMPERATURE: 99 F

## 2021-10-08 DIAGNOSIS — K64.2 GRADE III HEMORRHOIDS: Primary | ICD-10-CM

## 2021-10-08 DIAGNOSIS — D50.0 IRON DEFICIENCY ANEMIA DUE TO CHRONIC BLOOD LOSS: ICD-10-CM

## 2021-10-08 DIAGNOSIS — C50.919 MALIGNANT NEOPLASM OF FEMALE BREAST, UNSPECIFIED ESTROGEN RECEPTOR STATUS, UNSPECIFIED LATERALITY, UNSPECIFIED SITE OF BREAST (HCC): ICD-10-CM

## 2021-10-08 PROCEDURE — 36430 TRANSFUSION BLD/BLD COMPNT: CPT

## 2021-10-08 RX ORDER — DIPHENHYDRAMINE HCL 25 MG
25 CAPSULE ORAL ONCE
Status: COMPLETED | OUTPATIENT
Start: 2021-10-08 | End: 2021-10-08

## 2021-10-08 RX ORDER — ACETAMINOPHEN 325 MG/1
650 TABLET ORAL ONCE
Status: COMPLETED | OUTPATIENT
Start: 2021-10-08 | End: 2021-10-08

## 2021-10-08 RX ORDER — DIPHENHYDRAMINE HCL 25 MG
25 CAPSULE ORAL ONCE
Status: CANCELLED | OUTPATIENT
Start: 2021-10-08

## 2021-10-08 RX ORDER — ACETAMINOPHEN 325 MG/1
650 TABLET ORAL ONCE
Status: CANCELLED | OUTPATIENT
Start: 2021-10-08

## 2021-10-08 RX ADMIN — DIPHENHYDRAMINE HCL 25 MG: 25 MG CAPSULE ORAL at 11:14:00

## 2021-10-08 RX ADMIN — ACETAMINOPHEN 650 MG: 325 TABLET ORAL at 11:13:00

## 2021-10-08 NOTE — PROGRESS NOTES
Pt here for 1 unit PRBC. Unit infused without issue, pt discharged home with family, reports having future appointments in place.

## 2021-10-20 RX ORDER — BUPROPION HYDROCHLORIDE 150 MG/1
TABLET, EXTENDED RELEASE ORAL
Qty: 180 TABLET | Refills: 1 | Status: SHIPPED | OUTPATIENT
Start: 2021-10-20 | End: 2021-12-14 | Stop reason: CLARIF

## 2021-10-20 RX ORDER — FERROUS SULFATE TAB EC 324 MG (65 MG FE EQUIVALENT) 324 (65 FE) MG
324 TABLET DELAYED RESPONSE ORAL
COMMUNITY
End: 2021-12-19

## 2021-10-20 NOTE — TELEPHONE ENCOUNTER
LOV: 9/2/21 (medication follow up)   Last Refill: 9/2/21, #180, 0 RF  Next OV: 12/6/21    Please authorize if acceptable. Thank you!

## 2021-10-21 RX ORDER — SODIUM CHLORIDE 9 MG/ML
INJECTION, SOLUTION INTRAVENOUS CONTINUOUS
Status: CANCELLED | OUTPATIENT
Start: 2021-10-21

## 2021-10-22 ENCOUNTER — HOSPITAL ENCOUNTER (EMERGENCY)
Facility: HOSPITAL | Age: 61
Discharge: HOME | End: 2021-10-22
Attending: EMERGENCY MEDICINE
Payer: COMMERCIAL

## 2021-10-22 ENCOUNTER — NURSE ONLY (OUTPATIENT)
Dept: LAB | Facility: HOSPITAL | Age: 61
End: 2021-10-22
Attending: INTERNAL MEDICINE
Payer: COMMERCIAL

## 2021-10-22 ENCOUNTER — HOSPITAL ENCOUNTER (OUTPATIENT)
Dept: ULTRASOUND IMAGING | Facility: HOSPITAL | Age: 61
Discharge: HOME | End: 2021-10-22
Attending: INTERNAL MEDICINE
Payer: COMMERCIAL

## 2021-10-22 VITALS
WEIGHT: 200 LBS | SYSTOLIC BLOOD PRESSURE: 123 MMHG | TEMPERATURE: 98 F | RESPIRATION RATE: 14 BRPM | HEIGHT: 66 IN | BODY MASS INDEX: 32.14 KG/M2 | DIASTOLIC BLOOD PRESSURE: 46 MMHG | OXYGEN SATURATION: 98 % | HEART RATE: 70 BPM

## 2021-10-22 VITALS
TEMPERATURE: 99 F | DIASTOLIC BLOOD PRESSURE: 71 MMHG | HEART RATE: 70 BPM | OXYGEN SATURATION: 96 % | RESPIRATION RATE: 20 BRPM | SYSTOLIC BLOOD PRESSURE: 127 MMHG

## 2021-10-22 DIAGNOSIS — R18.8 OTHER ASCITES: ICD-10-CM

## 2021-10-22 DIAGNOSIS — D12.0 BENIGN NEOPLASM OF CECUM: ICD-10-CM

## 2021-10-22 DIAGNOSIS — R18.8 OTHER ASCITES: Primary | ICD-10-CM

## 2021-10-22 DIAGNOSIS — E78.1 HYPERTRIGLYCERIDEMIA: ICD-10-CM

## 2021-10-22 DIAGNOSIS — D64.9 ANEMIA, UNSPECIFIED TYPE: Primary | ICD-10-CM

## 2021-10-22 PROCEDURE — 86920 COMPATIBILITY TEST SPIN: CPT

## 2021-10-22 PROCEDURE — 76705 ECHO EXAM OF ABDOMEN: CPT | Performed by: INTERNAL MEDICINE

## 2021-10-22 PROCEDURE — 99285 EMERGENCY DEPT VISIT HI MDM: CPT

## 2021-10-22 PROCEDURE — 36430 TRANSFUSION BLD/BLD COMPNT: CPT

## 2021-10-22 PROCEDURE — 80053 COMPREHEN METABOLIC PANEL: CPT | Performed by: EMERGENCY MEDICINE

## 2021-10-22 PROCEDURE — 36415 COLL VENOUS BLD VENIPUNCTURE: CPT

## 2021-10-22 PROCEDURE — 80061 LIPID PANEL: CPT

## 2021-10-22 PROCEDURE — 85027 COMPLETE CBC AUTOMATED: CPT | Performed by: RADIOLOGY

## 2021-10-22 PROCEDURE — 86901 BLOOD TYPING SEROLOGIC RH(D): CPT | Performed by: EMERGENCY MEDICINE

## 2021-10-22 PROCEDURE — 86900 BLOOD TYPING SEROLOGIC ABO: CPT | Performed by: EMERGENCY MEDICINE

## 2021-10-22 PROCEDURE — 86850 RBC ANTIBODY SCREEN: CPT | Performed by: EMERGENCY MEDICINE

## 2021-10-22 PROCEDURE — 85610 PROTHROMBIN TIME: CPT

## 2021-10-22 NOTE — IMAGING NOTE
Dr Arturo Bullard notified of the patient's hemoglobin of 6. As her vitals are  stable  will proceed with the paracentesis. As per Dr Nevarez Gravely  Advice patient will be send to ER for transfusion.  ER charge made aware

## 2021-10-22 NOTE — ED INITIAL ASSESSMENT (HPI)
Was going to get outpatient paracentesis today, pre procedure labs showed hgb of 6.0. Told to come right to ER. Reporting extra fatigue.

## 2021-10-22 NOTE — ED PROVIDER NOTES
Patient Seen in: [facility] Emergency Department      History   Patient presents with:  Abnormal Result    Stated Complaint: hgb 6.0    Subjective:   HPI    This is a 68-year-old female past medical history of breast cancer was scheduled for an outp HAIRSH,LTD,BIOPSY  05/2015                Social History    Tobacco Use      Smoking status: Light Tobacco Smoker        Packs/day: 0.00        Years: 40.00        Pack years: 0        Types: Cigarettes      Smokeless tobacco: Never Used      Tobacco Type and screen.   Procedure                               Abnormality         Status                     ---------                               -----------         ------                     ABORH ([facility])[[id number]]                               Mary Mujica

## 2021-10-25 ENCOUNTER — ANESTHESIA EVENT (OUTPATIENT)
Dept: ENDOSCOPY | Facility: HOSPITAL | Age: 61
End: 2021-10-25
Payer: COMMERCIAL

## 2021-10-26 ENCOUNTER — HOSPITAL ENCOUNTER (OUTPATIENT)
Facility: HOSPITAL | Age: 61
Setting detail: HOSPITAL OUTPATIENT SURGERY
Discharge: HOME | End: 2021-10-26
Attending: INTERNAL MEDICINE | Admitting: INTERNAL MEDICINE
Payer: COMMERCIAL

## 2021-10-26 ENCOUNTER — ANESTHESIA (OUTPATIENT)
Dept: ENDOSCOPY | Facility: HOSPITAL | Age: 61
End: 2021-10-26
Payer: COMMERCIAL

## 2021-10-26 VITALS
WEIGHT: 195 LBS | RESPIRATION RATE: 18 BRPM | SYSTOLIC BLOOD PRESSURE: 138 MMHG | HEART RATE: 71 BPM | TEMPERATURE: 98 F | DIASTOLIC BLOOD PRESSURE: 61 MMHG | BODY MASS INDEX: 31.34 KG/M2 | HEIGHT: 66 IN | OXYGEN SATURATION: 93 %

## 2021-10-26 DIAGNOSIS — D50.9 IRON DEFICIENCY ANEMIA, UNSPECIFIED IRON DEFICIENCY ANEMIA TYPE: ICD-10-CM

## 2021-10-26 DIAGNOSIS — D64.9 ANEMIA, UNSPECIFIED TYPE: Primary | ICD-10-CM

## 2021-10-26 PROCEDURE — 0DBN8ZX EXCISION OF SIGMOID COLON, VIA NATURAL OR ARTIFICIAL OPENING ENDOSCOPIC, DIAGNOSTIC: ICD-10-PCS | Performed by: INTERNAL MEDICINE

## 2021-10-26 PROCEDURE — 88305 TISSUE EXAM BY PATHOLOGIST: CPT | Performed by: INTERNAL MEDICINE

## 2021-10-26 PROCEDURE — 0DB68ZX EXCISION OF STOMACH, VIA NATURAL OR ARTIFICIAL OPENING ENDOSCOPIC, DIAGNOSTIC: ICD-10-PCS | Performed by: INTERNAL MEDICINE

## 2021-10-26 PROCEDURE — 0DBL8ZX EXCISION OF TRANSVERSE COLON, VIA NATURAL OR ARTIFICIAL OPENING ENDOSCOPIC, DIAGNOSTIC: ICD-10-PCS | Performed by: INTERNAL MEDICINE

## 2021-10-26 PROCEDURE — 0DB98ZX EXCISION OF DUODENUM, VIA NATURAL OR ARTIFICIAL OPENING ENDOSCOPIC, DIAGNOSTIC: ICD-10-PCS | Performed by: INTERNAL MEDICINE

## 2021-10-26 RX ORDER — SODIUM CHLORIDE, SODIUM LACTATE, POTASSIUM CHLORIDE, CALCIUM CHLORIDE 600; 310; 30; 20 MG/100ML; MG/100ML; MG/100ML; MG/100ML
INJECTION, SOLUTION INTRAVENOUS CONTINUOUS
Status: DISCONTINUED | OUTPATIENT
Start: 2021-10-26 | End: 2021-10-26

## 2021-10-26 RX ORDER — NALOXONE HYDROCHLORIDE 0.4 MG/ML
80 INJECTION, SOLUTION INTRAMUSCULAR; INTRAVENOUS; SUBCUTANEOUS AS NEEDED
Status: DISCONTINUED | OUTPATIENT
Start: 2021-10-26 | End: 2021-10-26

## 2021-10-26 RX ORDER — SODIUM CHLORIDE 9 MG/ML
INJECTION, SOLUTION INTRAVENOUS CONTINUOUS
Status: DISCONTINUED | OUTPATIENT
Start: 2021-10-26 | End: 2021-10-26

## 2021-10-26 RX ORDER — SODIUM CHLORIDE, SODIUM LACTATE, POTASSIUM CHLORIDE, CALCIUM CHLORIDE 600; 310; 30; 20 MG/100ML; MG/100ML; MG/100ML; MG/100ML
INJECTION, SOLUTION INTRAVENOUS CONTINUOUS PRN
Status: DISCONTINUED | OUTPATIENT
Start: 2021-10-26 | End: 2021-10-26 | Stop reason: SURG

## 2021-10-26 RX ORDER — LIDOCAINE HYDROCHLORIDE 10 MG/ML
INJECTION, SOLUTION EPIDURAL; INFILTRATION; INTRACAUDAL; PERINEURAL AS NEEDED
Status: DISCONTINUED | OUTPATIENT
Start: 2021-10-26 | End: 2021-10-26 | Stop reason: SURG

## 2021-10-26 RX ADMIN — LIDOCAINE HYDROCHLORIDE 50 MG: 10 INJECTION, SOLUTION EPIDURAL; INFILTRATION; INTRACAUDAL; PERINEURAL at 12:21:00

## 2021-10-26 RX ADMIN — SODIUM CHLORIDE, SODIUM LACTATE, POTASSIUM CHLORIDE, CALCIUM CHLORIDE: 600; 310; 30; 20 INJECTION, SOLUTION INTRAVENOUS at 12:21:00

## 2021-10-26 NOTE — OPERATIVE REPORT
Patient Status:  Hospital Outpatient Surgery    1960 MRN [id number]   Location [id number] [place] [address] Attending Frida Blackman MD   Date 10/26/2021 PCP Zamzam Pozo MD     PREOPERATIVE DIAGNOSIS/INDICATION: I

## 2021-10-26 NOTE — ANESTHESIA POSTPROCEDURE EVALUATION
Wes Patient Status:  Hospital Outpatient Surgery   Age/Gender 63year old female MRN [id number]   Location [id number] [place] [address] Attending Roby Kahn MD   Hosp Day # 0 PCP MD Jason Bullard

## 2021-10-26 NOTE — ANESTHESIA PREPROCEDURE EVALUATION
PRE-OP EVALUATION    Patient Name: Christiano Wilkerson    Admit Diagnosis: Iron deficiency anemia, unspecified iron deficiency anemia type [D50.9]    Pre-op Diagnosis: Iron deficiency anemia, unspecified iron deficiency anemia type [D50.9]    RAINE, RITU every 6 (six) hours as needed for Pain., Disp: 90 tablet, Rfl: 0, 10/25/2021 at Unknown time  fluticasone-salmeterol (WIXELA INHUB) 250-50 MCG/DOSE Inhalation Aerosol Powder, Breath Activated, Inhale 1 puff into the lungs Q12H., Disp: 3 each, Rfl: 1, 10/25 Cigarettes      Smokeless tobacco: Never Used      Tobacco comment: 3-5 cig/ day    Alcohol use: Not Currently      Alcohol/week: 0.0 standard drinks      Comment: 2 drinks/yr       Drug use: No     Available pre-op labs reviewed.   Lab Results   Component

## 2021-10-26 NOTE — H&P
[facility] Patient Status:  Hospital Outpatient Surgery    1960 MRN [id number]   Location [address] [place] [address] Attending John Lopes MD   Date 10/26/2021 PCP Hardin Cooks, MD     CC: kg), SpO2 97 %, not currently breastfeeding. General: Appears alert, oriented x3 and in no acute distress. CV: Normal rate   Lungs: Normal effort   Skin: Warm and dry.   Laboratory Data:           Assessment/Plan/Procedure:  Patient Active Problem List:

## 2021-10-26 NOTE — OPERATIVE REPORT
Juan Diego Stringer Patient Status:  Hospital Outpatient Surgery    1960 MRN [id number]   Location [address] [place] [address] Attending Trip Albarran MD   Date 10/26/2021 PCP Pushpa Covington MD     PREOPERATIVE DIAGNOSIS/INDICATION: I closed with three endoclips for prophylaxis and due to persistent oozing  - RECTUM: Grade 2-3 Internal and external large hemorrhoids  RECOMMENDATIONS:   - Post Colonoscopy/polypectomy precautions, watch for bleeding, infection, perforation, adverse drug r

## 2021-10-28 NOTE — PROGRESS NOTES
Date: 10/28/2021    To: Christiano Wilkerson  : 1960     I hope this letter finds you doing well. I am writing to inform you of the following:      The biopsies obtained at the time of your recent upper endoscopic procedure were benign and showed no evide

## 2021-11-04 ENCOUNTER — OFFICE VISIT (OUTPATIENT)
Dept: HEMATOLOGY/ONCOLOGY | Age: 61
End: 2021-11-04
Attending: INTERNAL MEDICINE
Payer: COMMERCIAL

## 2021-11-04 VITALS
BODY MASS INDEX: 32.22 KG/M2 | RESPIRATION RATE: 18 BRPM | TEMPERATURE: 99 F | SYSTOLIC BLOOD PRESSURE: 122 MMHG | WEIGHT: 200.5 LBS | HEART RATE: 86 BPM | OXYGEN SATURATION: 95 % | HEIGHT: 65.98 IN | DIASTOLIC BLOOD PRESSURE: 56 MMHG

## 2021-11-04 DIAGNOSIS — C50.011 MALIGNANT NEOPLASM OF NIPPLE OF RIGHT BREAST IN FEMALE, ESTROGEN RECEPTOR POSITIVE (HCC): ICD-10-CM

## 2021-11-04 DIAGNOSIS — C78.2 CARCINOMA OF RIGHT BREAST METASTATIC TO PLEURA (HCC): ICD-10-CM

## 2021-11-04 DIAGNOSIS — Z17.0 MALIGNANT NEOPLASM OF NIPPLE OF RIGHT BREAST IN FEMALE, ESTROGEN RECEPTOR POSITIVE (HCC): Primary | ICD-10-CM

## 2021-11-04 DIAGNOSIS — C79.51 BONE METASTASES (HCC): ICD-10-CM

## 2021-11-04 DIAGNOSIS — C50.911 CARCINOMA OF RIGHT BREAST METASTATIC TO PLEURA (HCC): ICD-10-CM

## 2021-11-04 DIAGNOSIS — C50.011 MALIGNANT NEOPLASM OF NIPPLE OF RIGHT BREAST IN FEMALE, ESTROGEN RECEPTOR POSITIVE (HCC): Primary | ICD-10-CM

## 2021-11-04 DIAGNOSIS — D50.0 IRON DEFICIENCY ANEMIA SECONDARY TO BLOOD LOSS (CHRONIC): Primary | ICD-10-CM

## 2021-11-04 DIAGNOSIS — Z17.0 MALIGNANT NEOPLASM OF NIPPLE OF RIGHT BREAST IN FEMALE, ESTROGEN RECEPTOR POSITIVE (HCC): ICD-10-CM

## 2021-11-04 PROCEDURE — 96402 CHEMO HORMON ANTINEOPL SQ/IM: CPT

## 2021-11-04 PROCEDURE — 99215 OFFICE O/P EST HI 40 MIN: CPT | Performed by: INTERNAL MEDICINE

## 2021-11-04 RX ORDER — LAMOTRIGINE 25 MG/1
500 TABLET ORAL ONCE
Status: CANCELLED | OUTPATIENT
Start: 2021-11-04

## 2021-11-04 RX ORDER — LAMOTRIGINE 25 MG/1
500 TABLET ORAL ONCE
Status: COMPLETED | OUTPATIENT
Start: 2021-11-04 | End: 2021-11-04

## 2021-11-04 RX ADMIN — LAMOTRIGINE 500 MG: 25 TABLET ORAL at 15:25:00

## 2021-11-04 NOTE — PROGRESS NOTES
Pt here for C36D1.   Arrives Ambulating independently, accompanied by Self           Pregnancy screening: Denies possibility of pregnancy    Modifications in dose or schedule: No     Frequency of blood return and site check throughout administration: Prior

## 2021-11-05 ENCOUNTER — TELEPHONE (OUTPATIENT)
Dept: HEMATOLOGY/ONCOLOGY | Facility: HOSPITAL | Age: 61
End: 2021-11-05

## 2021-11-06 NOTE — PROGRESS NOTES
Cancer Center Progress Note  Patient Name: Dottie Poon   YOB: 1960   Medical Record Number: [id number]     Attending Physician: Tiffanie Gomez M.D. Date of Visit: 11/5/21    Chief Complaint:  Patient presents with:   Follow - Up deficient, again. The rash and hemangiomas of the skin are unchanged, but she has not seen dermatology, as recommended. She has also not seen Hepatology, as recommended.       Performance Status:  ECOG 1    Past Medical History:  Past Medical History:   Manuela drinks        Comment: 2 drinks/yr       Drug use: No      Sexual activity: Not Currently    Other Topics      Concerns:         Service: Not Asked        Blood Transfusions: Not Asked        Caffeine Concern: Yes          1 1/2 a day 16oz        O Outpatient Medications:   •  BUPROPION 150 MG Oral Tablet 12 Hr, TAKE 1 TABLET TWICE A DAY (2 TABLETS DAILY AS DIRECTED), Disp: 180 tablet, Rfl: 1  •  Ferrous Sulfate 324 (65 Fe) MG Oral Tab EC, Take by mouth., Disp: , Rfl:   •  OMEPRAZOLE 20 MG Oral Capsu 11    Allergies:    Radiology Contrast *    HIVES, ITCHING  Iodine (Topical)        OTHER (SEE COMMENTS)     Review of Systems:    Constitutional No fevers, chills, night sweats, excessive fatigue or weight loss. Eyes No significant visual difficulties. gait, cranial nerves intact. Psychiatric Normal - A&Ox3, Coherent speech. Verbalizes understanding of our discussions today.            Labs:  Recent Labs     11/04/21  1441   RBC 2.63 L   HGB 7.2 L   HCT 25.2 L   MCV 95.8   MCH 27.4   MCHC 28.6 L   RDW 1 lobe of liver, ultrasound-guided biopsy:  -Fragments of hemangioma. (See comment.)         Impression and Plan:  Breast Cancer: Stage IV, ER+ SD+ HER-2 Neg. She was transitioned to Faslodex and Abemaciclib.  Abemaciclib dose reduced for diarrhea and rash, More than 50% of that time was spent counseling the patient and/or on coordination of care. The diagnosis, prognosis, and general treatment was explained to the patient and the family. Electronically Signed by: ROBERTO Wharton

## 2021-11-08 ENCOUNTER — OFFICE VISIT (OUTPATIENT)
Dept: HEMATOLOGY/ONCOLOGY | Age: 61
End: 2021-11-08
Attending: INTERNAL MEDICINE
Payer: COMMERCIAL

## 2021-11-08 VITALS
OXYGEN SATURATION: 96 % | RESPIRATION RATE: 16 BRPM | HEART RATE: 77 BPM | TEMPERATURE: 97 F | SYSTOLIC BLOOD PRESSURE: 133 MMHG | DIASTOLIC BLOOD PRESSURE: 66 MMHG

## 2021-11-08 DIAGNOSIS — C50.919 BREAST CANCER METASTASIZED TO PLEURA, UNSPECIFIED LATERALITY (HCC): ICD-10-CM

## 2021-11-08 DIAGNOSIS — C78.2 BREAST CANCER METASTASIZED TO PLEURA, UNSPECIFIED LATERALITY (HCC): ICD-10-CM

## 2021-11-08 DIAGNOSIS — D50.0 IRON DEFICIENCY ANEMIA DUE TO CHRONIC BLOOD LOSS: Primary | ICD-10-CM

## 2021-11-08 DIAGNOSIS — E86.0 DEHYDRATION: ICD-10-CM

## 2021-11-08 DIAGNOSIS — C79.51 BONE METASTASES (HCC): ICD-10-CM

## 2021-11-08 PROCEDURE — 96365 THER/PROPH/DIAG IV INF INIT: CPT

## 2021-11-08 NOTE — PROGRESS NOTES
Pt observed 30 minutes post iron infusion. VSS. D/C home in stable condition with no complaints.    Education Record    Learner:  Patient    Disease / Diagnosis:JOHN    Barriers / Limitations:  None   Comments:    Method:  Brief focused and Printed material

## 2021-11-09 ENCOUNTER — TELEPHONE (OUTPATIENT)
Dept: HEMATOLOGY/ONCOLOGY | Age: 61
End: 2021-11-09

## 2021-11-09 ENCOUNTER — TELEPHONE (OUTPATIENT)
Dept: FAMILY MEDICINE CLINIC | Facility: CLINIC | Age: 61
End: 2021-11-09

## 2021-11-09 DIAGNOSIS — K76.9 LIVER LESION: ICD-10-CM

## 2021-11-09 DIAGNOSIS — D18.01 HEMANGIOMA OF SKIN: Primary | ICD-10-CM

## 2021-11-09 DIAGNOSIS — R16.0 ENLARGED LIVER: ICD-10-CM

## 2021-11-09 NOTE — TELEPHONE ENCOUNTER
Pt's  called in regards to pt getting to referrals. One for Dr. Bib Haney for Hepatology and another for Dr. Majo Mejia for Dermatology. Pt needs the referrals prior to her appt. Pt's  is requesting a call back when completed.

## 2021-11-09 NOTE — TELEPHONE ENCOUNTER
Explained due to having an HMO actual referral needs to come from her PCP. Christine Cabrales will contact patient's PCP.

## 2021-11-09 NOTE — TELEPHONE ENCOUNTER
Mindy Bishop called about a referral Dr. Patience Parker was going to get for the patient for Dermatologist- Brody Mercedes and Bob Lozano a hepatologist.  Please call Mindy Bishop at [phone]. Thank you.

## 2021-11-15 RX ORDER — ESCITALOPRAM OXALATE 20 MG/1
TABLET ORAL
Qty: 90 TABLET | Refills: 0 | Status: SHIPPED | OUTPATIENT
Start: 2021-11-15 | End: 2021-12-14 | Stop reason: CLARIF

## 2021-11-15 NOTE — TELEPHONE ENCOUNTER
ESCITALOPRAM TABS 20MG    Please see pended medications. Please sign if appropriate.       Thank you        Last OV: 09/02/2021      Last refill: 06/02/2021 90/1 tabs

## 2021-11-16 RX ORDER — FLUTICASONE PROPIONATE AND SALMETEROL 250; 50 UG/1; UG/1
1 POWDER RESPIRATORY (INHALATION) EVERY 12 HOURS
Qty: 180 EACH | Refills: 0 | Status: SHIPPED | OUTPATIENT
Start: 2021-11-16

## 2021-11-29 DIAGNOSIS — E78.2 HYPERLIPIDEMIA, MIXED: ICD-10-CM

## 2021-11-30 RX ORDER — ROSUVASTATIN CALCIUM 5 MG/1
5 TABLET, COATED ORAL NIGHTLY
Qty: 90 TABLET | Refills: 1 | Status: SHIPPED | OUTPATIENT
Start: 2021-11-30 | End: 2022-06-02

## 2021-11-30 RX ORDER — LEVOTHYROXINE SODIUM 0.15 MG/1
150 TABLET ORAL
Qty: 90 TABLET | Refills: 1 | Status: SHIPPED | OUTPATIENT
Start: 2021-11-30 | End: 2022-06-03

## 2021-12-02 ENCOUNTER — OFFICE VISIT (OUTPATIENT)
Dept: HEMATOLOGY/ONCOLOGY | Age: 61
End: 2021-12-02
Attending: INTERNAL MEDICINE
Payer: COMMERCIAL

## 2021-12-02 VITALS
HEART RATE: 87 BPM | DIASTOLIC BLOOD PRESSURE: 56 MMHG | TEMPERATURE: 98 F | SYSTOLIC BLOOD PRESSURE: 114 MMHG | OXYGEN SATURATION: 98 % | WEIGHT: 194.81 LBS | HEIGHT: 65.98 IN | RESPIRATION RATE: 18 BRPM | BODY MASS INDEX: 31.31 KG/M2

## 2021-12-02 DIAGNOSIS — Z17.0 MALIGNANT NEOPLASM OF NIPPLE OF RIGHT BREAST IN FEMALE, ESTROGEN RECEPTOR POSITIVE (HCC): Primary | ICD-10-CM

## 2021-12-02 DIAGNOSIS — C78.2 CARCINOMA OF RIGHT BREAST METASTATIC TO PLEURA (HCC): ICD-10-CM

## 2021-12-02 DIAGNOSIS — C50.011 MALIGNANT NEOPLASM OF NIPPLE OF RIGHT BREAST IN FEMALE, ESTROGEN RECEPTOR POSITIVE (HCC): Primary | ICD-10-CM

## 2021-12-02 DIAGNOSIS — C50.011 MALIGNANT NEOPLASM OF NIPPLE OF RIGHT BREAST IN FEMALE, ESTROGEN RECEPTOR POSITIVE (HCC): ICD-10-CM

## 2021-12-02 DIAGNOSIS — C79.51 BONE METASTASES (HCC): ICD-10-CM

## 2021-12-02 DIAGNOSIS — C50.911 CARCINOMA OF RIGHT BREAST METASTATIC TO PLEURA (HCC): ICD-10-CM

## 2021-12-02 DIAGNOSIS — C50.919 MALIGNANT NEOPLASM OF FEMALE BREAST, UNSPECIFIED ESTROGEN RECEPTOR STATUS, UNSPECIFIED LATERALITY, UNSPECIFIED SITE OF BREAST (HCC): ICD-10-CM

## 2021-12-02 DIAGNOSIS — K64.2 GRADE III HEMORRHOIDS: ICD-10-CM

## 2021-12-02 DIAGNOSIS — Z17.0 MALIGNANT NEOPLASM OF NIPPLE OF RIGHT BREAST IN FEMALE, ESTROGEN RECEPTOR POSITIVE (HCC): ICD-10-CM

## 2021-12-02 DIAGNOSIS — D50.0 IRON DEFICIENCY ANEMIA DUE TO CHRONIC BLOOD LOSS: Primary | ICD-10-CM

## 2021-12-02 DIAGNOSIS — D50.0 IRON DEFICIENCY ANEMIA DUE TO CHRONIC BLOOD LOSS: ICD-10-CM

## 2021-12-02 PROCEDURE — 86850 RBC ANTIBODY SCREEN: CPT

## 2021-12-02 PROCEDURE — 86920 COMPATIBILITY TEST SPIN: CPT

## 2021-12-02 PROCEDURE — 86901 BLOOD TYPING SEROLOGIC RH(D): CPT

## 2021-12-02 PROCEDURE — 86900 BLOOD TYPING SEROLOGIC ABO: CPT

## 2021-12-02 PROCEDURE — 99215 OFFICE O/P EST HI 40 MIN: CPT | Performed by: INTERNAL MEDICINE

## 2021-12-02 PROCEDURE — 96402 CHEMO HORMON ANTINEOPL SQ/IM: CPT

## 2021-12-02 RX ORDER — GABAPENTIN 300 MG/1
300 CAPSULE ORAL 4 TIMES DAILY
Qty: 360 CAPSULE | Refills: 3 | Status: SHIPPED | OUTPATIENT
Start: 2021-12-02

## 2021-12-02 RX ORDER — LAMOTRIGINE 25 MG/1
500 TABLET ORAL ONCE
Status: COMPLETED | OUTPATIENT
Start: 2021-12-02 | End: 2021-12-02

## 2021-12-02 RX ORDER — LAMOTRIGINE 25 MG/1
500 TABLET ORAL ONCE
Status: CANCELLED | OUTPATIENT
Start: 2021-12-02

## 2021-12-02 RX ADMIN — LAMOTRIGINE 500 MG: 25 TABLET ORAL at 15:32:00

## 2021-12-02 NOTE — PROGRESS NOTES
Cancer Center Progress Note  Patient Name: Jeane Fitzgerald   YOB: 1960   Medical Record Number: [id number]     Attending Physician: Santos Don M.D. Date of Visit: 12/2/2021    Chief Complaint:  Patient presents with:   Follow - Up the skin are unchanged, but she has not seen dermatology, as recommended. She has also not seen Hepatology, as recommended. But, the appointments are now made.     Performance Status:  ECOG 1    Past Medical History:  Past Medical History:   Diagnosis Date Comment: 2 drinks/yr       Drug use: No      Sexual activity: Not Currently    Other Topics      Concerns:         Service: Not Asked        Blood Transfusions: Not Asked        Caffeine Concern: Yes          1 1/2 a day 16oz        Occupational Ex MCG/ACT Inhalation Aero Soln, Inhale 2 puffs into the lungs every 4 (four) hours as needed. , Disp: 1 each, Rfl: 1  •  Omega-3-acid Ethyl Esters 1 g Oral Cap, Take 2 capsules (2 g total) by mouth 2 (two) times daily. , Disp: 360 capsule, Rfl: 1  •  ALPRAZola Appears close to chronological age. Well nourished. Well developed. Eyes Normal - Conjunctivae and sclerae are clear and without icterus. Pupils are reactive and equal.   Hematologic/Lymphatic Normal - No petechiae or purpura.   No tender or palpable lymp controlled on Faslodex alone. Will continue Faslodex. Bone Metastases: Stable. Discontinued Xgeva due to her teeth issues. Elevated LFT: Mild. Secondary to hemangiomas and fatty liver. Continue to monitor with monthly labs.  I have continued to str

## 2021-12-03 ENCOUNTER — TELEPHONE (OUTPATIENT)
Dept: HEMATOLOGY/ONCOLOGY | Facility: HOSPITAL | Age: 61
End: 2021-12-03

## 2021-12-03 NOTE — TELEPHONE ENCOUNTER
Heather Garcia MD  P Edw Darian Munroe Rns  I think we should give her IV iron despite the ferritin. Please have her schedule. Spoke with patient's . [facility]

## 2021-12-06 ENCOUNTER — OFFICE VISIT (OUTPATIENT)
Dept: FAMILY MEDICINE CLINIC | Facility: CLINIC | Age: 61
End: 2021-12-06

## 2021-12-06 ENCOUNTER — SOCIAL WORK SERVICES (OUTPATIENT)
Dept: HEMATOLOGY/ONCOLOGY | Facility: HOSPITAL | Age: 61
End: 2021-12-06

## 2021-12-06 ENCOUNTER — OFFICE VISIT (OUTPATIENT)
Dept: HEMATOLOGY/ONCOLOGY | Age: 61
End: 2021-12-06
Attending: INTERNAL MEDICINE
Payer: COMMERCIAL

## 2021-12-06 VITALS
DIASTOLIC BLOOD PRESSURE: 65 MMHG | RESPIRATION RATE: 16 BRPM | SYSTOLIC BLOOD PRESSURE: 108 MMHG | HEART RATE: 71 BPM | TEMPERATURE: 98 F | OXYGEN SATURATION: 98 %

## 2021-12-06 VITALS
DIASTOLIC BLOOD PRESSURE: 62 MMHG | SYSTOLIC BLOOD PRESSURE: 104 MMHG | WEIGHT: 196 LBS | TEMPERATURE: 98 F | BODY MASS INDEX: 32.65 KG/M2 | OXYGEN SATURATION: 97 % | HEIGHT: 65 IN | HEART RATE: 73 BPM | RESPIRATION RATE: 16 BRPM

## 2021-12-06 DIAGNOSIS — C50.011 MALIGNANT NEOPLASM OF NIPPLE OF RIGHT BREAST IN FEMALE, ESTROGEN RECEPTOR POSITIVE (HCC): ICD-10-CM

## 2021-12-06 DIAGNOSIS — M79.674 PAIN OF TOE OF RIGHT FOOT: ICD-10-CM

## 2021-12-06 DIAGNOSIS — C79.51 BONE METASTASES (HCC): ICD-10-CM

## 2021-12-06 DIAGNOSIS — G62.9 PERIPHERAL POLYNEUROPATHY: ICD-10-CM

## 2021-12-06 DIAGNOSIS — K64.2 GRADE III HEMORRHOIDS: Primary | ICD-10-CM

## 2021-12-06 DIAGNOSIS — K21.9 GASTROESOPHAGEAL REFLUX DISEASE WITHOUT ESOPHAGITIS: ICD-10-CM

## 2021-12-06 DIAGNOSIS — C50.919 MALIGNANT NEOPLASM OF FEMALE BREAST, UNSPECIFIED ESTROGEN RECEPTOR STATUS, UNSPECIFIED LATERALITY, UNSPECIFIED SITE OF BREAST (HCC): ICD-10-CM

## 2021-12-06 DIAGNOSIS — K42.9 UMBILICAL HERNIA WITHOUT OBSTRUCTION AND WITHOUT GANGRENE: ICD-10-CM

## 2021-12-06 DIAGNOSIS — R16.0 ENLARGED LIVER: ICD-10-CM

## 2021-12-06 DIAGNOSIS — F41.9 ANXIETY: ICD-10-CM

## 2021-12-06 DIAGNOSIS — C50.919 METASTATIC BREAST CANCER (HCC): ICD-10-CM

## 2021-12-06 DIAGNOSIS — E03.9 HYPOTHYROIDISM IN ADULT: Primary | ICD-10-CM

## 2021-12-06 DIAGNOSIS — R60.0 EDEMA OF BOTH FEET: ICD-10-CM

## 2021-12-06 DIAGNOSIS — Z17.0 MALIGNANT NEOPLASM OF NIPPLE OF RIGHT BREAST IN FEMALE, ESTROGEN RECEPTOR POSITIVE (HCC): ICD-10-CM

## 2021-12-06 DIAGNOSIS — E78.2 HYPERLIPIDEMIA, MIXED: ICD-10-CM

## 2021-12-06 DIAGNOSIS — D50.0 IRON DEFICIENCY ANEMIA DUE TO CHRONIC BLOOD LOSS: ICD-10-CM

## 2021-12-06 DIAGNOSIS — R79.89 ELEVATED LIVER FUNCTION TESTS: ICD-10-CM

## 2021-12-06 PROBLEM — J44.9 CHRONIC OBSTRUCTIVE PULMONARY DISEASE, UNSPECIFIED (HCC): Status: ACTIVE | Noted: 2021-12-06

## 2021-12-06 PROCEDURE — 3074F SYST BP LT 130 MM HG: CPT | Performed by: FAMILY MEDICINE

## 2021-12-06 PROCEDURE — 99215 OFFICE O/P EST HI 40 MIN: CPT | Performed by: FAMILY MEDICINE

## 2021-12-06 PROCEDURE — 3078F DIAST BP <80 MM HG: CPT | Performed by: FAMILY MEDICINE

## 2021-12-06 PROCEDURE — 86850 RBC ANTIBODY SCREEN: CPT

## 2021-12-06 PROCEDURE — 86900 BLOOD TYPING SEROLOGIC ABO: CPT

## 2021-12-06 PROCEDURE — 36430 TRANSFUSION BLD/BLD COMPNT: CPT

## 2021-12-06 PROCEDURE — 86920 COMPATIBILITY TEST SPIN: CPT

## 2021-12-06 PROCEDURE — 86901 BLOOD TYPING SEROLOGIC RH(D): CPT

## 2021-12-06 PROCEDURE — 3008F BODY MASS INDEX DOCD: CPT | Performed by: FAMILY MEDICINE

## 2021-12-06 RX ORDER — HYDROCODONE BITARTRATE AND ACETAMINOPHEN 7.5; 325 MG/1; MG/1
1-2 TABLET ORAL EVERY 8 HOURS PRN
Qty: 90 TABLET | Refills: 0 | Status: SHIPPED | OUTPATIENT
Start: 2022-01-06 | End: 2022-02-05

## 2021-12-06 RX ORDER — HYDROCODONE BITARTRATE AND ACETAMINOPHEN 7.5; 325 MG/1; MG/1
1-2 TABLET ORAL EVERY 8 HOURS PRN
Qty: 90 TABLET | Refills: 0 | Status: SHIPPED | OUTPATIENT
Start: 2021-12-06 | End: 2022-01-05

## 2021-12-06 RX ORDER — HYDROCODONE BITARTRATE AND ACETAMINOPHEN 7.5; 325 MG/1; MG/1
1-2 TABLET ORAL EVERY 8 HOURS PRN
Qty: 90 TABLET | Refills: 0 | Status: SHIPPED | OUTPATIENT
Start: 2022-02-06 | End: 2022-01-26

## 2021-12-06 RX ORDER — HYDROCODONE BITARTRATE AND ACETAMINOPHEN 7.5; 325 MG/1; MG/1
1 TABLET ORAL EVERY 6 HOURS PRN
Qty: 90 TABLET | Refills: 0 | Status: CANCELLED | OUTPATIENT
Start: 2021-12-06

## 2021-12-06 RX ORDER — ALBUTEROL SULFATE 90 UG/1
2 AEROSOL, METERED RESPIRATORY (INHALATION) EVERY 4 HOURS PRN
Qty: 1 EACH | Refills: 1 | Status: SHIPPED | OUTPATIENT
Start: 2021-12-06

## 2021-12-06 NOTE — PROGRESS NOTES
Education Record    Learner:  Patient    Disease / Diagnosis:pt here for blood trans    Barriers / Limitations:  None    Method:  Brief focused, printed material and  reinforcement    General Topics:  Plan of care reviewed    Outcome: tolerated trans.  No c

## 2021-12-06 NOTE — PROGRESS NOTES
SW checked in with patient and provided bag of lucina donation. Patient expressed appreciation and had no new needs at this time.

## 2021-12-07 NOTE — PROGRESS NOTES
Lorena Hunter is a 63year old female. cc neuropathy,breast cancer, hypercholesterolemia, elevated liver function test, hypothyroidism, anxiety, anemia, pain, edema,  HPI:   Patient is coming to the office for follow-up of multiple medical problems.   Gissell has a kids living with her it helps. Breast ca with metastasis to the pleura and bones continue hydrocodone for pain. She is seeing oncologist.    Tiredness fatigue patient received transfusion of the blood today because of the anemia.   Hopefully she w ESCITALOPRAM 20 MG Oral Tab TAKE 1 TABLET DAILY 90 tablet 0   • BUPROPION 150 MG Oral Tablet 12 Hr TAKE 1 TABLET TWICE A DAY (2 TABLETS DAILY AS DIRECTED) 180 tablet 1   • Ferrous Sulfate 324 (65 Fe) MG Oral Tab EC Take by mouth.      • OMEPRAZOLE 20 MG Ora cig/ day    Vaping Use      Vaping Use: Never used    Alcohol use: Not Currently      Alcohol/week: 0.0 standard drinks      Comment: 2 drinks/yr     Drug use: No       REVIEW OF SYSTEMS:   GENERAL HEALTH: feels well otherwise  SKIN: denies any unusual ski 12/06/21   ABORH (BLOOD TYPE)   Result Value Ref Range    ABO BLOOD TYPE A     RH BLOOD TYPE Negative    ANTIBODY SCREEN   Result Value Ref Range    Antibody Screen Negative    Prepare RBC Once   Result Value Ref Range    Blood Product [id number]     [facility] liver specialist as scheduled. Try to use foam separators between the toes and see if that would help resolve your symptoms. Call for blood test orders before next visit in 3 months.       Imaging & Consults:  None    The patient indicates understanding o

## 2021-12-07 NOTE — PATIENT INSTRUCTIONS
Continue current medications. Continue watching diet. Take your thyroid medication regularly. Recheck blood work for the thyroid with next set of blood work for anemia. Follow-up with GI as scheduled. Follow-up with oncologist as recommended.   See l

## 2021-12-08 ENCOUNTER — OFFICE VISIT (OUTPATIENT)
Dept: HEMATOLOGY/ONCOLOGY | Age: 61
End: 2021-12-08
Attending: INTERNAL MEDICINE
Payer: COMMERCIAL

## 2021-12-08 VITALS
TEMPERATURE: 99 F | RESPIRATION RATE: 16 BRPM | SYSTOLIC BLOOD PRESSURE: 132 MMHG | HEART RATE: 72 BPM | DIASTOLIC BLOOD PRESSURE: 70 MMHG

## 2021-12-08 DIAGNOSIS — D50.0 IRON DEFICIENCY ANEMIA DUE TO CHRONIC BLOOD LOSS: Primary | ICD-10-CM

## 2021-12-08 DIAGNOSIS — E86.0 DEHYDRATION: ICD-10-CM

## 2021-12-08 DIAGNOSIS — C79.51 BONE METASTASES (HCC): ICD-10-CM

## 2021-12-08 DIAGNOSIS — C78.2 BREAST CANCER METASTASIZED TO PLEURA, UNSPECIFIED LATERALITY (HCC): ICD-10-CM

## 2021-12-08 DIAGNOSIS — C50.919 BREAST CANCER METASTASIZED TO PLEURA, UNSPECIFIED LATERALITY (HCC): ICD-10-CM

## 2021-12-08 PROCEDURE — 96365 THER/PROPH/DIAG IV INF INIT: CPT

## 2021-12-14 ENCOUNTER — HOSPITAL ENCOUNTER (INPATIENT)
Facility: HOSPITAL | Age: 61
LOS: 5 days | Discharge: HOME | DRG: 393 | End: 2021-12-19
Attending: EMERGENCY MEDICINE | Admitting: INTERNAL MEDICINE
Payer: COMMERCIAL

## 2021-12-14 ENCOUNTER — TELEPHONE (OUTPATIENT)
Dept: HEMATOLOGY/ONCOLOGY | Facility: HOSPITAL | Age: 61
End: 2021-12-14

## 2021-12-14 ENCOUNTER — TELEPHONE (OUTPATIENT)
Dept: FAMILY MEDICINE CLINIC | Facility: CLINIC | Age: 61
End: 2021-12-14

## 2021-12-14 ENCOUNTER — OFFICE VISIT (OUTPATIENT)
Dept: HEMATOLOGY/ONCOLOGY | Age: 61
End: 2021-12-14
Attending: INTERNAL MEDICINE
Payer: COMMERCIAL

## 2021-12-14 VITALS
HEART RATE: 83 BPM | SYSTOLIC BLOOD PRESSURE: 90 MMHG | BODY MASS INDEX: 34.34 KG/M2 | HEIGHT: 65.98 IN | WEIGHT: 213.69 LBS | RESPIRATION RATE: 20 BRPM | TEMPERATURE: 98 F | DIASTOLIC BLOOD PRESSURE: 51 MMHG | OXYGEN SATURATION: 99 %

## 2021-12-14 DIAGNOSIS — C79.51 BONE METASTASES (HCC): ICD-10-CM

## 2021-12-14 DIAGNOSIS — D50.0 IRON DEFICIENCY ANEMIA DUE TO CHRONIC BLOOD LOSS: ICD-10-CM

## 2021-12-14 DIAGNOSIS — T50.905A ADVERSE EFFECT OF DRUG, INITIAL ENCOUNTER: ICD-10-CM

## 2021-12-14 DIAGNOSIS — C78.2 BREAST CANCER METASTASIZED TO PLEURA, UNSPECIFIED LATERALITY (HCC): ICD-10-CM

## 2021-12-14 DIAGNOSIS — D50.0 IRON DEFICIENCY ANEMIA SECONDARY TO BLOOD LOSS (CHRONIC): ICD-10-CM

## 2021-12-14 DIAGNOSIS — C50.919 BREAST CANCER METASTASIZED TO PLEURA, UNSPECIFIED LATERALITY (HCC): ICD-10-CM

## 2021-12-14 DIAGNOSIS — D50.0 IRON DEFICIENCY ANEMIA DUE TO CHRONIC BLOOD LOSS: Primary | ICD-10-CM

## 2021-12-14 DIAGNOSIS — E03.9 HYPOTHYROIDISM IN ADULT: ICD-10-CM

## 2021-12-14 DIAGNOSIS — D64.9 ANEMIA, UNSPECIFIED TYPE: Primary | ICD-10-CM

## 2021-12-14 DIAGNOSIS — R60.0 BILATERAL LEG EDEMA: ICD-10-CM

## 2021-12-14 PROCEDURE — 99223 1ST HOSP IP/OBS HIGH 75: CPT | Performed by: HOSPITALIST

## 2021-12-14 PROCEDURE — 99215 OFFICE O/P EST HI 40 MIN: CPT | Performed by: CLINICAL NURSE SPECIALIST

## 2021-12-14 RX ORDER — ACETAMINOPHEN 325 MG/1
650 TABLET ORAL EVERY 6 HOURS PRN
Status: DISCONTINUED | OUTPATIENT
Start: 2021-12-14 | End: 2021-12-19

## 2021-12-14 RX ORDER — ALPRAZOLAM 0.25 MG/1
0.25 TABLET ORAL EVERY 6 HOURS PRN
Status: DISCONTINUED | OUTPATIENT
Start: 2021-12-14 | End: 2021-12-19

## 2021-12-14 RX ORDER — ESCITALOPRAM OXALATE 10 MG/1
20 TABLET ORAL DAILY
Status: DISCONTINUED | OUTPATIENT
Start: 2021-12-14 | End: 2021-12-19

## 2021-12-14 RX ORDER — ROSUVASTATIN CALCIUM 5 MG/1
5 TABLET, COATED ORAL NIGHTLY
Status: DISCONTINUED | OUTPATIENT
Start: 2021-12-14 | End: 2021-12-19

## 2021-12-14 RX ORDER — BUPROPION HYDROCHLORIDE 150 MG/1
150 TABLET, EXTENDED RELEASE ORAL 2 TIMES DAILY
Status: DISCONTINUED | OUTPATIENT
Start: 2021-12-14 | End: 2021-12-19

## 2021-12-14 RX ORDER — HYDROCODONE BITARTRATE AND ACETAMINOPHEN 10; 325 MG/1; MG/1
1 TABLET ORAL EVERY 4 HOURS PRN
Refills: 0 | Status: DISCONTINUED | OUTPATIENT
Start: 2021-12-14 | End: 2021-12-19

## 2021-12-14 RX ORDER — FOLIC ACID 1 MG/1
1 TABLET ORAL DAILY
Status: DISCONTINUED | OUTPATIENT
Start: 2021-12-14 | End: 2021-12-19

## 2021-12-14 RX ORDER — HYDROCODONE BITARTRATE AND ACETAMINOPHEN 10; 325 MG/1; MG/1
2 TABLET ORAL EVERY 4 HOURS PRN
Refills: 0 | Status: DISCONTINUED | OUTPATIENT
Start: 2021-12-14 | End: 2021-12-19

## 2021-12-14 RX ORDER — ESCITALOPRAM OXALATE 20 MG/1
20 TABLET ORAL
COMMUNITY

## 2021-12-14 RX ORDER — ALBUTEROL SULFATE 90 UG/1
2 AEROSOL, METERED RESPIRATORY (INHALATION) EVERY 4 HOURS PRN
Status: DISCONTINUED | OUTPATIENT
Start: 2021-12-14 | End: 2021-12-19

## 2021-12-14 RX ORDER — PROCHLORPERAZINE EDISYLATE 5 MG/ML
5 INJECTION INTRAMUSCULAR; INTRAVENOUS EVERY 8 HOURS PRN
Status: DISCONTINUED | OUTPATIENT
Start: 2021-12-14 | End: 2021-12-19

## 2021-12-14 RX ORDER — BIOTIN 2500 MCG
1 CAPSULE ORAL EVERY EVENING
Status: DISCONTINUED | OUTPATIENT
Start: 2021-12-14 | End: 2021-12-14

## 2021-12-14 RX ORDER — LEVOTHYROXINE SODIUM 0.12 MG/1
125 TABLET ORAL
Status: DISCONTINUED | OUTPATIENT
Start: 2021-12-15 | End: 2021-12-19

## 2021-12-14 RX ORDER — FUROSEMIDE 10 MG/ML
40 INJECTION INTRAMUSCULAR; INTRAVENOUS ONCE
Status: COMPLETED | OUTPATIENT
Start: 2021-12-14 | End: 2021-12-14

## 2021-12-14 RX ORDER — BUPROPION HYDROCHLORIDE 150 MG/1
150 TABLET, EXTENDED RELEASE ORAL 2 TIMES DAILY
COMMUNITY

## 2021-12-14 RX ORDER — GABAPENTIN 300 MG/1
300 CAPSULE ORAL 4 TIMES DAILY
Status: DISCONTINUED | OUTPATIENT
Start: 2021-12-14 | End: 2021-12-19

## 2021-12-14 RX ORDER — MELATONIN
1000 DAILY
Status: DISCONTINUED | OUTPATIENT
Start: 2021-12-14 | End: 2021-12-19

## 2021-12-14 RX ORDER — ONDANSETRON 2 MG/ML
4 INJECTION INTRAMUSCULAR; INTRAVENOUS EVERY 6 HOURS PRN
Status: DISCONTINUED | OUTPATIENT
Start: 2021-12-14 | End: 2021-12-19

## 2021-12-14 RX ORDER — OMEPRAZOLE 20 MG/1
20 CAPSULE, DELAYED RELEASE ORAL
COMMUNITY
End: 2021-12-19

## 2021-12-14 RX ORDER — MELATONIN
3 NIGHTLY PRN
Status: DISCONTINUED | OUTPATIENT
Start: 2021-12-14 | End: 2021-12-19

## 2021-12-14 NOTE — PROGRESS NOTES
ANP Visit Note    Patient Name: Dottie Poon   YOB: 1960   Medical Record Number: [id number]   [facility]: [id number]   Date of visit: 12/14/2021   Abhay Robbins MD   No primary care provider on file.      Chief Complaint/Reason for Visit:  [facility] ([facility]) Bone metastases (HCC)     Malignant neoplasm of nipple of right breast in female [facility])     Adhesive capsulitis of right shoulder     Adhesive capsulitis of left shoulder     Malignant neoplasm of female breast [facility])     Dental infection     Dental abscess Laterality Date   • COLONOSCOPY     • COLONOSCOPY N/A 10/26/2021    Procedure: COLONOSCOPY with biopsy, Cold Polypectomy & Clips x 5 placement /ESOPHAGOGASTRODUODENOSCOPY (EGD) with Biopsy;  Surgeon: Nkechi Porter MD;  Location: [address] [place] [address] ENDOSCOPY   • ORAL file  Social Connections: Not on file  Intimate Partner Violence: Not on file  Housing Stability: Not on file    Medications:    Current Outpatient Medications:   •  HYDROcodone-acetaminophen 7.5-325 MG Oral Tab, Take 1-2 tablets by mouth every 8 (eight) h 2 capsules (2 g total) by mouth 2 (two) times daily. , Disp: 360 capsule, Rfl: 1  •  ALPRAZolam 0.25 MG Oral Tab, Take 1 tablet (0.25 mg total) by mouth every 6 (six) hours as needed. , Disp: 30 tablet, Rfl: 2  •  HYDROcodone-acetaminophen 7.5-325 MG Oral Ta MCH 29.3 26.0 - 34.0 pg    MCHC 26.9 (L) 31.0 - 37.0 g/dL    RDW 23.9 %    Neutrophil Absolute Prelim 5.88 1.50 - 7.70 x10 (3) uL    Neutrophil Absolute 5.88 1.50 - 7.70 x10(3) uL    Lymphocyte Absolute 2.95 1.00 - 4.00 x10(3) uL    Monocyte Absolute 0.62 0.9 (L) 1.0 - 2.0   RAINBOW DRAW LAVENDER    Collection Time: 12/14/21  1:56 PM   Result Value Ref Range    Hold Lavender Auto Resulted    RAINBOW DRAW LIGHT GREEN    Collection Time: 12/14/21  1:56 PM   Result Value Ref Range    Hold Lt Green Auto Resulte Breast Cancer: Faslodex   2. Bilateral LE edema: dopplers were negative suspect lymphedema related to abdominal swelling, consult PT   3. Critical anemia: Hgb 3.9, he will drive her to [address] [facility], will need GI inpt   4.  Liver disease/hemangiomas: appointment wi

## 2021-12-14 NOTE — ED QUICK NOTES
Orders for admission, patient is aware of plan and ready to go upstairs. Any questions, please call ED RN Steven Funk  at extension [phone]. Vaccinated? Yes  Type of COVID test sent:yes, negative  COVID Suspicion level: Low      Titratable drug(s) infusing:P

## 2021-12-14 NOTE — ED PROVIDER NOTES
Patient Seen in: [facility] Emergency Department      History   Patient presents with:  Abnormal Labs    Stated Complaint: blood drawn today, hgb of 3.9 per family    Subjective:   HPI    This is a 72-year-old woman history of metastatic breast CA, Years: 40.00        Pack years: 0        Types: Cigarettes      Smokeless tobacco: Never Used      Tobacco comment: 3-5 cig/ day    Vaping Use      Vaping Use: Never used    Alcohol use: Not Currently      Alcohol/week: 0.0 standard drinks      Commen Phosphatase 251 (*)     Total Protein 4.8 (*)     Albumin 2.4 (*)     Globulin  2.4 (*)     All other components within normal limits   HEMOGLOBIN - Abnormal; Notable for the following components:    HGB 5.7 (*)     All other components within normal limit RAPID SARS-COV-2 BY PCR - Normal   CBC WITH DIFFERENTIAL WITH PLATELET    Narrative: The following orders were created for panel order CBC With Differential With Platelet.   Procedure                               Abnormality         Status METABOLIC PANEL (8)   PREPARE RBC   TYPE AND SCREEN    Narrative: The following orders were created for panel order Type and screen.   Procedure                               Abnormality         Status                     --------- C50.919, C78.2 Unknown Unknown    Cirrhosis of liver with ascites ([facility] [address] K74.60, R18.8 Unknown Unknown    External hemorrhoids K64.4 Unknown Unknown    Hemangioma of liver D18.03 Unknown Unknown    Hyperthyroidism E05.90 Unknown Unknown    Telangiectasia of

## 2021-12-14 NOTE — H&P
[facility]  History and Physical     Machado Pablito Patient Status:  Inpatient    1960 MRN [id number]   [facility] 4NW-A Attending Rebecca Quiñonez, DO   Hosp Day # 0 PCP Sophia Byrnes MD     Chief Complaint: fatigue    Hist Social History:  reports that she has been smoking cigarettes. She has been smoking about 0.00 packs per day for the past 40.00 years. She has never used smokeless tobacco. She reports previous alcohol use. She reports that she does not use drugs. needed. , Disp: 30 tablet, Rfl: 2  Biotin 2500 MCG Oral Cap, Take 1 capsule by mouth every evening., Disp: , Rfl:   folic acid 1 MG Oral Tab, Take 1 mg by mouth daily. , Disp: , Rfl:   Vitamin B-12 1000 MCG Oral Tab, Take 1 tablet (1,000 mcg total) by mouth    K 4.1      CO2 22.0   ALKPHO 244*   AST 49*   ALT 29   BILT 0.4   TP 5.2*       Estimated Creatinine Clearance: 66.7 mL/min (based on SCr of 0.85 mg/dL). No results for input(s): PTP, INR in the last 168 hours.     No results for input(s

## 2021-12-14 NOTE — PROGRESS NOTES
Education Record    Learner:  Patient    Disease / Diagnosis: metastatic breast ca    Barriers / Limitations:  None   Comments:    Method:  Discussion   Comments:    General Topics:  Plan of care reviewed   Comments:    Outcome:  Shows understanding   Comm

## 2021-12-14 NOTE — TELEPHONE ENCOUNTER
Returned call to Raj Doan, he states Elizabeth Baugh is having increased swelling to both legs and feet, having difficulty walking due to pain from tightness of swelling, also abdomen more distended.   She is weak, she will fall asleep just siting on couch, he had to d

## 2021-12-15 ENCOUNTER — ANESTHESIA EVENT (OUTPATIENT)
Dept: ENDOSCOPY | Facility: HOSPITAL | Age: 61
DRG: 393 | End: 2021-12-15
Payer: COMMERCIAL

## 2021-12-15 ENCOUNTER — APPOINTMENT (OUTPATIENT)
Dept: ULTRASOUND IMAGING | Facility: HOSPITAL | Age: 61
DRG: 393 | End: 2021-12-15
Attending: HOSPITALIST
Payer: COMMERCIAL

## 2021-12-15 ENCOUNTER — APPOINTMENT (OUTPATIENT)
Dept: CV DIAGNOSTICS | Facility: HOSPITAL | Age: 61
DRG: 393 | End: 2021-12-15
Attending: HOSPITALIST
Payer: COMMERCIAL

## 2021-12-15 ENCOUNTER — APPOINTMENT (OUTPATIENT)
Dept: ULTRASOUND IMAGING | Facility: HOSPITAL | Age: 61
DRG: 393 | End: 2021-12-15
Attending: INTERNAL MEDICINE
Payer: COMMERCIAL

## 2021-12-15 PROCEDURE — 49083 ABD PARACENTESIS W/IMAGING: CPT | Performed by: INTERNAL MEDICINE

## 2021-12-15 PROCEDURE — 99232 SBSQ HOSP IP/OBS MODERATE 35: CPT | Performed by: HOSPITALIST

## 2021-12-15 PROCEDURE — 99253 IP/OBS CNSLTJ NEW/EST LOW 45: CPT | Performed by: STUDENT IN AN ORGANIZED HEALTH CARE EDUCATION/TRAINING PROGRAM

## 2021-12-15 PROCEDURE — 99255 IP/OBS CONSLTJ NEW/EST HI 80: CPT | Performed by: INTERNAL MEDICINE

## 2021-12-15 PROCEDURE — 30233N1 TRANSFUSION OF NONAUTOLOGOUS RED BLOOD CELLS INTO PERIPHERAL VEIN, PERCUTANEOUS APPROACH: ICD-10-PCS | Performed by: HOSPITALIST

## 2021-12-15 PROCEDURE — 93970 EXTREMITY STUDY: CPT | Performed by: HOSPITALIST

## 2021-12-15 PROCEDURE — 93306 TTE W/DOPPLER COMPLETE: CPT | Performed by: HOSPITALIST

## 2021-12-15 PROCEDURE — 0W9G3ZZ DRAINAGE OF PERITONEAL CAVITY, PERCUTANEOUS APPROACH: ICD-10-PCS | Performed by: RADIOLOGY

## 2021-12-15 PROCEDURE — 76705 ECHO EXAM OF ABDOMEN: CPT | Performed by: HOSPITALIST

## 2021-12-15 RX ORDER — SODIUM CHLORIDE 9 MG/ML
INJECTION, SOLUTION INTRAVENOUS ONCE
Status: COMPLETED | OUTPATIENT
Start: 2021-12-15 | End: 2021-12-15

## 2021-12-15 RX ORDER — FUROSEMIDE 10 MG/ML
40 INJECTION INTRAMUSCULAR; INTRAVENOUS DAILY
Status: DISCONTINUED | OUTPATIENT
Start: 2021-12-15 | End: 2021-12-19

## 2021-12-15 NOTE — PROGRESS NOTES
[facility]  Progress Note     Malloryraymundo Rome Patient Status:  Inpatient    1960 MRN [id number]   [facility] 4NW-A Attending Israel Dukes MD   Hosp Day # 1 PCP Liv Li MD     Chief Complaint: fatigue    S: Patient hg • escitalopram  20 mg Oral Daily   • fluticasone furoate-vilanterol  1 puff Inhalation Daily   • folic acid  1 mg Oral Daily   • gabapentin  300 mg Oral QID   • levothyroxine  125 mcg Oral Before breakfast   • rosuvastatin  5 mg Oral Nightly   • cyanocob

## 2021-12-15 NOTE — PROCEDURES
[facility]  Procedure Note    Evgenybonnie Salmon Patient Status:  Inpatient    1960 MRN [id number]   [facility] 4NW-A Attending Nancy Ferreira MD   Hosp Day # 1 PCP Juana Diaz MD     LOCATION: Left lower quadrant  FLUID REMOVED

## 2021-12-15 NOTE — PLAN OF CARE
Problem: HEMATOLOGIC - ADULT  Goal: Free from bleeding injury  Description: (Example usage: patient with low platelets)  INTERVENTIONS:  - Avoid intramuscular injections, enemas and rectal medication administration  - Ensure safe mobilization of patient

## 2021-12-15 NOTE — PROGRESS NOTES
Pharmacy Note: Dietary Supplement Discontinuation Per Policy    Biotin has been discontinued on [employer] per policy. This supplement may be restarted upon discharge using the medication reconciliation process.     Thank you,   Angela, PharmD  12/14

## 2021-12-15 NOTE — CONSULTS
[facility]  Report of  Surgical Consultation with History and Physical Exam    Lorena Hunter Patient Status:  Inpatient    1960 MRN [id number]   [facility] 4NW-A Attending Rian Valverde MD   [facility] Day # 1 PCP Gilbert Rodarte MD service with plans for EGD and capsule endoscopy tomorrow, as the patient has recently had colonoscopy and EGD.     The patient's past medical history is significant for anemia, history of breast cancer, hypothyroidism, hypercholesterolemia, hemorrhoids, an Intravenous, Daily  •  albuterol 108 (90 Base) MCG/ACT inhaler 2 puff, 2 puff, Inhalation, Q4H PRN  •  ALPRAZolam (XANAX) tab 0.25 mg, 0.25 mg, Oral, Q6H PRN  •  buPROPion (WELLBUTRIN SR) 12 hr tab 150 mg, 150 mg, Oral, BID  •  escitalopram (LEXAPRO) table Negative for easy bleeding and easy bruising  Integumentary:  Negative for pruritus and rash  Musculoskeletal:  Negative for bone/joint symptoms  Neurological:  Negative for gait disturbance  Psychiatric:  Negative for inappropriate interaction and psychia --  29.2*  --    RDW 23.9  --  23.7  --   --  21.0  --    NEPRELIM 5.88  --  6.18  --   --  6.40  --    WBC 9.9  --  10.2  --   --  9.8  --    .0  --  225.0  --   --  186.0  --     < > = values in this interval not displayed.        Recent Labs   La (NZC=[id number])         COMPARISON:  US [facility], US ABDOMEN LIMITED (CPT=76705), 10/22/2021, 2:24     PM.         INDICATIONS:  ascities         PATIENT STATED HISTORY: (As transcribed by Technologist)                 FINDINGS: Anemia     Dyslipidemia     Anemia, unspecified type     Grade III hemorrhoids     Grade III hemorrhoids     Chronic obstructive pulmonary disease, unspecified (HCC)     Cirrhosis of liver with ascites ([facility]     Hyperthyroidism      Sanket Jefferson is a 61-yea She recently underwent endoscopy with EGD and colonoscopy in October by Dr. Rachell Zarate that showed esophageal varices, small hiatal hernia, multiple colon polyps, grade 2-3 internal hemorrhoids, and large external hemorrhoids.   Surgery consulted for evaluati been edited to reflect my thoughts and I have provided further documentation of my own, if necessary.      Dr. Troy Williamson (ROCHELLE) [facility] General Surgery  12/15/2021  4:37 PM

## 2021-12-15 NOTE — PLAN OF CARE
HGB this morning was 6.7, another unit of PRBC was ordered and infused, seen by GI, paracentesis was ordered and completed took off 3 liters of ascetic fluid, specimen sent to lab, patient returned to room, somewhat groggy but arouses easily, vital signs a

## 2021-12-15 NOTE — PLAN OF CARE
Admitted to floor from home with anemia, HGB 3.7, alert and oriented, no distress. Pt is pale and feels tired. Has a unit of blood infusing from ED. Has history of rectal bleed a few days ago, states she has hemorrhoids, GI was consulted.  Pt has ascites, a

## 2021-12-15 NOTE — CONSULTS
[facility]    Report of Consultation    Catrachita Fuelernie Patient Status:  Inpatient    1960 MRN [id number]   [facility] 4NW-A Attending Javier Julian MD   [facility] Day # 1 PCP Angy Taylor MD     Date of Admission:  2021 Abdominal distention 2012   • Anemia    • Anxiety    • Back pain    • Breast cancer ([address] 2015    breast   • Disorder of liver     lesions   • Disorder of thyroid    • Fatigue 2019   • Feeling lonely Recent   • Hemorrhoids Unk   • High cholesterol    • His HYDROcodone-acetaminophen (NORCO)  MG per tab 1 tablet, 1 tablet, Oral, Q4H PRN  •  HYDROcodone-acetaminophen (NORCO)  MG per tab 2 tablet, 2 tablet, Oral, Q4H PRN  •  levothyroxine tab 125 mcg, 125 mcg, Oral, Before breakfast  •  rosuvastatin Lab Results   Component Value Date    WBC 9.8 12/15/2021    HGB 7.9 12/15/2021    HCT 21.9 12/15/2021    .0 12/15/2021    CREATSERUM 0.81 12/15/2021    BUN 13 12/15/2021     12/15/2021    K 3.8 12/15/2021     12/15/2021    CO2 25.0 12/ Benign neoplasm of ascending colon     Anemia     Dyslipidemia     Anemia, unspecified type     Grade III hemorrhoids     Grade III hemorrhoids     Chronic obstructive pulmonary disease, unspecified (HCC)     Cirrhosis of liver with ascites (HCC)     Hyper

## 2021-12-15 NOTE — CONSULTS
Gastroenterology Initial Consultation    Juan Diego Stringer Patient Status:  Inpatient    1960 MRN [id number]   [facility] 4NW-A Attending Roland Spivey, DO   Hosp Day # 1 PCP Pushpa Covington MD       Reason for Consultation/Chief Co Laterality Date   • COLONOSCOPY     • COLONOSCOPY N/A 10/26/2021    Procedure: COLONOSCOPY with biopsy, Cold Polypectomy & Clips x 5 placement /ESOPHAGOGASTRODUODENOSCOPY (EGD) with Biopsy;  Surgeon: Raheem Urena MD;  Location: [facility] ENDOSCOPY   • ORAL 10 mL IV push, 40 mg, Intravenous, Q12H    Review of Systems:    Except for what is noted on the HPI the remainder 10 point review of systems is negative.     Gastrointestinal: Except for what is noted on the HPI the remainder 10 point review of systems is respiratory effort, normal breath sounds on bilateral auscultation  Cardiovascular: Normal carotid artery pulses bilaterally, no lower extremity edema  Gastrointestinal: Distended, tense, reducible umbilical hernia  Lymphatic: No cervical or supraclavicula

## 2021-12-15 NOTE — ANESTHESIA PREPROCEDURE EVALUATION
PRE-OP EVALUATION    Patient Name: Rachel Sinha    Admit Diagnosis: Anemia, unspecified type [D64.9]    Pre-op Diagnosis: INPT    ESOPHAGOGASTRODUODENOSCOPY (EGD) CAPSULE ENDOSCOPY    Anesthesia Procedure: ESOPHAGOGASTRODUODENOSCOPY (EGD) CAPSULE ENDOSCOP Once        Outpatient Medications:   buPROPion 150 MG Oral Tablet 12 Hr, Take 150 mg by mouth 2 (two) times daily. Take 2 tablets as directed, Disp: , Rfl: , 12/14/2021 at 0900  escitalopram 20 MG Oral Tab, Take 20 mg by mouth daily. , Disp: , Rfl: , 12/13 B-12 1000 MCG Oral Tab, Take 1 tablet (1,000 mcg total) by mouth daily. , Disp: 30 tablet, Rfl: 11, 12/13/2021 at 1230  [START ON 1/6/2022] HYDROcodone-acetaminophen 7.5-325 MG Oral Tab, Take 1-2 tablets by mouth every 8 (eight) hours as needed for Pain., D 12/15/2021    MCHC 29.2 (L) 12/15/2021    RDW 21.0 12/15/2021    .0 12/15/2021     Lab Results   Component Value Date     12/15/2021    K 3.8 12/15/2021     12/15/2021    CO2 25.0 12/15/2021    BUN 13 12/15/2021    CREATSERUM 0.81 12/15/

## 2021-12-15 NOTE — TELEPHONE ENCOUNTER
I have received notes from patient oncology team that patient could benefit for lymphedema clinic evaluation from lymphedema clinic.   Please call patient regarding that if she is interested we can place an order for lymphedema clinic diagnosis lymphedema b

## 2021-12-15 NOTE — IMAGING NOTE
Lana RN provided instructions for paracentesis:   STAT INR , CL 4 hours prior HOld blood thinning medications.

## 2021-12-15 NOTE — H&P (VIEW-ONLY)
Gastroenterology Initial Consultation    Catrachita Fuelernie Patient Status:  Inpatient    1960 MRN [id number]   [facility] 4NW-A Attending Robert Jay DO   Hosp Day # 1 PCP Angy Taylor MD       Reason for Consultation/Chief Co Laterality Date   • COLONOSCOPY     • COLONOSCOPY N/A 10/26/2021    Procedure: COLONOSCOPY with biopsy, Cold Polypectomy & Clips x 5 placement /ESOPHAGOGASTRODUODENOSCOPY (EGD) with Biopsy;  Surgeon: Jj Amaral MD;  Location: [facility] ENDOSCOPY   • ORAL 10 mL IV push, 40 mg, Intravenous, Q12H    Review of Systems:    Except for what is noted on the HPI the remainder 10 point review of systems is negative.     Gastrointestinal: Except for what is noted on the HPI the remainder 10 point review of systems is respiratory effort, normal breath sounds on bilateral auscultation  Cardiovascular: Normal carotid artery pulses bilaterally, no lower extremity edema  Gastrointestinal: Distended, tense, reducible umbilical hernia  Lymphatic: No cervical or supraclavicula

## 2021-12-16 ENCOUNTER — ANESTHESIA (OUTPATIENT)
Dept: ENDOSCOPY | Facility: HOSPITAL | Age: 61
DRG: 393 | End: 2021-12-16
Payer: COMMERCIAL

## 2021-12-16 PROCEDURE — 0DJ08ZZ INSPECTION OF UPPER INTESTINAL TRACT, VIA NATURAL OR ARTIFICIAL OPENING ENDOSCOPIC: ICD-10-PCS | Performed by: INTERNAL MEDICINE

## 2021-12-16 PROCEDURE — 0DJ07ZZ INSPECTION OF UPPER INTESTINAL TRACT, VIA NATURAL OR ARTIFICIAL OPENING: ICD-10-PCS | Performed by: INTERNAL MEDICINE

## 2021-12-16 PROCEDURE — 99232 SBSQ HOSP IP/OBS MODERATE 35: CPT | Performed by: STUDENT IN AN ORGANIZED HEALTH CARE EDUCATION/TRAINING PROGRAM

## 2021-12-16 PROCEDURE — 99232 SBSQ HOSP IP/OBS MODERATE 35: CPT | Performed by: HOSPITALIST

## 2021-12-16 PROCEDURE — 99232 SBSQ HOSP IP/OBS MODERATE 35: CPT | Performed by: NURSE PRACTITIONER

## 2021-12-16 RX ORDER — LIDOCAINE HYDROCHLORIDE 10 MG/ML
INJECTION, SOLUTION EPIDURAL; INFILTRATION; INTRACAUDAL; PERINEURAL AS NEEDED
Status: DISCONTINUED | OUTPATIENT
Start: 2021-12-16 | End: 2021-12-16 | Stop reason: SURG

## 2021-12-16 RX ORDER — PROPRANOLOL HYDROCHLORIDE 10 MG/1
10 TABLET ORAL 2 TIMES DAILY
Status: DISCONTINUED | OUTPATIENT
Start: 2021-12-16 | End: 2021-12-19

## 2021-12-16 RX ADMIN — LIDOCAINE HYDROCHLORIDE 50 MG: 10 INJECTION, SOLUTION EPIDURAL; INFILTRATION; INTRACAUDAL; PERINEURAL at 07:44:00

## 2021-12-16 NOTE — ANESTHESIA POSTPROCEDURE EVALUATION
Wes Patient Status:  Inpatient   Age/Gender 63year old female MRN [id number]   Location [id number] [place] [address] Attending Tristan Preston MD   [address] Day # 2 PCP Gale Luna MD       Anesthesia Post-op Note

## 2021-12-16 NOTE — TELEPHONE ENCOUNTER
Spouse called back, on hippa. Advised of below. Reports pt is currently admitted to hospital.  Kita Ventura she will be there for a little while  sts he doesn't think she will need the clinic but appreciated the offer.

## 2021-12-16 NOTE — PROGRESS NOTES
[facility]  General surgery progress Note    Mary Murphy Patient Status:  Inpatient    1960 MRN [id number]   [facility] 4NW-A Attending Gregg Azevedo MD   Hosp Day # 2 PCP Kami Gamble MD     Subjective:   Overnight event 12/15/21  1043 12/15/21  1548 12/16/21  0029   WBC 9.9  --  10.2  --  9.8  --   --   --   --   --    HGB 3.9*   < > 3.7*   < > 6.4*   < > 6.7*  --  7.9* 7.3*   HCT 14.5*  --  14.3*  --  21.9*  --   --   --   --   --    .0  --  225.0  --  186.0  -- of the vascular structures, Doppler spectral analysis, and color flow. Doppler imaging were performed.   The     following veins were imaged bilaterally:  Common, deep, and superficial     femoral, popliteal, sapheno-femoral junction, and posterior mcg Oral Before breakfast   • rosuvastatin  5 mg Oral Nightly   • cyanocobalamin  1,000 mcg Oral Daily   • pantoprazole (PROTONIX) IV push  40 mg Intravenous Q12H     Current PRN Medications  albuterol, ALPRAZolam, HYDROcodone-acetaminophen, HYDROcodone-ac hemorrhoids     Hemangioma of liver     Telangiectasia of extremity    63year old female with marked anemia down to 3.7, acute on chronic due to likely GI bleed and iron deficiency, large external hemorrhoids, mild internal hemorrhoids, ascites and cirrho

## 2021-12-16 NOTE — OPERATIVE REPORT
Mallory Rome Patient Status:  Inpatient    1960 MRN [id number]   Location [address] [place] [address] Attending Israel Dukes MD   Hosp Day # 2 PCP Liv Li MD       PREOPERATIVE DIAGNOSIS/INDICATION: Iron Deficiency varices. 2. Gastric varix. 3. Given chronicity of anemia, lack of melena, hemodynamic instability or elevated BUN, I do not suspect these are the source of her anemia. 4. Portal hypertensive gastropathy.     5. Successful capsule deployment in duod

## 2021-12-16 NOTE — PROGRESS NOTES
Hem/Onc Inpatient Note    Patient Name: Freddy Thapa   YOB: 1960   Medical Record Number: [id number]   CSN: [id number]   Attending Hematology/Oncology Physician: Dr. María Paulson but arousable.  S/p EGD this AM.     Allergies:    Rad cytocentrifuged smear.     Albumin, fluid    Collection Time: 12/15/21  3:05 PM   Result Value Ref Range    Albumin, Other Body Fld 0.4 g/dL    Source, Other Body Fld Peritoneal Fld    Cell Count, peritoneal fluid    Collection Time: 12/15/21  3:05 PM   Res Range    WBC 9.4 4.0 - 11.0 x10(3) uL    RBC 2.54 (L) 3.80 - 5.30 x10(6)uL    HGB 7.4 (L) 12.0 - 16.0 g/dL    HCT 26.0 (L) 35.0 - 48.0 %    .0 150.0 - 450.0 10(3)uL    .4 (H) 80.0 - 100.0 fL    MCH 29.1 26.0 - 34.0 pg    MCHC 28.5 (L) 31.0 - out malignant ascites. Multiple ultrasounds in the past, including one 6 weeks ago, have not revealed significant ascites. Appreciate GI input. The patient has a hepatology appointment on Monday.  Cytology pending from paracentesis yesterday with 3L removed

## 2021-12-16 NOTE — INTERVAL H&P NOTE
Pre-op Diagnosis: INPT    The above referenced H&P was reviewed by Bob Esteban DO on 12/16/2021, the patient was examined and no significant changes have occurred in the patient's condition since the H&P was performed.   I discussed with the patient an

## 2021-12-17 ENCOUNTER — APPOINTMENT (OUTPATIENT)
Dept: ULTRASOUND IMAGING | Facility: HOSPITAL | Age: 61
DRG: 393 | End: 2021-12-17
Attending: INTERNAL MEDICINE
Payer: COMMERCIAL

## 2021-12-17 ENCOUNTER — ANESTHESIA (OUTPATIENT)
Dept: ENDOSCOPY | Facility: HOSPITAL | Age: 61
DRG: 393 | End: 2021-12-17
Payer: COMMERCIAL

## 2021-12-17 ENCOUNTER — ANESTHESIA EVENT (OUTPATIENT)
Dept: ENDOSCOPY | Facility: HOSPITAL | Age: 61
DRG: 393 | End: 2021-12-17
Payer: COMMERCIAL

## 2021-12-17 ENCOUNTER — SOCIAL WORK SERVICES (OUTPATIENT)
Dept: HEMATOLOGY/ONCOLOGY | Facility: HOSPITAL | Age: 61
End: 2021-12-17

## 2021-12-17 DIAGNOSIS — E78.2 HYPERLIPIDEMIA, MIXED: ICD-10-CM

## 2021-12-17 DIAGNOSIS — E03.9 HYPOTHYROIDISM IN ADULT: Primary | ICD-10-CM

## 2021-12-17 PROCEDURE — 99232 SBSQ HOSP IP/OBS MODERATE 35: CPT | Performed by: HOSPITALIST

## 2021-12-17 PROCEDURE — 76700 US EXAM ABDOM COMPLETE: CPT | Performed by: INTERNAL MEDICINE

## 2021-12-17 PROCEDURE — 93975 VASCULAR STUDY: CPT | Performed by: INTERNAL MEDICINE

## 2021-12-17 PROCEDURE — 99232 SBSQ HOSP IP/OBS MODERATE 35: CPT | Performed by: INTERNAL MEDICINE

## 2021-12-17 PROCEDURE — 0DJ08ZZ INSPECTION OF UPPER INTESTINAL TRACT, VIA NATURAL OR ARTIFICIAL OPENING ENDOSCOPIC: ICD-10-PCS | Performed by: INTERNAL MEDICINE

## 2021-12-17 RX ORDER — LIDOCAINE HYDROCHLORIDE 10 MG/ML
INJECTION, SOLUTION EPIDURAL; INFILTRATION; INTRACAUDAL; PERINEURAL AS NEEDED
Status: DISCONTINUED | OUTPATIENT
Start: 2021-12-17 | End: 2021-12-17 | Stop reason: SURG

## 2021-12-17 RX ORDER — ONDANSETRON 2 MG/ML
4 INJECTION INTRAMUSCULAR; INTRAVENOUS AS NEEDED
Status: ACTIVE | OUTPATIENT
Start: 2021-12-17 | End: 2021-12-18

## 2021-12-17 RX ORDER — NALOXONE HYDROCHLORIDE 0.4 MG/ML
80 INJECTION, SOLUTION INTRAMUSCULAR; INTRAVENOUS; SUBCUTANEOUS AS NEEDED
Status: ACTIVE | OUTPATIENT
Start: 2021-12-17 | End: 2021-12-18

## 2021-12-17 RX ORDER — SODIUM CHLORIDE 9 MG/ML
INJECTION, SOLUTION INTRAVENOUS CONTINUOUS
Status: DISCONTINUED | OUTPATIENT
Start: 2021-12-17 | End: 2021-12-17

## 2021-12-17 RX ORDER — SODIUM CHLORIDE, SODIUM LACTATE, POTASSIUM CHLORIDE, CALCIUM CHLORIDE 600; 310; 30; 20 MG/100ML; MG/100ML; MG/100ML; MG/100ML
INJECTION, SOLUTION INTRAVENOUS CONTINUOUS
Status: DISCONTINUED | OUTPATIENT
Start: 2021-12-17 | End: 2021-12-17

## 2021-12-17 RX ORDER — SPIRONOLACTONE 100 MG/1
100 TABLET, FILM COATED ORAL DAILY
Status: DISCONTINUED | OUTPATIENT
Start: 2021-12-17 | End: 2021-12-19

## 2021-12-17 RX ADMIN — SODIUM CHLORIDE: 9 INJECTION, SOLUTION INTRAVENOUS at 15:53:00

## 2021-12-17 RX ADMIN — SODIUM CHLORIDE: 9 INJECTION, SOLUTION INTRAVENOUS at 15:33:00

## 2021-12-17 RX ADMIN — LIDOCAINE HYDROCHLORIDE 50 MG: 10 INJECTION, SOLUTION EPIDURAL; INFILTRATION; INTRACAUDAL; PERINEURAL at 15:36:00

## 2021-12-17 NOTE — PROGRESS NOTES
[facility]  Progress Note    Lorena Favors Patient Status:  Inpatient    1960 MRN [id number]   [facility] 4NW-A Attending Rian Valverde MD   Hosp Day # 3 PCP Gilbert Rodarte MD     Subjective:  Patient is resting in bed.  She d effusion     Hypokalemia     At risk for falling     Nipple lesion     Breast mass in female     Breast cancer metastasized to pleura [facility])     Bone metastases (HCC)     Malignant neoplasm of nipple of right breast in female [facility])     Adhesive capsulitis o that half of that time was spent counseling the patient and/or on coordination of care. The diagnosis, prognosis, and general treatment was explained to the patient and the family.     Dru Villanueva PA-C  12/17/2021  9:18 AM    ADDENDUM:   Patient was

## 2021-12-17 NOTE — INTERVAL H&P NOTE
Pre-op Diagnosis: anemia    The above referenced H&P was reviewed by Randy Flower DO on 12/17/2021, the patient was examined and no significant changes have occurred in the patient's condition since the H&P was performed.   I discussed with the patient

## 2021-12-17 NOTE — PROGRESS NOTES
spoke with  and completed Patient’s Int FMLA, faxing to Andrey Thomas at [address] [place] [address] at [phone] and sending a copy to patient via [employer].

## 2021-12-17 NOTE — PLAN OF CARE
Patient received alert and oriented x4 on room air. Administered medications as prescribed. Patient is able to move all extremities and is now resting in bed. Instructed patient to use call light for needs, verbalizes understanding.  Uses call light appro tolerated  - Nasogastric tube to low intermittent suction as ordered  - Evaluate effectiveness of ordered antiemetic medications  - Provide nonpharmacologic comfort measures as appropriate  - Advance diet as tolerated, if ordered  - Obtain nutritional cons

## 2021-12-17 NOTE — PROGRESS NOTES
Hem/Onc Inpatient Note    Patient Name: Juan Diego Stringer   YOB: 1960   Medical Record Number: [id number]   CSN: [id number]   Attending Hematology/Oncology Physician: Dr. Xuan Murcia    S: A&O x3. Abdominal distension is worse today.  Awaiting capsul Collection Time: 12/17/21 12:13 AM   Result Value Ref Range    HGB 7.5 (L) 12.0 - 16.0 g/dL   Basic Metabolic Panel (8)    Collection Time: 12/17/21  6:07 AM   Result Value Ref Range    Glucose 85 70 - 99 mg/dL    Sodium 144 136 - 145 mmol/L    Potassium 3 diagnosis in the differential. She has bleeding hemorrhoids, the severity of which she has minimized repeatedly over time. EGD this morning revealed esophageal varices and portal hypertensive gastropathy. Capsule endoscopy results pending.  Surgery has been

## 2021-12-17 NOTE — ANESTHESIA POSTPROCEDURE EVALUATION
Wes Patient Status:  Inpatient   Age/Gender 63year old female MRN [id number]   Location [id number] [place] [address] Attending Jose Borges MD   [facility] Day # 3 PCP Rain Huggins MD       Anesthesia Post-op Note

## 2021-12-17 NOTE — PROGRESS NOTES
[facility]  Progress Note     Inocencio Keita Patient Status:  Inpatient    1960 MRN [id number]   [facility] 4NW-A Attending Evelia Swanson MD   Hosp Day # 3 PCP Rufus Hatchet, MD     Chief Complaint: fatigue    S: Patient do 0.4 0.5  --    TP 5.2* 4.8*  --        Estimated Creatinine Clearance: 70.9 mL/min (based on SCr of 0.8 mg/dL).     Recent Labs   Lab 12/14/21  1356 12/15/21  1043   PTP 15.7* 15.5*   INR 1.25* 1.22*       No results for input(s): TROP, CK in the last 168 h

## 2021-12-17 NOTE — OPERATIVE REPORT
Alvaro Espinal Patient Status:  Inpatient    1960 MRN [id number]   Location [address] [place] [address] Attending Renee Perez MD   Hosp Day # 3 PCP Maxx Stuart MD       PREOPERATIVE DIAGNOSIS/INDICATION: Abnormal capsule 3 Mo Rosales  [facility]

## 2021-12-17 NOTE — PROCEDURES
CAPSULE ENDOSCOPY RESULTS     Date of Procedure:  12/16/21    Reason for capsule: Chronic Iron Deficiency Anemia    Findings: The first gastric image is seen at 2 minutes.  The  capsule was placed into the duodenal bulb however seemed to reflux back to

## 2021-12-17 NOTE — ANESTHESIA PREPROCEDURE EVALUATION
PRE-OP EVALUATION    Patient Name: Amanda Singh    Admit Diagnosis: Anemia, unspecified type [D64.9]    Pre-op Diagnosis: anemia    PUSH ENTEROSCOPY    Anesthesia Procedure: PUSH ENTEROSCOPY (N/A )    Surgeon(s) and Role:     * Oren Mosqueda, DO - Prim chloride (PF) 0.9 % 10 mL IV push, 40 mg, Intravenous, Q12H  [COMPLETED] furosemide (LASIX) injection 40 mg, 40 mg, Intravenous, Once        Outpatient Medications:   buPROPion 150 MG Oral Tablet 12 Hr, Take 150 mg by mouth 2 (two) times daily.  Take 2 tabl Disp: , Rfl: , 03/74/8214 at 6972  folic acid 1 MG Oral Tab, Take 1 mg by mouth daily. , Disp: , Rfl: , 12/13/2021 at 1230  Vitamin B-12 1000 MCG Oral Tab, Take 1 tablet (1,000 mcg total) by mouth daily. , Disp: 30 tablet, Rfl: 11, 12/13/2021 at 1230  [START 12/17/2021    RBC 2.46 (L) 12/17/2021    HGB 7.5 (L) 12/17/2021    HCT 24.9 (L) 12/17/2021    .2 (H) 12/17/2021    MCH 30.5 12/17/2021    MCHC 30.1 (L) 12/17/2021    RDW 20.4 12/17/2021    .0 12/17/2021     Lab Results   Component Value Date

## 2021-12-17 NOTE — CM/SW NOTE
Pt lives at home w/spouse. Pt has metastatic breast CA. Unsure of SW needs at this time. Will remain available.

## 2021-12-17 NOTE — PROGRESS NOTES
[facility]  Progress Note     Corrine Carias Patient Status:  Inpatient    1960 MRN [id number]   [facility] 4NW-A Attending Francisco Howe MD   Hosp Day # 2 PCP Janet Victor MD     Chief Complaint: fatigue    S: Patient hg mg/dL). Recent Labs   Lab 12/14/21  1356 12/15/21  1043   PTP 15.7* 15.5*   INR 1.25* 1.22*       No results for input(s): TROP, CK in the last 168 hours. Imaging: Imaging data reviewed in Epic.     Medications:   • propranolol  10 mg Oral BID

## 2021-12-18 PROCEDURE — 99233 SBSQ HOSP IP/OBS HIGH 50: CPT | Performed by: HOSPITALIST

## 2021-12-18 PROCEDURE — 99232 SBSQ HOSP IP/OBS MODERATE 35: CPT | Performed by: INTERNAL MEDICINE

## 2021-12-18 PROCEDURE — 99232 SBSQ HOSP IP/OBS MODERATE 35: CPT | Performed by: STUDENT IN AN ORGANIZED HEALTH CARE EDUCATION/TRAINING PROGRAM

## 2021-12-18 NOTE — PROGRESS NOTES
[facility]  Report of GI Progress Note    Corrine Aretha Patient Status:  Inpatient    1960 MRN [id number]   [facility] 4NW-A Attending Carol Ann Harman MD   Hosp Day # 4 PCP Janet Victor MD     Date of Admission:  2021 (COMPAZINE) injection 5 mg, 5 mg, Intravenous, Q8H PRN  Pantoprazole Sodium (PROTONIX) 40 mg in sodium chloride (PF) 0.9 % 10 mL IV push, 40 mg, Intravenous, Q12H        Allergies    Radiology Contrast *    HIVES, ITCHING  Iodine (Topical)        OTHER (SE Imaging:  US ABDOMEN COMPLETE WITH DOPPLER(CPT=76700/96015)    Result Date: 12/17/2021  CONCLUSION:  1. Cirrhosis.  2. Intrahepatic masses which have been further characterized on multiple previous MRIs and most likely represent hemangiomas, correlate

## 2021-12-18 NOTE — PROGRESS NOTES
Hematology/Oncology Progress Note    Patient Name: Bay [address] Record Number: [id number]    YOB: 1960     Reason for Consultation:  Alvin Gomez was seen today for the diagnosis of iron deficiency anemia, GI bleed    Interval even petechiae    Laboratory:  Recent Labs   Lab 12/16/21  0923 12/16/21  1631 12/17/21  0607 12/17/21  0607 12/17/21  1731 12/18/21  0049 12/18/21  0526   WBC 9.4  --  8.1  --   --   --  8.8   HGB 7.4*   < > 7.5*   < > 8.1* 7.5* 7.6*   HCT 26.0*  --  24.9*  -- the past, including one 6 weeks ago, have not revealed significant ascites. Appreciate GI input. The patient has a hepatology appointment on Monday.  Cytology pending from paracentesis on 12/15 pending.      *Hepatic hemangiomas and dermal telangectasia: Maris Dang

## 2021-12-18 NOTE — PLAN OF CARE
Patient A+Ox4. Vitals stable. Denies any pain. Midnight hgb 7.5. No BM overnight and denies any bleeding. Up as tolerated.      Problem: HEMATOLOGIC - ADULT  Goal: Maintains hematologic stability  Description: INTERVENTIONS  - Assess for signs and symptoms

## 2021-12-18 NOTE — PROGRESS NOTES
[facility]  Progress Note    Eleonora Baker Patient Status:  Inpatient    1960 MRN [id number]   [facility] 4NW-A Attending Oliva Keith MD   Hosp Day # 4 PCP [facility], MD     Subjective:  Patient seen and examined.   No a lesion     Breast mass in female     Breast cancer metastasized to pleura [facility])     Bone metastases (HCC)     Malignant neoplasm of nipple of right breast in female [facility])     Adhesive capsulitis of right shoulder     Adhesive capsulitis of left shoulder and cirrhosis  3. No signs of acute continued bleeding, hemoglobin stable   4. GI following, awaiting paracentesis fluid cytology   5. GI planning balloon enteroscopy as an outpatient   6. Follow-up with hepatology for cirrhosis as an outpatient   7.  Surge

## 2021-12-18 NOTE — PROGRESS NOTES
[facility]  Progress Note     Toi Lang Patient Status:  Inpatient    1960 MRN [id number]   [facility] 4NW-A Attending Emeterio Almazan MD   Hosp Day # 4 PCP Uri Malagon MD     Chief Complaint: fatigue    S: Patient do Estimated Creatinine Clearance: 70.9 mL/min (based on SCr of 0.8 mg/dL). Recent Labs   Lab 12/14/21  1356 12/15/21  1043   PTP 15.7* 15.5*   INR 1.25* 1.22*       No results for input(s): TROP, CK in the last 168 hours.          Imaging: Imaging da

## 2021-12-19 VITALS
BODY MASS INDEX: 31.96 KG/M2 | WEIGHT: 198.88 LBS | RESPIRATION RATE: 20 BRPM | HEART RATE: 65 BPM | SYSTOLIC BLOOD PRESSURE: 106 MMHG | OXYGEN SATURATION: 92 % | HEIGHT: 66 IN | DIASTOLIC BLOOD PRESSURE: 41 MMHG | TEMPERATURE: 99 F

## 2021-12-19 PROCEDURE — 99232 SBSQ HOSP IP/OBS MODERATE 35: CPT | Performed by: STUDENT IN AN ORGANIZED HEALTH CARE EDUCATION/TRAINING PROGRAM

## 2021-12-19 PROCEDURE — 99238 HOSP IP/OBS DSCHRG MGMT 30/<: CPT | Performed by: HOSPITALIST

## 2021-12-19 PROCEDURE — 99232 SBSQ HOSP IP/OBS MODERATE 35: CPT | Performed by: INTERNAL MEDICINE

## 2021-12-19 RX ORDER — FUROSEMIDE 40 MG/1
40 TABLET ORAL DAILY
Qty: 30 TABLET | Refills: 0 | Status: SHIPPED | OUTPATIENT
Start: 2021-12-19 | End: 2022-01-13

## 2021-12-19 RX ORDER — PROPRANOLOL HYDROCHLORIDE 10 MG/1
10 TABLET ORAL 2 TIMES DAILY
Qty: 60 TABLET | Refills: 0 | Status: SHIPPED | OUTPATIENT
Start: 2021-12-19 | End: 2022-01-13

## 2021-12-19 RX ORDER — PANTOPRAZOLE SODIUM 40 MG/1
40 TABLET, DELAYED RELEASE ORAL
Qty: 60 TABLET | Refills: 0 | Status: SHIPPED | OUTPATIENT
Start: 2021-12-19 | End: 2022-01-13

## 2021-12-19 RX ORDER — SPIRONOLACTONE 100 MG/1
100 TABLET, FILM COATED ORAL DAILY
Qty: 30 TABLET | Refills: 0 | Status: SHIPPED | OUTPATIENT
Start: 2021-12-19 | End: 2022-01-13

## 2021-12-19 NOTE — DISCHARGE SUMMARY
[facility]  DISCHARGE SUMMARY     Lakshmi Mims Patient Status:  Inpatient    1960 MRN [id number]   [facility] 4NW-A Attending No att. providers found   [facility] Day # 5 PCP Madelyn Alvarenga MD     Date of Admission: 2021 Seeing dr. Luz Salas tomorrow, a liver specialist.      Lace+ Score: 77  59-90 High Risk  29-58 Medium Risk  0-28   Low Risk  Patient was referred to the [facility]. TCM Follow-Up Recommendation:  LACE > 58:  High Risk of readmission a (1,000 mcg total) by mouth daily. Quantity: 30 tablet  Refills: 11     escitalopram 20 MG Tabs  Commonly known as: LEXAPRO      Take 20 mg by mouth daily.    Refills: 0     fluticasone-salmeterol 250-50 MCG/DOSE Aepb  Commonly known as: Advair Diskus prescriptions at the location directed by your doctor or nurse    Bring a paper prescription for each of these medications  · furosemide 40 MG Tabs  · pantoprazole 40 MG Tbec  · propranolol 10 MG Tabs  · spironolactone 100 MG Tabs         [place] n when your loved one is ready for pick-up. Thank you for your understanding.                1/6/2022  3:00 PM  ON TREATMENT VISIT FOLLOW-UP [5534] 15 min [facility] [place], Raul Raymond MD    Patient Instructions:         Location I ENTRANCE of BUILDING A.. Once you arrive, please register at the [facility] on the second floor.   **Important Info for [employer] [place] [employer]** Since the safety and protection of patients, staff, physicians and community is an absolute priori

## 2021-12-19 NOTE — PROGRESS NOTES
[facility]  Progress Note    Mary Murphy Patient Status:  Inpatient    1960 MRN [id number]   [facility] 4NW-A Attending Julio Cesar Medina MD   Hosp Day # 5 PCP Kami Gamble MD     Subjective:   The patient is sitting up at th shoulder     Adhesive capsulitis of left shoulder     Malignant neoplasm of female breast ([address]     Dental infection     Dental abscess     Periapical abscess     Submandibular gland swelling     Myalgia     Osteomyelitis of jaw     Hypertriglyceridemia explained to the patient and the family. Sajan Staley PA-C  12/19/2021  9:53 AM    ADDENDUM:     Patient was seen and examined.   Tolerating breakfast, normal bowel movements without any blood in the stool, hemoglobin relatively stable, no other c

## 2021-12-19 NOTE — PROGRESS NOTES
Patient seen and examined. Discharge if ok with consultants. Hospitalist portion of med rec completed.   Hepatology follow up tomorrow  Continue iv iron outpatient  Meredith Kevin MD  [facility]  Internal Medicine Hospitalist  Cell [phone]

## 2021-12-19 NOTE — PLAN OF CARE
Patient A+Ox4. Denies any pain. Ate dinner, and denies any nausea. itals stable. ambulatled in room. Denies any bleeding.      Problem: HEMATOLOGIC - ADULT  Goal: Maintains hematologic stability  Description: INTERVENTIONS  - Assess for signs and symptoms o

## 2021-12-19 NOTE — PROGRESS NOTES
Hematology/Oncology Progress Note    Patient Name: Bay Terry Christiansen Dr Record Number: [id number]    YOB: 1960     Reason for Consultation:  Jeane Fitzgerald was seen today for the diagnosis of iron deficiency anemia, GI bleed    Interval even petechiae    Laboratory:  Recent Labs   Lab 12/17/21  0607 12/17/21  1731 12/18/21  0049 12/18/21  0526 12/19/21  0711   WBC 8.1  --   --  8.8 6.7   HGB 7.5*   < > 7.5* 7.6* 7.4*   HCT 24.9*  --   --  25.8* 27.1*   .0  --   --  155.0 158.0    dermal telangectasia: Likely due to cirrhosis though possibility for hereditary hemorrhagic telangiectasia has been raised. She has a hepatology appointment tomorrow.     *Metastatic Breast cancer:  The patient presented with pleural disease and has been c

## 2021-12-19 NOTE — PLAN OF CARE
Alert and oriented, denies pain, seen by oncology, cleared patient for discharge, GI signed off. To see hepatologist tomorrow and she will follow up with surgery for hemorroidectomy.  Latest  HGB 7.4.    1145 Discharged via wheelchair in good condition, rev

## 2021-12-20 ENCOUNTER — OFFICE VISIT (OUTPATIENT)
Dept: SURGERY | Facility: CLINIC | Age: 61
End: 2021-12-20

## 2021-12-20 VITALS
BODY MASS INDEX: 32 KG/M2 | DIASTOLIC BLOOD PRESSURE: 58 MMHG | WEIGHT: 197 LBS | SYSTOLIC BLOOD PRESSURE: 94 MMHG | RESPIRATION RATE: 16 BRPM | OXYGEN SATURATION: 95 % | HEART RATE: 65 BPM

## 2021-12-20 DIAGNOSIS — K74.69 CRYPTOGENIC CIRRHOSIS (HCC): Primary | ICD-10-CM

## 2021-12-20 NOTE — PROGRESS NOTES
[facility]  [address] [facility], [address]  [address]  [phone] ([phone]) impairment     glasses   • Weight gain Unk      Past Surgical History:   Procedure Laterality Date   • COLONOSCOPY     • COLONOSCOPY N/A 10/26/2021    Procedure: COLONOSCOPY with biopsy, Cold Polypectomy & Clips x 5 placement /ESOPHAGOGASTRODUODENOSCOPY (E (eight) hours as needed for Pain., Disp: 90 tablet, Rfl: 0  •  albuterol 108 (90 Base) MCG/ACT Inhalation Aero Soln, Inhale 2 puffs into the lungs every 4 (four) hours as needed for Wheezing or Shortness of Breath., Disp: 1 each, Rfl: 1  •  gabapentin 300 that untreated hypothyroidism can also be a contributor to her liver dysfunction and discussed importance of taking her thyroid medications properly/regularly. - Regarding her anemia, would have low threshold to start IV iron.  Has ongoing follow up with

## 2021-12-21 ENCOUNTER — PATIENT OUTREACH (OUTPATIENT)
Dept: CASE MANAGEMENT | Age: 61
End: 2021-12-21

## 2021-12-21 DIAGNOSIS — D64.9 ANEMIA, UNSPECIFIED TYPE: ICD-10-CM

## 2021-12-21 DIAGNOSIS — Z02.9 ENCOUNTERS FOR ADMINISTRATIVE PURPOSE: ICD-10-CM

## 2021-12-21 NOTE — PROGRESS NOTES
Initial Post Discharge Follow Up   Discharge Date: 12/19/21  Contact Date: 12/21/2021    Consent Verification:  Assessment Completed With: Patient  Spouse: Joann Payment received per patient?  verbal  HIPAA Verified?   Yes     Discharge Dx:   Anemia, un Tab Take 20 mg by mouth daily. • HYDROcodone-acetaminophen 7.5-325 MG Oral Tab Take 1-2 tablets by mouth every 8 (eight) hours as needed for Pain.  90 tablet 0   • [START ON 1/6/2022] HYDROcodone-acetaminophen 7.5-325 MG Oral Tab Take 1-2 tablets by reggie over your medications with you to make sure we are not missing anything? yes  • Are there any reasons that keep you from taking your medication as prescribed? No  Are you having any concerns with constipation?  No    Referrals/orders at D/C:  Home Health/Ser no, pt states that she will not be scheduling with Dr. Juan F Hall at this time as Dr. Jose Gonzales told her that she can not have any further procedures right now.  She will also not schedule with Dr. Se Newman at this time stating that the hemorrhoids are not a big con

## 2021-12-23 ENCOUNTER — OFFICE VISIT (OUTPATIENT)
Dept: INTERNAL MEDICINE CLINIC | Facility: CLINIC | Age: 61
End: 2021-12-23

## 2021-12-23 ENCOUNTER — OFFICE VISIT (OUTPATIENT)
Dept: HEMATOLOGY/ONCOLOGY | Age: 61
End: 2021-12-23
Attending: INTERNAL MEDICINE
Payer: COMMERCIAL

## 2021-12-23 ENCOUNTER — APPOINTMENT (OUTPATIENT)
Dept: HEMATOLOGY/ONCOLOGY | Age: 61
End: 2021-12-23
Attending: INTERNAL MEDICINE
Payer: COMMERCIAL

## 2021-12-23 VITALS
OXYGEN SATURATION: 96 % | RESPIRATION RATE: 18 BRPM | BODY MASS INDEX: 31.82 KG/M2 | DIASTOLIC BLOOD PRESSURE: 60 MMHG | HEIGHT: 65.98 IN | SYSTOLIC BLOOD PRESSURE: 117 MMHG | TEMPERATURE: 99 F | HEART RATE: 73 BPM | WEIGHT: 198 LBS

## 2021-12-23 VITALS
OXYGEN SATURATION: 95 % | HEIGHT: 66 IN | BODY MASS INDEX: 31.98 KG/M2 | RESPIRATION RATE: 18 BRPM | SYSTOLIC BLOOD PRESSURE: 100 MMHG | DIASTOLIC BLOOD PRESSURE: 48 MMHG | TEMPERATURE: 98 F | HEART RATE: 76 BPM | WEIGHT: 199 LBS

## 2021-12-23 DIAGNOSIS — K64.2 GRADE III HEMORRHOIDS: ICD-10-CM

## 2021-12-23 DIAGNOSIS — C78.2 BREAST CANCER METASTASIZED TO PLEURA, UNSPECIFIED LATERALITY (HCC): ICD-10-CM

## 2021-12-23 DIAGNOSIS — R60.0 BILATERAL LEG EDEMA: Primary | ICD-10-CM

## 2021-12-23 DIAGNOSIS — K55.21 AVM (ARTERIOVENOUS MALFORMATION) OF SMALL BOWEL, ACQUIRED WITH HEMORRHAGE: ICD-10-CM

## 2021-12-23 DIAGNOSIS — R18.8 OTHER ASCITES: ICD-10-CM

## 2021-12-23 DIAGNOSIS — F17.200 TOBACCO DEPENDENCE: ICD-10-CM

## 2021-12-23 DIAGNOSIS — D50.0 IRON DEFICIENCY ANEMIA SECONDARY TO BLOOD LOSS (CHRONIC): ICD-10-CM

## 2021-12-23 DIAGNOSIS — C79.51 BONE METASTASES (HCC): ICD-10-CM

## 2021-12-23 DIAGNOSIS — C50.011 MALIGNANT NEOPLASM OF NIPPLE OF RIGHT BREAST IN FEMALE, ESTROGEN RECEPTOR POSITIVE (HCC): ICD-10-CM

## 2021-12-23 DIAGNOSIS — D50.0 IRON DEFICIENCY ANEMIA SECONDARY TO BLOOD LOSS (CHRONIC): Primary | ICD-10-CM

## 2021-12-23 DIAGNOSIS — R18.8 CIRRHOSIS OF LIVER WITH ASCITES, UNSPECIFIED HEPATIC CIRRHOSIS TYPE (HCC): ICD-10-CM

## 2021-12-23 DIAGNOSIS — Z17.0 MALIGNANT NEOPLASM OF NIPPLE OF RIGHT BREAST IN FEMALE, ESTROGEN RECEPTOR POSITIVE (HCC): ICD-10-CM

## 2021-12-23 DIAGNOSIS — R21 RASH: ICD-10-CM

## 2021-12-23 DIAGNOSIS — R60.0 BILATERAL EDEMA OF LOWER EXTREMITY: ICD-10-CM

## 2021-12-23 DIAGNOSIS — C50.919 BREAST CANCER METASTASIZED TO PLEURA, UNSPECIFIED LATERALITY (HCC): ICD-10-CM

## 2021-12-23 DIAGNOSIS — E05.90 HYPERTHYROIDISM: ICD-10-CM

## 2021-12-23 DIAGNOSIS — D18.03 HEMANGIOMA OF LIVER: ICD-10-CM

## 2021-12-23 DIAGNOSIS — R60.0 LOWER EXTREMITY EDEMA: ICD-10-CM

## 2021-12-23 DIAGNOSIS — D50.0 IRON DEFICIENCY ANEMIA DUE TO CHRONIC BLOOD LOSS: ICD-10-CM

## 2021-12-23 DIAGNOSIS — E03.9 HYPOTHYROIDISM IN ADULT: ICD-10-CM

## 2021-12-23 DIAGNOSIS — K74.60 CIRRHOSIS OF LIVER WITH ASCITES, UNSPECIFIED HEPATIC CIRRHOSIS TYPE (HCC): ICD-10-CM

## 2021-12-23 DIAGNOSIS — E03.9 HYPOTHYROIDISM, UNSPECIFIED TYPE: ICD-10-CM

## 2021-12-23 DIAGNOSIS — K62.5 RECTAL BLEEDING: ICD-10-CM

## 2021-12-23 PROCEDURE — 3008F BODY MASS INDEX DOCD: CPT | Performed by: CLINICAL NURSE SPECIALIST

## 2021-12-23 PROCEDURE — 3074F SYST BP LT 130 MM HG: CPT | Performed by: NURSE PRACTITIONER

## 2021-12-23 PROCEDURE — 3078F DIAST BP <80 MM HG: CPT | Performed by: NURSE PRACTITIONER

## 2021-12-23 PROCEDURE — 99215 OFFICE O/P EST HI 40 MIN: CPT | Performed by: CLINICAL NURSE SPECIALIST

## 2021-12-23 PROCEDURE — 99495 TRANSJ CARE MGMT MOD F2F 14D: CPT | Performed by: NURSE PRACTITIONER

## 2021-12-23 PROCEDURE — 3074F SYST BP LT 130 MM HG: CPT | Performed by: CLINICAL NURSE SPECIALIST

## 2021-12-23 PROCEDURE — 3078F DIAST BP <80 MM HG: CPT | Performed by: CLINICAL NURSE SPECIALIST

## 2021-12-23 NOTE — PROGRESS NOTES
[facility]      HISTORY   CHIEF COMPLAINT: HFU-JOHN, BL LE edema, cirrhosis with ascites, hyperthyroidism, grade II hemorrhoids, rectal bleeding, rash, and tobacco dependence.      HPI: Christiano Wilkerson is a 63 year ol paracentesis as needed, and to not proceed with balloon enteroscopy at this time. She is also noted with 3+ BL LE edema present up to knees, she is on PO diuretics of Lasix and Spironolactone now and tolerating them.  She reports her fatigue is improving as ON 1/6/2022] HYDROcodone-acetaminophen 7.5-325 MG Oral Tab, Take 1-2 tablets by mouth every 8 (eight) hours as needed for Pain., Disp: 90 tablet, Rfl: 0  [START ON 2/6/2022] HYDROcodone-acetaminophen 7.5-325 MG Oral Tab, Take 1-2 tablets by mouth every 8 ( swelling Unk   • Neuropathy    • Personal history of antineoplastic chemotherapy     oral   • Pneumonia due to organism    • PONV (postoperative nausea and vomiting)    • Rash    • Shortness of breath     Only exertion   • Stress    • Visual impairment (CST): Bettie Wyatt MD on 12/15/2021 at 3:13 PM       [address] DOPPLER(CPT=76700/84569)    Result Date: 12/17/2021  CONCLUSION:  1. Cirrhosis.  2. Intrahepatic masses which have been further characterized on multiple previous MRIs and most lik diarrhea, nausea, vomiting   MUSCULOSKELETAL: denies pain, states normal range of motion of extremities  NEUROLOGIC: denies confusion, balance difficulty  PSYCHIATRIC: Hx depression or anxiety  HEMATOLOGIC: + anemia, + bruising, denies bleeding    PHYSICAL on 1/3 at 9pm  · Started on:   · Propranolol 10mg BID  · Continue diuretics:  · Lasix 40mg daily  · Spironolactone 100mg daily  · 3+ ascites present today  · Orders in place for weekly paracentesis as needed  · No NSAIDs at this time  · 2 gram Na diet rest (40 mg total) by mouth 2 (two) times daily before meals. , Disp: 60 tablet, Rfl: 0  furosemide 40 MG Oral Tab, Take 1 tablet (40 mg total) by mouth daily. , Disp: 30 tablet, Rfl: 0  buPROPion 150 MG Oral Tablet 12 Hr, Take 150 mg by mouth 2 (two) times daily 09/03/2021  Pneumococcal Vaccine: Birth to 64yrs(3 of 4 - PCV13) due on 10/05/2021  Colonoscopy due on 10/26/2022  Annual Depression Screen due on 12/23/2022  Influenza Vaccine Completed  Zoster Vaccines Completed    Chronic Care Management Referral: N/A 1/6/2022  3:00 PM Jen Nassar MD PF HEM ONC [place]   1/6/2022  3:15 PM PF TX RN2 PF CHEMO I [place]   1/26/2022  4:30 PM Yg Noe MD EMG 36 [id number]   3/7/2022  3:30 PM Yg Noe MD EMG 36 [id number]        1.

## 2021-12-23 NOTE — PROGRESS NOTES
ANP Visit Note    Patient Name: Christiano Wilkerson   YOB: 1960   Medical Record Number: [id number]   CSN: [id number]   Date of visit: 12/23/2021   Kandice Yepez MD   No primary care provider on file.      Chief Complaint/Reason for Visit:  [facility] ([facility]) unspecified laterality, unspecified site of breast ([facility]     Hypoxia     Metastatic breast cancer ([facility]     Dizziness     Dehydration     Pancytopenia (HCC)     Iron deficiency anemia due to chronic blood loss     Gastroesophageal reflux disease without es Family history unknown: Yes       Social History:  Social History    Socioeconomic History      Marital status:       Spouse name: Not on file      Number of children: Not on file      Years of education: Not on file      Highest education level: times daily before meals. , Disp: 60 tablet, Rfl: 0  •  furosemide 40 MG Oral Tab, Take 1 tablet (40 mg total) by mouth daily. , Disp: 30 tablet, Rfl: 0  •  buPROPion 150 MG Oral Tablet 12 Hr, Take 150 mg by mouth 2 (two) times daily.  Take 2 tablets as direc Tab, Take 1 tablet (1,000 mcg total) by mouth daily. , Disp: 30 tablet, Rfl: 11    [place] in [place]     Review of Systems:  A comprehensive 14 point review of systems was completed.   Pertinent positives and negatives noted in the the %    Eosinophil % 1.9 %    Basophil % 0.9 %    Immature Granulocyte % 0.2 %           Impression/Plan:  1. Metastatic hormone + breast cancer: continue Abemeciclib/Fulvestrant  2. JOHN: identified source as AVM's.  Will recheck labs in 1 week, she will most

## 2021-12-23 NOTE — PROGRESS NOTES
[facility] PHARMACIST MEDICATION RECONCILIATION        Rachel Sinha MRN [id number]    1960 PCP Aleja Marley MD       Comments: Medication history completed by the [address] [place] [address] Pharmacist with the patient and  needed. • Biotin 2500 MCG Oral Cap Take 1 capsule by mouth every evening. • folic acid 612 MCG Oral Tab Take 800 mcg by mouth daily. • Vitamin B-12 1000 MCG Oral Tab Take 1 tablet (1,000 mcg total) by mouth daily.      Medication Adherence Assessment: Answered all questions. Reviewed all medications in detail with patient including dose, indication, timing of administration, monitoring parameters, and potential side effects of medications. Patient confirmed understanding.      Thank you,    [employer]

## 2021-12-27 DIAGNOSIS — K74.60 CIRRHOSIS OF LIVER WITH ASCITES, UNSPECIFIED HEPATIC CIRRHOSIS TYPE (HCC): Primary | ICD-10-CM

## 2021-12-27 DIAGNOSIS — R18.8 CIRRHOSIS OF LIVER WITH ASCITES, UNSPECIFIED HEPATIC CIRRHOSIS TYPE (HCC): Primary | ICD-10-CM

## 2021-12-28 PROBLEM — F17.200 TOBACCO DEPENDENCE: Status: ACTIVE | Noted: 2021-12-28

## 2021-12-28 PROBLEM — K62.5 RECTAL BLEEDING: Status: ACTIVE | Noted: 2021-12-28

## 2021-12-28 PROBLEM — R60.0 LOWER EXTREMITY EDEMA: Status: ACTIVE | Noted: 2021-12-28

## 2021-12-28 PROBLEM — R21 RASH: Status: ACTIVE | Noted: 2021-12-28

## 2021-12-29 NOTE — PROGRESS NOTES
ANP Visit Note    Patient Name: Varghese Carvajal   YOB: 1960   Medical Record Number: [id number]   [facility]: [id number]   Date of visit: 12/30/2021   Haylie Batista MD   No primary care provider on file.      Chief Complaint/Reason for Visit:  [facility] ([facility]) blood loss     Gastroesophageal reflux disease without esophagitis     Benign neoplasm of cecum     Benign neoplasm of ascending colon     Anemia     Dyslipidemia     Anemia, unspecified type     Grade III hemorrhoids     Grade III hemorrhoids     Chronic       Spouse name: Not on file      Number of children: Not on file      Years of education: Not on file      Highest education level: Not on file    Occupational History      Not on file    Tobacco Use      Smoking status: Light Tobacco Smoker daily., Disp: 30 tablet, Rfl: 0  •  buPROPion 150 MG Oral Tablet 12 Hr, Take 150 mg by mouth 2 (two) times daily. , Disp: , Rfl:   •  escitalopram 20 MG Oral Tab, Take 20 mg by mouth Noon., Disp: , Rfl:   •  HYDROcodone-acetaminophen 7.5-325 MG Oral Tab, Ta Disp: 30 tablet, Rfl: 11    [place]arc[place] reviewed in [place]     Review of Systems:  A comprehensive 14 point review of systems was completed. Pertinent positives and negatives noted in the the HPI.     Performance Status: ECOG 1    Physical Examina - 0.70 x10(3) uL    Basophil Absolute 0.13 0.00 - 0.20 x10(3) uL    Immature Granulocyte Absolute 0.02 0.00 - 1.00 x10(3) uL    Neutrophil % 70.5 %    Lymphocyte % 18.3 %    Monocyte % 6.5 %    Eosinophil % 2.6 %    Basophil % 1.8 %    Immature Granulocyte

## 2021-12-30 ENCOUNTER — APPOINTMENT (OUTPATIENT)
Dept: HEMATOLOGY/ONCOLOGY | Age: 61
End: 2021-12-30
Attending: INTERNAL MEDICINE
Payer: COMMERCIAL

## 2021-12-30 ENCOUNTER — OFFICE VISIT (OUTPATIENT)
Dept: HEMATOLOGY/ONCOLOGY | Age: 61
End: 2021-12-30
Attending: INTERNAL MEDICINE
Payer: COMMERCIAL

## 2021-12-30 VITALS
HEIGHT: 65.98 IN | OXYGEN SATURATION: 93 % | TEMPERATURE: 98 F | WEIGHT: 190 LBS | SYSTOLIC BLOOD PRESSURE: 99 MMHG | HEART RATE: 64 BPM | BODY MASS INDEX: 30.53 KG/M2 | DIASTOLIC BLOOD PRESSURE: 58 MMHG | RESPIRATION RATE: 18 BRPM

## 2021-12-30 DIAGNOSIS — D18.03 HEMANGIOMA OF LIVER: ICD-10-CM

## 2021-12-30 DIAGNOSIS — Z17.0 MALIGNANT NEOPLASM OF NIPPLE OF RIGHT BREAST IN FEMALE, ESTROGEN RECEPTOR POSITIVE (HCC): ICD-10-CM

## 2021-12-30 DIAGNOSIS — C50.919 BREAST CANCER METASTASIZED TO PLEURA, UNSPECIFIED LATERALITY (HCC): ICD-10-CM

## 2021-12-30 DIAGNOSIS — C79.51 BONE METASTASES (HCC): ICD-10-CM

## 2021-12-30 DIAGNOSIS — C50.011 MALIGNANT NEOPLASM OF NIPPLE OF RIGHT BREAST IN FEMALE, ESTROGEN RECEPTOR POSITIVE (HCC): ICD-10-CM

## 2021-12-30 DIAGNOSIS — K55.21 AVM (ARTERIOVENOUS MALFORMATION) OF SMALL BOWEL, ACQUIRED WITH HEMORRHAGE: ICD-10-CM

## 2021-12-30 DIAGNOSIS — R60.0 BILATERAL LEG EDEMA: Primary | ICD-10-CM

## 2021-12-30 DIAGNOSIS — E03.9 HYPOTHYROIDISM IN ADULT: ICD-10-CM

## 2021-12-30 DIAGNOSIS — D50.0 IRON DEFICIENCY ANEMIA DUE TO CHRONIC BLOOD LOSS: ICD-10-CM

## 2021-12-30 DIAGNOSIS — C78.2 BREAST CANCER METASTASIZED TO PLEURA, UNSPECIFIED LATERALITY (HCC): ICD-10-CM

## 2021-12-30 PROCEDURE — 99214 OFFICE O/P EST MOD 30 MIN: CPT | Performed by: CLINICAL NURSE SPECIALIST

## 2021-12-30 NOTE — PROGRESS NOTES
Patient is here today for follow up with Nohemi WHITE  for Breast Cancer and Iron deficiency anemia. Patient stated bilateral foot pain is rated an 8 on a scale of 0-10. Patient takes Hydrocodone Acetaminophen and Gabapentin for pain control. Fatigued.  Ap

## 2022-01-04 ENCOUNTER — TELEPHONE (OUTPATIENT)
Dept: HEMATOLOGY/ONCOLOGY | Age: 62
End: 2022-01-04

## 2022-01-04 NOTE — TELEPHONE ENCOUNTER
Justino Garcia, family has Covid and tested positive 1/3/22, he is canceling apts for 1/6/22 and needs to know when it will be okay to reschedule, can they skip a dose or just reschedule in about 10 days. Please advise.

## 2022-01-04 NOTE — TELEPHONE ENCOUNTER
Camron Lawrence at [phone] is calling because his family has Covid-19 and have tested positive on 1/3/22 and she has appointments for labs, Dr. Harlan Winslow and injections on 1/6/22 that need to be cancelled.

## 2022-01-06 ENCOUNTER — APPOINTMENT (OUTPATIENT)
Dept: HEMATOLOGY/ONCOLOGY | Age: 62
End: 2022-01-06
Attending: INTERNAL MEDICINE
Payer: COMMERCIAL

## 2022-01-11 ENCOUNTER — MED REC SCAN ONLY (OUTPATIENT)
Dept: FAMILY MEDICINE CLINIC | Facility: CLINIC | Age: 62
End: 2022-01-11

## 2022-01-13 ENCOUNTER — TELEPHONE (OUTPATIENT)
Dept: HEMATOLOGY/ONCOLOGY | Facility: HOSPITAL | Age: 62
End: 2022-01-13

## 2022-01-13 ENCOUNTER — OFFICE VISIT (OUTPATIENT)
Dept: HEMATOLOGY/ONCOLOGY | Age: 62
End: 2022-01-13
Attending: INTERNAL MEDICINE
Payer: COMMERCIAL

## 2022-01-13 VITALS
HEIGHT: 65.98 IN | RESPIRATION RATE: 18 BRPM | HEART RATE: 59 BPM | WEIGHT: 176.38 LBS | OXYGEN SATURATION: 95 % | DIASTOLIC BLOOD PRESSURE: 55 MMHG | TEMPERATURE: 98 F | BODY MASS INDEX: 28.34 KG/M2 | SYSTOLIC BLOOD PRESSURE: 108 MMHG

## 2022-01-13 DIAGNOSIS — D50.0 IRON DEFICIENCY ANEMIA DUE TO CHRONIC BLOOD LOSS: ICD-10-CM

## 2022-01-13 DIAGNOSIS — R60.0 BILATERAL LEG EDEMA: ICD-10-CM

## 2022-01-13 DIAGNOSIS — C79.51 BONE METASTASES (HCC): ICD-10-CM

## 2022-01-13 DIAGNOSIS — C78.2 CARCINOMA OF RIGHT BREAST METASTATIC TO PLEURA (HCC): ICD-10-CM

## 2022-01-13 DIAGNOSIS — C50.011 MALIGNANT NEOPLASM OF NIPPLE OF RIGHT BREAST IN FEMALE, ESTROGEN RECEPTOR POSITIVE (HCC): ICD-10-CM

## 2022-01-13 DIAGNOSIS — Z17.0 MALIGNANT NEOPLASM OF NIPPLE OF RIGHT BREAST IN FEMALE, ESTROGEN RECEPTOR POSITIVE (HCC): Primary | ICD-10-CM

## 2022-01-13 DIAGNOSIS — C50.911 CARCINOMA OF RIGHT BREAST METASTATIC TO PLEURA (HCC): ICD-10-CM

## 2022-01-13 DIAGNOSIS — Z17.0 MALIGNANT NEOPLASM OF NIPPLE OF RIGHT BREAST IN FEMALE, ESTROGEN RECEPTOR POSITIVE (HCC): ICD-10-CM

## 2022-01-13 DIAGNOSIS — D18.03 HEMANGIOMA OF LIVER: ICD-10-CM

## 2022-01-13 DIAGNOSIS — E03.9 HYPOTHYROIDISM IN ADULT: ICD-10-CM

## 2022-01-13 DIAGNOSIS — C50.011 MALIGNANT NEOPLASM OF NIPPLE OF RIGHT BREAST IN FEMALE, ESTROGEN RECEPTOR POSITIVE (HCC): Primary | ICD-10-CM

## 2022-01-13 LAB
ALBUMIN SERPL-MCNC: 3.1 G/DL (ref 3.4–5)
ALBUMIN/GLOB SERPL: 0.8 {RATIO} (ref 1–2)
ALP LIVER SERPL-CCNC: 214 U/L
ALT SERPL-CCNC: 21 U/L
ANION GAP SERPL CALC-SCNC: 5 MMOL/L (ref 0–18)
AST SERPL-CCNC: 35 U/L (ref 15–37)
BASOPHILS # BLD AUTO: 0.07 X10(3) UL (ref 0–0.2)
BASOPHILS NFR BLD AUTO: 1.2 %
BILIRUB SERPL-MCNC: 0.5 MG/DL (ref 0.1–2)
BUN BLD-MCNC: 11 MG/DL (ref 7–18)
CALCIUM BLD-MCNC: 8.7 MG/DL (ref 8.5–10.1)
CHLORIDE SERPL-SCNC: 109 MMOL/L (ref 98–112)
CO2 SERPL-SCNC: 28 MMOL/L (ref 21–32)
CREAT BLD-MCNC: 0.96 MG/DL
DEPRECATED HBV CORE AB SER IA-ACNC: 276.3 NG/ML
EOSINOPHIL # BLD AUTO: 0.26 X10(3) UL (ref 0–0.7)
EOSINOPHIL NFR BLD AUTO: 4.5 %
ERYTHROCYTE [DISTWIDTH] IN BLOOD BY AUTOMATED COUNT: 17.9 %
FASTING STATUS PATIENT QL REPORTED: NO
GLOBULIN PLAS-MCNC: 3.8 G/DL (ref 2.8–4.4)
GLUCOSE BLD-MCNC: 101 MG/DL (ref 70–99)
HCT VFR BLD AUTO: 30.9 %
HGB BLD-MCNC: 9 G/DL
HGB RETIC QN AUTO: 30.1 PG (ref 28.2–36.6)
IMM GRANULOCYTES # BLD AUTO: 0.04 X10(3) UL (ref 0–1)
IMM GRANULOCYTES NFR BLD: 0.7 %
IMM RETICS NFR: 0.29 RATIO (ref 0.1–0.3)
IRON SATN MFR SERPL: 16 %
IRON SERPL-MCNC: 47 UG/DL
LYMPHOCYTES # BLD AUTO: 1.27 X10(3) UL (ref 1–4)
LYMPHOCYTES NFR BLD AUTO: 22 %
MCH RBC QN AUTO: 31.8 PG (ref 26–34)
MCHC RBC AUTO-ENTMCNC: 29.1 G/DL (ref 31–37)
MCV RBC AUTO: 109.2 FL
MONOCYTES # BLD AUTO: 0.39 X10(3) UL (ref 0.1–1)
MONOCYTES NFR BLD AUTO: 6.8 %
NEUTROPHILS # BLD AUTO: 3.74 X10 (3) UL (ref 1.5–7.7)
NEUTROPHILS # BLD AUTO: 3.74 X10(3) UL (ref 1.5–7.7)
NEUTROPHILS NFR BLD AUTO: 64.8 %
OSMOLALITY SERPL CALC.SUM OF ELEC: 294 MOSM/KG (ref 275–295)
PLATELET # BLD AUTO: 282 10(3)UL (ref 150–450)
POTASSIUM SERPL-SCNC: 3.4 MMOL/L (ref 3.5–5.1)
PROT SERPL-MCNC: 6.9 G/DL (ref 6.4–8.2)
RBC # BLD AUTO: 2.83 X10(6)UL
RETICS # AUTO: 56.3 X10(3) UL (ref 22.5–147.5)
RETICS/RBC NFR AUTO: 2 %
SODIUM SERPL-SCNC: 142 MMOL/L (ref 136–145)
T4 FREE SERPL-MCNC: 1.1 NG/DL (ref 0.8–1.7)
TIBC SERPL-MCNC: 302 UG/DL (ref 240–450)
TRANSFERRIN SERPL-MCNC: 203 MG/DL (ref 200–360)
TSI SER-ACNC: 21.5 MIU/ML (ref 0.36–3.74)
WBC # BLD AUTO: 5.8 X10(3) UL (ref 4–11)

## 2022-01-13 PROCEDURE — 99215 OFFICE O/P EST HI 40 MIN: CPT | Performed by: INTERNAL MEDICINE

## 2022-01-13 PROCEDURE — 96402 CHEMO HORMON ANTINEOPL SQ/IM: CPT

## 2022-01-13 RX ORDER — SPIRONOLACTONE 100 MG/1
100 TABLET, FILM COATED ORAL DAILY
Qty: 30 TABLET | Refills: 0 | Status: SHIPPED | OUTPATIENT
Start: 2022-01-13

## 2022-01-13 RX ORDER — PANTOPRAZOLE SODIUM 40 MG/1
40 TABLET, DELAYED RELEASE ORAL
Qty: 60 TABLET | Refills: 0 | Status: SHIPPED | OUTPATIENT
Start: 2022-01-13

## 2022-01-13 RX ORDER — FUROSEMIDE 40 MG/1
40 TABLET ORAL DAILY
Qty: 30 TABLET | Refills: 0 | Status: SHIPPED | OUTPATIENT
Start: 2022-01-13

## 2022-01-13 RX ORDER — PROPRANOLOL HYDROCHLORIDE 10 MG/1
10 TABLET ORAL 2 TIMES DAILY
Qty: 60 TABLET | Refills: 0 | Status: SHIPPED | OUTPATIENT
Start: 2022-01-13

## 2022-01-13 RX ORDER — LAMOTRIGINE 25 MG/1
500 TABLET ORAL ONCE
Status: COMPLETED | OUTPATIENT
Start: 2022-01-13 | End: 2022-01-13

## 2022-01-13 RX ORDER — LAMOTRIGINE 25 MG/1
500 TABLET ORAL ONCE
Status: CANCELLED | OUTPATIENT
Start: 2022-01-13

## 2022-01-13 RX ADMIN — LAMOTRIGINE 500 MG: 25 TABLET ORAL at 11:02:00

## 2022-01-13 NOTE — TELEPHONE ENCOUNTER
Pt went to ED on 12/14/21 for unspecified anemia   Prescribed the below meds at time of discharge.      pantoprazole 40 MG Oral Tab EC  propranolol 10 MG Oral Tab  furosemide 40 MG Oral Tab  spironolactone 100 MG Oral Tab    LOV: 12/06/21    NOV: 1/26/22

## 2022-01-13 NOTE — PROGRESS NOTES
Pt here for follow up and injection. She complains of some fatigue. Denies any bleeding. She recently had COVID. Pt's  states she is sleeping a lot.

## 2022-01-13 NOTE — PROGRESS NOTES
Pt here for C38D1 Faslodex injection.   Arrives Ambulating independently, accompanied by Self           Pregnancy screening: Denies possibility of pregnancy    Modifications in dose or schedule: Yes    Discharged to Home, Ambulating independently, accompani

## 2022-01-13 NOTE — PROGRESS NOTES
Cancer Center Progress Note  Patient Name: Sammy Bowman   YOB: 1960   Medical Record Number: [id number]     Attending Physician: Maximo Santillan M.D. Date of Visit: 1/13/2022      Chief Complaint:  Patient presents with:   Follow - negative for malignancy. She was transfused and given IV iron for her iron deficiency. EGD revealed esophageal varices and portal gastropathy. Her push enteroscopy and capsule endoscopy revealed small bowel AVMs and erosions/ulcers.   She was discharged to Packs/day: 0.00        Years: 40.00        Pack years: 0        Types: Cigarettes      Smokeless tobacco: Never Used      Tobacco comment: 3-5 cig/ day    Vaping Use      Vaping Use: Never used    Substance and Sexual Activity      Alcohol use: Not Current Tab, Take 1-2 tablets by mouth every 8 (eight) hours as needed for Pain., Disp: 90 tablet, Rfl: 0  •  [START ON 2/6/2022] HYDROcodone-acetaminophen 7.5-325 MG Oral Tab, Take 1-2 tablets by mouth every 8 (eight) hours as needed for Pain., Disp: 90 tablet, R pain, palpitations or orthopnea. Gastrointestinal No nausea, vomiting, diarrhea, GI bleeding, or constipation. NL appetite, No Early Satiety. Genitorurinary No hematuria, dysuria, abnormal bleeding. Integumentary No yellowing.    Neurologic No headach ALT 21   BILT 0.5   TP 6.9       Radiology:        Pathology:  Final Diagnosis:   Dominant mass, left lobe of liver, ultrasound-guided biopsy:  -Fragments of hemangioma.   (See comment.)         Impression and Plan:  Breast Cancer: Stage IV, ER+ WI+ HER-2 Jaison Martínez M.D.   [facility]

## 2022-01-13 NOTE — TELEPHONE ENCOUNTER
Pt has MRI and is requested a sedative to help get through procedure. Please let her know when this is called in for her.       Thanks

## 2022-01-13 NOTE — TELEPHONE ENCOUNTER
Pt would like  90 day refill of   pantoprazole 40 MG Oral Tab EC  propranolol 10 MG Oral Tab  furosemide 40 MG Oral Tab  spironolactone 100 MG Oral Tab    sent to [facility] [id number] #[id number] - [place], [place] - [address] AT [place] [address] OF [address] [place] [address]

## 2022-01-13 NOTE — TELEPHONE ENCOUNTER
MD Zuleima Ruffin RN  Caller: Unspecified (Today, 11:01 AM)  She should take 2 of her Alprazolam pills prior to the MRI. Pt's  informed.

## 2022-01-15 ENCOUNTER — HOSPITAL ENCOUNTER (OUTPATIENT)
Dept: MRI IMAGING | Age: 62
Discharge: HOME | End: 2022-01-15
Attending: INTERNAL MEDICINE
Payer: COMMERCIAL

## 2022-01-15 DIAGNOSIS — R18.8 CIRRHOSIS OF LIVER WITH ASCITES, UNSPECIFIED HEPATIC CIRRHOSIS TYPE (HCC): ICD-10-CM

## 2022-01-15 DIAGNOSIS — K74.60 CIRRHOSIS OF LIVER WITH ASCITES, UNSPECIFIED HEPATIC CIRRHOSIS TYPE (HCC): ICD-10-CM

## 2022-01-15 PROCEDURE — 74181 MRI ABDOMEN W/O CONTRAST: CPT | Performed by: INTERNAL MEDICINE

## 2022-01-16 NOTE — PROGRESS NOTES
[facility] at [facility]  [address] [facility], [address]  [address]  [phone] ([phone]) breast   • Disorder of liver     lesions   • Disorder of thyroid    • Fatigue 2019   • Feeling lonely Recent   • Hemorrhoids Unk   • High cholesterol    • History of blood transfusion 10/2021   • Leg swelling Unk   • Neuropathy    • Personal history of ant •  HYDROcodone-acetaminophen 7.5-325 MG Oral Tab, Take 1-2 tablets by mouth every 8 (eight) hours as needed for Pain., Disp: 90 tablet, Rfl: 0  •  [START ON 2/6/2022] HYDROcodone-acetaminophen 7.5-325 MG Oral Tab, Take 1-2 tablets by mouth every 8 (eight varices and ascites. Also, history of metastatic breast cancer (pleural, bone) and liver lesions that are biopsy proven hemangiomas. - Regarding cirrhosis, denies any significant alcohol and other serologies negative so far.  Will also check A1AT phenot

## 2022-01-17 ENCOUNTER — OFFICE VISIT (OUTPATIENT)
Dept: SURGERY | Facility: CLINIC | Age: 62
End: 2022-01-17
Payer: COMMERCIAL

## 2022-01-17 VITALS
RESPIRATION RATE: 16 BRPM | HEART RATE: 59 BPM | OXYGEN SATURATION: 96 % | BODY MASS INDEX: 28 KG/M2 | DIASTOLIC BLOOD PRESSURE: 62 MMHG | WEIGHT: 175 LBS | SYSTOLIC BLOOD PRESSURE: 105 MMHG

## 2022-01-17 DIAGNOSIS — R74.8 ELEVATED ALKALINE PHOSPHATASE LEVEL: ICD-10-CM

## 2022-01-17 DIAGNOSIS — K74.69 CRYPTOGENIC CIRRHOSIS (HCC): Primary | ICD-10-CM

## 2022-01-26 ENCOUNTER — LAB ENCOUNTER (OUTPATIENT)
Dept: LAB | Facility: HOSPITAL | Age: 62
End: 2022-01-26
Attending: FAMILY MEDICINE
Payer: COMMERCIAL

## 2022-01-26 ENCOUNTER — OFFICE VISIT (OUTPATIENT)
Dept: FAMILY MEDICINE CLINIC | Facility: CLINIC | Age: 62
End: 2022-01-26
Payer: COMMERCIAL

## 2022-01-26 VITALS
HEART RATE: 55 BPM | SYSTOLIC BLOOD PRESSURE: 98 MMHG | BODY MASS INDEX: 29.49 KG/M2 | RESPIRATION RATE: 16 BRPM | TEMPERATURE: 98 F | HEIGHT: 65 IN | WEIGHT: 177 LBS | OXYGEN SATURATION: 97 % | DIASTOLIC BLOOD PRESSURE: 48 MMHG

## 2022-01-26 DIAGNOSIS — H91.93 DECREASED HEARING OF BOTH EARS: ICD-10-CM

## 2022-01-26 DIAGNOSIS — R16.0 HEPATOMEGALY: ICD-10-CM

## 2022-01-26 DIAGNOSIS — C50.919 METASTATIC BREAST CANCER (HCC): ICD-10-CM

## 2022-01-26 DIAGNOSIS — E03.9 HYPOTHYROIDISM IN ADULT: ICD-10-CM

## 2022-01-26 DIAGNOSIS — R18.8 CIRRHOSIS OF LIVER WITH ASCITES, UNSPECIFIED HEPATIC CIRRHOSIS TYPE (HCC): ICD-10-CM

## 2022-01-26 DIAGNOSIS — E87.6 HYPOKALEMIA: ICD-10-CM

## 2022-01-26 DIAGNOSIS — R60.0 EDEMA OF BOTH FEET: ICD-10-CM

## 2022-01-26 DIAGNOSIS — R16.1 SPLENOMEGALY: ICD-10-CM

## 2022-01-26 DIAGNOSIS — K74.60 CIRRHOSIS OF LIVER WITH ASCITES, UNSPECIFIED HEPATIC CIRRHOSIS TYPE (HCC): ICD-10-CM

## 2022-01-26 DIAGNOSIS — E78.2 HYPERLIPIDEMIA, MIXED: ICD-10-CM

## 2022-01-26 DIAGNOSIS — D50.0 IRON DEFICIENCY ANEMIA DUE TO CHRONIC BLOOD LOSS: Primary | ICD-10-CM

## 2022-01-26 DIAGNOSIS — D50.0 IRON DEFICIENCY ANEMIA DUE TO CHRONIC BLOOD LOSS: ICD-10-CM

## 2022-01-26 LAB
ALBUMIN SERPL-MCNC: 3.7 G/DL (ref 3.4–5)
ALBUMIN/GLOB SERPL: 1.1 {RATIO} (ref 1–2)
ALP LIVER SERPL-CCNC: 209 U/L
ALT SERPL-CCNC: 37 U/L
ANION GAP SERPL CALC-SCNC: 5 MMOL/L (ref 0–18)
AST SERPL-CCNC: 52 U/L (ref 15–37)
BASOPHILS # BLD AUTO: 0.04 X10(3) UL (ref 0–0.2)
BASOPHILS NFR BLD AUTO: 0.7 %
BILIRUB SERPL-MCNC: 0.5 MG/DL (ref 0.1–2)
BUN BLD-MCNC: 10 MG/DL (ref 7–18)
CALCIUM BLD-MCNC: 9.3 MG/DL (ref 8.5–10.1)
CHLORIDE SERPL-SCNC: 108 MMOL/L (ref 98–112)
CO2 SERPL-SCNC: 31 MMOL/L (ref 21–32)
CREAT BLD-MCNC: 0.95 MG/DL
EOSINOPHIL # BLD AUTO: 0.07 X10(3) UL (ref 0–0.7)
EOSINOPHIL NFR BLD AUTO: 1.3 %
ERYTHROCYTE [DISTWIDTH] IN BLOOD BY AUTOMATED COUNT: 17.4 %
FASTING STATUS PATIENT QL REPORTED: NO
GLOBULIN PLAS-MCNC: 3.4 G/DL (ref 2.8–4.4)
GLUCOSE BLD-MCNC: 91 MG/DL (ref 70–99)
HCT VFR BLD AUTO: 30.7 %
HGB BLD-MCNC: 9.1 G/DL
IMM GRANULOCYTES # BLD AUTO: 0.01 X10(3) UL (ref 0–1)
IMM GRANULOCYTES NFR BLD: 0.2 %
LYMPHOCYTES # BLD AUTO: 1.64 X10(3) UL (ref 1–4)
LYMPHOCYTES NFR BLD AUTO: 29.9 %
MCH RBC QN AUTO: 31.1 PG (ref 26–34)
MCHC RBC AUTO-ENTMCNC: 29.6 G/DL (ref 31–37)
MCV RBC AUTO: 104.8 FL
MONOCYTES # BLD AUTO: 0.39 X10(3) UL (ref 0.1–1)
MONOCYTES NFR BLD AUTO: 7.1 %
NEUTROPHILS # BLD AUTO: 3.34 X10 (3) UL (ref 1.5–7.7)
NEUTROPHILS # BLD AUTO: 3.34 X10(3) UL (ref 1.5–7.7)
NEUTROPHILS NFR BLD AUTO: 60.8 %
OSMOLALITY SERPL CALC.SUM OF ELEC: 297 MOSM/KG (ref 275–295)
PLATELET # BLD AUTO: 180 10(3)UL (ref 150–450)
POTASSIUM SERPL-SCNC: 4.2 MMOL/L (ref 3.5–5.1)
PROT SERPL-MCNC: 7.1 G/DL (ref 6.4–8.2)
RBC # BLD AUTO: 2.93 X10(6)UL
SODIUM SERPL-SCNC: 144 MMOL/L (ref 136–145)
T4 FREE SERPL-MCNC: 1.2 NG/DL (ref 0.8–1.7)
TSI SER-ACNC: 9.78 MIU/ML (ref 0.36–3.74)
WBC # BLD AUTO: 5.5 X10(3) UL (ref 4–11)

## 2022-01-26 PROCEDURE — 80053 COMPREHEN METABOLIC PANEL: CPT

## 2022-01-26 PROCEDURE — 99215 OFFICE O/P EST HI 40 MIN: CPT | Performed by: FAMILY MEDICINE

## 2022-01-26 PROCEDURE — 84443 ASSAY THYROID STIM HORMONE: CPT

## 2022-01-26 PROCEDURE — 3078F DIAST BP <80 MM HG: CPT | Performed by: FAMILY MEDICINE

## 2022-01-26 PROCEDURE — 3074F SYST BP LT 130 MM HG: CPT | Performed by: FAMILY MEDICINE

## 2022-01-26 PROCEDURE — 85025 COMPLETE CBC W/AUTO DIFF WBC: CPT

## 2022-01-26 PROCEDURE — 84439 ASSAY OF FREE THYROXINE: CPT

## 2022-01-26 PROCEDURE — 3008F BODY MASS INDEX DOCD: CPT | Performed by: FAMILY MEDICINE

## 2022-01-26 PROCEDURE — 36415 COLL VENOUS BLD VENIPUNCTURE: CPT

## 2022-01-26 RX ORDER — PNV NO.95/FERROUS FUM/FOLIC AC 28MG-0.8MG
TABLET ORAL
COMMUNITY

## 2022-01-26 NOTE — PATIENT INSTRUCTIONS
Do blood work today. Continue current medications. Further recommendation pending test results. Call ENT Dr. Sigrid Cavazos for evaluation of the hearing.

## 2022-01-27 NOTE — PROGRESS NOTES
Adri Carlson is a 63year old female. cc anemia, hypothyroidism, edema, metastatic breast cancer, cirrhosis, ascites, decreased hearing of the ears   HPI:   Patient is coming to the office for hospitalization follow-up from December 14 until December 19, 2 taking medication for thyroid regularly right now. We will recheck levels today since she is going to get a testing done for electrolytes and anemia. We will make adjustments of the medication if needed.     Edema bilateral lower legs it is improved right times daily. 360 capsule 1   • ALPRAZolam 0.25 MG Oral Tab Take 1 tablet (0.25 mg total) by mouth every 6 (six) hours as needed. 30 tablet 2   • Biotin 2500 MCG Oral Cap Take 1 capsule by mouth every evening.      • folic acid 223 MCG Oral Tab Take 800 mcg Position: Sitting, Cuff Size: adult)   Pulse 55   Temp 97.6 °F (36.4 °C) (Oral)   Resp 16   Ht 5' 5\" (1.651 m)   Wt 177 lb (80.3 kg)   SpO2 97%   Breastfeeding No   BMI 29.45 kg/m²   GENERAL: well developed, well nourished,in no apparent distress  SKIN: n Transferrin 203 200 - 360 mg/dL    Total Iron Binding Capacity 302 240 - 450 ug/dL    % Saturation 16 15 - 50 %   CBC W/ DIFFERENTIAL   Result Value Ref Range    WBC 5.8 4.0 - 11.0 x10(3) uL    RBC 2.83 (L) 3.80 - 5.30 x10(6)uL    HGB 9.0 (L) 12.0 - 16. Doppler spectral analysis, and color flow.  Doppler imaging were performed.  The   following veins were imaged bilaterally:  Common, deep, and superficial femoral, popliteal, sapheno-femoral junction, and posterior tibial veins.       PATIENT STATED HISTOR  Normal appearing gallbladder, intrahepatic ducts, and common bile duct.  Common bile duct diameter is 3 mm. PANCREAS:  Obscured.    SPLEEN:  Normal.   KIDNEYS:  Normal.  Both kidneys measure 12 cm.     AORTA/IVC:  Patent proximal abdominal aorta.  Mid to 19, 2021 also reviewed recent note from hepatologist Dr. Leidy Almazan  from January 17, 2022. Imaging & Consults:  ENT - INTERNAL    The patient indicates understanding of these issues and agrees to the plan. The patient is asked to return in April 2022.   Time

## 2022-02-10 ENCOUNTER — OFFICE VISIT (OUTPATIENT)
Dept: HEMATOLOGY/ONCOLOGY | Age: 62
End: 2022-02-10
Attending: INTERNAL MEDICINE
Payer: COMMERCIAL

## 2022-02-10 VITALS
WEIGHT: 172.38 LBS | DIASTOLIC BLOOD PRESSURE: 53 MMHG | HEART RATE: 65 BPM | BODY MASS INDEX: 29 KG/M2 | RESPIRATION RATE: 18 BRPM | SYSTOLIC BLOOD PRESSURE: 118 MMHG | TEMPERATURE: 99 F | OXYGEN SATURATION: 97 %

## 2022-02-10 DIAGNOSIS — C78.2 CARCINOMA OF RIGHT BREAST METASTATIC TO PLEURA (HCC): Primary | ICD-10-CM

## 2022-02-10 DIAGNOSIS — C50.911 CARCINOMA OF RIGHT BREAST METASTATIC TO PLEURA (HCC): ICD-10-CM

## 2022-02-10 DIAGNOSIS — Z17.0 MALIGNANT NEOPLASM OF NIPPLE OF RIGHT BREAST IN FEMALE, ESTROGEN RECEPTOR POSITIVE (HCC): Primary | ICD-10-CM

## 2022-02-10 DIAGNOSIS — C50.011 MALIGNANT NEOPLASM OF NIPPLE OF RIGHT BREAST IN FEMALE, ESTROGEN RECEPTOR POSITIVE (HCC): ICD-10-CM

## 2022-02-10 DIAGNOSIS — C79.51 BONE METASTASES (HCC): ICD-10-CM

## 2022-02-10 DIAGNOSIS — K74.69 CRYPTOGENIC CIRRHOSIS (HCC): ICD-10-CM

## 2022-02-10 DIAGNOSIS — D50.0 IRON DEFICIENCY ANEMIA DUE TO CHRONIC BLOOD LOSS: ICD-10-CM

## 2022-02-10 DIAGNOSIS — C78.2 CARCINOMA OF RIGHT BREAST METASTATIC TO PLEURA (HCC): ICD-10-CM

## 2022-02-10 DIAGNOSIS — Z17.0 MALIGNANT NEOPLASM OF NIPPLE OF RIGHT BREAST IN FEMALE, ESTROGEN RECEPTOR POSITIVE (HCC): ICD-10-CM

## 2022-02-10 DIAGNOSIS — C50.911 CARCINOMA OF RIGHT BREAST METASTATIC TO PLEURA (HCC): Primary | ICD-10-CM

## 2022-02-10 DIAGNOSIS — C50.011 MALIGNANT NEOPLASM OF NIPPLE OF RIGHT BREAST IN FEMALE, ESTROGEN RECEPTOR POSITIVE (HCC): Primary | ICD-10-CM

## 2022-02-10 LAB
ALBUMIN SERPL-MCNC: 3.7 G/DL (ref 3.4–5)
ALBUMIN/GLOB SERPL: 1 {RATIO} (ref 1–2)
ALP LIVER SERPL-CCNC: 182 U/L
ALT SERPL-CCNC: 33 U/L
ANION GAP SERPL CALC-SCNC: 5 MMOL/L (ref 0–18)
AST SERPL-CCNC: 42 U/L (ref 15–37)
BASOPHILS # BLD AUTO: 0.05 X10(3) UL (ref 0–0.2)
BILIRUB SERPL-MCNC: 0.4 MG/DL (ref 0.1–2)
BUN BLD-MCNC: 11 MG/DL (ref 7–18)
CALCIUM BLD-MCNC: 9 MG/DL (ref 8.5–10.1)
CHLORIDE SERPL-SCNC: 110 MMOL/L (ref 98–112)
CO2 SERPL-SCNC: 29 MMOL/L (ref 21–32)
CREAT BLD-MCNC: 1.04 MG/DL
DEPRECATED HBV CORE AB SER IA-ACNC: 86.2 NG/ML
EOSINOPHIL # BLD AUTO: 0.13 X10(3) UL (ref 0–0.7)
EOSINOPHIL NFR BLD AUTO: 3.3 %
ERYTHROCYTE [DISTWIDTH] IN BLOOD BY AUTOMATED COUNT: 16.2 %
FASTING STATUS PATIENT QL REPORTED: NO
GLOBULIN PLAS-MCNC: 3.7 G/DL (ref 2.8–4.4)
GLUCOSE BLD-MCNC: 90 MG/DL (ref 70–99)
HBV SURFACE AB SER QL: NONREACTIVE
HBV SURFACE AB SERPL IA-ACNC: <3.1 MIU/ML
HCT VFR BLD AUTO: 27.4 %
HGB BLD-MCNC: 8.2 G/DL
IMM GRANULOCYTES # BLD AUTO: 0.01 X10(3) UL (ref 0–1)
IMM GRANULOCYTES NFR BLD: 0.3 %
IRON SATN MFR SERPL: 19 %
IRON SERPL-MCNC: 74 UG/DL
LYMPHOCYTES # BLD AUTO: 1.23 X10(3) UL (ref 1–4)
LYMPHOCYTES NFR BLD AUTO: 31.3 %
MCH RBC QN AUTO: 31.1 PG (ref 26–34)
MCHC RBC AUTO-ENTMCNC: 29.9 G/DL (ref 31–37)
MCV RBC AUTO: 103.8 FL
MONOCYTES # BLD AUTO: 0.21 X10(3) UL (ref 0.1–1)
MONOCYTES NFR BLD AUTO: 5.3 %
NEUTROPHILS # BLD AUTO: 2.3 X10 (3) UL (ref 1.5–7.7)
NEUTROPHILS # BLD AUTO: 2.3 X10(3) UL (ref 1.5–7.7)
NEUTROPHILS NFR BLD AUTO: 58.5 %
OSMOLALITY SERPL CALC.SUM OF ELEC: 297 MOSM/KG (ref 275–295)
PLATELET # BLD AUTO: 188 10(3)UL (ref 150–450)
POTASSIUM SERPL-SCNC: 3.8 MMOL/L (ref 3.5–5.1)
PROT SERPL-MCNC: 7.4 G/DL (ref 6.4–8.2)
RBC # BLD AUTO: 2.64 X10(6)UL
SODIUM SERPL-SCNC: 144 MMOL/L (ref 136–145)
TIBC SERPL-MCNC: 386 UG/DL (ref 240–450)
TRANSFERRIN SERPL-MCNC: 259 MG/DL (ref 200–360)
WBC # BLD AUTO: 3.9 X10(3) UL (ref 4–11)

## 2022-02-10 PROCEDURE — 96402 CHEMO HORMON ANTINEOPL SQ/IM: CPT

## 2022-02-10 PROCEDURE — 99215 OFFICE O/P EST HI 40 MIN: CPT | Performed by: INTERNAL MEDICINE

## 2022-02-10 RX ORDER — LAMOTRIGINE 25 MG/1
500 TABLET ORAL ONCE
Status: CANCELLED | OUTPATIENT
Start: 2022-02-10

## 2022-02-10 RX ORDER — LAMOTRIGINE 25 MG/1
500 TABLET ORAL ONCE
Status: COMPLETED | OUTPATIENT
Start: 2022-02-10 | End: 2022-02-10

## 2022-02-10 RX ADMIN — LAMOTRIGINE 500 MG: 25 TABLET ORAL at 15:37:00

## 2022-02-10 NOTE — PROGRESS NOTES
Patient here for Faslodex injection. Patient  Tolerated injection, no complications. Discharged home in stable condition.      Education Record    Learner:  Patient    Disease / Diagnosis:breast CA  Barriers / Limitations:  None    Method:  Brief focused, printed material and  reinforcement    General Topics:  Plan of care reviewed    Outcome:  Shows understanding

## 2022-02-10 NOTE — PROGRESS NOTES
Outpatient Oncology Care Plan  Problem list:  knowledge deficit    Problems related to:    disease/disease progression    Interventions:  provided general teaching    Expected outcomes:  understands plan of care    Progress towards outcome:  making progress    Education Record    Learner:  Patient and Spouse  Barriers / Limitations:  None  Method:  Brief focused  Outcome:  Shows understanding  Comments:  Pt here for follow up and injection. Pt states she feels good. Pt states she had a little blood when passing hard stool.

## 2022-02-13 LAB
PETH 16:0/18:1 (POPETH): <10 NG/ML
PETH 16:0/18:2 (PLPETH): <10 NG/ML

## 2022-02-14 RX ORDER — PROPRANOLOL HYDROCHLORIDE 10 MG/1
10 TABLET ORAL 2 TIMES DAILY
Qty: 60 TABLET | Refills: 0 | Status: SHIPPED | OUTPATIENT
Start: 2022-02-14 | End: 2022-03-16

## 2022-02-14 RX ORDER — SPIRONOLACTONE 100 MG/1
100 TABLET, FILM COATED ORAL DAILY
Qty: 90 TABLET | Refills: 0 | Status: SHIPPED | OUTPATIENT
Start: 2022-02-14 | End: 2022-05-16

## 2022-02-14 RX ORDER — FUROSEMIDE 40 MG/1
40 TABLET ORAL DAILY
Qty: 90 TABLET | Refills: 0 | Status: SHIPPED | OUTPATIENT
Start: 2022-02-14 | End: 2022-05-16

## 2022-02-14 NOTE — TELEPHONE ENCOUNTER
Pt requesting status of refill. Pt needing a 90 day supply of:    pantoprazole 40 MG Oral Tab EC    propranolol 10 MG Oral Tab (60 day supply was sent)      Be sent to:    [facility], [address] [place] [address] AT [facility], [phone], [phone]    Please advise.

## 2022-02-15 RX ORDER — FLUTICASONE PROPIONATE AND SALMETEROL 50; 250 UG/1; UG/1
POWDER RESPIRATORY (INHALATION)
Qty: 180 EACH | Refills: 2 | Status: SHIPPED | OUTPATIENT
Start: 2022-02-15

## 2022-02-15 RX ORDER — ESCITALOPRAM OXALATE 20 MG/1
TABLET ORAL
Qty: 90 TABLET | Refills: 1 | Status: SHIPPED | OUTPATIENT
Start: 2022-02-15

## 2022-02-16 LAB — ALPHA-1-ANTITRYPSIN: 194 MG/DL

## 2022-02-17 RX ORDER — PANTOPRAZOLE SODIUM 40 MG/1
TABLET, DELAYED RELEASE ORAL
Qty: 60 TABLET | Refills: 0 | Status: SHIPPED | OUTPATIENT
Start: 2022-02-17 | End: 2022-03-16

## 2022-02-23 RX ORDER — OMEGA-3-ACID ETHYL ESTERS 1 G/1
CAPSULE, LIQUID FILLED ORAL
Qty: 360 CAPSULE | Refills: 3 | Status: SHIPPED | OUTPATIENT
Start: 2022-02-23

## 2022-03-10 ENCOUNTER — OFFICE VISIT (OUTPATIENT)
Dept: HEMATOLOGY/ONCOLOGY | Age: 62
End: 2022-03-10
Attending: INTERNAL MEDICINE
Payer: COMMERCIAL

## 2022-03-10 VITALS
HEART RATE: 64 BPM | OXYGEN SATURATION: 98 % | RESPIRATION RATE: 18 BRPM | TEMPERATURE: 99 F | BODY MASS INDEX: 27.82 KG/M2 | DIASTOLIC BLOOD PRESSURE: 54 MMHG | SYSTOLIC BLOOD PRESSURE: 115 MMHG | HEIGHT: 65.98 IN | WEIGHT: 173.13 LBS

## 2022-03-10 DIAGNOSIS — C50.011 MALIGNANT NEOPLASM OF NIPPLE OF RIGHT BREAST IN FEMALE, ESTROGEN RECEPTOR POSITIVE (HCC): Primary | ICD-10-CM

## 2022-03-10 DIAGNOSIS — C78.2 CARCINOMA OF RIGHT BREAST METASTATIC TO PLEURA (HCC): ICD-10-CM

## 2022-03-10 DIAGNOSIS — D50.0 IRON DEFICIENCY ANEMIA DUE TO CHRONIC BLOOD LOSS: ICD-10-CM

## 2022-03-10 DIAGNOSIS — C79.51 BONE METASTASES (HCC): ICD-10-CM

## 2022-03-10 DIAGNOSIS — C50.011 MALIGNANT NEOPLASM OF NIPPLE OF RIGHT BREAST IN FEMALE, ESTROGEN RECEPTOR POSITIVE (HCC): ICD-10-CM

## 2022-03-10 DIAGNOSIS — C50.911 CARCINOMA OF RIGHT BREAST METASTATIC TO PLEURA (HCC): ICD-10-CM

## 2022-03-10 DIAGNOSIS — Z17.0 MALIGNANT NEOPLASM OF NIPPLE OF RIGHT BREAST IN FEMALE, ESTROGEN RECEPTOR POSITIVE (HCC): ICD-10-CM

## 2022-03-10 DIAGNOSIS — Z17.0 MALIGNANT NEOPLASM OF NIPPLE OF RIGHT BREAST IN FEMALE, ESTROGEN RECEPTOR POSITIVE (HCC): Primary | ICD-10-CM

## 2022-03-10 LAB
ALBUMIN SERPL-MCNC: 3.7 G/DL (ref 3.4–5)
ALBUMIN/GLOB SERPL: 1.1 {RATIO} (ref 1–2)
ALP LIVER SERPL-CCNC: 158 U/L
ALT SERPL-CCNC: 54 U/L
ANION GAP SERPL CALC-SCNC: 6 MMOL/L (ref 0–18)
AST SERPL-CCNC: 51 U/L (ref 15–37)
BASOPHILS # BLD AUTO: 0.06 X10(3) UL (ref 0–0.2)
BASOPHILS NFR BLD AUTO: 1.2 %
BILIRUB SERPL-MCNC: 0.3 MG/DL (ref 0.1–2)
BUN BLD-MCNC: 16 MG/DL (ref 7–18)
CALCIUM BLD-MCNC: 9.2 MG/DL (ref 8.5–10.1)
CHLORIDE SERPL-SCNC: 108 MMOL/L (ref 98–112)
CO2 SERPL-SCNC: 26 MMOL/L (ref 21–32)
CREAT BLD-MCNC: 0.9 MG/DL
DEPRECATED HBV CORE AB SER IA-ACNC: 74.2 NG/ML
EOSINOPHIL # BLD AUTO: 0.18 X10(3) UL (ref 0–0.7)
EOSINOPHIL NFR BLD AUTO: 3.7 %
ERYTHROCYTE [DISTWIDTH] IN BLOOD BY AUTOMATED COUNT: 14.9 %
FASTING STATUS PATIENT QL REPORTED: NO
GLOBULIN PLAS-MCNC: 3.4 G/DL (ref 2.8–4.4)
GLUCOSE BLD-MCNC: 127 MG/DL (ref 70–99)
HCT VFR BLD AUTO: 27.6 %
HGB BLD-MCNC: 8.2 G/DL
IMM GRANULOCYTES # BLD AUTO: 0.01 X10(3) UL (ref 0–1)
IMM GRANULOCYTES NFR BLD: 0.2 %
IRON SATN MFR SERPL: 16 %
IRON SERPL-MCNC: 73 UG/DL
LYMPHOCYTES # BLD AUTO: 1.54 X10(3) UL (ref 1–4)
LYMPHOCYTES NFR BLD AUTO: 31.6 %
MCH RBC QN AUTO: 30.7 PG (ref 26–34)
MCHC RBC AUTO-ENTMCNC: 29.7 G/DL (ref 31–37)
MCV RBC AUTO: 103.4 FL
MONOCYTES # BLD AUTO: 0.37 X10(3) UL (ref 0.1–1)
MONOCYTES NFR BLD AUTO: 7.6 %
NEUTROPHILS # BLD AUTO: 2.71 X10 (3) UL (ref 1.5–7.7)
NEUTROPHILS # BLD AUTO: 2.71 X10(3) UL (ref 1.5–7.7)
NEUTROPHILS NFR BLD AUTO: 55.7 %
OSMOLALITY SERPL CALC.SUM OF ELEC: 293 MOSM/KG (ref 275–295)
PLATELET # BLD AUTO: 188 10(3)UL (ref 150–450)
POTASSIUM SERPL-SCNC: 4 MMOL/L (ref 3.5–5.1)
PROT SERPL-MCNC: 7.1 G/DL (ref 6.4–8.2)
RBC # BLD AUTO: 2.67 X10(6)UL
SODIUM SERPL-SCNC: 140 MMOL/L (ref 136–145)
TIBC SERPL-MCNC: 447 UG/DL (ref 240–450)
TRANSFERRIN SERPL-MCNC: 300 MG/DL (ref 200–360)
WBC # BLD AUTO: 4.9 X10(3) UL (ref 4–11)

## 2022-03-10 PROCEDURE — 99215 OFFICE O/P EST HI 40 MIN: CPT | Performed by: INTERNAL MEDICINE

## 2022-03-10 PROCEDURE — 96402 CHEMO HORMON ANTINEOPL SQ/IM: CPT

## 2022-03-10 RX ORDER — LAMOTRIGINE 25 MG/1
500 TABLET ORAL ONCE
Status: CANCELLED | OUTPATIENT
Start: 2022-03-10

## 2022-03-10 RX ORDER — LAMOTRIGINE 25 MG/1
500 TABLET ORAL ONCE
Status: COMPLETED | OUTPATIENT
Start: 2022-03-10 | End: 2022-03-10

## 2022-03-10 RX ADMIN — LAMOTRIGINE 500 MG: 25 TABLET ORAL at 15:37:00

## 2022-03-10 NOTE — PROGRESS NOTES
Outpatient Oncology Care Plan  Problem list:  knowledge deficit    Problems related to:    disease/disease progression    Interventions:  provided general teaching    Expected outcomes:  understands plan of care    Progress towards outcome:  making progress    Education Record    Learner:  Patient and Spouse  Barriers / Limitations:  None  Method:  Brief focused  Outcome:  Shows understanding  Comments:  Pt here for follow up and injection. She states she has had some rectal bleeding. About 3 times in the past month.

## 2022-03-16 RX ORDER — PROPRANOLOL HYDROCHLORIDE 10 MG/1
10 TABLET ORAL 2 TIMES DAILY
Qty: 180 TABLET | Refills: 0 | Status: SHIPPED | OUTPATIENT
Start: 2022-03-16

## 2022-03-16 RX ORDER — PANTOPRAZOLE SODIUM 40 MG/1
40 TABLET, DELAYED RELEASE ORAL
Qty: 180 TABLET | Refills: 0 | Status: SHIPPED | OUTPATIENT
Start: 2022-03-16

## 2022-03-16 NOTE — TELEPHONE ENCOUNTER
Pt requesting 90 day refill of:    PANTOPRAZOLE 40 MG Oral Tab EC    propranolol 10 MG Oral Tab    Be sent to:    [facility][id number] - [address] [facility], [address] AT [facility] OF [address] [place] [address] Dr, [phone], [phone]

## 2022-04-04 RX ORDER — HYDROCODONE BITARTRATE AND ACETAMINOPHEN 7.5; 325 MG/1; MG/1
1-2 TABLET ORAL EVERY 8 HOURS PRN
COMMUNITY
Start: 2022-02-15

## 2022-04-04 RX ORDER — ALBUTEROL SULFATE 90 UG/1
2 AEROSOL, METERED RESPIRATORY (INHALATION) EVERY 4 HOURS PRN
Qty: 3 EACH | Refills: 1 | Status: SHIPPED | OUTPATIENT
Start: 2022-04-04

## 2022-04-07 ENCOUNTER — OFFICE VISIT (OUTPATIENT)
Dept: HEMATOLOGY/ONCOLOGY | Age: 62
End: 2022-04-07
Attending: INTERNAL MEDICINE
Payer: COMMERCIAL

## 2022-04-07 VITALS
HEIGHT: 65.98 IN | BODY MASS INDEX: 28.28 KG/M2 | OXYGEN SATURATION: 99 % | HEART RATE: 68 BPM | WEIGHT: 176 LBS | SYSTOLIC BLOOD PRESSURE: 109 MMHG | TEMPERATURE: 99 F | DIASTOLIC BLOOD PRESSURE: 61 MMHG | RESPIRATION RATE: 18 BRPM

## 2022-04-07 DIAGNOSIS — C79.51 BONE METASTASES (HCC): ICD-10-CM

## 2022-04-07 DIAGNOSIS — C50.911 CARCINOMA OF RIGHT BREAST METASTATIC TO PLEURA (HCC): ICD-10-CM

## 2022-04-07 DIAGNOSIS — C50.011 MALIGNANT NEOPLASM OF NIPPLE OF RIGHT BREAST IN FEMALE, ESTROGEN RECEPTOR POSITIVE (HCC): ICD-10-CM

## 2022-04-07 DIAGNOSIS — C78.2 CARCINOMA OF RIGHT BREAST METASTATIC TO PLEURA (HCC): ICD-10-CM

## 2022-04-07 DIAGNOSIS — Z17.0 MALIGNANT NEOPLASM OF NIPPLE OF RIGHT BREAST IN FEMALE, ESTROGEN RECEPTOR POSITIVE (HCC): ICD-10-CM

## 2022-04-07 DIAGNOSIS — C50.011 MALIGNANT NEOPLASM OF NIPPLE OF RIGHT BREAST IN FEMALE, ESTROGEN RECEPTOR POSITIVE (HCC): Primary | ICD-10-CM

## 2022-04-07 DIAGNOSIS — Z17.0 MALIGNANT NEOPLASM OF NIPPLE OF RIGHT BREAST IN FEMALE, ESTROGEN RECEPTOR POSITIVE (HCC): Primary | ICD-10-CM

## 2022-04-07 DIAGNOSIS — D50.0 IRON DEFICIENCY ANEMIA DUE TO CHRONIC BLOOD LOSS: ICD-10-CM

## 2022-04-07 LAB
ALBUMIN SERPL-MCNC: 3.5 G/DL (ref 3.4–5)
ALBUMIN/GLOB SERPL: 1.1 {RATIO} (ref 1–2)
ALP LIVER SERPL-CCNC: 130 U/L
ALT SERPL-CCNC: 21 U/L
ANION GAP SERPL CALC-SCNC: 4 MMOL/L (ref 0–18)
AST SERPL-CCNC: 19 U/L (ref 15–37)
BASOPHILS # BLD AUTO: 0.06 X10(3) UL (ref 0–0.2)
BASOPHILS NFR BLD AUTO: 1.2 %
BILIRUB SERPL-MCNC: 0.4 MG/DL (ref 0.1–2)
BUN BLD-MCNC: 9 MG/DL (ref 7–18)
CALCIUM BLD-MCNC: 8.9 MG/DL (ref 8.5–10.1)
CHLORIDE SERPL-SCNC: 111 MMOL/L (ref 98–112)
CO2 SERPL-SCNC: 29 MMOL/L (ref 21–32)
CREAT BLD-MCNC: 0.75 MG/DL
DEPRECATED HBV CORE AB SER IA-ACNC: 19.8 NG/ML
EOSINOPHIL # BLD AUTO: 0.21 X10(3) UL (ref 0–0.7)
EOSINOPHIL NFR BLD AUTO: 4.1 %
ERYTHROCYTE [DISTWIDTH] IN BLOOD BY AUTOMATED COUNT: 14.6 %
FASTING STATUS PATIENT QL REPORTED: NO
GLOBULIN PLAS-MCNC: 3.2 G/DL (ref 2.8–4.4)
GLUCOSE BLD-MCNC: 128 MG/DL (ref 70–99)
HCT VFR BLD AUTO: 25.2 %
HGB BLD-MCNC: 7.4 G/DL
IMM GRANULOCYTES # BLD AUTO: 0.02 X10(3) UL (ref 0–1)
IMM GRANULOCYTES NFR BLD: 0.4 %
IRON SATN MFR SERPL: 17 %
IRON SERPL-MCNC: 81 UG/DL
LYMPHOCYTES # BLD AUTO: 1.44 X10(3) UL (ref 1–4)
LYMPHOCYTES NFR BLD AUTO: 27.9 %
MCH RBC QN AUTO: 29.5 PG (ref 26–34)
MCHC RBC AUTO-ENTMCNC: 29.4 G/DL (ref 31–37)
MCV RBC AUTO: 100.4 FL
MONOCYTES # BLD AUTO: 0.37 X10(3) UL (ref 0.1–1)
MONOCYTES NFR BLD AUTO: 7.2 %
NEUTROPHILS # BLD AUTO: 3.06 X10 (3) UL (ref 1.5–7.7)
NEUTROPHILS # BLD AUTO: 3.06 X10(3) UL (ref 1.5–7.7)
NEUTROPHILS NFR BLD AUTO: 59.2 %
OSMOLALITY SERPL CALC.SUM OF ELEC: 298 MOSM/KG (ref 275–295)
PLATELET # BLD AUTO: 204 10(3)UL (ref 150–450)
POTASSIUM SERPL-SCNC: 3.4 MMOL/L (ref 3.5–5.1)
PROT SERPL-MCNC: 6.7 G/DL (ref 6.4–8.2)
RBC # BLD AUTO: 2.51 X10(6)UL
SODIUM SERPL-SCNC: 144 MMOL/L (ref 136–145)
TIBC SERPL-MCNC: 486 UG/DL (ref 240–450)
TRANSFERRIN SERPL-MCNC: 326 MG/DL (ref 200–360)
WBC # BLD AUTO: 5.2 X10(3) UL (ref 4–11)

## 2022-04-07 PROCEDURE — 82728 ASSAY OF FERRITIN: CPT | Performed by: INTERNAL MEDICINE

## 2022-04-07 PROCEDURE — 36415 COLL VENOUS BLD VENIPUNCTURE: CPT

## 2022-04-07 PROCEDURE — 80053 COMPREHEN METABOLIC PANEL: CPT | Performed by: INTERNAL MEDICINE

## 2022-04-07 PROCEDURE — 83540 ASSAY OF IRON: CPT | Performed by: INTERNAL MEDICINE

## 2022-04-07 PROCEDURE — 83550 IRON BINDING TEST: CPT | Performed by: INTERNAL MEDICINE

## 2022-04-07 PROCEDURE — 96402 CHEMO HORMON ANTINEOPL SQ/IM: CPT

## 2022-04-07 PROCEDURE — 85025 COMPLETE CBC W/AUTO DIFF WBC: CPT | Performed by: INTERNAL MEDICINE

## 2022-04-07 RX ORDER — LAMOTRIGINE 25 MG/1
500 TABLET ORAL ONCE
Status: COMPLETED | OUTPATIENT
Start: 2022-04-07 | End: 2022-04-07

## 2022-04-07 RX ORDER — LAMOTRIGINE 25 MG/1
500 TABLET ORAL ONCE
Status: CANCELLED | OUTPATIENT
Start: 2022-04-07

## 2022-04-07 RX ADMIN — LAMOTRIGINE 500 MG: 25 TABLET ORAL at 15:38:00

## 2022-04-07 NOTE — PROGRESS NOTES
Outpatient Oncology Care Plan  Problem list:  knowledge deficit    Problems related to:    disease/disease progression    Interventions:  provided general teaching    Expected outcomes:  understands plan of care    Progress towards outcome:  making progress    Education Record    Learner:  Patient  Barriers / Limitations:  None  Method:  Brief focused  Outcome:  Shows understanding  Comments:  Pt here for follow up and injection. Pt states she had 2 days of rectal bleeding. Pt states at times she is fatigued. Pt does not have a normal sleep routine. The importance of a normal sleep routine discussed.

## 2022-04-07 NOTE — PROGRESS NOTES
Pt here for C41D1 Faslodex injection. Arrives Ambulating independently, accompanied by Self           Pregnancy screening: Denies possibility of pregnancy    Modifications in dose or schedule: No    Discharged to Home, Ambulating independently, accompanied by:Spouse    Outpatient Oncology Care Plan  Problem list:  fatigue  knowledge deficit  Problems related to:    side effect of treatment  Interventions:  provided general teaching  Expected outcomes:  understands plan of care  Progress towards outcome:  unchanged    Education Record    Learner:  Patient  Barriers / Limitations:  None  Method:  Discussion  Outcome:  Shows understanding  Comments:    Patient tolerate injection. AVS given with next appointment. Patient discharged in stable condition.

## 2022-04-08 ENCOUNTER — TELEPHONE (OUTPATIENT)
Dept: HEMATOLOGY/ONCOLOGY | Facility: HOSPITAL | Age: 62
End: 2022-04-08

## 2022-04-14 ENCOUNTER — OFFICE VISIT (OUTPATIENT)
Dept: HEMATOLOGY/ONCOLOGY | Age: 62
End: 2022-04-14
Attending: INTERNAL MEDICINE
Payer: COMMERCIAL

## 2022-04-14 VITALS
SYSTOLIC BLOOD PRESSURE: 111 MMHG | TEMPERATURE: 98 F | RESPIRATION RATE: 16 BRPM | DIASTOLIC BLOOD PRESSURE: 61 MMHG | OXYGEN SATURATION: 97 % | HEART RATE: 62 BPM

## 2022-04-14 DIAGNOSIS — D50.0 IRON DEFICIENCY ANEMIA DUE TO CHRONIC BLOOD LOSS: Primary | ICD-10-CM

## 2022-04-14 DIAGNOSIS — C50.919 BREAST CANCER METASTASIZED TO PLEURA, UNSPECIFIED LATERALITY (HCC): ICD-10-CM

## 2022-04-14 DIAGNOSIS — E86.0 DEHYDRATION: ICD-10-CM

## 2022-04-14 DIAGNOSIS — C78.2 BREAST CANCER METASTASIZED TO PLEURA, UNSPECIFIED LATERALITY (HCC): ICD-10-CM

## 2022-04-14 DIAGNOSIS — C79.51 BONE METASTASES (HCC): ICD-10-CM

## 2022-04-14 PROCEDURE — 96365 THER/PROPH/DIAG IV INF INIT: CPT

## 2022-04-18 ENCOUNTER — PATIENT MESSAGE (OUTPATIENT)
Dept: FAMILY MEDICINE CLINIC | Facility: CLINIC | Age: 62
End: 2022-04-18

## 2022-04-19 RX ORDER — HYDROCODONE BITARTRATE AND ACETAMINOPHEN 7.5; 325 MG/1; MG/1
1-2 TABLET ORAL EVERY 8 HOURS PRN
Qty: 90 TABLET | Refills: 0 | Status: SHIPPED | OUTPATIENT
Start: 2022-04-19

## 2022-04-19 NOTE — TELEPHONE ENCOUNTER
From: Kristin Baumann  To: Christopher Ramirez MD  Sent: 4/18/2022 8:22 AM CDT  Subject: Referrals for Dr Gordon Barros    I was just wondering Dr. Angela Wang put in any more referrals for Dr. Gordon Barros who I see this Wednesday

## 2022-04-20 ENCOUNTER — OFFICE VISIT (OUTPATIENT)
Dept: SURGERY | Facility: CLINIC | Age: 62
End: 2022-04-20
Payer: COMMERCIAL

## 2022-04-20 VITALS
DIASTOLIC BLOOD PRESSURE: 63 MMHG | SYSTOLIC BLOOD PRESSURE: 117 MMHG | OXYGEN SATURATION: 96 % | RESPIRATION RATE: 18 BRPM | WEIGHT: 171 LBS | TEMPERATURE: 98 F | BODY MASS INDEX: 28 KG/M2 | HEART RATE: 65 BPM

## 2022-04-20 DIAGNOSIS — K74.69 CRYPTOGENIC CIRRHOSIS OF LIVER (HCC): ICD-10-CM

## 2022-04-20 DIAGNOSIS — K74.69 CRYPTOGENIC CIRRHOSIS (HCC): Primary | ICD-10-CM

## 2022-05-03 NOTE — PROGRESS NOTES
[facility] Progress Note  Patient Name: Delfina Michaud   YOB: 1960   Medical Record Number: [id number]     Attending Physician: Candice Newman M.D. Date of Visit: 6/6/2019        Chief Complaint:  Patient presents with:   Follow - U History      Marital status:       Spouse name: Not on file      Number of children: Not on file      Years of education: Not on file      Highest education level: Not on file    Occupational History      Not on file    Social Needs      Financial r Not on file      Current Medications:    Current Outpatient Medications:   •  Abemaciclib 100 MG Oral Tab, Take 1 tablet (100 mg total) by mouth 2 (two) times daily.  may take with or without food, Disp: 60 tablet, Rfl: 3  •  OMEGA-3-ACID ETHYL ESTERS 1 g O Oral Tab, Take 1-2 tablets by mouth 4 (four) times daily as needed for Diarrhea., Disp: 30 tablet, Rfl: 0  •  Potassium Chloride ER 20 MEQ Oral Tab CR, Take 2 tabs today, then 1 tab daily. , Disp: 30 tablet, Rfl: 0  •  Loperamide HCl (IMODIUM A-D) 2 MG Oral Lungs are clear to auscultation, no rhonchi or wheezing. Cardiovascular Normal - Regular rate and rhythm, no murmurs, gallops or rubs. Abdomen Normal - Non-tender, non-distended, no masses, ascites or hepatosplenomegaly.     Extremities Normal - No visi patient and the family. Electronically Signed by: Salo Marrufo M.D.   [facility]

## 2022-05-05 ENCOUNTER — OFFICE VISIT (OUTPATIENT)
Dept: HEMATOLOGY/ONCOLOGY | Age: 62
End: 2022-05-05
Attending: INTERNAL MEDICINE
Payer: COMMERCIAL

## 2022-05-05 VITALS
DIASTOLIC BLOOD PRESSURE: 65 MMHG | HEIGHT: 65.98 IN | BODY MASS INDEX: 27.88 KG/M2 | OXYGEN SATURATION: 97 % | RESPIRATION RATE: 18 BRPM | HEART RATE: 71 BPM | TEMPERATURE: 99 F | SYSTOLIC BLOOD PRESSURE: 127 MMHG | WEIGHT: 173.5 LBS

## 2022-05-05 DIAGNOSIS — C50.011 MALIGNANT NEOPLASM OF NIPPLE OF RIGHT BREAST IN FEMALE, ESTROGEN RECEPTOR POSITIVE (HCC): ICD-10-CM

## 2022-05-05 DIAGNOSIS — C50.911 CARCINOMA OF RIGHT BREAST METASTATIC TO PLEURA (HCC): ICD-10-CM

## 2022-05-05 DIAGNOSIS — C50.919 METASTATIC BREAST CANCER (HCC): Primary | ICD-10-CM

## 2022-05-05 DIAGNOSIS — Z17.0 MALIGNANT NEOPLASM OF NIPPLE OF RIGHT BREAST IN FEMALE, ESTROGEN RECEPTOR POSITIVE (HCC): ICD-10-CM

## 2022-05-05 DIAGNOSIS — C79.51 BONE METASTASES (HCC): ICD-10-CM

## 2022-05-05 DIAGNOSIS — C50.919 METASTATIC BREAST CANCER (HCC): ICD-10-CM

## 2022-05-05 DIAGNOSIS — D50.0 IRON DEFICIENCY ANEMIA DUE TO CHRONIC BLOOD LOSS: Primary | ICD-10-CM

## 2022-05-05 DIAGNOSIS — C78.2 CARCINOMA OF RIGHT BREAST METASTATIC TO PLEURA (HCC): ICD-10-CM

## 2022-05-05 LAB
ALBUMIN SERPL-MCNC: 3.5 G/DL (ref 3.4–5)
ALBUMIN/GLOB SERPL: 1 {RATIO} (ref 1–2)
ALP LIVER SERPL-CCNC: 137 U/L
ALT SERPL-CCNC: 23 U/L
ANION GAP SERPL CALC-SCNC: 7 MMOL/L (ref 0–18)
AST SERPL-CCNC: 23 U/L (ref 15–37)
BASOPHILS # BLD AUTO: 0.06 X10(3) UL (ref 0–0.2)
BASOPHILS NFR BLD AUTO: 1 %
BILIRUB SERPL-MCNC: 0.5 MG/DL (ref 0.1–2)
BUN BLD-MCNC: 9 MG/DL (ref 7–18)
CALCIUM BLD-MCNC: 9.3 MG/DL (ref 8.5–10.1)
CHLORIDE SERPL-SCNC: 107 MMOL/L (ref 98–112)
CO2 SERPL-SCNC: 27 MMOL/L (ref 21–32)
CREAT BLD-MCNC: 0.72 MG/DL
DEPRECATED HBV CORE AB SER IA-ACNC: 99.3 NG/ML
EOSINOPHIL # BLD AUTO: 0.24 X10(3) UL (ref 0–0.7)
EOSINOPHIL NFR BLD AUTO: 3.9 %
ERYTHROCYTE [DISTWIDTH] IN BLOOD BY AUTOMATED COUNT: 17.4 %
FASTING STATUS PATIENT QL REPORTED: NO
GLOBULIN PLAS-MCNC: 3.6 G/DL (ref 2.8–4.4)
GLUCOSE BLD-MCNC: 118 MG/DL (ref 70–99)
HCT VFR BLD AUTO: 30.4 %
HGB BLD-MCNC: 9 G/DL
IMM GRANULOCYTES # BLD AUTO: 0.01 X10(3) UL (ref 0–1)
IMM GRANULOCYTES NFR BLD: 0.2 %
IRON SATN MFR SERPL: 13 %
IRON SERPL-MCNC: 55 UG/DL
LYMPHOCYTES # BLD AUTO: 1.88 X10(3) UL (ref 1–4)
LYMPHOCYTES NFR BLD AUTO: 30.2 %
MCH RBC QN AUTO: 29.1 PG (ref 26–34)
MCHC RBC AUTO-ENTMCNC: 29.6 G/DL (ref 31–37)
MCV RBC AUTO: 98.4 FL
MONOCYTES # BLD AUTO: 0.4 X10(3) UL (ref 0.1–1)
MONOCYTES NFR BLD AUTO: 6.4 %
NEUTROPHILS # BLD AUTO: 3.63 X10 (3) UL (ref 1.5–7.7)
NEUTROPHILS # BLD AUTO: 3.63 X10(3) UL (ref 1.5–7.7)
NEUTROPHILS NFR BLD AUTO: 58.3 %
OSMOLALITY SERPL CALC.SUM OF ELEC: 292 MOSM/KG (ref 275–295)
PLATELET # BLD AUTO: 210 10(3)UL (ref 150–450)
POTASSIUM SERPL-SCNC: 3.7 MMOL/L (ref 3.5–5.1)
PROT SERPL-MCNC: 7.1 G/DL (ref 6.4–8.2)
RBC # BLD AUTO: 3.09 X10(6)UL
SODIUM SERPL-SCNC: 141 MMOL/L (ref 136–145)
TIBC SERPL-MCNC: 422 UG/DL (ref 240–450)
TRANSFERRIN SERPL-MCNC: 283 MG/DL (ref 200–360)
WBC # BLD AUTO: 6.2 X10(3) UL (ref 4–11)

## 2022-05-05 PROCEDURE — 96402 CHEMO HORMON ANTINEOPL SQ/IM: CPT

## 2022-05-05 PROCEDURE — 99215 OFFICE O/P EST HI 40 MIN: CPT | Performed by: INTERNAL MEDICINE

## 2022-05-05 RX ORDER — LAMOTRIGINE 25 MG/1
500 TABLET ORAL ONCE
Status: COMPLETED | OUTPATIENT
Start: 2022-05-05 | End: 2022-05-05

## 2022-05-05 RX ORDER — LAMOTRIGINE 25 MG/1
500 TABLET ORAL ONCE
Status: CANCELLED | OUTPATIENT
Start: 2022-05-05

## 2022-05-05 RX ADMIN — LAMOTRIGINE 500 MG: 25 TABLET ORAL at 16:00:00

## 2022-05-05 NOTE — PROGRESS NOTES
Pt here for C42D1 of Faslodex injection. Arrives Ambulating independently, accompanied by Self and Family member           Pregnancy screening: Not applicable    Modifications in dose or schedule: No     Frequency of blood return and site check throughout administration: Prior to administration   Discharged to Home, Ambulating independently, accompanied by:Self and Family member    Outpatient Oncology Care Plan  Problem list:  knowledge deficit  Problems related to:    chemotherapy  Interventions:  monitor lab values  Expected outcomes:  understands plan of care  Progress towards outcome:  making progress    Education Record    Learner:  Patient and Family Member  Barriers / Limitations:  None  Method:  Brief focused  Outcome:  Shows understanding  Comments: Tolerated her injections well.  Given new AVS

## 2022-05-05 NOTE — PROGRESS NOTES
Outpatient Oncology Care Plan  Problem list:  knowledge deficit    Problems related to:    disease/disease progression    Interventions:  provided general teaching    Expected outcomes:  understands plan of care    Progress towards outcome:  making progress    Education Record    Learner:  Patient and Spouse  Barriers / Limitations:  None  Method:  Brief focused  Outcome:  Shows understanding  Comments:  Pt here for follow up and injection. She states about 2 weeks ago she had a temp, Max 101.9, cough and fatigue. This past Sunday she started feeling bad again. No fevers. She complains of fatigue. She states she is bleeding a little. She has stayed in bed Sunday, Monday and Tuesday. She states her hernia is painful and her feet are painful.

## 2022-05-06 NOTE — PLAN OF CARE
Patient achieved a 15% reduction in IOP from pretreatment levels or a plan is in place to achieve this goal. Problem: HEMATOLOGIC - ADULT  Goal: Maintains hematologic stability  Description: INTERVENTIONS  - Assess for signs and symptoms of bleeding or hemorrhage  - Monitor labs and vital signs for trends  - Administer supportive blood products/factors, fluids Pt. Taken this am for EGD and capsule endoscopy. 0900 Pt. Returned from endoscopy. Capsule study equipment on pt. US of abd ordered. Dr. Tarah Short called and may hold until tomorrow to avoid disturbance of capsule study. Dept. Notified and will do tomorrow.

## 2022-05-09 ENCOUNTER — TELEPHONE (OUTPATIENT)
Dept: HEMATOLOGY/ONCOLOGY | Facility: HOSPITAL | Age: 62
End: 2022-05-09

## 2022-05-09 NOTE — TELEPHONE ENCOUNTER
Davy Sears MD  P Edw Darian Munroe Rns  Johanna,   Please let Dori Hernandez know that Dr. Ella Mckeon is now at [facility]. He'll still be the closest Liver doc for her. Message left.

## 2022-05-16 RX ORDER — FUROSEMIDE 40 MG/1
TABLET ORAL
Qty: 90 TABLET | Refills: 0 | Status: SHIPPED | OUTPATIENT
Start: 2022-05-16

## 2022-05-16 RX ORDER — SPIRONOLACTONE 100 MG/1
TABLET, FILM COATED ORAL
Qty: 90 TABLET | Refills: 0 | Status: SHIPPED | OUTPATIENT
Start: 2022-05-16

## 2022-05-19 RX ORDER — SPIRONOLACTONE 100 MG/1
TABLET, FILM COATED ORAL
Qty: 90 TABLET | Refills: 0 | OUTPATIENT
Start: 2022-05-19

## 2022-05-20 ENCOUNTER — PATIENT MESSAGE (OUTPATIENT)
Dept: FAMILY MEDICINE CLINIC | Facility: CLINIC | Age: 62
End: 2022-05-20

## 2022-05-20 RX ORDER — HYDROCODONE BITARTRATE AND ACETAMINOPHEN 7.5; 325 MG/1; MG/1
1-2 TABLET ORAL EVERY 8 HOURS PRN
Qty: 90 TABLET | Refills: 0 | Status: SHIPPED | OUTPATIENT
Start: 2022-05-20

## 2022-05-29 DIAGNOSIS — E78.2 HYPERLIPIDEMIA, MIXED: ICD-10-CM

## 2022-06-02 ENCOUNTER — OFFICE VISIT (OUTPATIENT)
Dept: HEMATOLOGY/ONCOLOGY | Age: 62
End: 2022-06-02
Attending: INTERNAL MEDICINE
Payer: COMMERCIAL

## 2022-06-02 VITALS
DIASTOLIC BLOOD PRESSURE: 57 MMHG | BODY MASS INDEX: 28.64 KG/M2 | OXYGEN SATURATION: 98 % | RESPIRATION RATE: 18 BRPM | WEIGHT: 178.19 LBS | HEIGHT: 65.98 IN | HEART RATE: 59 BPM | TEMPERATURE: 99 F | SYSTOLIC BLOOD PRESSURE: 125 MMHG

## 2022-06-02 DIAGNOSIS — C50.911 CARCINOMA OF RIGHT BREAST METASTATIC TO PLEURA (HCC): ICD-10-CM

## 2022-06-02 DIAGNOSIS — C79.51 BONE METASTASES (HCC): ICD-10-CM

## 2022-06-02 DIAGNOSIS — Z17.0 MALIGNANT NEOPLASM OF NIPPLE OF RIGHT BREAST IN FEMALE, ESTROGEN RECEPTOR POSITIVE (HCC): ICD-10-CM

## 2022-06-02 DIAGNOSIS — D50.0 IRON DEFICIENCY ANEMIA DUE TO CHRONIC BLOOD LOSS: Primary | ICD-10-CM

## 2022-06-02 DIAGNOSIS — Z17.0 MALIGNANT NEOPLASM OF NIPPLE OF RIGHT BREAST IN FEMALE, ESTROGEN RECEPTOR POSITIVE (HCC): Primary | ICD-10-CM

## 2022-06-02 DIAGNOSIS — C50.011 MALIGNANT NEOPLASM OF NIPPLE OF RIGHT BREAST IN FEMALE, ESTROGEN RECEPTOR POSITIVE (HCC): Primary | ICD-10-CM

## 2022-06-02 DIAGNOSIS — C78.2 CARCINOMA OF RIGHT BREAST METASTATIC TO PLEURA (HCC): ICD-10-CM

## 2022-06-02 DIAGNOSIS — C50.011 MALIGNANT NEOPLASM OF NIPPLE OF RIGHT BREAST IN FEMALE, ESTROGEN RECEPTOR POSITIVE (HCC): ICD-10-CM

## 2022-06-02 LAB
ALBUMIN SERPL-MCNC: 4 G/DL (ref 3.4–5)
ALBUMIN/GLOB SERPL: 1.2 {RATIO} (ref 1–2)
ALP LIVER SERPL-CCNC: 146 U/L
ALT SERPL-CCNC: 51 U/L
ANION GAP SERPL CALC-SCNC: 4 MMOL/L (ref 0–18)
AST SERPL-CCNC: 50 U/L (ref 15–37)
BASOPHILS # BLD AUTO: 0.06 X10(3) UL (ref 0–0.2)
BASOPHILS NFR BLD AUTO: 1.2 %
BILIRUB SERPL-MCNC: 0.4 MG/DL (ref 0.1–2)
BUN BLD-MCNC: 13 MG/DL (ref 7–18)
CALCIUM BLD-MCNC: 9.3 MG/DL (ref 8.5–10.1)
CHLORIDE SERPL-SCNC: 106 MMOL/L (ref 98–112)
CO2 SERPL-SCNC: 30 MMOL/L (ref 21–32)
CREAT BLD-MCNC: 0.94 MG/DL
DEPRECATED HBV CORE AB SER IA-ACNC: 68.6 NG/ML
EOSINOPHIL # BLD AUTO: 0.16 X10(3) UL (ref 0–0.7)
EOSINOPHIL NFR BLD AUTO: 3.2 %
ERYTHROCYTE [DISTWIDTH] IN BLOOD BY AUTOMATED COUNT: 15.9 %
FASTING STATUS PATIENT QL REPORTED: NO
GLOBULIN PLAS-MCNC: 3.4 G/DL (ref 2.8–4.4)
GLUCOSE BLD-MCNC: 117 MG/DL (ref 70–99)
HCT VFR BLD AUTO: 31.9 %
HGB BLD-MCNC: 9.8 G/DL
IMM GRANULOCYTES # BLD AUTO: 0.01 X10(3) UL (ref 0–1)
IMM GRANULOCYTES NFR BLD: 0.2 %
IRON SATN MFR SERPL: 10 %
IRON SERPL-MCNC: 44 UG/DL
LYMPHOCYTES # BLD AUTO: 1.5 X10(3) UL (ref 1–4)
LYMPHOCYTES NFR BLD AUTO: 30.4 %
MCH RBC QN AUTO: 29.5 PG (ref 26–34)
MCHC RBC AUTO-ENTMCNC: 30.7 G/DL (ref 31–37)
MCV RBC AUTO: 96.1 FL
MONOCYTES # BLD AUTO: 0.27 X10(3) UL (ref 0.1–1)
MONOCYTES NFR BLD AUTO: 5.5 %
NEUTROPHILS # BLD AUTO: 2.93 X10 (3) UL (ref 1.5–7.7)
NEUTROPHILS # BLD AUTO: 2.93 X10(3) UL (ref 1.5–7.7)
NEUTROPHILS NFR BLD AUTO: 59.5 %
OSMOLALITY SERPL CALC.SUM OF ELEC: 291 MOSM/KG (ref 275–295)
PLATELET # BLD AUTO: 157 10(3)UL (ref 150–450)
POTASSIUM SERPL-SCNC: 3.7 MMOL/L (ref 3.5–5.1)
PROT SERPL-MCNC: 7.4 G/DL (ref 6.4–8.2)
RBC # BLD AUTO: 3.32 X10(6)UL
SODIUM SERPL-SCNC: 140 MMOL/L (ref 136–145)
TIBC SERPL-MCNC: 447 UG/DL (ref 240–450)
TRANSFERRIN SERPL-MCNC: 300 MG/DL (ref 200–360)
WBC # BLD AUTO: 4.9 X10(3) UL (ref 4–11)

## 2022-06-02 PROCEDURE — 99215 OFFICE O/P EST HI 40 MIN: CPT | Performed by: INTERNAL MEDICINE

## 2022-06-02 PROCEDURE — 96402 CHEMO HORMON ANTINEOPL SQ/IM: CPT

## 2022-06-02 RX ORDER — LAMOTRIGINE 25 MG/1
500 TABLET ORAL ONCE
Status: CANCELLED | OUTPATIENT
Start: 2022-06-02

## 2022-06-02 RX ORDER — ROSUVASTATIN CALCIUM 5 MG/1
TABLET, COATED ORAL
Qty: 90 TABLET | Refills: 0 | Status: SHIPPED | OUTPATIENT
Start: 2022-06-02

## 2022-06-02 RX ORDER — LAMOTRIGINE 25 MG/1
500 TABLET ORAL ONCE
Status: COMPLETED | OUTPATIENT
Start: 2022-06-02 | End: 2022-06-02

## 2022-06-02 RX ADMIN — LAMOTRIGINE 500 MG: 25 TABLET ORAL at 15:56:00

## 2022-06-02 NOTE — PROGRESS NOTES
Outpatient Oncology Care Plan  Problem list:  knowledge deficit    Problems related to:    disease/disease progression    Interventions:  provided general teaching    Expected outcomes:  understands plan of care    Progress towards outcome:  making progress    Education Record    Learner:  Patient and Spouse  Barriers / Limitations:  None  Method:  Brief focused  Outcome:  Shows understanding  Comments:    Pt here for follow up. No new complaints. Denies bleeding.

## 2022-06-03 RX ORDER — LEVOTHYROXINE SODIUM 0.15 MG/1
150 TABLET ORAL
Qty: 90 TABLET | Refills: 0 | Status: SHIPPED | OUTPATIENT
Start: 2022-06-03

## 2022-06-06 RX ORDER — FUROSEMIDE 40 MG/1
TABLET ORAL
Qty: 90 TABLET | Refills: 0 | Status: SHIPPED | OUTPATIENT
Start: 2022-06-06

## 2022-06-14 ENCOUNTER — TELEPHONE (OUTPATIENT)
Dept: FAMILY MEDICINE CLINIC | Facility: CLINIC | Age: 62
End: 2022-06-14

## 2022-06-20 RX ORDER — PROPRANOLOL HYDROCHLORIDE 10 MG/1
TABLET ORAL
Qty: 180 TABLET | Refills: 0 | Status: SHIPPED | OUTPATIENT
Start: 2022-06-20

## 2022-06-28 ENCOUNTER — PATIENT MESSAGE (OUTPATIENT)
Dept: FAMILY MEDICINE CLINIC | Facility: CLINIC | Age: 62
End: 2022-06-28

## 2022-06-28 RX ORDER — HYDROCODONE BITARTRATE AND ACETAMINOPHEN 7.5; 325 MG/1; MG/1
1-2 TABLET ORAL EVERY 8 HOURS PRN
Qty: 90 TABLET | Refills: 0 | Status: SHIPPED | OUTPATIENT
Start: 2022-06-28

## 2022-06-28 NOTE — TELEPHONE ENCOUNTER
From: Nathanael Breen  To: Jeannette Marquez MD  Sent: 6/28/2022 11:08 AM CDT  Subject: Pain medication     Good morning. I need you to put in a rx order for my Hydrocodone for June, July and August. My last  was in May. I see you again in August I think. Thank you!  Daly Laguna

## 2022-06-30 ENCOUNTER — OFFICE VISIT (OUTPATIENT)
Dept: HEMATOLOGY/ONCOLOGY | Age: 62
End: 2022-06-30
Attending: INTERNAL MEDICINE
Payer: COMMERCIAL

## 2022-06-30 VITALS
WEIGHT: 177.5 LBS | HEIGHT: 65.98 IN | OXYGEN SATURATION: 96 % | BODY MASS INDEX: 28.53 KG/M2 | HEART RATE: 64 BPM | DIASTOLIC BLOOD PRESSURE: 58 MMHG | TEMPERATURE: 99 F | SYSTOLIC BLOOD PRESSURE: 114 MMHG

## 2022-06-30 DIAGNOSIS — C50.011 MALIGNANT NEOPLASM OF NIPPLE OF RIGHT BREAST IN FEMALE, ESTROGEN RECEPTOR POSITIVE (HCC): Primary | ICD-10-CM

## 2022-06-30 DIAGNOSIS — Z17.0 MALIGNANT NEOPLASM OF NIPPLE OF RIGHT BREAST IN FEMALE, ESTROGEN RECEPTOR POSITIVE (HCC): ICD-10-CM

## 2022-06-30 DIAGNOSIS — C50.011 MALIGNANT NEOPLASM OF NIPPLE OF RIGHT BREAST IN FEMALE, ESTROGEN RECEPTOR POSITIVE (HCC): ICD-10-CM

## 2022-06-30 DIAGNOSIS — Z17.0 MALIGNANT NEOPLASM OF NIPPLE OF RIGHT BREAST IN FEMALE, ESTROGEN RECEPTOR POSITIVE (HCC): Primary | ICD-10-CM

## 2022-06-30 DIAGNOSIS — C79.51 BONE METASTASES (HCC): ICD-10-CM

## 2022-06-30 DIAGNOSIS — D50.0 IRON DEFICIENCY ANEMIA DUE TO CHRONIC BLOOD LOSS: ICD-10-CM

## 2022-06-30 DIAGNOSIS — C78.2 CARCINOMA OF RIGHT BREAST METASTATIC TO PLEURA (HCC): ICD-10-CM

## 2022-06-30 DIAGNOSIS — C50.911 CARCINOMA OF RIGHT BREAST METASTATIC TO PLEURA (HCC): ICD-10-CM

## 2022-06-30 LAB
ALBUMIN SERPL-MCNC: 4.1 G/DL (ref 3.4–5)
ALBUMIN/GLOB SERPL: 1.2 {RATIO} (ref 1–2)
ALP LIVER SERPL-CCNC: 147 U/L
ALT SERPL-CCNC: 45 U/L
ANION GAP SERPL CALC-SCNC: 6 MMOL/L (ref 0–18)
AST SERPL-CCNC: 43 U/L (ref 15–37)
BASOPHILS # BLD AUTO: 0.08 X10(3) UL (ref 0–0.2)
BASOPHILS NFR BLD AUTO: 1.2 %
BILIRUB SERPL-MCNC: 0.4 MG/DL (ref 0.1–2)
BUN BLD-MCNC: 13 MG/DL (ref 7–18)
CALCIUM BLD-MCNC: 9.4 MG/DL (ref 8.5–10.1)
CHLORIDE SERPL-SCNC: 104 MMOL/L (ref 98–112)
CO2 SERPL-SCNC: 30 MMOL/L (ref 21–32)
CREAT BLD-MCNC: 1.09 MG/DL
DEPRECATED HBV CORE AB SER IA-ACNC: 54.4 NG/ML
EOSINOPHIL # BLD AUTO: 0.26 X10(3) UL (ref 0–0.7)
EOSINOPHIL NFR BLD AUTO: 4 %
ERYTHROCYTE [DISTWIDTH] IN BLOOD BY AUTOMATED COUNT: 15.9 %
FASTING STATUS PATIENT QL REPORTED: NO
GLOBULIN PLAS-MCNC: 3.4 G/DL (ref 2.8–4.4)
GLUCOSE BLD-MCNC: 124 MG/DL (ref 70–99)
HCT VFR BLD AUTO: 32 %
HGB BLD-MCNC: 9.8 G/DL
IMM GRANULOCYTES # BLD AUTO: 0.02 X10(3) UL (ref 0–1)
IMM GRANULOCYTES NFR BLD: 0.3 %
IRON SATN MFR SERPL: 11 %
IRON SERPL-MCNC: 51 UG/DL
LYMPHOCYTES # BLD AUTO: 2.08 X10(3) UL (ref 1–4)
LYMPHOCYTES NFR BLD AUTO: 31.9 %
MCH RBC QN AUTO: 28.7 PG (ref 26–34)
MCHC RBC AUTO-ENTMCNC: 30.6 G/DL (ref 31–37)
MCV RBC AUTO: 93.6 FL
MONOCYTES # BLD AUTO: 0.45 X10(3) UL (ref 0.1–1)
MONOCYTES NFR BLD AUTO: 6.9 %
NEUTROPHILS # BLD AUTO: 3.63 X10 (3) UL (ref 1.5–7.7)
NEUTROPHILS # BLD AUTO: 3.63 X10(3) UL (ref 1.5–7.7)
NEUTROPHILS NFR BLD AUTO: 55.7 %
OSMOLALITY SERPL CALC.SUM OF ELEC: 292 MOSM/KG (ref 275–295)
PLATELET # BLD AUTO: 178 10(3)UL (ref 150–450)
POTASSIUM SERPL-SCNC: 3.6 MMOL/L (ref 3.5–5.1)
PROT SERPL-MCNC: 7.5 G/DL (ref 6.4–8.2)
RBC # BLD AUTO: 3.42 X10(6)UL
SODIUM SERPL-SCNC: 140 MMOL/L (ref 136–145)
TIBC SERPL-MCNC: 459 UG/DL (ref 240–450)
TRANSFERRIN SERPL-MCNC: 308 MG/DL (ref 200–360)
WBC # BLD AUTO: 6.5 X10(3) UL (ref 4–11)

## 2022-06-30 PROCEDURE — 99214 OFFICE O/P EST MOD 30 MIN: CPT | Performed by: INTERNAL MEDICINE

## 2022-06-30 PROCEDURE — 96402 CHEMO HORMON ANTINEOPL SQ/IM: CPT

## 2022-06-30 RX ORDER — LAMOTRIGINE 25 MG/1
500 TABLET ORAL ONCE
Status: COMPLETED | OUTPATIENT
Start: 2022-06-30 | End: 2022-06-30

## 2022-06-30 RX ORDER — LAMOTRIGINE 25 MG/1
500 TABLET ORAL ONCE
Status: CANCELLED | OUTPATIENT
Start: 2022-06-30

## 2022-06-30 RX ADMIN — LAMOTRIGINE 500 MG: 25 TABLET ORAL at 15:26:00

## 2022-06-30 NOTE — PROGRESS NOTES
Outpatient Oncology Care Plan  Problem list:  knowledge deficit    Problems related to:    disease/disease progression    Interventions:  provided general teaching    Expected outcomes:  understands plan of care    Progress towards outcome:  making progress    Education Record    Learner:  Patient and Spouse  Barriers / Limitations:  None  Method:  Brief focused  Outcome:  Shows understanding  Comments:  Pt here for follow up and injection. Pt denies bleeding. Pt does not voice any new complaints.

## 2022-07-28 ENCOUNTER — OFFICE VISIT (OUTPATIENT)
Dept: HEMATOLOGY/ONCOLOGY | Age: 62
End: 2022-07-28
Attending: INTERNAL MEDICINE
Payer: COMMERCIAL

## 2022-07-28 VITALS
HEART RATE: 60 BPM | BODY MASS INDEX: 29.23 KG/M2 | HEIGHT: 65.98 IN | OXYGEN SATURATION: 96 % | SYSTOLIC BLOOD PRESSURE: 130 MMHG | TEMPERATURE: 99 F | DIASTOLIC BLOOD PRESSURE: 68 MMHG | WEIGHT: 181.88 LBS

## 2022-07-28 DIAGNOSIS — Z17.0 MALIGNANT NEOPLASM OF NIPPLE OF RIGHT BREAST IN FEMALE, ESTROGEN RECEPTOR POSITIVE (HCC): ICD-10-CM

## 2022-07-28 DIAGNOSIS — D18.03 LIVER HEMANGIOMA: Primary | ICD-10-CM

## 2022-07-28 DIAGNOSIS — D50.0 IRON DEFICIENCY ANEMIA DUE TO CHRONIC BLOOD LOSS: ICD-10-CM

## 2022-07-28 DIAGNOSIS — C50.011 MALIGNANT NEOPLASM OF NIPPLE OF RIGHT BREAST IN FEMALE, ESTROGEN RECEPTOR POSITIVE (HCC): Primary | ICD-10-CM

## 2022-07-28 DIAGNOSIS — C50.911 CARCINOMA OF RIGHT BREAST METASTATIC TO PLEURA (HCC): ICD-10-CM

## 2022-07-28 DIAGNOSIS — Z17.0 MALIGNANT NEOPLASM OF NIPPLE OF RIGHT BREAST IN FEMALE, ESTROGEN RECEPTOR POSITIVE (HCC): Primary | ICD-10-CM

## 2022-07-28 DIAGNOSIS — C79.51 BONE METASTASES (HCC): ICD-10-CM

## 2022-07-28 DIAGNOSIS — C78.2 CARCINOMA OF RIGHT BREAST METASTATIC TO PLEURA (HCC): ICD-10-CM

## 2022-07-28 DIAGNOSIS — C50.011 MALIGNANT NEOPLASM OF NIPPLE OF RIGHT BREAST IN FEMALE, ESTROGEN RECEPTOR POSITIVE (HCC): ICD-10-CM

## 2022-07-28 LAB
ALBUMIN SERPL-MCNC: 4.1 G/DL (ref 3.4–5)
ALBUMIN SERPL-MCNC: 4.1 G/DL (ref 3.4–5)
ALBUMIN/GLOB SERPL: 1.1 {RATIO} (ref 1–2)
ALBUMIN/GLOB SERPL: 1.2 {RATIO} (ref 1–2)
ALP LIVER SERPL-CCNC: 166 U/L
ALP LIVER SERPL-CCNC: 168 U/L
ALT SERPL-CCNC: 29 U/L
ALT SERPL-CCNC: 30 U/L
ANION GAP SERPL CALC-SCNC: 4 MMOL/L (ref 0–18)
ANION GAP SERPL CALC-SCNC: 4 MMOL/L (ref 0–18)
AST SERPL-CCNC: 29 U/L (ref 15–37)
AST SERPL-CCNC: 30 U/L (ref 15–37)
BASOPHILS # BLD AUTO: 0.1 X10(3) UL (ref 0–0.2)
BASOPHILS NFR BLD AUTO: 1.5 %
BILIRUB SERPL-MCNC: 0.4 MG/DL (ref 0.1–2)
BILIRUB SERPL-MCNC: 0.4 MG/DL (ref 0.1–2)
BUN BLD-MCNC: 12 MG/DL (ref 7–18)
BUN BLD-MCNC: 12 MG/DL (ref 7–18)
CALCIUM BLD-MCNC: 8.8 MG/DL (ref 8.5–10.1)
CALCIUM BLD-MCNC: 8.8 MG/DL (ref 8.5–10.1)
CHLORIDE SERPL-SCNC: 105 MMOL/L (ref 98–112)
CHLORIDE SERPL-SCNC: 106 MMOL/L (ref 98–112)
CO2 SERPL-SCNC: 29 MMOL/L (ref 21–32)
CO2 SERPL-SCNC: 30 MMOL/L (ref 21–32)
CREAT BLD-MCNC: 1.05 MG/DL
CREAT BLD-MCNC: 1.1 MG/DL
DEPRECATED HBV CORE AB SER IA-ACNC: 29.7 NG/ML
EOSINOPHIL # BLD AUTO: 0.32 X10(3) UL (ref 0–0.7)
EOSINOPHIL NFR BLD AUTO: 4.8 %
ERYTHROCYTE [DISTWIDTH] IN BLOOD BY AUTOMATED COUNT: 15.6 %
FASTING STATUS PATIENT QL REPORTED: NO
FASTING STATUS PATIENT QL REPORTED: NO
GLOBULIN PLAS-MCNC: 3.5 G/DL (ref 2.8–4.4)
GLOBULIN PLAS-MCNC: 3.6 G/DL (ref 2.8–4.4)
GLUCOSE BLD-MCNC: 119 MG/DL (ref 70–99)
GLUCOSE BLD-MCNC: 123 MG/DL (ref 70–99)
HCT VFR BLD AUTO: 30.1 %
HGB BLD-MCNC: 9.4 G/DL
IMM GRANULOCYTES # BLD AUTO: 0.02 X10(3) UL (ref 0–1)
IMM GRANULOCYTES NFR BLD: 0.3 %
IRON SATN MFR SERPL: 16 %
IRON SERPL-MCNC: 88 UG/DL
LYMPHOCYTES # BLD AUTO: 2.12 X10(3) UL (ref 1–4)
LYMPHOCYTES NFR BLD AUTO: 31.8 %
MCH RBC QN AUTO: 29.2 PG (ref 26–34)
MCHC RBC AUTO-ENTMCNC: 31.2 G/DL (ref 31–37)
MCV RBC AUTO: 93.5 FL
MONOCYTES # BLD AUTO: 0.47 X10(3) UL (ref 0.1–1)
MONOCYTES NFR BLD AUTO: 7 %
NEUTROPHILS # BLD AUTO: 3.64 X10 (3) UL (ref 1.5–7.7)
NEUTROPHILS # BLD AUTO: 3.64 X10(3) UL (ref 1.5–7.7)
NEUTROPHILS NFR BLD AUTO: 54.6 %
OSMOLALITY SERPL CALC.SUM OF ELEC: 289 MOSM/KG (ref 275–295)
OSMOLALITY SERPL CALC.SUM OF ELEC: 289 MOSM/KG (ref 275–295)
PLATELET # BLD AUTO: 206 10(3)UL (ref 150–450)
POTASSIUM SERPL-SCNC: 3.7 MMOL/L (ref 3.5–5.1)
POTASSIUM SERPL-SCNC: 3.8 MMOL/L (ref 3.5–5.1)
PROT SERPL-MCNC: 7.6 G/DL (ref 6.4–8.2)
PROT SERPL-MCNC: 7.7 G/DL (ref 6.4–8.2)
RBC # BLD AUTO: 3.22 X10(6)UL
SODIUM SERPL-SCNC: 139 MMOL/L (ref 136–145)
SODIUM SERPL-SCNC: 139 MMOL/L (ref 136–145)
TIBC SERPL-MCNC: 539 UG/DL (ref 240–450)
TRANSFERRIN SERPL-MCNC: 362 MG/DL (ref 200–360)
WBC # BLD AUTO: 6.7 X10(3) UL (ref 4–11)

## 2022-07-28 PROCEDURE — 96402 CHEMO HORMON ANTINEOPL SQ/IM: CPT

## 2022-07-28 PROCEDURE — 99215 OFFICE O/P EST HI 40 MIN: CPT | Performed by: INTERNAL MEDICINE

## 2022-07-28 RX ORDER — LAMOTRIGINE 25 MG/1
500 TABLET ORAL ONCE
Status: COMPLETED | OUTPATIENT
Start: 2022-07-28 | End: 2022-07-28

## 2022-07-28 RX ORDER — LAMOTRIGINE 25 MG/1
500 TABLET ORAL ONCE
Status: CANCELLED | OUTPATIENT
Start: 2022-07-28

## 2022-07-28 RX ADMIN — LAMOTRIGINE 500 MG: 25 TABLET ORAL at 15:32:00

## 2022-07-28 NOTE — PROGRESS NOTES
Education Record    Learner:  Patient    Disease / Diagnosis:MBRCA    Barriers / Limitations:  None   Comments:    Method:  Brief focused and Printed material   Comments:    General Topics:   Plan of care reviewed   Comments:    Outcome:  Shows understanding   Comments:   Faslodex injection given per MD orders w/out incident. Pt dc home ambulatory with spouse in stable condition.

## 2022-07-28 NOTE — PROGRESS NOTES
Patient is here for follow up for breast cancer on faslodex. She is doing fairly well. She is eating ok, not as healthy choices as she should. She continues to have neuropathy pain in her feet and legs. Her energy is gradually decreasing over the past two weeks but she has had a lot going on and stress has been higher so she hasn't been sleeping as well. She denies any fever, cough or shortness of breath.      Education Record    Learner:  Patient and Spouse    Disease / Diagnosis: breast cancer    Barriers / Limitations:  None   Comments:    Method:  Discussion   Comments:    General Topics:  Side effects and symptom management   Comments:    Outcome:  Shows understanding   Comments:

## 2022-07-29 ENCOUNTER — TELEPHONE (OUTPATIENT)
Dept: HEMATOLOGY/ONCOLOGY | Facility: HOSPITAL | Age: 62
End: 2022-07-29

## 2022-08-01 ENCOUNTER — OFFICE VISIT (OUTPATIENT)
Dept: FAMILY MEDICINE CLINIC | Facility: CLINIC | Age: 62
End: 2022-08-01
Payer: COMMERCIAL

## 2022-08-01 VITALS
HEIGHT: 65.98 IN | DIASTOLIC BLOOD PRESSURE: 60 MMHG | TEMPERATURE: 99 F | RESPIRATION RATE: 16 BRPM | HEART RATE: 70 BPM | WEIGHT: 178 LBS | BODY MASS INDEX: 28.61 KG/M2 | SYSTOLIC BLOOD PRESSURE: 120 MMHG

## 2022-08-01 DIAGNOSIS — R16.0 HEPATOMEGALY: ICD-10-CM

## 2022-08-01 DIAGNOSIS — E03.9 HYPOTHYROIDISM IN ADULT: ICD-10-CM

## 2022-08-01 DIAGNOSIS — R79.89 ELEVATED LIVER FUNCTION TESTS: ICD-10-CM

## 2022-08-01 DIAGNOSIS — D50.0 IRON DEFICIENCY ANEMIA DUE TO CHRONIC BLOOD LOSS: ICD-10-CM

## 2022-08-01 DIAGNOSIS — K42.9 UMBILICAL HERNIA WITHOUT OBSTRUCTION AND WITHOUT GANGRENE: ICD-10-CM

## 2022-08-01 DIAGNOSIS — C50.011 MALIGNANT NEOPLASM OF NIPPLE OF RIGHT BREAST IN FEMALE, ESTROGEN RECEPTOR POSITIVE (HCC): ICD-10-CM

## 2022-08-01 DIAGNOSIS — K74.60 CIRRHOSIS OF LIVER WITH ASCITES, UNSPECIFIED HEPATIC CIRRHOSIS TYPE (HCC): ICD-10-CM

## 2022-08-01 DIAGNOSIS — R16.1 SPLENOMEGALY: ICD-10-CM

## 2022-08-01 DIAGNOSIS — G62.9 PERIPHERAL POLYNEUROPATHY: ICD-10-CM

## 2022-08-01 DIAGNOSIS — E78.2 HYPERLIPIDEMIA, MIXED: Primary | ICD-10-CM

## 2022-08-01 DIAGNOSIS — C79.51 BONE METASTASES (HCC): ICD-10-CM

## 2022-08-01 DIAGNOSIS — Z17.0 MALIGNANT NEOPLASM OF NIPPLE OF RIGHT BREAST IN FEMALE, ESTROGEN RECEPTOR POSITIVE (HCC): ICD-10-CM

## 2022-08-01 DIAGNOSIS — R60.0 EDEMA OF BOTH FEET: ICD-10-CM

## 2022-08-01 DIAGNOSIS — R18.8 CIRRHOSIS OF LIVER WITH ASCITES, UNSPECIFIED HEPATIC CIRRHOSIS TYPE (HCC): ICD-10-CM

## 2022-08-01 DIAGNOSIS — K21.9 GASTROESOPHAGEAL REFLUX DISEASE WITHOUT ESOPHAGITIS: ICD-10-CM

## 2022-08-01 DIAGNOSIS — C50.919 METASTATIC BREAST CANCER (HCC): ICD-10-CM

## 2022-08-01 PROCEDURE — 3078F DIAST BP <80 MM HG: CPT | Performed by: FAMILY MEDICINE

## 2022-08-01 PROCEDURE — 3074F SYST BP LT 130 MM HG: CPT | Performed by: FAMILY MEDICINE

## 2022-08-01 PROCEDURE — 3008F BODY MASS INDEX DOCD: CPT | Performed by: FAMILY MEDICINE

## 2022-08-01 PROCEDURE — 99215 OFFICE O/P EST HI 40 MIN: CPT | Performed by: FAMILY MEDICINE

## 2022-08-01 RX ORDER — HYDROCODONE BITARTRATE AND ACETAMINOPHEN 7.5; 325 MG/1; MG/1
1 TABLET ORAL EVERY 8 HOURS PRN
Qty: 30 TABLET | Refills: 0 | Status: SHIPPED | OUTPATIENT
Start: 2022-10-02 | End: 2022-08-01

## 2022-08-01 RX ORDER — HYDROCODONE BITARTRATE AND ACETAMINOPHEN 7.5; 325 MG/1; MG/1
1 TABLET ORAL EVERY 8 HOURS PRN
Qty: 30 TABLET | Refills: 0 | Status: SHIPPED | OUTPATIENT
Start: 2022-08-01 | End: 2022-08-01

## 2022-08-01 RX ORDER — HYDROCODONE BITARTRATE AND ACETAMINOPHEN 7.5; 325 MG/1; MG/1
1 TABLET ORAL EVERY 8 HOURS PRN
Qty: 30 TABLET | Refills: 0 | Status: SHIPPED | OUTPATIENT
Start: 2022-09-01 | End: 2022-08-01

## 2022-08-01 RX ORDER — PANTOPRAZOLE SODIUM 40 MG/1
40 TABLET, DELAYED RELEASE ORAL
Qty: 180 TABLET | Refills: 1 | Status: SHIPPED | OUTPATIENT
Start: 2022-08-01

## 2022-08-01 RX ORDER — HYDROCODONE BITARTRATE AND ACETAMINOPHEN 7.5; 325 MG/1; MG/1
1 TABLET ORAL EVERY 8 HOURS PRN
Qty: 90 TABLET | Refills: 0 | Status: SHIPPED | OUTPATIENT
Start: 2022-10-02 | End: 2022-11-01

## 2022-08-01 RX ORDER — FUROSEMIDE 40 MG/1
40 TABLET ORAL DAILY
Qty: 90 TABLET | Refills: 1 | Status: SHIPPED | OUTPATIENT
Start: 2022-08-01

## 2022-08-01 RX ORDER — HYDROCODONE BITARTRATE AND ACETAMINOPHEN 7.5; 325 MG/1; MG/1
1 TABLET ORAL EVERY 8 HOURS PRN
Qty: 90 TABLET | Refills: 0 | Status: SHIPPED | OUTPATIENT
Start: 2022-08-01 | End: 2022-08-31

## 2022-08-01 RX ORDER — HYDROCODONE BITARTRATE AND ACETAMINOPHEN 7.5; 325 MG/1; MG/1
1-2 TABLET ORAL EVERY 8 HOURS PRN
Qty: 90 TABLET | Refills: 0 | Status: CANCELLED | OUTPATIENT
Start: 2022-08-01

## 2022-08-01 RX ORDER — PROPRANOLOL HYDROCHLORIDE 10 MG/1
10 TABLET ORAL 2 TIMES DAILY
Qty: 180 TABLET | Refills: 1 | Status: SHIPPED | OUTPATIENT
Start: 2022-08-01

## 2022-08-01 RX ORDER — SPIRONOLACTONE 100 MG/1
100 TABLET, FILM COATED ORAL DAILY
Qty: 90 TABLET | Refills: 1 | Status: SHIPPED | OUTPATIENT
Start: 2022-08-01

## 2022-08-01 RX ORDER — HYDROCODONE BITARTRATE AND ACETAMINOPHEN 7.5; 325 MG/1; MG/1
1 TABLET ORAL EVERY 8 HOURS PRN
Qty: 90 TABLET | Refills: 0 | Status: SHIPPED | OUTPATIENT
Start: 2022-09-01 | End: 2022-10-01

## 2022-08-03 ENCOUNTER — OFFICE VISIT (OUTPATIENT)
Dept: HEMATOLOGY/ONCOLOGY | Age: 62
End: 2022-08-03
Attending: INTERNAL MEDICINE
Payer: COMMERCIAL

## 2022-08-03 VITALS
HEART RATE: 65 BPM | SYSTOLIC BLOOD PRESSURE: 112 MMHG | TEMPERATURE: 99 F | OXYGEN SATURATION: 97 % | RESPIRATION RATE: 20 BRPM | DIASTOLIC BLOOD PRESSURE: 63 MMHG

## 2022-08-03 DIAGNOSIS — C50.919 BREAST CANCER METASTASIZED TO PLEURA, UNSPECIFIED LATERALITY (HCC): ICD-10-CM

## 2022-08-03 DIAGNOSIS — C79.51 BONE METASTASES (HCC): ICD-10-CM

## 2022-08-03 DIAGNOSIS — C78.2 BREAST CANCER METASTASIZED TO PLEURA, UNSPECIFIED LATERALITY (HCC): ICD-10-CM

## 2022-08-03 DIAGNOSIS — E86.0 DEHYDRATION: ICD-10-CM

## 2022-08-03 DIAGNOSIS — D50.0 IRON DEFICIENCY ANEMIA DUE TO CHRONIC BLOOD LOSS: Primary | ICD-10-CM

## 2022-08-03 PROCEDURE — 96365 THER/PROPH/DIAG IV INF INIT: CPT

## 2022-08-03 NOTE — PROGRESS NOTES
Education Record    Learner:  Patient and Spouse    Disease / Diagnosis: Anemia    Barriers / Limitations:  None   Comments:    Method:  Discussion   Comments:    General Topics:  Medication, Procedure and Plan of care reviewed   Comments:    Outcome:  Shows understanding   Comments:  Patient tolerated Infed infusion without incidence. 30-minute post vitals stable. Patient  Discharged from [facility] instable condition.

## 2022-08-12 RX ORDER — ESCITALOPRAM OXALATE 20 MG/1
TABLET ORAL
Qty: 90 TABLET | Refills: 0 | Status: SHIPPED | OUTPATIENT
Start: 2022-08-12 | End: 2022-11-10

## 2022-08-17 ENCOUNTER — OFFICE VISIT (OUTPATIENT)
Dept: FAMILY MEDICINE CLINIC | Facility: CLINIC | Age: 62
End: 2022-08-17
Payer: COMMERCIAL

## 2022-08-17 ENCOUNTER — TELEPHONE (OUTPATIENT)
Dept: HEMATOLOGY/ONCOLOGY | Facility: HOSPITAL | Age: 62
End: 2022-08-17

## 2022-08-17 VITALS
TEMPERATURE: 97 F | HEART RATE: 70 BPM | WEIGHT: 180 LBS | DIASTOLIC BLOOD PRESSURE: 50 MMHG | HEIGHT: 66 IN | RESPIRATION RATE: 16 BRPM | BODY MASS INDEX: 28.93 KG/M2 | OXYGEN SATURATION: 98 % | SYSTOLIC BLOOD PRESSURE: 119 MMHG

## 2022-08-17 DIAGNOSIS — J06.9 VIRAL UPPER RESPIRATORY TRACT INFECTION: Primary | ICD-10-CM

## 2022-08-17 DIAGNOSIS — Z20.822 SUSPECTED COVID-19 VIRUS INFECTION: ICD-10-CM

## 2022-08-17 PROCEDURE — 3008F BODY MASS INDEX DOCD: CPT | Performed by: NURSE PRACTITIONER

## 2022-08-17 PROCEDURE — 3078F DIAST BP <80 MM HG: CPT | Performed by: NURSE PRACTITIONER

## 2022-08-17 PROCEDURE — 99213 OFFICE O/P EST LOW 20 MIN: CPT | Performed by: NURSE PRACTITIONER

## 2022-08-17 PROCEDURE — 3074F SYST BP LT 130 MM HG: CPT | Performed by: NURSE PRACTITIONER

## 2022-08-18 ENCOUNTER — HOSPITAL ENCOUNTER (OUTPATIENT)
Dept: MRI IMAGING | Age: 62
Discharge: HOME | End: 2022-08-18
Attending: INTERNAL MEDICINE
Payer: COMMERCIAL

## 2022-08-18 ENCOUNTER — HOSPITAL ENCOUNTER (OUTPATIENT)
Dept: CT IMAGING | Age: 62
Discharge: HOME | End: 2022-08-18
Attending: INTERNAL MEDICINE
Payer: COMMERCIAL

## 2022-08-18 DIAGNOSIS — C78.2 CARCINOMA OF RIGHT BREAST METASTATIC TO PLEURA (HCC): ICD-10-CM

## 2022-08-18 DIAGNOSIS — C50.911 CARCINOMA OF RIGHT BREAST METASTATIC TO PLEURA (HCC): ICD-10-CM

## 2022-08-18 DIAGNOSIS — C79.51 BONE METASTASES (HCC): ICD-10-CM

## 2022-08-18 DIAGNOSIS — D18.03 LIVER HEMANGIOMA: ICD-10-CM

## 2022-08-18 DIAGNOSIS — C50.011 MALIGNANT NEOPLASM OF NIPPLE OF RIGHT BREAST IN FEMALE, ESTROGEN RECEPTOR POSITIVE (HCC): ICD-10-CM

## 2022-08-18 DIAGNOSIS — Z17.0 MALIGNANT NEOPLASM OF NIPPLE OF RIGHT BREAST IN FEMALE, ESTROGEN RECEPTOR POSITIVE (HCC): ICD-10-CM

## 2022-08-18 LAB — SARS-COV-2 RNA RESP QL NAA+PROBE: NOT DETECTED

## 2022-08-18 PROCEDURE — 74181 MRI ABDOMEN W/O CONTRAST: CPT | Performed by: INTERNAL MEDICINE

## 2022-08-18 PROCEDURE — 71250 CT THORAX DX C-: CPT | Performed by: INTERNAL MEDICINE

## 2022-08-25 ENCOUNTER — OFFICE VISIT (OUTPATIENT)
Dept: HEMATOLOGY/ONCOLOGY | Age: 62
End: 2022-08-25
Attending: INTERNAL MEDICINE
Payer: COMMERCIAL

## 2022-08-25 VITALS
BODY MASS INDEX: 28.9 KG/M2 | HEIGHT: 65.98 IN | DIASTOLIC BLOOD PRESSURE: 66 MMHG | HEART RATE: 64 BPM | OXYGEN SATURATION: 98 % | SYSTOLIC BLOOD PRESSURE: 129 MMHG | WEIGHT: 179.81 LBS | TEMPERATURE: 100 F

## 2022-08-25 DIAGNOSIS — C79.51 BONE METASTASES (HCC): ICD-10-CM

## 2022-08-25 DIAGNOSIS — C78.2 CARCINOMA OF RIGHT BREAST METASTATIC TO PLEURA (HCC): ICD-10-CM

## 2022-08-25 DIAGNOSIS — C50.011 MALIGNANT NEOPLASM OF NIPPLE OF RIGHT BREAST IN FEMALE, ESTROGEN RECEPTOR POSITIVE (HCC): Primary | ICD-10-CM

## 2022-08-25 DIAGNOSIS — C50.911 CARCINOMA OF RIGHT BREAST METASTATIC TO PLEURA (HCC): ICD-10-CM

## 2022-08-25 DIAGNOSIS — E78.2 HYPERLIPIDEMIA, MIXED: ICD-10-CM

## 2022-08-25 DIAGNOSIS — Z17.0 MALIGNANT NEOPLASM OF NIPPLE OF RIGHT BREAST IN FEMALE, ESTROGEN RECEPTOR POSITIVE (HCC): ICD-10-CM

## 2022-08-25 DIAGNOSIS — D50.0 IRON DEFICIENCY ANEMIA DUE TO CHRONIC BLOOD LOSS: ICD-10-CM

## 2022-08-25 DIAGNOSIS — Z17.0 MALIGNANT NEOPLASM OF NIPPLE OF RIGHT BREAST IN FEMALE, ESTROGEN RECEPTOR POSITIVE (HCC): Primary | ICD-10-CM

## 2022-08-25 DIAGNOSIS — C50.011 MALIGNANT NEOPLASM OF NIPPLE OF RIGHT BREAST IN FEMALE, ESTROGEN RECEPTOR POSITIVE (HCC): ICD-10-CM

## 2022-08-25 LAB
ALBUMIN SERPL-MCNC: 3.8 G/DL (ref 3.4–5)
ALBUMIN/GLOB SERPL: 1.1 {RATIO} (ref 1–2)
ALP LIVER SERPL-CCNC: 144 U/L
ALT SERPL-CCNC: 39 U/L
ANION GAP SERPL CALC-SCNC: 6 MMOL/L (ref 0–18)
AST SERPL-CCNC: 41 U/L (ref 15–37)
BASOPHILS # BLD AUTO: 0.09 X10(3) UL (ref 0–0.2)
BASOPHILS NFR BLD AUTO: 1.2 %
BILIRUB SERPL-MCNC: 0.4 MG/DL (ref 0.1–2)
BUN BLD-MCNC: 12 MG/DL (ref 7–18)
CALCIUM BLD-MCNC: 8.8 MG/DL (ref 8.5–10.1)
CHLORIDE SERPL-SCNC: 107 MMOL/L (ref 98–112)
CO2 SERPL-SCNC: 27 MMOL/L (ref 21–32)
CREAT BLD-MCNC: 1.02 MG/DL
DEPRECATED HBV CORE AB SER IA-ACNC: 162.5 NG/ML
EOSINOPHIL # BLD AUTO: 0.35 X10(3) UL (ref 0–0.7)
EOSINOPHIL NFR BLD AUTO: 4.6 %
ERYTHROCYTE [DISTWIDTH] IN BLOOD BY AUTOMATED COUNT: 17.2 %
FASTING STATUS PATIENT QL REPORTED: NO
GFR SERPLBLD BASED ON 1.73 SQ M-ARVRAT: 63 ML/MIN/1.73M2 (ref 60–?)
GLOBULIN PLAS-MCNC: 3.4 G/DL (ref 2.8–4.4)
GLUCOSE BLD-MCNC: 136 MG/DL (ref 70–99)
HCT VFR BLD AUTO: 28.5 %
HGB BLD-MCNC: 8.8 G/DL
IMM GRANULOCYTES # BLD AUTO: 0.04 X10(3) UL (ref 0–1)
IMM GRANULOCYTES NFR BLD: 0.5 %
IRON SATN MFR SERPL: 15 %
IRON SERPL-MCNC: 61 UG/DL
LYMPHOCYTES # BLD AUTO: 2.59 X10(3) UL (ref 1–4)
LYMPHOCYTES NFR BLD AUTO: 34.1 %
MCH RBC QN AUTO: 30.1 PG (ref 26–34)
MCHC RBC AUTO-ENTMCNC: 30.9 G/DL (ref 31–37)
MCV RBC AUTO: 97.6 FL
MONOCYTES # BLD AUTO: 0.5 X10(3) UL (ref 0.1–1)
MONOCYTES NFR BLD AUTO: 6.6 %
NEUTROPHILS # BLD AUTO: 4.03 X10 (3) UL (ref 1.5–7.7)
NEUTROPHILS # BLD AUTO: 4.03 X10(3) UL (ref 1.5–7.7)
NEUTROPHILS NFR BLD AUTO: 53 %
OSMOLALITY SERPL CALC.SUM OF ELEC: 292 MOSM/KG (ref 275–295)
PLATELET # BLD AUTO: 245 10(3)UL (ref 150–450)
POTASSIUM SERPL-SCNC: 3.5 MMOL/L (ref 3.5–5.1)
PROT SERPL-MCNC: 7.2 G/DL (ref 6.4–8.2)
RBC # BLD AUTO: 2.92 X10(6)UL
SODIUM SERPL-SCNC: 140 MMOL/L (ref 136–145)
TIBC SERPL-MCNC: 402 UG/DL (ref 240–450)
TRANSFERRIN SERPL-MCNC: 270 MG/DL (ref 200–360)
WBC # BLD AUTO: 7.6 X10(3) UL (ref 4–11)

## 2022-08-25 PROCEDURE — 99215 OFFICE O/P EST HI 40 MIN: CPT | Performed by: INTERNAL MEDICINE

## 2022-08-25 PROCEDURE — 96402 CHEMO HORMON ANTINEOPL SQ/IM: CPT

## 2022-08-25 RX ORDER — LAMOTRIGINE 25 MG/1
500 TABLET ORAL ONCE
Status: COMPLETED | OUTPATIENT
Start: 2022-08-25 | End: 2022-08-25

## 2022-08-25 RX ORDER — LAMOTRIGINE 25 MG/1
500 TABLET ORAL ONCE
Status: CANCELLED | OUTPATIENT
Start: 2022-08-25

## 2022-08-25 RX ADMIN — LAMOTRIGINE 500 MG: 25 TABLET ORAL at 15:43:00

## 2022-08-25 NOTE — PROGRESS NOTES
Pt here for follow up and injection. She states she has a lot of stress at home. She has 5 adults and 4 grandkids at home.        Outpatient Oncology Care Plan  Problem list:  knowledge deficit    Problems related to:    disease/disease progression    Interventions:  provided general teaching    Expected outcomes:  understands plan of care    Progress towards outcome:  making progress    Education Record    Learner:  Patient  Barriers / Limitations:  None  Method:  Brief focused  Outcome:  Shows understanding  Comments:

## 2022-08-25 NOTE — PROGRESS NOTES
Education Record    Learner:  Patient    Disease / Diagnosis: patient here for faslodex injection     Barriers / Limitations:  None    Method:  Brief focused, printed material and  reinforcement    General Topics:  Plan of care reviewed    Outcome:  Shows understanding

## 2022-08-26 RX ORDER — LEVOTHYROXINE SODIUM 0.15 MG/1
TABLET ORAL
Qty: 90 TABLET | Refills: 0 | Status: SHIPPED | OUTPATIENT
Start: 2022-08-26

## 2022-08-26 RX ORDER — ROSUVASTATIN CALCIUM 5 MG/1
TABLET, COATED ORAL
Qty: 90 TABLET | Refills: 0 | Status: SHIPPED | OUTPATIENT
Start: 2022-08-26

## 2022-08-26 NOTE — TELEPHONE ENCOUNTER
LOV:8/1/2022   for: Medication f/u   Patient advised to RTC on: The patient is asked to return in 3 months. Nov:11/16/2022      Thyroid Supplements Protocol Failed 08/25/2022 11:42 PM   Protocol Details  TSH value between 0.350 and 5.500 IU/ml    TSH test in past 12 months    Appointment in past 12 or next 3 months      Cholesterol Medication Protocol Passed 08/25/2022 11:42 PM   Protocol Details  ALT < 80    ALT resulted within past year    Lipid panel within past 12 months    Appointment within past 12 or next 3 months        Mcm sent to get the blood work done.

## 2022-09-03 NOTE — LETTER
Date: 11/5/2018    Patient Name: Palak Lambert          To Whom it may concern: This letter has been written at the patient's request. The above patient was seen at the [facility] for treatment of a medical condition.     This patient shoul
[phone]

## 2022-09-08 RX ORDER — HYDROCODONE BITARTRATE AND ACETAMINOPHEN 7.5; 325 MG/1; MG/1
1-2 TABLET ORAL EVERY 8 HOURS PRN
Qty: 90 TABLET | Refills: 0 | Status: SHIPPED | OUTPATIENT
Start: 2022-09-08

## 2022-09-08 NOTE — TELEPHONE ENCOUNTER
Last fill 6.28 under Dr CORBETT--pt sees you. Seeing you 11.16  Please approve if appropriate. Thanks.

## 2022-09-17 ENCOUNTER — LAB ENCOUNTER (OUTPATIENT)
Dept: LAB | Age: 62
End: 2022-09-17
Attending: FAMILY MEDICINE
Payer: COMMERCIAL

## 2022-09-17 DIAGNOSIS — E78.2 HYPERLIPIDEMIA, MIXED: ICD-10-CM

## 2022-09-17 DIAGNOSIS — E03.9 HYPOTHYROIDISM IN ADULT: ICD-10-CM

## 2022-09-17 LAB
ALBUMIN SERPL-MCNC: 3.6 G/DL (ref 3.4–5)
ALBUMIN/GLOB SERPL: 1.1 {RATIO} (ref 1–2)
ALP LIVER SERPL-CCNC: 129 U/L
ALT SERPL-CCNC: 31 U/L
ANION GAP SERPL CALC-SCNC: 4 MMOL/L (ref 0–18)
AST SERPL-CCNC: 36 U/L (ref 15–37)
BILIRUB SERPL-MCNC: 0.4 MG/DL (ref 0.1–2)
BUN BLD-MCNC: 14 MG/DL (ref 7–18)
CALCIUM BLD-MCNC: 9.6 MG/DL (ref 8.5–10.1)
CHLORIDE SERPL-SCNC: 109 MMOL/L (ref 98–112)
CHOLEST SERPL-MCNC: 185 MG/DL (ref ?–200)
CO2 SERPL-SCNC: 30 MMOL/L (ref 21–32)
CREAT BLD-MCNC: 1 MG/DL
FASTING PATIENT LIPID ANSWER: YES
FASTING STATUS PATIENT QL REPORTED: YES
GFR SERPLBLD BASED ON 1.73 SQ M-ARVRAT: 64 ML/MIN/1.73M2 (ref 60–?)
GLOBULIN PLAS-MCNC: 3.4 G/DL (ref 2.8–4.4)
GLUCOSE BLD-MCNC: 123 MG/DL (ref 70–99)
HDLC SERPL-MCNC: 35 MG/DL (ref 40–59)
LDLC SERPL CALC-MCNC: 116 MG/DL (ref ?–100)
NONHDLC SERPL-MCNC: 150 MG/DL (ref ?–130)
OSMOLALITY SERPL CALC.SUM OF ELEC: 298 MOSM/KG (ref 275–295)
POTASSIUM SERPL-SCNC: 4.2 MMOL/L (ref 3.5–5.1)
PROT SERPL-MCNC: 7 G/DL (ref 6.4–8.2)
SODIUM SERPL-SCNC: 143 MMOL/L (ref 136–145)
T4 FREE SERPL-MCNC: 1.1 NG/DL (ref 0.8–1.7)
TRIGL SERPL-MCNC: 190 MG/DL (ref 30–149)
TSI SER-ACNC: 2.96 MIU/ML (ref 0.36–3.74)
VLDLC SERPL CALC-MCNC: 33 MG/DL (ref 0–30)

## 2022-09-17 PROCEDURE — 36415 COLL VENOUS BLD VENIPUNCTURE: CPT

## 2022-09-17 PROCEDURE — 84443 ASSAY THYROID STIM HORMONE: CPT

## 2022-09-17 PROCEDURE — 80061 LIPID PANEL: CPT

## 2022-09-17 PROCEDURE — 84439 ASSAY OF FREE THYROXINE: CPT

## 2022-09-17 PROCEDURE — 80053 COMPREHEN METABOLIC PANEL: CPT

## 2022-09-19 RX ORDER — OMEGA-3-ACID ETHYL ESTERS 1 G/1
2 CAPSULE, LIQUID FILLED ORAL 2 TIMES DAILY
Qty: 360 CAPSULE | Refills: 0 | Status: SHIPPED | OUTPATIENT
Start: 2022-09-19

## 2022-09-22 ENCOUNTER — OFFICE VISIT (OUTPATIENT)
Dept: HEMATOLOGY/ONCOLOGY | Age: 62
End: 2022-09-22
Attending: INTERNAL MEDICINE

## 2022-09-22 VITALS
WEIGHT: 182 LBS | BODY MASS INDEX: 29.25 KG/M2 | SYSTOLIC BLOOD PRESSURE: 121 MMHG | TEMPERATURE: 99 F | DIASTOLIC BLOOD PRESSURE: 63 MMHG | HEIGHT: 65.98 IN | OXYGEN SATURATION: 96 % | HEART RATE: 63 BPM

## 2022-09-22 DIAGNOSIS — C50.011 MALIGNANT NEOPLASM OF NIPPLE OF RIGHT BREAST IN FEMALE, ESTROGEN RECEPTOR POSITIVE (HCC): Primary | ICD-10-CM

## 2022-09-22 DIAGNOSIS — C50.911 CARCINOMA OF RIGHT BREAST METASTATIC TO PLEURA (HCC): ICD-10-CM

## 2022-09-22 DIAGNOSIS — C79.51 BONE METASTASES (HCC): ICD-10-CM

## 2022-09-22 DIAGNOSIS — C50.011 MALIGNANT NEOPLASM OF NIPPLE OF RIGHT BREAST IN FEMALE, ESTROGEN RECEPTOR POSITIVE (HCC): ICD-10-CM

## 2022-09-22 DIAGNOSIS — Z17.0 MALIGNANT NEOPLASM OF NIPPLE OF RIGHT BREAST IN FEMALE, ESTROGEN RECEPTOR POSITIVE (HCC): Primary | ICD-10-CM

## 2022-09-22 DIAGNOSIS — C78.2 CARCINOMA OF RIGHT BREAST METASTATIC TO PLEURA (HCC): ICD-10-CM

## 2022-09-22 DIAGNOSIS — Z17.0 MALIGNANT NEOPLASM OF NIPPLE OF RIGHT BREAST IN FEMALE, ESTROGEN RECEPTOR POSITIVE (HCC): ICD-10-CM

## 2022-09-22 DIAGNOSIS — D50.0 IRON DEFICIENCY ANEMIA DUE TO CHRONIC BLOOD LOSS: ICD-10-CM

## 2022-09-22 LAB
ALBUMIN SERPL-MCNC: 3.9 G/DL (ref 3.4–5)
ALBUMIN/GLOB SERPL: 1.1 {RATIO} (ref 1–2)
ALP LIVER SERPL-CCNC: 147 U/L
ALT SERPL-CCNC: 27 U/L
ANION GAP SERPL CALC-SCNC: 4 MMOL/L (ref 0–18)
AST SERPL-CCNC: 22 U/L (ref 15–37)
BASOPHILS # BLD AUTO: 0.08 X10(3) UL (ref 0–0.2)
BASOPHILS NFR BLD AUTO: 1.2 %
BILIRUB SERPL-MCNC: 0.4 MG/DL (ref 0.1–2)
BUN BLD-MCNC: 13 MG/DL (ref 7–18)
CALCIUM BLD-MCNC: 9.1 MG/DL (ref 8.5–10.1)
CHLORIDE SERPL-SCNC: 106 MMOL/L (ref 98–112)
CO2 SERPL-SCNC: 30 MMOL/L (ref 21–32)
CREAT BLD-MCNC: 1.18 MG/DL
DEPRECATED HBV CORE AB SER IA-ACNC: 41.8 NG/ML
EOSINOPHIL # BLD AUTO: 0.35 X10(3) UL (ref 0–0.7)
EOSINOPHIL NFR BLD AUTO: 5.3 %
ERYTHROCYTE [DISTWIDTH] IN BLOOD BY AUTOMATED COUNT: 15.5 %
FASTING STATUS PATIENT QL REPORTED: NO
GFR SERPLBLD BASED ON 1.73 SQ M-ARVRAT: 53 ML/MIN/1.73M2 (ref 60–?)
GLOBULIN PLAS-MCNC: 3.7 G/DL (ref 2.8–4.4)
GLUCOSE BLD-MCNC: 147 MG/DL (ref 70–99)
HCT VFR BLD AUTO: 31.8 %
HGB BLD-MCNC: 9.8 G/DL
IMM GRANULOCYTES # BLD AUTO: 0.02 X10(3) UL (ref 0–1)
IMM GRANULOCYTES NFR BLD: 0.3 %
IRON SATN MFR SERPL: 14 %
IRON SERPL-MCNC: 68 UG/DL
LYMPHOCYTES # BLD AUTO: 1.97 X10(3) UL (ref 1–4)
LYMPHOCYTES NFR BLD AUTO: 29.6 %
MCH RBC QN AUTO: 30.2 PG (ref 26–34)
MCHC RBC AUTO-ENTMCNC: 30.8 G/DL (ref 31–37)
MCV RBC AUTO: 97.8 FL
MONOCYTES # BLD AUTO: 0.48 X10(3) UL (ref 0.1–1)
MONOCYTES NFR BLD AUTO: 7.2 %
NEUTROPHILS # BLD AUTO: 3.75 X10 (3) UL (ref 1.5–7.7)
NEUTROPHILS # BLD AUTO: 3.75 X10(3) UL (ref 1.5–7.7)
NEUTROPHILS NFR BLD AUTO: 56.4 %
OSMOLALITY SERPL CALC.SUM OF ELEC: 293 MOSM/KG (ref 275–295)
PLATELET # BLD AUTO: 226 10(3)UL (ref 150–450)
POTASSIUM SERPL-SCNC: 3.9 MMOL/L (ref 3.5–5.1)
PROT SERPL-MCNC: 7.6 G/DL (ref 6.4–8.2)
RBC # BLD AUTO: 3.25 X10(6)UL
SODIUM SERPL-SCNC: 140 MMOL/L (ref 136–145)
TIBC SERPL-MCNC: 493 UG/DL (ref 240–450)
TRANSFERRIN SERPL-MCNC: 331 MG/DL (ref 200–360)
WBC # BLD AUTO: 6.7 X10(3) UL (ref 4–11)

## 2022-09-22 PROCEDURE — 99215 OFFICE O/P EST HI 40 MIN: CPT | Performed by: INTERNAL MEDICINE

## 2022-09-22 PROCEDURE — 96402 CHEMO HORMON ANTINEOPL SQ/IM: CPT

## 2022-09-22 RX ORDER — LAMOTRIGINE 25 MG/1
500 TABLET ORAL ONCE
Status: COMPLETED | OUTPATIENT
Start: 2022-09-22 | End: 2022-09-22

## 2022-09-22 RX ORDER — LAMOTRIGINE 25 MG/1
500 TABLET ORAL ONCE
Status: CANCELLED | OUTPATIENT
Start: 2022-09-22

## 2022-09-22 RX ADMIN — LAMOTRIGINE 500 MG: 25 TABLET ORAL at 15:37:00

## 2022-09-22 NOTE — PROGRESS NOTES
Pt here for C47 D1. Arrives Ambulating independently, accompanied by Self and Spouse           Pregnancy screening: Denies possibility of pregnancy    Modifications in dose or schedule: No    Drugs/infusions dual verified for appearance and physical integrity. IV pump settings were dual verified: yes     Frequency of blood return and site check throughout administration: Other injection, no blood return   Discharged to Home, Ambulating independently, accompanied by:Self and Spouse    Outpatient Oncology Care Plan  Problem list:  fatigue  Problems related to:  disease/disease progression  side effect of treatment  Interventions:  encourage activity as tolerated  promoted rest  Expected outcomes:  adequate sleep/rest  symptoms relieved/minimized  Progress towards outcome:  making progress    Education Record    Learner:  Patient and Spouse  Barriers / Limitations:  None  Method:  Discussion  Outcome:  Shows understanding  Comments: Pt tolerated injection without incident. Discharged home in stable condition.

## 2022-10-18 DIAGNOSIS — R16.1 SPLENOMEGALY: ICD-10-CM

## 2022-10-18 RX ORDER — ALBUMIN (HUMAN) 12.5 G/50ML
25 SOLUTION INTRAVENOUS ONCE
Status: DISCONTINUED | OUTPATIENT
Start: 2022-10-18 | End: 2022-10-18

## 2022-10-18 RX ORDER — PROPRANOLOL HYDROCHLORIDE 10 MG/1
TABLET ORAL
Qty: 180 TABLET | Refills: 1 | Status: SHIPPED | OUTPATIENT
Start: 2022-10-18

## 2022-10-20 ENCOUNTER — OFFICE VISIT (OUTPATIENT)
Dept: HEMATOLOGY/ONCOLOGY | Age: 62
End: 2022-10-20
Attending: INTERNAL MEDICINE
Payer: COMMERCIAL

## 2022-10-20 VITALS
TEMPERATURE: 99 F | DIASTOLIC BLOOD PRESSURE: 63 MMHG | SYSTOLIC BLOOD PRESSURE: 119 MMHG | HEART RATE: 63 BPM | WEIGHT: 181.19 LBS | OXYGEN SATURATION: 97 % | HEIGHT: 65.98 IN | BODY MASS INDEX: 29.12 KG/M2

## 2022-10-20 DIAGNOSIS — C50.011 MALIGNANT NEOPLASM OF NIPPLE OF RIGHT BREAST IN FEMALE, ESTROGEN RECEPTOR POSITIVE (HCC): ICD-10-CM

## 2022-10-20 DIAGNOSIS — D50.0 IRON DEFICIENCY ANEMIA DUE TO CHRONIC BLOOD LOSS: ICD-10-CM

## 2022-10-20 DIAGNOSIS — C79.51 BONE METASTASES (HCC): ICD-10-CM

## 2022-10-20 DIAGNOSIS — C78.2 CARCINOMA OF RIGHT BREAST METASTATIC TO PLEURA (HCC): ICD-10-CM

## 2022-10-20 DIAGNOSIS — Z17.0 MALIGNANT NEOPLASM OF NIPPLE OF RIGHT BREAST IN FEMALE, ESTROGEN RECEPTOR POSITIVE (HCC): Primary | ICD-10-CM

## 2022-10-20 DIAGNOSIS — C50.911 CARCINOMA OF RIGHT BREAST METASTATIC TO PLEURA (HCC): ICD-10-CM

## 2022-10-20 DIAGNOSIS — Z17.0 MALIGNANT NEOPLASM OF NIPPLE OF RIGHT BREAST IN FEMALE, ESTROGEN RECEPTOR POSITIVE (HCC): ICD-10-CM

## 2022-10-20 DIAGNOSIS — C50.011 MALIGNANT NEOPLASM OF NIPPLE OF RIGHT BREAST IN FEMALE, ESTROGEN RECEPTOR POSITIVE (HCC): Primary | ICD-10-CM

## 2022-10-20 LAB
ALBUMIN SERPL-MCNC: 3.9 G/DL (ref 3.4–5)
ALBUMIN/GLOB SERPL: 1.1 {RATIO} (ref 1–2)
ALP LIVER SERPL-CCNC: 133 U/L
ALT SERPL-CCNC: 18 U/L
ANION GAP SERPL CALC-SCNC: 6 MMOL/L (ref 0–18)
AST SERPL-CCNC: 12 U/L (ref 15–37)
BASOPHILS # BLD AUTO: 0.08 X10(3) UL (ref 0–0.2)
BASOPHILS NFR BLD AUTO: 1.2 %
BILIRUB SERPL-MCNC: 0.5 MG/DL (ref 0.1–2)
BUN BLD-MCNC: 11 MG/DL (ref 7–18)
CALCIUM BLD-MCNC: 9.2 MG/DL (ref 8.5–10.1)
CHLORIDE SERPL-SCNC: 106 MMOL/L (ref 98–112)
CO2 SERPL-SCNC: 29 MMOL/L (ref 21–32)
CREAT BLD-MCNC: 1.2 MG/DL
DEPRECATED HBV CORE AB SER IA-ACNC: 14.5 NG/ML
EOSINOPHIL # BLD AUTO: 0.23 X10(3) UL (ref 0–0.7)
EOSINOPHIL NFR BLD AUTO: 3.4 %
ERYTHROCYTE [DISTWIDTH] IN BLOOD BY AUTOMATED COUNT: 15 %
FASTING STATUS PATIENT QL REPORTED: NO
GFR SERPLBLD BASED ON 1.73 SQ M-ARVRAT: 52 ML/MIN/1.73M2 (ref 60–?)
GLOBULIN PLAS-MCNC: 3.6 G/DL (ref 2.8–4.4)
GLUCOSE BLD-MCNC: 156 MG/DL (ref 70–99)
HCT VFR BLD AUTO: 30.4 %
HGB BLD-MCNC: 9.4 G/DL
IMM GRANULOCYTES # BLD AUTO: 0.03 X10(3) UL (ref 0–1)
IMM GRANULOCYTES NFR BLD: 0.4 %
IRON SATN MFR SERPL: 14 %
IRON SERPL-MCNC: 79 UG/DL
LYMPHOCYTES # BLD AUTO: 2.06 X10(3) UL (ref 1–4)
LYMPHOCYTES NFR BLD AUTO: 30.2 %
MCH RBC QN AUTO: 29.1 PG (ref 26–34)
MCHC RBC AUTO-ENTMCNC: 30.9 G/DL (ref 31–37)
MCV RBC AUTO: 94.1 FL
MONOCYTES # BLD AUTO: 0.44 X10(3) UL (ref 0.1–1)
MONOCYTES NFR BLD AUTO: 6.4 %
NEUTROPHILS # BLD AUTO: 3.99 X10 (3) UL (ref 1.5–7.7)
NEUTROPHILS # BLD AUTO: 3.99 X10(3) UL (ref 1.5–7.7)
NEUTROPHILS NFR BLD AUTO: 58.4 %
OSMOLALITY SERPL CALC.SUM OF ELEC: 295 MOSM/KG (ref 275–295)
PLATELET # BLD AUTO: 217 10(3)UL (ref 150–450)
POTASSIUM SERPL-SCNC: 3.8 MMOL/L (ref 3.5–5.1)
PROT SERPL-MCNC: 7.5 G/DL (ref 6.4–8.2)
RBC # BLD AUTO: 3.23 X10(6)UL
SODIUM SERPL-SCNC: 141 MMOL/L (ref 136–145)
TIBC SERPL-MCNC: 556 UG/DL (ref 240–450)
TRANSFERRIN SERPL-MCNC: 373 MG/DL (ref 200–360)
WBC # BLD AUTO: 6.8 X10(3) UL (ref 4–11)

## 2022-10-20 PROCEDURE — 99215 OFFICE O/P EST HI 40 MIN: CPT | Performed by: INTERNAL MEDICINE

## 2022-10-20 PROCEDURE — 96402 CHEMO HORMON ANTINEOPL SQ/IM: CPT

## 2022-10-20 RX ORDER — LAMOTRIGINE 25 MG/1
500 TABLET ORAL ONCE
Status: CANCELLED | OUTPATIENT
Start: 2022-10-20

## 2022-10-20 RX ORDER — LAMOTRIGINE 25 MG/1
500 TABLET ORAL ONCE
Status: COMPLETED | OUTPATIENT
Start: 2022-10-20 | End: 2022-10-20

## 2022-10-20 RX ORDER — SODIUM CHLORIDE 9 MG/ML
INJECTION, SOLUTION INTRAVENOUS CONTINUOUS
OUTPATIENT
Start: 2022-10-20

## 2022-10-20 RX ORDER — ALBUMIN (HUMAN) 12.5 G/50ML
50 SOLUTION INTRAVENOUS ONCE
Status: DISCONTINUED | OUTPATIENT
Start: 2022-10-21 | End: 2022-10-23

## 2022-10-20 RX ADMIN — LAMOTRIGINE 500 MG: 25 TABLET ORAL at 15:49:00

## 2022-10-20 NOTE — PROGRESS NOTES
Pt here for follow up and injection. Pt complains of fatigue. Pt is going to [place] next week.        Outpatient Oncology Care Plan  Problem list:  knowledge deficit    Problems related to:    disease/disease progression    Interventions:  provided general teaching    Expected outcomes:  understands plan of care    Progress towards outcome:  making progress    Education Record    Learner:  Patient and Spouse  Barriers / Limitations:  None  Method:  Brief focused  Outcome:  Shows understanding  Comments: none

## 2022-10-21 ENCOUNTER — NURSE ONLY (OUTPATIENT)
Dept: LAB | Facility: HOSPITAL | Age: 62
End: 2022-10-21
Attending: INTERNAL MEDICINE
Payer: COMMERCIAL

## 2022-10-21 ENCOUNTER — HOSPITAL ENCOUNTER (OUTPATIENT)
Dept: ULTRASOUND IMAGING | Facility: HOSPITAL | Age: 62
Discharge: HOME | End: 2022-10-21
Attending: INTERNAL MEDICINE
Payer: COMMERCIAL

## 2022-10-21 ENCOUNTER — TELEPHONE (OUTPATIENT)
Dept: HEMATOLOGY/ONCOLOGY | Facility: HOSPITAL | Age: 62
End: 2022-10-21

## 2022-10-21 VITALS
HEART RATE: 65 BPM | BODY MASS INDEX: 29.25 KG/M2 | HEIGHT: 66 IN | DIASTOLIC BLOOD PRESSURE: 89 MMHG | RESPIRATION RATE: 20 BRPM | SYSTOLIC BLOOD PRESSURE: 118 MMHG | OXYGEN SATURATION: 97 % | TEMPERATURE: 98 F | WEIGHT: 182 LBS

## 2022-10-21 DIAGNOSIS — R18.8 CIRRHOSIS OF LIVER WITH ASCITES, UNSPECIFIED HEPATIC CIRRHOSIS TYPE: ICD-10-CM

## 2022-10-21 DIAGNOSIS — K74.60 CIRRHOSIS OF LIVER WITH ASCITES, UNSPECIFIED HEPATIC CIRRHOSIS TYPE (HCC): Primary | ICD-10-CM

## 2022-10-21 DIAGNOSIS — K74.60 CIRRHOSIS OF LIVER WITH ASCITES, UNSPECIFIED HEPATIC CIRRHOSIS TYPE: ICD-10-CM

## 2022-10-21 DIAGNOSIS — R18.8 CIRRHOSIS OF LIVER WITH ASCITES, UNSPECIFIED HEPATIC CIRRHOSIS TYPE (HCC): Primary | ICD-10-CM

## 2022-10-21 LAB
INR BLD: 1.16 (ref 0.85–1.16)
PROTHROMBIN TIME: 14.8 SECONDS (ref 11.6–14.8)

## 2022-10-21 PROCEDURE — 76705 ECHO EXAM OF ABDOMEN: CPT | Performed by: INTERNAL MEDICINE

## 2022-10-21 PROCEDURE — 85610 PROTHROMBIN TIME: CPT

## 2022-10-21 PROCEDURE — 36415 COLL VENOUS BLD VENIPUNCTURE: CPT

## 2022-10-25 ENCOUNTER — OFFICE VISIT (OUTPATIENT)
Dept: HEMATOLOGY/ONCOLOGY | Age: 62
End: 2022-10-25
Attending: INTERNAL MEDICINE
Payer: COMMERCIAL

## 2022-10-25 VITALS
TEMPERATURE: 98 F | RESPIRATION RATE: 16 BRPM | DIASTOLIC BLOOD PRESSURE: 57 MMHG | HEART RATE: 76 BPM | SYSTOLIC BLOOD PRESSURE: 101 MMHG | OXYGEN SATURATION: 98 %

## 2022-10-25 DIAGNOSIS — C78.2 BREAST CANCER METASTASIZED TO PLEURA, UNSPECIFIED LATERALITY (HCC): ICD-10-CM

## 2022-10-25 DIAGNOSIS — C79.51 BONE METASTASES (HCC): ICD-10-CM

## 2022-10-25 DIAGNOSIS — D50.0 IRON DEFICIENCY ANEMIA DUE TO CHRONIC BLOOD LOSS: Primary | ICD-10-CM

## 2022-10-25 DIAGNOSIS — E86.0 DEHYDRATION: ICD-10-CM

## 2022-10-25 DIAGNOSIS — C50.919 BREAST CANCER METASTASIZED TO PLEURA, UNSPECIFIED LATERALITY (HCC): ICD-10-CM

## 2022-10-25 PROCEDURE — 96365 THER/PROPH/DIAG IV INF INIT: CPT

## 2022-11-09 DIAGNOSIS — H91.93 DECREASED HEARING OF BOTH EARS: Primary | ICD-10-CM

## 2022-11-09 DIAGNOSIS — F41.9 ANXIETY: ICD-10-CM

## 2022-11-10 RX ORDER — ESCITALOPRAM OXALATE 20 MG/1
TABLET ORAL
Qty: 30 TABLET | Refills: 0 | Status: SHIPPED | OUTPATIENT
Start: 2022-11-10

## 2022-11-15 DIAGNOSIS — F41.9 ANXIETY: ICD-10-CM

## 2022-11-15 RX ORDER — ESCITALOPRAM OXALATE 20 MG/1
TABLET ORAL
Qty: 90 TABLET | Refills: 3 | OUTPATIENT
Start: 2022-11-15

## 2022-11-15 NOTE — TELEPHONE ENCOUNTER
LOV: 08/01/2022 for med check     Last Refill:   ESCITALOPRAM 20 MG Oral Tab 30 tablet 0 11/10/2022     RTC: 11/01/2022    Protocol:n/a     Refill was sent on 11/10/2022 for #30

## 2022-11-16 ENCOUNTER — OFFICE VISIT (OUTPATIENT)
Dept: FAMILY MEDICINE CLINIC | Facility: CLINIC | Age: 62
End: 2022-11-16
Payer: COMMERCIAL

## 2022-11-16 VITALS
DIASTOLIC BLOOD PRESSURE: 66 MMHG | HEIGHT: 66 IN | SYSTOLIC BLOOD PRESSURE: 98 MMHG | BODY MASS INDEX: 29.73 KG/M2 | TEMPERATURE: 97 F | HEART RATE: 64 BPM | RESPIRATION RATE: 18 BRPM | WEIGHT: 185 LBS

## 2022-11-16 DIAGNOSIS — K42.9 UMBILICAL HERNIA WITHOUT OBSTRUCTION AND WITHOUT GANGRENE: ICD-10-CM

## 2022-11-16 DIAGNOSIS — C50.011 MALIGNANT NEOPLASM OF NIPPLE OF RIGHT BREAST IN FEMALE, ESTROGEN RECEPTOR POSITIVE (HCC): ICD-10-CM

## 2022-11-16 DIAGNOSIS — G62.9 PERIPHERAL POLYNEUROPATHY: ICD-10-CM

## 2022-11-16 DIAGNOSIS — E03.9 HYPOTHYROIDISM IN ADULT: ICD-10-CM

## 2022-11-16 DIAGNOSIS — R60.0 EDEMA OF BOTH FEET: ICD-10-CM

## 2022-11-16 DIAGNOSIS — R79.89 ELEVATED LIVER FUNCTION TESTS: ICD-10-CM

## 2022-11-16 DIAGNOSIS — C50.919 METASTATIC BREAST CANCER (HCC): ICD-10-CM

## 2022-11-16 DIAGNOSIS — D50.0 IRON DEFICIENCY ANEMIA DUE TO CHRONIC BLOOD LOSS: ICD-10-CM

## 2022-11-16 DIAGNOSIS — Z17.0 MALIGNANT NEOPLASM OF NIPPLE OF RIGHT BREAST IN FEMALE, ESTROGEN RECEPTOR POSITIVE (HCC): ICD-10-CM

## 2022-11-16 DIAGNOSIS — E78.2 HYPERLIPIDEMIA, MIXED: Primary | ICD-10-CM

## 2022-11-16 PROCEDURE — 3078F DIAST BP <80 MM HG: CPT | Performed by: FAMILY MEDICINE

## 2022-11-16 PROCEDURE — 3008F BODY MASS INDEX DOCD: CPT | Performed by: FAMILY MEDICINE

## 2022-11-16 PROCEDURE — 99215 OFFICE O/P EST HI 40 MIN: CPT | Performed by: FAMILY MEDICINE

## 2022-11-16 PROCEDURE — 3074F SYST BP LT 130 MM HG: CPT | Performed by: FAMILY MEDICINE

## 2022-11-16 RX ORDER — HYDROCODONE BITARTRATE AND ACETAMINOPHEN 7.5; 325 MG/1; MG/1
1-2 TABLET ORAL EVERY 8 HOURS PRN
Qty: 90 TABLET | Refills: 0 | Status: SHIPPED | OUTPATIENT
Start: 2022-11-16 | End: 2022-11-16 | Stop reason: CLARIF

## 2022-11-16 RX ORDER — COVID-19 ANTIGEN TEST
KIT MISCELLANEOUS
COMMUNITY
Start: 2022-09-01

## 2022-11-16 RX ORDER — HYDROCODONE BITARTRATE AND ACETAMINOPHEN 7.5; 325 MG/1; MG/1
1 TABLET ORAL EVERY 8 HOURS PRN
Qty: 30 TABLET | Refills: 0 | Status: SHIPPED | OUTPATIENT
Start: 2022-12-17 | End: 2022-11-16 | Stop reason: CLARIF

## 2022-11-16 RX ORDER — HYDROCODONE BITARTRATE AND ACETAMINOPHEN 7.5; 325 MG/1; MG/1
1-2 TABLET ORAL EVERY 8 HOURS PRN
Qty: 30 TABLET | Refills: 0 | Status: SHIPPED | OUTPATIENT
Start: 2022-12-17 | End: 2023-03-17

## 2022-11-16 RX ORDER — HYDROCODONE BITARTRATE AND ACETAMINOPHEN 7.5; 325 MG/1; MG/1
1-2 TABLET ORAL EVERY 8 HOURS PRN
Qty: 90 TABLET | Refills: 0 | Status: SHIPPED | OUTPATIENT
Start: 2022-12-17 | End: 2022-11-16 | Stop reason: CLARIF

## 2022-11-16 RX ORDER — HYDROCODONE BITARTRATE AND ACETAMINOPHEN 7.5; 325 MG/1; MG/1
1 TABLET ORAL EVERY 8 HOURS PRN
Qty: 30 TABLET | Refills: 0 | Status: SHIPPED | OUTPATIENT
Start: 2022-11-16 | End: 2022-11-16

## 2022-11-16 RX ORDER — HYDROCODONE BITARTRATE AND ACETAMINOPHEN 7.5; 325 MG/1; MG/1
1-2 TABLET ORAL EVERY 8 HOURS PRN
Qty: 90 TABLET | Refills: 0 | Status: SHIPPED | OUTPATIENT
Start: 2022-11-16 | End: 2022-12-16

## 2022-11-16 RX ORDER — HYDROCODONE BITARTRATE AND ACETAMINOPHEN 7.5; 325 MG/1; MG/1
1-2 TABLET ORAL EVERY 8 HOURS PRN
Qty: 90 TABLET | Refills: 0 | Status: SHIPPED | OUTPATIENT
Start: 2023-01-17 | End: 2023-02-16

## 2022-11-16 RX ORDER — HYDROCODONE BITARTRATE AND ACETAMINOPHEN 7.5; 325 MG/1; MG/1
1-2 TABLET ORAL EVERY 8 HOURS PRN
Qty: 90 TABLET | Refills: 0 | Status: CANCELLED | OUTPATIENT
Start: 2022-11-16

## 2022-11-16 RX ORDER — HYDROCODONE BITARTRATE AND ACETAMINOPHEN 7.5; 325 MG/1; MG/1
1-2 TABLET ORAL EVERY 8 HOURS PRN
Qty: 90 TABLET | Refills: 0 | Status: SHIPPED | OUTPATIENT
Start: 2023-01-17 | End: 2022-11-16 | Stop reason: CLARIF

## 2022-11-16 RX ORDER — HYDROCODONE BITARTRATE AND ACETAMINOPHEN 7.5; 325 MG/1; MG/1
1 TABLET ORAL EVERY 8 HOURS PRN
Qty: 30 TABLET | Refills: 0 | Status: SHIPPED | OUTPATIENT
Start: 2023-01-17 | End: 2022-11-16 | Stop reason: CLARIF

## 2022-11-17 ENCOUNTER — OFFICE VISIT (OUTPATIENT)
Dept: HEMATOLOGY/ONCOLOGY | Age: 62
End: 2022-11-17
Attending: INTERNAL MEDICINE
Payer: COMMERCIAL

## 2022-11-17 VITALS
RESPIRATION RATE: 20 BRPM | SYSTOLIC BLOOD PRESSURE: 95 MMHG | WEIGHT: 182.5 LBS | DIASTOLIC BLOOD PRESSURE: 56 MMHG | BODY MASS INDEX: 29.33 KG/M2 | TEMPERATURE: 97 F | HEIGHT: 65.98 IN | HEART RATE: 69 BPM | OXYGEN SATURATION: 98 %

## 2022-11-17 DIAGNOSIS — C50.011 MALIGNANT NEOPLASM OF NIPPLE OF RIGHT BREAST IN FEMALE, ESTROGEN RECEPTOR POSITIVE (HCC): Primary | ICD-10-CM

## 2022-11-17 DIAGNOSIS — Z51.11 ENCOUNTER FOR CHEMOTHERAPY MANAGEMENT: Primary | ICD-10-CM

## 2022-11-17 DIAGNOSIS — D50.0 IRON DEFICIENCY ANEMIA DUE TO CHRONIC BLOOD LOSS: ICD-10-CM

## 2022-11-17 DIAGNOSIS — C78.2 CARCINOMA OF RIGHT BREAST METASTATIC TO PLEURA (HCC): ICD-10-CM

## 2022-11-17 DIAGNOSIS — Z17.0 MALIGNANT NEOPLASM OF NIPPLE OF RIGHT BREAST IN FEMALE, ESTROGEN RECEPTOR POSITIVE (HCC): Primary | ICD-10-CM

## 2022-11-17 DIAGNOSIS — C79.51 BONE METASTASES (HCC): ICD-10-CM

## 2022-11-17 DIAGNOSIS — E78.2 HYPERLIPIDEMIA, MIXED: ICD-10-CM

## 2022-11-17 DIAGNOSIS — C50.911 CARCINOMA OF RIGHT BREAST METASTATIC TO PLEURA (HCC): ICD-10-CM

## 2022-11-17 DIAGNOSIS — E78.2 HYPERLIPIDEMIA, MIXED: Primary | ICD-10-CM

## 2022-11-17 LAB
ALBUMIN SERPL-MCNC: 3.4 G/DL (ref 3.4–5)
ALBUMIN/GLOB SERPL: 1 {RATIO} (ref 1–2)
ALP LIVER SERPL-CCNC: 133 U/L
ALT SERPL-CCNC: 16 U/L
ANION GAP SERPL CALC-SCNC: 8 MMOL/L (ref 0–18)
AST SERPL-CCNC: 10 U/L (ref 15–37)
BASOPHILS # BLD AUTO: 0.09 X10(3) UL (ref 0–0.2)
BASOPHILS NFR BLD AUTO: 1.3 %
BILIRUB SERPL-MCNC: 0.4 MG/DL (ref 0.1–2)
BUN BLD-MCNC: 7 MG/DL (ref 7–18)
CALCIUM BLD-MCNC: 8.5 MG/DL (ref 8.5–10.1)
CHLORIDE SERPL-SCNC: 107 MMOL/L (ref 98–112)
CHOLEST SERPL-MCNC: 154 MG/DL (ref ?–200)
CO2 SERPL-SCNC: 27 MMOL/L (ref 21–32)
CREAT BLD-MCNC: 0.99 MG/DL
DEPRECATED HBV CORE AB SER IA-ACNC: 69.5 NG/ML
EOSINOPHIL # BLD AUTO: 0.22 X10(3) UL (ref 0–0.7)
EOSINOPHIL NFR BLD AUTO: 3.1 %
ERYTHROCYTE [DISTWIDTH] IN BLOOD BY AUTOMATED COUNT: 18.3 %
FASTING PATIENT LIPID ANSWER: YES
FASTING STATUS PATIENT QL REPORTED: NO
GFR SERPLBLD BASED ON 1.73 SQ M-ARVRAT: 65 ML/MIN/1.73M2 (ref 60–?)
GLOBULIN PLAS-MCNC: 3.3 G/DL (ref 2.8–4.4)
GLUCOSE BLD-MCNC: 90 MG/DL (ref 70–99)
HCT VFR BLD AUTO: 24.4 %
HDLC SERPL-MCNC: 25 MG/DL (ref 40–59)
HGB BLD-MCNC: 7.2 G/DL
IMM GRANULOCYTES # BLD AUTO: 0.04 X10(3) UL (ref 0–1)
IMM GRANULOCYTES NFR BLD: 0.6 %
IRON SATN MFR SERPL: 7 %
IRON SERPL-MCNC: 27 UG/DL
LDLC SERPL CALC-MCNC: 70 MG/DL (ref ?–100)
LYMPHOCYTES # BLD AUTO: 2.52 X10(3) UL (ref 1–4)
LYMPHOCYTES NFR BLD AUTO: 35.2 %
MCH RBC QN AUTO: 29.4 PG (ref 26–34)
MCHC RBC AUTO-ENTMCNC: 29.5 G/DL (ref 31–37)
MCV RBC AUTO: 99.6 FL
MONOCYTES # BLD AUTO: 0.41 X10(3) UL (ref 0.1–1)
MONOCYTES NFR BLD AUTO: 5.7 %
NEUTROPHILS # BLD AUTO: 3.87 X10 (3) UL (ref 1.5–7.7)
NEUTROPHILS # BLD AUTO: 3.87 X10(3) UL (ref 1.5–7.7)
NEUTROPHILS NFR BLD AUTO: 54.1 %
NONHDLC SERPL-MCNC: 129 MG/DL (ref ?–130)
OSMOLALITY SERPL CALC.SUM OF ELEC: 292 MOSM/KG (ref 275–295)
PLATELET # BLD AUTO: 281 10(3)UL (ref 150–450)
POTASSIUM SERPL-SCNC: 3.5 MMOL/L (ref 3.5–5.1)
PROT SERPL-MCNC: 6.7 G/DL (ref 6.4–8.2)
RBC # BLD AUTO: 2.45 X10(6)UL
SODIUM SERPL-SCNC: 142 MMOL/L (ref 136–145)
TIBC SERPL-MCNC: 380 UG/DL (ref 240–450)
TRANSFERRIN SERPL-MCNC: 255 MG/DL (ref 200–360)
TRIGL SERPL-MCNC: 370 MG/DL (ref 30–149)
VLDLC SERPL CALC-MCNC: 57 MG/DL (ref 0–30)
WBC # BLD AUTO: 7.2 X10(3) UL (ref 4–11)

## 2022-11-17 PROCEDURE — 99215 OFFICE O/P EST HI 40 MIN: CPT | Performed by: CLINICAL NURSE SPECIALIST

## 2022-11-17 PROCEDURE — 96402 CHEMO HORMON ANTINEOPL SQ/IM: CPT

## 2022-11-17 RX ORDER — LAMOTRIGINE 25 MG/1
500 TABLET ORAL ONCE
Status: CANCELLED | OUTPATIENT
Start: 2022-11-17

## 2022-11-17 RX ORDER — LAMOTRIGINE 25 MG/1
500 TABLET ORAL ONCE
Status: COMPLETED | OUTPATIENT
Start: 2022-11-17 | End: 2022-11-17

## 2022-11-17 RX ADMIN — LAMOTRIGINE 500 MG: 25 TABLET ORAL at 15:45:00

## 2022-11-17 NOTE — PROGRESS NOTES
Pt here for C49 D1. Arrives Ambulating independently, accompanied by Self           Pregnancy screening: Denies possibility of pregnancy    Modifications in dose or schedule: No    Drugs/infusions dual verified for appearance and physical integrity. IV pump settings were dual verified: yes     Frequency of blood return and site check throughout administration: Prior to administration   Discharged to Home, Ambulating independently, accompanied by:Self    Outpatient Oncology Care Plan  Problem list:  fatigue  Problems related to:  side effect of treatment  Interventions:  educate regarding self care  promoted rest  Expected outcomes:  adequate sleep/rest  symptoms relieved/minimized  Progress towards outcome:  making progress    Education Record    Learner:  Patient  Barriers / Limitations:  None  Method:  Discussion  Outcome:  Shows understanding  Comments: Pt tolerated injection without complication. Pt discharged home in stable condition.

## 2022-11-17 NOTE — PROGRESS NOTES
Pt here for follow up and injection. Pt had a GI bug while she was in [place]. Pt states her energy level is better since getting iron.

## 2022-11-18 ENCOUNTER — TELEPHONE (OUTPATIENT)
Dept: HEMATOLOGY/ONCOLOGY | Facility: HOSPITAL | Age: 62
End: 2022-11-18

## 2022-11-18 NOTE — TELEPHONE ENCOUNTER
Pt needs IV iron. Feraheme x2 doses per CHRISTOPHER Stover. Pt's  informed and transferred to [facility] to schedule.

## 2022-11-22 ENCOUNTER — OFFICE VISIT (OUTPATIENT)
Dept: HEMATOLOGY/ONCOLOGY | Age: 62
End: 2022-11-22
Attending: INTERNAL MEDICINE
Payer: COMMERCIAL

## 2022-11-22 VITALS
HEART RATE: 99 BPM | SYSTOLIC BLOOD PRESSURE: 108 MMHG | TEMPERATURE: 99 F | OXYGEN SATURATION: 98 % | DIASTOLIC BLOOD PRESSURE: 65 MMHG | RESPIRATION RATE: 16 BRPM

## 2022-11-22 DIAGNOSIS — C78.2 BREAST CANCER METASTASIZED TO PLEURA, UNSPECIFIED LATERALITY (HCC): ICD-10-CM

## 2022-11-22 DIAGNOSIS — C50.919 BREAST CANCER METASTASIZED TO PLEURA, UNSPECIFIED LATERALITY (HCC): ICD-10-CM

## 2022-11-22 DIAGNOSIS — D50.0 IRON DEFICIENCY ANEMIA DUE TO CHRONIC BLOOD LOSS: Primary | ICD-10-CM

## 2022-11-22 DIAGNOSIS — E86.0 DEHYDRATION: ICD-10-CM

## 2022-11-22 DIAGNOSIS — C79.51 BONE METASTASES (HCC): ICD-10-CM

## 2022-11-22 PROCEDURE — 96374 THER/PROPH/DIAG INJ IV PUSH: CPT

## 2022-11-28 ENCOUNTER — TELEPHONE (OUTPATIENT)
Dept: HEMATOLOGY/ONCOLOGY | Facility: HOSPITAL | Age: 62
End: 2022-11-28

## 2022-11-28 DIAGNOSIS — D50.0 IRON DEFICIENCY ANEMIA DUE TO CHRONIC BLOOD LOSS: Primary | ICD-10-CM

## 2022-11-28 NOTE — TELEPHONE ENCOUNTER
Spouse called, patient had iron infusion last week and is due in tomorrow for another one. She has been mostly sleeping for the last 1.5 weeks, she would work and come home and sleep. She even called in today for work and is sleeping. Again is due in tomorrow for iron infusion at 300 and he was wondering if labs could be done as her hgb was 7.2 last time and possibly labs are down further and she will need a transfusion. Requesting a call back with plan.

## 2022-11-29 ENCOUNTER — OFFICE VISIT (OUTPATIENT)
Dept: HEMATOLOGY/ONCOLOGY | Age: 62
End: 2022-11-29
Attending: INTERNAL MEDICINE
Payer: COMMERCIAL

## 2022-11-29 VITALS
BODY MASS INDEX: 29.17 KG/M2 | HEIGHT: 65.98 IN | TEMPERATURE: 98 F | SYSTOLIC BLOOD PRESSURE: 117 MMHG | DIASTOLIC BLOOD PRESSURE: 56 MMHG | WEIGHT: 181.5 LBS | HEART RATE: 76 BPM | OXYGEN SATURATION: 98 % | RESPIRATION RATE: 18 BRPM

## 2022-11-29 DIAGNOSIS — K64.2 GRADE III HEMORRHOIDS: ICD-10-CM

## 2022-11-29 DIAGNOSIS — E86.0 DEHYDRATION: ICD-10-CM

## 2022-11-29 DIAGNOSIS — C50.919 MALIGNANT NEOPLASM OF FEMALE BREAST, UNSPECIFIED ESTROGEN RECEPTOR STATUS, UNSPECIFIED LATERALITY, UNSPECIFIED SITE OF BREAST (HCC): ICD-10-CM

## 2022-11-29 DIAGNOSIS — C78.2 BREAST CANCER METASTASIZED TO PLEURA, UNSPECIFIED LATERALITY (HCC): ICD-10-CM

## 2022-11-29 DIAGNOSIS — D50.0 IRON DEFICIENCY ANEMIA DUE TO CHRONIC BLOOD LOSS: Primary | ICD-10-CM

## 2022-11-29 DIAGNOSIS — C79.51 BONE METASTASES (HCC): ICD-10-CM

## 2022-11-29 DIAGNOSIS — C50.919 BREAST CANCER METASTASIZED TO PLEURA, UNSPECIFIED LATERALITY (HCC): ICD-10-CM

## 2022-11-29 DIAGNOSIS — D50.0 IRON DEFICIENCY ANEMIA SECONDARY TO BLOOD LOSS (CHRONIC): ICD-10-CM

## 2022-11-29 LAB
ANTIBODY SCREEN: NEGATIVE
BASOPHILS # BLD AUTO: 0.12 X10(3) UL (ref 0–0.2)
BASOPHILS NFR BLD AUTO: 1.5 %
EOSINOPHIL # BLD AUTO: 0.3 X10(3) UL (ref 0–0.7)
EOSINOPHIL NFR BLD AUTO: 3.8 %
ERYTHROCYTE [DISTWIDTH] IN BLOOD BY AUTOMATED COUNT: 18.2 %
HCT VFR BLD AUTO: 23 %
HGB BLD-MCNC: 6.6 G/DL
IMM GRANULOCYTES # BLD AUTO: 0.06 X10(3) UL (ref 0–1)
IMM GRANULOCYTES NFR BLD: 0.8 %
LYMPHOCYTES # BLD AUTO: 3.27 X10(3) UL (ref 1–4)
LYMPHOCYTES NFR BLD AUTO: 40.9 %
MCH RBC QN AUTO: 29.6 PG (ref 26–34)
MCHC RBC AUTO-ENTMCNC: 28.7 G/DL (ref 31–37)
MCV RBC AUTO: 103.1 FL
MONOCYTES # BLD AUTO: 0.52 X10(3) UL (ref 0.1–1)
MONOCYTES NFR BLD AUTO: 6.5 %
NEUTROPHILS # BLD AUTO: 3.72 X10 (3) UL (ref 1.5–7.7)
NEUTROPHILS # BLD AUTO: 3.72 X10(3) UL (ref 1.5–7.7)
NEUTROPHILS NFR BLD AUTO: 46.5 %
PLATELET # BLD AUTO: 308 10(3)UL (ref 150–450)
RBC # BLD AUTO: 2.23 X10(6)UL
RH BLOOD TYPE: NEGATIVE
WBC # BLD AUTO: 8 X10(3) UL (ref 4–11)

## 2022-11-29 PROCEDURE — 86901 BLOOD TYPING SEROLOGIC RH(D): CPT

## 2022-11-29 PROCEDURE — 86900 BLOOD TYPING SEROLOGIC ABO: CPT

## 2022-11-29 PROCEDURE — 86850 RBC ANTIBODY SCREEN: CPT

## 2022-11-29 PROCEDURE — 85025 COMPLETE CBC W/AUTO DIFF WBC: CPT

## 2022-11-29 PROCEDURE — 36415 COLL VENOUS BLD VENIPUNCTURE: CPT

## 2022-11-29 PROCEDURE — 96374 THER/PROPH/DIAG INJ IV PUSH: CPT

## 2022-11-30 ENCOUNTER — OFFICE VISIT (OUTPATIENT)
Dept: HEMATOLOGY/ONCOLOGY | Age: 62
End: 2022-11-30
Attending: INTERNAL MEDICINE
Payer: COMMERCIAL

## 2022-11-30 VITALS
RESPIRATION RATE: 18 BRPM | OXYGEN SATURATION: 98 % | DIASTOLIC BLOOD PRESSURE: 61 MMHG | SYSTOLIC BLOOD PRESSURE: 102 MMHG | HEART RATE: 59 BPM | TEMPERATURE: 98 F

## 2022-11-30 DIAGNOSIS — K64.2 GRADE III HEMORRHOIDS: ICD-10-CM

## 2022-11-30 DIAGNOSIS — D50.0 IRON DEFICIENCY ANEMIA DUE TO CHRONIC BLOOD LOSS: ICD-10-CM

## 2022-11-30 DIAGNOSIS — C50.919 MALIGNANT NEOPLASM OF FEMALE BREAST, UNSPECIFIED ESTROGEN RECEPTOR STATUS, UNSPECIFIED LATERALITY, UNSPECIFIED SITE OF BREAST (HCC): ICD-10-CM

## 2022-11-30 DIAGNOSIS — D50.0 IRON DEFICIENCY ANEMIA SECONDARY TO BLOOD LOSS (CHRONIC): Primary | ICD-10-CM

## 2022-11-30 PROCEDURE — 36430 TRANSFUSION BLD/BLD COMPNT: CPT

## 2022-11-30 NOTE — PROGRESS NOTES
Education Record    Learner:  Patient    Disease / Diagnosis: patient  Here for blood transfusion     Barriers / Limitations:  None    Method:  Brief focused, printed material and  reinforcement    General Topics:  Plan of care reviewed    Outcome:  Shows understanding  Patient tolerated transfusion. Discharged home in stable condition.

## 2022-12-01 LAB — BLOOD TYPE BARCODE: 600

## 2022-12-05 ENCOUNTER — MED REC SCAN ONLY (OUTPATIENT)
Dept: FAMILY MEDICINE CLINIC | Facility: CLINIC | Age: 62
End: 2022-12-05

## 2022-12-15 ENCOUNTER — OFFICE VISIT (OUTPATIENT)
Dept: HEMATOLOGY/ONCOLOGY | Age: 62
End: 2022-12-15
Attending: INTERNAL MEDICINE
Payer: COMMERCIAL

## 2022-12-15 VITALS
HEIGHT: 65.98 IN | BODY MASS INDEX: 28.98 KG/M2 | OXYGEN SATURATION: 97 % | TEMPERATURE: 100 F | SYSTOLIC BLOOD PRESSURE: 114 MMHG | DIASTOLIC BLOOD PRESSURE: 67 MMHG | HEART RATE: 65 BPM | WEIGHT: 180.31 LBS

## 2022-12-15 DIAGNOSIS — Z17.0 MALIGNANT NEOPLASM OF NIPPLE OF RIGHT BREAST IN FEMALE, ESTROGEN RECEPTOR POSITIVE (HCC): Primary | ICD-10-CM

## 2022-12-15 DIAGNOSIS — D50.0 IRON DEFICIENCY ANEMIA SECONDARY TO BLOOD LOSS (CHRONIC): ICD-10-CM

## 2022-12-15 DIAGNOSIS — C50.911 CARCINOMA OF RIGHT BREAST METASTATIC TO PLEURA (HCC): ICD-10-CM

## 2022-12-15 DIAGNOSIS — C50.011 MALIGNANT NEOPLASM OF NIPPLE OF RIGHT BREAST IN FEMALE, ESTROGEN RECEPTOR POSITIVE (HCC): Primary | ICD-10-CM

## 2022-12-15 DIAGNOSIS — C79.51 BONE METASTASES (HCC): ICD-10-CM

## 2022-12-15 DIAGNOSIS — C78.2 CARCINOMA OF RIGHT BREAST METASTATIC TO PLEURA (HCC): ICD-10-CM

## 2022-12-15 LAB
ALBUMIN SERPL-MCNC: 3.6 G/DL (ref 3.4–5)
ALBUMIN/GLOB SERPL: 1 {RATIO} (ref 1–2)
ALP LIVER SERPL-CCNC: 153 U/L
ALT SERPL-CCNC: 27 U/L
ANION GAP SERPL CALC-SCNC: 8 MMOL/L (ref 0–18)
AST SERPL-CCNC: 38 U/L (ref 15–37)
BASOPHILS # BLD AUTO: 0.1 X10(3) UL (ref 0–0.2)
BASOPHILS NFR BLD AUTO: 1.3 %
BILIRUB SERPL-MCNC: 0.3 MG/DL (ref 0.1–2)
BUN BLD-MCNC: 12 MG/DL (ref 7–18)
CALCIUM BLD-MCNC: 9.2 MG/DL (ref 8.5–10.1)
CHLORIDE SERPL-SCNC: 105 MMOL/L (ref 98–112)
CO2 SERPL-SCNC: 27 MMOL/L (ref 21–32)
CREAT BLD-MCNC: 0.97 MG/DL
DEPRECATED HBV CORE AB SER IA-ACNC: 179.3 NG/ML
EOSINOPHIL # BLD AUTO: 0.2 X10(3) UL (ref 0–0.7)
EOSINOPHIL NFR BLD AUTO: 2.6 %
ERYTHROCYTE [DISTWIDTH] IN BLOOD BY AUTOMATED COUNT: 18.6 %
FASTING STATUS PATIENT QL REPORTED: NO
GFR SERPLBLD BASED ON 1.73 SQ M-ARVRAT: 67 ML/MIN/1.73M2 (ref 60–?)
GLOBULIN PLAS-MCNC: 3.5 G/DL (ref 2.8–4.4)
GLUCOSE BLD-MCNC: 131 MG/DL (ref 70–99)
HCT VFR BLD AUTO: 27.4 %
HGB BLD-MCNC: 8.3 G/DL
IMM GRANULOCYTES # BLD AUTO: 0.05 X10(3) UL (ref 0–1)
IMM GRANULOCYTES NFR BLD: 0.7 %
IRON SATN MFR SERPL: 16 %
IRON SERPL-MCNC: 58 UG/DL
LYMPHOCYTES # BLD AUTO: 2.06 X10(3) UL (ref 1–4)
LYMPHOCYTES NFR BLD AUTO: 26.8 %
MCH RBC QN AUTO: 30.1 PG (ref 26–34)
MCHC RBC AUTO-ENTMCNC: 30.3 G/DL (ref 31–37)
MCV RBC AUTO: 99.3 FL
MONOCYTES # BLD AUTO: 0.55 X10(3) UL (ref 0.1–1)
MONOCYTES NFR BLD AUTO: 7.2 %
NEUTROPHILS # BLD AUTO: 4.72 X10 (3) UL (ref 1.5–7.7)
NEUTROPHILS # BLD AUTO: 4.72 X10(3) UL (ref 1.5–7.7)
NEUTROPHILS NFR BLD AUTO: 61.4 %
OSMOLALITY SERPL CALC.SUM OF ELEC: 292 MOSM/KG (ref 275–295)
PLATELET # BLD AUTO: 276 10(3)UL (ref 150–450)
POTASSIUM SERPL-SCNC: 3.8 MMOL/L (ref 3.5–5.1)
PROT SERPL-MCNC: 7.1 G/DL (ref 6.4–8.2)
RBC # BLD AUTO: 2.76 X10(6)UL
SODIUM SERPL-SCNC: 140 MMOL/L (ref 136–145)
TIBC SERPL-MCNC: 368 UG/DL (ref 240–450)
TRANSFERRIN SERPL-MCNC: 247 MG/DL (ref 200–360)
WBC # BLD AUTO: 7.7 X10(3) UL (ref 4–11)

## 2022-12-15 PROCEDURE — 99215 OFFICE O/P EST HI 40 MIN: CPT | Performed by: INTERNAL MEDICINE

## 2022-12-15 PROCEDURE — 96402 CHEMO HORMON ANTINEOPL SQ/IM: CPT

## 2022-12-15 RX ORDER — LAMOTRIGINE 25 MG/1
500 TABLET ORAL ONCE
Status: COMPLETED | OUTPATIENT
Start: 2022-12-15 | End: 2022-12-15

## 2022-12-15 RX ORDER — LAMOTRIGINE 25 MG/1
500 TABLET ORAL ONCE
Status: CANCELLED | OUTPATIENT
Start: 2022-12-15

## 2022-12-15 RX ADMIN — LAMOTRIGINE 500 MG: 25 TABLET ORAL at 15:32:00

## 2022-12-15 NOTE — PROGRESS NOTES
Pt here for C50D1 Faslodex. Arrives Ambulating independently, accompanied by Spouse           Pregnancy screening: Denies possibility of pregnancy    Modifications in dose or schedule: No    Drugs/infusions dual verified for appearance and physical integrity. IV pump settings were dual verified: yes     Frequency of blood return and site check throughout administration: Prior to administration subcutaneous injection   Discharged to Home, Ambulating independently, accompanied by:Spouse    Outpatient Oncology Care Plan  Problem list:  fatigue  Problems related to:  side effect of treatment  Interventions:  emotional support given  patient/family supported  encourage activity as tolerated  monitor lab values  promoted rest  Expected outcomes:  adequate sleep/rest  family support/coping well  no active bleeding  optimal lab values  patient supported/coping well  safe in environment  understands plan of care  Progress towards outcome:  making progress    Education Record    Learner:  Patient and Spouse  Barriers / Limitations:  None  Method:  Discussion  Outcome:  Shows understanding  Comments:    Per Norm Edwards in billing, authorization good thru 2/28/23. Injection given per MD order without incident. Tolerated well. Reviewed next appointment. Uses MyChart. Discharged home in stable condition, no new complaints.

## 2022-12-15 NOTE — PROGRESS NOTES
Pt here for follow up and  Treatment. Pt states her energy level is OK. She states she isn't getting sleep due to 9 people in the house. She states she had a little bleeding around last visit.     Outpatient Oncology Care Plan  Problem list:  knowledge deficit    Problems related to:    disease/disease progression    Interventions:  provided general teaching    Expected outcomes:  understands plan of care    Progress towards outcome:  making progress    Education Record    Learner:  Patient and Spouse  Barriers / Limitations:  None  Method:  Brief focused  Outcome:  Shows understanding  Comments:

## 2022-12-16 ENCOUNTER — PATIENT MESSAGE (OUTPATIENT)
Dept: FAMILY MEDICINE CLINIC | Facility: CLINIC | Age: 62
End: 2022-12-16

## 2022-12-20 RX ORDER — HYDROCODONE BITARTRATE AND ACETAMINOPHEN 7.5; 325 MG/1; MG/1
1-2 TABLET ORAL EVERY 8 HOURS PRN
Qty: 90 TABLET | Refills: 0 | Status: SHIPPED | OUTPATIENT
Start: 2022-12-20

## 2022-12-20 NOTE — TELEPHONE ENCOUNTER
LOV: 11/16/2022    Last Refill:    HYDROcodone-acetaminophen 7.5-325 MG Oral Tab 90 tablet 0 9/8/2022     RTC: enedina mendieta 02/14/2023    Protocol: n/a

## 2022-12-20 NOTE — TELEPHONE ENCOUNTER
Pt  calling regarding refill request, wanting to make sure message was received about getting additional 60 day RX sent to pharmacy. Advised message was received and forwarded to Dr Francheska Cleaning.  requesting that prescription for next refill be corrected to 90 days as pt is charges a $10 copay each time she picks up RX regardless of quantity.     Please advise

## 2023-01-10 DIAGNOSIS — R16.1 SPLENOMEGALY: ICD-10-CM

## 2023-01-11 RX ORDER — PANTOPRAZOLE SODIUM 40 MG/1
TABLET, DELAYED RELEASE ORAL
Qty: 180 TABLET | Refills: 0 | Status: SHIPPED | OUTPATIENT
Start: 2023-01-11

## 2023-01-12 ENCOUNTER — OFFICE VISIT (OUTPATIENT)
Dept: HEMATOLOGY/ONCOLOGY | Age: 63
End: 2023-01-12
Attending: INTERNAL MEDICINE
Payer: COMMERCIAL

## 2023-01-12 VITALS
SYSTOLIC BLOOD PRESSURE: 118 MMHG | OXYGEN SATURATION: 98 % | WEIGHT: 179.19 LBS | HEART RATE: 63 BPM | DIASTOLIC BLOOD PRESSURE: 64 MMHG | TEMPERATURE: 99 F | BODY MASS INDEX: 29 KG/M2

## 2023-01-12 DIAGNOSIS — D50.0 IRON DEFICIENCY ANEMIA DUE TO CHRONIC BLOOD LOSS: ICD-10-CM

## 2023-01-12 DIAGNOSIS — C50.911 CARCINOMA OF RIGHT BREAST METASTATIC TO PLEURA (HCC): ICD-10-CM

## 2023-01-12 DIAGNOSIS — C78.2 METASTASIS TO PLEURA (HCC): Primary | ICD-10-CM

## 2023-01-12 DIAGNOSIS — C79.51 BONE METASTASES (HCC): ICD-10-CM

## 2023-01-12 DIAGNOSIS — C78.2 CARCINOMA OF RIGHT BREAST METASTATIC TO PLEURA (HCC): ICD-10-CM

## 2023-01-12 DIAGNOSIS — C50.011 MALIGNANT NEOPLASM OF NIPPLE OF RIGHT BREAST IN FEMALE, ESTROGEN RECEPTOR POSITIVE (HCC): Primary | ICD-10-CM

## 2023-01-12 DIAGNOSIS — R16.0 LIVER MASSES: ICD-10-CM

## 2023-01-12 DIAGNOSIS — Z17.0 MALIGNANT NEOPLASM OF NIPPLE OF RIGHT BREAST IN FEMALE, ESTROGEN RECEPTOR POSITIVE (HCC): Primary | ICD-10-CM

## 2023-01-12 LAB
ALBUMIN SERPL-MCNC: 3.8 G/DL (ref 3.4–5)
ALBUMIN/GLOB SERPL: 1.1 {RATIO} (ref 1–2)
ALP LIVER SERPL-CCNC: 165 U/L
ALT SERPL-CCNC: 27 U/L
ANION GAP SERPL CALC-SCNC: 5 MMOL/L (ref 0–18)
AST SERPL-CCNC: 26 U/L (ref 15–37)
BASOPHILS # BLD AUTO: 0.09 X10(3) UL (ref 0–0.2)
BASOPHILS NFR BLD AUTO: 1.5 %
BILIRUB SERPL-MCNC: 0.3 MG/DL (ref 0.1–2)
BUN BLD-MCNC: 6 MG/DL (ref 7–18)
BUN BLD-MCNC: 8 MG/DL (ref 7–18)
CALCIUM BLD-MCNC: 8.9 MG/DL (ref 8.5–10.1)
CHLORIDE BLD-SCNC: 104 MMOL/L (ref 98–112)
CHLORIDE SERPL-SCNC: 108 MMOL/L (ref 98–112)
CO2 BLD-SCNC: 25 MMOL/L (ref 21–32)
CO2 SERPL-SCNC: 28 MMOL/L (ref 21–32)
CREAT BLD-MCNC: 1 MG/DL
CREAT BLD-MCNC: 1.07 MG/DL
DEPRECATED HBV CORE AB SER IA-ACNC: 28.1 NG/ML
EOSINOPHIL # BLD AUTO: 0.31 X10(3) UL (ref 0–0.7)
EOSINOPHIL NFR BLD AUTO: 5 %
ERYTHROCYTE [DISTWIDTH] IN BLOOD BY AUTOMATED COUNT: 16 %
FASTING STATUS PATIENT QL REPORTED: NO
GFR SERPLBLD BASED ON 1.73 SQ M-ARVRAT: 59 ML/MIN/1.73M2 (ref 60–?)
GFR SERPLBLD BASED ON 1.73 SQ M-ARVRAT: 64 ML/MIN/1.73M2 (ref 60–?)
GLOBULIN PLAS-MCNC: 3.6 G/DL (ref 2.8–4.4)
GLUCOSE BLD-MCNC: 100 MG/DL (ref 70–99)
GLUCOSE BLD-MCNC: 94 MG/DL (ref 70–99)
HCT VFR BLD AUTO: 27.8 %
HCT VFR BLD CALC: 18 %
HGB BLD-MCNC: 8.5 G/DL
IMM GRANULOCYTES # BLD AUTO: 0.02 X10(3) UL (ref 0–1)
IMM GRANULOCYTES NFR BLD: 0.3 %
IRON SATN MFR SERPL: 27 %
IRON SERPL-MCNC: 130 UG/DL
ISTAT IONIZED CALCIUM FOR CHEM 8: 1.17 MMOL/L (ref 1.12–1.32)
LYMPHOCYTES # BLD AUTO: 1.93 X10(3) UL (ref 1–4)
LYMPHOCYTES NFR BLD AUTO: 31.2 %
MCH RBC QN AUTO: 29.5 PG (ref 26–34)
MCHC RBC AUTO-ENTMCNC: 30.6 G/DL (ref 31–37)
MCV RBC AUTO: 96.5 FL
MONOCYTES # BLD AUTO: 0.35 X10(3) UL (ref 0.1–1)
MONOCYTES NFR BLD AUTO: 5.7 %
NEUTROPHILS # BLD AUTO: 3.49 X10 (3) UL (ref 1.5–7.7)
NEUTROPHILS # BLD AUTO: 3.49 X10(3) UL (ref 1.5–7.7)
NEUTROPHILS NFR BLD AUTO: 56.3 %
OSMOLALITY SERPL CALC.SUM OF ELEC: 290 MOSM/KG (ref 275–295)
PLATELET # BLD AUTO: 240 10(3)UL (ref 150–450)
POTASSIUM BLD-SCNC: 3.4 MMOL/L (ref 3.6–5.1)
POTASSIUM SERPL-SCNC: 3.4 MMOL/L (ref 3.5–5.1)
PROT SERPL-MCNC: 7.4 G/DL (ref 6.4–8.2)
RBC # BLD AUTO: 2.88 X10(6)UL
SODIUM BLD-SCNC: 140 MMOL/L (ref 136–145)
SODIUM SERPL-SCNC: 141 MMOL/L (ref 136–145)
TIBC SERPL-MCNC: 489 UG/DL (ref 240–450)
TRANSFERRIN SERPL-MCNC: 328 MG/DL (ref 200–360)
WBC # BLD AUTO: 6.2 X10(3) UL (ref 4–11)

## 2023-01-12 PROCEDURE — 99215 OFFICE O/P EST HI 40 MIN: CPT | Performed by: INTERNAL MEDICINE

## 2023-01-12 PROCEDURE — 96402 CHEMO HORMON ANTINEOPL SQ/IM: CPT

## 2023-01-12 RX ORDER — LAMOTRIGINE 25 MG/1
500 TABLET ORAL ONCE
Status: COMPLETED | OUTPATIENT
Start: 2023-01-12 | End: 2023-01-12

## 2023-01-12 RX ORDER — LAMOTRIGINE 25 MG/1
500 TABLET ORAL ONCE
Status: CANCELLED | OUTPATIENT
Start: 2023-01-12

## 2023-01-12 RX ADMIN — LAMOTRIGINE 500 MG: 25 TABLET ORAL at 15:42:00

## 2023-01-12 NOTE — PROGRESS NOTES
Pt here for follow up. She states the day before her shots she has some bleeding. She complains of constipation. She states she feels like her abdomen is more distended.       Outpatient Oncology Care Plan  Problem list:  knowledge deficit    Problems related to:    disease/disease progression    Interventions:  provided general teaching    Expected outcomes:  understands plan of care    Progress towards outcome:  making progress    Education Record    Learner:  Patient  Barriers / Limitations:  None  Method:  Brief focused  Outcome:  Shows understanding  Comments:

## 2023-01-18 DIAGNOSIS — R16.1 SPLENOMEGALY: ICD-10-CM

## 2023-01-18 DIAGNOSIS — R60.0 EDEMA OF BOTH FEET: ICD-10-CM

## 2023-01-18 RX ORDER — FUROSEMIDE 40 MG/1
TABLET ORAL
Qty: 90 TABLET | Refills: 0 | Status: SHIPPED | OUTPATIENT
Start: 2023-01-18

## 2023-01-18 RX ORDER — SPIRONOLACTONE 100 MG/1
TABLET, FILM COATED ORAL
Qty: 90 TABLET | Refills: 0 | Status: SHIPPED | OUTPATIENT
Start: 2023-01-18

## 2023-01-20 ENCOUNTER — OFFICE VISIT (OUTPATIENT)
Dept: HEMATOLOGY/ONCOLOGY | Age: 63
End: 2023-01-20
Attending: INTERNAL MEDICINE
Payer: COMMERCIAL

## 2023-01-20 VITALS
RESPIRATION RATE: 18 BRPM | SYSTOLIC BLOOD PRESSURE: 102 MMHG | OXYGEN SATURATION: 96 % | TEMPERATURE: 98 F | HEART RATE: 59 BPM | DIASTOLIC BLOOD PRESSURE: 67 MMHG

## 2023-01-20 DIAGNOSIS — C50.919 BREAST CANCER METASTASIZED TO PLEURA, UNSPECIFIED LATERALITY (HCC): ICD-10-CM

## 2023-01-20 DIAGNOSIS — E86.0 DEHYDRATION: ICD-10-CM

## 2023-01-20 DIAGNOSIS — D50.0 IRON DEFICIENCY ANEMIA DUE TO CHRONIC BLOOD LOSS: ICD-10-CM

## 2023-01-20 DIAGNOSIS — D50.0 IRON DEFICIENCY ANEMIA SECONDARY TO BLOOD LOSS (CHRONIC): Primary | ICD-10-CM

## 2023-01-20 DIAGNOSIS — C79.51 BONE METASTASES (HCC): ICD-10-CM

## 2023-01-20 DIAGNOSIS — C78.2 BREAST CANCER METASTASIZED TO PLEURA, UNSPECIFIED LATERALITY (HCC): ICD-10-CM

## 2023-01-20 PROCEDURE — 96365 THER/PROPH/DIAG IV INF INIT: CPT

## 2023-02-09 ENCOUNTER — OFFICE VISIT (OUTPATIENT)
Dept: HEMATOLOGY/ONCOLOGY | Age: 63
End: 2023-02-09
Attending: INTERNAL MEDICINE
Payer: COMMERCIAL

## 2023-02-09 VITALS
TEMPERATURE: 97 F | HEART RATE: 73 BPM | HEIGHT: 65.98 IN | BODY MASS INDEX: 28.32 KG/M2 | OXYGEN SATURATION: 97 % | WEIGHT: 176.19 LBS | SYSTOLIC BLOOD PRESSURE: 115 MMHG | RESPIRATION RATE: 18 BRPM | DIASTOLIC BLOOD PRESSURE: 55 MMHG

## 2023-02-09 DIAGNOSIS — C78.2 CARCINOMA OF RIGHT BREAST METASTATIC TO PLEURA (HCC): ICD-10-CM

## 2023-02-09 DIAGNOSIS — C50.011 MALIGNANT NEOPLASM OF NIPPLE OF RIGHT BREAST IN FEMALE, ESTROGEN RECEPTOR POSITIVE (HCC): Primary | ICD-10-CM

## 2023-02-09 DIAGNOSIS — Z17.0 MALIGNANT NEOPLASM OF NIPPLE OF RIGHT BREAST IN FEMALE, ESTROGEN RECEPTOR POSITIVE (HCC): ICD-10-CM

## 2023-02-09 DIAGNOSIS — C50.911 CARCINOMA OF RIGHT BREAST METASTATIC TO PLEURA (HCC): ICD-10-CM

## 2023-02-09 DIAGNOSIS — D50.0 IRON DEFICIENCY ANEMIA SECONDARY TO BLOOD LOSS (CHRONIC): ICD-10-CM

## 2023-02-09 DIAGNOSIS — C50.011 MALIGNANT NEOPLASM OF NIPPLE OF RIGHT BREAST IN FEMALE, ESTROGEN RECEPTOR POSITIVE (HCC): ICD-10-CM

## 2023-02-09 DIAGNOSIS — C79.51 BONE METASTASES (HCC): ICD-10-CM

## 2023-02-09 DIAGNOSIS — Z17.0 MALIGNANT NEOPLASM OF NIPPLE OF RIGHT BREAST IN FEMALE, ESTROGEN RECEPTOR POSITIVE (HCC): Primary | ICD-10-CM

## 2023-02-09 LAB
ALBUMIN SERPL-MCNC: 4 G/DL (ref 3.4–5)
ALBUMIN/GLOB SERPL: 1.2 {RATIO} (ref 1–2)
ALP LIVER SERPL-CCNC: 160 U/L
ALT SERPL-CCNC: 37 U/L
ANION GAP SERPL CALC-SCNC: 7 MMOL/L (ref 0–18)
AST SERPL-CCNC: 31 U/L (ref 15–37)
BASOPHILS # BLD AUTO: 0.07 X10(3) UL (ref 0–0.2)
BASOPHILS NFR BLD AUTO: 1.6 %
BILIRUB SERPL-MCNC: 0.6 MG/DL (ref 0.1–2)
BUN BLD-MCNC: 12 MG/DL (ref 7–18)
CALCIUM BLD-MCNC: 9.6 MG/DL (ref 8.5–10.1)
CHLORIDE SERPL-SCNC: 108 MMOL/L (ref 98–112)
CO2 SERPL-SCNC: 26 MMOL/L (ref 21–32)
CREAT BLD-MCNC: 1.03 MG/DL
DEPRECATED HBV CORE AB SER IA-ACNC: 170.1 NG/ML
EOSINOPHIL # BLD AUTO: 0.2 X10(3) UL (ref 0–0.7)
EOSINOPHIL NFR BLD AUTO: 4.5 %
ERYTHROCYTE [DISTWIDTH] IN BLOOD BY AUTOMATED COUNT: 16.5 %
FASTING STATUS PATIENT QL REPORTED: NO
GFR SERPLBLD BASED ON 1.73 SQ M-ARVRAT: 62 ML/MIN/1.73M2 (ref 60–?)
GLOBULIN PLAS-MCNC: 3.3 G/DL (ref 2.8–4.4)
GLUCOSE BLD-MCNC: 163 MG/DL (ref 70–99)
HCT VFR BLD AUTO: 32.1 %
HGB BLD-MCNC: 9.9 G/DL
IMM GRANULOCYTES # BLD AUTO: 0.01 X10(3) UL (ref 0–1)
IMM GRANULOCYTES NFR BLD: 0.2 %
IRON SATN MFR SERPL: 14 %
IRON SERPL-MCNC: 56 UG/DL
LYMPHOCYTES # BLD AUTO: 1.26 X10(3) UL (ref 1–4)
LYMPHOCYTES NFR BLD AUTO: 28.2 %
MCH RBC QN AUTO: 29.7 PG (ref 26–34)
MCHC RBC AUTO-ENTMCNC: 30.8 G/DL (ref 31–37)
MCV RBC AUTO: 96.4 FL
MONOCYTES # BLD AUTO: 0.24 X10(3) UL (ref 0.1–1)
MONOCYTES NFR BLD AUTO: 5.4 %
NEUTROPHILS # BLD AUTO: 2.69 X10 (3) UL (ref 1.5–7.7)
NEUTROPHILS # BLD AUTO: 2.69 X10(3) UL (ref 1.5–7.7)
NEUTROPHILS NFR BLD AUTO: 60.1 %
OSMOLALITY SERPL CALC.SUM OF ELEC: 295 MOSM/KG (ref 275–295)
PLATELET # BLD AUTO: 176 10(3)UL (ref 150–450)
POTASSIUM SERPL-SCNC: 3.4 MMOL/L (ref 3.5–5.1)
PROT SERPL-MCNC: 7.3 G/DL (ref 6.4–8.2)
RBC # BLD AUTO: 3.33 X10(6)UL
SODIUM SERPL-SCNC: 141 MMOL/L (ref 136–145)
TIBC SERPL-MCNC: 411 UG/DL (ref 240–450)
TRANSFERRIN SERPL-MCNC: 276 MG/DL (ref 200–360)
WBC # BLD AUTO: 4.5 X10(3) UL (ref 4–11)

## 2023-02-09 PROCEDURE — 36415 COLL VENOUS BLD VENIPUNCTURE: CPT

## 2023-02-09 PROCEDURE — 82728 ASSAY OF FERRITIN: CPT | Performed by: INTERNAL MEDICINE

## 2023-02-09 PROCEDURE — 99215 OFFICE O/P EST HI 40 MIN: CPT | Performed by: INTERNAL MEDICINE

## 2023-02-09 PROCEDURE — 96402 CHEMO HORMON ANTINEOPL SQ/IM: CPT

## 2023-02-09 RX ORDER — LAMOTRIGINE 25 MG/1
500 TABLET ORAL ONCE
Status: CANCELLED | OUTPATIENT
Start: 2023-02-09

## 2023-02-09 RX ORDER — LAMOTRIGINE 25 MG/1
500 TABLET ORAL ONCE
Status: COMPLETED | OUTPATIENT
Start: 2023-02-09 | End: 2023-02-09

## 2023-02-09 RX ADMIN — LAMOTRIGINE 500 MG: 25 TABLET ORAL at 15:44:00

## 2023-02-10 ENCOUNTER — OFFICE VISIT (OUTPATIENT)
Dept: FAMILY MEDICINE CLINIC | Facility: CLINIC | Age: 63
End: 2023-02-10
Payer: COMMERCIAL

## 2023-02-10 VITALS
TEMPERATURE: 98 F | SYSTOLIC BLOOD PRESSURE: 116 MMHG | RESPIRATION RATE: 18 BRPM | WEIGHT: 178 LBS | HEIGHT: 69.8 IN | BODY MASS INDEX: 25.77 KG/M2 | DIASTOLIC BLOOD PRESSURE: 60 MMHG | HEART RATE: 64 BPM

## 2023-02-10 DIAGNOSIS — C50.919 METASTATIC BREAST CANCER (HCC): ICD-10-CM

## 2023-02-10 DIAGNOSIS — E03.9 HYPOTHYROIDISM IN ADULT: ICD-10-CM

## 2023-02-10 DIAGNOSIS — D50.0 IRON DEFICIENCY ANEMIA DUE TO CHRONIC BLOOD LOSS: ICD-10-CM

## 2023-02-10 DIAGNOSIS — R73.9 HYPERGLYCEMIA: ICD-10-CM

## 2023-02-10 DIAGNOSIS — F41.9 ANXIETY: ICD-10-CM

## 2023-02-10 DIAGNOSIS — E78.2 HYPERLIPIDEMIA, MIXED: Primary | ICD-10-CM

## 2023-02-10 DIAGNOSIS — R60.0 LOWER EXTREMITY EDEMA: ICD-10-CM

## 2023-02-10 DIAGNOSIS — R79.89 ELEVATED LIVER FUNCTION TESTS: ICD-10-CM

## 2023-02-10 DIAGNOSIS — E87.6 HYPOKALEMIA: ICD-10-CM

## 2023-02-10 DIAGNOSIS — G62.9 PERIPHERAL POLYNEUROPATHY: ICD-10-CM

## 2023-02-10 PROCEDURE — 3078F DIAST BP <80 MM HG: CPT | Performed by: FAMILY MEDICINE

## 2023-02-10 PROCEDURE — 3074F SYST BP LT 130 MM HG: CPT | Performed by: FAMILY MEDICINE

## 2023-02-10 PROCEDURE — 3008F BODY MASS INDEX DOCD: CPT | Performed by: FAMILY MEDICINE

## 2023-02-10 PROCEDURE — 99215 OFFICE O/P EST HI 40 MIN: CPT | Performed by: FAMILY MEDICINE

## 2023-02-10 RX ORDER — HYDROCODONE BITARTRATE AND ACETAMINOPHEN 7.5; 325 MG/1; MG/1
1 TABLET ORAL EVERY 8 HOURS PRN
Qty: 90 TABLET | Refills: 0 | Status: SHIPPED | OUTPATIENT
Start: 2023-04-13 | End: 2023-05-13

## 2023-02-10 RX ORDER — HYDROCODONE BITARTRATE AND ACETAMINOPHEN 7.5; 325 MG/1; MG/1
1-2 TABLET ORAL EVERY 8 HOURS PRN
Qty: 90 TABLET | Refills: 0 | Status: CANCELLED | OUTPATIENT
Start: 2023-02-10

## 2023-02-10 RX ORDER — ALPRAZOLAM 0.25 MG/1
0.25 TABLET ORAL 2 TIMES DAILY PRN
Qty: 30 TABLET | Refills: 2 | Status: SHIPPED | OUTPATIENT
Start: 2023-02-10

## 2023-02-10 RX ORDER — HYDROCODONE BITARTRATE AND ACETAMINOPHEN 7.5; 325 MG/1; MG/1
1 TABLET ORAL EVERY 8 HOURS PRN
Qty: 90 TABLET | Refills: 0 | Status: SHIPPED | OUTPATIENT
Start: 2023-02-10 | End: 2023-03-12

## 2023-02-10 RX ORDER — POTASSIUM CHLORIDE 750 MG/1
10 TABLET, FILM COATED, EXTENDED RELEASE ORAL DAILY
Qty: 30 TABLET | Refills: 0 | Status: SHIPPED | OUTPATIENT
Start: 2023-02-10

## 2023-02-10 RX ORDER — HYDROCODONE BITARTRATE AND ACETAMINOPHEN 7.5; 325 MG/1; MG/1
1 TABLET ORAL EVERY 8 HOURS PRN
Qty: 90 TABLET | Refills: 0 | Status: SHIPPED | OUTPATIENT
Start: 2023-03-13 | End: 2023-04-12

## 2023-02-10 RX ORDER — ESCITALOPRAM OXALATE 20 MG/1
20 TABLET ORAL DAILY
Qty: 90 TABLET | Refills: 1 | Status: SHIPPED | OUTPATIENT
Start: 2023-02-10

## 2023-02-10 RX ORDER — HYDROCODONE BITARTRATE AND ACETAMINOPHEN 7.5; 325 MG/1; MG/1
1-2 TABLET ORAL EVERY 8 HOURS PRN
Qty: 90 TABLET | Refills: 0 | Status: SHIPPED | OUTPATIENT
Start: 2023-02-10 | End: 2023-03-12

## 2023-02-10 RX ORDER — HYDROCODONE BITARTRATE AND ACETAMINOPHEN 7.5; 325 MG/1; MG/1
1-2 TABLET ORAL EVERY 8 HOURS PRN
Qty: 90 TABLET | Refills: 0 | Status: SHIPPED | OUTPATIENT
Start: 2023-03-13 | End: 2023-04-12

## 2023-02-10 RX ORDER — HYDROCODONE BITARTRATE AND ACETAMINOPHEN 7.5; 325 MG/1; MG/1
1-2 TABLET ORAL EVERY 8 HOURS PRN
Qty: 90 TABLET | Refills: 0 | Status: SHIPPED | OUTPATIENT
Start: 2023-04-13 | End: 2023-05-13

## 2023-02-10 NOTE — PATIENT INSTRUCTIONS
Start potassium chloride 10 mEq 1 tablet daily. Recheck blood work in 1 to 2 weeks fasting. Continue current meds. Watch diet for fats and carbs. Stay active.

## 2023-02-16 ENCOUNTER — TELEPHONE (OUTPATIENT)
Dept: ADMINISTRATIVE | Age: 63
End: 2023-02-16

## 2023-02-16 DIAGNOSIS — Z01.00 ROUTINE EYE EXAM: Primary | ICD-10-CM

## 2023-02-16 DIAGNOSIS — Z01.01 VISION EXAM WITH ABNORMAL FINDINGS: ICD-10-CM

## 2023-02-17 ENCOUNTER — HOSPITAL ENCOUNTER (OUTPATIENT)
Dept: MRI IMAGING | Age: 63
Discharge: HOME | End: 2023-02-17
Attending: INTERNAL MEDICINE
Payer: COMMERCIAL

## 2023-02-17 ENCOUNTER — HOSPITAL ENCOUNTER (OUTPATIENT)
Dept: CT IMAGING | Age: 63
Discharge: HOME | End: 2023-02-17
Attending: INTERNAL MEDICINE
Payer: COMMERCIAL

## 2023-02-17 DIAGNOSIS — C50.911 CARCINOMA OF RIGHT BREAST METASTATIC TO PLEURA (HCC): ICD-10-CM

## 2023-02-17 DIAGNOSIS — C78.2 CARCINOMA OF RIGHT BREAST METASTATIC TO PLEURA (HCC): ICD-10-CM

## 2023-02-17 DIAGNOSIS — C78.2 METASTASIS TO PLEURA (HCC): ICD-10-CM

## 2023-02-17 DIAGNOSIS — R16.0 LIVER MASSES: ICD-10-CM

## 2023-02-17 DIAGNOSIS — C79.51 BONE METASTASES (HCC): ICD-10-CM

## 2023-02-17 PROCEDURE — 71250 CT THORAX DX C-: CPT | Performed by: INTERNAL MEDICINE

## 2023-02-17 PROCEDURE — 74183 MRI ABD W/O CNTR FLWD CNTR: CPT | Performed by: INTERNAL MEDICINE

## 2023-02-17 PROCEDURE — A9581 GADOXETATE DISODIUM INJ: HCPCS | Performed by: INTERNAL MEDICINE

## 2023-03-09 ENCOUNTER — OFFICE VISIT (OUTPATIENT)
Dept: HEMATOLOGY/ONCOLOGY | Age: 63
End: 2023-03-09
Attending: INTERNAL MEDICINE
Payer: COMMERCIAL

## 2023-03-09 VITALS
WEIGHT: 179.13 LBS | TEMPERATURE: 99 F | DIASTOLIC BLOOD PRESSURE: 71 MMHG | BODY MASS INDEX: 28.79 KG/M2 | HEART RATE: 72 BPM | HEIGHT: 65.98 IN | OXYGEN SATURATION: 98 % | SYSTOLIC BLOOD PRESSURE: 134 MMHG | RESPIRATION RATE: 18 BRPM

## 2023-03-09 DIAGNOSIS — C78.2 METASTASIS TO PLEURA (HCC): ICD-10-CM

## 2023-03-09 DIAGNOSIS — C79.51 BONE METASTASES (HCC): ICD-10-CM

## 2023-03-09 DIAGNOSIS — C78.2 CARCINOMA OF RIGHT BREAST METASTATIC TO PLEURA (HCC): ICD-10-CM

## 2023-03-09 DIAGNOSIS — Z17.0 MALIGNANT NEOPLASM OF NIPPLE OF RIGHT BREAST IN FEMALE, ESTROGEN RECEPTOR POSITIVE (HCC): Primary | ICD-10-CM

## 2023-03-09 DIAGNOSIS — R16.0 LIVER MASSES: ICD-10-CM

## 2023-03-09 DIAGNOSIS — E78.2 HYPERLIPIDEMIA, MIXED: ICD-10-CM

## 2023-03-09 DIAGNOSIS — C50.911 CARCINOMA OF RIGHT BREAST METASTATIC TO PLEURA (HCC): ICD-10-CM

## 2023-03-09 DIAGNOSIS — R73.9 HYPERGLYCEMIA: ICD-10-CM

## 2023-03-09 DIAGNOSIS — D50.0 IRON DEFICIENCY ANEMIA DUE TO CHRONIC BLOOD LOSS: ICD-10-CM

## 2023-03-09 DIAGNOSIS — C50.011 MALIGNANT NEOPLASM OF NIPPLE OF RIGHT BREAST IN FEMALE, ESTROGEN RECEPTOR POSITIVE (HCC): Primary | ICD-10-CM

## 2023-03-09 DIAGNOSIS — E03.9 HYPOTHYROIDISM IN ADULT: ICD-10-CM

## 2023-03-09 LAB
ALBUMIN SERPL-MCNC: 3.8 G/DL (ref 3.4–5)
ALBUMIN/GLOB SERPL: 1.2 {RATIO} (ref 1–2)
ALP LIVER SERPL-CCNC: 124 U/L
ALT SERPL-CCNC: 20 U/L
ANION GAP SERPL CALC-SCNC: 11 MMOL/L (ref 0–18)
AST SERPL-CCNC: 18 U/L (ref 15–37)
BASOPHILS # BLD AUTO: 0.08 X10(3) UL (ref 0–0.2)
BASOPHILS NFR BLD AUTO: 1.7 %
BILIRUB SERPL-MCNC: 0.3 MG/DL (ref 0.1–2)
BUN BLD-MCNC: 7 MG/DL (ref 7–18)
CALCIUM BLD-MCNC: 8.9 MG/DL (ref 8.5–10.1)
CHLORIDE SERPL-SCNC: 110 MMOL/L (ref 98–112)
CHOLEST SERPL-MCNC: 262 MG/DL (ref ?–200)
CO2 SERPL-SCNC: 23 MMOL/L (ref 21–32)
CREAT BLD-MCNC: 0.94 MG/DL
DEPRECATED HBV CORE AB SER IA-ACNC: 51.2 NG/ML
EOSINOPHIL # BLD AUTO: 0.26 X10(3) UL (ref 0–0.7)
EOSINOPHIL NFR BLD AUTO: 5.6 %
ERYTHROCYTE [DISTWIDTH] IN BLOOD BY AUTOMATED COUNT: 15.9 %
EST. AVERAGE GLUCOSE BLD GHB EST-MCNC: 114 MG/DL (ref 68–126)
FASTING PATIENT LIPID ANSWER: YES
FASTING STATUS PATIENT QL REPORTED: NO
GFR SERPLBLD BASED ON 1.73 SQ M-ARVRAT: 69 ML/MIN/1.73M2 (ref 60–?)
GLOBULIN PLAS-MCNC: 3.2 G/DL (ref 2.8–4.4)
GLUCOSE BLD-MCNC: 125 MG/DL (ref 70–99)
HBA1C MFR BLD: 5.6 % (ref ?–5.7)
HCT VFR BLD AUTO: 28.6 %
HDLC SERPL-MCNC: 24 MG/DL (ref 40–59)
HGB BLD-MCNC: 9 G/DL
IMM GRANULOCYTES # BLD AUTO: 0.01 X10(3) UL (ref 0–1)
IMM GRANULOCYTES NFR BLD: 0.2 %
IRON SATN MFR SERPL: 8 %
IRON SERPL-MCNC: 32 UG/DL
LDLC SERPL CALC-MCNC: 136 MG/DL (ref ?–100)
LYMPHOCYTES # BLD AUTO: 1.45 X10(3) UL (ref 1–4)
LYMPHOCYTES NFR BLD AUTO: 31.3 %
MCH RBC QN AUTO: 30.5 PG (ref 26–34)
MCHC RBC AUTO-ENTMCNC: 31.5 G/DL (ref 31–37)
MCV RBC AUTO: 96.9 FL
MONOCYTES # BLD AUTO: 0.23 X10(3) UL (ref 0.1–1)
MONOCYTES NFR BLD AUTO: 5 %
NEUTROPHILS # BLD AUTO: 2.61 X10 (3) UL (ref 1.5–7.7)
NEUTROPHILS # BLD AUTO: 2.61 X10(3) UL (ref 1.5–7.7)
NEUTROPHILS NFR BLD AUTO: 56.2 %
NONHDLC SERPL-MCNC: 238 MG/DL (ref ?–130)
OSMOLALITY SERPL CALC.SUM OF ELEC: 297 MOSM/KG (ref 275–295)
PLATELET # BLD AUTO: 180 10(3)UL (ref 150–450)
POTASSIUM SERPL-SCNC: 3.6 MMOL/L (ref 3.5–5.1)
PROT SERPL-MCNC: 7 G/DL (ref 6.4–8.2)
RBC # BLD AUTO: 2.95 X10(6)UL
SODIUM SERPL-SCNC: 144 MMOL/L (ref 136–145)
T4 FREE SERPL-MCNC: 0.6 NG/DL (ref 0.8–1.7)
TIBC SERPL-MCNC: 392 UG/DL (ref 240–450)
TRANSFERRIN SERPL-MCNC: 263 MG/DL (ref 200–360)
TRIGL SERPL-MCNC: 546 MG/DL (ref 30–149)
TSI SER-ACNC: 67.2 MIU/ML (ref 0.36–3.74)
VLDLC SERPL CALC-MCNC: 106 MG/DL (ref 0–30)
WBC # BLD AUTO: 4.6 X10(3) UL (ref 4–11)

## 2023-03-09 PROCEDURE — 96402 CHEMO HORMON ANTINEOPL SQ/IM: CPT

## 2023-03-09 PROCEDURE — 99215 OFFICE O/P EST HI 40 MIN: CPT | Performed by: INTERNAL MEDICINE

## 2023-03-09 RX ORDER — LAMOTRIGINE 25 MG/1
500 TABLET ORAL ONCE
Status: CANCELLED | OUTPATIENT
Start: 2023-03-12

## 2023-03-09 RX ORDER — LAMOTRIGINE 25 MG/1
500 TABLET ORAL ONCE
Status: COMPLETED | OUTPATIENT
Start: 2023-03-09 | End: 2023-03-09

## 2023-03-09 RX ADMIN — LAMOTRIGINE 500 MG: 25 TABLET ORAL at 10:55:00

## 2023-03-09 NOTE — PROGRESS NOTES
Pt here for follow up and injection. Denies any bleeding.     Outpatient Oncology Care Plan  Problem list:  knowledge deficit    Problems related to:    disease/disease progression    Interventions:  provided general teaching    Expected outcomes:  understands plan of care    Progress towards outcome:  making progress    Education Record    Learner:  Patient  Barriers / Limitations:  None  Method:  Brief focused  Outcome:  Shows understanding  Comments:

## 2023-03-10 DIAGNOSIS — E78.1 HYPERTRIGLYCERIDEMIA: Primary | ICD-10-CM

## 2023-03-10 DIAGNOSIS — E03.9 HYPOTHYROIDISM IN ADULT: Primary | ICD-10-CM

## 2023-04-06 ENCOUNTER — OFFICE VISIT (OUTPATIENT)
Dept: HEMATOLOGY/ONCOLOGY | Age: 63
End: 2023-04-06
Attending: INTERNAL MEDICINE
Payer: COMMERCIAL

## 2023-04-06 VITALS
HEIGHT: 65.98 IN | TEMPERATURE: 97 F | OXYGEN SATURATION: 97 % | WEIGHT: 174 LBS | HEART RATE: 73 BPM | SYSTOLIC BLOOD PRESSURE: 110 MMHG | RESPIRATION RATE: 18 BRPM | BODY MASS INDEX: 27.97 KG/M2 | DIASTOLIC BLOOD PRESSURE: 58 MMHG

## 2023-04-06 DIAGNOSIS — C78.2 BREAST CANCER METASTASIZED TO PLEURA, UNSPECIFIED LATERALITY (HCC): Primary | ICD-10-CM

## 2023-04-06 DIAGNOSIS — E03.9 HYPOTHYROIDISM IN ADULT: ICD-10-CM

## 2023-04-06 DIAGNOSIS — C78.2 CARCINOMA OF RIGHT BREAST METASTATIC TO PLEURA (HCC): ICD-10-CM

## 2023-04-06 DIAGNOSIS — C50.911 CARCINOMA OF RIGHT BREAST METASTATIC TO PLEURA (HCC): ICD-10-CM

## 2023-04-06 DIAGNOSIS — C79.51 MALIGNANT NEOPLASM METASTATIC TO BONE (HCC): ICD-10-CM

## 2023-04-06 DIAGNOSIS — C50.919 BREAST CANCER METASTASIZED TO PLEURA, UNSPECIFIED LATERALITY (HCC): Primary | ICD-10-CM

## 2023-04-06 DIAGNOSIS — C78.2 METASTASIS TO PLEURA (HCC): ICD-10-CM

## 2023-04-06 DIAGNOSIS — E78.1 HYPERTRIGLYCERIDEMIA: ICD-10-CM

## 2023-04-06 DIAGNOSIS — Z17.0 MALIGNANT NEOPLASM OF NIPPLE OF RIGHT BREAST IN FEMALE, ESTROGEN RECEPTOR POSITIVE (HCC): ICD-10-CM

## 2023-04-06 DIAGNOSIS — D50.0 IRON DEFICIENCY ANEMIA DUE TO CHRONIC BLOOD LOSS: ICD-10-CM

## 2023-04-06 DIAGNOSIS — C50.011 MALIGNANT NEOPLASM OF NIPPLE OF RIGHT BREAST IN FEMALE, ESTROGEN RECEPTOR POSITIVE (HCC): ICD-10-CM

## 2023-04-06 LAB
ALBUMIN SERPL-MCNC: 3.9 G/DL (ref 3.4–5)
ALBUMIN/GLOB SERPL: 1.2 {RATIO} (ref 1–2)
ALP LIVER SERPL-CCNC: 128 U/L
ALT SERPL-CCNC: 23 U/L
ANION GAP SERPL CALC-SCNC: 7 MMOL/L (ref 0–18)
AST SERPL-CCNC: 16 U/L (ref 15–37)
BASOPHILS # BLD AUTO: 0.08 X10(3) UL (ref 0–0.2)
BASOPHILS NFR BLD AUTO: 2 %
BILIRUB SERPL-MCNC: 0.3 MG/DL (ref 0.1–2)
BUN BLD-MCNC: 10 MG/DL (ref 7–18)
CALCIUM BLD-MCNC: 9.4 MG/DL (ref 8.5–10.1)
CHLORIDE SERPL-SCNC: 110 MMOL/L (ref 98–112)
CHOLEST SERPL-MCNC: 227 MG/DL (ref ?–200)
CO2 SERPL-SCNC: 26 MMOL/L (ref 21–32)
CREAT BLD-MCNC: 0.91 MG/DL
DEPRECATED HBV CORE AB SER IA-ACNC: 18.3 NG/ML
EOSINOPHIL # BLD AUTO: 0.18 X10(3) UL (ref 0–0.7)
EOSINOPHIL NFR BLD AUTO: 4.4 %
ERYTHROCYTE [DISTWIDTH] IN BLOOD BY AUTOMATED COUNT: 14.7 %
FASTING PATIENT LIPID ANSWER: YES
FASTING STATUS PATIENT QL REPORTED: NO
GFR SERPLBLD BASED ON 1.73 SQ M-ARVRAT: 72 ML/MIN/1.73M2 (ref 60–?)
GLOBULIN PLAS-MCNC: 3.2 G/DL (ref 2.8–4.4)
GLUCOSE BLD-MCNC: 109 MG/DL (ref 70–99)
HCT VFR BLD AUTO: 31.1 %
HDLC SERPL-MCNC: 27 MG/DL (ref 40–59)
HGB BLD-MCNC: 9.8 G/DL
IMM GRANULOCYTES # BLD AUTO: 0 X10(3) UL (ref 0–1)
IMM GRANULOCYTES NFR BLD: 0 %
IRON SATN MFR SERPL: 7 %
IRON SERPL-MCNC: 31 UG/DL
LDLC SERPL CALC-MCNC: 132 MG/DL (ref ?–100)
LYMPHOCYTES # BLD AUTO: 1.15 X10(3) UL (ref 1–4)
LYMPHOCYTES NFR BLD AUTO: 28.4 %
MCH RBC QN AUTO: 30 PG (ref 26–34)
MCHC RBC AUTO-ENTMCNC: 31.5 G/DL (ref 31–37)
MCV RBC AUTO: 95.1 FL
MONOCYTES # BLD AUTO: 0.22 X10(3) UL (ref 0.1–1)
MONOCYTES NFR BLD AUTO: 5.4 %
NEUTROPHILS # BLD AUTO: 2.42 X10 (3) UL (ref 1.5–7.7)
NEUTROPHILS # BLD AUTO: 2.42 X10(3) UL (ref 1.5–7.7)
NEUTROPHILS NFR BLD AUTO: 59.8 %
NONHDLC SERPL-MCNC: 200 MG/DL (ref ?–130)
OSMOLALITY SERPL CALC.SUM OF ELEC: 296 MOSM/KG (ref 275–295)
PLATELET # BLD AUTO: 177 10(3)UL (ref 150–450)
POTASSIUM SERPL-SCNC: 4.3 MMOL/L (ref 3.5–5.1)
PROT SERPL-MCNC: 7.1 G/DL (ref 6.4–8.2)
RBC # BLD AUTO: 3.27 X10(6)UL
SODIUM SERPL-SCNC: 143 MMOL/L (ref 136–145)
T4 FREE SERPL-MCNC: 0.6 NG/DL (ref 0.8–1.7)
TIBC SERPL-MCNC: 417 UG/DL (ref 240–450)
TRANSFERRIN SERPL-MCNC: 280 MG/DL (ref 200–360)
TRIGL SERPL-MCNC: 375 MG/DL (ref 30–149)
TSI SER-ACNC: 32 MIU/ML (ref 0.36–3.74)
VLDLC SERPL CALC-MCNC: 70 MG/DL (ref 0–30)
WBC # BLD AUTO: 4.1 X10(3) UL (ref 4–11)

## 2023-04-06 PROCEDURE — 96402 CHEMO HORMON ANTINEOPL SQ/IM: CPT

## 2023-04-06 PROCEDURE — 99215 OFFICE O/P EST HI 40 MIN: CPT | Performed by: INTERNAL MEDICINE

## 2023-04-06 RX ORDER — LAMOTRIGINE 25 MG/1
500 TABLET ORAL ONCE
Status: COMPLETED | OUTPATIENT
Start: 2023-04-06 | End: 2023-04-06

## 2023-04-06 RX ORDER — LAMOTRIGINE 25 MG/1
500 TABLET ORAL ONCE
Status: CANCELLED | OUTPATIENT
Start: 2023-04-10

## 2023-04-06 RX ADMIN — LAMOTRIGINE 500 MG: 25 TABLET ORAL at 11:34:00

## 2023-04-06 NOTE — PROGRESS NOTES
Pt here for follow up and injection. No new complaints.       Outpatient Oncology Care Plan  Problem list:  knowledge deficit    Problems related to:    disease/disease progression    Interventions:  provided general teaching    Expected outcomes:  understands plan of care    Progress towards outcome:  making progress    Education Record    Learner:  Patient  Barriers / Limitations:  None  Method:  Brief focused  Outcome:  Shows understanding  Comments:

## 2023-04-06 NOTE — PROGRESS NOTES
Pt here for C54 D1. Arrives Ambulating independently, accompanied by Self           Pregnancy screening: Denies possibility of pregnancy    Modifications in dose or schedule: No    Drugs/infusions dual verified for appearance and physical integrity. IV pump settings were dual verified: yes     Frequency of blood return and site check throughout administration: Other IM injection   Discharged to Home, Ambulating independently, accompanied by:Self    Outpatient Oncology Care Plan  Problem list:  fatigue  Problems related to:  side effect of treatment  Interventions:  encourage activity as tolerated  promoted rest  Expected outcomes:  adequate sleep/rest  symptoms relieved/minimized  Progress towards outcome:  making progress    Education Record    Learner:  Patient  Barriers / Limitations:  None  Method:  Discussion  Outcome:  Shows understanding  Comments: Pt tolerated IM injection without complication. Discharged home in stable condition - will get appts from [address]

## 2023-04-07 NOTE — PROGRESS NOTES
So this patient will need an additional iron infusion. I am looking at the treatment plan and I can't tell if she has any more infusions available? Do I need to enter more into the plan? She's not scheduled to see Shiela Trejo again until the 4 so if is ok to wait that long, she can plan on the infusion that day too.

## 2023-04-10 ENCOUNTER — PATIENT MESSAGE (OUTPATIENT)
Dept: FAMILY MEDICINE CLINIC | Facility: CLINIC | Age: 63
End: 2023-04-10

## 2023-04-10 ENCOUNTER — TELEPHONE (OUTPATIENT)
Dept: HEMATOLOGY/ONCOLOGY | Age: 63
End: 2023-04-10

## 2023-04-10 DIAGNOSIS — E78.2 HYPERLIPIDEMIA, MIXED: ICD-10-CM

## 2023-04-10 DIAGNOSIS — R16.1 SPLENOMEGALY: ICD-10-CM

## 2023-04-10 DIAGNOSIS — E03.9 HYPOTHYROIDISM IN ADULT: Primary | ICD-10-CM

## 2023-04-10 RX ORDER — LEVOTHYROXINE SODIUM 175 UG/1
175 TABLET ORAL
Qty: 30 TABLET | Refills: 1 | Status: SHIPPED | OUTPATIENT
Start: 2023-04-10

## 2023-04-10 RX ORDER — PANTOPRAZOLE SODIUM 40 MG/1
TABLET, DELAYED RELEASE ORAL
Qty: 180 TABLET | Refills: 0 | Status: SHIPPED | OUTPATIENT
Start: 2023-04-10

## 2023-04-10 NOTE — TELEPHONE ENCOUNTER
PANTOPRAZOLE SODIUM DR TABS 40MG    Please see pended medications. Please sign if appropriate.       Thank you      Last OV: 02/10/2023      Last refill: 01/11/2023 for 90 tabs      Pt aksed to RTC in 3 months (around 05/10/2023)

## 2023-04-10 NOTE — TELEPHONE ENCOUNTER
From: Taylor Faulkner  To: Candy Vivar MD  Sent: 4/10/2023 12:06 PM CDT  Subject: Test results     I thought I filled out a form for all medical information to be given to spouse Lucio Pathak. Can you call him with my results? Thank you.  Lang Morejon

## 2023-04-12 DIAGNOSIS — E78.2 HYPERLIPIDEMIA, MIXED: ICD-10-CM

## 2023-04-12 RX ORDER — LEVOTHYROXINE SODIUM 175 UG/1
175 TABLET ORAL
Qty: 30 TABLET | Refills: 1 | Status: CANCELLED | OUTPATIENT
Start: 2023-04-12

## 2023-04-18 DIAGNOSIS — R60.0 EDEMA OF BOTH FEET: ICD-10-CM

## 2023-04-18 DIAGNOSIS — R16.1 SPLENOMEGALY: ICD-10-CM

## 2023-04-18 RX ORDER — SPIRONOLACTONE 100 MG/1
TABLET, FILM COATED ORAL
Qty: 90 TABLET | Refills: 0 | Status: SHIPPED | OUTPATIENT
Start: 2023-04-18

## 2023-04-18 RX ORDER — FUROSEMIDE 40 MG/1
TABLET ORAL
Qty: 90 TABLET | Refills: 0 | Status: SHIPPED | OUTPATIENT
Start: 2023-04-18

## 2023-05-04 ENCOUNTER — OFFICE VISIT (OUTPATIENT)
Dept: HEMATOLOGY/ONCOLOGY | Age: 63
End: 2023-05-04
Attending: INTERNAL MEDICINE
Payer: COMMERCIAL

## 2023-05-04 VITALS
SYSTOLIC BLOOD PRESSURE: 118 MMHG | OXYGEN SATURATION: 98 % | WEIGHT: 169.5 LBS | HEART RATE: 71 BPM | BODY MASS INDEX: 27.24 KG/M2 | RESPIRATION RATE: 16 BRPM | DIASTOLIC BLOOD PRESSURE: 61 MMHG | TEMPERATURE: 100 F | HEIGHT: 65.98 IN

## 2023-05-04 DIAGNOSIS — Z17.0 MALIGNANT NEOPLASM OF NIPPLE OF RIGHT BREAST IN FEMALE, ESTROGEN RECEPTOR POSITIVE (HCC): ICD-10-CM

## 2023-05-04 DIAGNOSIS — C50.011 MALIGNANT NEOPLASM OF NIPPLE OF RIGHT BREAST IN FEMALE, ESTROGEN RECEPTOR POSITIVE (HCC): ICD-10-CM

## 2023-05-04 DIAGNOSIS — C79.51 MALIGNANT NEOPLASM METASTATIC TO BONE (HCC): ICD-10-CM

## 2023-05-04 DIAGNOSIS — C78.2 METASTASIS TO PLEURA (HCC): ICD-10-CM

## 2023-05-04 DIAGNOSIS — C78.2 BREAST CANCER METASTASIZED TO PLEURA, UNSPECIFIED LATERALITY (HCC): Primary | ICD-10-CM

## 2023-05-04 DIAGNOSIS — C50.919 BREAST CANCER METASTASIZED TO PLEURA, UNSPECIFIED LATERALITY (HCC): Primary | ICD-10-CM

## 2023-05-04 DIAGNOSIS — C50.911 CARCINOMA OF RIGHT BREAST METASTATIC TO PLEURA (HCC): ICD-10-CM

## 2023-05-04 DIAGNOSIS — C78.2 CARCINOMA OF RIGHT BREAST METASTATIC TO PLEURA (HCC): ICD-10-CM

## 2023-05-04 DIAGNOSIS — D50.0 IRON DEFICIENCY ANEMIA DUE TO CHRONIC BLOOD LOSS: ICD-10-CM

## 2023-05-04 LAB
ALBUMIN SERPL-MCNC: 3.9 G/DL (ref 3.4–5)
ALBUMIN/GLOB SERPL: 1.1 {RATIO} (ref 1–2)
ALP LIVER SERPL-CCNC: 138 U/L
ALT SERPL-CCNC: 21 U/L
ANION GAP SERPL CALC-SCNC: 5 MMOL/L (ref 0–18)
AST SERPL-CCNC: 18 U/L (ref 15–37)
BASOPHILS # BLD AUTO: 0.08 X10(3) UL (ref 0–0.2)
BASOPHILS NFR BLD AUTO: 1.7 %
BILIRUB SERPL-MCNC: 0.4 MG/DL (ref 0.1–2)
BUN BLD-MCNC: 9 MG/DL (ref 7–18)
CALCIUM BLD-MCNC: 9.8 MG/DL (ref 8.5–10.1)
CHLORIDE SERPL-SCNC: 108 MMOL/L (ref 98–112)
CO2 SERPL-SCNC: 27 MMOL/L (ref 21–32)
CREAT BLD-MCNC: 0.86 MG/DL
DEPRECATED HBV CORE AB SER IA-ACNC: 9.9 NG/ML
EOSINOPHIL # BLD AUTO: 0.22 X10(3) UL (ref 0–0.7)
EOSINOPHIL NFR BLD AUTO: 4.6 %
ERYTHROCYTE [DISTWIDTH] IN BLOOD BY AUTOMATED COUNT: 14.6 %
GFR SERPLBLD BASED ON 1.73 SQ M-ARVRAT: 77 ML/MIN/1.73M2 (ref 60–?)
GLOBULIN PLAS-MCNC: 3.6 G/DL (ref 2.8–4.4)
GLUCOSE BLD-MCNC: 92 MG/DL (ref 70–99)
HCT VFR BLD AUTO: 29.4 %
HGB BLD-MCNC: 8.9 G/DL
IMM GRANULOCYTES # BLD AUTO: 0.01 X10(3) UL (ref 0–1)
IMM GRANULOCYTES NFR BLD: 0.2 %
IRON SATN MFR SERPL: 5 %
IRON SERPL-MCNC: 24 UG/DL
LYMPHOCYTES # BLD AUTO: 1.75 X10(3) UL (ref 1–4)
LYMPHOCYTES NFR BLD AUTO: 36.8 %
MCH RBC QN AUTO: 28 PG (ref 26–34)
MCHC RBC AUTO-ENTMCNC: 30.3 G/DL (ref 31–37)
MCV RBC AUTO: 92.5 FL
MONOCYTES # BLD AUTO: 0.31 X10(3) UL (ref 0.1–1)
MONOCYTES NFR BLD AUTO: 6.5 %
NEUTROPHILS # BLD AUTO: 2.38 X10 (3) UL (ref 1.5–7.7)
NEUTROPHILS # BLD AUTO: 2.38 X10(3) UL (ref 1.5–7.7)
NEUTROPHILS NFR BLD AUTO: 50.2 %
OSMOLALITY SERPL CALC.SUM OF ELEC: 288 MOSM/KG (ref 275–295)
PLATELET # BLD AUTO: 199 10(3)UL (ref 150–450)
POTASSIUM SERPL-SCNC: 3.6 MMOL/L (ref 3.5–5.1)
PROT SERPL-MCNC: 7.5 G/DL (ref 6.4–8.2)
RBC # BLD AUTO: 3.18 X10(6)UL
SODIUM SERPL-SCNC: 140 MMOL/L (ref 136–145)
TIBC SERPL-MCNC: 514 UG/DL (ref 240–450)
TRANSFERRIN SERPL-MCNC: 345 MG/DL (ref 200–360)
WBC # BLD AUTO: 4.8 X10(3) UL (ref 4–11)

## 2023-05-04 PROCEDURE — 99215 OFFICE O/P EST HI 40 MIN: CPT | Performed by: INTERNAL MEDICINE

## 2023-05-04 PROCEDURE — 96402 CHEMO HORMON ANTINEOPL SQ/IM: CPT

## 2023-05-04 RX ORDER — LAMOTRIGINE 25 MG/1
500 TABLET ORAL ONCE
Status: CANCELLED | OUTPATIENT
Start: 2023-05-09

## 2023-05-04 RX ORDER — LAMOTRIGINE 25 MG/1
500 TABLET ORAL ONCE
Status: COMPLETED | OUTPATIENT
Start: 2023-05-04 | End: 2023-05-04

## 2023-05-04 RX ADMIN — LAMOTRIGINE 500 MG: 25 TABLET ORAL at 12:21:00

## 2023-05-05 ENCOUNTER — TELEPHONE (OUTPATIENT)
Dept: HEMATOLOGY/ONCOLOGY | Age: 63
End: 2023-05-05

## 2023-05-09 ENCOUNTER — OFFICE VISIT (OUTPATIENT)
Dept: HEMATOLOGY/ONCOLOGY | Age: 63
End: 2023-05-09
Attending: INTERNAL MEDICINE
Payer: COMMERCIAL

## 2023-05-09 VITALS
HEART RATE: 69 BPM | DIASTOLIC BLOOD PRESSURE: 68 MMHG | RESPIRATION RATE: 16 BRPM | OXYGEN SATURATION: 97 % | TEMPERATURE: 98 F | SYSTOLIC BLOOD PRESSURE: 115 MMHG

## 2023-05-09 DIAGNOSIS — C79.51 MALIGNANT NEOPLASM METASTATIC TO BONE (HCC): ICD-10-CM

## 2023-05-09 DIAGNOSIS — D50.0 IRON DEFICIENCY ANEMIA DUE TO CHRONIC BLOOD LOSS: ICD-10-CM

## 2023-05-09 DIAGNOSIS — C78.2 BREAST CANCER METASTASIZED TO PLEURA, UNSPECIFIED LATERALITY (HCC): ICD-10-CM

## 2023-05-09 DIAGNOSIS — C50.919 BREAST CANCER METASTASIZED TO PLEURA, UNSPECIFIED LATERALITY (HCC): ICD-10-CM

## 2023-05-09 DIAGNOSIS — D50.0 IRON DEFICIENCY ANEMIA SECONDARY TO BLOOD LOSS (CHRONIC): Primary | ICD-10-CM

## 2023-05-09 DIAGNOSIS — E86.0 DEHYDRATION: ICD-10-CM

## 2023-05-09 PROCEDURE — 96374 THER/PROPH/DIAG INJ IV PUSH: CPT

## 2023-05-16 ENCOUNTER — OFFICE VISIT (OUTPATIENT)
Dept: HEMATOLOGY/ONCOLOGY | Age: 63
End: 2023-05-16
Attending: INTERNAL MEDICINE
Payer: COMMERCIAL

## 2023-05-16 VITALS
DIASTOLIC BLOOD PRESSURE: 71 MMHG | RESPIRATION RATE: 16 BRPM | TEMPERATURE: 98 F | OXYGEN SATURATION: 97 % | SYSTOLIC BLOOD PRESSURE: 117 MMHG | HEART RATE: 71 BPM

## 2023-05-16 DIAGNOSIS — D50.0 IRON DEFICIENCY ANEMIA DUE TO CHRONIC BLOOD LOSS: ICD-10-CM

## 2023-05-16 DIAGNOSIS — C79.51 MALIGNANT NEOPLASM METASTATIC TO BONE (HCC): ICD-10-CM

## 2023-05-16 DIAGNOSIS — C50.919 BREAST CANCER METASTASIZED TO PLEURA, UNSPECIFIED LATERALITY (HCC): ICD-10-CM

## 2023-05-16 DIAGNOSIS — D50.0 IRON DEFICIENCY ANEMIA SECONDARY TO BLOOD LOSS (CHRONIC): Primary | ICD-10-CM

## 2023-05-16 DIAGNOSIS — E86.0 DEHYDRATION: ICD-10-CM

## 2023-05-16 DIAGNOSIS — C78.2 BREAST CANCER METASTASIZED TO PLEURA, UNSPECIFIED LATERALITY (HCC): ICD-10-CM

## 2023-05-16 PROCEDURE — 96374 THER/PROPH/DIAG INJ IV PUSH: CPT

## 2023-05-22 ENCOUNTER — PATIENT MESSAGE (OUTPATIENT)
Dept: FAMILY MEDICINE CLINIC | Facility: CLINIC | Age: 63
End: 2023-05-22

## 2023-05-22 RX ORDER — HYDROCODONE BITARTRATE AND ACETAMINOPHEN 7.5; 325 MG/1; MG/1
1-2 TABLET ORAL EVERY 8 HOURS PRN
Qty: 90 TABLET | Refills: 0 | Status: SHIPPED | OUTPATIENT
Start: 2023-05-22 | End: 2023-06-21

## 2023-05-23 DIAGNOSIS — E78.2 HYPERLIPIDEMIA, MIXED: ICD-10-CM

## 2023-05-23 RX ORDER — HYDROCODONE BITARTRATE AND ACETAMINOPHEN 7.5; 325 MG/1; MG/1
1-2 TABLET ORAL EVERY 8 HOURS PRN
Qty: 90 TABLET | Refills: 0 | OUTPATIENT
Start: 2023-05-23

## 2023-05-24 RX ORDER — ROSUVASTATIN CALCIUM 5 MG/1
TABLET, COATED ORAL
Qty: 90 TABLET | Refills: 0 | Status: SHIPPED | OUTPATIENT
Start: 2023-05-24

## 2023-05-24 RX ORDER — LEVOTHYROXINE SODIUM 0.15 MG/1
TABLET ORAL
Qty: 90 TABLET | Refills: 0 | Status: SHIPPED | OUTPATIENT
Start: 2023-05-24

## 2023-05-24 NOTE — TELEPHONE ENCOUNTER
Did not pass protocol  Last office visit 2/10  Last refill was: 4/10 30 tabs x1  Next appointment: 5/31    Please sign pended medication if appropriate  Thyroid Supplements Protocol Failed 05/23/2023 12:04 AM   Protocol Details  TSH value between 0.350 and 5.500 IU/ml

## 2023-05-31 ENCOUNTER — OFFICE VISIT (OUTPATIENT)
Dept: FAMILY MEDICINE CLINIC | Facility: CLINIC | Age: 63
End: 2023-05-31
Payer: COMMERCIAL

## 2023-05-31 VITALS
BODY MASS INDEX: 28.22 KG/M2 | DIASTOLIC BLOOD PRESSURE: 58 MMHG | SYSTOLIC BLOOD PRESSURE: 104 MMHG | TEMPERATURE: 98 F | WEIGHT: 175.63 LBS | HEIGHT: 65.98 IN | HEART RATE: 68 BPM

## 2023-05-31 DIAGNOSIS — E03.9 HYPOTHYROIDISM IN ADULT: ICD-10-CM

## 2023-05-31 DIAGNOSIS — E78.2 HYPERLIPIDEMIA, MIXED: Primary | ICD-10-CM

## 2023-05-31 DIAGNOSIS — R79.89 ELEVATED LIVER FUNCTION TESTS: ICD-10-CM

## 2023-05-31 DIAGNOSIS — R60.0 EDEMA OF BOTH FEET: ICD-10-CM

## 2023-05-31 DIAGNOSIS — R73.9 HYPERGLYCEMIA: ICD-10-CM

## 2023-05-31 DIAGNOSIS — C50.011 MALIGNANT NEOPLASM OF NIPPLE OF RIGHT BREAST IN FEMALE, ESTROGEN RECEPTOR POSITIVE (HCC): ICD-10-CM

## 2023-05-31 DIAGNOSIS — E87.6 HYPOKALEMIA: ICD-10-CM

## 2023-05-31 DIAGNOSIS — F41.9 ANXIETY: ICD-10-CM

## 2023-05-31 DIAGNOSIS — Z17.0 MALIGNANT NEOPLASM OF NIPPLE OF RIGHT BREAST IN FEMALE, ESTROGEN RECEPTOR POSITIVE (HCC): ICD-10-CM

## 2023-05-31 PROCEDURE — 99214 OFFICE O/P EST MOD 30 MIN: CPT | Performed by: FAMILY MEDICINE

## 2023-05-31 PROCEDURE — 3078F DIAST BP <80 MM HG: CPT | Performed by: FAMILY MEDICINE

## 2023-05-31 PROCEDURE — 3008F BODY MASS INDEX DOCD: CPT | Performed by: FAMILY MEDICINE

## 2023-05-31 PROCEDURE — 3074F SYST BP LT 130 MM HG: CPT | Performed by: FAMILY MEDICINE

## 2023-05-31 RX ORDER — HYDROCODONE BITARTRATE AND ACETAMINOPHEN 7.5; 325 MG/1; MG/1
1 TABLET ORAL EVERY 8 HOURS PRN
Qty: 90 TABLET | Refills: 0 | Status: SHIPPED | OUTPATIENT
Start: 2023-07-01 | End: 2023-06-01

## 2023-05-31 RX ORDER — HYDROCODONE BITARTRATE AND ACETAMINOPHEN 7.5; 325 MG/1; MG/1
1 TABLET ORAL EVERY 8 HOURS PRN
Qty: 90 TABLET | Refills: 0 | Status: SHIPPED | OUTPATIENT
Start: 2023-08-01 | End: 2023-05-31

## 2023-05-31 RX ORDER — HYDROCODONE BITARTRATE AND ACETAMINOPHEN 7.5; 325 MG/1; MG/1
1 TABLET ORAL EVERY 8 HOURS PRN
Qty: 90 TABLET | Refills: 0 | Status: SHIPPED | OUTPATIENT
Start: 2023-08-01 | End: 2023-06-01

## 2023-05-31 RX ORDER — HYDROCODONE BITARTRATE AND ACETAMINOPHEN 7.5; 325 MG/1; MG/1
1 TABLET ORAL EVERY 8 HOURS PRN
Qty: 90 TABLET | Refills: 0 | Status: SHIPPED | OUTPATIENT
Start: 2023-05-31 | End: 2023-06-01

## 2023-05-31 RX ORDER — POTASSIUM CHLORIDE 750 MG/1
10 TABLET, FILM COATED, EXTENDED RELEASE ORAL DAILY
Qty: 90 TABLET | Refills: 1 | Status: CANCELLED | OUTPATIENT
Start: 2023-05-31

## 2023-05-31 RX ORDER — ALPRAZOLAM 0.5 MG/1
0.5 TABLET ORAL 2 TIMES DAILY PRN
Qty: 60 TABLET | Refills: 2 | Status: SHIPPED | OUTPATIENT
Start: 2023-05-31

## 2023-05-31 RX ORDER — HYDROCODONE BITARTRATE AND ACETAMINOPHEN 7.5; 325 MG/1; MG/1
1 TABLET ORAL EVERY 8 HOURS PRN
Qty: 90 TABLET | Refills: 0 | Status: SHIPPED | OUTPATIENT
Start: 2023-05-31 | End: 2023-05-31

## 2023-05-31 RX ORDER — HYDROCODONE BITARTRATE AND ACETAMINOPHEN 7.5; 325 MG/1; MG/1
1 TABLET ORAL EVERY 8 HOURS PRN
Qty: 90 TABLET | Refills: 0 | Status: SHIPPED | OUTPATIENT
Start: 2023-07-01 | End: 2023-05-31

## 2023-05-31 NOTE — PATIENT INSTRUCTIONS
Increase alprazolam to 0.5 mg 1 tablet twice a day. Continue current meds. Watch diet for fats and carbs. Stay active. Fasting blood work. We will adjust your thyroid medication after results are back.

## 2023-06-01 ENCOUNTER — OFFICE VISIT (OUTPATIENT)
Dept: HEMATOLOGY/ONCOLOGY | Age: 63
End: 2023-06-01
Attending: INTERNAL MEDICINE
Payer: COMMERCIAL

## 2023-06-01 ENCOUNTER — SOCIAL WORK SERVICES (OUTPATIENT)
Dept: HEMATOLOGY/ONCOLOGY | Facility: HOSPITAL | Age: 63
End: 2023-06-01

## 2023-06-01 VITALS
SYSTOLIC BLOOD PRESSURE: 128 MMHG | OXYGEN SATURATION: 98 % | RESPIRATION RATE: 16 BRPM | WEIGHT: 173.31 LBS | HEIGHT: 65.98 IN | HEART RATE: 65 BPM | TEMPERATURE: 98 F | BODY MASS INDEX: 27.85 KG/M2 | DIASTOLIC BLOOD PRESSURE: 73 MMHG

## 2023-06-01 DIAGNOSIS — C50.919 BREAST CANCER METASTASIZED TO PLEURA, UNSPECIFIED LATERALITY (HCC): ICD-10-CM

## 2023-06-01 DIAGNOSIS — C78.2 CARCINOMA OF RIGHT BREAST METASTATIC TO PLEURA (HCC): ICD-10-CM

## 2023-06-01 DIAGNOSIS — C79.51 MALIGNANT NEOPLASM METASTATIC TO BONE (HCC): Primary | ICD-10-CM

## 2023-06-01 DIAGNOSIS — C50.911 CARCINOMA OF RIGHT BREAST METASTATIC TO PLEURA (HCC): ICD-10-CM

## 2023-06-01 DIAGNOSIS — Z17.0 MALIGNANT NEOPLASM OF NIPPLE OF RIGHT BREAST IN FEMALE, ESTROGEN RECEPTOR POSITIVE (HCC): ICD-10-CM

## 2023-06-01 DIAGNOSIS — C78.2 BREAST CANCER METASTASIZED TO PLEURA, UNSPECIFIED LATERALITY (HCC): ICD-10-CM

## 2023-06-01 DIAGNOSIS — C50.911 CARCINOMA OF RIGHT BREAST METASTATIC TO PLEURA (HCC): Primary | ICD-10-CM

## 2023-06-01 DIAGNOSIS — D50.0 IRON DEFICIENCY ANEMIA SECONDARY TO BLOOD LOSS (CHRONIC): ICD-10-CM

## 2023-06-01 DIAGNOSIS — C50.011 MALIGNANT NEOPLASM OF NIPPLE OF RIGHT BREAST IN FEMALE, ESTROGEN RECEPTOR POSITIVE (HCC): ICD-10-CM

## 2023-06-01 DIAGNOSIS — C79.51 MALIGNANT NEOPLASM METASTATIC TO BONE (HCC): ICD-10-CM

## 2023-06-01 DIAGNOSIS — F32.A DEPRESSIVE DISORDER: Primary | ICD-10-CM

## 2023-06-01 DIAGNOSIS — C78.2 CARCINOMA OF RIGHT BREAST METASTATIC TO PLEURA (HCC): Primary | ICD-10-CM

## 2023-06-01 LAB
ALBUMIN SERPL-MCNC: 3.7 G/DL (ref 3.4–5)
ALBUMIN/GLOB SERPL: 1.1 {RATIO} (ref 1–2)
ALP LIVER SERPL-CCNC: 139 U/L
ALT SERPL-CCNC: 20 U/L
ANION GAP SERPL CALC-SCNC: 4 MMOL/L (ref 0–18)
AST SERPL-CCNC: 19 U/L (ref 15–37)
BASOPHILS # BLD AUTO: 0.05 X10(3) UL (ref 0–0.2)
BASOPHILS NFR BLD AUTO: 1.3 %
BILIRUB SERPL-MCNC: 0.4 MG/DL (ref 0.1–2)
BUN BLD-MCNC: 7 MG/DL (ref 7–18)
CALCIUM BLD-MCNC: 9.2 MG/DL (ref 8.5–10.1)
CHLORIDE SERPL-SCNC: 108 MMOL/L (ref 98–112)
CO2 SERPL-SCNC: 28 MMOL/L (ref 21–32)
CREAT BLD-MCNC: 0.76 MG/DL
DEPRECATED HBV CORE AB SER IA-ACNC: 153.9 NG/ML
EOSINOPHIL # BLD AUTO: 0.16 X10(3) UL (ref 0–0.7)
EOSINOPHIL NFR BLD AUTO: 4 %
ERYTHROCYTE [DISTWIDTH] IN BLOOD BY AUTOMATED COUNT: 16.6 %
FASTING STATUS PATIENT QL REPORTED: NO
GFR SERPLBLD BASED ON 1.73 SQ M-ARVRAT: 90 ML/MIN/1.73M2 (ref 60–?)
GLOBULIN PLAS-MCNC: 3.3 G/DL (ref 2.8–4.4)
GLUCOSE BLD-MCNC: 111 MG/DL (ref 70–99)
HCT VFR BLD AUTO: 35 %
HGB BLD-MCNC: 10.7 G/DL
IMM GRANULOCYTES # BLD AUTO: 0.02 X10(3) UL (ref 0–1)
IMM GRANULOCYTES NFR BLD: 0.5 %
IRON SATN MFR SERPL: 15 %
IRON SERPL-MCNC: 55 UG/DL
LYMPHOCYTES # BLD AUTO: 0.85 X10(3) UL (ref 1–4)
LYMPHOCYTES NFR BLD AUTO: 21.5 %
MCH RBC QN AUTO: 28.8 PG (ref 26–34)
MCHC RBC AUTO-ENTMCNC: 30.6 G/DL (ref 31–37)
MCV RBC AUTO: 94.3 FL
MONOCYTES # BLD AUTO: 0.22 X10(3) UL (ref 0.1–1)
MONOCYTES NFR BLD AUTO: 5.6 %
NEUTROPHILS # BLD AUTO: 2.66 X10 (3) UL (ref 1.5–7.7)
NEUTROPHILS # BLD AUTO: 2.66 X10(3) UL (ref 1.5–7.7)
NEUTROPHILS NFR BLD AUTO: 67.1 %
OSMOLALITY SERPL CALC.SUM OF ELEC: 289 MOSM/KG (ref 275–295)
PLATELET # BLD AUTO: 144 10(3)UL (ref 150–450)
POTASSIUM SERPL-SCNC: 3.5 MMOL/L (ref 3.5–5.1)
PROT SERPL-MCNC: 7 G/DL (ref 6.4–8.2)
RBC # BLD AUTO: 3.71 X10(6)UL
SODIUM SERPL-SCNC: 140 MMOL/L (ref 136–145)
TIBC SERPL-MCNC: 367 UG/DL (ref 240–450)
TRANSFERRIN SERPL-MCNC: 246 MG/DL (ref 200–360)
WBC # BLD AUTO: 4 X10(3) UL (ref 4–11)

## 2023-06-01 PROCEDURE — 99215 OFFICE O/P EST HI 40 MIN: CPT | Performed by: INTERNAL MEDICINE

## 2023-06-01 PROCEDURE — 96402 CHEMO HORMON ANTINEOPL SQ/IM: CPT

## 2023-06-01 RX ORDER — LAMOTRIGINE 25 MG/1
500 TABLET ORAL ONCE
Status: COMPLETED | OUTPATIENT
Start: 2023-06-01 | End: 2023-06-01

## 2023-06-01 RX ORDER — LAMOTRIGINE 25 MG/1
500 TABLET ORAL ONCE
Status: CANCELLED | OUTPATIENT
Start: 2023-06-01

## 2023-06-01 RX ADMIN — LAMOTRIGINE 500 MG: 25 TABLET ORAL at 12:57:00

## 2023-06-01 NOTE — PROGRESS NOTES
SW met with pt to provide support, encouragement and resources due to PHQ10 score of 12 (0 on #9). Pt presents with flat affect and mood. She states she has been feeling more down and overwhelmed at home. She states she discussed with her PCP. She states many of her issues are related to home and the amount of people in her home. Her  was also present and encouraged her to seek resources for support. SW discussed counseling resources including Brooke CARDONA. Pt agreed to a phone consult to discuss benefits of counseling for pt due to home conflicts and metastatic breast cancer. Phone consult visit scheduled. SW provided a distraction bag and encouraged healthy coping skills. Pt appreciative of the bag and of the resources. Shawnee Beltran LCSW   at [facility]    [address] [facility], [address] [place] [address], [place], [facility] Rickey Beardenla Javier Ann [address] [place] [address] Javier Martinez  Ph: [phone]. [email or web address]  Fax: [phone]

## 2023-06-01 NOTE — PROGRESS NOTES
Pt here for follow up and injection. She states PCP adjusted her medication yesterday due to anxiety and stress at home. No new complaints.

## 2023-06-06 RX ORDER — ALBUTEROL SULFATE 90 UG/1
AEROSOL, METERED RESPIRATORY (INHALATION)
Qty: 25.5 G | Refills: 3 | Status: SHIPPED | OUTPATIENT
Start: 2023-06-06

## 2023-06-29 ENCOUNTER — OFFICE VISIT (OUTPATIENT)
Dept: HEMATOLOGY/ONCOLOGY | Age: 63
End: 2023-06-29
Attending: INTERNAL MEDICINE
Payer: COMMERCIAL

## 2023-06-29 VITALS
OXYGEN SATURATION: 95 % | BODY MASS INDEX: 27.83 KG/M2 | RESPIRATION RATE: 18 BRPM | SYSTOLIC BLOOD PRESSURE: 120 MMHG | TEMPERATURE: 98 F | DIASTOLIC BLOOD PRESSURE: 57 MMHG | WEIGHT: 173.19 LBS | HEART RATE: 77 BPM | HEIGHT: 65.98 IN

## 2023-06-29 DIAGNOSIS — C79.51 MALIGNANT NEOPLASM METASTATIC TO BONE (HCC): ICD-10-CM

## 2023-06-29 DIAGNOSIS — Z17.0 MALIGNANT NEOPLASM OF NIPPLE OF RIGHT BREAST IN FEMALE, ESTROGEN RECEPTOR POSITIVE (HCC): ICD-10-CM

## 2023-06-29 DIAGNOSIS — C78.2 CARCINOMA OF RIGHT BREAST METASTATIC TO PLEURA (HCC): ICD-10-CM

## 2023-06-29 DIAGNOSIS — C78.2 BREAST CANCER METASTASIZED TO PLEURA, UNSPECIFIED LATERALITY (HCC): ICD-10-CM

## 2023-06-29 DIAGNOSIS — C78.2 CARCINOMA OF RIGHT BREAST METASTATIC TO PLEURA (HCC): Primary | ICD-10-CM

## 2023-06-29 DIAGNOSIS — C50.911 CARCINOMA OF RIGHT BREAST METASTATIC TO PLEURA (HCC): Primary | ICD-10-CM

## 2023-06-29 DIAGNOSIS — D50.0 IRON DEFICIENCY ANEMIA SECONDARY TO BLOOD LOSS (CHRONIC): ICD-10-CM

## 2023-06-29 DIAGNOSIS — C50.919 BREAST CANCER METASTASIZED TO PLEURA, UNSPECIFIED LATERALITY (HCC): ICD-10-CM

## 2023-06-29 DIAGNOSIS — C50.911 CARCINOMA OF RIGHT BREAST METASTATIC TO PLEURA (HCC): ICD-10-CM

## 2023-06-29 DIAGNOSIS — C50.011 MALIGNANT NEOPLASM OF NIPPLE OF RIGHT BREAST IN FEMALE, ESTROGEN RECEPTOR POSITIVE (HCC): ICD-10-CM

## 2023-06-29 LAB
ALBUMIN SERPL-MCNC: 3.8 G/DL (ref 3.4–5)
ALBUMIN/GLOB SERPL: 1.2 {RATIO} (ref 1–2)
ALP LIVER SERPL-CCNC: 149 U/L
ALT SERPL-CCNC: 25 U/L
ANION GAP SERPL CALC-SCNC: 2 MMOL/L (ref 0–18)
AST SERPL-CCNC: 17 U/L (ref 15–37)
BASOPHILS # BLD AUTO: 0.07 X10(3) UL (ref 0–0.2)
BASOPHILS NFR BLD AUTO: 1.5 %
BILIRUB SERPL-MCNC: 0.3 MG/DL (ref 0.1–2)
BUN BLD-MCNC: 12 MG/DL (ref 7–18)
CALCIUM BLD-MCNC: 9.5 MG/DL (ref 8.5–10.1)
CHLORIDE SERPL-SCNC: 111 MMOL/L (ref 98–112)
CO2 SERPL-SCNC: 28 MMOL/L (ref 21–32)
CREAT BLD-MCNC: 0.82 MG/DL
DEPRECATED HBV CORE AB SER IA-ACNC: 66.7 NG/ML
EOSINOPHIL # BLD AUTO: 0.18 X10(3) UL (ref 0–0.7)
EOSINOPHIL NFR BLD AUTO: 3.9 %
ERYTHROCYTE [DISTWIDTH] IN BLOOD BY AUTOMATED COUNT: 17.1 %
FASTING STATUS PATIENT QL REPORTED: NO
GFR SERPLBLD BASED ON 1.73 SQ M-ARVRAT: 82 ML/MIN/1.73M2 (ref 60–?)
GLOBULIN PLAS-MCNC: 3.3 G/DL (ref 2.8–4.4)
GLUCOSE BLD-MCNC: 108 MG/DL (ref 70–99)
HCT VFR BLD AUTO: 35.2 %
HGB BLD-MCNC: 11.2 G/DL
IMM GRANULOCYTES # BLD AUTO: 0.03 X10(3) UL (ref 0–1)
IMM GRANULOCYTES NFR BLD: 0.7 %
IRON SATN MFR SERPL: 12 %
IRON SERPL-MCNC: 47 UG/DL
LYMPHOCYTES # BLD AUTO: 1.54 X10(3) UL (ref 1–4)
LYMPHOCYTES NFR BLD AUTO: 33.6 %
MCH RBC QN AUTO: 29.5 PG (ref 26–34)
MCHC RBC AUTO-ENTMCNC: 31.8 G/DL (ref 31–37)
MCV RBC AUTO: 92.6 FL
MONOCYTES # BLD AUTO: 0.27 X10(3) UL (ref 0.1–1)
MONOCYTES NFR BLD AUTO: 5.9 %
NEUTROPHILS # BLD AUTO: 2.5 X10 (3) UL (ref 1.5–7.7)
NEUTROPHILS # BLD AUTO: 2.5 X10(3) UL (ref 1.5–7.7)
NEUTROPHILS NFR BLD AUTO: 54.4 %
OSMOLALITY SERPL CALC.SUM OF ELEC: 292 MOSM/KG (ref 275–295)
PLATELET # BLD AUTO: 146 10(3)UL (ref 150–450)
POTASSIUM SERPL-SCNC: 3.6 MMOL/L (ref 3.5–5.1)
PROT SERPL-MCNC: 7.1 G/DL (ref 6.4–8.2)
RBC # BLD AUTO: 3.8 X10(6)UL
SODIUM SERPL-SCNC: 141 MMOL/L (ref 136–145)
TIBC SERPL-MCNC: 389 UG/DL (ref 240–450)
TRANSFERRIN SERPL-MCNC: 261 MG/DL (ref 200–360)
WBC # BLD AUTO: 4.6 X10(3) UL (ref 4–11)

## 2023-06-29 PROCEDURE — 99215 OFFICE O/P EST HI 40 MIN: CPT | Performed by: INTERNAL MEDICINE

## 2023-06-29 PROCEDURE — 96402 CHEMO HORMON ANTINEOPL SQ/IM: CPT

## 2023-06-29 RX ORDER — LAMOTRIGINE 25 MG/1
500 TABLET ORAL ONCE
Status: CANCELLED | OUTPATIENT
Start: 2023-07-11

## 2023-06-29 RX ORDER — LAMOTRIGINE 25 MG/1
500 TABLET ORAL ONCE
Status: COMPLETED | OUTPATIENT
Start: 2023-06-29 | End: 2023-06-29

## 2023-06-29 RX ADMIN — LAMOTRIGINE 500 MG: 25 TABLET ORAL at 15:42:00

## 2023-07-10 DIAGNOSIS — R16.1 SPLENOMEGALY: ICD-10-CM

## 2023-07-10 RX ORDER — PANTOPRAZOLE SODIUM 40 MG/1
40 TABLET, DELAYED RELEASE ORAL
Qty: 60 TABLET | Refills: 0 | Status: SHIPPED | OUTPATIENT
Start: 2023-07-10

## 2023-07-10 NOTE — TELEPHONE ENCOUNTER
LOV: 05/31/2023  for: follow-up of multiple medical problems.    Patient advised to RTC on:  3 months   Medication Quantity Refills Start End   PANTOPRAZOLE 40 MG Oral Tab  tablet 0 4/10/2023    Sig:   TAKE 1 TABLET TWICE A DAY BEFORE MEALS

## 2023-07-17 DIAGNOSIS — R60.0 EDEMA OF BOTH FEET: ICD-10-CM

## 2023-07-17 DIAGNOSIS — R16.1 SPLENOMEGALY: ICD-10-CM

## 2023-07-17 RX ORDER — FUROSEMIDE 40 MG/1
TABLET ORAL
Qty: 90 TABLET | Refills: 0 | Status: SHIPPED | OUTPATIENT
Start: 2023-07-17

## 2023-07-17 RX ORDER — SPIRONOLACTONE 100 MG/1
TABLET, FILM COATED ORAL
Qty: 90 TABLET | Refills: 0 | Status: SHIPPED | OUTPATIENT
Start: 2023-07-17

## 2023-07-27 ENCOUNTER — OFFICE VISIT (OUTPATIENT)
Dept: HEMATOLOGY/ONCOLOGY | Age: 63
End: 2023-07-27
Attending: INTERNAL MEDICINE
Payer: COMMERCIAL

## 2023-07-27 VITALS
BODY MASS INDEX: 28.17 KG/M2 | HEIGHT: 65.98 IN | SYSTOLIC BLOOD PRESSURE: 108 MMHG | RESPIRATION RATE: 18 BRPM | WEIGHT: 175.31 LBS | OXYGEN SATURATION: 95 % | TEMPERATURE: 98 F | DIASTOLIC BLOOD PRESSURE: 62 MMHG | HEART RATE: 62 BPM

## 2023-07-27 DIAGNOSIS — C79.51 MALIGNANT NEOPLASM METASTATIC TO BONE (HCC): ICD-10-CM

## 2023-07-27 DIAGNOSIS — D50.0 IRON DEFICIENCY ANEMIA SECONDARY TO BLOOD LOSS (CHRONIC): ICD-10-CM

## 2023-07-27 DIAGNOSIS — C78.2 CARCINOMA OF RIGHT BREAST METASTATIC TO PLEURA (HCC): Primary | ICD-10-CM

## 2023-07-27 DIAGNOSIS — C50.911 CARCINOMA OF RIGHT BREAST METASTATIC TO PLEURA (HCC): Primary | ICD-10-CM

## 2023-07-27 DIAGNOSIS — C50.011 MALIGNANT NEOPLASM OF NIPPLE OF RIGHT BREAST IN FEMALE, ESTROGEN RECEPTOR POSITIVE: ICD-10-CM

## 2023-07-27 DIAGNOSIS — D18.03 HEMANGIOMA OF LIVER: ICD-10-CM

## 2023-07-27 DIAGNOSIS — D50.0 IRON DEFICIENCY ANEMIA DUE TO CHRONIC BLOOD LOSS: ICD-10-CM

## 2023-07-27 DIAGNOSIS — C50.919 BREAST CANCER METASTASIZED TO PLEURA, UNSPECIFIED LATERALITY (HCC): ICD-10-CM

## 2023-07-27 DIAGNOSIS — Z17.0 MALIGNANT NEOPLASM OF NIPPLE OF RIGHT BREAST IN FEMALE, ESTROGEN RECEPTOR POSITIVE: ICD-10-CM

## 2023-07-27 DIAGNOSIS — K64.4 EXTERNAL HEMORRHOIDS: ICD-10-CM

## 2023-07-27 DIAGNOSIS — I78.1 TELANGIECTASIA OF EXTREMITY: ICD-10-CM

## 2023-07-27 DIAGNOSIS — C78.2 METASTASIS TO PLEURA (HCC): ICD-10-CM

## 2023-07-27 DIAGNOSIS — C78.2 BREAST CANCER METASTASIZED TO PLEURA, UNSPECIFIED LATERALITY (HCC): ICD-10-CM

## 2023-07-27 LAB
ALBUMIN SERPL-MCNC: 4.1 G/DL (ref 3.4–5)
ALBUMIN/GLOB SERPL: 1.2 {RATIO} (ref 1–2)
ALP LIVER SERPL-CCNC: 138 U/L
ALT SERPL-CCNC: 25 U/L
ANION GAP SERPL CALC-SCNC: 4 MMOL/L (ref 0–18)
AST SERPL-CCNC: 21 U/L (ref 15–37)
BASOPHILS # BLD AUTO: 0.07 X10(3) UL (ref 0–0.2)
BASOPHILS NFR BLD AUTO: 1.2 %
BILIRUB SERPL-MCNC: 0.5 MG/DL (ref 0.1–2)
BUN BLD-MCNC: 12 MG/DL (ref 7–18)
CALCIUM BLD-MCNC: 9 MG/DL (ref 8.5–10.1)
CHLORIDE SERPL-SCNC: 108 MMOL/L (ref 98–112)
CO2 SERPL-SCNC: 26 MMOL/L (ref 21–32)
CREAT BLD-MCNC: 1 MG/DL
DEPRECATED HBV CORE AB SER IA-ACNC: 50.6 NG/ML
EGFRCR SERPLBLD CKD-EPI 2021: 64 ML/MIN/1.73M2 (ref 60–?)
EOSINOPHIL # BLD AUTO: 0.23 X10(3) UL (ref 0–0.7)
EOSINOPHIL NFR BLD AUTO: 3.8 %
ERYTHROCYTE [DISTWIDTH] IN BLOOD BY AUTOMATED COUNT: 17.1 %
GLOBULIN PLAS-MCNC: 3.5 G/DL (ref 2.8–4.4)
GLUCOSE BLD-MCNC: 148 MG/DL (ref 70–99)
HCT VFR BLD AUTO: 37.1 %
HGB BLD-MCNC: 12 G/DL
IMM GRANULOCYTES # BLD AUTO: 0.01 X10(3) UL (ref 0–1)
IMM GRANULOCYTES NFR BLD: 0.2 %
IRON SATN MFR SERPL: 12 %
IRON SERPL-MCNC: 51 UG/DL
LYMPHOCYTES # BLD AUTO: 2.1 X10(3) UL (ref 1–4)
LYMPHOCYTES NFR BLD AUTO: 34.8 %
MCH RBC QN AUTO: 29.9 PG (ref 26–34)
MCHC RBC AUTO-ENTMCNC: 32.3 G/DL (ref 31–37)
MCV RBC AUTO: 92.3 FL
MONOCYTES # BLD AUTO: 0.28 X10(3) UL (ref 0.1–1)
MONOCYTES NFR BLD AUTO: 4.6 %
NEUTROPHILS # BLD AUTO: 3.35 X10 (3) UL (ref 1.5–7.7)
NEUTROPHILS # BLD AUTO: 3.35 X10(3) UL (ref 1.5–7.7)
NEUTROPHILS NFR BLD AUTO: 55.4 %
OSMOLALITY SERPL CALC.SUM OF ELEC: 289 MOSM/KG (ref 275–295)
PLATELET # BLD AUTO: 182 10(3)UL (ref 150–450)
POTASSIUM SERPL-SCNC: 3.8 MMOL/L (ref 3.5–5.1)
PROT SERPL-MCNC: 7.6 G/DL (ref 6.4–8.2)
RBC # BLD AUTO: 4.02 X10(6)UL
SODIUM SERPL-SCNC: 138 MMOL/L (ref 136–145)
TIBC SERPL-MCNC: 422 UG/DL (ref 240–450)
TRANSFERRIN SERPL-MCNC: 283 MG/DL (ref 200–360)
WBC # BLD AUTO: 6 X10(3) UL (ref 4–11)

## 2023-07-27 PROCEDURE — 96402 CHEMO HORMON ANTINEOPL SQ/IM: CPT

## 2023-07-27 PROCEDURE — 99215 OFFICE O/P EST HI 40 MIN: CPT | Performed by: CLINICAL NURSE SPECIALIST

## 2023-07-27 RX ORDER — LAMOTRIGINE 25 MG/1
500 TABLET ORAL ONCE
Status: CANCELLED | OUTPATIENT
Start: 2023-08-02

## 2023-07-27 RX ORDER — LAMOTRIGINE 25 MG/1
500 TABLET ORAL ONCE
Status: COMPLETED | OUTPATIENT
Start: 2023-07-27 | End: 2023-07-27

## 2023-07-27 RX ADMIN — LAMOTRIGINE 500 MG: 25 TABLET ORAL at 15:41:00

## 2023-07-27 NOTE — PROGRESS NOTES
Pt here for follow up and injection. She thinks she broke her little toe. No other complaints.     Outpatient Oncology Care Plan  Problem list:  knowledge deficit    Problems related to:    disease/disease progression    Interventions:  provided general teaching    Expected outcomes:  understands plan of care    Progress towards outcome:  making progress    Education Record    Learner:  Patient and Spouse  Barriers / Limitations:  None  Method:  Brief focused  Outcome:  Shows understanding  Comments:

## 2023-07-27 NOTE — PROGRESS NOTES
Education Record    Learner:  Patient    Disease / Diagnosis: faslodex inj    Barriers / Limitations:  None    Method:  Brief focused, printed material and  reinforcement    General Topics:  Plan of care reviewed    Outcome:  Shows understanding

## 2023-08-03 ENCOUNTER — OFFICE VISIT (OUTPATIENT)
Dept: HEMATOLOGY/ONCOLOGY | Age: 63
End: 2023-08-03
Attending: INTERNAL MEDICINE
Payer: COMMERCIAL

## 2023-08-03 VITALS
DIASTOLIC BLOOD PRESSURE: 69 MMHG | SYSTOLIC BLOOD PRESSURE: 140 MMHG | OXYGEN SATURATION: 97 % | RESPIRATION RATE: 16 BRPM | TEMPERATURE: 99 F | HEART RATE: 66 BPM

## 2023-08-03 DIAGNOSIS — D50.0 IRON DEFICIENCY ANEMIA SECONDARY TO BLOOD LOSS (CHRONIC): ICD-10-CM

## 2023-08-03 DIAGNOSIS — C78.2 BREAST CANCER METASTASIZED TO PLEURA, UNSPECIFIED LATERALITY (HCC): Primary | ICD-10-CM

## 2023-08-03 DIAGNOSIS — C79.51 MALIGNANT NEOPLASM METASTATIC TO BONE (HCC): ICD-10-CM

## 2023-08-03 DIAGNOSIS — E86.0 DEHYDRATION: ICD-10-CM

## 2023-08-03 DIAGNOSIS — C50.919 BREAST CANCER METASTASIZED TO PLEURA, UNSPECIFIED LATERALITY (HCC): Primary | ICD-10-CM

## 2023-08-03 DIAGNOSIS — D50.0 IRON DEFICIENCY ANEMIA DUE TO CHRONIC BLOOD LOSS: ICD-10-CM

## 2023-08-03 PROCEDURE — 96374 THER/PROPH/DIAG INJ IV PUSH: CPT

## 2023-08-10 ENCOUNTER — OFFICE VISIT (OUTPATIENT)
Dept: HEMATOLOGY/ONCOLOGY | Age: 63
End: 2023-08-10
Attending: INTERNAL MEDICINE
Payer: COMMERCIAL

## 2023-08-10 VITALS
OXYGEN SATURATION: 97 % | TEMPERATURE: 99 F | HEART RATE: 68 BPM | RESPIRATION RATE: 16 BRPM | DIASTOLIC BLOOD PRESSURE: 60 MMHG | SYSTOLIC BLOOD PRESSURE: 102 MMHG

## 2023-08-10 DIAGNOSIS — D50.0 IRON DEFICIENCY ANEMIA SECONDARY TO BLOOD LOSS (CHRONIC): ICD-10-CM

## 2023-08-10 DIAGNOSIS — C78.2 BREAST CANCER METASTASIZED TO PLEURA, UNSPECIFIED LATERALITY (HCC): Primary | ICD-10-CM

## 2023-08-10 DIAGNOSIS — C79.51 MALIGNANT NEOPLASM METASTATIC TO BONE (HCC): ICD-10-CM

## 2023-08-10 DIAGNOSIS — D50.0 IRON DEFICIENCY ANEMIA DUE TO CHRONIC BLOOD LOSS: ICD-10-CM

## 2023-08-10 DIAGNOSIS — C50.919 BREAST CANCER METASTASIZED TO PLEURA, UNSPECIFIED LATERALITY (HCC): Primary | ICD-10-CM

## 2023-08-10 DIAGNOSIS — E86.0 DEHYDRATION: ICD-10-CM

## 2023-08-10 PROCEDURE — 96374 THER/PROPH/DIAG INJ IV PUSH: CPT

## 2023-08-15 NOTE — TELEPHONE ENCOUNTER
Call  patient she is due for medication check please schedule for March. I would like her to do that thyroid test cholesterol tests one week prior visit -  needs to be fasting for those tests.     I have called in 1 month supply of her levothyroxine so she
Call to pt reaches yissel/spouse-listed on [facility] consent-sts he can take info for pt. Advised of dr Lorena Whitley recommendations noted below.  Offered appt 3/3 0815-yissel declines stating pt needs evening appt or at the earliest, a 4pm appt-due to her work [employer]
Call to yissel/spouse-advised of dr palomo's appt recommendation noted below. Maxx Espinoza voices understanding, agrees with that plan, sts will have pt do fasting the labs the wk before appt. appt scheduled.
I can see her March 5 at 7 PM.  15 minutes would be okay at that time. Thanks.
Chaim Leon)

## 2023-08-21 ENCOUNTER — PATIENT MESSAGE (OUTPATIENT)
Dept: FAMILY MEDICINE CLINIC | Facility: CLINIC | Age: 63
End: 2023-08-21

## 2023-08-21 DIAGNOSIS — E03.9 HYPOTHYROIDISM IN ADULT: Primary | ICD-10-CM

## 2023-08-21 DIAGNOSIS — E78.2 HYPERLIPIDEMIA, MIXED: ICD-10-CM

## 2023-08-21 RX ORDER — LEVOTHYROXINE SODIUM 0.15 MG/1
150 TABLET ORAL
Qty: 90 TABLET | Refills: 0 | Status: SHIPPED | OUTPATIENT
Start: 2023-08-21

## 2023-08-21 RX ORDER — ROSUVASTATIN CALCIUM 5 MG/1
TABLET, COATED ORAL
Qty: 90 TABLET | Refills: 0 | Status: SHIPPED | OUTPATIENT
Start: 2023-08-21

## 2023-08-21 NOTE — TELEPHONE ENCOUNTER
LOV:  05/31/2023 for: F/u hypercholesterolemia,, hypothyroidism, anxiety, anemia, pain, edema,   Patient advised to RTC on:  3 months.      Medication Quantity Refills Start End   ROSUVASTATIN 5 MG Oral Tab 90 tablet 0 5/24/2023    Sig:   TAKE 1 TABLET NIGHTLY       Medication Quantity Refills Start End   LEVOTHYROXINE 150 MCG Oral Tab 90 tablet 0 5/24/2023    Sig:   TAKE 1 TABLET BEFORE BREAKFAST

## 2023-08-21 NOTE — TELEPHONE ENCOUNTER
From: Caleb Zuñiga  To: John Sargent MD  Sent: 8/21/2023 2:46 PM CDT  Subject: Lipid panel     I requested them to send you the bloodwork that you were wanting twice. Did you ever receive it?

## 2023-08-22 ENCOUNTER — TELEPHONE (OUTPATIENT)
Dept: HEMATOLOGY/ONCOLOGY | Facility: HOSPITAL | Age: 63
End: 2023-08-22

## 2023-08-22 NOTE — TELEPHONE ENCOUNTER
Dr Danny Alanis patient - metastatic breast cancer on faslodex. Fever/Runny Nose/Nasal Congestion/Headache/Dyspnea/Fatigue/Sore Throat/Dry Cough    Patient reports she woke up yesterday with a runny nose, headache, nasal congestion, sore throat and cough. By the time she got home from work at [employer] she had a fever of 103.5. She also felt short of breath. No dysuria, nausea, vomiting or diarrhea. She went to bed. She did not take any tylenol or advil. At 10 pm her fever was 101.9. She drank some fluids and went back to bed. This morning she still has a runny nose, headache, nasal congestion and dry cough. The dyspnea is \"much better. \" She still has a dry cough. She took a home Covid 19 test and she is positive. She called work and was told several people are Covid positive. Her  is not exhibiting any symptoms at this time. I told Jaguar Andre I would update  Dr Danny Alanis and his nurse. Jaguar Andre asked if he would be ordering Paxlovid? Should she call her PCP? She also said she is suppose to come in on Thursday to see Dr Danny Alanis. She wants to know if she should cancel her appointment. I told would call her back.

## 2023-08-22 NOTE — TELEPHONE ENCOUNTER
I called and updated Delia Ocampo that Dr Susan Cohen said if Ayaan Beebe is already feeling better today that he does not feel she needs a paxlovid prescription. I told him if her symptoms get worse again to please call the office. He agreed.

## 2023-08-24 ENCOUNTER — APPOINTMENT (OUTPATIENT)
Dept: HEMATOLOGY/ONCOLOGY | Age: 63
End: 2023-08-24
Attending: INTERNAL MEDICINE
Payer: COMMERCIAL

## 2023-08-30 ENCOUNTER — SOCIAL WORK SERVICES (OUTPATIENT)
Dept: HEMATOLOGY/ONCOLOGY | Facility: HOSPITAL | Age: 63
End: 2023-08-30

## 2023-08-31 ENCOUNTER — OFFICE VISIT (OUTPATIENT)
Dept: HEMATOLOGY/ONCOLOGY | Age: 63
End: 2023-08-31
Attending: INTERNAL MEDICINE
Payer: COMMERCIAL

## 2023-08-31 VITALS
OXYGEN SATURATION: 97 % | HEART RATE: 73 BPM | SYSTOLIC BLOOD PRESSURE: 121 MMHG | WEIGHT: 179.5 LBS | DIASTOLIC BLOOD PRESSURE: 64 MMHG | BODY MASS INDEX: 28.85 KG/M2 | HEIGHT: 65.98 IN | RESPIRATION RATE: 18 BRPM | TEMPERATURE: 97 F

## 2023-08-31 DIAGNOSIS — C50.919 BREAST CANCER METASTASIZED TO PLEURA, UNSPECIFIED LATERALITY (HCC): ICD-10-CM

## 2023-08-31 DIAGNOSIS — C50.011 MALIGNANT NEOPLASM OF NIPPLE OF RIGHT BREAST IN FEMALE, ESTROGEN RECEPTOR POSITIVE: ICD-10-CM

## 2023-08-31 DIAGNOSIS — C78.2 BREAST CANCER METASTASIZED TO PLEURA, UNSPECIFIED LATERALITY (HCC): ICD-10-CM

## 2023-08-31 DIAGNOSIS — C79.51 MALIGNANT NEOPLASM METASTATIC TO BONE (HCC): ICD-10-CM

## 2023-08-31 DIAGNOSIS — C78.2 CARCINOMA OF RIGHT BREAST METASTATIC TO PLEURA (HCC): Primary | ICD-10-CM

## 2023-08-31 DIAGNOSIS — Z17.0 MALIGNANT NEOPLASM OF NIPPLE OF RIGHT BREAST IN FEMALE, ESTROGEN RECEPTOR POSITIVE: ICD-10-CM

## 2023-08-31 DIAGNOSIS — C78.2 CARCINOMA OF RIGHT BREAST METASTATIC TO PLEURA (HCC): ICD-10-CM

## 2023-08-31 DIAGNOSIS — C50.911 CARCINOMA OF RIGHT BREAST METASTATIC TO PLEURA (HCC): ICD-10-CM

## 2023-08-31 DIAGNOSIS — D50.0 IRON DEFICIENCY ANEMIA DUE TO CHRONIC BLOOD LOSS: ICD-10-CM

## 2023-08-31 DIAGNOSIS — C50.911 CARCINOMA OF RIGHT BREAST METASTATIC TO PLEURA (HCC): Primary | ICD-10-CM

## 2023-08-31 DIAGNOSIS — C78.2 METASTASIS TO PLEURA (HCC): ICD-10-CM

## 2023-08-31 LAB
ALBUMIN SERPL-MCNC: 3.8 G/DL (ref 3.4–5)
ALBUMIN/GLOB SERPL: 1.2 {RATIO} (ref 1–2)
ALP LIVER SERPL-CCNC: 120 U/L
ALT SERPL-CCNC: 17 U/L
ANION GAP SERPL CALC-SCNC: 6 MMOL/L (ref 0–18)
AST SERPL-CCNC: 19 U/L (ref 15–37)
BASOPHILS # BLD AUTO: 0.06 X10(3) UL (ref 0–0.2)
BASOPHILS NFR BLD AUTO: 1 %
BILIRUB SERPL-MCNC: 0.4 MG/DL (ref 0.1–2)
BUN BLD-MCNC: 11 MG/DL (ref 7–18)
CALCIUM BLD-MCNC: 9.3 MG/DL (ref 8.5–10.1)
CHLORIDE SERPL-SCNC: 109 MMOL/L (ref 98–112)
CO2 SERPL-SCNC: 26 MMOL/L (ref 21–32)
CREAT BLD-MCNC: 0.9 MG/DL
DEPRECATED HBV CORE AB SER IA-ACNC: 255.3 NG/ML
EGFRCR SERPLBLD CKD-EPI 2021: 73 ML/MIN/1.73M2 (ref 60–?)
EOSINOPHIL # BLD AUTO: 0.18 X10(3) UL (ref 0–0.7)
EOSINOPHIL NFR BLD AUTO: 3.1 %
ERYTHROCYTE [DISTWIDTH] IN BLOOD BY AUTOMATED COUNT: 16.1 %
FASTING STATUS PATIENT QL REPORTED: NO
GLOBULIN PLAS-MCNC: 3.3 G/DL (ref 2.8–4.4)
GLUCOSE BLD-MCNC: 97 MG/DL (ref 70–99)
HCT VFR BLD AUTO: 30.7 %
HGB BLD-MCNC: 9.9 G/DL
IMM GRANULOCYTES # BLD AUTO: 0.09 X10(3) UL (ref 0–1)
IMM GRANULOCYTES NFR BLD: 1.6 %
IRON SATN MFR SERPL: 21 %
IRON SERPL-MCNC: 72 UG/DL
LYMPHOCYTES # BLD AUTO: 1.98 X10(3) UL (ref 1–4)
LYMPHOCYTES NFR BLD AUTO: 34.6 %
MCH RBC QN AUTO: 31.7 PG (ref 26–34)
MCHC RBC AUTO-ENTMCNC: 32.2 G/DL (ref 31–37)
MCV RBC AUTO: 98.4 FL
MONOCYTES # BLD AUTO: 0.35 X10(3) UL (ref 0.1–1)
MONOCYTES NFR BLD AUTO: 6.1 %
NEUTROPHILS # BLD AUTO: 3.07 X10 (3) UL (ref 1.5–7.7)
NEUTROPHILS # BLD AUTO: 3.07 X10(3) UL (ref 1.5–7.7)
NEUTROPHILS NFR BLD AUTO: 53.6 %
OSMOLALITY SERPL CALC.SUM OF ELEC: 291 MOSM/KG (ref 275–295)
PLATELET # BLD AUTO: 215 10(3)UL (ref 150–450)
POTASSIUM SERPL-SCNC: 4 MMOL/L (ref 3.5–5.1)
PROT SERPL-MCNC: 7.1 G/DL (ref 6.4–8.2)
RBC # BLD AUTO: 3.12 X10(6)UL
SODIUM SERPL-SCNC: 141 MMOL/L (ref 136–145)
TIBC SERPL-MCNC: 343 UG/DL (ref 240–450)
TRANSFERRIN SERPL-MCNC: 230 MG/DL (ref 200–360)
WBC # BLD AUTO: 5.7 X10(3) UL (ref 4–11)

## 2023-08-31 PROCEDURE — 99215 OFFICE O/P EST HI 40 MIN: CPT | Performed by: INTERNAL MEDICINE

## 2023-08-31 PROCEDURE — 96402 CHEMO HORMON ANTINEOPL SQ/IM: CPT

## 2023-08-31 RX ORDER — LAMOTRIGINE 25 MG/1
500 TABLET ORAL ONCE
Status: COMPLETED | OUTPATIENT
Start: 2023-08-31 | End: 2023-08-31

## 2023-08-31 RX ORDER — LAMOTRIGINE 25 MG/1
500 TABLET ORAL ONCE
Status: CANCELLED | OUTPATIENT
Start: 2023-08-31

## 2023-08-31 RX ADMIN — LAMOTRIGINE 500 MG: 25 TABLET ORAL at 15:23:00

## 2023-09-13 ENCOUNTER — OFFICE VISIT (OUTPATIENT)
Dept: FAMILY MEDICINE CLINIC | Facility: CLINIC | Age: 63
End: 2023-09-13
Payer: COMMERCIAL

## 2023-09-13 VITALS
HEIGHT: 65.98 IN | HEART RATE: 62 BPM | BODY MASS INDEX: 28.45 KG/M2 | WEIGHT: 177 LBS | RESPIRATION RATE: 16 BRPM | DIASTOLIC BLOOD PRESSURE: 60 MMHG | SYSTOLIC BLOOD PRESSURE: 114 MMHG | TEMPERATURE: 98 F

## 2023-09-13 DIAGNOSIS — Z23 NEED FOR VACCINATION: Primary | ICD-10-CM

## 2023-09-13 DIAGNOSIS — R79.89 ELEVATED LIVER FUNCTION TESTS: ICD-10-CM

## 2023-09-13 DIAGNOSIS — E87.6 HYPOKALEMIA: ICD-10-CM

## 2023-09-13 DIAGNOSIS — F41.9 ANXIETY: ICD-10-CM

## 2023-09-13 DIAGNOSIS — G62.9 PERIPHERAL POLYNEUROPATHY: ICD-10-CM

## 2023-09-13 DIAGNOSIS — C79.51 MALIGNANT NEOPLASM METASTATIC TO BONE (HCC): ICD-10-CM

## 2023-09-13 DIAGNOSIS — R60.0 EDEMA OF BOTH FEET: ICD-10-CM

## 2023-09-13 DIAGNOSIS — E78.2 HYPERLIPIDEMIA, MIXED: ICD-10-CM

## 2023-09-13 DIAGNOSIS — R60.0 LOWER EXTREMITY EDEMA: ICD-10-CM

## 2023-09-13 DIAGNOSIS — K21.9 GASTROESOPHAGEAL REFLUX DISEASE WITHOUT ESOPHAGITIS: ICD-10-CM

## 2023-09-13 DIAGNOSIS — D50.0 IRON DEFICIENCY ANEMIA DUE TO CHRONIC BLOOD LOSS: ICD-10-CM

## 2023-09-13 DIAGNOSIS — R73.9 HYPERGLYCEMIA: ICD-10-CM

## 2023-09-13 DIAGNOSIS — E03.9 HYPOTHYROIDISM IN ADULT: ICD-10-CM

## 2023-09-13 DIAGNOSIS — Z17.0 MALIGNANT NEOPLASM OF NIPPLE OF RIGHT BREAST IN FEMALE, ESTROGEN RECEPTOR POSITIVE: ICD-10-CM

## 2023-09-13 DIAGNOSIS — R16.1 SPLENOMEGALY: ICD-10-CM

## 2023-09-13 DIAGNOSIS — C50.011 MALIGNANT NEOPLASM OF NIPPLE OF RIGHT BREAST IN FEMALE, ESTROGEN RECEPTOR POSITIVE: ICD-10-CM

## 2023-09-13 PROCEDURE — 90471 IMMUNIZATION ADMIN: CPT | Performed by: FAMILY MEDICINE

## 2023-09-13 PROCEDURE — 3008F BODY MASS INDEX DOCD: CPT | Performed by: FAMILY MEDICINE

## 2023-09-13 PROCEDURE — 90686 IIV4 VACC NO PRSV 0.5 ML IM: CPT | Performed by: FAMILY MEDICINE

## 2023-09-13 PROCEDURE — 3078F DIAST BP <80 MM HG: CPT | Performed by: FAMILY MEDICINE

## 2023-09-13 PROCEDURE — 99214 OFFICE O/P EST MOD 30 MIN: CPT | Performed by: FAMILY MEDICINE

## 2023-09-13 PROCEDURE — 3074F SYST BP LT 130 MM HG: CPT | Performed by: FAMILY MEDICINE

## 2023-09-13 RX ORDER — ALBUTEROL SULFATE 90 UG/1
2 AEROSOL, METERED RESPIRATORY (INHALATION) EVERY 4 HOURS PRN
Qty: 25.5 G | Refills: 2 | Status: SHIPPED | OUTPATIENT
Start: 2023-09-13

## 2023-09-13 RX ORDER — HYDROCODONE BITARTRATE AND ACETAMINOPHEN 7.5; 325 MG/1; MG/1
1 TABLET ORAL EVERY 8 HOURS PRN
Qty: 90 TABLET | Refills: 0 | Status: SHIPPED | OUTPATIENT
Start: 2023-10-14 | End: 2023-11-13

## 2023-09-13 RX ORDER — ALPRAZOLAM 0.5 MG/1
0.5 TABLET ORAL 2 TIMES DAILY PRN
Qty: 60 TABLET | Refills: 2 | Status: SHIPPED | OUTPATIENT
Start: 2023-09-13

## 2023-09-13 RX ORDER — HYDROCODONE BITARTRATE AND ACETAMINOPHEN 10; 325 MG/1; MG/1
1-2 TABLET ORAL EVERY 8 HOURS PRN
Qty: 90 TABLET | Refills: 0 | Status: SHIPPED | OUTPATIENT
Start: 2023-10-14 | End: 2023-11-13

## 2023-09-13 RX ORDER — HYDROCODONE BITARTRATE AND ACETAMINOPHEN 7.5; 325 MG/1; MG/1
1-2 TABLET ORAL EVERY 8 HOURS PRN
Qty: 90 TABLET | Refills: 1 | Status: CANCELLED | OUTPATIENT
Start: 2023-09-13

## 2023-09-13 RX ORDER — HYDROCODONE BITARTRATE AND ACETAMINOPHEN 7.5; 325 MG/1; MG/1
1 TABLET ORAL EVERY 8 HOURS PRN
Qty: 90 TABLET | Refills: 0 | Status: SHIPPED | OUTPATIENT
Start: 2023-09-13 | End: 2023-10-13

## 2023-09-13 RX ORDER — HYDROCODONE BITARTRATE AND ACETAMINOPHEN 10; 325 MG/1; MG/1
1-2 TABLET ORAL EVERY 8 HOURS PRN
Qty: 90 TABLET | Refills: 0 | Status: SHIPPED | OUTPATIENT
Start: 2023-11-14 | End: 2023-12-14

## 2023-09-13 RX ORDER — GABAPENTIN 300 MG/1
300 CAPSULE ORAL 4 TIMES DAILY
Qty: 360 CAPSULE | Refills: 1 | Status: CANCELLED | OUTPATIENT
Start: 2023-09-13

## 2023-09-13 RX ORDER — ESCITALOPRAM OXALATE 20 MG/1
20 TABLET ORAL DAILY
Qty: 90 TABLET | Refills: 1 | Status: SHIPPED | OUTPATIENT
Start: 2023-09-13

## 2023-09-13 RX ORDER — FUROSEMIDE 40 MG/1
40 TABLET ORAL DAILY
Qty: 90 TABLET | Refills: 1 | Status: SHIPPED | OUTPATIENT
Start: 2023-09-13

## 2023-09-13 RX ORDER — POTASSIUM CHLORIDE 750 MG/1
10 TABLET, FILM COATED, EXTENDED RELEASE ORAL DAILY
Qty: 90 TABLET | Refills: 1 | Status: SHIPPED | OUTPATIENT
Start: 2023-09-13

## 2023-09-13 RX ORDER — HYDROCODONE BITARTRATE AND ACETAMINOPHEN 10; 325 MG/1; MG/1
1-2 TABLET ORAL EVERY 8 HOURS PRN
Qty: 90 TABLET | Refills: 0 | Status: SHIPPED | OUTPATIENT
Start: 2023-09-13 | End: 2023-10-13

## 2023-09-13 RX ORDER — PROPRANOLOL HYDROCHLORIDE 10 MG/1
10 TABLET ORAL 2 TIMES DAILY
Qty: 180 TABLET | Refills: 1 | Status: SHIPPED | OUTPATIENT
Start: 2023-09-13

## 2023-09-13 RX ORDER — LEVOTHYROXINE SODIUM 175 UG/1
175 TABLET ORAL
Qty: 90 TABLET | Refills: 1 | Status: SHIPPED | OUTPATIENT
Start: 2023-09-13

## 2023-09-13 RX ORDER — HYDROCODONE BITARTRATE AND ACETAMINOPHEN 7.5; 325 MG/1; MG/1
1 TABLET ORAL EVERY 8 HOURS PRN
Qty: 90 TABLET | Refills: 0 | Status: SHIPPED | OUTPATIENT
Start: 2023-11-14 | End: 2023-12-14

## 2023-09-13 RX ORDER — ROSUVASTATIN CALCIUM 5 MG/1
5 TABLET, COATED ORAL NIGHTLY
Qty: 90 TABLET | Refills: 1 | Status: SHIPPED | OUTPATIENT
Start: 2023-09-13

## 2023-09-14 ENCOUNTER — MED REC SCAN ONLY (OUTPATIENT)
Dept: FAMILY MEDICINE CLINIC | Facility: CLINIC | Age: 63
End: 2023-09-14

## 2023-09-22 ENCOUNTER — PATIENT MESSAGE (OUTPATIENT)
Dept: FAMILY MEDICINE CLINIC | Facility: CLINIC | Age: 63
End: 2023-09-22

## 2023-09-22 NOTE — TELEPHONE ENCOUNTER
Can triage please call pharmacy to confirm when the last [place] date was? [place].   She has prescription on file dated 9/13/23, and future prescriptions as well

## 2023-09-22 NOTE — TELEPHONE ENCOUNTER
From: Cliff Carroll  To: Arnold Jack  Sent: 9/22/2023 12:45 PM CDT  Subject: Hydrocodone prescription     Shara still has my hydrocodone prescription delayed. Not sure if you put one in for me in August as well but I never received them either. Can you please send the prescriptions for the hydrocodone to the [address] [place] [address]?

## 2023-09-28 ENCOUNTER — OFFICE VISIT (OUTPATIENT)
Dept: HEMATOLOGY/ONCOLOGY | Age: 63
End: 2023-09-28
Attending: INTERNAL MEDICINE
Payer: COMMERCIAL

## 2023-09-28 VITALS
SYSTOLIC BLOOD PRESSURE: 106 MMHG | HEIGHT: 65.98 IN | OXYGEN SATURATION: 97 % | TEMPERATURE: 96 F | BODY MASS INDEX: 28.85 KG/M2 | HEART RATE: 66 BPM | RESPIRATION RATE: 18 BRPM | WEIGHT: 179.5 LBS | DIASTOLIC BLOOD PRESSURE: 60 MMHG

## 2023-09-28 DIAGNOSIS — C50.011 MALIGNANT NEOPLASM OF NIPPLE OF RIGHT BREAST IN FEMALE, ESTROGEN RECEPTOR POSITIVE: ICD-10-CM

## 2023-09-28 DIAGNOSIS — C50.911 CARCINOMA OF RIGHT BREAST METASTATIC TO PLEURA (HCC): ICD-10-CM

## 2023-09-28 DIAGNOSIS — C78.2 CARCINOMA OF RIGHT BREAST METASTATIC TO PLEURA (HCC): Primary | ICD-10-CM

## 2023-09-28 DIAGNOSIS — C78.2 CARCINOMA OF RIGHT BREAST METASTATIC TO PLEURA (HCC): ICD-10-CM

## 2023-09-28 DIAGNOSIS — D50.0 IRON DEFICIENCY ANEMIA SECONDARY TO BLOOD LOSS (CHRONIC): ICD-10-CM

## 2023-09-28 DIAGNOSIS — C79.51 MALIGNANT NEOPLASM METASTATIC TO BONE (HCC): ICD-10-CM

## 2023-09-28 DIAGNOSIS — K74.69 CRYPTOGENIC CIRRHOSIS (HCC): Primary | ICD-10-CM

## 2023-09-28 DIAGNOSIS — Z17.0 MALIGNANT NEOPLASM OF NIPPLE OF RIGHT BREAST IN FEMALE, ESTROGEN RECEPTOR POSITIVE: ICD-10-CM

## 2023-09-28 DIAGNOSIS — C78.2 BREAST CANCER METASTASIZED TO PLEURA, UNSPECIFIED LATERALITY (HCC): ICD-10-CM

## 2023-09-28 DIAGNOSIS — C50.911 CARCINOMA OF RIGHT BREAST METASTATIC TO PLEURA (HCC): Primary | ICD-10-CM

## 2023-09-28 DIAGNOSIS — C50.919 BREAST CANCER METASTASIZED TO PLEURA, UNSPECIFIED LATERALITY (HCC): ICD-10-CM

## 2023-09-28 LAB
ALBUMIN SERPL-MCNC: 4.1 G/DL (ref 3.4–5)
ALBUMIN/GLOB SERPL: 1.1 {RATIO} (ref 1–2)
ALP LIVER SERPL-CCNC: 154 U/L
ALT SERPL-CCNC: 31 U/L
ANION GAP SERPL CALC-SCNC: 4 MMOL/L (ref 0–18)
AST SERPL-CCNC: 28 U/L (ref 15–37)
BASOPHILS # BLD AUTO: 0.11 X10(3) UL (ref 0–0.2)
BASOPHILS NFR BLD AUTO: 1.7 %
BILIRUB SERPL-MCNC: 0.4 MG/DL (ref 0.1–2)
BUN BLD-MCNC: 10 MG/DL (ref 7–18)
CALCIUM BLD-MCNC: 8.9 MG/DL (ref 8.5–10.1)
CHLORIDE SERPL-SCNC: 108 MMOL/L (ref 98–112)
CO2 SERPL-SCNC: 28 MMOL/L (ref 21–32)
CREAT BLD-MCNC: 1.09 MG/DL
EGFRCR SERPLBLD CKD-EPI 2021: 58 ML/MIN/1.73M2 (ref 60–?)
EOSINOPHIL # BLD AUTO: 0.32 X10(3) UL (ref 0–0.7)
EOSINOPHIL NFR BLD AUTO: 4.9 %
ERYTHROCYTE [DISTWIDTH] IN BLOOD BY AUTOMATED COUNT: 15.8 %
GLOBULIN PLAS-MCNC: 3.7 G/DL (ref 2.8–4.4)
GLUCOSE BLD-MCNC: 140 MG/DL (ref 70–99)
HCT VFR BLD AUTO: 33.3 %
HGB BLD-MCNC: 11.3 G/DL
IMM GRANULOCYTES # BLD AUTO: 0.03 X10(3) UL (ref 0–1)
IMM GRANULOCYTES NFR BLD: 0.5 %
IRON SATN MFR SERPL: 14 %
IRON SERPL-MCNC: 56 UG/DL
LYMPHOCYTES # BLD AUTO: 1.95 X10(3) UL (ref 1–4)
LYMPHOCYTES NFR BLD AUTO: 29.9 %
MCH RBC QN AUTO: 33.8 PG (ref 26–34)
MCHC RBC AUTO-ENTMCNC: 33.9 G/DL (ref 31–37)
MCV RBC AUTO: 99.7 FL
MONOCYTES # BLD AUTO: 0.3 X10(3) UL (ref 0.1–1)
MONOCYTES NFR BLD AUTO: 4.6 %
NEUTROPHILS # BLD AUTO: 3.81 X10 (3) UL (ref 1.5–7.7)
NEUTROPHILS # BLD AUTO: 3.81 X10(3) UL (ref 1.5–7.7)
NEUTROPHILS NFR BLD AUTO: 58.4 %
OSMOLALITY SERPL CALC.SUM OF ELEC: 291 MOSM/KG (ref 275–295)
PLATELET # BLD AUTO: 219 10(3)UL (ref 150–450)
POTASSIUM SERPL-SCNC: 3.9 MMOL/L (ref 3.5–5.1)
PROT SERPL-MCNC: 7.8 G/DL (ref 6.4–8.2)
RBC # BLD AUTO: 3.34 X10(6)UL
SODIUM SERPL-SCNC: 140 MMOL/L (ref 136–145)
TIBC SERPL-MCNC: 408 UG/DL (ref 240–450)
TRANSFERRIN SERPL-MCNC: 274 MG/DL (ref 200–360)
WBC # BLD AUTO: 6.5 X10(3) UL (ref 4–11)

## 2023-09-28 PROCEDURE — 99215 OFFICE O/P EST HI 40 MIN: CPT | Performed by: INTERNAL MEDICINE

## 2023-09-28 PROCEDURE — 96402 CHEMO HORMON ANTINEOPL SQ/IM: CPT

## 2023-09-28 RX ORDER — LAMOTRIGINE 25 MG/1
500 TABLET ORAL ONCE
Status: COMPLETED | OUTPATIENT
Start: 2023-09-28 | End: 2023-09-28

## 2023-09-28 RX ORDER — LAMOTRIGINE 25 MG/1
500 TABLET ORAL ONCE
Status: CANCELLED | OUTPATIENT
Start: 2023-09-28

## 2023-09-28 RX ADMIN — LAMOTRIGINE 500 MG: 25 TABLET ORAL at 15:33:00

## 2023-09-28 NOTE — PROGRESS NOTES
Pt here for follow up and injection. Pt complains of fatigue. She has not followed up with liver doctor. She complains of leg cramps for the past 2 days. Her feet are swollen.

## 2023-10-16 DIAGNOSIS — R60.0 EDEMA OF BOTH FEET: ICD-10-CM

## 2023-10-16 DIAGNOSIS — R16.1 SPLENOMEGALY: ICD-10-CM

## 2023-10-16 RX ORDER — SPIRONOLACTONE 100 MG/1
TABLET, FILM COATED ORAL
Qty: 90 TABLET | Refills: 0 | Status: SHIPPED | OUTPATIENT
Start: 2023-10-16

## 2023-10-16 RX ORDER — FUROSEMIDE 40 MG/1
40 TABLET ORAL DAILY
Qty: 90 TABLET | Refills: 3 | OUTPATIENT
Start: 2023-10-16

## 2023-10-16 NOTE — TELEPHONE ENCOUNTER
LOV:   for:   Patient advised to RTC on:    Nov;12/27/2023    Medication Quantity Refills Start End   furosemide 40 MG Oral Tab 90 tablet 1 9/13/2023    Sig:   Take 1 tablet (40 mg total) by mouth daily.        Medication Quantity Refills Start End   spironolactone 100 MG Oral Tab 90 tablet 0 7/17/2023    Sig:   TAKE 1 TABLET DAILY

## 2023-10-26 ENCOUNTER — OFFICE VISIT (OUTPATIENT)
Dept: HEMATOLOGY/ONCOLOGY | Age: 63
End: 2023-10-26
Attending: INTERNAL MEDICINE
Payer: COMMERCIAL

## 2023-10-26 VITALS
WEIGHT: 176 LBS | HEIGHT: 65.98 IN | DIASTOLIC BLOOD PRESSURE: 62 MMHG | SYSTOLIC BLOOD PRESSURE: 106 MMHG | OXYGEN SATURATION: 94 % | BODY MASS INDEX: 28.28 KG/M2 | TEMPERATURE: 99 F | RESPIRATION RATE: 18 BRPM | HEART RATE: 64 BPM

## 2023-10-26 DIAGNOSIS — C50.919 BREAST CANCER METASTASIZED TO PLEURA, UNSPECIFIED LATERALITY (HCC): ICD-10-CM

## 2023-10-26 DIAGNOSIS — C78.2 CARCINOMA OF RIGHT BREAST METASTATIC TO PLEURA (HCC): ICD-10-CM

## 2023-10-26 DIAGNOSIS — C50.911 CARCINOMA OF RIGHT BREAST METASTATIC TO PLEURA (HCC): ICD-10-CM

## 2023-10-26 DIAGNOSIS — C78.2 BREAST CANCER METASTASIZED TO PLEURA, UNSPECIFIED LATERALITY (HCC): ICD-10-CM

## 2023-10-26 DIAGNOSIS — C50.011 MALIGNANT NEOPLASM OF NIPPLE OF RIGHT BREAST IN FEMALE, ESTROGEN RECEPTOR POSITIVE: ICD-10-CM

## 2023-10-26 DIAGNOSIS — C79.51 MALIGNANT NEOPLASM METASTATIC TO BONE (HCC): Primary | ICD-10-CM

## 2023-10-26 DIAGNOSIS — D50.0 IRON DEFICIENCY ANEMIA SECONDARY TO BLOOD LOSS (CHRONIC): ICD-10-CM

## 2023-10-26 DIAGNOSIS — Z17.0 MALIGNANT NEOPLASM OF NIPPLE OF RIGHT BREAST IN FEMALE, ESTROGEN RECEPTOR POSITIVE: ICD-10-CM

## 2023-10-26 LAB
ALBUMIN SERPL-MCNC: 4.2 G/DL (ref 3.4–5)
ALBUMIN/GLOB SERPL: 1.2 {RATIO} (ref 1–2)
ALP LIVER SERPL-CCNC: 130 U/L
ALT SERPL-CCNC: 27 U/L
ANION GAP SERPL CALC-SCNC: 3 MMOL/L (ref 0–18)
AST SERPL-CCNC: 19 U/L (ref 15–37)
BASOPHILS # BLD AUTO: 0.11 X10(3) UL (ref 0–0.2)
BASOPHILS NFR BLD AUTO: 1.9 %
BILIRUB SERPL-MCNC: 0.4 MG/DL (ref 0.1–2)
BUN BLD-MCNC: 15 MG/DL (ref 7–18)
CALCIUM BLD-MCNC: 9 MG/DL (ref 8.5–10.1)
CHLORIDE SERPL-SCNC: 107 MMOL/L (ref 98–112)
CO2 SERPL-SCNC: 29 MMOL/L (ref 21–32)
CREAT BLD-MCNC: 1.19 MG/DL
DEPRECATED HBV CORE AB SER IA-ACNC: 63.5 NG/ML
EGFRCR SERPLBLD CKD-EPI 2021: 52 ML/MIN/1.73M2 (ref 60–?)
EOSINOPHIL # BLD AUTO: 0.27 X10(3) UL (ref 0–0.7)
EOSINOPHIL NFR BLD AUTO: 4.6 %
ERYTHROCYTE [DISTWIDTH] IN BLOOD BY AUTOMATED COUNT: 14.2 %
FASTING STATUS PATIENT QL REPORTED: NO
GLOBULIN PLAS-MCNC: 3.6 G/DL (ref 2.8–4.4)
GLUCOSE BLD-MCNC: 97 MG/DL (ref 70–99)
HCT VFR BLD AUTO: 34.5 %
HGB BLD-MCNC: 11.4 G/DL
IMM GRANULOCYTES # BLD AUTO: 0.01 X10(3) UL (ref 0–1)
IMM GRANULOCYTES NFR BLD: 0.2 %
IRON SATN MFR SERPL: 13 %
IRON SERPL-MCNC: 54 UG/DL
LYMPHOCYTES # BLD AUTO: 1.91 X10(3) UL (ref 1–4)
LYMPHOCYTES NFR BLD AUTO: 32.6 %
MCH RBC QN AUTO: 32.2 PG (ref 26–34)
MCHC RBC AUTO-ENTMCNC: 33 G/DL (ref 31–37)
MCV RBC AUTO: 97.5 FL
MONOCYTES # BLD AUTO: 0.42 X10(3) UL (ref 0.1–1)
MONOCYTES NFR BLD AUTO: 7.2 %
NEUTROPHILS # BLD AUTO: 3.14 X10 (3) UL (ref 1.5–7.7)
NEUTROPHILS # BLD AUTO: 3.14 X10(3) UL (ref 1.5–7.7)
NEUTROPHILS NFR BLD AUTO: 53.5 %
OSMOLALITY SERPL CALC.SUM OF ELEC: 289 MOSM/KG (ref 275–295)
PLATELET # BLD AUTO: 165 10(3)UL (ref 150–450)
POTASSIUM SERPL-SCNC: 3.8 MMOL/L (ref 3.5–5.1)
PROT SERPL-MCNC: 7.8 G/DL (ref 6.4–8.2)
RBC # BLD AUTO: 3.54 X10(6)UL
SODIUM SERPL-SCNC: 139 MMOL/L (ref 136–145)
TIBC SERPL-MCNC: 426 UG/DL (ref 240–450)
TRANSFERRIN SERPL-MCNC: 286 MG/DL (ref 200–360)
WBC # BLD AUTO: 5.9 X10(3) UL (ref 4–11)

## 2023-10-26 PROCEDURE — 99215 OFFICE O/P EST HI 40 MIN: CPT | Performed by: INTERNAL MEDICINE

## 2023-10-26 PROCEDURE — 96402 CHEMO HORMON ANTINEOPL SQ/IM: CPT

## 2023-10-26 RX ORDER — LAMOTRIGINE 25 MG/1
500 TABLET ORAL ONCE
Status: CANCELLED | OUTPATIENT
Start: 2023-10-31

## 2023-10-26 RX ORDER — LAMOTRIGINE 25 MG/1
500 TABLET ORAL ONCE
Status: COMPLETED | OUTPATIENT
Start: 2023-10-26 | End: 2023-10-26

## 2023-10-26 RX ADMIN — LAMOTRIGINE 500 MG: 25 TABLET ORAL at 15:37:00

## 2023-10-26 NOTE — PROGRESS NOTES
Pt here for follow up and injection. Some bleeding after finally having a BM after being constipated. Suggested miralax.

## 2023-11-01 ENCOUNTER — PATIENT MESSAGE (OUTPATIENT)
Dept: FAMILY MEDICINE CLINIC | Facility: CLINIC | Age: 63
End: 2023-11-01

## 2023-11-01 NOTE — TELEPHONE ENCOUNTER
Left another message again stating if she is close by and still able to make the 1 PM appt, to call and let us know.

## 2023-11-01 NOTE — TELEPHONE ENCOUNTER
Please advise on patient message as there is no current availability in your schedule. Thank you. Arava Counseling:  Patient counseled regarding adverse effects of Arava including but not limited to nausea, vomiting, abnormalities in liver function tests. Patients may develop mouth sores, rash, diarrhea, and abnormalities in blood counts. The patient understands that monitoring is required including LFTs and blood counts.  There is a rare possibility of scarring of the liver and lung problems that can occur when taking methotrexate. Persistent nausea, loss of appetite, pale stools, dark urine, cough, and shortness of breath should be reported immediately. Patient advised to discontinue Arava treatment and consult with a physician prior to attempting conception. The patient will have to undergo a treatment to eliminate Arava from the body prior to conception.

## 2023-11-01 NOTE — TELEPHONE ENCOUNTER
Patient should be evaluated for her symptoms , please schedule an appointment with Petrona Luevano to look at her back before doing an imaging test.  Thank you

## 2023-11-03 ENCOUNTER — LAB ENCOUNTER (OUTPATIENT)
Dept: LAB | Age: 63
End: 2023-11-03
Attending: FAMILY MEDICINE
Payer: COMMERCIAL

## 2023-11-03 ENCOUNTER — HOSPITAL ENCOUNTER (OUTPATIENT)
Dept: GENERAL RADIOLOGY | Age: 63
Discharge: HOME | End: 2023-11-03
Payer: COMMERCIAL

## 2023-11-03 ENCOUNTER — OFFICE VISIT (OUTPATIENT)
Dept: FAMILY MEDICINE CLINIC | Facility: CLINIC | Age: 63
End: 2023-11-03
Payer: COMMERCIAL

## 2023-11-03 VITALS
HEIGHT: 66 IN | SYSTOLIC BLOOD PRESSURE: 112 MMHG | HEART RATE: 80 BPM | BODY MASS INDEX: 28.28 KG/M2 | DIASTOLIC BLOOD PRESSURE: 78 MMHG | WEIGHT: 176 LBS

## 2023-11-03 DIAGNOSIS — Z17.0 MALIGNANT NEOPLASM OF NIPPLE OF RIGHT BREAST IN FEMALE, ESTROGEN RECEPTOR POSITIVE: ICD-10-CM

## 2023-11-03 DIAGNOSIS — C50.011 MALIGNANT NEOPLASM OF NIPPLE OF RIGHT BREAST IN FEMALE, ESTROGEN RECEPTOR POSITIVE: ICD-10-CM

## 2023-11-03 DIAGNOSIS — M54.50 ACUTE BILATERAL LOW BACK PAIN WITHOUT SCIATICA: ICD-10-CM

## 2023-11-03 DIAGNOSIS — C78.2 BREAST CANCER METASTASIZED TO PLEURA, UNSPECIFIED LATERALITY (HCC): ICD-10-CM

## 2023-11-03 DIAGNOSIS — E78.2 HYPERLIPIDEMIA, MIXED: ICD-10-CM

## 2023-11-03 DIAGNOSIS — M54.50 ACUTE BILATERAL LOW BACK PAIN WITHOUT SCIATICA: Primary | ICD-10-CM

## 2023-11-03 DIAGNOSIS — E03.9 HYPOTHYROIDISM IN ADULT: ICD-10-CM

## 2023-11-03 DIAGNOSIS — C79.51 MALIGNANT NEOPLASM METASTATIC TO BONE (HCC): ICD-10-CM

## 2023-11-03 DIAGNOSIS — C50.919 BREAST CANCER METASTASIZED TO PLEURA, UNSPECIFIED LATERALITY (HCC): ICD-10-CM

## 2023-11-03 LAB
ALBUMIN SERPL-MCNC: 4.1 G/DL (ref 3.4–5)
ALBUMIN/GLOB SERPL: 1.1 {RATIO} (ref 1–2)
ALP LIVER SERPL-CCNC: 144 U/L
ALT SERPL-CCNC: 29 U/L
ANION GAP SERPL CALC-SCNC: 3 MMOL/L (ref 0–18)
AST SERPL-CCNC: 21 U/L (ref 15–37)
BILIRUB SERPL-MCNC: 0.4 MG/DL (ref 0.1–2)
BUN BLD-MCNC: 10 MG/DL (ref 9–23)
CALCIUM BLD-MCNC: 9.7 MG/DL (ref 8.5–10.1)
CHLORIDE SERPL-SCNC: 109 MMOL/L (ref 98–112)
CHOLEST SERPL-MCNC: 221 MG/DL (ref ?–200)
CO2 SERPL-SCNC: 28 MMOL/L (ref 21–32)
CREAT BLD-MCNC: 0.88 MG/DL
EGFRCR SERPLBLD CKD-EPI 2021: 75 ML/MIN/1.73M2 (ref 60–?)
FASTING PATIENT LIPID ANSWER: YES
FASTING STATUS PATIENT QL REPORTED: YES
GLOBULIN PLAS-MCNC: 3.6 G/DL (ref 2.8–4.4)
GLUCOSE BLD-MCNC: 121 MG/DL (ref 70–99)
HDLC SERPL-MCNC: 32 MG/DL (ref 40–59)
LDLC SERPL CALC-MCNC: 130 MG/DL (ref ?–100)
NONHDLC SERPL-MCNC: 189 MG/DL (ref ?–130)
OSMOLALITY SERPL CALC.SUM OF ELEC: 290 MOSM/KG (ref 275–295)
POTASSIUM SERPL-SCNC: 4.6 MMOL/L (ref 3.5–5.1)
PROT SERPL-MCNC: 7.7 G/DL (ref 6.4–8.2)
SODIUM SERPL-SCNC: 140 MMOL/L (ref 136–145)
T4 FREE SERPL-MCNC: 0.6 NG/DL (ref 0.8–1.7)
TRIGL SERPL-MCNC: 330 MG/DL (ref 30–149)
TSI SER-ACNC: 50.9 MIU/ML (ref 0.36–3.74)
VLDLC SERPL CALC-MCNC: 61 MG/DL (ref 0–30)

## 2023-11-03 PROCEDURE — 3074F SYST BP LT 130 MM HG: CPT

## 2023-11-03 PROCEDURE — 80061 LIPID PANEL: CPT

## 2023-11-03 PROCEDURE — 3008F BODY MASS INDEX DOCD: CPT

## 2023-11-03 PROCEDURE — 80053 COMPREHEN METABOLIC PANEL: CPT

## 2023-11-03 PROCEDURE — 99214 OFFICE O/P EST MOD 30 MIN: CPT

## 2023-11-03 PROCEDURE — 84443 ASSAY THYROID STIM HORMONE: CPT

## 2023-11-03 PROCEDURE — 3078F DIAST BP <80 MM HG: CPT

## 2023-11-03 PROCEDURE — 84439 ASSAY OF FREE THYROXINE: CPT

## 2023-11-03 PROCEDURE — 72110 X-RAY EXAM L-2 SPINE 4/>VWS: CPT

## 2023-11-03 RX ORDER — METHYLPREDNISOLONE 4 MG/1
TABLET ORAL
Qty: 1 EACH | Refills: 0 | Status: SHIPPED | OUTPATIENT
Start: 2023-11-03

## 2023-11-04 ENCOUNTER — TELEPHONE (OUTPATIENT)
Dept: FAMILY MEDICINE CLINIC | Facility: CLINIC | Age: 63
End: 2023-11-04

## 2023-11-04 DIAGNOSIS — E03.9 HYPOTHYROIDISM IN ADULT: Primary | ICD-10-CM

## 2023-11-04 NOTE — TELEPHONE ENCOUNTER
I have received patient test results. TSH came back very high please ask if she is taking her thyroid medication regularly? If she takes levothyroxine 175 mcg every day I would like her to increase levothyroxine to 200 mcg daily recheck TSH free T4 in 4 to 6 weeks. Triglycerides came back high low-fat diet. Continue current medications. Blood sugar 121 also elevated low carb diet.   Thank you

## 2023-11-07 ENCOUNTER — PATIENT MESSAGE (OUTPATIENT)
Dept: FAMILY MEDICINE CLINIC | Facility: CLINIC | Age: 63
End: 2023-11-07

## 2023-11-07 NOTE — TELEPHONE ENCOUNTER
From: Dante Reyes  To: Pita Smith  Sent: 11/7/2023 2:26 PM CST  Subject: Allowing permission to speak to spouse    I was wondering what form I need to have signed so that my spouse and sister will be allowed to call and ask questions and speak on my behalf.      [employer]

## 2023-11-14 RX ORDER — LEVOTHYROXINE SODIUM 0.2 MG/1
200 TABLET ORAL
Qty: 90 TABLET | Refills: 0 | Status: SHIPPED | OUTPATIENT
Start: 2023-11-14

## 2023-11-14 NOTE — TELEPHONE ENCOUNTER
Pt called back. Advised of lab results below with recommendations. She voiced understanding. Confirms she has been taking the 175mcg  Agrees to start 200mcg  RX sent. Questions answered. Med list updated.

## 2023-11-20 DIAGNOSIS — E78.2 HYPERLIPIDEMIA, MIXED: ICD-10-CM

## 2023-11-21 RX ORDER — ROSUVASTATIN CALCIUM 5 MG/1
5 TABLET, COATED ORAL NIGHTLY
Qty: 90 TABLET | Refills: 0 | Status: SHIPPED | OUTPATIENT
Start: 2023-11-21

## 2023-11-21 RX ORDER — LEVOTHYROXINE SODIUM 0.15 MG/1
150 TABLET ORAL
Qty: 90 TABLET | Refills: 0 | OUTPATIENT
Start: 2023-11-21

## 2023-11-30 ENCOUNTER — OFFICE VISIT (OUTPATIENT)
Dept: HEMATOLOGY/ONCOLOGY | Age: 63
End: 2023-11-30
Attending: INTERNAL MEDICINE
Payer: COMMERCIAL

## 2023-11-30 VITALS
RESPIRATION RATE: 18 BRPM | OXYGEN SATURATION: 95 % | HEART RATE: 68 BPM | HEIGHT: 65.98 IN | TEMPERATURE: 99 F | DIASTOLIC BLOOD PRESSURE: 61 MMHG | BODY MASS INDEX: 28.77 KG/M2 | WEIGHT: 179 LBS | SYSTOLIC BLOOD PRESSURE: 104 MMHG

## 2023-11-30 DIAGNOSIS — C78.2 BREAST CANCER METASTASIZED TO PLEURA, UNSPECIFIED LATERALITY (HCC): ICD-10-CM

## 2023-11-30 DIAGNOSIS — C50.919 BREAST CANCER METASTASIZED TO PLEURA, UNSPECIFIED LATERALITY (HCC): ICD-10-CM

## 2023-11-30 DIAGNOSIS — C78.2 CARCINOMA OF RIGHT BREAST METASTATIC TO PLEURA (HCC): Primary | ICD-10-CM

## 2023-11-30 DIAGNOSIS — Z17.0 MALIGNANT NEOPLASM OF NIPPLE OF RIGHT BREAST IN FEMALE, ESTROGEN RECEPTOR POSITIVE: ICD-10-CM

## 2023-11-30 DIAGNOSIS — C79.51 MALIGNANT NEOPLASM METASTATIC TO BONE (HCC): ICD-10-CM

## 2023-11-30 DIAGNOSIS — C50.911 CARCINOMA OF RIGHT BREAST METASTATIC TO PLEURA (HCC): Primary | ICD-10-CM

## 2023-11-30 DIAGNOSIS — C50.011 MALIGNANT NEOPLASM OF NIPPLE OF RIGHT BREAST IN FEMALE, ESTROGEN RECEPTOR POSITIVE: ICD-10-CM

## 2023-11-30 LAB
ALBUMIN SERPL-MCNC: 3.9 G/DL (ref 3.4–5)
ALBUMIN/GLOB SERPL: 1.1 {RATIO} (ref 1–2)
ALP LIVER SERPL-CCNC: 153 U/L
ALT SERPL-CCNC: 20 U/L
ANION GAP SERPL CALC-SCNC: 3 MMOL/L (ref 0–18)
AST SERPL-CCNC: 15 U/L (ref 15–37)
BASOPHILS # BLD AUTO: 0.1 X10(3) UL (ref 0–0.2)
BASOPHILS NFR BLD AUTO: 1.5 %
BILIRUB SERPL-MCNC: 0.4 MG/DL (ref 0.1–2)
BUN BLD-MCNC: 11 MG/DL (ref 9–23)
CALCIUM BLD-MCNC: 8.9 MG/DL (ref 8.5–10.1)
CHLORIDE SERPL-SCNC: 106 MMOL/L (ref 98–112)
CO2 SERPL-SCNC: 28 MMOL/L (ref 21–32)
CREAT BLD-MCNC: 1.25 MG/DL
DEPRECATED HBV CORE AB SER IA-ACNC: 36.5 NG/ML
EGFRCR SERPLBLD CKD-EPI 2021: 49 ML/MIN/1.73M2 (ref 60–?)
EOSINOPHIL # BLD AUTO: 0.4 X10(3) UL (ref 0–0.7)
EOSINOPHIL NFR BLD AUTO: 6 %
ERYTHROCYTE [DISTWIDTH] IN BLOOD BY AUTOMATED COUNT: 14.3 %
FASTING STATUS PATIENT QL REPORTED: NO
GLOBULIN PLAS-MCNC: 3.7 G/DL (ref 2.8–4.4)
GLUCOSE BLD-MCNC: 119 MG/DL (ref 70–99)
HCT VFR BLD AUTO: 33.4 %
HGB BLD-MCNC: 10.8 G/DL
IMM GRANULOCYTES # BLD AUTO: 0.02 X10(3) UL (ref 0–1)
IMM GRANULOCYTES NFR BLD: 0.3 %
IRON SATN MFR SERPL: 15 %
IRON SERPL-MCNC: 67 UG/DL
LYMPHOCYTES # BLD AUTO: 2.38 X10(3) UL (ref 1–4)
LYMPHOCYTES NFR BLD AUTO: 35.8 %
MCH RBC QN AUTO: 31.4 PG (ref 26–34)
MCHC RBC AUTO-ENTMCNC: 32.3 G/DL (ref 31–37)
MCV RBC AUTO: 97.1 FL
MONOCYTES # BLD AUTO: 0.35 X10(3) UL (ref 0.1–1)
MONOCYTES NFR BLD AUTO: 5.3 %
NEUTROPHILS # BLD AUTO: 3.4 X10 (3) UL (ref 1.5–7.7)
NEUTROPHILS # BLD AUTO: 3.4 X10(3) UL (ref 1.5–7.7)
NEUTROPHILS NFR BLD AUTO: 51.1 %
OSMOLALITY SERPL CALC.SUM OF ELEC: 285 MOSM/KG (ref 275–295)
PLATELET # BLD AUTO: 210 10(3)UL (ref 150–450)
POTASSIUM SERPL-SCNC: 4 MMOL/L (ref 3.5–5.1)
PROT SERPL-MCNC: 7.6 G/DL (ref 6.4–8.2)
RBC # BLD AUTO: 3.44 X10(6)UL
SODIUM SERPL-SCNC: 137 MMOL/L (ref 136–145)
TIBC SERPL-MCNC: 435 UG/DL (ref 240–450)
TRANSFERRIN SERPL-MCNC: 292 MG/DL (ref 200–360)
WBC # BLD AUTO: 6.7 X10(3) UL (ref 4–11)

## 2023-11-30 PROCEDURE — 99215 OFFICE O/P EST HI 40 MIN: CPT | Performed by: INTERNAL MEDICINE

## 2023-11-30 PROCEDURE — 96402 CHEMO HORMON ANTINEOPL SQ/IM: CPT

## 2023-11-30 RX ORDER — LAMOTRIGINE 25 MG/1
500 TABLET ORAL ONCE
Status: COMPLETED | OUTPATIENT
Start: 2023-11-30 | End: 2023-11-30

## 2023-11-30 RX ORDER — LAMOTRIGINE 25 MG/1
500 TABLET ORAL ONCE
Status: CANCELLED | OUTPATIENT
Start: 2023-12-04

## 2023-11-30 RX ADMIN — LAMOTRIGINE 500 MG: 25 TABLET ORAL at 15:37:00

## 2023-11-30 NOTE — PROGRESS NOTES
Education Record    Learner:  Patient    Disease / Diagnosis: patient  here for faslodex injection     Barriers / Limitations:  None    Method:  Brief focused, and  reinforcement    General Topics:  Plan of care reviewed    Outcome:  Shows understanding

## 2023-11-30 NOTE — PROGRESS NOTES
Pt here for follow up and injection. Pt states her hernia is getting bigger.       Outpatient Oncology Care Plan  Problem list:  knowledge deficit    Problems related to:    disease/disease progression    Interventions:  provided general teaching    Expected outcomes:  understands plan of care    Progress towards outcome:  making progress    Education Record    Learner:  Patient and Spouse  Barriers / Limitations:  None  Method:  Brief focused  Outcome:  Shows understanding  Comments:

## 2023-12-27 ENCOUNTER — OFFICE VISIT (OUTPATIENT)
Dept: FAMILY MEDICINE CLINIC | Facility: CLINIC | Age: 63
End: 2023-12-27
Payer: COMMERCIAL

## 2023-12-27 VITALS
WEIGHT: 176 LBS | RESPIRATION RATE: 18 BRPM | HEIGHT: 66 IN | HEART RATE: 70 BPM | TEMPERATURE: 97 F | BODY MASS INDEX: 28.28 KG/M2 | DIASTOLIC BLOOD PRESSURE: 48 MMHG | SYSTOLIC BLOOD PRESSURE: 122 MMHG

## 2023-12-27 DIAGNOSIS — Z17.0 MALIGNANT NEOPLASM OF NIPPLE OF RIGHT BREAST IN FEMALE, ESTROGEN RECEPTOR POSITIVE  (HCC): ICD-10-CM

## 2023-12-27 DIAGNOSIS — C79.51 MALIGNANT NEOPLASM METASTATIC TO BONE (HCC): ICD-10-CM

## 2023-12-27 DIAGNOSIS — R60.0 EDEMA OF BOTH FEET: ICD-10-CM

## 2023-12-27 DIAGNOSIS — C50.919 BREAST CANCER METASTASIZED TO PLEURA, UNSPECIFIED LATERALITY (HCC): ICD-10-CM

## 2023-12-27 DIAGNOSIS — C50.011 MALIGNANT NEOPLASM OF NIPPLE OF RIGHT BREAST IN FEMALE, ESTROGEN RECEPTOR POSITIVE  (HCC): ICD-10-CM

## 2023-12-27 DIAGNOSIS — D50.0 IRON DEFICIENCY ANEMIA DUE TO CHRONIC BLOOD LOSS: ICD-10-CM

## 2023-12-27 DIAGNOSIS — E78.2 HYPERLIPIDEMIA, MIXED: Primary | ICD-10-CM

## 2023-12-27 DIAGNOSIS — E03.9 HYPOTHYROIDISM IN ADULT: ICD-10-CM

## 2023-12-27 DIAGNOSIS — G62.9 PERIPHERAL POLYNEUROPATHY: ICD-10-CM

## 2023-12-27 DIAGNOSIS — C78.2 BREAST CANCER METASTASIZED TO PLEURA, UNSPECIFIED LATERALITY (HCC): ICD-10-CM

## 2023-12-27 PROCEDURE — 3074F SYST BP LT 130 MM HG: CPT | Performed by: FAMILY MEDICINE

## 2023-12-27 PROCEDURE — 3078F DIAST BP <80 MM HG: CPT | Performed by: FAMILY MEDICINE

## 2023-12-27 PROCEDURE — 99214 OFFICE O/P EST MOD 30 MIN: CPT | Performed by: FAMILY MEDICINE

## 2023-12-27 PROCEDURE — 3008F BODY MASS INDEX DOCD: CPT | Performed by: FAMILY MEDICINE

## 2023-12-27 RX ORDER — HYDROCODONE BITARTRATE AND ACETAMINOPHEN 7.5; 325 MG/1; MG/1
1-2 TABLET ORAL EVERY 8 HOURS PRN
Qty: 90 TABLET | Refills: 0 | Status: SHIPPED | OUTPATIENT
Start: 2024-01-27 | End: 2023-12-28

## 2023-12-27 RX ORDER — ROSUVASTATIN CALCIUM 5 MG/1
5 TABLET, COATED ORAL NIGHTLY
Qty: 90 TABLET | Refills: 1 | Status: SHIPPED | OUTPATIENT
Start: 2023-12-27

## 2023-12-27 RX ORDER — ALPRAZOLAM 0.5 MG/1
0.5 TABLET ORAL 2 TIMES DAILY PRN
Qty: 60 TABLET | Refills: 2 | Status: SHIPPED | OUTPATIENT
Start: 2023-12-27

## 2023-12-27 RX ORDER — HYDROCODONE BITARTRATE AND ACETAMINOPHEN 7.5; 325 MG/1; MG/1
1-2 TABLET ORAL EVERY 8 HOURS PRN
Qty: 90 TABLET | Refills: 0 | Status: SHIPPED | OUTPATIENT
Start: 2024-02-27 | End: 2023-12-28

## 2023-12-27 RX ORDER — HYDROCODONE BITARTRATE AND ACETAMINOPHEN 7.5; 325 MG/1; MG/1
1-2 TABLET ORAL EVERY 8 HOURS PRN
Qty: 90 TABLET | Refills: 0 | Status: SHIPPED | OUTPATIENT
Start: 2023-12-27 | End: 2024-01-26

## 2023-12-28 ENCOUNTER — OFFICE VISIT (OUTPATIENT)
Dept: HEMATOLOGY/ONCOLOGY | Age: 63
End: 2023-12-28
Attending: INTERNAL MEDICINE
Payer: COMMERCIAL

## 2023-12-28 VITALS
HEIGHT: 65.98 IN | SYSTOLIC BLOOD PRESSURE: 121 MMHG | DIASTOLIC BLOOD PRESSURE: 62 MMHG | WEIGHT: 177.5 LBS | HEART RATE: 64 BPM | BODY MASS INDEX: 28.53 KG/M2 | OXYGEN SATURATION: 97 % | RESPIRATION RATE: 18 BRPM | TEMPERATURE: 99 F

## 2023-12-28 DIAGNOSIS — C78.2 CARCINOMA OF RIGHT BREAST METASTATIC TO PLEURA (HCC): ICD-10-CM

## 2023-12-28 DIAGNOSIS — E53.8 VITAMIN B 12 DEFICIENCY: ICD-10-CM

## 2023-12-28 DIAGNOSIS — C79.51 MALIGNANT NEOPLASM METASTATIC TO BONE (HCC): Primary | ICD-10-CM

## 2023-12-28 DIAGNOSIS — E03.9 HYPOTHYROIDISM IN ADULT: ICD-10-CM

## 2023-12-28 DIAGNOSIS — D50.0 IRON DEFICIENCY ANEMIA SECONDARY TO BLOOD LOSS (CHRONIC): ICD-10-CM

## 2023-12-28 DIAGNOSIS — I78.1 TELANGIECTASIA OF EXTREMITY: ICD-10-CM

## 2023-12-28 DIAGNOSIS — C50.911 CARCINOMA OF RIGHT BREAST METASTATIC TO PLEURA (HCC): ICD-10-CM

## 2023-12-28 DIAGNOSIS — Z17.0 MALIGNANT NEOPLASM OF NIPPLE OF RIGHT BREAST IN FEMALE, ESTROGEN RECEPTOR POSITIVE  (HCC): ICD-10-CM

## 2023-12-28 DIAGNOSIS — C50.011 MALIGNANT NEOPLASM OF NIPPLE OF RIGHT BREAST IN FEMALE, ESTROGEN RECEPTOR POSITIVE  (HCC): ICD-10-CM

## 2023-12-28 DIAGNOSIS — D50.0 IRON DEFICIENCY ANEMIA DUE TO CHRONIC BLOOD LOSS: ICD-10-CM

## 2023-12-28 DIAGNOSIS — C78.2 BREAST CANCER METASTASIZED TO PLEURA, UNSPECIFIED LATERALITY (HCC): ICD-10-CM

## 2023-12-28 DIAGNOSIS — C79.51 MALIGNANT NEOPLASM METASTATIC TO BONE (HCC): ICD-10-CM

## 2023-12-28 DIAGNOSIS — C78.2 CARCINOMA OF RIGHT BREAST METASTATIC TO PLEURA (HCC): Primary | ICD-10-CM

## 2023-12-28 DIAGNOSIS — C50.911 CARCINOMA OF RIGHT BREAST METASTATIC TO PLEURA (HCC): Primary | ICD-10-CM

## 2023-12-28 DIAGNOSIS — C50.919 BREAST CANCER METASTASIZED TO PLEURA, UNSPECIFIED LATERALITY (HCC): ICD-10-CM

## 2023-12-28 LAB
ALBUMIN SERPL-MCNC: 3.8 G/DL (ref 3.4–5)
ALBUMIN/GLOB SERPL: 1.1 {RATIO} (ref 1–2)
ALP LIVER SERPL-CCNC: 123 U/L
ALT SERPL-CCNC: 19 U/L
ANION GAP SERPL CALC-SCNC: 3 MMOL/L (ref 0–18)
AST SERPL-CCNC: 11 U/L (ref 15–37)
BASOPHILS # BLD AUTO: 0.08 X10(3) UL (ref 0–0.2)
BASOPHILS NFR BLD AUTO: 1.6 %
BILIRUB SERPL-MCNC: 0.4 MG/DL (ref 0.1–2)
BUN BLD-MCNC: 10 MG/DL (ref 9–23)
CALCIUM BLD-MCNC: 9.1 MG/DL (ref 8.5–10.1)
CHLORIDE SERPL-SCNC: 109 MMOL/L (ref 98–112)
CO2 SERPL-SCNC: 29 MMOL/L (ref 21–32)
CREAT BLD-MCNC: 0.89 MG/DL
DEPRECATED HBV CORE AB SER IA-ACNC: 16.9 NG/ML
EGFRCR SERPLBLD CKD-EPI 2021: 74 ML/MIN/1.73M2 (ref 60–?)
EOSINOPHIL # BLD AUTO: 0.29 X10(3) UL (ref 0–0.7)
EOSINOPHIL NFR BLD AUTO: 5.8 %
ERYTHROCYTE [DISTWIDTH] IN BLOOD BY AUTOMATED COUNT: 14.2 %
FASTING STATUS PATIENT QL REPORTED: NO
GLOBULIN PLAS-MCNC: 3.5 G/DL (ref 2.8–4.4)
GLUCOSE BLD-MCNC: 102 MG/DL (ref 70–99)
HCT VFR BLD AUTO: 31.6 %
HGB BLD-MCNC: 10.3 G/DL
IMM GRANULOCYTES # BLD AUTO: 0.01 X10(3) UL (ref 0–1)
IMM GRANULOCYTES NFR BLD: 0.2 %
IRON SATN MFR SERPL: 28 %
IRON SERPL-MCNC: 133 UG/DL
LYMPHOCYTES # BLD AUTO: 1.82 X10(3) UL (ref 1–4)
LYMPHOCYTES NFR BLD AUTO: 36.3 %
MCH RBC QN AUTO: 31.1 PG (ref 26–34)
MCHC RBC AUTO-ENTMCNC: 32.6 G/DL (ref 31–37)
MCV RBC AUTO: 95.5 FL
MONOCYTES # BLD AUTO: 0.27 X10(3) UL (ref 0.1–1)
MONOCYTES NFR BLD AUTO: 5.4 %
NEUTROPHILS # BLD AUTO: 2.55 X10 (3) UL (ref 1.5–7.7)
NEUTROPHILS # BLD AUTO: 2.55 X10(3) UL (ref 1.5–7.7)
NEUTROPHILS NFR BLD AUTO: 50.7 %
OSMOLALITY SERPL CALC.SUM OF ELEC: 291 MOSM/KG (ref 275–295)
PLATELET # BLD AUTO: 180 10(3)UL (ref 150–450)
POTASSIUM SERPL-SCNC: 3.7 MMOL/L (ref 3.5–5.1)
PROT SERPL-MCNC: 7.3 G/DL (ref 6.4–8.2)
RBC # BLD AUTO: 3.31 X10(6)UL
SODIUM SERPL-SCNC: 141 MMOL/L (ref 136–145)
T4 FREE SERPL-MCNC: 0.7 NG/DL (ref 0.8–1.7)
TIBC SERPL-MCNC: 483 UG/DL (ref 240–450)
TRANSFERRIN SERPL-MCNC: 324 MG/DL (ref 200–360)
TSI SER-ACNC: 24.3 MIU/ML (ref 0.36–3.74)
WBC # BLD AUTO: 5 X10(3) UL (ref 4–11)

## 2023-12-28 PROCEDURE — 96402 CHEMO HORMON ANTINEOPL SQ/IM: CPT

## 2023-12-28 PROCEDURE — 99214 OFFICE O/P EST MOD 30 MIN: CPT | Performed by: CLINICAL NURSE SPECIALIST

## 2023-12-28 RX ORDER — LAMOTRIGINE 25 MG/1
500 TABLET ORAL ONCE
Status: COMPLETED | OUTPATIENT
Start: 2023-12-28 | End: 2023-12-28

## 2023-12-28 RX ORDER — LAMOTRIGINE 25 MG/1
500 TABLET ORAL ONCE
Status: CANCELLED | OUTPATIENT
Start: 2023-12-28

## 2023-12-28 RX ADMIN — LAMOTRIGINE 500 MG: 25 TABLET ORAL at 15:30:00

## 2023-12-28 NOTE — PATIENT INSTRUCTIONS
Continue current meds. Watch diet for fats and carbs. Stay active. Do blood work for thyroid tomorrow . we will check another set of testing in 3 months.

## 2023-12-28 NOTE — PROGRESS NOTES
Pt here for follow up and injection. No new complaints. She states she has occasional bleeding from her hemorrhoids.      Outpatient Oncology Care Plan  Problem list:  knowledge deficit    Problems related to:    disease/disease progression    Interventions:  provided general teaching    Expected outcomes:  understands plan of care    Progress towards outcome:  making progress    Education Record    Learner:  Patient and Spouse  Barriers / Limitations:  None  Method:  Brief focused  Outcome:  Shows understanding  Comments:

## 2023-12-29 DIAGNOSIS — E03.9 HYPOTHYROIDISM IN ADULT: Primary | ICD-10-CM

## 2023-12-29 DIAGNOSIS — E78.2 HYPERLIPIDEMIA, MIXED: ICD-10-CM

## 2023-12-29 RX ORDER — LEVOTHYROXINE SODIUM 0.03 MG/1
25 TABLET ORAL
Qty: 90 TABLET | Refills: 0 | Status: SHIPPED | OUTPATIENT
Start: 2023-12-29

## 2023-12-29 RX ORDER — LEVOTHYROXINE SODIUM 0.2 MG/1
200 TABLET ORAL
Qty: 90 TABLET | Refills: 0 | Status: SHIPPED | OUTPATIENT
Start: 2023-12-29

## 2024-01-01 NOTE — PROGRESS NOTES
Dc patient in stable condition,saline lock removed,dc instruction,priscription and follow up appt, given to patient,verbalized needs. N/A

## 2024-01-04 ENCOUNTER — APPOINTMENT (OUTPATIENT)
Dept: HEMATOLOGY/ONCOLOGY | Age: 64
End: 2024-01-04
Attending: INTERNAL MEDICINE
Payer: COMMERCIAL

## 2024-01-08 ENCOUNTER — TELEPHONE (OUTPATIENT)
Dept: HEMATOLOGY/ONCOLOGY | Facility: HOSPITAL | Age: 64
End: 2024-01-08

## 2024-01-11 NOTE — TELEPHONE ENCOUNTER
From: Sarina Barth  To: Alan Garcia MD  Sent: 5/20/2022 10:21 AM CDT  Subject: Hydrocodone refill    Good Morning Doctor. Can you please call in my new 80 / 120 a month supply which I would have received starting in May? Thank you.      Any questions call Linus Boggs. [phone]
Last OV 1/26/2022 for TCM    Last RF 4/19/2022 #90 without refills. Please see pended refill and approve if appropriate.
99

## 2024-01-15 DIAGNOSIS — R16.1 SPLENOMEGALY: ICD-10-CM

## 2024-01-15 RX ORDER — SPIRONOLACTONE 100 MG/1
TABLET, FILM COATED ORAL
Qty: 90 TABLET | Refills: 0 | Status: SHIPPED | OUTPATIENT
Start: 2024-01-15

## 2024-01-15 NOTE — TELEPHONE ENCOUNTER
LOV: 12/27 /2023  for:Follow-up of multiple medical problems.     Patient advised to RTC on:  3 months.     Medication Quantity Refills Start End   SPIRONOLACTONE 100 MG Oral Tab 90 tablet 0 10/16/2023 --   Sig:   TAKE 1 TABLET DAILY

## 2024-01-22 ENCOUNTER — TELEPHONE (OUTPATIENT)
Dept: PHYSICAL THERAPY | Facility: HOSPITAL | Age: 64
End: 2024-01-22

## 2024-01-25 ENCOUNTER — OFFICE VISIT (OUTPATIENT)
Dept: HEMATOLOGY/ONCOLOGY | Age: 64
End: 2024-01-25
Attending: INTERNAL MEDICINE
Payer: COMMERCIAL

## 2024-01-25 VITALS
BODY MASS INDEX: 29.33 KG/M2 | TEMPERATURE: 97 F | WEIGHT: 182.5 LBS | OXYGEN SATURATION: 98 % | HEART RATE: 78 BPM | RESPIRATION RATE: 18 BRPM | DIASTOLIC BLOOD PRESSURE: 70 MMHG | SYSTOLIC BLOOD PRESSURE: 126 MMHG | HEIGHT: 65.98 IN

## 2024-01-25 DIAGNOSIS — C78.2 CARCINOMA OF RIGHT BREAST METASTATIC TO PLEURA (HCC): Primary | ICD-10-CM

## 2024-01-25 DIAGNOSIS — C78.2 BREAST CANCER METASTASIZED TO PLEURA, UNSPECIFIED LATERALITY (HCC): ICD-10-CM

## 2024-01-25 DIAGNOSIS — Z17.0 MALIGNANT NEOPLASM OF NIPPLE OF RIGHT BREAST IN FEMALE, ESTROGEN RECEPTOR POSITIVE  (HCC): ICD-10-CM

## 2024-01-25 DIAGNOSIS — C50.919 BREAST CANCER METASTASIZED TO PLEURA, UNSPECIFIED LATERALITY (HCC): ICD-10-CM

## 2024-01-25 DIAGNOSIS — C50.911 CARCINOMA OF RIGHT BREAST METASTATIC TO PLEURA (HCC): Primary | ICD-10-CM

## 2024-01-25 DIAGNOSIS — C50.011 MALIGNANT NEOPLASM OF NIPPLE OF RIGHT BREAST IN FEMALE, ESTROGEN RECEPTOR POSITIVE  (HCC): ICD-10-CM

## 2024-01-25 DIAGNOSIS — C79.51 MALIGNANT NEOPLASM METASTATIC TO BONE (HCC): ICD-10-CM

## 2024-01-25 DIAGNOSIS — D50.0 IRON DEFICIENCY ANEMIA SECONDARY TO BLOOD LOSS (CHRONIC): ICD-10-CM

## 2024-01-25 LAB
ALBUMIN SERPL-MCNC: 3.7 G/DL (ref 3.4–5)
ALBUMIN/GLOB SERPL: 1 {RATIO} (ref 1–2)
ALP LIVER SERPL-CCNC: 127 U/L
ANION GAP SERPL CALC-SCNC: 5 MMOL/L (ref 0–18)
AST SERPL-CCNC: 15 U/L (ref 15–37)
BASOPHILS # BLD AUTO: 0.08 X10(3) UL (ref 0–0.2)
BASOPHILS NFR BLD AUTO: 1.3 %
BILIRUB SERPL-MCNC: 0.3 MG/DL (ref 0.1–2)
BUN BLD-MCNC: 13 MG/DL (ref 9–23)
CALCIUM BLD-MCNC: 9.3 MG/DL (ref 8.5–10.1)
CHLORIDE SERPL-SCNC: 108 MMOL/L (ref 98–112)
CO2 SERPL-SCNC: 28 MMOL/L (ref 21–32)
CREAT BLD-MCNC: 1.06 MG/DL
DEPRECATED HBV CORE AB SER IA-ACNC: 19.2 NG/ML
EGFRCR SERPLBLD CKD-EPI 2021: 60 ML/MIN/1.73M2 (ref 60–?)
EOSINOPHIL # BLD AUTO: 0.39 X10(3) UL (ref 0–0.7)
EOSINOPHIL NFR BLD AUTO: 6.4 %
ERYTHROCYTE [DISTWIDTH] IN BLOOD BY AUTOMATED COUNT: 14.2 %
GLOBULIN PLAS-MCNC: 3.6 G/DL (ref 2.8–4.4)
GLUCOSE BLD-MCNC: 102 MG/DL (ref 70–99)
HCT VFR BLD AUTO: 34.7 %
HGB BLD-MCNC: 11 G/DL
IMM GRANULOCYTES # BLD AUTO: 0.01 X10(3) UL (ref 0–1)
IMM GRANULOCYTES NFR BLD: 0.2 %
IRON SATN MFR SERPL: 22 %
IRON SERPL-MCNC: 114 UG/DL
LYMPHOCYTES # BLD AUTO: 1.98 X10(3) UL (ref 1–4)
LYMPHOCYTES NFR BLD AUTO: 32.5 %
MCH RBC QN AUTO: 29.9 PG (ref 26–34)
MCHC RBC AUTO-ENTMCNC: 31.7 G/DL (ref 31–37)
MCV RBC AUTO: 94.3 FL
MONOCYTES # BLD AUTO: 0.37 X10(3) UL (ref 0.1–1)
MONOCYTES NFR BLD AUTO: 6.1 %
NEUTROPHILS # BLD AUTO: 3.26 X10 (3) UL (ref 1.5–7.7)
NEUTROPHILS # BLD AUTO: 3.26 X10(3) UL (ref 1.5–7.7)
NEUTROPHILS NFR BLD AUTO: 53.5 %
OSMOLALITY SERPL CALC.SUM OF ELEC: 292 MOSM/KG (ref 275–295)
PLATELET # BLD AUTO: 195 10(3)UL (ref 150–450)
POTASSIUM SERPL-SCNC: 3.6 MMOL/L (ref 3.5–5.1)
PROT SERPL-MCNC: 7.3 G/DL (ref 6.4–8.2)
RBC # BLD AUTO: 3.68 X10(6)UL
SODIUM SERPL-SCNC: 141 MMOL/L (ref 136–145)
TIBC SERPL-MCNC: 513 UG/DL (ref 240–450)
TRANSFERRIN SERPL-MCNC: 344 MG/DL (ref 200–360)
WBC # BLD AUTO: 6.1 X10(3) UL (ref 4–11)

## 2024-01-25 PROCEDURE — 99215 OFFICE O/P EST HI 40 MIN: CPT | Performed by: INTERNAL MEDICINE

## 2024-01-25 PROCEDURE — 96402 CHEMO HORMON ANTINEOPL SQ/IM: CPT

## 2024-01-25 RX ORDER — LAMOTRIGINE 25 MG/1
500 TABLET ORAL ONCE
Status: COMPLETED | OUTPATIENT
Start: 2024-01-25 | End: 2024-01-25

## 2024-01-25 RX ORDER — LAMOTRIGINE 25 MG/1
500 TABLET ORAL ONCE
Status: CANCELLED | OUTPATIENT
Start: 2024-01-25

## 2024-01-25 RX ADMIN — LAMOTRIGINE 500 MG: 25 TABLET ORAL at 15:54:00

## 2024-01-25 NOTE — PROGRESS NOTES
Here for monthly fu visit for [facility] and due for faslodex injection  Pt reports doing well  No new complaints voiced  Labs drawn   Education Record     Learner:  Patient     Disease / Diagnosis:      Barriers / Limitations:  none                Comments:     Method:  Discussion                Comments:     General Topics:  Plan of care reviewed                Comments:     Outcome:  Shows understanding                Comments:

## 2024-01-25 NOTE — PROGRESS NOTES
Cancer Center Progress Note  Patient Name: Martha Tobias   YOB: 1960   Medical Record Number: [id number]     Attending Physician: Glenn Munroe M.D.     Date of Visit: 1/25/2024        Chief Complaint:  Chief Complaint   Patient presents with    Follow - Up    Injection        Oncologic History:  Martha Tobias is a 63 year old female with limited PMH and limited prior access to the medical system admitted on 5/18 c/o a 3 week history of shortness of breath. She was found to have a large R pleural effusion. She underwent therapeutic thoracentesis. Cytology was unrevealing. When the fluid reaccumulated, she was taken to the OR for thoracostomy with pleural biopsy. In the OR, it was noted that her bilateral breasts were abnormal, concerning for breast masses. The thoracostomy was performed and the pleura appeared abnormal. It was biopsied revealing metastatic breast cancer. Pleurodesis was also performed. Upon questioning, the patient first noted breast abnormalities 2 years prior to admission. She had not seen a physician in several years. She denied symptoms elsewhere.     Progression was noted on CT and she was transitioned to Faslodex and Abeniciclib.    She had difficulty tolerating the Abemiciclib due to diarrhea and cytopenias.    She was admitted to the hospital from 12/21/20 to 12/24/20 with severe anemia after restarting the Abemaciclib. She was transfused and underwent MRI enterography. No bleeding source, other than her hemorrhoids was found. She was discharged to follow up with GI. The Abemaciclib was discontinued.   Interestingly, her liver lesions on MRI were described as stable hemangiomas, where they had appeared to be enlarging metastatic deposits on non-contrast CT.     Biopsy of the liver revealed cavernous hemangioma.    She was hospitalized from 12/14/21 to 12/19/21 and diagnosed with cirrhosis. She required paracentesis for improved comfort. Cytology was negative for  malignancy. She was transfused and given IV iron for her iron deficiency. EGD revealed esophageal varices and portal gastropathy. Her push enteroscopy and capsule endoscopy revealed small bowel AVMs and erosions/ulcers.  She was discharged to be evaluated by hepatology, ongoing follow up with GI, follow up here for ongoing care of her metastatic breast cancer.    History of Present Illness:  Pt is here for follow up. She feels well. No bleeding. Ferritin last visit was low.    Performance Status:  ECOG 1    Past Medical History:  Past Medical History:   Diagnosis Date    Abdominal distention 2012    Anemia     Anxiety     Back pain     Breast cancer (HCC) 2015    breast    COPD (chronic obstructive pulmonary disease) (HCC)     No continuous O2    Disorder of liver     lesions    Disorder of thyroid     Fatigue 2019    Feeling lonely Recent    Hemorrhoids Unk    High cholesterol     History of 2019 novel coronavirus disease (COVID-19) 2021    ASYMPTOMATIC.  NO HOSPITALIZATION    History of blood transfusion 10/2021    Hyperthyroidism     Leg swelling Unk    Neuropathy     Personal history of antineoplastic chemotherapy     oral    Pneumonia due to organism     PONV (postoperative nausea and vomiting)     Rash     Shortness of breath     Only exertion    Stress     Visual impairment     glasses    Weight gain Unk       Past Surgical History:  Past Surgical History:   Procedure Laterality Date    COLONOSCOPY      COLONOSCOPY N/A 10/26/2021    Procedure: COLONOSCOPY with biopsy, Cold Polypectomy & Clips x 5 placement /ESOPHAGOGASTRODUODENOSCOPY (EGD) with Biopsy;  Surgeon: sOcar Harp MD;  Location:  ENDOSCOPY    ORAL SURGERY  2017    THORACOTOMY,LTD,BIOPSY  05/2015       Family History:  Family History   Adopted: Yes   Family history unknown: Yes       Social History:  Social History     Socioeconomic History    Marital status:      Spouse name: Not on file    Number of children: Not on file    Years of  education: Not on file    Highest education level: Not on file   Occupational History    Not on file   Tobacco Use    Smoking status: Light Smoker     Packs/day: 0.00     Years: 40.00     Additional pack years: 0.00     Total pack years: 0.00     Types: Cigarettes    Smokeless tobacco: Never    Tobacco comments:     3-5 cig/ day   Vaping Use    Vaping Use: Never used   Substance and Sexual Activity    Alcohol use: Not Currently     Alcohol/week: 0.0 standard drinks of alcohol     Comment: 2 drinks/yr     Drug use: No    Sexual activity: Not Currently   Other Topics Concern     Service Not Asked    Blood Transfusions Not Asked    Caffeine Concern Yes     Comment: 1 1/2 a day 16oz    Occupational Exposure Not Asked    Hobby Hazards Not Asked    Sleep Concern Not Asked    Stress Concern Not Asked    Weight Concern Not Asked    Special Diet Not Asked    Back Care Not Asked    Exercise No    Bike Helmet Not Asked    Seat Belt Not Asked    Self-Exams Not Asked   Social History Narrative    Not on file     Social Determinants of Health     Financial Resource Strain: Not on file   Food Insecurity: Not on file   Transportation Needs: Not on file   Physical Activity: Not on file   Stress: Not on file   Social Connections: Not on file   Housing Stability: Not on file       Current Medications:    Current Outpatient Medications:     spironolactone 100 MG Oral Tab, TAKE 1 TABLET DAILY, Disp: 90 tablet, Rfl: 0    levothyroxine (SYNTHROID) 200 MCG Oral Tab, Take 1 tablet (200 mcg total) by mouth before breakfast., Disp: 90 tablet, Rfl: 0    levothyroxine 25 MCG Oral Tab, Take 1 tablet (25 mcg total) by mouth before breakfast., Disp: 90 tablet, Rfl: 0    rosuvastatin 5 MG Oral Tab, Take 1 tablet (5 mg total) by mouth nightly., Disp: 90 tablet, Rfl: 1    HYDROcodone-acetaminophen 7.5-325 MG Oral Tab, Take 1-2 tablets by mouth every 8 (eight) hours as needed for Pain., Disp: 90 tablet, Rfl: 0    ALPRAZolam 0.5 MG Oral Tab,  Take 1 tablet (0.5 mg total) by mouth 2 (two) times daily as needed for Sleep or Anxiety., Disp: 60 tablet, Rfl: 2    propranolol 10 MG Oral Tab, Take 1 tablet (10 mg total) by mouth 2 (two) times daily., Disp: 180 tablet, Rfl: 1    escitalopram 20 MG Oral Tab, Take 1 tablet (20 mg total) by mouth daily., Disp: 90 tablet, Rfl: 1    albuterol 108 (90 Base) MCG/ACT Inhalation Aero Soln, Inhale 2 puffs into the lungs every 4 (four) hours as needed for Wheezing or Shortness of Breath., Disp: 25.5 g, Rfl: 2    Potassium Chloride ER 10 MEQ Oral Tab CR, Take 1 tablet (10 mEq total) by mouth daily., Disp: 90 tablet, Rfl: 1    furosemide 40 MG Oral Tab, Take 1 tablet (40 mg total) by mouth daily., Disp: 90 tablet, Rfl: 1    pantoprazole 40 MG Oral Tab EC, Take 1 tablet (40 mg total) by mouth 2 (two) times daily before meals., Disp: 60 tablet, Rfl: 0    gabapentin 300 MG Oral Cap, Take 1 capsule (300 mg total) by mouth 4 (four) times daily., Disp: 360 capsule, Rfl: 3    omega-3-acid ethyl esters 1 g Oral Cap, Take 2 capsules (2 g total) by mouth 2 (two) times daily., Disp: 360 capsule, Rfl: 0    Ferrous Sulfate (IRON) 325 (65 Fe) MG Oral Tab, Take by mouth. She does not take daily, Disp: , Rfl:     buPROPion 150 MG Oral Tablet 12 Hr, Take 1 tablet (150 mg total) by mouth 2 (two) times daily., Disp: , Rfl:     Biotin 2500 MCG Oral Cap, Take 1 capsule by mouth every evening., Disp: , Rfl:     folic acid 800 MCG Oral Tab, Take 1 tablet (800 mcg total) by mouth daily., Disp: , Rfl:     Vitamin B-12 1000 MCG Oral Tab, Take 1 tablet (1,000 mcg total) by mouth daily., Disp: 30 tablet, Rfl: 11    Allergies:  Allergies   Allergen Reactions    Radiology Contrast Iodinated Dyes HIVES and ITCHING    Iodine (Topical) OTHER (SEE COMMENTS)        Review of Systems:    Constitutional No fevers, chills, night sweats, excessive fatigue or weight loss.   Eyes No significant visual difficulties. No diplopia. No yellowing.   Hematologic/Lymphatic  No easy bruising or bleeding.  Denies tender or palpable lymph nodes.   Respiratory No dyspnea, pleuritic chest pain, cough or hemoptysis.   Cardiovascular No anginal chest pain, palpitations or orthopnea.   Gastrointestinal No nausea, vomiting, diarrhea, GI bleeding, or constipation. NL appetite, No Early Satiety.   Genitorurinary No hematuria, dysuria, abnormal bleeding.   Integumentary No yellowing.   Neurologic No headache, blurred vision, and no areas of focal weakness. Normal gait.   Psychiatric No insomnia, depression, erica or mood swings.         Vital Signs:  /70 (BP Location: Left arm, Patient Position: Sitting, Cuff Size: adult)   Pulse 78   Temp 97.1 °F (36.2 °C) (Tympanic)   Resp 18   Ht 1.676 m (5' 5.98\")   Wt 82.8 kg (182 lb 8 oz)   SpO2 98%   BMI 29.47 kg/m²       Physical Examination:    Constitutional Normal - Mood and affect appropriate. Appears close to chronological age. Well nourished. Well developed.   Eyes Normal - Conjunctivae and sclerae are clear and without icterus. Pupils are reactive and equal.   Hematologic/Lymphatic Normal - No petechiae or purpura.  No tender or palpable lymph nodes in the cervical, supraclavicular, axillary or inguinal area.   Respiratory Normal - Lungs CTA, no rhonchi or wheezing.   Cardiovascular Normal - RRR, no murmurs, gallops or rubs.   Abdomen Non-tender, moderately distended, no masses, ascites or hepatosplenomegaly. Stable umbilical hernia.   Extremities Normal - No C/C/E    Integumentary Diffuse angioma-like rash of the arms and torso. Small hemangioma of the R eye.No Jaundice   Neurologic Normal - No sensory or motor deficits, normal cerebellar function, normal gait, cranial nerves intact.   Psychiatric Normal - A&Ox3, Coherent speech. Verbalizes understanding of our discussions today.           Labs:  Recent Labs     01/25/24  1514   RBC 3.68 L   HGB 11.0 L   HCT 34.7 L   MCV 94.3   MCH 29.9   MCHC 31.7   RDW 14.2   NEPRELIM 3.26   WBC 6.1    .0     CMP pending.     Latest Reference Range & Units 12/28/23 14:37   Iron, Serum 50 - 170 ug/dL 133   Transferrin 200 - 360 mg/dL 324   Iron Bind.Cap.(TIBC) 240 - 450 ug/dL 483 (H)   Iron Saturation 15 - 50 % 28   FERRITIN 18.0 - 340.0 ng/mL 16.9 (L)   (H): Data is abnormally high  (L): Data is abnormally low      Radiology:  .        Pathology:  Final Diagnosis:   Dominant mass, left lobe of liver, ultrasound-guided biopsy:  -Fragments of hemangioma.  (See comment.)         Impression and Plan:  Breast Cancer: Stage IV, ER+ MT+ HER-2 Neg.  She was transitioned to Faslodex and Abemaciclib. Abemaciclib dose reduced for diarrhea and rash, and then held for pancytopenia. After restarting, she was hospitalized for severe anemia that was likely related to bleeding, iron deficiency, and the chemo. MRI suggests that the liver lesions have not progressed, as suspected on the non-con CT.  Discontinued the Abemaciclib and continuing Faslodex. Repeat MRI again raised the question of liver metastases. In an effort to definitively determine whether these were metastases, Liver biopsy was accomplished that revealed hemangioma. CT Chest and MRI Abd were stable, indicating that her breast cancer is controlled on Faslodex alone.  Will continue Faslodex. Repeat imaging is stable. The patient reports that her family has enquired about BCRA testing. The patient is very low risk for carrying a mutation, having an ER+ MT+ HER2 negative cancer diagnosed after the age of 50. Testing was not indicated prior to the [employer] of PARP inhibitors, that can be used to treat metastatic breast cancer. Testing is normal.    Bone Metastases: Stable.  Discontinued Xgeva due to her teeth issues.     Cirrhosis: Unclear etiology, though likely PITTS. Hepatology follow up.    Anemia: Iron deficiency and ongoing blood loss. Secondary to hemorrhoids and AVMs. She has Varices, as well, but they were not bleeding, and did not require banding while  she was in the hospital.   Hgb is better today. Follow iron levels closely. Monitor for active bleeding. Replete iron stores for her ferritin<30.     Depression: She will continue to meet with SW for counseling regarding handling her stress at home.     Rash w/ hemangioma of the eyelid, hemangiomas of the Liver, severe hemorrhoids: She saw hepatology and the rash has improved.      Planned Follow Up:  4 weeks for MD visit with labs.     I spent * minutes face to face with the patient.  More than 50% of that time was spent counseling the patient and/or on coordination of care.  The diagnosis, prognosis, and general treatment was explained to the patient and the family.    Electronically Signed by:    Glenn Munroe M.D.  [facility]

## 2024-01-25 NOTE — PROGRESS NOTES
Education Record    Learner:  Patient    Disease / Diagnosis: patient  here for faslodex inj    Barriers / Limitations:  None    Method:  Brief focused, and  reinforcement    General Topics:  Plan of care reviewed    Outcome:  Shows understanding

## 2024-02-01 ENCOUNTER — OFFICE VISIT (OUTPATIENT)
Dept: HEMATOLOGY/ONCOLOGY | Age: 64
End: 2024-02-01
Attending: INTERNAL MEDICINE
Payer: COMMERCIAL

## 2024-02-01 VITALS
HEIGHT: 65.98 IN | DIASTOLIC BLOOD PRESSURE: 63 MMHG | WEIGHT: 180.5 LBS | SYSTOLIC BLOOD PRESSURE: 110 MMHG | RESPIRATION RATE: 16 BRPM | BODY MASS INDEX: 29.01 KG/M2 | HEART RATE: 67 BPM | OXYGEN SATURATION: 97 % | TEMPERATURE: 99 F

## 2024-02-01 DIAGNOSIS — D50.0 IRON DEFICIENCY ANEMIA SECONDARY TO BLOOD LOSS (CHRONIC): ICD-10-CM

## 2024-02-01 DIAGNOSIS — C50.919 BREAST CANCER METASTASIZED TO PLEURA, UNSPECIFIED LATERALITY (HCC): Primary | ICD-10-CM

## 2024-02-01 DIAGNOSIS — C78.2 BREAST CANCER METASTASIZED TO PLEURA, UNSPECIFIED LATERALITY (HCC): Primary | ICD-10-CM

## 2024-02-01 DIAGNOSIS — C79.51 MALIGNANT NEOPLASM METASTATIC TO BONE (HCC): ICD-10-CM

## 2024-02-01 DIAGNOSIS — E86.0 DEHYDRATION: ICD-10-CM

## 2024-02-01 DIAGNOSIS — D50.0 IRON DEFICIENCY ANEMIA DUE TO CHRONIC BLOOD LOSS: ICD-10-CM

## 2024-02-01 PROCEDURE — 96374 THER/PROPH/DIAG INJ IV PUSH: CPT

## 2024-02-01 NOTE — PROGRESS NOTES
Pt here for iron infusion.  Arrives Ambulating independently, accompanied by self           Modifications in dose or schedule: No     Frequency of blood return and site check throughout administration: Prior to administration   Discharged to Home, Ambulating independently, accompanied by: self    Outpatient Oncology Care Plan  Problem list:  anemia  Problems related to:    disease/disease progression  Interventions:  administered iron  Expected outcomes:  optimal lab values  Progress towards outcome:  making progress    Education Record    Learner:  Patient  Barriers / Limitations:  None  Method:  Brief focused  Outcome:  Shows understanding  Comments: Pt tolerated infusion. No c/o. VSS. See flowsheet

## 2024-02-22 ENCOUNTER — OFFICE VISIT (OUTPATIENT)
Dept: HEMATOLOGY/ONCOLOGY | Age: 64
End: 2024-02-22
Attending: INTERNAL MEDICINE
Payer: COMMERCIAL

## 2024-02-22 VITALS
BODY MASS INDEX: 29.01 KG/M2 | SYSTOLIC BLOOD PRESSURE: 117 MMHG | DIASTOLIC BLOOD PRESSURE: 64 MMHG | WEIGHT: 180.5 LBS | HEIGHT: 65.98 IN | OXYGEN SATURATION: 96 % | HEART RATE: 72 BPM | TEMPERATURE: 97 F | RESPIRATION RATE: 18 BRPM

## 2024-02-22 DIAGNOSIS — C79.51 MALIGNANT NEOPLASM METASTATIC TO BONE (HCC): ICD-10-CM

## 2024-02-22 DIAGNOSIS — C50.911 CARCINOMA OF RIGHT BREAST METASTATIC TO PLEURA (HCC): Primary | ICD-10-CM

## 2024-02-22 DIAGNOSIS — C50.919 BREAST CANCER METASTASIZED TO PLEURA, UNSPECIFIED LATERALITY (HCC): ICD-10-CM

## 2024-02-22 DIAGNOSIS — C78.2 BREAST CANCER METASTASIZED TO PLEURA, UNSPECIFIED LATERALITY (HCC): ICD-10-CM

## 2024-02-22 DIAGNOSIS — Z17.0 MALIGNANT NEOPLASM OF NIPPLE OF RIGHT BREAST IN FEMALE, ESTROGEN RECEPTOR POSITIVE (HCC): ICD-10-CM

## 2024-02-22 DIAGNOSIS — C50.011 MALIGNANT NEOPLASM OF NIPPLE OF RIGHT BREAST IN FEMALE, ESTROGEN RECEPTOR POSITIVE (HCC): ICD-10-CM

## 2024-02-22 DIAGNOSIS — C78.2 CARCINOMA OF RIGHT BREAST METASTATIC TO PLEURA (HCC): Primary | ICD-10-CM

## 2024-02-22 LAB
ALBUMIN SERPL-MCNC: 3.9 G/DL (ref 3.4–5)
ALBUMIN/GLOB SERPL: 1.1 {RATIO} (ref 1–2)
ALP LIVER SERPL-CCNC: 136 U/L
ALT SERPL-CCNC: 22 U/L
ANION GAP SERPL CALC-SCNC: 4 MMOL/L (ref 0–18)
AST SERPL-CCNC: 16 U/L (ref 15–37)
BASOPHILS # BLD AUTO: 0.12 X10(3) UL (ref 0–0.2)
BASOPHILS NFR BLD AUTO: 1.5 %
BILIRUB SERPL-MCNC: 0.5 MG/DL (ref 0.1–2)
BUN BLD-MCNC: 17 MG/DL (ref 9–23)
CALCIUM BLD-MCNC: 8.8 MG/DL (ref 8.5–10.1)
CHLORIDE SERPL-SCNC: 108 MMOL/L (ref 98–112)
CO2 SERPL-SCNC: 28 MMOL/L (ref 21–32)
CREAT BLD-MCNC: 1.12 MG/DL
DEPRECATED HBV CORE AB SER IA-ACNC: 86 NG/ML
EGFRCR SERPLBLD CKD-EPI 2021: 56 ML/MIN/1.73M2 (ref 60–?)
EOSINOPHIL # BLD AUTO: 0.55 X10(3) UL (ref 0–0.7)
EOSINOPHIL NFR BLD AUTO: 7 %
ERYTHROCYTE [DISTWIDTH] IN BLOOD BY AUTOMATED COUNT: 15.4 %
GLOBULIN PLAS-MCNC: 3.7 G/DL (ref 2.8–4.4)
GLUCOSE BLD-MCNC: 162 MG/DL (ref 70–99)
HCT VFR BLD AUTO: 34.7 %
HGB BLD-MCNC: 11.5 G/DL
IMM GRANULOCYTES # BLD AUTO: 0.02 X10(3) UL (ref 0–1)
IMM GRANULOCYTES NFR BLD: 0.3 %
IRON SATN MFR SERPL: 17 %
IRON SERPL-MCNC: 76 UG/DL
LYMPHOCYTES # BLD AUTO: 2.56 X10(3) UL (ref 1–4)
LYMPHOCYTES NFR BLD AUTO: 32.8 %
MCH RBC QN AUTO: 31.1 PG (ref 26–34)
MCHC RBC AUTO-ENTMCNC: 33.1 G/DL (ref 31–37)
MCV RBC AUTO: 93.8 FL
MONOCYTES # BLD AUTO: 0.42 X10(3) UL (ref 0.1–1)
MONOCYTES NFR BLD AUTO: 5.4 %
NEUTROPHILS # BLD AUTO: 4.14 X10 (3) UL (ref 1.5–7.7)
NEUTROPHILS # BLD AUTO: 4.14 X10(3) UL (ref 1.5–7.7)
NEUTROPHILS NFR BLD AUTO: 53 %
OSMOLALITY SERPL CALC.SUM OF ELEC: 295 MOSM/KG (ref 275–295)
PLATELET # BLD AUTO: 232 10(3)UL (ref 150–450)
POTASSIUM SERPL-SCNC: 4.1 MMOL/L (ref 3.5–5.1)
PROT SERPL-MCNC: 7.6 G/DL (ref 6.4–8.2)
RBC # BLD AUTO: 3.7 X10(6)UL
SODIUM SERPL-SCNC: 140 MMOL/L (ref 136–145)
TIBC SERPL-MCNC: 447 UG/DL (ref 240–450)
TRANSFERRIN SERPL-MCNC: 300 MG/DL (ref 200–360)
WBC # BLD AUTO: 7.8 X10(3) UL (ref 4–11)

## 2024-02-22 PROCEDURE — 99215 OFFICE O/P EST HI 40 MIN: CPT | Performed by: INTERNAL MEDICINE

## 2024-02-22 PROCEDURE — 96402 CHEMO HORMON ANTINEOPL SQ/IM: CPT

## 2024-02-22 RX ORDER — LAMOTRIGINE 25 MG/1
500 TABLET ORAL ONCE
Status: COMPLETED | OUTPATIENT
Start: 2024-02-22 | End: 2024-02-22

## 2024-02-22 RX ORDER — LAMOTRIGINE 25 MG/1
500 TABLET ORAL ONCE
Status: CANCELLED | OUTPATIENT
Start: 2024-02-28

## 2024-02-22 RX ADMIN — LAMOTRIGINE 500 MG: 25 TABLET ORAL at 15:51:00

## 2024-02-22 NOTE — PROGRESS NOTES
Education Record    Learner:  Patient    Disease / Diagnosis: patient  here for faslodex injection, no feraheme per Dr. Munroe,     Barriers / Limitations:  None    Method:  Brief focused,  and  reinforcement    General Topics:  Plan of care reviewed    Outcome:  Shows understanding   Patient  tolerated injection, no c/o.

## 2024-02-22 NOTE — PROGRESS NOTES
Cancer Center Progress Note  Patient Name: Martha Tobias   YOB: 1960   Medical Record Number: [id number]     Attending Physician: Glenn Munroe M.D.     Date of Visit: 2/22/2024          Chief Complaint:  Chief Complaint   Patient presents with    Follow - Up        Oncologic History:  Martha Tobias is a 63 year old female with limited PMH and limited prior access to the medical system admitted on 5/18 c/o a 3 week history of shortness of breath. She was found to have a large R pleural effusion. She underwent therapeutic thoracentesis. Cytology was unrevealing. When the fluid reaccumulated, she was taken to the OR for thoracostomy with pleural biopsy. In the OR, it was noted that her bilateral breasts were abnormal, concerning for breast masses. The thoracostomy was performed and the pleura appeared abnormal. It was biopsied revealing metastatic breast cancer. Pleurodesis was also performed. Upon questioning, the patient first noted breast abnormalities 2 years prior to admission. She had not seen a physician in several years. She denied symptoms elsewhere.     Progression was noted on CT and she was transitioned to Faslodex and Abeniciclib.    She had difficulty tolerating the Abemiciclib due to diarrhea and cytopenias.    She was admitted to the hospital from 12/21/20 to 12/24/20 with severe anemia after restarting the Abemaciclib. She was transfused and underwent MRI enterography. No bleeding source, other than her hemorrhoids was found. She was discharged to follow up with GI. The Abemaciclib was discontinued.   Interestingly, her liver lesions on MRI were described as stable hemangiomas, where they had appeared to be enlarging metastatic deposits on non-contrast CT.     Biopsy of the liver revealed cavernous hemangioma.    She was hospitalized from 12/14/21 to 12/19/21 and diagnosed with cirrhosis. She required paracentesis for improved comfort. Cytology was negative for malignancy. She  was transfused and given IV iron for her iron deficiency. EGD revealed esophageal varices and portal gastropathy. Her push enteroscopy and capsule endoscopy revealed small bowel AVMs and erosions/ulcers.  She was discharged to be evaluated by hepatology, ongoing follow up with GI, follow up here for ongoing care of her metastatic breast cancer.    History of Present Illness:  Pt is here for follow up. She feels well.     Performance Status:  ECOG 1    Past Medical History:  Past Medical History:   Diagnosis Date    Abdominal distention 2012    Anemia     Anxiety     Back pain     Breast cancer (HCC) 2015    breast    COPD (chronic obstructive pulmonary disease) (HCC)     No continuous O2    Disorder of liver     lesions    Disorder of thyroid     Fatigue 2019    Feeling lonely Recent    Hemorrhoids Unk    High cholesterol     History of 2019 novel coronavirus disease (COVID-19) 2021    ASYMPTOMATIC.  NO HOSPITALIZATION    History of blood transfusion 10/2021    Hyperthyroidism     Leg swelling Unk    Neuropathy     Personal history of antineoplastic chemotherapy     oral    Pneumonia due to organism     PONV (postoperative nausea and vomiting)     Rash     Shortness of breath     Only exertion    Stress     Visual impairment     glasses    Weight gain Unk       Past Surgical History:  Past Surgical History:   Procedure Laterality Date    COLONOSCOPY      COLONOSCOPY N/A 10/26/2021    Procedure: COLONOSCOPY with biopsy, Cold Polypectomy & Clips x 5 placement /ESOPHAGOGASTRODUODENOSCOPY (EGD) with Biopsy;  Surgeon: Oscar Harp MD;  Location:  ENDOSCOPY    ORAL SURGERY  2017    [facility],BIOPSY  05/2015       Family History:  Family History   Adopted: Yes   Family history unknown: Yes       Social History:  Social History     Socioeconomic History    Marital status:      Spouse name: Not on file    Number of children: Not on file    Years of education: Not on file    Highest education level: Not  on file   Occupational History    Not on file   Tobacco Use    Smoking status: Light Smoker     Packs/day: 0.00     Years: 40.00     Additional pack years: 0.00     Total pack years: 0.00     Types: Cigarettes    Smokeless tobacco: Never    Tobacco comments:     3-5 cig/ day   Vaping Use    Vaping Use: Never used   Substance and Sexual Activity    Alcohol use: Not Currently     Alcohol/week: 0.0 standard drinks of alcohol     Comment: 2 drinks/yr     Drug use: No    Sexual activity: Not Currently   Other Topics Concern     Service Not Asked    Blood Transfusions Not Asked    Caffeine Concern Yes     Comment: 1 1/2 a day 16oz    Occupational Exposure Not Asked    Hobby Hazards Not Asked    Sleep Concern Not Asked    Stress Concern Not Asked    Weight Concern Not Asked    Special Diet Not Asked    Back Care Not Asked    Exercise No    Bike Helmet Not Asked    Seat Belt Not Asked    Self-Exams Not Asked   Social History Narrative    Not on file     Social Determinants of Health     Financial Resource Strain: Not on file   Food Insecurity: Not on file   Transportation Needs: Not on file   Physical Activity: Not on file   Stress: Not on file   Social Connections: Not on file   Housing Stability: Not on file       Current Medications:    Current Outpatient Medications:     spironolactone 100 MG Oral Tab, TAKE 1 TABLET DAILY, Disp: 90 tablet, Rfl: 0    levothyroxine (SYNTHROID) 200 MCG Oral Tab, Take 1 tablet (200 mcg total) by mouth before breakfast., Disp: 90 tablet, Rfl: 0    levothyroxine 25 MCG Oral Tab, Take 1 tablet (25 mcg total) by mouth before breakfast., Disp: 90 tablet, Rfl: 0    rosuvastatin 5 MG Oral Tab, Take 1 tablet (5 mg total) by mouth nightly., Disp: 90 tablet, Rfl: 1    ALPRAZolam 0.5 MG Oral Tab, Take 1 tablet (0.5 mg total) by mouth 2 (two) times daily as needed for Sleep or Anxiety., Disp: 60 tablet, Rfl: 2    propranolol 10 MG Oral Tab, Take 1 tablet (10 mg total) by mouth 2 (two) times  daily., Disp: 180 tablet, Rfl: 1    escitalopram 20 MG Oral Tab, Take 1 tablet (20 mg total) by mouth daily., Disp: 90 tablet, Rfl: 1    albuterol 108 (90 Base) MCG/ACT Inhalation Aero Soln, Inhale 2 puffs into the lungs every 4 (four) hours as needed for Wheezing or Shortness of Breath., Disp: 25.5 g, Rfl: 2    Potassium Chloride ER 10 MEQ Oral Tab CR, Take 1 tablet (10 mEq total) by mouth daily. (Patient not taking: Reported on 1/25/2024), Disp: 90 tablet, Rfl: 1    furosemide 40 MG Oral Tab, Take 1 tablet (40 mg total) by mouth daily., Disp: 90 tablet, Rfl: 1    pantoprazole 40 MG Oral Tab EC, Take 1 tablet (40 mg total) by mouth 2 (two) times daily before meals., Disp: 60 tablet, Rfl: 0    gabapentin 300 MG Oral Cap, Take 1 capsule (300 mg total) by mouth 4 (four) times daily., Disp: 360 capsule, Rfl: 3    omega-3-acid ethyl esters 1 g Oral Cap, Take 2 capsules (2 g total) by mouth 2 (two) times daily., Disp: 360 capsule, Rfl: 0    Ferrous Sulfate (IRON) 325 (65 Fe) MG Oral Tab, Take by mouth. She does not take daily, Disp: , Rfl:     buPROPion 150 MG Oral Tablet 12 Hr, Take 1 tablet (150 mg total) by mouth 2 (two) times daily., Disp: , Rfl:     Biotin 2500 MCG Oral Cap, Take 1 capsule by mouth every evening., Disp: , Rfl:     folic acid 800 MCG Oral Tab, Take 1 tablet (800 mcg total) by mouth daily., Disp: , Rfl:     Vitamin B-12 1000 MCG Oral Tab, Take 1 tablet (1,000 mcg total) by mouth daily., Disp: 30 tablet, Rfl: 11    Allergies:  Allergies   Allergen Reactions    Radiology Contrast Iodinated Dyes HIVES and ITCHING    Iodine (Topical) OTHER (SEE COMMENTS)        Review of Systems:    Constitutional No fevers, chills, night sweats, excessive fatigue or weight loss.   Eyes No significant visual difficulties. No diplopia. No yellowing.   Hematologic/Lymphatic No easy bruising or bleeding.  Denies tender or palpable lymph nodes.   Respiratory No dyspnea, pleuritic chest pain, cough or hemoptysis.    Cardiovascular No anginal chest pain, palpitations or orthopnea.   Gastrointestinal No nausea, vomiting, diarrhea, GI bleeding, or constipation. NL appetite, No Early Satiety.   Genitorurinary No hematuria, dysuria, abnormal bleeding.   Integumentary No yellowing.   Neurologic No headache, blurred vision, and no areas of focal weakness. Normal gait.   Psychiatric No insomnia, depression, erica or mood swings.         Vital Signs:  /64 (BP Location: Left arm, Patient Position: Sitting, Cuff Size: large)   Pulse 72   Temp 96.6 °F (35.9 °C) (Tympanic)   Resp 18   Ht 1.676 m (5' 5.98\")   Wt 81.9 kg (180 lb 8 oz)   SpO2 96%   BMI 29.15 kg/m²       Physical Examination:    Constitutional Normal - Mood and affect appropriate. Appears close to chronological age. Well nourished. Well developed.   Eyes Normal - Conjunctivae and sclerae are clear and without icterus. Pupils are reactive and equal.   Hematologic/Lymphatic Normal - No petechiae or purpura.  No tender or palpable lymph nodes in the cervical, supraclavicular, axillary or inguinal area.   Respiratory Normal - Lungs CTA, no rhonchi or wheezing.   Cardiovascular Normal - RRR, no murmurs, gallops or rubs.   Abdomen Non-tender, moderately distended, no masses, ascites or hepatosplenomegaly. Stable umbilical hernia.   Extremities Normal - No C/C/E    Integumentary Diffuse angioma-like rash of the arms and torso. Small hemangioma of the R eye.No Jaundice   Neurologic Normal - No sensory or motor deficits, normal cerebellar function, normal gait, cranial nerves intact.   Psychiatric Normal - A&Ox3, Coherent speech. Verbalizes understanding of our discussions today.           Labs:   Latest Reference Range & Units 02/22/24 15:15   Sodium 136 - 145 mmol/L 140   Potassium 3.5 - 5.1 mmol/L 4.1   Chloride 98 - 112 mmol/L 108   Carbon Dioxide, Total 21.0 - 32.0 mmol/L 28.0   BUN 9 - 23 mg/dL 17   CREATININE 0.55 - 1.02 mg/dL 1.12 (H)   CALCIUM 8.5 - 10.1 mg/dL  8.8   EGFR >=60 mL/min/1.73m2 56 (L)   ANION GAP 0 - 18 mmol/L 4   CALCULATED OSMOLALITY 275 - 295 mOsm/kg 295   ALKALINE PHOSPHATASE 50 - 130 U/L 136 (H)   AST (SGOT) 15 - 37 U/L 16   ALT (SGPT) 13 - 56 U/L 22   Total Bilirubin 0.1 - 2.0 mg/dL 0.5   Globulin 2.8 - 4.4 g/dL 3.7   Iron, Serum 50 - 170 ug/dL 76   Transferrin 200 - 360 mg/dL 300   Iron Bind.Cap.(TIBC) 240 - 450 ug/dL 447   Iron Saturation 15 - 50 % 17   FERRITIN 18.0 - 340.0 ng/mL 86.0   (H): Data is abnormally high  (L): Data is abnormally low   Latest Reference Range & Units 02/22/24 15:15   A/G Ratio 1.0 - 2.0  1.1   PROTEIN, TOTAL 6.4 - 8.2 g/dL 7.6   Albumin 3.4 - 5.0 g/dL 3.9      Latest Reference Range & Units 02/22/24 15:15   WBC 4.0 - 11.0 x10(3) uL 7.8   Hemoglobin 12.0 - 16.0 g/dL 11.5 (L)   Hematocrit 35.0 - 48.0 % 34.7 (L)   Platelet Count 150.0 - 450.0 10(3)uL 232.0   RBC 3.80 - 5.30 x10(6)uL 3.70 (L)   MCH 26.0 - 34.0 pg 31.1   MCHC 31.0 - 37.0 g/dL 33.1   MCV 80.0 - 100.0 fL 93.8   RDW % 15.4   Prelim Neutrophil Abs 1.50 - 7.70 x10 (3) uL 4.14   Neutrophils Absolute 1.50 - 7.70 x10(3) uL 4.14   Lymphocytes Absolute 1.00 - 4.00 x10(3) uL 2.56   Monocytes Absolute 0.10 - 1.00 x10(3) uL 0.42   Eosinophils Absolute 0.00 - 0.70 x10(3) uL 0.55   Basophils Absolute 0.00 - 0.20 x10(3) uL 0.12   Immature Granulocyte Absolute 0.00 - 1.00 x10(3) uL 0.02   Neutrophils % % 53.0   Lymphocytes % % 32.8   Monocytes % % 5.4   Eosinophils % % 7.0   Basophils % % 1.5   Immature Granulocyte % % 0.3   (L): Data is abnormally low  Radiology:  .        Pathology:  Final Diagnosis:   Dominant mass, left lobe of liver, ultrasound-guided biopsy:  -Fragments of hemangioma.  (See comment.)         Impression and Plan:  Breast Cancer: Stage IV, ER+ OR+ HER-2 Neg.  She was transitioned to Faslodex and Abemaciclib. Abemaciclib dose reduced for diarrhea and rash, and then held for pancytopenia. After restarting, she was hospitalized for severe anemia that was likely  related to bleeding, iron deficiency, and the chemo. MRI suggests that the liver lesions have not progressed, as suspected on the non-con CT.  Discontinued the Abemaciclib and continuing Faslodex. Repeat MRI again raised the question of liver metastases. In an effort to definitively determine whether these were metastases, Liver biopsy was accomplished that revealed hemangioma. CT Chest and MRI Abd were stable, indicating that her breast cancer is controlled on Faslodex alone.  Will continue Faslodex. Repeat imaging is stable. The patient reports that her family has enquired about BCRA testing. The patient is very low risk for carrying a mutation, having an ER+ MA+ HER2 negative cancer diagnosed after the age of 50. Testing was not indicated prior to the Sikh of PARP inhibitors, that can be used to treat metastatic breast cancer. Testing is normal.    Bone Metastases: Stable.  Discontinued Xgeva due to her teeth issues.     Cirrhosis: Unclear etiology, though likely PITTS. Hepatology follow up.    Anemia: Iron deficiency and ongoing blood loss. Secondary to hemorrhoids and AVMs. She has Varices, as well, but they were not bleeding, and did not require banding while she was in the hospital.   Hgb is better today. Follow iron levels closely. Monitor for active bleeding. Replete iron stores for her ferritin<30.     Depression: She will continue to meet with SW for counseling regarding handling her stress at home.     Rash w/ hemangioma of the eyelid, hemangiomas of the Liver, severe hemorrhoids: She saw hepatology and the rash has improved.      Planned Follow Up:  4 weeks for MD visit with labs.     I spent * minutes face to face with the patient.  More than 50% of that time was spent counseling the patient and/or on coordination of care.  The diagnosis, prognosis, and general treatment was explained to the patient and the family.    Electronically Signed by:    Glenn Munroe M.D.  [facility]

## 2024-03-19 DIAGNOSIS — E78.2 HYPERLIPIDEMIA, MIXED: Primary | ICD-10-CM

## 2024-03-19 NOTE — TELEPHONE ENCOUNTER
LOV: 12/27/2023  for: F/u multiple problems  Patient advised to RTC on:  3 months.     Medication Quantity Refills Start End   omega-3-acid ethyl esters 1 g Oral Cap 360 capsule 0 9/19/2022 --   Sig:   Take 2 capsules (2 g total) by mouth 2 (two) times daily.

## 2024-03-21 ENCOUNTER — OFFICE VISIT (OUTPATIENT)
Dept: HEMATOLOGY/ONCOLOGY | Age: 64
End: 2024-03-21
Attending: INTERNAL MEDICINE
Payer: COMMERCIAL

## 2024-03-21 VITALS
TEMPERATURE: 97 F | HEIGHT: 65.98 IN | DIASTOLIC BLOOD PRESSURE: 71 MMHG | OXYGEN SATURATION: 98 % | HEART RATE: 82 BPM | BODY MASS INDEX: 29.41 KG/M2 | SYSTOLIC BLOOD PRESSURE: 133 MMHG | RESPIRATION RATE: 18 BRPM | WEIGHT: 183 LBS

## 2024-03-21 DIAGNOSIS — C50.919 BREAST CANCER METASTASIZED TO PLEURA, UNSPECIFIED LATERALITY (HCC): ICD-10-CM

## 2024-03-21 DIAGNOSIS — C79.51 MALIGNANT NEOPLASM METASTATIC TO BONE (HCC): ICD-10-CM

## 2024-03-21 DIAGNOSIS — C50.011 MALIGNANT NEOPLASM OF NIPPLE OF RIGHT BREAST IN FEMALE, ESTROGEN RECEPTOR POSITIVE (HCC): ICD-10-CM

## 2024-03-21 DIAGNOSIS — C78.2 BREAST CANCER METASTASIZED TO PLEURA, UNSPECIFIED LATERALITY (HCC): ICD-10-CM

## 2024-03-21 DIAGNOSIS — C78.2 CARCINOMA OF RIGHT BREAST METASTATIC TO PLEURA (HCC): Primary | ICD-10-CM

## 2024-03-21 DIAGNOSIS — C50.911 CARCINOMA OF RIGHT BREAST METASTATIC TO PLEURA (HCC): Primary | ICD-10-CM

## 2024-03-21 DIAGNOSIS — Z17.0 MALIGNANT NEOPLASM OF NIPPLE OF RIGHT BREAST IN FEMALE, ESTROGEN RECEPTOR POSITIVE (HCC): ICD-10-CM

## 2024-03-21 LAB
ALBUMIN SERPL-MCNC: 3.8 G/DL (ref 3.4–5)
ALBUMIN/GLOB SERPL: 1.1 {RATIO} (ref 1–2)
ALP LIVER SERPL-CCNC: 133 U/L
ALT SERPL-CCNC: 22 U/L
ANION GAP SERPL CALC-SCNC: 5 MMOL/L (ref 0–18)
AST SERPL-CCNC: 12 U/L (ref 15–37)
BASOPHILS # BLD AUTO: 0.09 X10(3) UL (ref 0–0.2)
BASOPHILS NFR BLD AUTO: 1.5 %
BILIRUB SERPL-MCNC: 0.5 MG/DL (ref 0.1–2)
BUN BLD-MCNC: 12 MG/DL (ref 9–23)
CALCIUM BLD-MCNC: 9.1 MG/DL (ref 8.5–10.1)
CHLORIDE SERPL-SCNC: 108 MMOL/L (ref 98–112)
CO2 SERPL-SCNC: 28 MMOL/L (ref 21–32)
CREAT BLD-MCNC: 0.91 MG/DL
DEPRECATED HBV CORE AB SER IA-ACNC: 20 NG/ML
EGFRCR SERPLBLD CKD-EPI 2021: 72 ML/MIN/1.73M2 (ref 60–?)
EOSINOPHIL # BLD AUTO: 0.4 X10(3) UL (ref 0–0.7)
EOSINOPHIL NFR BLD AUTO: 6.6 %
ERYTHROCYTE [DISTWIDTH] IN BLOOD BY AUTOMATED COUNT: 14.5 %
GLOBULIN PLAS-MCNC: 3.6 G/DL (ref 2.8–4.4)
GLUCOSE BLD-MCNC: 141 MG/DL (ref 70–99)
HCT VFR BLD AUTO: 35.5 %
HGB BLD-MCNC: 11.4 G/DL
IMM GRANULOCYTES # BLD AUTO: 0.01 X10(3) UL (ref 0–1)
IMM GRANULOCYTES NFR BLD: 0.2 %
IRON SATN MFR SERPL: 12 %
IRON SERPL-MCNC: 59 UG/DL
LYMPHOCYTES # BLD AUTO: 1.56 X10(3) UL (ref 1–4)
LYMPHOCYTES NFR BLD AUTO: 25.7 %
MCH RBC QN AUTO: 29.9 PG (ref 26–34)
MCHC RBC AUTO-ENTMCNC: 32.1 G/DL (ref 31–37)
MCV RBC AUTO: 93.2 FL
MONOCYTES # BLD AUTO: 0.31 X10(3) UL (ref 0.1–1)
MONOCYTES NFR BLD AUTO: 5.1 %
NEUTROPHILS # BLD AUTO: 3.71 X10 (3) UL (ref 1.5–7.7)
NEUTROPHILS # BLD AUTO: 3.71 X10(3) UL (ref 1.5–7.7)
NEUTROPHILS NFR BLD AUTO: 60.9 %
OSMOLALITY SERPL CALC.SUM OF ELEC: 294 MOSM/KG (ref 275–295)
PLATELET # BLD AUTO: 184 10(3)UL (ref 150–450)
POTASSIUM SERPL-SCNC: 3.4 MMOL/L (ref 3.5–5.1)
PROT SERPL-MCNC: 7.4 G/DL (ref 6.4–8.2)
RBC # BLD AUTO: 3.81 X10(6)UL
SODIUM SERPL-SCNC: 141 MMOL/L (ref 136–145)
TIBC SERPL-MCNC: 474 UG/DL (ref 240–450)
TRANSFERRIN SERPL-MCNC: 318 MG/DL (ref 200–360)
WBC # BLD AUTO: 6.1 X10(3) UL (ref 4–11)

## 2024-03-21 PROCEDURE — 96402 CHEMO HORMON ANTINEOPL SQ/IM: CPT

## 2024-03-21 PROCEDURE — 99215 OFFICE O/P EST HI 40 MIN: CPT | Performed by: INTERNAL MEDICINE

## 2024-03-21 RX ORDER — LAMOTRIGINE 25 MG/1
500 TABLET ORAL ONCE
Status: COMPLETED | OUTPATIENT
Start: 2024-03-21 | End: 2024-03-21

## 2024-03-21 RX ORDER — LAMOTRIGINE 25 MG/1
500 TABLET ORAL ONCE
Status: CANCELLED | OUTPATIENT
Start: 2024-04-02

## 2024-03-21 RX ADMIN — LAMOTRIGINE 500 MG: 25 TABLET ORAL at 15:23:00

## 2024-03-21 NOTE — PROGRESS NOTES
Patient  here for faslodex inj. Tolerated inj, no c/o. Dc'd home in stable condition. Will get appts off mychart.

## 2024-03-21 NOTE — PROGRESS NOTES
Cancer Center Progress Note  Patient Name: Martha Tobias   YOB: 1960   Medical Record Number: [id number]     Attending Physician: Glenn Munroe M.D.     Date of Visit: 3/21/2024      Chief Complaint:  Chief Complaint   Patient presents with    Follow - Up    Injection     F/u and C66D1 Faslodex injection         Oncologic History:  Martha Tobias is a 63 year old female with limited PMH and limited prior access to the medical system admitted on 5/18 c/o a 3 week history of shortness of breath. She was found to have a large R pleural effusion. She underwent therapeutic thoracentesis. Cytology was unrevealing. When the fluid reaccumulated, she was taken to the OR for thoracostomy with pleural biopsy. In the OR, it was noted that her bilateral breasts were abnormal, concerning for breast masses. The thoracostomy was performed and the pleura appeared abnormal. It was biopsied revealing metastatic breast cancer. Pleurodesis was also performed. Upon questioning, the patient first noted breast abnormalities 2 years prior to admission. She had not seen a physician in several years. She denied symptoms elsewhere.     Progression was noted on CT and she was transitioned to Faslodex and Abeniciclib.    She had difficulty tolerating the Abemiciclib due to diarrhea and cytopenias.    She was admitted to the hospital from 12/21/20 to 12/24/20 with severe anemia after restarting the Abemaciclib. She was transfused and underwent MRI enterography. No bleeding source, other than her hemorrhoids was found. She was discharged to follow up with GI. The Abemaciclib was discontinued.   Interestingly, her liver lesions on MRI were described as stable hemangiomas, where they had appeared to be enlarging metastatic deposits on non-contrast CT.     Biopsy of the liver revealed cavernous hemangioma.    She was hospitalized from 12/14/21 to 12/19/21 and diagnosed with cirrhosis. She required paracentesis for improved  comfort. Cytology was negative for malignancy. She was transfused and given IV iron for her iron deficiency. EGD revealed esophageal varices and portal gastropathy. Her push enteroscopy and capsule endoscopy revealed small bowel AVMs and erosions/ulcers.  She was discharged to be evaluated by hepatology, ongoing follow up with GI, follow up here for ongoing care of her metastatic breast cancer.    History of Present Illness:  Pt is here for follow up. She complains of some fatigue, but no bleeding.    Performance Status:  ECOG 1    Past Medical History:  Past Medical History:   Diagnosis Date    Abdominal distention 2012    Anemia     Anxiety     Back pain     Breast cancer (HCC) 2015    breast    COPD (chronic obstructive pulmonary disease) (HCC)     No continuous O2    Disorder of liver     lesions    Disorder of thyroid     Fatigue 2019    Feeling lonely Recent    Hemorrhoids Unk    High cholesterol     History of 2019 novel coronavirus disease (COVID-19) 2021    ASYMPTOMATIC.  NO HOSPITALIZATION    History of blood transfusion 10/2021    Hyperthyroidism     Leg swelling Unk    Neuropathy     Personal history of antineoplastic chemotherapy     oral    Pneumonia due to organism     PONV (postoperative nausea and vomiting)     Rash     Shortness of breath     Only exertion    Stress     Visual impairment     glasses    Weight gain Unk       Past Surgical History:  Past Surgical History:   Procedure Laterality Date    COLONOSCOPY      COLONOSCOPY N/A 10/26/2021    Procedure: COLONOSCOPY with biopsy, Cold Polypectomy & Clips x 5 placement /ESOPHAGOGASTRODUODENOSCOPY (EGD) with Biopsy;  Surgeon: Oscar Harp MD;  Location:  ENDOSCOPY    ORAL SURGERY  2017    THORACOTOMY,LTD,BIOPSY  05/2015       Family History:  Family History   Adopted: Yes   Family history unknown: Yes       Social History:  Social History     Socioeconomic History    Marital status:      Spouse name: Not on file    Number of  children: Not on file    Years of education: Not on file    Highest education level: Not on file   Occupational History    Not on file   Tobacco Use    Smoking status: Light Smoker     Packs/day: 0.00     Years: 40.00     Additional pack years: 0.00     Total pack years: 0.00     Types: Cigarettes    Smokeless tobacco: Never    Tobacco comments:     3-5 cig/ day   Vaping Use    Vaping Use: Never used   Substance and Sexual Activity    Alcohol use: Not Currently     Alcohol/week: 0.0 standard drinks of alcohol     Comment: 2 drinks/yr     Drug use: No    Sexual activity: Not Currently   Other Topics Concern     Service Not Asked    Blood Transfusions Not Asked    Caffeine Concern Yes     Comment: 1 1/2 a day 16oz    Occupational Exposure Not Asked    Hobby Hazards Not Asked    Sleep Concern Not Asked    Stress Concern Not Asked    Weight Concern Not Asked    Special Diet Not Asked    Back Care Not Asked    Exercise No    Bike Helmet Not Asked    Seat Belt Not Asked    Self-Exams Not Asked   Social History Narrative    Not on file     Social Determinants of Health     Financial Resource Strain: Not on file   Food Insecurity: Not on file   Transportation Needs: Not on file   Physical Activity: Not on file   Stress: Not on file   Social Connections: Not on file   Housing Stability: Not on file       Current Medications:    Current Outpatient Medications:     spironolactone 100 MG Oral Tab, TAKE 1 TABLET DAILY, Disp: 90 tablet, Rfl: 0    levothyroxine (SYNTHROID) 200 MCG Oral Tab, Take 1 tablet (200 mcg total) by mouth before breakfast., Disp: 90 tablet, Rfl: 0    levothyroxine 25 MCG Oral Tab, Take 1 tablet (25 mcg total) by mouth before breakfast., Disp: 90 tablet, Rfl: 0    rosuvastatin 5 MG Oral Tab, Take 1 tablet (5 mg total) by mouth nightly., Disp: 90 tablet, Rfl: 1    ALPRAZolam 0.5 MG Oral Tab, Take 1 tablet (0.5 mg total) by mouth 2 (two) times daily as needed for Sleep or Anxiety., Disp: 60 tablet,  Rfl: 2    propranolol 10 MG Oral Tab, Take 1 tablet (10 mg total) by mouth 2 (two) times daily., Disp: 180 tablet, Rfl: 1    escitalopram 20 MG Oral Tab, Take 1 tablet (20 mg total) by mouth daily., Disp: 90 tablet, Rfl: 1    albuterol 108 (90 Base) MCG/ACT Inhalation Aero Soln, Inhale 2 puffs into the lungs every 4 (four) hours as needed for Wheezing or Shortness of Breath., Disp: 25.5 g, Rfl: 2    Potassium Chloride ER 10 MEQ Oral Tab CR, Take 1 tablet (10 mEq total) by mouth daily. (Patient not taking: Reported on 1/25/2024), Disp: 90 tablet, Rfl: 1    furosemide 40 MG Oral Tab, Take 1 tablet (40 mg total) by mouth daily., Disp: 90 tablet, Rfl: 1    pantoprazole 40 MG Oral Tab EC, Take 1 tablet (40 mg total) by mouth 2 (two) times daily before meals., Disp: 60 tablet, Rfl: 0    gabapentin 300 MG Oral Cap, Take 1 capsule (300 mg total) by mouth 4 (four) times daily., Disp: 360 capsule, Rfl: 3    omega-3-acid ethyl esters 1 g Oral Cap, Take 2 capsules (2 g total) by mouth 2 (two) times daily., Disp: 360 capsule, Rfl: 0    Ferrous Sulfate (IRON) 325 (65 Fe) MG Oral Tab, Take by mouth. She does not take daily, Disp: , Rfl:     buPROPion 150 MG Oral Tablet 12 Hr, Take 1 tablet (150 mg total) by mouth 2 (two) times daily., Disp: , Rfl:     Biotin 2500 MCG Oral Cap, Take 1 capsule by mouth every evening., Disp: , Rfl:     folic acid 800 MCG Oral Tab, Take 1 tablet (800 mcg total) by mouth daily., Disp: , Rfl:     Vitamin B-12 1000 MCG Oral Tab, Take 1 tablet (1,000 mcg total) by mouth daily., Disp: 30 tablet, Rfl: 11    Allergies:  Allergies   Allergen Reactions    Radiology Contrast Iodinated Dyes HIVES and ITCHING    Iodine (Topical) OTHER (SEE COMMENTS)        Review of Systems:    Constitutional No fevers, chills, night sweats, excessive fatigue or weight loss.   Eyes No significant visual difficulties. No diplopia. No yellowing.   Hematologic/Lymphatic No easy bruising or bleeding.  Denies tender or palpable lymph  nodes.   Respiratory No dyspnea, pleuritic chest pain, cough or hemoptysis.   Cardiovascular No anginal chest pain, palpitations or orthopnea.   Gastrointestinal No nausea, vomiting, diarrhea, GI bleeding, or constipation. NL appetite, No Early Satiety.   Genitorurinary No hematuria, dysuria, abnormal bleeding.   Integumentary No yellowing.   Neurologic No headache, blurred vision, and no areas of focal weakness. Normal gait.   Psychiatric No insomnia, depression, erica or mood swings.         Vital Signs:  /71 (BP Location: Left arm, Patient Position: Sitting, Cuff Size: adult)   Pulse 82   Temp 96.9 °F (36.1 °C) (Tympanic)   Resp 18   Ht 1.676 m (5' 5.98\")   Wt 83 kg (183 lb)   SpO2 98%   BMI 29.55 kg/m²       Physical Examination:    Constitutional Normal - Mood and affect appropriate. Appears close to chronological age. Well nourished. Well developed.   Eyes Normal - Conjunctivae and sclerae are clear and without icterus. Pupils are reactive and equal.   Hematologic/Lymphatic Normal - No petechiae or purpura.  No tender or palpable lymph nodes in the cervical, supraclavicular, axillary or inguinal area.   Respiratory Normal - Lungs CTA, no rhonchi or wheezing.   Cardiovascular Normal - RRR, no murmurs, gallops or rubs.   Abdomen Non-tender, moderately distended, no masses, ascites or hepatosplenomegaly. Stable umbilical hernia.   Extremities Normal - No C/C/E    Integumentary Diffuse angioma-like rash of the arms and torso. Small hemangioma of the R eye.No Jaundice   Neurologic Normal - No sensory or motor deficits, normal cerebellar function, normal gait, cranial nerves intact.   Psychiatric Normal - A&Ox3, Coherent speech. Verbalizes understanding of our discussions today.           Labs:  Recent Labs     03/21/24  1444   RBC 3.81   HGB 11.4 L   HCT 35.5   MCV 93.2   MCH 29.9   MCHC 32.1   RDW 14.5   NEPRELIM 3.71   WBC 6.1   .0     Iron, TIBC, Ferritin Pending  CMP Pending    Radiology:  .         Pathology:  Final Diagnosis:   Dominant mass, left lobe of liver, ultrasound-guided biopsy:  -Fragments of hemangioma.  (See comment.)         Impression and Plan:  Breast Cancer: Stage IV, ER+ OH+ HER-2 Neg.  She was transitioned to Faslodex and Abemaciclib. Abemaciclib dose reduced for diarrhea and rash, and then held for pancytopenia. After restarting, she was hospitalized for severe anemia that was likely related to bleeding, iron deficiency, and the chemo. MRI suggests that the liver lesions have not progressed, as suspected on the non-con CT.  Discontinued the Abemaciclib and continuing Faslodex. Repeat MRI again raised the question of liver metastases. In an effort to definitively determine whether these were metastases, Liver biopsy was accomplished that revealed hemangioma. CT Chest and MRI Abd were stable, indicating that her breast cancer is controlled on Faslodex alone.  Will continue Faslodex. Repeat imaging is stable. The patient reports that her family has enquired about BCRA testing. The patient is very low risk for carrying a mutation, having an ER+ OH+ HER2 negative cancer diagnosed after the age of 50. Testing was not indicated prior to the Jew of PARP inhibitors, that can be used to treat metastatic breast cancer. Testing is normal.    Bone Metastases: Stable.  Discontinued Xgeva due to her teeth issues.     Cirrhosis: Unclear etiology, though likely PITTS. Hepatology follow up.    Anemia: Iron deficiency and ongoing blood loss. Secondary to hemorrhoids and AVMs. She has Varices, as well, but they were not bleeding, and did not require banding while she was in the hospital.   Hgb is better today. Follow iron levels closely. Monitor for active bleeding. Replete iron stores for her ferritin<30.     Depression: She will continue to meet with  for counseling regarding handling her stress at home.     Rash w/ hemangioma of the eyelid, hemangiomas of the Liver, severe hemorrhoids: She saw  hepatology and the rash has improved.      Planned Follow Up:  4 weeks for MD visit with labs.     I spent * minutes face to face with the patient.  More than 50% of that time was spent counseling the patient and/or on coordination of care.  The diagnosis, prognosis, and general treatment was explained to the patient and the family.    Electronically Signed by:    Glenn Munroe M.D.  [facility]

## 2024-03-21 NOTE — PROGRESS NOTES
Patient is here for C66D1 Faslodex for breast cancer. Patient states she is feeling good. Patient has no further issues or complaints at this time.     Education Record    Learner:  Patient    Disease / Diagnosis: breast cancer    Barriers / Limitations:  None   Comments:    Method:  Discussion   Comments:    General Topics:  Medication, Side effects and symptom management, and Plan of care reviewed   Comments:    Outcome:  Shows understanding   Comments:

## 2024-03-25 ENCOUNTER — TELEPHONE (OUTPATIENT)
Dept: HEMATOLOGY/ONCOLOGY | Age: 64
End: 2024-03-25

## 2024-04-02 ENCOUNTER — TELEPHONE (OUTPATIENT)
Dept: FAMILY MEDICINE CLINIC | Facility: CLINIC | Age: 64
End: 2024-04-02

## 2024-04-02 ENCOUNTER — TELEPHONE (OUTPATIENT)
Dept: HEMATOLOGY/ONCOLOGY | Facility: HOSPITAL | Age: 64
End: 2024-04-02

## 2024-04-02 NOTE — TELEPHONE ENCOUNTER
I have 2 PM open next Wednesday we can work her in if we have any cancellations sooner we can always call her.  Thank you

## 2024-04-02 NOTE — TELEPHONE ENCOUNTER
Pt would like appt asap. She states she sees you every three months and is past due. Please advise. Thank you.

## 2024-04-03 RX ORDER — OMEGA-3-ACID ETHYL ESTERS 1 G/1
2 CAPSULE, LIQUID FILLED ORAL 2 TIMES DAILY
Qty: 360 CAPSULE | Refills: 0 | Status: SHIPPED | OUTPATIENT
Start: 2024-04-03

## 2024-04-03 NOTE — TELEPHONE ENCOUNTER
Looks like that time slot was taken via [employer] by another pt. Please advise of another date & time.

## 2024-04-04 NOTE — TELEPHONE ENCOUNTER
I can see her next Tuesday at 11:45 AM.  Do we have any cancellations this Friday or next Monday we will call her.  Thank you

## 2024-04-08 ENCOUNTER — OFFICE VISIT (OUTPATIENT)
Dept: HEMATOLOGY/ONCOLOGY | Age: 64
End: 2024-04-08
Attending: INTERNAL MEDICINE
Payer: COMMERCIAL

## 2024-04-08 VITALS
HEART RATE: 64 BPM | RESPIRATION RATE: 16 BRPM | OXYGEN SATURATION: 95 % | SYSTOLIC BLOOD PRESSURE: 102 MMHG | DIASTOLIC BLOOD PRESSURE: 50 MMHG | TEMPERATURE: 98 F

## 2024-04-08 DIAGNOSIS — C50.919 BREAST CANCER METASTASIZED TO PLEURA, UNSPECIFIED LATERALITY (HCC): Primary | ICD-10-CM

## 2024-04-08 DIAGNOSIS — C78.2 BREAST CANCER METASTASIZED TO PLEURA, UNSPECIFIED LATERALITY (HCC): Primary | ICD-10-CM

## 2024-04-08 DIAGNOSIS — D50.0 IRON DEFICIENCY ANEMIA DUE TO CHRONIC BLOOD LOSS: ICD-10-CM

## 2024-04-08 DIAGNOSIS — E86.0 DEHYDRATION: ICD-10-CM

## 2024-04-08 DIAGNOSIS — C79.51 MALIGNANT NEOPLASM METASTATIC TO BONE (HCC): ICD-10-CM

## 2024-04-08 DIAGNOSIS — D50.0 IRON DEFICIENCY ANEMIA SECONDARY TO BLOOD LOSS (CHRONIC): ICD-10-CM

## 2024-04-08 PROCEDURE — 96374 THER/PROPH/DIAG INJ IV PUSH: CPT

## 2024-04-08 NOTE — PROGRESS NOTES
Education Record     Learner:  Patient     Disease / Diagnosis: iron infusion      Barriers / Limitations: none                 Comments:     Method:  Discussion                Comments:     General Topics:  Plan of care reviewed                Comments:     Outcome:  Shows understanding                Comments:  Pt tolerated IV iron infusion. Pt dc home in stable condition with no complaints. Has fu appts

## 2024-04-12 DIAGNOSIS — R16.1 SPLENOMEGALY: ICD-10-CM

## 2024-04-18 ENCOUNTER — OFFICE VISIT (OUTPATIENT)
Dept: HEMATOLOGY/ONCOLOGY | Age: 64
End: 2024-04-18
Attending: INTERNAL MEDICINE
Payer: COMMERCIAL

## 2024-04-18 VITALS
BODY MASS INDEX: 29.15 KG/M2 | HEART RATE: 74 BPM | HEIGHT: 65.98 IN | DIASTOLIC BLOOD PRESSURE: 70 MMHG | SYSTOLIC BLOOD PRESSURE: 132 MMHG | RESPIRATION RATE: 18 BRPM | WEIGHT: 181.38 LBS | OXYGEN SATURATION: 95 % | TEMPERATURE: 99 F

## 2024-04-18 DIAGNOSIS — C79.51 MALIGNANT NEOPLASM METASTATIC TO BONE (HCC): ICD-10-CM

## 2024-04-18 DIAGNOSIS — C78.2 BREAST CANCER METASTASIZED TO PLEURA, UNSPECIFIED LATERALITY (HCC): ICD-10-CM

## 2024-04-18 DIAGNOSIS — C50.919 BREAST CANCER METASTASIZED TO PLEURA, UNSPECIFIED LATERALITY (HCC): Primary | ICD-10-CM

## 2024-04-18 DIAGNOSIS — C78.2 BREAST CANCER METASTASIZED TO PLEURA, UNSPECIFIED LATERALITY (HCC): Primary | ICD-10-CM

## 2024-04-18 DIAGNOSIS — C78.2 CARCINOMA OF RIGHT BREAST METASTATIC TO PLEURA (HCC): Primary | ICD-10-CM

## 2024-04-18 DIAGNOSIS — C50.911 CARCINOMA OF RIGHT BREAST METASTATIC TO PLEURA (HCC): Primary | ICD-10-CM

## 2024-04-18 DIAGNOSIS — C50.919 BREAST CANCER METASTASIZED TO PLEURA, UNSPECIFIED LATERALITY (HCC): ICD-10-CM

## 2024-04-18 DIAGNOSIS — D50.0 IRON DEFICIENCY ANEMIA SECONDARY TO BLOOD LOSS (CHRONIC): ICD-10-CM

## 2024-04-18 DIAGNOSIS — C50.011 MALIGNANT NEOPLASM OF NIPPLE OF RIGHT BREAST IN FEMALE, ESTROGEN RECEPTOR POSITIVE (HCC): ICD-10-CM

## 2024-04-18 DIAGNOSIS — Z17.0 MALIGNANT NEOPLASM OF NIPPLE OF RIGHT BREAST IN FEMALE, ESTROGEN RECEPTOR POSITIVE (HCC): ICD-10-CM

## 2024-04-18 DIAGNOSIS — C78.2 CARCINOMA OF RIGHT BREAST METASTATIC TO PLEURA (HCC): ICD-10-CM

## 2024-04-18 DIAGNOSIS — C50.911 CARCINOMA OF RIGHT BREAST METASTATIC TO PLEURA (HCC): ICD-10-CM

## 2024-04-18 LAB
ALBUMIN SERPL-MCNC: 4.2 G/DL (ref 3.4–5)
ALBUMIN/GLOB SERPL: 1.1 {RATIO} (ref 1–2)
ALP LIVER SERPL-CCNC: 133 U/L
ALT SERPL-CCNC: 26 U/L
ANION GAP SERPL CALC-SCNC: 6 MMOL/L (ref 0–18)
AST SERPL-CCNC: 22 U/L (ref 15–37)
BASOPHILS # BLD AUTO: 0.14 X10(3) UL (ref 0–0.2)
BASOPHILS NFR BLD AUTO: 1.6 %
BILIRUB SERPL-MCNC: 0.6 MG/DL (ref 0.1–2)
BUN BLD-MCNC: 23 MG/DL (ref 9–23)
CALCIUM BLD-MCNC: 9 MG/DL (ref 8.5–10.1)
CHLORIDE SERPL-SCNC: 102 MMOL/L (ref 98–112)
CO2 SERPL-SCNC: 28 MMOL/L (ref 21–32)
CREAT BLD-MCNC: 1.17 MG/DL
DEPRECATED HBV CORE AB SER IA-ACNC: 242.4 NG/ML
EGFRCR SERPLBLD CKD-EPI 2021: 53 ML/MIN/1.73M2 (ref 60–?)
EOSINOPHIL # BLD AUTO: 0.47 X10(3) UL (ref 0–0.7)
EOSINOPHIL NFR BLD AUTO: 5.2 %
ERYTHROCYTE [DISTWIDTH] IN BLOOD BY AUTOMATED COUNT: 15.5 %
FASTING STATUS PATIENT QL REPORTED: NO
GLOBULIN PLAS-MCNC: 4 G/DL (ref 2.8–4.4)
GLUCOSE BLD-MCNC: 179 MG/DL (ref 70–99)
HCT VFR BLD AUTO: 39.8 %
HGB BLD-MCNC: 13.3 G/DL
IMM GRANULOCYTES # BLD AUTO: 0.04 X10(3) UL (ref 0–1)
IMM GRANULOCYTES NFR BLD: 0.4 %
IRON SATN MFR SERPL: 16 %
IRON SERPL-MCNC: 78 UG/DL
LYMPHOCYTES # BLD AUTO: 2.94 X10(3) UL (ref 1–4)
LYMPHOCYTES NFR BLD AUTO: 32.7 %
MCH RBC QN AUTO: 30.7 PG (ref 26–34)
MCHC RBC AUTO-ENTMCNC: 33.4 G/DL (ref 31–37)
MCV RBC AUTO: 91.9 FL
MONOCYTES # BLD AUTO: 0.5 X10(3) UL (ref 0.1–1)
MONOCYTES NFR BLD AUTO: 5.6 %
NEUTROPHILS # BLD AUTO: 4.91 X10 (3) UL (ref 1.5–7.7)
NEUTROPHILS # BLD AUTO: 4.91 X10(3) UL (ref 1.5–7.7)
NEUTROPHILS NFR BLD AUTO: 54.5 %
OSMOLALITY SERPL CALC.SUM OF ELEC: 290 MOSM/KG (ref 275–295)
PLATELET # BLD AUTO: 228 10(3)UL (ref 150–450)
POTASSIUM SERPL-SCNC: 4.1 MMOL/L (ref 3.5–5.1)
PROT SERPL-MCNC: 8.2 G/DL (ref 6.4–8.2)
RBC # BLD AUTO: 4.33 X10(6)UL
SODIUM SERPL-SCNC: 136 MMOL/L (ref 136–145)
TIBC SERPL-MCNC: 495 UG/DL (ref 240–450)
TRANSFERRIN SERPL-MCNC: 332 MG/DL (ref 200–360)
WBC # BLD AUTO: 9 X10(3) UL (ref 4–11)

## 2024-04-18 PROCEDURE — 80053 COMPREHEN METABOLIC PANEL: CPT

## 2024-04-18 PROCEDURE — 83550 IRON BINDING TEST: CPT

## 2024-04-18 PROCEDURE — 96402 CHEMO HORMON ANTINEOPL SQ/IM: CPT

## 2024-04-18 PROCEDURE — 85025 COMPLETE CBC W/AUTO DIFF WBC: CPT

## 2024-04-18 PROCEDURE — 99215 OFFICE O/P EST HI 40 MIN: CPT | Performed by: INTERNAL MEDICINE

## 2024-04-18 PROCEDURE — 83540 ASSAY OF IRON: CPT

## 2024-04-18 RX ORDER — LAMOTRIGINE 25 MG/1
500 TABLET ORAL ONCE
Status: COMPLETED | OUTPATIENT
Start: 2024-04-18 | End: 2024-04-18

## 2024-04-18 RX ORDER — SPIRONOLACTONE 100 MG/1
100 TABLET, FILM COATED ORAL DAILY
Qty: 90 TABLET | Refills: 1 | Status: SHIPPED | OUTPATIENT
Start: 2024-04-18

## 2024-04-18 RX ORDER — LAMOTRIGINE 25 MG/1
500 TABLET ORAL ONCE
Status: CANCELLED | OUTPATIENT
Start: 2024-05-06

## 2024-04-18 RX ADMIN — LAMOTRIGINE 500 MG: 25 TABLET ORAL at 15:56:00

## 2024-04-18 NOTE — PROGRESS NOTES
Education Record    Learner:  Patient    Disease / Diagnosis: patient  here for faslodex inj    Barriers / Limitations:  None    Method:  Brief focused    General Topics:  Plan of care reviewed    Outcome:  Shows understanding   Patient  tolerated injection. No c/o. Dc'd home in stable condition.

## 2024-04-18 NOTE — PROGRESS NOTES
Cancer Center Progress Note  Patient Name: Martha Tobias   YOB: 1960   Medical Record Number: [id number]     Attending Physician: Glenn Munroe M.D.     Date of Visit: 4/18/2024    Chief Complaint:  Chief Complaint   Patient presents with    Follow - Up        Oncologic History:  Martha Tobias is a 63 year old female with limited PMH and limited prior access to the medical system admitted on 5/18 c/o a 3 week history of shortness of breath. She was found to have a large R pleural effusion. She underwent therapeutic thoracentesis. Cytology was unrevealing. When the fluid reaccumulated, she was taken to the OR for thoracostomy with pleural biopsy. In the OR, it was noted that her bilateral breasts were abnormal, concerning for breast masses. The thoracostomy was performed and the pleura appeared abnormal. It was biopsied revealing metastatic breast cancer. Pleurodesis was also performed. Upon questioning, the patient first noted breast abnormalities 2 years prior to admission. She had not seen a physician in several years. She denied symptoms elsewhere.     Progression was noted on CT and she was transitioned to Faslodex and Abeniciclib.    She had difficulty tolerating the Abemiciclib due to diarrhea and cytopenias.    She was admitted to the hospital from 12/21/20 to 12/24/20 with severe anemia after restarting the Abemaciclib. She was transfused and underwent MRI enterography. No bleeding source, other than her hemorrhoids was found. She was discharged to follow up with GI. The Abemaciclib was discontinued.   Interestingly, her liver lesions on MRI were described as stable hemangiomas, where they had appeared to be enlarging metastatic deposits on non-contrast CT.     Biopsy of the liver revealed cavernous hemangioma.    She was hospitalized from 12/14/21 to 12/19/21 and diagnosed with cirrhosis. She required paracentesis for improved comfort. Cytology was negative for malignancy. She was  transfused and given IV iron for her iron deficiency. EGD revealed esophageal varices and portal gastropathy. Her push enteroscopy and capsule endoscopy revealed small bowel AVMs and erosions/ulcers.  She was discharged to be evaluated by hepatology, ongoing follow up with GI, follow up here for ongoing care of her metastatic breast cancer.    History of Present Illness:  Pt is here for follow up. She feels well.     Performance Status:  ECOG 1    Past Medical History:  Past Medical History:    Abdominal distention    Anemia    Anxiety    Back pain    Breast cancer (HCC)    breast    COPD (chronic obstructive pulmonary disease) (HCC)    No continuous O2    Disorder of liver    lesions    Disorder of thyroid    Fatigue    Feeling lonely    Hemorrhoids    High cholesterol    History of 2019 novel coronavirus disease (COVID-19)    ASYMPTOMATIC.  NO HOSPITALIZATION    History of blood transfusion    Hyperthyroidism    Leg swelling    Neuropathy    Personal history of antineoplastic chemotherapy    oral    Pneumonia due to organism    PONV (postoperative nausea and vomiting)    Rash    Shortness of breath    Only exertion    Stress    Visual impairment    glasses    Weight gain       Past Surgical History:  Past Surgical History:   Procedure Laterality Date    Colonoscopy      Colonoscopy N/A 10/26/2021    Procedure: COLONOSCOPY with biopsy, Cold Polypectomy & Clips x 5 placement /ESOPHAGOGASTRODUODENOSCOPY (EGD) with Biopsy;  Surgeon: Oscar Harp MD;  Location:  ENDOSCOPY    Oral surgery  2017    [facility],biopsy  05/2015       Family History:  Family History   Adopted: Yes   Family history unknown: Yes       Social History:  Social History     Socioeconomic History    Marital status:      Spouse name: Not on file    Number of children: Not on file    Years of education: Not on file    Highest education level: Not on file   Occupational History    Not on file   Tobacco Use    Smoking status: Light  Smoker     Current packs/day: 0.00     Types: Cigarettes    Smokeless tobacco: Never    Tobacco comments:     3-5 cig/ day   Vaping Use    Vaping status: Never Used   Substance and Sexual Activity    Alcohol use: Not Currently     Alcohol/week: 0.0 standard drinks of alcohol     Comment: 2 drinks/yr     Drug use: No    Sexual activity: Not Currently   Other Topics Concern     Service Not Asked    Blood Transfusions Not Asked    Caffeine Concern Yes     Comment: 1 1/2 a day 16oz    Occupational Exposure Not Asked    Hobby Hazards Not Asked    Sleep Concern Not Asked    Stress Concern Not Asked    Weight Concern Not Asked    Special Diet Not Asked    Back Care Not Asked    Exercise No    Bike Helmet Not Asked    Seat Belt Not Asked    Self-Exams Not Asked   Social History Narrative    Not on file     Social Determinants of Health     Financial Resource Strain: Not on file   Food Insecurity: Not on file   Transportation Needs: Not on file   Physical Activity: Not on file   Stress: Not on file   Social Connections: Not on file   Housing Stability: Not on file       Current Medications:    Current Outpatient Medications:     HYDROcodone-acetaminophen 7.5-325 MG Oral Tab, Take 1-2 tablets by mouth every 8 (eight) hours as needed for Pain., Disp: , Rfl:     escitalopram 20 MG Oral Tab, Take 1 tablet (20 mg total) by mouth daily., Disp: 90 tablet, Rfl: 1    levothyroxine 25 MCG Oral Tab, Take 1 tablet (25 mcg total) by mouth before breakfast., Disp: 90 tablet, Rfl: 1    levothyroxine (SYNTHROID) 200 MCG Oral Tab, Take 1 tablet (200 mcg total) by mouth before breakfast., Disp: 90 tablet, Rfl: 0    rosuvastatin 5 MG Oral Tab, Take 1 tablet (5 mg total) by mouth nightly., Disp: 90 tablet, Rfl: 1    propranolol 10 MG Oral Tab, Take 1 tablet (10 mg total) by mouth 2 (two) times daily., Disp: 180 tablet, Rfl: 1    Potassium Chloride ER 10 MEQ Oral Tab CR, Take 1 tablet (10 mEq total) by mouth daily., Disp: 90 tablet, Rfl:  1    spironolactone 100 MG Oral Tab, Take 1 tablet (100 mg total) by mouth daily., Disp: 90 tablet, Rfl: 1    furosemide 40 MG Oral Tab, Take 1 tablet (40 mg total) by mouth daily., Disp: 90 tablet, Rfl: 1    pantoprazole 40 MG Oral Tab EC, Take 1 tablet (40 mg total) by mouth 2 (two) times daily before meals., Disp: 180 tablet, Rfl: 1    ALPRAZolam 0.5 MG Oral Tab, Take 1 tablet (0.5 mg total) by mouth 2 (two) times daily as needed for Sleep or Anxiety., Disp: 60 tablet, Rfl: 0    HYDROcodone-acetaminophen 7.5-325 MG Oral Tab, Take 1 tablet by mouth every 8 (eight) hours as needed for Pain., Disp: 90 tablet, Rfl: 0    [START ON 5/10/2024] HYDROcodone-acetaminophen 7.5-325 MG Oral Tab, Take 1 tablet by mouth every 8 (eight) hours as needed for Pain., Disp: 90 tablet, Rfl: 0    [START ON 6/10/2024] HYDROcodone-acetaminophen 7.5-325 MG Oral Tab, Take 1 tablet by mouth every 8 (eight) hours as needed for Pain., Disp: 90 tablet, Rfl: 0    OMEGA-3-ACID ETHYL ESTERS 1 g Oral Cap, TAKE 2 CAPSULES TWICE A DAY, Disp: 360 capsule, Rfl: 0    albuterol 108 (90 Base) MCG/ACT Inhalation Aero Soln, Inhale 2 puffs into the lungs every 4 (four) hours as needed for Wheezing or Shortness of Breath., Disp: 25.5 g, Rfl: 2    gabapentin 300 MG Oral Cap, Take 1 capsule (300 mg total) by mouth 4 (four) times daily., Disp: 360 capsule, Rfl: 3    Ferrous Sulfate (IRON) 325 (65 Fe) MG Oral Tab, Take by mouth. She does not take daily, Disp: , Rfl:     buPROPion 150 MG Oral Tablet 12 Hr, Take 1 tablet (150 mg total) by mouth 2 (two) times daily., Disp: , Rfl:     Biotin 2500 MCG Oral Cap, Take 1 capsule by mouth every evening., Disp: , Rfl:     folic acid 800 MCG Oral Tab, Take 1 tablet (800 mcg total) by mouth daily., Disp: , Rfl:     Vitamin B-12 1000 MCG Oral Tab, Take 1 tablet (1,000 mcg total) by mouth daily., Disp: 30 tablet, Rfl: 11    Allergies:  Allergies   Allergen Reactions    Radiology Contrast Iodinated Dyes HIVES and ITCHING     Iodine (Topical) OTHER (SEE COMMENTS)        Review of Systems:    Constitutional No fevers, chills, night sweats, excessive fatigue or weight loss.   Eyes No significant visual difficulties. No diplopia. No yellowing.   Hematologic/Lymphatic No easy bruising or bleeding.  Denies tender or palpable lymph nodes.   Respiratory No dyspnea, pleuritic chest pain, cough or hemoptysis.   Cardiovascular No anginal chest pain, palpitations or orthopnea.   Gastrointestinal No nausea, vomiting, diarrhea, GI bleeding, or constipation. NL appetite, No Early Satiety.   Genitorurinary No hematuria, dysuria, abnormal bleeding.   Integumentary No yellowing.   Neurologic No headache, blurred vision, and no areas of focal weakness. Normal gait.   Psychiatric No insomnia, depression, erica or mood swings.         Vital Signs:  /70 (BP Location: Left arm, Patient Position: Sitting, Cuff Size: adult)   Pulse 74   Temp 99 °F (37.2 °C) (Tympanic)   Resp 18   Ht 1.676 m (5' 5.98\")   Wt 82.3 kg (181 lb 6.4 oz)   SpO2 95%   BMI 29.29 kg/m²       Physical Examination:    Constitutional Normal - Mood and affect appropriate. Appears close to chronological age. Well nourished. Well developed.   Eyes Normal - Conjunctivae and sclerae are clear and without icterus. Pupils are reactive and equal.   Hematologic/Lymphatic Normal - No petechiae or purpura.  No tender or palpable lymph nodes in the cervical, supraclavicular, axillary or inguinal area.   Respiratory Normal - Lungs CTA, no rhonchi or wheezing.   Cardiovascular Normal - RRR, no murmurs, gallops or rubs.   Abdomen Non-tender, moderately distended, no masses, ascites or hepatosplenomegaly. Stable umbilical hernia.   Extremities Normal - No C/C/E    Integumentary Diffuse angioma-like rash of the arms and torso. Small hemangioma of the R eye.No Jaundice   Neurologic Normal - No sensory or motor deficits, normal cerebellar function, normal gait, cranial nerves intact.   Psychiatric  Normal - A&Ox3, Coherent speech. Verbalizes understanding of our discussions today.           Labs:  Recent Labs     04/18/24  1517   RBC 4.33   HGB 13.3   HCT 39.8   MCV 91.9   MCH 30.7   MCHC 33.4   RDW 15.5   NEPRELIM 4.91   WBC 9.0   .0     Recent Labs     04/18/24  1517    H   BUN 23   CREATSERUM 1.17 H   CA 9.0   ALB 4.2      K 4.1      CO2 28.0   ALKPHO 133 H   AST 22   ALT 26   BILT 0.6   TP 8.2     Iron studies are pending.     Radiology:  .        Pathology:  Final Diagnosis:   Dominant mass, left lobe of liver, ultrasound-guided biopsy:  -Fragments of hemangioma.  (See comment.)         Impression and Plan:  Breast Cancer: Stage IV, ER+ OH+ HER-2 Neg.  She was transitioned to Faslodex and Abemaciclib. Abemaciclib dose reduced for diarrhea and rash, and then held for pancytopenia. After restarting, she was hospitalized for severe anemia that was likely related to bleeding, iron deficiency, and the chemo. MRI suggests that the liver lesions have not progressed, as suspected on the non-con CT.  Discontinued the Abemaciclib and continuing Faslodex. Repeat MRI again raised the question of liver metastases. In an effort to definitively determine whether these were metastases, Liver biopsy was accomplished that revealed hemangioma. CT Chest and MRI Abd were stable, indicating that her breast cancer is controlled on Faslodex alone.  Will continue Faslodex. Repeat imaging is stable. The patient reports that her family has enquired about BCRA testing. The patient is very low risk for carrying a mutation, having an ER+ OH+ HER2 negative cancer diagnosed after the age of 50. Testing was not indicated prior to the Gnosticism of PARP inhibitors, that can be used to treat metastatic breast cancer. Testing is normal.    Bone Metastases: Stable.  Discontinued Xgeva due to her teeth issues.     Cirrhosis: Unclear etiology, though likely PITTS. Hepatology follow up.    Anemia: Iron deficiency and  ongoing blood loss. Secondary to hemorrhoids and AVMs. She has Varices, as well, but they were not bleeding, and did not require banding while she was in the hospital.   Hgb is better today. Follow iron levels closely. Monitor for active bleeding. Replete iron stores for her ferritin<30.     Rash w/ hemangioma of the eyelid, hemangiomas of the Liver, severe hemorrhoids: She saw hepatology and the rash has improved.      Planned Follow Up:  4 weeks for MD visit with labs.     I spent * minutes face to face with the patient.  More than 50% of that time was spent counseling the patient and/or on coordination of care.  The diagnosis, prognosis, and general treatment was explained to the patient and the family.    Electronically Signed by:    Glenn Munroe M.D.  [facility]

## 2024-05-16 ENCOUNTER — OFFICE VISIT (OUTPATIENT)
Dept: HEMATOLOGY/ONCOLOGY | Age: 64
End: 2024-05-16
Attending: INTERNAL MEDICINE

## 2024-05-16 VITALS
HEART RATE: 72 BPM | DIASTOLIC BLOOD PRESSURE: 68 MMHG | WEIGHT: 187 LBS | OXYGEN SATURATION: 95 % | HEIGHT: 65.98 IN | BODY MASS INDEX: 30.05 KG/M2 | SYSTOLIC BLOOD PRESSURE: 122 MMHG | TEMPERATURE: 99 F | RESPIRATION RATE: 18 BRPM

## 2024-05-16 DIAGNOSIS — Z17.0 MALIGNANT NEOPLASM OF NIPPLE OF RIGHT BREAST IN FEMALE, ESTROGEN RECEPTOR POSITIVE (HCC): ICD-10-CM

## 2024-05-16 DIAGNOSIS — C50.911 CARCINOMA OF RIGHT BREAST METASTATIC TO PLEURA (HCC): Primary | ICD-10-CM

## 2024-05-16 DIAGNOSIS — C78.2 CARCINOMA OF RIGHT BREAST METASTATIC TO PLEURA (HCC): Primary | ICD-10-CM

## 2024-05-16 DIAGNOSIS — K74.69 OTHER CIRRHOSIS OF LIVER (HCC): ICD-10-CM

## 2024-05-16 DIAGNOSIS — C78.2 BREAST CANCER METASTASIZED TO PLEURA, UNSPECIFIED LATERALITY (HCC): ICD-10-CM

## 2024-05-16 DIAGNOSIS — C50.919 BREAST CANCER METASTASIZED TO PLEURA, UNSPECIFIED LATERALITY (HCC): ICD-10-CM

## 2024-05-16 DIAGNOSIS — C50.011 MALIGNANT NEOPLASM OF NIPPLE OF RIGHT BREAST IN FEMALE, ESTROGEN RECEPTOR POSITIVE (HCC): ICD-10-CM

## 2024-05-16 DIAGNOSIS — C79.51 MALIGNANT NEOPLASM METASTATIC TO BONE (HCC): ICD-10-CM

## 2024-05-16 DIAGNOSIS — D50.0 IRON DEFICIENCY ANEMIA SECONDARY TO BLOOD LOSS (CHRONIC): ICD-10-CM

## 2024-05-16 LAB
ALBUMIN SERPL-MCNC: 3.8 G/DL (ref 3.4–5)
ALBUMIN/GLOB SERPL: 1.1 {RATIO} (ref 1–2)
ALP LIVER SERPL-CCNC: 123 U/L
ALT SERPL-CCNC: 25 U/L
ANION GAP SERPL CALC-SCNC: 4 MMOL/L (ref 0–18)
AST SERPL-CCNC: 22 U/L (ref 15–37)
BASOPHILS # BLD AUTO: 0.09 X10(3) UL (ref 0–0.2)
BASOPHILS NFR BLD AUTO: 1.4 %
BILIRUB SERPL-MCNC: 0.4 MG/DL (ref 0.1–2)
BUN BLD-MCNC: 18 MG/DL (ref 9–23)
CALCIUM BLD-MCNC: 9.5 MG/DL (ref 8.5–10.1)
CHLORIDE SERPL-SCNC: 104 MMOL/L (ref 98–112)
CO2 SERPL-SCNC: 29 MMOL/L (ref 21–32)
CREAT BLD-MCNC: 1.07 MG/DL
DEPRECATED HBV CORE AB SER IA-ACNC: 64.1 NG/ML
EGFRCR SERPLBLD CKD-EPI 2021: 59 ML/MIN/1.73M2 (ref 60–?)
EOSINOPHIL # BLD AUTO: 0.44 X10(3) UL (ref 0–0.7)
EOSINOPHIL NFR BLD AUTO: 7 %
ERYTHROCYTE [DISTWIDTH] IN BLOOD BY AUTOMATED COUNT: 15 %
FASTING STATUS PATIENT QL REPORTED: NO
GLOBULIN PLAS-MCNC: 3.5 G/DL (ref 2.8–4.4)
GLUCOSE BLD-MCNC: 301 MG/DL (ref 70–99)
HCT VFR BLD AUTO: 36.7 %
HGB BLD-MCNC: 12 G/DL
IMM GRANULOCYTES # BLD AUTO: 0.02 X10(3) UL (ref 0–1)
IMM GRANULOCYTES NFR BLD: 0.3 %
IRON SATN MFR SERPL: 17 %
IRON SERPL-MCNC: 75 UG/DL
LYMPHOCYTES # BLD AUTO: 1.84 X10(3) UL (ref 1–4)
LYMPHOCYTES NFR BLD AUTO: 29.2 %
MCH RBC QN AUTO: 31 PG (ref 26–34)
MCHC RBC AUTO-ENTMCNC: 32.7 G/DL (ref 31–37)
MCV RBC AUTO: 94.8 FL
MONOCYTES # BLD AUTO: 0.28 X10(3) UL (ref 0.1–1)
MONOCYTES NFR BLD AUTO: 4.4 %
NEUTROPHILS # BLD AUTO: 3.63 X10 (3) UL (ref 1.5–7.7)
NEUTROPHILS # BLD AUTO: 3.63 X10(3) UL (ref 1.5–7.7)
NEUTROPHILS NFR BLD AUTO: 57.7 %
OSMOLALITY SERPL CALC.SUM OF ELEC: 297 MOSM/KG (ref 275–295)
PLATELET # BLD AUTO: 175 10(3)UL (ref 150–450)
POTASSIUM SERPL-SCNC: 4.3 MMOL/L (ref 3.5–5.1)
PROT SERPL-MCNC: 7.3 G/DL (ref 6.4–8.2)
RBC # BLD AUTO: 3.87 X10(6)UL
SODIUM SERPL-SCNC: 137 MMOL/L (ref 136–145)
TIBC SERPL-MCNC: 434 UG/DL (ref 240–450)
TRANSFERRIN SERPL-MCNC: 291 MG/DL (ref 200–360)
WBC # BLD AUTO: 6.3 X10(3) UL (ref 4–11)

## 2024-05-16 PROCEDURE — 96402 CHEMO HORMON ANTINEOPL SQ/IM: CPT

## 2024-05-16 PROCEDURE — 99215 OFFICE O/P EST HI 40 MIN: CPT | Performed by: INTERNAL MEDICINE

## 2024-05-16 RX ORDER — LAMOTRIGINE 25 MG/1
500 TABLET ORAL ONCE
Status: CANCELLED | OUTPATIENT
Start: 2024-06-09

## 2024-05-16 RX ORDER — DIPHENHYDRAMINE HCL 25 MG
25 CAPSULE ORAL ONCE
Status: CANCELLED
Start: 2024-05-16 | End: 2024-05-16

## 2024-05-16 RX ORDER — DIPHENHYDRAMINE HCL 25 MG
25 CAPSULE ORAL ONCE
Status: COMPLETED | OUTPATIENT
Start: 2024-05-16 | End: 2024-05-16

## 2024-05-16 RX ORDER — LAMOTRIGINE 25 MG/1
500 TABLET ORAL ONCE
Status: COMPLETED | OUTPATIENT
Start: 2024-05-16 | End: 2024-05-16

## 2024-05-16 RX ADMIN — LAMOTRIGINE 500 MG: 25 TABLET ORAL at 15:50:00

## 2024-05-16 RX ADMIN — DIPHENHYDRAMINE HCL 25 MG: 25 MG CAPSULE ORAL at 15:49:00

## 2024-05-16 NOTE — PROGRESS NOTES
Cancer Center Progress Note  Patient Name: Martha Tobias   YOB: 1960   Medical Record Number: [id number]     Attending Physician: Glenn Munroe M.D.     Date of Visit: 5/16/2024      Chief Complaint:  Chief Complaint   Patient presents with    Follow - Up        Oncologic History:  Martha Tobias is a 63 year old female with limited PMH and limited prior access to the medical system admitted on 5/18 c/o a 3 week history of shortness of breath. She was found to have a large R pleural effusion. She underwent therapeutic thoracentesis. Cytology was unrevealing. When the fluid reaccumulated, she was taken to the OR for thoracostomy with pleural biopsy. In the OR, it was noted that her bilateral breasts were abnormal, concerning for breast masses. The thoracostomy was performed and the pleura appeared abnormal. It was biopsied revealing metastatic breast cancer. Pleurodesis was also performed. Upon questioning, the patient first noted breast abnormalities 2 years prior to admission. She had not seen a physician in several years. She denied symptoms elsewhere.     Progression was noted on CT and she was transitioned to Faslodex and Abeniciclib.    She had difficulty tolerating the Abemiciclib due to diarrhea and cytopenias.    She was admitted to the hospital from 12/21/20 to 12/24/20 with severe anemia after restarting the Abemaciclib. She was transfused and underwent MRI enterography. No bleeding source, other than her hemorrhoids was found. She was discharged to follow up with GI. The Abemaciclib was discontinued.   Interestingly, her liver lesions on MRI were described as stable hemangiomas, where they had appeared to be enlarging metastatic deposits on non-contrast CT.     Biopsy of the liver revealed cavernous hemangioma.    She was hospitalized from 12/14/21 to 12/19/21 and diagnosed with cirrhosis. She required paracentesis for improved comfort. Cytology was negative for malignancy. She was  transfused and given IV iron for her iron deficiency. EGD revealed esophageal varices and portal gastropathy. Her push enteroscopy and capsule endoscopy revealed small bowel AVMs and erosions/ulcers.  She was discharged to be evaluated by hepatology, ongoing follow up with GI, follow up here for ongoing care of her metastatic breast cancer.    History of Present Illness:  Pt is here for follow up. She feels that her abd is a bit more bloated. She has not seen hepatology in 2 years.   She also reports having had a rash a few hours after her last injection. It resolved with benadryl.      Performance Status:  ECOG 1    Past Medical History:  Past Medical History:    Abdominal distention    Anemia    Anxiety    Back pain    Breast cancer (HCC)    breast    COPD (chronic obstructive pulmonary disease) (HCC)    No continuous O2    Disorder of liver    lesions    Disorder of thyroid    Fatigue    Feeling lonely    Hemorrhoids    High cholesterol    History of 2019 novel coronavirus disease (COVID-19)    ASYMPTOMATIC.  NO HOSPITALIZATION    History of blood transfusion    Hyperthyroidism    Leg swelling    Neuropathy    Personal history of antineoplastic chemotherapy    oral    Pneumonia due to organism    PONV (postoperative nausea and vomiting)    Rash    Shortness of breath    Only exertion    Stress    Visual impairment    glasses    Weight gain       Past Surgical History:  Past Surgical History:   Procedure Laterality Date    Colonoscopy      Colonoscopy N/A 10/26/2021    Procedure: COLONOSCOPY with biopsy, Cold Polypectomy & Clips x 5 placement /ESOPHAGOGASTRODUODENOSCOPY (EGD) with Biopsy;  Surgeon: Oscar Harp MD;  Location:  ENDOSCOPY    Oral surgery  [address] [place] [address],[facility],biopsy  05/2015       Family History:  Family History   Adopted: Yes   Family history unknown: Yes       Social History:  Social History     Socioeconomic History    Marital status:      Spouse name: Not on file    Number of  children: Not on file    Years of education: Not on file    Highest education level: Not on file   Occupational History    Not on file   Tobacco Use    Smoking status: Light Smoker     Current packs/day: 0.00     Types: Cigarettes    Smokeless tobacco: Never    Tobacco comments:     3-5 cig/ day   Vaping Use    Vaping status: Never Used   Substance and Sexual Activity    Alcohol use: Not Currently     Alcohol/week: 0.0 standard drinks of alcohol     Comment: 2 drinks/yr     Drug use: No    Sexual activity: Not Currently   Other Topics Concern     Service Not Asked    Blood Transfusions Not Asked    Caffeine Concern Yes     Comment: 1 1/2 a day 16oz    Occupational Exposure Not Asked    Hobby Hazards Not Asked    Sleep Concern Not Asked    Stress Concern Not Asked    Weight Concern Not Asked    Special Diet Not Asked    Back Care Not Asked    Exercise No    Bike Helmet Not Asked    Seat Belt Not Asked    Self-Exams Not Asked   Social History Narrative    Not on file     Social Determinants of Health     Financial Resource Strain: Not on file   Food Insecurity: Not on file   Transportation Needs: Not on file   Physical Activity: Not on file   Stress: Not on file   Social Connections: Not on file   Housing Stability: Not on file       Current Medications:    Current Outpatient Medications:     spironolactone 100 MG Oral Tab, TAKE 1 TABLET DAILY, Disp: 90 tablet, Rfl: 1    HYDROcodone-acetaminophen 7.5-325 MG Oral Tab, Take 1-2 tablets by mouth every 8 (eight) hours as needed for Pain., Disp: , Rfl:     escitalopram 20 MG Oral Tab, Take 1 tablet (20 mg total) by mouth daily., Disp: 90 tablet, Rfl: 1    levothyroxine 25 MCG Oral Tab, Take 1 tablet (25 mcg total) by mouth before breakfast., Disp: 90 tablet, Rfl: 1    levothyroxine (SYNTHROID) 200 MCG Oral Tab, Take 1 tablet (200 mcg total) by mouth before breakfast., Disp: 90 tablet, Rfl: 0    rosuvastatin 5 MG Oral Tab, Take 1 tablet (5 mg total) by mouth  nightly., Disp: 90 tablet, Rfl: 1    propranolol 10 MG Oral Tab, Take 1 tablet (10 mg total) by mouth 2 (two) times daily., Disp: 180 tablet, Rfl: 1    Potassium Chloride ER 10 MEQ Oral Tab CR, Take 1 tablet (10 mEq total) by mouth daily., Disp: 90 tablet, Rfl: 1    furosemide 40 MG Oral Tab, Take 1 tablet (40 mg total) by mouth daily., Disp: 90 tablet, Rfl: 1    pantoprazole 40 MG Oral Tab EC, Take 1 tablet (40 mg total) by mouth 2 (two) times daily before meals., Disp: 180 tablet, Rfl: 1    ALPRAZolam 0.5 MG Oral Tab, Take 1 tablet (0.5 mg total) by mouth 2 (two) times daily as needed for Sleep or Anxiety., Disp: 60 tablet, Rfl: 0    OMEGA-3-ACID ETHYL ESTERS 1 g Oral Cap, TAKE 2 CAPSULES TWICE A DAY, Disp: 360 capsule, Rfl: 0    albuterol 108 (90 Base) MCG/ACT Inhalation Aero Soln, Inhale 2 puffs into the lungs every 4 (four) hours as needed for Wheezing or Shortness of Breath., Disp: 25.5 g, Rfl: 2    gabapentin 300 MG Oral Cap, Take 1 capsule (300 mg total) by mouth 4 (four) times daily., Disp: 360 capsule, Rfl: 3    Ferrous Sulfate (IRON) 325 (65 Fe) MG Oral Tab, Take by mouth. She does not take daily, Disp: , Rfl:     buPROPion 150 MG Oral Tablet 12 Hr, Take 1 tablet (150 mg total) by mouth 2 (two) times daily., Disp: , Rfl:     Biotin 2500 MCG Oral Cap, Take 1 capsule by mouth every evening., Disp: , Rfl:     folic acid 800 MCG Oral Tab, Take 1 tablet (800 mcg total) by mouth daily., Disp: , Rfl:     Vitamin B-12 1000 MCG Oral Tab, Take 1 tablet (1,000 mcg total) by mouth daily., Disp: 30 tablet, Rfl: 11    Allergies:  Allergies   Allergen Reactions    Radiology Contrast Iodinated Dyes HIVES and ITCHING    Iodine (Topical) OTHER (SEE COMMENTS)        Review of Systems:    Constitutional No fevers, chills, night sweats, excessive fatigue or weight loss.   Eyes No significant visual difficulties. No diplopia. No yellowing.   Hematologic/Lymphatic No easy bruising or bleeding.  Denies tender or palpable lymph  nodes.   Respiratory No dyspnea, pleuritic chest pain, cough or hemoptysis.   Cardiovascular No anginal chest pain, palpitations or orthopnea.   Gastrointestinal No nausea, vomiting, diarrhea, GI bleeding, or constipation. NL appetite, No Early Satiety.   Genitorurinary No hematuria, dysuria, abnormal bleeding.   Integumentary No yellowing.   Neurologic No headache, blurred vision, and no areas of focal weakness. Normal gait.   Psychiatric No insomnia, depression, erica or mood swings.         Vital Signs:  /68 (BP Location: Left arm, Patient Position: Sitting, Cuff Size: large)   Pulse 72   Temp 99.3 °F (37.4 °C) (Tympanic)   Resp 18   Ht 1.676 m (5' 5.98\")   Wt 84.8 kg (187 lb)   SpO2 95%   BMI 30.20 kg/m²       Physical Examination:    Constitutional Normal - Mood and affect appropriate. Appears close to chronological age. Well nourished. Well developed.   Eyes Normal - Conjunctivae and sclerae are clear and without icterus. Pupils are reactive and equal.   Hematologic/Lymphatic Normal - No petechiae or purpura.  No tender or palpable lymph nodes in the cervical, supraclavicular, axillary or inguinal area.   Respiratory Normal - Lungs CTA, no rhonchi or wheezing.   Cardiovascular Normal - RRR, no murmurs, gallops or rubs.   Abdomen Non-tender, moderately distended, no masses, ascites or hepatosplenomegaly. Stable umbilical hernia.   Extremities Normal - No C/C/E    Integumentary Diffuse angioma-like rash of the arms and torso. Small hemangioma of the R eye.No Jaundice   Neurologic Normal - No sensory or motor deficits, normal cerebellar function, normal gait, cranial nerves intact.   Psychiatric Normal - A&Ox3, Coherent speech. Verbalizes understanding of our discussions today.           Labs:  Recent Labs     05/16/24  1513   RBC 3.87   HGB 12.0   HCT 36.7   MCV 94.8   MCH 31.0   MCHC 32.7   RDW 15.0   NEPRELIM 3.63   WBC 6.3   .0       CMP, Fe, TIBC, and ferritin pending.     Radiology:  .         Pathology:  Final Diagnosis:   Dominant mass, left lobe of liver, ultrasound-guided biopsy:  -Fragments of hemangioma.  (See comment.)         Impression and Plan:  Breast Cancer: Stage IV, ER+ TN+ HER-2 Neg.  She was transitioned to Faslodex and Abemaciclib. Abemaciclib dose reduced for diarrhea and rash, and then held for pancytopenia. After restarting, she was hospitalized for severe anemia that was likely related to bleeding, iron deficiency, and the chemo. MRI suggests that the liver lesions have not progressed, as suspected on the non-con CT.  Discontinued the Abemaciclib and continuing Faslodex. Repeat MRI again raised the question of liver metastases. In an effort to definitively determine whether these were metastases, Liver biopsy was accomplished that revealed hemangioma. CT Chest and MRI Abd were stable, indicating that her breast cancer is controlled on Faslodex alone.  Will continue Faslodex. Repeat imaging is stable. The patient reports that her family has enquired about BCRA testing. The patient is very low risk for carrying a mutation, having an ER+ TN+ HER2 negative cancer diagnosed after the age of 50. Testing was not indicated prior to the [employer] of PARP inhibitors, that can be used to treat metastatic breast cancer. Testing is normal. Continue Faslodex. Will premedicate with PO benadryl.    Bone Metastases: Stable.  Discontinued Xgeva due to her teeth issues.     Cirrhosis: Unclear etiology, though likely PITTS. She feels like she may need a paracentesis. I advised her to call hepatology, but will check an abd ultrasound as well.    Anemia: Iron deficiency and ongoing blood loss. Secondary to hemorrhoids and AVMs. She has Varices, as well, but they were not bleeding, and did not require banding while she was in the hospital.   Hgb is better today. Follow iron levels closely. Monitor for active bleeding. Replete iron stores for her ferritin<30.     Rash w/ hemangioma of the eyelid,  hemangiomas of the Liver, severe hemorrhoids: She saw hepatology and the rash has improved.      Planned Follow Up:  4 weeks for MD visit with labs.     I spent * minutes face to face with the patient.  More than 50% of that time was spent counseling the patient and/or on coordination of care.  The diagnosis, prognosis, and general treatment was explained to the patient and the family.    Electronically Signed by:    Glenn Munroe M.D.  [facility]

## 2024-05-16 NOTE — PROGRESS NOTES
Pt here for follow up and injection.   Pt states her legs and feet are swollen.  She is using her inhaler.   She feels more distended.

## 2024-05-16 NOTE — PROGRESS NOTES
Patient  here for faslodex inj, patient  states she developed rash 3 hours after last injection, premedicated with benadryl prior to inj, dc'd home in stable condition.

## 2024-05-27 NOTE — PROGRESS NOTES
Freddy Thapa is a 64year old female. cc neuropathy,breast cancer, hypercholesterolemia, elevated liver function test, hypothyroidism, anxiety  HPI:   Patient is coming to the office for follow-up of multiple medical problems.   Patient has metastatic breas using alprazolam more frequently like now after work. She has a kids living with her it helps. Breast ca with metastasis to the pleura and bones continue hydrocodone for pain. She is seeing oncologist for further recommendations.     Edema bilateral lo INCREASE TO 1 TABLET 2 TIMES A DAY ) 180 tablet 0   • ESCITALOPRAM 20 MG Oral Tab TAKE 1 TABLET DAILY 90 tablet 1   • VERZENIO 100 MG Oral Tab TAKE 1 TABLET TWICE A DAY WITH OR WITHOUT FOOD (Patient taking differently: daily.  ) 56 tablet 13   • diphenoxyl Breastfeeding No   BMI 28.84 kg/m²   GENERAL: well developed, well nourished,in no apparent distress, with her .   SKIN: no rashes,no suspicious lesions  HEENT: atraumatic, normocephalic,ears and throat are clear  NECK: supple,no adenopathy  LUNGS: c continues coughing but she is a history of smoking will start inhaler.   ASSESSMENT AND PLAN:   Hypothyroidism in adult  (primary encounter diagnosis)  Pure hypercholesterolemia  Hyperglycemia  Malignant neoplasm of nipple of right breast in female, estroge a day looking cessation. Decrease levothyroxine to 150 mcg daily. Healthy diet. Continue other medications. Do fasting blood work before next visit. Start Advair discus 1 puff twice a day for 2 to 4 weeks. Healthy diet. Low-fat.       Imaging & Consu No

## 2024-05-29 DIAGNOSIS — J43.9 PULMONARY EMPHYSEMA, UNSPECIFIED EMPHYSEMA TYPE (HCC): Primary | ICD-10-CM

## 2024-05-31 RX ORDER — ALBUTEROL SULFATE 90 UG/1
2 AEROSOL, METERED RESPIRATORY (INHALATION) EVERY 4 HOURS PRN
Qty: 25.5 G | Refills: 1 | Status: SHIPPED | OUTPATIENT
Start: 2024-05-31

## 2024-05-31 NOTE — TELEPHONE ENCOUNTER
LOV:  4/9/2024 for: Medication Follow-Up   Patient advised to RTC on:  3 months.   Nov:07/09/2024      Medication Quantity Refills Start End   albuterol 108 (90 Base) MCG/ACT Inhalation Aero Soln 25.5 g 2 9/13/2023 --   Sig:   Inhale 2 puffs into the lungs every 4 (four) hours as needed for Wheezing or Shortness of Breath.

## 2024-06-07 ENCOUNTER — PATIENT MESSAGE (OUTPATIENT)
Dept: FAMILY MEDICINE CLINIC | Facility: CLINIC | Age: 64
End: 2024-06-07

## 2024-06-07 ENCOUNTER — TELEPHONE (OUTPATIENT)
Dept: HEMATOLOGY/ONCOLOGY | Facility: HOSPITAL | Age: 64
End: 2024-06-07

## 2024-06-07 DIAGNOSIS — C50.011 MALIGNANT NEOPLASM OF NIPPLE OF RIGHT BREAST IN FEMALE, ESTROGEN RECEPTOR POSITIVE (HCC): Primary | ICD-10-CM

## 2024-06-07 DIAGNOSIS — Z17.0 MALIGNANT NEOPLASM OF NIPPLE OF RIGHT BREAST IN FEMALE, ESTROGEN RECEPTOR POSITIVE (HCC): Primary | ICD-10-CM

## 2024-06-07 DIAGNOSIS — C78.2 CARCINOMA OF RIGHT BREAST METASTATIC TO PLEURA (HCC): ICD-10-CM

## 2024-06-07 DIAGNOSIS — C50.911 CARCINOMA OF RIGHT BREAST METASTATIC TO PLEURA (HCC): ICD-10-CM

## 2024-06-07 DIAGNOSIS — C79.51 MALIGNANT NEOPLASM METASTATIC TO BONE (HCC): ICD-10-CM

## 2024-06-07 NOTE — TELEPHONE ENCOUNTER
From: Martha Tobias  To: Tracey Christianson  Sent: 2024 10:05 AM CDT  Subject: Needs referral from you    Dr To office left a message stating they need a new HMO referral from my primary to continue seeing him. They said they can’t locate one do it may be . I have my next visit with him next week . Can you please update and make a new referral for me and send to them thru MyChart? Thank you. Martha

## 2024-06-13 ENCOUNTER — SOCIAL WORK SERVICES (OUTPATIENT)
Dept: HEMATOLOGY/ONCOLOGY | Facility: HOSPITAL | Age: 64
End: 2024-06-13

## 2024-06-13 ENCOUNTER — OFFICE VISIT (OUTPATIENT)
Dept: HEMATOLOGY/ONCOLOGY | Age: 64
End: 2024-06-13
Attending: INTERNAL MEDICINE
Payer: COMMERCIAL

## 2024-06-13 ENCOUNTER — OFFICE VISIT (OUTPATIENT)
Dept: HEMATOLOGY/ONCOLOGY | Age: 64
End: 2024-06-13
Attending: INTERNAL MEDICINE

## 2024-06-13 VITALS
HEART RATE: 81 BPM | HEIGHT: 65.98 IN | DIASTOLIC BLOOD PRESSURE: 64 MMHG | WEIGHT: 186 LBS | TEMPERATURE: 97 F | RESPIRATION RATE: 18 BRPM | SYSTOLIC BLOOD PRESSURE: 134 MMHG | OXYGEN SATURATION: 98 % | BODY MASS INDEX: 29.89 KG/M2

## 2024-06-13 DIAGNOSIS — C50.011 MALIGNANT NEOPLASM OF NIPPLE OF RIGHT BREAST IN FEMALE, ESTROGEN RECEPTOR POSITIVE (HCC): ICD-10-CM

## 2024-06-13 DIAGNOSIS — C78.2 BREAST CANCER METASTASIZED TO PLEURA, UNSPECIFIED LATERALITY (HCC): ICD-10-CM

## 2024-06-13 DIAGNOSIS — C79.51 MALIGNANT NEOPLASM METASTATIC TO BONE (HCC): Primary | ICD-10-CM

## 2024-06-13 DIAGNOSIS — D50.0 IRON DEFICIENCY ANEMIA DUE TO CHRONIC BLOOD LOSS: ICD-10-CM

## 2024-06-13 DIAGNOSIS — C50.911 CARCINOMA OF RIGHT BREAST METASTATIC TO PLEURA (HCC): Primary | ICD-10-CM

## 2024-06-13 DIAGNOSIS — C79.51 MALIGNANT NEOPLASM METASTATIC TO BONE (HCC): ICD-10-CM

## 2024-06-13 DIAGNOSIS — C50.919 BREAST CANCER METASTASIZED TO PLEURA, UNSPECIFIED LATERALITY (HCC): ICD-10-CM

## 2024-06-13 DIAGNOSIS — Z51.11 ENCOUNTER FOR ANTINEOPLASTIC CHEMOTHERAPY: ICD-10-CM

## 2024-06-13 DIAGNOSIS — C78.2 CARCINOMA OF RIGHT BREAST METASTATIC TO PLEURA (HCC): Primary | ICD-10-CM

## 2024-06-13 DIAGNOSIS — Z17.0 MALIGNANT NEOPLASM OF NIPPLE OF RIGHT BREAST IN FEMALE, ESTROGEN RECEPTOR POSITIVE (HCC): ICD-10-CM

## 2024-06-13 LAB
ALBUMIN SERPL-MCNC: 3.9 G/DL (ref 3.4–5)
ALBUMIN/GLOB SERPL: 1 {RATIO} (ref 1–2)
ALP LIVER SERPL-CCNC: 135 U/L
ALT SERPL-CCNC: 23 U/L
ANION GAP SERPL CALC-SCNC: 6 MMOL/L (ref 0–18)
AST SERPL-CCNC: 22 U/L (ref 15–37)
BASOPHILS # BLD AUTO: 0.13 X10(3) UL (ref 0–0.2)
BASOPHILS NFR BLD AUTO: 1.5 %
BILIRUB SERPL-MCNC: 0.5 MG/DL (ref 0.1–2)
BUN BLD-MCNC: 12 MG/DL (ref 9–23)
CALCIUM BLD-MCNC: 9 MG/DL (ref 8.5–10.1)
CHLORIDE SERPL-SCNC: 103 MMOL/L (ref 98–112)
CO2 SERPL-SCNC: 26 MMOL/L (ref 21–32)
CREAT BLD-MCNC: 1.16 MG/DL
DEPRECATED HBV CORE AB SER IA-ACNC: 56.3 NG/ML
EGFRCR SERPLBLD CKD-EPI 2021: 54 ML/MIN/1.73M2 (ref 60–?)
EOSINOPHIL # BLD AUTO: 0.42 X10(3) UL (ref 0–0.7)
EOSINOPHIL NFR BLD AUTO: 4.7 %
ERYTHROCYTE [DISTWIDTH] IN BLOOD BY AUTOMATED COUNT: 14.6 %
FASTING STATUS PATIENT QL REPORTED: NO
GLOBULIN PLAS-MCNC: 3.9 G/DL (ref 2.8–4.4)
GLUCOSE BLD-MCNC: 245 MG/DL (ref 70–99)
HCT VFR BLD AUTO: 37.4 %
HGB BLD-MCNC: 12.9 G/DL
IMM GRANULOCYTES # BLD AUTO: 0.04 X10(3) UL (ref 0–1)
IMM GRANULOCYTES NFR BLD: 0.4 %
IRON SATN MFR SERPL: 13 %
IRON SERPL-MCNC: 54 UG/DL
LYMPHOCYTES # BLD AUTO: 2.58 X10(3) UL (ref 1–4)
LYMPHOCYTES NFR BLD AUTO: 29 %
MCH RBC QN AUTO: 31.6 PG (ref 26–34)
MCHC RBC AUTO-ENTMCNC: 34.5 G/DL (ref 31–37)
MCV RBC AUTO: 91.7 FL
MONOCYTES # BLD AUTO: 0.51 X10(3) UL (ref 0.1–1)
MONOCYTES NFR BLD AUTO: 5.7 %
NEUTROPHILS # BLD AUTO: 5.22 X10 (3) UL (ref 1.5–7.7)
NEUTROPHILS # BLD AUTO: 5.22 X10(3) UL (ref 1.5–7.7)
NEUTROPHILS NFR BLD AUTO: 58.7 %
OSMOLALITY SERPL CALC.SUM OF ELEC: 288 MOSM/KG (ref 275–295)
PLATELET # BLD AUTO: 214 10(3)UL (ref 150–450)
POTASSIUM SERPL-SCNC: 3.7 MMOL/L (ref 3.5–5.1)
PROT SERPL-MCNC: 7.8 G/DL (ref 6.4–8.2)
RBC # BLD AUTO: 4.08 X10(6)UL
SODIUM SERPL-SCNC: 135 MMOL/L (ref 136–145)
TIBC SERPL-MCNC: 426 UG/DL (ref 240–450)
TRANSFERRIN SERPL-MCNC: 286 MG/DL (ref 200–360)
WBC # BLD AUTO: 8.9 X10(3) UL (ref 4–11)

## 2024-06-13 PROCEDURE — 83550 IRON BINDING TEST: CPT | Performed by: INTERNAL MEDICINE

## 2024-06-13 PROCEDURE — 36415 COLL VENOUS BLD VENIPUNCTURE: CPT

## 2024-06-13 PROCEDURE — 85025 COMPLETE CBC W/AUTO DIFF WBC: CPT | Performed by: INTERNAL MEDICINE

## 2024-06-13 PROCEDURE — 80053 COMPREHEN METABOLIC PANEL: CPT | Performed by: INTERNAL MEDICINE

## 2024-06-13 PROCEDURE — 82728 ASSAY OF FERRITIN: CPT | Performed by: INTERNAL MEDICINE

## 2024-06-13 PROCEDURE — 96402 CHEMO HORMON ANTINEOPL SQ/IM: CPT

## 2024-06-13 PROCEDURE — 83540 ASSAY OF IRON: CPT | Performed by: INTERNAL MEDICINE

## 2024-06-13 RX ORDER — LAMOTRIGINE 25 MG/1
500 TABLET ORAL ONCE
Status: COMPLETED | OUTPATIENT
Start: 2024-06-13 | End: 2024-06-13

## 2024-06-13 RX ORDER — LAMOTRIGINE 25 MG/1
500 TABLET ORAL ONCE
Status: CANCELLED | OUTPATIENT
Start: 2024-07-07

## 2024-06-13 RX ORDER — DIPHENHYDRAMINE HCL 25 MG
25 CAPSULE ORAL ONCE
Status: CANCELLED | OUTPATIENT
Start: 2024-07-07 | End: 2024-07-07

## 2024-06-13 RX ORDER — DIPHENHYDRAMINE HCL 25 MG
25 CAPSULE ORAL ONCE
Status: COMPLETED | OUTPATIENT
Start: 2024-06-13 | End: 2024-06-13

## 2024-06-13 RX ADMIN — LAMOTRIGINE 500 MG: 25 TABLET ORAL at 15:58:00

## 2024-06-13 RX ADMIN — DIPHENHYDRAMINE HCL 25 MG: 25 MG CAPSULE ORAL at 15:57:00

## 2024-06-13 NOTE — PROGRESS NOTES
Pt here for follow up and injection.   She complains of a lot of family stress.    Outpatient Oncology Care Plan  Problem list:  knowledge deficit    Problems related to:    disease/disease progression    Interventions:  provided general teaching    Expected outcomes:  understands plan of care    Progress towards outcome:  making progress    Education Record    Learner:  Patient  Barriers / Limitations:  None  Method:  Brief focused  Outcome:  Shows understanding  Comments:

## 2024-06-13 NOTE — PROGRESS NOTES
SW met with pt to provide support and resources due to PHQ9 score of 14 (0 on #9). Pt explained her home environment including her youngest daughter moving back in with her which has increased stress and anxiety in the home.  Pt can recall negative statements made by her daughter in the past.    SW discussed counseling resources available including [facility] which pt has declined in the past.  SW discussed support groups through [facility] including a virtual option. Pt option to the virtual option and accepting of a flyer that included other support opportunities.    SW provided contact information for any further needs.    Jenifer Day, JESSICAW   at [address], [place] [address]  [id number] [place] [address]  Ph: [phone] [email or web address]  Fax: [phone]

## 2024-06-21 NOTE — PROGRESS NOTES
Cancer Center Progress Note  Patient Name: Martha Tobias   YOB: 1960   Medical Record Number: [id number]     Attending Physician: Glenn Munroe M.D.     Date of Visit: 6/21/2024        Chief Complaint:  Chief Complaint   Patient presents with    Follow - Up        Oncologic History:  Martha Tobias is a 63 year old female with limited PMH and limited prior access to the medical system admitted on 5/18 c/o a 3 week history of shortness of breath. She was found to have a large R pleural effusion. She underwent therapeutic thoracentesis. Cytology was unrevealing. When the fluid reaccumulated, she was taken to the OR for thoracostomy with pleural biopsy. In the OR, it was noted that her bilateral breasts were abnormal, concerning for breast masses. The thoracostomy was performed and the pleura appeared abnormal. It was biopsied revealing metastatic breast cancer. Pleurodesis was also performed. Upon questioning, the patient first noted breast abnormalities 2 years prior to admission. She had not seen a physician in several years. She denied symptoms elsewhere.     Progression was noted on CT and she was transitioned to Faslodex and Abeniciclib.    She had difficulty tolerating the Abemiciclib due to diarrhea and cytopenias.    She was admitted to the hospital from 12/21/20 to 12/24/20 with severe anemia after restarting the Abemaciclib. She was transfused and underwent MRI enterography. No bleeding source, other than her hemorrhoids was found. She was discharged to follow up with GI. The Abemaciclib was discontinued.   Interestingly, her liver lesions on MRI were described as stable hemangiomas, where they had appeared to be enlarging metastatic deposits on non-contrast CT.     Biopsy of the liver revealed cavernous hemangioma.    She was hospitalized from 12/14/21 to 12/19/21 and diagnosed with cirrhosis. She required paracentesis for improved comfort. Cytology was negative for malignancy. She was  transfused and given IV iron for her iron deficiency. EGD revealed esophageal varices and portal gastropathy. Her push enteroscopy and capsule endoscopy revealed small bowel AVMs and erosions/ulcers.  She was discharged to be evaluated by hepatology, ongoing follow up with GI, follow up here for ongoing care of her metastatic breast cancer.    History of Present Illness:  Pt is here for follow up. She feels OK, but is dealing with a lot of stress at home.       Performance Status:  ECOG 0    Past Medical History:  Past Medical History:    Abdominal distention    Anemia    Anxiety    Back pain    Breast cancer (HCC)    breast    COPD (chronic obstructive pulmonary disease) (HCC)    No continuous O2    Disorder of liver    lesions    Disorder of thyroid    Fatigue    Feeling lonely    Hemorrhoids    High cholesterol    History of 2019 novel coronavirus disease (COVID-19)    ASYMPTOMATIC.  NO HOSPITALIZATION    History of blood transfusion    Hyperthyroidism    Leg swelling    Neuropathy    Personal history of antineoplastic chemotherapy    oral    Pneumonia due to organism    PONV (postoperative nausea and vomiting)    Rash    Shortness of breath    Only exertion    Stress    Visual impairment    glasses    Weight gain       Past Surgical History:  Past Surgical History:   Procedure Laterality Date    Colonoscopy      Colonoscopy N/A 10/26/2021    Procedure: COLONOSCOPY with biopsy, Cold Polypectomy & Clips x 5 placement /ESOPHAGOGASTRODUODENOSCOPY (EGD) with Biopsy;  Surgeon: Oscar Harp MD;  Location:  ENDOSCOPY    Oral surgery  2017    Thoracotomy,ltd,biopsy  05/2015       Family History:  Family History   Adopted: Yes   Family history unknown: Yes       Social History:  Social History     Socioeconomic History    Marital status:      Spouse name: Not on file    Number of children: Not on file    Years of education: Not on file    Highest education level: Not on file   Occupational History    Not on  file   Tobacco Use    Smoking status: Light Smoker     Current packs/day: 0.00     Types: Cigarettes    Smokeless tobacco: Never    Tobacco comments:     3-5 cig/ day   Vaping Use    Vaping status: Never Used   Substance and Sexual Activity    Alcohol use: Not Currently     Alcohol/week: 0.0 standard drinks of alcohol     Comment: 2 drinks/yr     Drug use: No    Sexual activity: Not Currently   Other Topics Concern     Service Not Asked    Blood Transfusions Not Asked    Caffeine Concern Yes     Comment: 1 1/2 a day 16oz    Occupational Exposure Not Asked    Hobby Hazards Not Asked    Sleep Concern Not Asked    Stress Concern Not Asked    Weight Concern Not Asked    Special Diet Not Asked    Back Care Not Asked    Exercise No    Bike Helmet Not Asked    Seat Belt Not Asked    Self-Exams Not Asked   Social History Narrative    Not on file     Social Determinants of Health     Financial Resource Strain: Not on file   Food Insecurity: Not on file   Transportation Needs: Not on file   Physical Activity: Not on file   Stress: Not on file   Social Connections: Not on file   Housing Stability: Not on file       Current Medications:    Current Outpatient Medications:     ALBUTEROL 108 (90 Base) MCG/ACT Inhalation Aero Soln, USE 2 INHALATIONS EVERY 4 HOURS AS NEEDED FOR SHORTNESS OF BREATH OR WHEEZING, Disp: 25.5 g, Rfl: 1    HYDROcodone-acetaminophen 7.5-325 MG Oral Tab, Take 1-2 tablets by mouth every 8 (eight) hours as needed for Pain., Disp: 90 tablet, Rfl: 0    spironolactone 100 MG Oral Tab, TAKE 1 TABLET DAILY, Disp: 90 tablet, Rfl: 1    escitalopram 20 MG Oral Tab, Take 1 tablet (20 mg total) by mouth daily., Disp: 90 tablet, Rfl: 1    levothyroxine 25 MCG Oral Tab, Take 1 tablet (25 mcg total) by mouth before breakfast., Disp: 90 tablet, Rfl: 1    levothyroxine (SYNTHROID) 200 MCG Oral Tab, Take 1 tablet (200 mcg total) by mouth before breakfast., Disp: 90 tablet, Rfl: 0    rosuvastatin 5 MG Oral Tab, Take 1  tablet (5 mg total) by mouth nightly., Disp: 90 tablet, Rfl: 1    propranolol 10 MG Oral Tab, Take 1 tablet (10 mg total) by mouth 2 (two) times daily., Disp: 180 tablet, Rfl: 1    Potassium Chloride ER 10 MEQ Oral Tab CR, Take 1 tablet (10 mEq total) by mouth daily., Disp: 90 tablet, Rfl: 1    furosemide 40 MG Oral Tab, Take 1 tablet (40 mg total) by mouth daily., Disp: 90 tablet, Rfl: 1    pantoprazole 40 MG Oral Tab EC, Take 1 tablet (40 mg total) by mouth 2 (two) times daily before meals., Disp: 180 tablet, Rfl: 1    ALPRAZolam 0.5 MG Oral Tab, Take 1 tablet (0.5 mg total) by mouth 2 (two) times daily as needed for Sleep or Anxiety., Disp: 60 tablet, Rfl: 0    OMEGA-3-ACID ETHYL ESTERS 1 g Oral Cap, TAKE 2 CAPSULES TWICE A DAY, Disp: 360 capsule, Rfl: 0    gabapentin 300 MG Oral Cap, Take 1 capsule (300 mg total) by mouth 4 (four) times daily., Disp: 360 capsule, Rfl: 3    Ferrous Sulfate (IRON) 325 (65 Fe) MG Oral Tab, Take by mouth. She does not take daily, Disp: , Rfl:     buPROPion 150 MG Oral Tablet 12 Hr, Take 1 tablet (150 mg total) by mouth 2 (two) times daily., Disp: , Rfl:     Biotin 2500 MCG Oral Cap, Take 1 capsule by mouth every evening., Disp: , Rfl:     folic acid 800 MCG Oral Tab, Take 1 tablet (800 mcg total) by mouth daily., Disp: , Rfl:     Vitamin B-12 1000 MCG Oral Tab, Take 1 tablet (1,000 mcg total) by mouth daily., Disp: 30 tablet, Rfl: 11    Allergies:  Allergies   Allergen Reactions    Radiology Contrast Iodinated Dyes HIVES and ITCHING    Iodine (Topical) OTHER (SEE COMMENTS)        Review of Systems:    Constitutional No fevers, chills, night sweats, excessive fatigue or weight loss.   Eyes No significant visual difficulties. No diplopia. No yellowing.   Hematologic/Lymphatic No easy bruising or bleeding.  Denies tender or palpable lymph nodes.   Respiratory No dyspnea, pleuritic chest pain, cough or hemoptysis.   Cardiovascular No anginal chest pain, palpitations or orthopnea.    Gastrointestinal No nausea, vomiting, diarrhea, GI bleeding, or constipation. NL appetite, No Early Satiety.   Genitorurinary No hematuria, dysuria, abnormal bleeding.   Integumentary No yellowing.   Neurologic No headache, blurred vision, and no areas of focal weakness. Normal gait.   Psychiatric No insomnia, depression, erica or mood swings.         Vital Signs:  /64 (BP Location: Left arm, Patient Position: Sitting, Cuff Size: adult)   Pulse 81   Temp 96.9 °F (36.1 °C) (Tympanic)   Resp 18   Ht 1.676 m (5' 5.98\")   Wt 84.4 kg (186 lb)   SpO2 98%   BMI 30.04 kg/m²       Physical Examination:    Constitutional Normal - Mood and affect appropriate. Appears close to chronological age. Well nourished. Well developed.   Eyes Normal - Conjunctivae and sclerae are clear and without icterus. Pupils are reactive and equal.   Hematologic/Lymphatic Normal - No petechiae or purpura.  No tender or palpable lymph nodes in the cervical, supraclavicular, axillary or inguinal area.   Respiratory Normal - Lungs CTA, no rhonchi or wheezing.   Cardiovascular Normal - RRR, no murmurs, gallops or rubs.   Abdomen Non-tender, moderately distended, no masses, ascites or hepatosplenomegaly. Stable umbilical hernia.   Extremities Normal - No C/C/E    Integumentary Diffuse angioma-like rash of the arms and torso. Small hemangioma of the R eye.No Jaundice   Neurologic Normal - No sensory or motor deficits, normal cerebellar function, normal gait, cranial nerves intact.   Psychiatric Normal - A&Ox3, Coherent speech. Verbalizes understanding of our discussions today.           Labs:   Latest Reference Range & Units 06/13/24 15:24   Sodium 136 - 145 mmol/L 135 (L)   Potassium 3.5 - 5.1 mmol/L 3.7   Chloride 98 - 112 mmol/L 103   Carbon Dioxide, Total 21.0 - 32.0 mmol/L 26.0   BUN 9 - 23 mg/dL 12   CREATININE 0.55 - 1.02 mg/dL 1.16 (H)   CALCIUM 8.5 - 10.1 mg/dL 9.0   EGFR >=60 mL/min/1.73m2 54 (L)   ANION GAP 0 - 18 mmol/L 6    CALCULATED OSMOLALITY 275 - 295 mOsm/kg 288   ALKALINE PHOSPHATASE 50 - 130 U/L 135 (H)   AST (SGOT) 15 - 37 U/L 22   ALT (SGPT) 13 - 56 U/L 23   Total Bilirubin 0.1 - 2.0 mg/dL 0.5   Globulin 2.8 - 4.4 g/dL 3.9   (L): Data is abnormally low  (H): Data is abnormally high   Latest Reference Range & Units 06/13/24 15:24   A/G Ratio 1.0 - 2.0  1.0   PROTEIN, TOTAL 6.4 - 8.2 g/dL 7.8   Albumin 3.4 - 5.0 g/dL 3.9        Latest Reference Range & Units 06/13/24 15:24   Iron, Serum 50 - 170 ug/dL 54   Transferrin 200 - 360 mg/dL 286   Iron Bind.Cap.(TIBC) 240 - 450 ug/dL 426   Iron Saturation 15 - 50 % 13 (L)   FERRITIN 18.0 - 340.0 ng/mL 56.3   (L): Data is abnormally low     Latest Reference Range & Units 06/13/24 15:24   WBC 4.0 - 11.0 x10(3) uL 8.9   Hemoglobin 12.0 - 16.0 g/dL 12.9   Hematocrit 35.0 - 48.0 % 37.4   Platelet Count 150.0 - 450.0 10(3)uL 214.0   RBC 3.80 - 5.30 x10(6)uL 4.08   MCH 26.0 - 34.0 pg 31.6   MCHC 31.0 - 37.0 g/dL 34.5   MCV 80.0 - 100.0 fL 91.7   RDW % 14.6   Prelim Neutrophil Abs 1.50 - 7.70 x10 (3) uL 5.22   Neutrophils Absolute 1.50 - 7.70 x10(3) uL 5.22   Lymphocytes Absolute 1.00 - 4.00 x10(3) uL 2.58   Monocytes Absolute 0.10 - 1.00 x10(3) uL 0.51   Eosinophils Absolute 0.00 - 0.70 x10(3) uL 0.42   Basophils Absolute 0.00 - 0.20 x10(3) uL 0.13   Immature Granulocyte Absolute 0.00 - 1.00 x10(3) uL 0.04   Neutrophils % % 58.7   Lymphocytes % % 29.0   Monocytes % % 5.7   Eosinophils % % 4.7   Basophils % % 1.5   Immature Granulocyte % % 0.4       Radiology:  .        Pathology:  Final Diagnosis:   Dominant mass, left lobe of liver, ultrasound-guided biopsy:  -Fragments of hemangioma.  (See comment.)         Impression and Plan:  Breast Cancer: Stage IV, ER+ ID+ HER-2 Neg.  She was transitioned to Faslodex and Abemaciclib. Abemaciclib dose reduced for diarrhea and rash, and then held for pancytopenia. After restarting, she was hospitalized for severe anemia that was likely related to  bleeding, iron deficiency, and the chemo. MRI suggests that the liver lesions have not progressed, as suspected on the non-con CT.  Discontinued the Abemaciclib and continuing Faslodex. Repeat MRI again raised the question of liver metastases. In an effort to definitively determine whether these were metastases, Liver biopsy was accomplished that revealed hemangioma. CT Chest and MRI Abd were stable, indicating that her breast cancer is controlled on Faslodex alone.  Will continue Faslodex. Repeat imaging is stable. The patient reports that her family has enquired about BCRA testing. The patient is very low risk for carrying a mutation, having an ER+ UT+ HER2 negative cancer diagnosed after the age of 50. Testing was not indicated prior to the Mu-ism of PARP inhibitors, that can be used to treat metastatic breast cancer. Testing is normal. Continue Faslodex. Will premedicate with PO benadryl for her recent rash.    Bone Metastases: Stable.  Discontinued Xgeva due to her teeth issues.     Cirrhosis: Unclear etiology, though likely PITTS. She feels like she may need a paracentesis. I advised her to call hepatology, but will check an abd ultrasound as well.    Anemia: Iron deficiency and ongoing blood loss. Secondary to hemorrhoids and AVMs. She has Varices, as well, but they were not bleeding, and did not require banding while she was in the hospital.   Hgb is better today. Follow iron levels closely. Monitor for active bleeding. Replete iron stores for her ferritin<30.     Rash w/ hemangioma of the eyelid, hemangiomas of the Liver, severe hemorrhoids: She saw hepatology and the rash has improved.      Planned Follow Up:  4 weeks for MD visit with labs.     I spent * minutes face to face with the patient.  More than 50% of that time was spent counseling the patient and/or on coordination of care.  The diagnosis, prognosis, and general treatment was explained to the patient and the family.    Electronically Signed  by:    Glenn Munroe M.D.  [facility]

## 2024-07-08 ENCOUNTER — LAB ENCOUNTER (OUTPATIENT)
Dept: LAB | Age: 64
End: 2024-07-08
Attending: FAMILY MEDICINE
Payer: COMMERCIAL

## 2024-07-08 DIAGNOSIS — E78.2 HYPERLIPIDEMIA, MIXED: ICD-10-CM

## 2024-07-08 DIAGNOSIS — E03.9 HYPOTHYROIDISM IN ADULT: ICD-10-CM

## 2024-07-08 LAB
ALBUMIN SERPL-MCNC: 3.9 G/DL (ref 3.4–5)
ALBUMIN/GLOB SERPL: 1.1 {RATIO} (ref 1–2)
ALP LIVER SERPL-CCNC: 123 U/L
ALT SERPL-CCNC: 22 U/L
ANION GAP SERPL CALC-SCNC: 4 MMOL/L (ref 0–18)
AST SERPL-CCNC: 14 U/L (ref 15–37)
BILIRUB SERPL-MCNC: 0.5 MG/DL (ref 0.1–2)
BUN BLD-MCNC: 10 MG/DL (ref 9–23)
CALCIUM BLD-MCNC: 9.6 MG/DL (ref 8.5–10.1)
CHLORIDE SERPL-SCNC: 109 MMOL/L (ref 98–112)
CHOLEST SERPL-MCNC: 274 MG/DL (ref ?–200)
CO2 SERPL-SCNC: 28 MMOL/L (ref 21–32)
CREAT BLD-MCNC: 1 MG/DL
EGFRCR SERPLBLD CKD-EPI 2021: 64 ML/MIN/1.73M2 (ref 60–?)
FASTING PATIENT LIPID ANSWER: YES
FASTING STATUS PATIENT QL REPORTED: YES
GLOBULIN PLAS-MCNC: 3.6 G/DL (ref 2.8–4.4)
GLUCOSE BLD-MCNC: 167 MG/DL (ref 70–99)
HDLC SERPL-MCNC: 38 MG/DL (ref 40–59)
LDLC SERPL CALC-MCNC: 137 MG/DL (ref ?–100)
NONHDLC SERPL-MCNC: 236 MG/DL (ref ?–130)
OSMOLALITY SERPL CALC.SUM OF ELEC: 295 MOSM/KG (ref 275–295)
POTASSIUM SERPL-SCNC: 4.5 MMOL/L (ref 3.5–5.1)
PROT SERPL-MCNC: 7.5 G/DL (ref 6.4–8.2)
SODIUM SERPL-SCNC: 141 MMOL/L (ref 136–145)
T4 FREE SERPL-MCNC: 0.5 NG/DL (ref 0.8–1.7)
TRIGL SERPL-MCNC: 532 MG/DL (ref 30–149)
TSI SER-ACNC: 65.2 MIU/ML (ref 0.36–3.74)
VLDLC SERPL CALC-MCNC: 103 MG/DL (ref 0–30)

## 2024-07-08 PROCEDURE — 80061 LIPID PANEL: CPT

## 2024-07-08 PROCEDURE — 80053 COMPREHEN METABOLIC PANEL: CPT

## 2024-07-08 PROCEDURE — 84439 ASSAY OF FREE THYROXINE: CPT

## 2024-07-08 PROCEDURE — 36415 COLL VENOUS BLD VENIPUNCTURE: CPT

## 2024-07-08 PROCEDURE — 84443 ASSAY THYROID STIM HORMONE: CPT

## 2024-07-09 ENCOUNTER — OFFICE VISIT (OUTPATIENT)
Dept: FAMILY MEDICINE CLINIC | Facility: CLINIC | Age: 64
End: 2024-07-09
Payer: COMMERCIAL

## 2024-07-09 VITALS
BODY MASS INDEX: 29.89 KG/M2 | HEIGHT: 65.98 IN | WEIGHT: 186 LBS | TEMPERATURE: 98 F | RESPIRATION RATE: 20 BRPM | HEART RATE: 68 BPM | DIASTOLIC BLOOD PRESSURE: 62 MMHG | SYSTOLIC BLOOD PRESSURE: 102 MMHG

## 2024-07-09 DIAGNOSIS — E78.2 HYPERLIPIDEMIA, MIXED: Primary | ICD-10-CM

## 2024-07-09 DIAGNOSIS — C78.2 CARCINOMA OF RIGHT BREAST METASTATIC TO PLEURA (HCC): ICD-10-CM

## 2024-07-09 DIAGNOSIS — C78.2 BREAST CANCER METASTASIZED TO PLEURA, UNSPECIFIED LATERALITY (HCC): ICD-10-CM

## 2024-07-09 DIAGNOSIS — R60.0 LOWER EXTREMITY EDEMA: ICD-10-CM

## 2024-07-09 DIAGNOSIS — Z17.0 MALIGNANT NEOPLASM OF NIPPLE OF RIGHT BREAST IN FEMALE, ESTROGEN RECEPTOR POSITIVE (HCC): ICD-10-CM

## 2024-07-09 DIAGNOSIS — R73.9 HYPERGLYCEMIA: ICD-10-CM

## 2024-07-09 DIAGNOSIS — C50.911 CARCINOMA OF RIGHT BREAST METASTATIC TO PLEURA (HCC): ICD-10-CM

## 2024-07-09 DIAGNOSIS — C50.919 BREAST CANCER METASTASIZED TO PLEURA, UNSPECIFIED LATERALITY (HCC): ICD-10-CM

## 2024-07-09 DIAGNOSIS — R60.0 EDEMA OF BOTH FEET: ICD-10-CM

## 2024-07-09 DIAGNOSIS — G62.9 PERIPHERAL POLYNEUROPATHY: ICD-10-CM

## 2024-07-09 DIAGNOSIS — J43.9 PULMONARY EMPHYSEMA, UNSPECIFIED EMPHYSEMA TYPE (HCC): ICD-10-CM

## 2024-07-09 DIAGNOSIS — F41.9 ANXIETY: ICD-10-CM

## 2024-07-09 DIAGNOSIS — E03.9 HYPOTHYROIDISM IN ADULT: ICD-10-CM

## 2024-07-09 DIAGNOSIS — C79.51 MALIGNANT NEOPLASM METASTATIC TO BONE (HCC): ICD-10-CM

## 2024-07-09 DIAGNOSIS — F51.01 PRIMARY INSOMNIA: ICD-10-CM

## 2024-07-09 DIAGNOSIS — D50.0 IRON DEFICIENCY ANEMIA DUE TO CHRONIC BLOOD LOSS: ICD-10-CM

## 2024-07-09 DIAGNOSIS — C50.011 MALIGNANT NEOPLASM OF NIPPLE OF RIGHT BREAST IN FEMALE, ESTROGEN RECEPTOR POSITIVE (HCC): ICD-10-CM

## 2024-07-09 PROCEDURE — 3074F SYST BP LT 130 MM HG: CPT | Performed by: FAMILY MEDICINE

## 2024-07-09 PROCEDURE — 3078F DIAST BP <80 MM HG: CPT | Performed by: FAMILY MEDICINE

## 2024-07-09 PROCEDURE — 99215 OFFICE O/P EST HI 40 MIN: CPT | Performed by: FAMILY MEDICINE

## 2024-07-09 PROCEDURE — G2211 COMPLEX E/M VISIT ADD ON: HCPCS | Performed by: FAMILY MEDICINE

## 2024-07-09 PROCEDURE — 3008F BODY MASS INDEX DOCD: CPT | Performed by: FAMILY MEDICINE

## 2024-07-09 RX ORDER — ROSUVASTATIN CALCIUM 5 MG/1
5 TABLET, COATED ORAL NIGHTLY
Qty: 90 TABLET | Refills: 1 | Status: SHIPPED | OUTPATIENT
Start: 2024-07-09

## 2024-07-09 RX ORDER — ESCITALOPRAM OXALATE 20 MG/1
20 TABLET ORAL DAILY
Qty: 30 TABLET | Refills: 0 | Status: SHIPPED | OUTPATIENT
Start: 2024-07-09

## 2024-07-09 RX ORDER — ALPRAZOLAM 0.5 MG/1
0.5 TABLET ORAL 2 TIMES DAILY PRN
Qty: 60 TABLET | Refills: 0 | Status: SHIPPED | OUTPATIENT
Start: 2024-07-09

## 2024-07-09 RX ORDER — LEVOTHYROXINE SODIUM 0.2 MG/1
200 TABLET ORAL
Qty: 90 TABLET | Refills: 0 | Status: SHIPPED | OUTPATIENT
Start: 2024-07-09

## 2024-07-09 RX ORDER — ZOLPIDEM TARTRATE 5 MG/1
5 TABLET ORAL NIGHTLY PRN
Qty: 30 TABLET | Refills: 2 | Status: SHIPPED | OUTPATIENT
Start: 2024-07-09

## 2024-07-09 RX ORDER — HYDROCODONE BITARTRATE AND ACETAMINOPHEN 7.5; 325 MG/1; MG/1
1-2 TABLET ORAL EVERY 8 HOURS PRN
Qty: 90 TABLET | Refills: 0 | Status: SHIPPED | OUTPATIENT
Start: 2024-08-09 | End: 2024-09-08

## 2024-07-09 RX ORDER — PANTOPRAZOLE SODIUM 40 MG/1
40 TABLET, DELAYED RELEASE ORAL
Qty: 180 TABLET | Refills: 1 | Status: SHIPPED | OUTPATIENT
Start: 2024-07-09

## 2024-07-09 RX ORDER — POTASSIUM CHLORIDE 750 MG/1
10 TABLET, FILM COATED, EXTENDED RELEASE ORAL DAILY
Qty: 90 TABLET | Refills: 1 | Status: SHIPPED | OUTPATIENT
Start: 2024-07-09

## 2024-07-09 RX ORDER — LEVOTHYROXINE SODIUM 0.05 MG/1
50 TABLET ORAL
Qty: 90 TABLET | Refills: 0 | Status: SHIPPED | OUTPATIENT
Start: 2024-07-09

## 2024-07-09 RX ORDER — FUROSEMIDE 40 MG/1
40 TABLET ORAL DAILY
Qty: 90 TABLET | Refills: 1 | Status: SHIPPED | OUTPATIENT
Start: 2024-07-09

## 2024-07-09 RX ORDER — HYDROCODONE BITARTRATE AND ACETAMINOPHEN 7.5; 325 MG/1; MG/1
1-2 TABLET ORAL EVERY 8 HOURS PRN
Qty: 90 TABLET | Refills: 0 | Status: SHIPPED | OUTPATIENT
Start: 2024-07-09 | End: 2024-08-08

## 2024-07-09 RX ORDER — PROPRANOLOL HYDROCHLORIDE 10 MG/1
10 TABLET ORAL 2 TIMES DAILY
Qty: 180 TABLET | Refills: 1 | Status: SHIPPED | OUTPATIENT
Start: 2024-07-09

## 2024-07-09 RX ORDER — HYDROCODONE BITARTRATE AND ACETAMINOPHEN 7.5; 325 MG/1; MG/1
1-2 TABLET ORAL EVERY 8 HOURS PRN
Qty: 90 TABLET | Refills: 0 | Status: SHIPPED | OUTPATIENT
Start: 2024-09-09 | End: 2024-10-09

## 2024-07-09 RX ORDER — LEVOTHYROXINE SODIUM 0.03 MG/1
25 TABLET ORAL
Qty: 90 TABLET | Refills: 1 | Status: CANCELLED | OUTPATIENT
Start: 2024-07-09

## 2024-07-09 NOTE — PATIENT INSTRUCTIONS
Recheck lipid panel in 2 weeks.  Modify diet as we discussed.  Start zolpidem 5 mg 1 tablet at bedtime.  Increase levothyroxine to 250 mg daily-  take levothyroxine 200 mg and 50 mg tablets at the same time.  Try not to zolpidem and alprazolam at the same time.  Continue using hydrocodone as needed for pain.  Keep good hydration.  Happy to recheck another set of testing before next visit

## 2024-07-09 NOTE — PROGRESS NOTES
Martha Tobias is a 63 year old female.cc neuropathy,breast cancer, hypercholesterolemia,, hypothyroidism, anxiety, anemia, pain, edema,  HPI:   Patient is coming to the office for follow-up of multiple medical problems.    Patient has metastatic breast cancer.  She is seeing Dr. Munroe.  She is getting some iron infusion because of the chronic anemia.  Treatment by oncologist..    Patient complains of having neuropathy.  He also has some pain in the bilateral feet lower extremities.  She is using gabapentin medication is helping.  She is using hydrocodone she takes 1 tablet every 8 hours and she would like prescription for that hydrocodone.  3 prescriptions will be given today patient will follow-up in 3 months.  She says that the bilateral feet pain it really bothers her and when she has feet pain it is hard to handle that.    Hypercholesterolemia-patient will try to modify her diet.  Patient triglycerides came back over 500 patient understands risk of acute pancreatitis related to hypertriglyceridemia.  She will try to modify her diet to try to have less creamer with her coffee.  Will recheck blood work in 2 weeks.  Order will keep patient on current medication due to history of elevated liver function test.  Was placed.  LDL came back elevated but    Elevated liver function tests are improved.  She has multiple liver lesions.  We will continue monitoring.  Patient seen liver specialist in the past.  Blood work is monitored.    Hypothyroidism she is taking levothyroxine continue monitoring blood work.  Recent TSH came back at very high.  Will increase levothyroxine to 250 mg daily.  Patient will have a blood work recheck prior to next visit.  Having some tiredness fatigue low energy.    Patient is is doing okay with her anxiety she is taking escitalopram 20 mg daily.  Taking Xanax as needed.  Right now doing okay.  On PHQ-9 patient says that over the last 2 weeks nearly every day she has trouble to sleep at  night having little energy feeling tired.  Several days within 2 weeks having little interest or pleasure in doing things feeling down depressed hopeless having been overeating and moving or speaking slowly but other people would notice.  PHQ-9 score came back at 10.  No suicidal no homicidal    On ELVIRA-7 patient says that over the last weeks nearly every day she has trouble to relax.  More than half days within 2 weeks worrying too much about different things.  Several days within 2 weeks feeling nervous anxious on the edge, not being able to stop or control worrying and becoming easily annoyed or irritable.  ELVIRA-7 score came back at 8.    Breast ca with metastasis to the pleura and bones continue hydrocodone for pain.  She is seeing oncologist.  Managed by them.    Edema bilateral lower legs patient will elevate legs, monitor.  Taking diuretics we will continue medication.  Refill was called in.    Insomnia patient has a hard time to fall asleep.  She is using some over-the-counter medication but there is no improvement.  She has been using gabapentin sometimes but is not always working.  Occasionally with needed she takes alprazolam.  Will try zolpidem patient understands that she cannot take this medication with her hydrocodone or alprazolam at the same time due to respiratory  Problems related to taking all those meds at the same time.    Current Outpatient Medications   Medication Sig Dispense Refill    ALPRAZolam 0.5 MG Oral Tab Take 1 tablet (0.5 mg total) by mouth 2 (two) times daily as needed for Sleep or Anxiety. 60 tablet 0    rosuvastatin 5 MG Oral Tab Take 1 tablet (5 mg total) by mouth nightly. 90 tablet 1    Potassium Chloride ER 10 MEQ Oral Tab CR Take 1 tablet (10 mEq total) by mouth daily. 90 tablet 1    escitalopram 20 MG Oral Tab Take 1 tablet (20 mg total) by mouth daily. 30 tablet 0    levothyroxine (SYNTHROID) 200 MCG Oral Tab Take 1 tablet (200 mcg total) by mouth before breakfast. 90 tablet  0    pantoprazole 40 MG Oral Tab EC Take 1 tablet (40 mg total) by mouth 2 (two) times daily before meals. 180 tablet 1    propranolol 10 MG Oral Tab Take 1 tablet (10 mg total) by mouth 2 (two) times daily. 180 tablet 1    furosemide 40 MG Oral Tab Take 1 tablet (40 mg total) by mouth daily. 90 tablet 1    HYDROcodone-acetaminophen 7.5-325 MG Oral Tab Take 1-2 tablets by mouth every 8 (eight) hours as needed for Pain. 90 tablet 0    [START ON 8/9/2024] HYDROcodone-acetaminophen 7.5-325 MG Oral Tab Take 1-2 tablets by mouth every 8 (eight) hours as needed for Pain. 90 tablet 0    [START ON 9/9/2024] HYDROcodone-acetaminophen 7.5-325 MG Oral Tab Take 1-2 tablets by mouth every 8 (eight) hours as needed for Pain. 90 tablet 0    zolpidem 5 MG Oral Tab Take 1 tablet (5 mg total) by mouth nightly as needed for Sleep. 30 tablet 2    levothyroxine 50 MCG Oral Tab Take 1 tablet (50 mcg total) by mouth before breakfast. 90 tablet 0    ALBUTEROL 108 (90 Base) MCG/ACT Inhalation Aero Soln USE 2 INHALATIONS EVERY 4 HOURS AS NEEDED FOR SHORTNESS OF BREATH OR WHEEZING 25.5 g 1    HYDROcodone-acetaminophen 7.5-325 MG Oral Tab Take 1-2 tablets by mouth every 8 (eight) hours as needed for Pain. 90 tablet 0    spironolactone 100 MG Oral Tab TAKE 1 TABLET DAILY 90 tablet 1    levothyroxine 25 MCG Oral Tab Take 1 tablet (25 mcg total) by mouth before breakfast. 90 tablet 1    OMEGA-3-ACID ETHYL ESTERS 1 g Oral Cap TAKE 2 CAPSULES TWICE A  capsule 0    gabapentin 300 MG Oral Cap Take 1 capsule (300 mg total) by mouth 4 (four) times daily. 360 capsule 3    Ferrous Sulfate (IRON) 325 (65 Fe) MG Oral Tab Take by mouth. She does not take daily      buPROPion 150 MG Oral Tablet 12 Hr Take 1 tablet (150 mg total) by mouth 2 (two) times daily.      Biotin 2500 MCG Oral Cap Take 1 capsule by mouth every evening.      folic acid 800 MCG Oral Tab Take 1 tablet (800 mcg total) by mouth daily.      Vitamin B-12 1000 MCG Oral Tab Take 1  tablet (1,000 mcg total) by mouth daily. 30 tablet 11      Past Medical History:    Abdominal distention    Anemia    Anxiety    Back pain    Breast cancer (HCC)    breast    COPD (chronic obstructive pulmonary disease) (HCC)    No continuous O2    Disorder of liver    lesions    Disorder of thyroid    Fatigue    Feeling lonely    Hemorrhoids    High cholesterol    History of 2019 novel coronavirus disease (COVID-19)    ASYMPTOMATIC.  NO HOSPITALIZATION    History of blood transfusion    Hyperthyroidism    Leg swelling    Neuropathy    Personal history of antineoplastic chemotherapy    oral    Pneumonia due to organism    PONV (postoperative nausea and vomiting)    Rash    Shortness of breath    Only exertion    Stress    Visual impairment    glasses    Weight gain      Social History:  Social History     Socioeconomic History    Marital status:    Tobacco Use    Smoking status: Light Smoker     Current packs/day: 0.00     Types: Cigarettes    Smokeless tobacco: Never    Tobacco comments:     3-5 cig/ day   Vaping Use    Vaping status: Never Used   Substance and Sexual Activity    Alcohol use: Not Currently     Alcohol/week: 0.0 standard drinks of alcohol     Comment: 2 drinks/yr     Drug use: No    Sexual activity: Not Currently   Other Topics Concern    Caffeine Concern Yes     Comment: 1 1/2 a day 16oz    Exercise No        REVIEW OF SYSTEMS:   GENERAL HEALTH: feels well otherwise  SKIN: denies any unusual skin lesions or rashes  HEENT no congestion no runny nose no sore throat  Neck no neck pain  RESPIRATORY: denies shortness of breath with exertion  CARDIOVASCULAR: denies chest pain on exertion  GI: denies abdominal pain and denies heartburn visible umbilical hernia with no tenderness no sign of incarceration.  NEURO: denies headaches, having pain bilateral feet numbness and tingling  Musculoskeletal pain in the extremities, right knee pain, bilateral swelling in the feet   Psych -anxiety stable  now.    EXAM:   /62 (BP Location: Left arm, Patient Position: Sitting, Cuff Size: adult)   Pulse 68   Temp 97.9 °F (36.6 °C) (Temporal)   Resp 20   Ht 5' 5.98\" (1.676 m)   Wt 186 lb (84.4 kg)   BMI 30.04 kg/m²   GENERAL: well developed, well nourished,in no apparent distress, with her .  SKIN: no rashes,no suspicious lesions, erythematous rash  HEENT: atraumatic, normocephalic,ears and throat are clear  NECK: supple,no adenopathy  LUNGS: clear to auscultation  CARDIO: RRR without murmur  GI: good BS's,no masses, HSM or tenderness patient has only tenderness at the umbilical hernia,   EXTREMITIES: , + 1 edema bilateral lower extremities.    Musculoskeletal-s swelling of the lower extremities  Psychiatric - alert  and oriented x3, normal mood patient is here with her .    Results for orders placed or performed in visit on 07/08/24   Comp Metabolic Panel (14)   Result Value Ref Range    Glucose 167 (H) 70 - 99 mg/dL    Sodium 141 136 - 145 mmol/L    Potassium 4.5 3.5 - 5.1 mmol/L    Chloride 109 98 - 112 mmol/L    CO2 28.0 21.0 - 32.0 mmol/L    Anion Gap 4 0 - 18 mmol/L    BUN 10 9 - 23 mg/dL    Creatinine 1.00 0.55 - 1.02 mg/dL    Calcium, Total 9.6 8.5 - 10.1 mg/dL    Calculated Osmolality 295 275 - 295 mOsm/kg    eGFR-Cr 64 >=60 mL/min/1.73m2    AST 14 (L) 15 - 37 U/L    ALT 22 13 - 56 U/L    Alkaline Phosphatase 123 50 - 130 U/L    Bilirubin, Total 0.5 0.1 - 2.0 mg/dL    Total Protein 7.5 6.4 - 8.2 g/dL    Albumin 3.9 3.4 - 5.0 g/dL    Globulin  3.6 2.8 - 4.4 g/dL    A/G Ratio 1.1 1.0 - 2.0    Patient Fasting for CMP? Yes    Lipid Panel   Result Value Ref Range    Cholesterol, Total 274 (H) <200 mg/dL    HDL Cholesterol 38 (L) 40 - 59 mg/dL    Triglycerides 532 (H) 30 - 149 mg/dL    LDL Cholesterol 137 (H) <100 mg/dL    VLDL 103 (H) 0 - 30 mg/dL    Non HDL Chol 236 (H) <130 mg/dL    Patient Fasting for Lipid? Yes    TSH and Free T4   Result Value Ref Range    Free T4 0.5 (L) 0.8 - 1.7  ng/dL    TSH 65.200 (H) 0.358 - 3.740 mIU/mL     *Note: Due to a large number of results and/or encounters for the requested time period, some results have not been displayed. A complete set of results can be found in Results Review.       ASSESSMENT AND PLAN:     Encounter Diagnoses   Name Primary?    Hyperlipidemia, mixed Yes    Anxiety     Malignant neoplasm metastatic to bone (HCC)     Hypothyroidism in adult     Edema of both feet     Primary insomnia     Peripheral polyneuropathy     Carcinoma of right breast metastatic to pleura (HCC)     Pulmonary emphysema, unspecified emphysema type (HCC)     Malignant neoplasm of nipple of right breast in female, estrogen receptor positive (HCC)     Iron deficiency anemia due to chronic blood loss     Hyperglycemia     Lower extremity edema     Breast cancer metastasized to pleura, unspecified laterality (HCC)        Orders Placed This Encounter   Procedures    Comp Metabolic Panel (14)    Lipid Panel    Iron And Tibc    TSH and Free T4    Ferritin    Lipid Panel [E]       Meds & Refills for this Visit:  Requested Prescriptions     Signed Prescriptions Disp Refills    ALPRAZolam 0.5 MG Oral Tab 60 tablet 0     Sig: Take 1 tablet (0.5 mg total) by mouth 2 (two) times daily as needed for Sleep or Anxiety.    rosuvastatin 5 MG Oral Tab 90 tablet 1     Sig: Take 1 tablet (5 mg total) by mouth nightly.    Potassium Chloride ER 10 MEQ Oral Tab CR 90 tablet 1     Sig: Take 1 tablet (10 mEq total) by mouth daily.    escitalopram 20 MG Oral Tab 30 tablet 0     Sig: Take 1 tablet (20 mg total) by mouth daily.    levothyroxine (SYNTHROID) 200 MCG Oral Tab 90 tablet 0     Sig: Take 1 tablet (200 mcg total) by mouth before breakfast.    pantoprazole 40 MG Oral Tab  tablet 1     Sig: Take 1 tablet (40 mg total) by mouth 2 (two) times daily before meals.    propranolol 10 MG Oral Tab 180 tablet 1     Sig: Take 1 tablet (10 mg total) by mouth 2 (two) times daily.    furosemide 40  MG Oral Tab 90 tablet 1     Sig: Take 1 tablet (40 mg total) by mouth daily.    HYDROcodone-acetaminophen 7.5-325 MG Oral Tab 90 tablet 0     Sig: Take 1-2 tablets by mouth every 8 (eight) hours as needed for Pain.    HYDROcodone-acetaminophen 7.5-325 MG Oral Tab 90 tablet 0     Sig: Take 1-2 tablets by mouth every 8 (eight) hours as needed for Pain.    HYDROcodone-acetaminophen 7.5-325 MG Oral Tab 90 tablet 0     Sig: Take 1-2 tablets by mouth every 8 (eight) hours as needed for Pain.    zolpidem 5 MG Oral Tab 30 tablet 2     Sig: Take 1 tablet (5 mg total) by mouth nightly as needed for Sleep.    levothyroxine 50 MCG Oral Tab 90 tablet 0     Sig: Take 1 tablet (50 mcg total) by mouth before breakfast.   Recheck lipid panel in 2 weeks.  Modify diet as we discussed.  Start zolpidem 5 mg 1 tablet at bedtime.  Increase levothyroxine to 250 mg daily-  take levothyroxine 200 mg and 50 mg tablets at the same time.  Try not to zolpidem and alprazolam at the same time.  Continue using hydrocodone as needed for pain.  Keep good hydration.  Happy to recheck another set of testing before next visit      Imaging & Consults:  None    The patient indicates understanding of these issues and agrees to the plan.  The patient is asked to return in 3 months.  Time spent was 40 minutes spend  on  visit and documentation.    The note was dictated using speech recognition software.  Accuracy and grammar in transcription may be subject to error.

## 2024-07-11 ENCOUNTER — OFFICE VISIT (OUTPATIENT)
Dept: HEMATOLOGY/ONCOLOGY | Age: 64
End: 2024-07-11
Attending: INTERNAL MEDICINE
Payer: COMMERCIAL

## 2024-07-11 VITALS
DIASTOLIC BLOOD PRESSURE: 64 MMHG | HEART RATE: 76 BPM | TEMPERATURE: 96 F | SYSTOLIC BLOOD PRESSURE: 136 MMHG | OXYGEN SATURATION: 97 % | HEIGHT: 65.98 IN | BODY MASS INDEX: 29.81 KG/M2 | RESPIRATION RATE: 18 BRPM | WEIGHT: 185.5 LBS

## 2024-07-11 DIAGNOSIS — Z51.11 ENCOUNTER FOR CHEMOTHERAPY MANAGEMENT: ICD-10-CM

## 2024-07-11 DIAGNOSIS — D50.0 IRON DEFICIENCY ANEMIA SECONDARY TO BLOOD LOSS (CHRONIC): ICD-10-CM

## 2024-07-11 DIAGNOSIS — C78.2 CARCINOMA OF RIGHT BREAST METASTATIC TO PLEURA (HCC): Primary | ICD-10-CM

## 2024-07-11 DIAGNOSIS — C79.51 MALIGNANT NEOPLASM METASTATIC TO BONE (HCC): ICD-10-CM

## 2024-07-11 DIAGNOSIS — C78.2 BREAST CANCER METASTASIZED TO PLEURA, UNSPECIFIED LATERALITY (HCC): ICD-10-CM

## 2024-07-11 DIAGNOSIS — C50.919 BREAST CANCER METASTASIZED TO PLEURA, UNSPECIFIED LATERALITY (HCC): ICD-10-CM

## 2024-07-11 DIAGNOSIS — C50.011 MALIGNANT NEOPLASM OF NIPPLE OF RIGHT BREAST IN FEMALE, ESTROGEN RECEPTOR POSITIVE (HCC): ICD-10-CM

## 2024-07-11 DIAGNOSIS — C78.2 BREAST CANCER METASTASIZED TO PLEURA, UNSPECIFIED LATERALITY (HCC): Primary | ICD-10-CM

## 2024-07-11 DIAGNOSIS — C50.911 CARCINOMA OF RIGHT BREAST METASTATIC TO PLEURA (HCC): Primary | ICD-10-CM

## 2024-07-11 DIAGNOSIS — Z17.0 MALIGNANT NEOPLASM OF NIPPLE OF RIGHT BREAST IN FEMALE, ESTROGEN RECEPTOR POSITIVE (HCC): ICD-10-CM

## 2024-07-11 DIAGNOSIS — C50.919 BREAST CANCER METASTASIZED TO PLEURA, UNSPECIFIED LATERALITY (HCC): Primary | ICD-10-CM

## 2024-07-11 LAB
ALBUMIN SERPL-MCNC: 4.2 G/DL (ref 3.4–5)
ALBUMIN/GLOB SERPL: 1.2 {RATIO} (ref 1–2)
ALP LIVER SERPL-CCNC: 135 U/L
ALT SERPL-CCNC: 26 U/L
ANION GAP SERPL CALC-SCNC: 3 MMOL/L (ref 0–18)
AST SERPL-CCNC: 19 U/L (ref 15–37)
BASOPHILS # BLD AUTO: 0.14 X10(3) UL (ref 0–0.2)
BASOPHILS NFR BLD AUTO: 1.8 %
BILIRUB SERPL-MCNC: 0.6 MG/DL (ref 0.1–2)
BUN BLD-MCNC: 12 MG/DL (ref 9–23)
CALCIUM BLD-MCNC: 10 MG/DL (ref 8.5–10.1)
CHLORIDE SERPL-SCNC: 105 MMOL/L (ref 98–112)
CO2 SERPL-SCNC: 28 MMOL/L (ref 21–32)
CREAT BLD-MCNC: 1.1 MG/DL
EGFRCR SERPLBLD CKD-EPI 2021: 57 ML/MIN/1.73M2 (ref 60–?)
EOSINOPHIL # BLD AUTO: 0.39 X10(3) UL (ref 0–0.7)
EOSINOPHIL NFR BLD AUTO: 5.1 %
ERYTHROCYTE [DISTWIDTH] IN BLOOD BY AUTOMATED COUNT: 14.9 %
FASTING STATUS PATIENT QL REPORTED: NO
GLOBULIN PLAS-MCNC: 3.5 G/DL (ref 2.8–4.4)
GLUCOSE BLD-MCNC: 228 MG/DL (ref 70–99)
HCT VFR BLD AUTO: 36.5 %
HGB BLD-MCNC: 12.2 G/DL
IMM GRANULOCYTES # BLD AUTO: 0.03 X10(3) UL (ref 0–1)
IMM GRANULOCYTES NFR BLD: 0.4 %
IRON SATN MFR SERPL: 22 %
IRON SERPL-MCNC: 111 UG/DL
LYMPHOCYTES # BLD AUTO: 2.29 X10(3) UL (ref 1–4)
LYMPHOCYTES NFR BLD AUTO: 29.9 %
MCH RBC QN AUTO: 31.2 PG (ref 26–34)
MCHC RBC AUTO-ENTMCNC: 33.4 G/DL (ref 31–37)
MCV RBC AUTO: 93.4 FL
MONOCYTES # BLD AUTO: 0.36 X10(3) UL (ref 0.1–1)
MONOCYTES NFR BLD AUTO: 4.7 %
NEUTROPHILS # BLD AUTO: 4.44 X10 (3) UL (ref 1.5–7.7)
NEUTROPHILS # BLD AUTO: 4.44 X10(3) UL (ref 1.5–7.7)
NEUTROPHILS NFR BLD AUTO: 58.1 %
OSMOLALITY SERPL CALC.SUM OF ELEC: 289 MOSM/KG (ref 275–295)
PLATELET # BLD AUTO: 207 10(3)UL (ref 150–450)
POTASSIUM SERPL-SCNC: 3.8 MMOL/L (ref 3.5–5.1)
PROT SERPL-MCNC: 7.7 G/DL (ref 6.4–8.2)
RBC # BLD AUTO: 3.91 X10(6)UL
SODIUM SERPL-SCNC: 136 MMOL/L (ref 136–145)
TIBC SERPL-MCNC: 504 UG/DL (ref 240–450)
TRANSFERRIN SERPL-MCNC: 338 MG/DL (ref 200–360)
WBC # BLD AUTO: 7.7 X10(3) UL (ref 4–11)

## 2024-07-11 PROCEDURE — 83550 IRON BINDING TEST: CPT

## 2024-07-11 PROCEDURE — 85025 COMPLETE CBC W/AUTO DIFF WBC: CPT

## 2024-07-11 PROCEDURE — 36415 COLL VENOUS BLD VENIPUNCTURE: CPT

## 2024-07-11 PROCEDURE — 99215 OFFICE O/P EST HI 40 MIN: CPT | Performed by: INTERNAL MEDICINE

## 2024-07-11 PROCEDURE — 83540 ASSAY OF IRON: CPT

## 2024-07-11 PROCEDURE — 96402 CHEMO HORMON ANTINEOPL SQ/IM: CPT

## 2024-07-11 PROCEDURE — 80053 COMPREHEN METABOLIC PANEL: CPT

## 2024-07-11 RX ORDER — LAMOTRIGINE 25 MG/1
500 TABLET ORAL ONCE
Status: COMPLETED | OUTPATIENT
Start: 2024-07-11 | End: 2024-07-11

## 2024-07-11 RX ORDER — LAMOTRIGINE 25 MG/1
500 TABLET ORAL ONCE
Status: CANCELLED | OUTPATIENT
Start: 2024-07-11

## 2024-07-11 RX ORDER — DIPHENHYDRAMINE HCL 25 MG
25 CAPSULE ORAL ONCE
Status: COMPLETED | OUTPATIENT
Start: 2024-07-11 | End: 2024-07-11

## 2024-07-11 RX ORDER — DIPHENHYDRAMINE HCL 25 MG
25 CAPSULE ORAL ONCE
Status: CANCELLED | OUTPATIENT
Start: 2024-07-11 | End: 2024-07-11

## 2024-07-11 RX ADMIN — LAMOTRIGINE 500 MG: 25 TABLET ORAL at 15:25:00

## 2024-07-11 RX ADMIN — DIPHENHYDRAMINE HCL 25 MG: 25 MG CAPSULE ORAL at 15:24:00

## 2024-07-12 NOTE — PROGRESS NOTES
Cancer Center Progress Note  Patient Name: Martha Tobias   YOB: 1960   Medical Record Number: [id number]     Attending Physician: Glenn Munroe M.D.     Date of Visit: 7/11/24        Chief Complaint:  Chief Complaint   Patient presents with    Follow - Up        Oncologic History:  Martha Tobias is a 63 year old female with limited PMH and limited prior access to the medical system admitted on 5/18 c/o a 3 week history of shortness of breath. She was found to have a large R pleural effusion. She underwent therapeutic thoracentesis. Cytology was unrevealing. When the fluid reaccumulated, she was taken to the OR for thoracostomy with pleural biopsy. In the OR, it was noted that her bilateral breasts were abnormal, concerning for breast masses. The thoracostomy was performed and the pleura appeared abnormal. It was biopsied revealing metastatic breast cancer. Pleurodesis was also performed. Upon questioning, the patient first noted breast abnormalities 2 years prior to admission. She had not seen a physician in several years. She denied symptoms elsewhere.     Progression was noted on CT and she was transitioned to Faslodex and Abeniciclib.    She had difficulty tolerating the Abemiciclib due to diarrhea and cytopenias.    She was admitted to the hospital from 12/21/20 to 12/24/20 with severe anemia after restarting the Abemaciclib. She was transfused and underwent MRI enterography. No bleeding source, other than her hemorrhoids was found. She was discharged to follow up with GI. The Abemaciclib was discontinued.   Interestingly, her liver lesions on MRI were described as stable hemangiomas, where they had appeared to be enlarging metastatic deposits on non-contrast CT.     Biopsy of the liver revealed cavernous hemangioma.    She was hospitalized from 12/14/21 to 12/19/21 and diagnosed with cirrhosis. She required paracentesis for improved comfort. Cytology was negative for malignancy. She was  transfused and given IV iron for her iron deficiency. EGD revealed esophageal varices and portal gastropathy. Her push enteroscopy and capsule endoscopy revealed small bowel AVMs and erosions/ulcers.  She was discharged to be evaluated by hepatology, ongoing follow up with GI, follow up here for ongoing care of her metastatic breast cancer.    History of Present Illness:  Pt is here for follow up. She feels well.      Performance Status:  ECOG 0    Past Medical History:  Past Medical History:    Abdominal distention    Anemia    Anxiety    Back pain    Breast cancer (HCC)    breast    COPD (chronic obstructive pulmonary disease) (HCC)    No continuous O2    Disorder of liver    lesions    Disorder of thyroid    Fatigue    Feeling lonely    Hemorrhoids    High cholesterol    History of 2019 novel coronavirus disease (COVID-19)    ASYMPTOMATIC.  NO HOSPITALIZATION    History of blood transfusion    Hyperthyroidism    Leg swelling    Neuropathy    Personal history of antineoplastic chemotherapy    oral    Pneumonia due to organism    PONV (postoperative nausea and vomiting)    Rash    Shortness of breath    Only exertion    Stress    Visual impairment    glasses    Weight gain       Past Surgical History:  Past Surgical History:   Procedure Laterality Date    Colonoscopy      Colonoscopy N/A 10/26/2021    Procedure: COLONOSCOPY with biopsy, Cold Polypectomy & Clips x 5 placement /ESOPHAGOGASTRODUODENOSCOPY (EGD) with Biopsy;  Surgeon: Oscar Harp MD;  Location:  ENDOSCOPY    Oral surgery  2017    [facility],biopsy  05/2015       Family History:  Family History   Adopted: Yes   Family history unknown: Yes       Social History:  Social History     Socioeconomic History    Marital status:      Spouse name: Not on file    Number of children: Not on file    Years of education: Not on file    Highest education level: Not on file   Occupational History    Not on file   Tobacco Use    Smoking status: Light  Smoker     Current packs/day: 0.00     Types: Cigarettes    Smokeless tobacco: Never    Tobacco comments:     3-5 cig/ day   Vaping Use    Vaping status: Never Used   Substance and Sexual Activity    Alcohol use: Not Currently     Alcohol/week: 0.0 standard drinks of alcohol     Comment: 2 drinks/yr     Drug use: No    Sexual activity: Not Currently   Other Topics Concern     Service Not Asked    Blood Transfusions Not Asked    Caffeine Concern Yes     Comment: 1 1/2 a day 16oz    Occupational Exposure Not Asked    Hobby Hazards Not Asked    Sleep Concern Not Asked    Stress Concern Not Asked    Weight Concern Not Asked    Special Diet Not Asked    Back Care Not Asked    Exercise No    Bike Helmet Not Asked    Seat Belt Not Asked    Self-Exams Not Asked   Social History Narrative    Not on file     Social Determinants of Health     Financial Resource Strain: Not on file   Food Insecurity: Not on file   Transportation Needs: Not on file   Physical Activity: Not on file   Stress: Not on file   Social Connections: Not on file   Housing Stability: Not on file       Current Medications:    Current Outpatient Medications:     ALPRAZolam 0.5 MG Oral Tab, Take 1 tablet (0.5 mg total) by mouth 2 (two) times daily as needed for Sleep or Anxiety., Disp: 60 tablet, Rfl: 0    rosuvastatin 5 MG Oral Tab, Take 1 tablet (5 mg total) by mouth nightly., Disp: 90 tablet, Rfl: 1    Potassium Chloride ER 10 MEQ Oral Tab CR, Take 1 tablet (10 mEq total) by mouth daily., Disp: 90 tablet, Rfl: 1    escitalopram 20 MG Oral Tab, Take 1 tablet (20 mg total) by mouth daily., Disp: 30 tablet, Rfl: 0    levothyroxine (SYNTHROID) 200 MCG Oral Tab, Take 1 tablet (200 mcg total) by mouth before breakfast., Disp: 90 tablet, Rfl: 0    pantoprazole 40 MG Oral Tab EC, Take 1 tablet (40 mg total) by mouth 2 (two) times daily before meals., Disp: 180 tablet, Rfl: 1    propranolol 10 MG Oral Tab, Take 1 tablet (10 mg total) by mouth 2 (two) times  daily., Disp: 180 tablet, Rfl: 1    furosemide 40 MG Oral Tab, Take 1 tablet (40 mg total) by mouth daily., Disp: 90 tablet, Rfl: 1    zolpidem 5 MG Oral Tab, Take 1 tablet (5 mg total) by mouth nightly as needed for Sleep., Disp: 30 tablet, Rfl: 2    levothyroxine 50 MCG Oral Tab, Take 1 tablet (50 mcg total) by mouth before breakfast., Disp: 90 tablet, Rfl: 0    ALBUTEROL 108 (90 Base) MCG/ACT Inhalation Aero Soln, USE 2 INHALATIONS EVERY 4 HOURS AS NEEDED FOR SHORTNESS OF BREATH OR WHEEZING, Disp: 25.5 g, Rfl: 1    HYDROcodone-acetaminophen 7.5-325 MG Oral Tab, Take 1-2 tablets by mouth every 8 (eight) hours as needed for Pain., Disp: 90 tablet, Rfl: 0    spironolactone 100 MG Oral Tab, TAKE 1 TABLET DAILY, Disp: 90 tablet, Rfl: 1    OMEGA-3-ACID ETHYL ESTERS 1 g Oral Cap, TAKE 2 CAPSULES TWICE A DAY, Disp: 360 capsule, Rfl: 0    gabapentin 300 MG Oral Cap, Take 1 capsule (300 mg total) by mouth 4 (four) times daily., Disp: 360 capsule, Rfl: 3    Ferrous Sulfate (IRON) 325 (65 Fe) MG Oral Tab, Take by mouth. She does not take daily, Disp: , Rfl:     buPROPion 150 MG Oral Tablet 12 Hr, Take 1 tablet (150 mg total) by mouth 2 (two) times daily., Disp: , Rfl:     Biotin 2500 MCG Oral Cap, Take 1 capsule by mouth every evening., Disp: , Rfl:     folic acid 800 MCG Oral Tab, Take 1 tablet (800 mcg total) by mouth daily., Disp: , Rfl:     Vitamin B-12 1000 MCG Oral Tab, Take 1 tablet (1,000 mcg total) by mouth daily., Disp: 30 tablet, Rfl: 11    HYDROcodone-acetaminophen 7.5-325 MG Oral Tab, Take 1-2 tablets by mouth every 8 (eight) hours as needed for Pain., Disp: 90 tablet, Rfl: 0    [START ON 8/9/2024] HYDROcodone-acetaminophen 7.5-325 MG Oral Tab, Take 1-2 tablets by mouth every 8 (eight) hours as needed for Pain., Disp: 90 tablet, Rfl: 0    [START ON 9/9/2024] HYDROcodone-acetaminophen 7.5-325 MG Oral Tab, Take 1-2 tablets by mouth every 8 (eight) hours as needed for Pain., Disp: 90 tablet, Rfl:  0    Allergies:  Allergies   Allergen Reactions    Radiology Contrast Iodinated Dyes HIVES and ITCHING    Iodine (Topical) OTHER (SEE COMMENTS)        Review of Systems:    Constitutional No fevers, chills, night sweats, excessive fatigue or weight loss.   Eyes No significant visual difficulties. No diplopia. No yellowing.   Hematologic/Lymphatic No easy bruising or bleeding.  Denies tender or palpable lymph nodes.   Respiratory No dyspnea, pleuritic chest pain, cough or hemoptysis.   Cardiovascular No anginal chest pain, palpitations or orthopnea.   Gastrointestinal No nausea, vomiting, diarrhea, GI bleeding, or constipation. NL appetite, No Early Satiety.   Genitorurinary No hematuria, dysuria, abnormal bleeding.   Integumentary No yellowing.   Neurologic No headache, blurred vision, and no areas of focal weakness. Normal gait.   Psychiatric No insomnia, depression, erica or mood swings.         Vital Signs:  There were no vitals taken for this visit.  Height: 167.6 cm (5' 5.98\") (07/11 1441)  Weight: 84.1 kg (185 lb 8 oz) (07/11 1441)  BSA (Calculated - sq m): 1.94 sq meters (07/11 1441)  Pulse: 76 (07/11 1441)  BP: 136/64 (07/11 1441)  Temp: 96 °F (35.6 °C) (07/11 1441)  Do Not Use - Resp Rate: --  SpO2: 97 % (07/11 1441)        Physical Examination:    Constitutional Normal - Mood and affect appropriate. Appears close to chronological age. Well nourished. Well developed.   Eyes Normal - Conjunctivae and sclerae are clear and without icterus. Pupils are reactive and equal.   Hematologic/Lymphatic Normal - No petechiae or purpura.  No tender or palpable lymph nodes in the cervical, supraclavicular, axillary or inguinal area.   Respiratory Normal - Lungs CTA, no rhonchi or wheezing.   Cardiovascular Normal - RRR, no murmurs, gallops or rubs.   Abdomen Non-tender, moderately distended, no masses, ascites or hepatosplenomegaly. Stable umbilical hernia.   Extremities Normal - No C/C/E    Integumentary Diffuse  angioma-like rash of the arms and torso. Small hemangioma of the R eye.No Jaundice   Neurologic Normal - No sensory or motor deficits, normal cerebellar function, normal gait, cranial nerves intact.   Psychiatric Normal - A&Ox3, Coherent speech. Verbalizes understanding of our discussions today.           Labs:  Recent Labs     07/11/24  1441   RBC 3.91   HGB 12.2   HCT 36.5   MCV 93.4   MCH 31.2   MCHC 33.4   RDW 14.9   NEPRELIM 4.44   WBC 7.7   .0     Recent Labs     07/11/24  1441    H   BUN 12   CREATSERUM 1.10 H   CA 10.0   ALB 4.2      K 3.8      CO2 28.0   ALKPHO 135 H   AST 19   ALT 26   BILT 0.6   TP 7.7      Latest Reference Range & Units 07/11/24 14:41   Iron, Serum 50 - 170 ug/dL 111   Transferrin 200 - 360 mg/dL 338   Iron Bind.Cap.(TIBC) 240 - 450 ug/dL 504 (H)   Iron Saturation 15 - 50 % 22   (H): Data is abnormally high    Radiology:  .        Pathology:  Final Diagnosis:   Dominant mass, left lobe of liver, ultrasound-guided biopsy:  -Fragments of hemangioma.  (See comment.)         Impression and Plan:  Breast Cancer: Stage IV, ER+ AR+ HER-2 Neg.  She was transitioned to Faslodex and Abemaciclib. Abemaciclib dose reduced for diarrhea and rash, and then held for pancytopenia. After restarting, she was hospitalized for severe anemia that was likely related to bleeding, iron deficiency, and the chemo. MRI suggests that the liver lesions have not progressed, as suspected on the non-con CT.  Discontinued the Abemaciclib and continuing Faslodex. Repeat MRI again raised the question of liver metastases. In an effort to definitively determine whether these were metastases, Liver biopsy was accomplished that revealed hemangioma. CT Chest and MRI Abd were stable, indicating that her breast cancer is controlled on Faslodex alone.  Will continue Faslodex. Repeat imaging is stable. The patient reports that her family has enquired about BCRA testing. The patient is very low risk for  carrying a mutation, having an ER+ DC+ HER2 negative cancer diagnosed after the age of 50. Testing was not indicated prior to the Hoahaoism of PARP inhibitors, that can be used to treat metastatic breast cancer. Testing is normal. Continue Faslodex. Will premedicate with PO benadryl for her recent rash.    Bone Metastases: Stable.  Discontinued Xgeva due to her teeth issues.     Cirrhosis: Unclear etiology, though likely PITTS. She feels like she may need a paracentesis. I advised her to call hepatology, but will check an abd ultrasound as well.    Anemia: Iron deficiency and ongoing blood loss. Secondary to hemorrhoids and AVMs. She has Varices, as well, but they were not bleeding, and did not require banding while she was in the hospital.   Hgb is better today. Follow iron levels closely. Monitor for active bleeding. Replete iron stores for her ferritin<30.     Rash w/ hemangioma of the eyelid, hemangiomas of the Liver, severe hemorrhoids: She saw hepatology and the rash has improved.      Planned Follow Up:  4 weeks for MD visit with labs.     I spent * minutes face to face with the patient.  More than 50% of that time was spent counseling the patient and/or on coordination of care.  The diagnosis, prognosis, and general treatment was explained to the patient and the family.    Electronically Signed by:    Glenn Munroe M.D.  [facility]

## 2024-08-08 ENCOUNTER — OFFICE VISIT (OUTPATIENT)
Dept: HEMATOLOGY/ONCOLOGY | Age: 64
End: 2024-08-08
Attending: INTERNAL MEDICINE
Payer: COMMERCIAL

## 2024-08-08 VITALS
SYSTOLIC BLOOD PRESSURE: 134 MMHG | HEIGHT: 65.98 IN | RESPIRATION RATE: 18 BRPM | BODY MASS INDEX: 28.88 KG/M2 | TEMPERATURE: 98 F | HEART RATE: 78 BPM | WEIGHT: 179.69 LBS | OXYGEN SATURATION: 95 % | DIASTOLIC BLOOD PRESSURE: 69 MMHG

## 2024-08-08 DIAGNOSIS — C50.919 BREAST CANCER METASTASIZED TO PLEURA, UNSPECIFIED LATERALITY (HCC): ICD-10-CM

## 2024-08-08 DIAGNOSIS — C79.51 MALIGNANT NEOPLASM METASTATIC TO BONE (HCC): Primary | ICD-10-CM

## 2024-08-08 DIAGNOSIS — D50.0 IRON DEFICIENCY ANEMIA SECONDARY TO BLOOD LOSS (CHRONIC): ICD-10-CM

## 2024-08-08 DIAGNOSIS — C50.011 MALIGNANT NEOPLASM OF NIPPLE OF RIGHT BREAST IN FEMALE, ESTROGEN RECEPTOR POSITIVE (HCC): ICD-10-CM

## 2024-08-08 DIAGNOSIS — Z51.11 ENCOUNTER FOR CHEMOTHERAPY MANAGEMENT: ICD-10-CM

## 2024-08-08 DIAGNOSIS — C50.911 CARCINOMA OF RIGHT BREAST METASTATIC TO PLEURA (HCC): Primary | ICD-10-CM

## 2024-08-08 DIAGNOSIS — Z17.0 MALIGNANT NEOPLASM OF NIPPLE OF RIGHT BREAST IN FEMALE, ESTROGEN RECEPTOR POSITIVE (HCC): ICD-10-CM

## 2024-08-08 DIAGNOSIS — C79.51 MALIGNANT NEOPLASM METASTATIC TO BONE (HCC): ICD-10-CM

## 2024-08-08 DIAGNOSIS — C78.2 BREAST CANCER METASTASIZED TO PLEURA, UNSPECIFIED LATERALITY (HCC): ICD-10-CM

## 2024-08-08 DIAGNOSIS — C78.2 CARCINOMA OF RIGHT BREAST METASTATIC TO PLEURA (HCC): ICD-10-CM

## 2024-08-08 DIAGNOSIS — C50.911 CARCINOMA OF RIGHT BREAST METASTATIC TO PLEURA (HCC): ICD-10-CM

## 2024-08-08 DIAGNOSIS — C78.2 CARCINOMA OF RIGHT BREAST METASTATIC TO PLEURA (HCC): Primary | ICD-10-CM

## 2024-08-08 LAB
ALBUMIN SERPL-MCNC: 4.3 G/DL (ref 3.4–5)
ALBUMIN/GLOB SERPL: 1.2 {RATIO} (ref 1–2)
ALP LIVER SERPL-CCNC: 134 U/L
ALT SERPL-CCNC: 22 U/L
ANION GAP SERPL CALC-SCNC: 5 MMOL/L (ref 0–18)
AST SERPL-CCNC: 14 U/L (ref 15–37)
BASOPHILS # BLD AUTO: 0.09 X10(3) UL (ref 0–0.2)
BASOPHILS NFR BLD AUTO: 1.4 %
BILIRUB SERPL-MCNC: 0.5 MG/DL (ref 0.1–2)
BUN BLD-MCNC: 16 MG/DL (ref 9–23)
CALCIUM BLD-MCNC: 10.6 MG/DL (ref 8.5–10.1)
CHLORIDE SERPL-SCNC: 103 MMOL/L (ref 98–112)
CO2 SERPL-SCNC: 30 MMOL/L (ref 21–32)
CREAT BLD-MCNC: 0.98 MG/DL
DEPRECATED HBV CORE AB SER IA-ACNC: 26.3 NG/ML
EGFRCR SERPLBLD CKD-EPI 2021: 65 ML/MIN/1.73M2 (ref 60–?)
EOSINOPHIL # BLD AUTO: 0.32 X10(3) UL (ref 0–0.7)
EOSINOPHIL NFR BLD AUTO: 4.9 %
ERYTHROCYTE [DISTWIDTH] IN BLOOD BY AUTOMATED COUNT: 13.5 %
FASTING STATUS PATIENT QL REPORTED: NO
GLOBULIN PLAS-MCNC: 3.5 G/DL (ref 2.8–4.4)
GLUCOSE BLD-MCNC: 156 MG/DL (ref 70–99)
HCT VFR BLD AUTO: 37.4 %
HGB BLD-MCNC: 12.7 G/DL
IMM GRANULOCYTES # BLD AUTO: 0.01 X10(3) UL (ref 0–1)
IMM GRANULOCYTES NFR BLD: 0.2 %
IRON SATN MFR SERPL: 16 %
IRON SERPL-MCNC: 67 UG/DL
LYMPHOCYTES # BLD AUTO: 2.24 X10(3) UL (ref 1–4)
LYMPHOCYTES NFR BLD AUTO: 34.3 %
MCH RBC QN AUTO: 31 PG (ref 26–34)
MCHC RBC AUTO-ENTMCNC: 34 G/DL (ref 31–37)
MCV RBC AUTO: 91.2 FL
MONOCYTES # BLD AUTO: 0.43 X10(3) UL (ref 0.1–1)
MONOCYTES NFR BLD AUTO: 6.6 %
NEUTROPHILS # BLD AUTO: 3.45 X10 (3) UL (ref 1.5–7.7)
NEUTROPHILS # BLD AUTO: 3.45 X10(3) UL (ref 1.5–7.7)
NEUTROPHILS NFR BLD AUTO: 52.6 %
OSMOLALITY SERPL CALC.SUM OF ELEC: 290 MOSM/KG (ref 275–295)
PLATELET # BLD AUTO: 176 10(3)UL (ref 150–450)
POTASSIUM SERPL-SCNC: 4.4 MMOL/L (ref 3.5–5.1)
PROT SERPL-MCNC: 7.8 G/DL (ref 6.4–8.2)
RBC # BLD AUTO: 4.1 X10(6)UL
SODIUM SERPL-SCNC: 138 MMOL/L (ref 136–145)
TOTAL IRON BINDING CAPACITY: 423 UG/DL (ref 250–425)
TRANSFERRIN SERPL-MCNC: 307 MG/DL (ref 250–380)
WBC # BLD AUTO: 6.5 X10(3) UL (ref 4–11)

## 2024-08-08 PROCEDURE — 82728 ASSAY OF FERRITIN: CPT | Performed by: INTERNAL MEDICINE

## 2024-08-08 PROCEDURE — 80053 COMPREHEN METABOLIC PANEL: CPT | Performed by: INTERNAL MEDICINE

## 2024-08-08 PROCEDURE — 83550 IRON BINDING TEST: CPT | Performed by: INTERNAL MEDICINE

## 2024-08-08 PROCEDURE — 83540 ASSAY OF IRON: CPT | Performed by: INTERNAL MEDICINE

## 2024-08-08 PROCEDURE — 85025 COMPLETE CBC W/AUTO DIFF WBC: CPT | Performed by: INTERNAL MEDICINE

## 2024-08-08 PROCEDURE — 96402 CHEMO HORMON ANTINEOPL SQ/IM: CPT

## 2024-08-08 PROCEDURE — 36415 COLL VENOUS BLD VENIPUNCTURE: CPT

## 2024-08-08 RX ORDER — LAMOTRIGINE 25 MG/1
500 TABLET ORAL ONCE
Status: CANCELLED | OUTPATIENT
Start: 2024-08-08

## 2024-08-08 RX ORDER — DIPHENHYDRAMINE HCL 25 MG
25 CAPSULE ORAL ONCE
Status: COMPLETED | OUTPATIENT
Start: 2024-08-08 | End: 2024-08-08

## 2024-08-08 RX ORDER — DIPHENHYDRAMINE HCL 25 MG
25 CAPSULE ORAL ONCE
Status: CANCELLED | OUTPATIENT
Start: 2024-08-08 | End: 2024-08-08

## 2024-08-08 RX ORDER — LAMOTRIGINE 25 MG/1
500 TABLET ORAL ONCE
Status: COMPLETED | OUTPATIENT
Start: 2024-08-08 | End: 2024-08-08

## 2024-08-08 RX ADMIN — LAMOTRIGINE 500 MG: 25 TABLET ORAL at 16:19:00

## 2024-08-08 RX ADMIN — DIPHENHYDRAMINE HCL 25 MG: 25 MG CAPSULE ORAL at 16:19:00

## 2024-08-08 NOTE — PROGRESS NOTES
Cancer Center Progress Note  Patient Name: Martha Tobias   YOB: 1960   Medical Record Number: [id number]     Attending Physician: Glenn Munroe M.D.     Date of Visit: 8/8/2024          Chief Complaint:  Chief Complaint   Patient presents with    Follow - Up     Pt here for MD follow up visit, faslodex injection today.          Oncologic History:  Martha Tobias is a 64 year old female with limited PMH and limited prior access to the medical system admitted on 5/18 c/o a 3 week history of shortness of breath. She was found to have a large R pleural effusion. She underwent therapeutic thoracentesis. Cytology was unrevealing. When the fluid reaccumulated, she was taken to the OR for thoracostomy with pleural biopsy. In the OR, it was noted that her bilateral breasts were abnormal, concerning for breast masses. The thoracostomy was performed and the pleura appeared abnormal. It was biopsied revealing metastatic breast cancer. Pleurodesis was also performed. Upon questioning, the patient first noted breast abnormalities 2 years prior to admission. She had not seen a physician in several years. She denied symptoms elsewhere.     Progression was noted on CT and she was transitioned to Faslodex and Abeniciclib.    She had difficulty tolerating the Abemiciclib due to diarrhea and cytopenias.    She was admitted to the hospital from 12/21/20 to 12/24/20 with severe anemia after restarting the Abemaciclib. She was transfused and underwent MRI enterography. No bleeding source, other than her hemorrhoids was found. She was discharged to follow up with GI. The Abemaciclib was discontinued.   Interestingly, her liver lesions on MRI were described as stable hemangiomas, where they had appeared to be enlarging metastatic deposits on non-contrast CT.     Biopsy of the liver revealed cavernous hemangioma.    She was hospitalized from 12/14/21 to 12/19/21 and diagnosed with cirrhosis. She required paracentesis for  improved comfort. Cytology was negative for malignancy. She was transfused and given IV iron for her iron deficiency. EGD revealed esophageal varices and portal gastropathy. Her push enteroscopy and capsule endoscopy revealed small bowel AVMs and erosions/ulcers.  She was discharged to be evaluated by hepatology, ongoing follow up with GI, follow up here for ongoing care of her metastatic breast cancer.    History of Present Illness:  Pt is here for follow up. She feels well.      Performance Status:  ECOG 0    Past Medical History:  Past Medical History:    Abdominal distention    Anemia    Anxiety    Back pain    Breast cancer (HCC)    breast    COPD (chronic obstructive pulmonary disease) (HCC)    No continuous O2    Disorder of liver    lesions    Disorder of thyroid    Fatigue    Feeling lonely    Hemorrhoids    High cholesterol    History of 2019 novel coronavirus disease (COVID-19)    ASYMPTOMATIC.  NO HOSPITALIZATION    History of blood transfusion    Hyperthyroidism    Leg swelling    Neuropathy    Personal history of antineoplastic chemotherapy    oral    Pneumonia due to organism    PONV (postoperative nausea and vomiting)    Rash    Shortness of breath    Only exertion    Stress    Visual impairment    glasses    Weight gain       Past Surgical History:  Past Surgical History:   Procedure Laterality Date    Colonoscopy      Colonoscopy N/A 10/26/2021    Procedure: COLONOSCOPY with biopsy, Cold Polypectomy & Clips x 5 placement /ESOPHAGOGASTRODUODENOSCOPY (EGD) with Biopsy;  Surgeon: Oscar Harp MD;  Location:  ENDOSCOPY    Oral surgery  2017    [facility],biopsy  05/2015       Family History:  Family History   Adopted: Yes   Family history unknown: Yes       Social History:  Social History     Socioeconomic History    Marital status:      Spouse name: Not on file    Number of children: Not on file    Years of education: Not on file    Highest education level: Not on file    Occupational History    Not on file   Tobacco Use    Smoking status: Light Smoker     Current packs/day: 0.00     Types: Cigarettes    Smokeless tobacco: Never    Tobacco comments:     3-5 cig/ day   Vaping Use    Vaping status: Never Used   Substance and Sexual Activity    Alcohol use: Not Currently     Alcohol/week: 0.0 standard drinks of alcohol     Comment: 2 drinks/yr     Drug use: No    Sexual activity: Not Currently   Other Topics Concern     Service Not Asked    Blood Transfusions Not Asked    Caffeine Concern Yes     Comment: 1 1/2 a day 16oz    Occupational Exposure Not Asked    Hobby Hazards Not Asked    Sleep Concern Not Asked    Stress Concern Not Asked    Weight Concern Not Asked    Special Diet Not Asked    Back Care Not Asked    Exercise No    Bike Helmet Not Asked    Seat Belt Not Asked    Self-Exams Not Asked   Social History Narrative    Not on file     Social Determinants of Health     Financial Resource Strain: Not on file   Food Insecurity: Not on file   Transportation Needs: Not on file   Physical Activity: Not on file   Stress: Not on file   Social Connections: Not on file   Housing Stability: Not on file       Current Medications:    Current Outpatient Medications:     ALPRAZolam 0.5 MG Oral Tab, Take 1 tablet (0.5 mg total) by mouth 2 (two) times daily as needed for Sleep or Anxiety., Disp: 60 tablet, Rfl: 0    rosuvastatin 5 MG Oral Tab, Take 1 tablet (5 mg total) by mouth nightly., Disp: 90 tablet, Rfl: 1    Potassium Chloride ER 10 MEQ Oral Tab CR, Take 1 tablet (10 mEq total) by mouth daily., Disp: 90 tablet, Rfl: 1    escitalopram 20 MG Oral Tab, Take 1 tablet (20 mg total) by mouth daily., Disp: 30 tablet, Rfl: 0    levothyroxine (SYNTHROID) 200 MCG Oral Tab, Take 1 tablet (200 mcg total) by mouth before breakfast., Disp: 90 tablet, Rfl: 0    pantoprazole 40 MG Oral Tab EC, Take 1 tablet (40 mg total) by mouth 2 (two) times daily before meals., Disp: 180 tablet, Rfl: 1     propranolol 10 MG Oral Tab, Take 1 tablet (10 mg total) by mouth 2 (two) times daily., Disp: 180 tablet, Rfl: 1    furosemide 40 MG Oral Tab, Take 1 tablet (40 mg total) by mouth daily., Disp: 90 tablet, Rfl: 1    HYDROcodone-acetaminophen 7.5-325 MG Oral Tab, Take 1-2 tablets by mouth every 8 (eight) hours as needed for Pain., Disp: 90 tablet, Rfl: 0    zolpidem 5 MG Oral Tab, Take 1 tablet (5 mg total) by mouth nightly as needed for Sleep., Disp: 30 tablet, Rfl: 2    levothyroxine 50 MCG Oral Tab, Take 1 tablet (50 mcg total) by mouth before breakfast., Disp: 90 tablet, Rfl: 0    spironolactone 100 MG Oral Tab, TAKE 1 TABLET DAILY, Disp: 90 tablet, Rfl: 1    OMEGA-3-ACID ETHYL ESTERS 1 g Oral Cap, TAKE 2 CAPSULES TWICE A DAY, Disp: 360 capsule, Rfl: 0    gabapentin 300 MG Oral Cap, Take 1 capsule (300 mg total) by mouth 4 (four) times daily., Disp: 360 capsule, Rfl: 3    Ferrous Sulfate (IRON) 325 (65 Fe) MG Oral Tab, Take by mouth. She does not take daily, Disp: , Rfl:     buPROPion 150 MG Oral Tablet 12 Hr, Take 1 tablet (150 mg total) by mouth 2 (two) times daily., Disp: , Rfl:     Biotin 2500 MCG Oral Cap, Take 1 capsule by mouth every evening., Disp: , Rfl:     folic acid 800 MCG Oral Tab, Take 1 tablet (800 mcg total) by mouth daily., Disp: , Rfl:     Vitamin B-12 1000 MCG Oral Tab, Take 1 tablet (1,000 mcg total) by mouth daily., Disp: 30 tablet, Rfl: 11    [START ON 9/9/2024] HYDROcodone-acetaminophen 7.5-325 MG Oral Tab, Take 1-2 tablets by mouth every 8 (eight) hours as needed for Pain., Disp: 90 tablet, Rfl: 0    ALBUTEROL 108 (90 Base) MCG/ACT Inhalation Aero Soln, USE 2 INHALATIONS EVERY 4 HOURS AS NEEDED FOR SHORTNESS OF BREATH OR WHEEZING (Patient not taking: Reported on 8/8/2024), Disp: 25.5 g, Rfl: 1    Allergies:  Allergies   Allergen Reactions    Radiology Contrast Iodinated Dyes HIVES and ITCHING    Iodine (Topical) OTHER (SEE COMMENTS)        Review of Systems:    Constitutional No fevers,  chills, night sweats, excessive fatigue or weight loss.   Eyes No significant visual difficulties. No diplopia. No yellowing.   Hematologic/Lymphatic No easy bruising or bleeding.  Denies tender or palpable lymph nodes.   Respiratory No dyspnea, pleuritic chest pain, cough or hemoptysis.   Cardiovascular No anginal chest pain, palpitations or orthopnea.   Gastrointestinal No nausea, vomiting, diarrhea, GI bleeding, or constipation. NL appetite, No Early Satiety.   Genitorurinary No hematuria, dysuria, abnormal bleeding.   Integumentary No yellowing.   Neurologic No headache, blurred vision, and no areas of focal weakness. Normal gait.   Psychiatric No insomnia, depression, erica or mood swings.         Vital Signs:  /69 (BP Location: Left arm, Patient Position: Sitting, Cuff Size: adult)   Pulse 78   Temp 97.6 °F (36.4 °C) (Tympanic)   Resp 18   Ht 1.676 m (5' 5.98\")   Wt 81.5 kg (179 lb 11.2 oz)   SpO2 95%   BMI 29.02 kg/m²   Height: 167.6 cm (5' 5.98\") (08/08 1453)  Weight: 81.5 kg (179 lb 11.2 oz) (08/08 1453)  BSA (Calculated - sq m): 1.91 sq meters (08/08 1453)  Pulse: 78 (08/08 1453)  BP: 134/69 (08/08 1453)  Temp: 97.6 °F (36.4 °C) (08/08 1453)  Do Not Use - Resp Rate: --  SpO2: 95 % (08/08 1453)        Physical Examination:    Constitutional Normal - Mood and affect appropriate. Appears close to chronological age. Well nourished. Well developed.   Eyes Normal - Conjunctivae and sclerae are clear and without icterus. Pupils are reactive and equal.   Hematologic/Lymphatic Normal - No petechiae or purpura.  No tender or palpable lymph nodes in the cervical, supraclavicular, axillary or inguinal area.   Respiratory Normal - Lungs CTA, no rhonchi or wheezing.   Cardiovascular Normal - RRR, no murmurs, gallops or rubs.   Abdomen Non-tender, moderately distended, no masses, ascites or hepatosplenomegaly. Stable umbilical hernia.   Extremities Normal - No C/C/E    Integumentary Diffuse angioma-like rash  of the arms and torso. Small hemangioma of the R eye.No Jaundice   Neurologic Normal - No sensory or motor deficits, normal cerebellar function, normal gait, cranial nerves intact.   Psychiatric Normal - A&Ox3, Coherent speech. Verbalizes understanding of our discussions today.           Labs:  Recent Labs     08/08/24  1440   RBC 4.10   HGB 12.7   HCT 37.4   MCV 91.2   MCH 31.0   MCHC 34.0   RDW 13.5   NEPRELIM 3.45   WBC 6.5   .0     Recent Labs     08/08/24  1440    H   BUN 16   CREATSERUM 0.98   CA 10.6 H   ALB 4.3      K 4.4      CO2 30.0   ALKPHO 134 H   AST 14 L   ALT 22   BILT 0.5   TP 7.8       Iron studies ordered and pending.    Radiology:  .        Pathology:  Final Diagnosis:   Dominant mass, left lobe of liver, ultrasound-guided biopsy:  -Fragments of hemangioma.  (See comment.)         Impression and Plan:  Breast Cancer: Stage IV, ER+ AK+ HER-2 Neg.  She was transitioned to Faslodex and Abemaciclib. Abemaciclib dose reduced for diarrhea and rash, and then held for pancytopenia. After restarting, she was hospitalized for severe anemia that was likely related to bleeding, iron deficiency, and the chemo. MRI suggests that the liver lesions have not progressed, as suspected on the non-con CT.  Discontinued the Abemaciclib and continuing Faslodex. Repeat MRI again raised the question of liver metastases. In an effort to definitively determine whether these were metastases, Liver biopsy was accomplished that revealed hemangioma. CT Chest and MRI Abd were stable, indicating that her breast cancer is controlled on Faslodex alone.  Will continue Faslodex. Repeat imaging is stable. The patient reports that her family has enquired about BCRA testing. The patient is very low risk for carrying a mutation, having an ER+ AK+ HER2 negative cancer diagnosed after the age of 50. Testing was not indicated prior to the Hindu of PARP inhibitors, that can be used to treat metastatic breast  cancer. Testing is normal. Continue Faslodex. Will premedicate with PO benadryl for her recent rash.    Bone Metastases: Stable.  Discontinued Xgeva due to her teeth issues.     Cirrhosis: Unclear etiology, though likely PITTS. She feels like she may need a paracentesis. I advised her to call hepatology, but will check an abd ultrasound as well.    Anemia: Iron deficiency and ongoing blood loss. Secondary to hemorrhoids and AVMs. She has Varices, as well, but they were not bleeding, and did not require banding while she was in the hospital.   Hgb is better today. Follow iron levels closely. Monitor for active bleeding. Replete iron stores for her ferritin<30.     Rash w/ hemangioma of the eyelid, hemangiomas of the Liver, severe hemorrhoids: She saw hepatology and the rash has improved.      Planned Follow Up:  4 weeks for MD visit with labs.     I spent * minutes face to face with the patient.  More than 50% of that time was spent counseling the patient and/or on coordination of care.  The diagnosis, prognosis, and general treatment was explained to the patient and the family.    Electronically Signed by:    Glenn Munroe M.D.  [facility]

## 2024-08-08 NOTE — PROGRESS NOTES
Pt here for C71 D1 Drug(s): Faslodex injection.  Arrives Ambulating independently, accompanied by Spouse     Patient was evaluated today by MD and Treatment Nurse.    Oral medications included in this regimen:  yes - Benadryl    Patient confirms comprehension of cancer treatment schedule:  yes    Pregnancy screening:  Denies possibility of pregnancy    Modifications in dose or schedule:  No    Medications appearance and physical integrity checked by RN: yes.    Chemotherapy IV pump settings verified by 2 RNs:  n/a - cancer treatment injection administered.  Frequency of blood return and site check throughout administration: Prior to administration and At completion of therapy     Infusion/treatment outcome:  patient tolerated treatment without incident    Education Record    Learner:  Patient and Spouse  Barriers / Limitations:  None  Method:  Discussion  Education / instructions given:  Reviewed plan of care and follow up appts.  Outcome:  Shows understanding    Discharged Home, Ambulating independently, accompanied by:Spouse    Patient/family verbalized understanding of future appointments: by [employer] messaging

## 2024-08-14 DIAGNOSIS — F41.9 ANXIETY: ICD-10-CM

## 2024-08-14 RX ORDER — ESCITALOPRAM OXALATE 20 MG/1
20 TABLET ORAL DAILY
Qty: 30 TABLET | Refills: 1 | Status: SHIPPED | OUTPATIENT
Start: 2024-08-14

## 2024-08-14 NOTE — TELEPHONE ENCOUNTER
LOV: 7/9/2024  for: Medication Follow-Up   Patient advised to RTC on:  3 months.   Nov:10/09/2024    Medication Quantity Refills Start End   escitalopram 20 MG Oral Tab 30 tablet 0 7/9/2024 --   Sig:   Take 1 tablet (20 mg total) by mouth daily.

## 2024-08-20 DIAGNOSIS — E03.9 HYPOTHYROIDISM IN ADULT: ICD-10-CM

## 2024-08-20 DIAGNOSIS — R60.0 EDEMA OF BOTH FEET: ICD-10-CM

## 2024-08-20 DIAGNOSIS — E78.2 HYPERLIPIDEMIA, MIXED: ICD-10-CM

## 2024-08-20 DIAGNOSIS — F41.9 ANXIETY: ICD-10-CM

## 2024-08-21 DIAGNOSIS — F51.01 PRIMARY INSOMNIA: ICD-10-CM

## 2024-08-21 RX ORDER — PANTOPRAZOLE SODIUM 40 MG/1
40 TABLET, DELAYED RELEASE ORAL
Qty: 180 TABLET | Refills: 1 | OUTPATIENT
Start: 2024-08-21

## 2024-08-21 RX ORDER — PROPRANOLOL HYDROCHLORIDE 10 MG/1
10 TABLET ORAL 2 TIMES DAILY
Qty: 180 TABLET | Refills: 1 | OUTPATIENT
Start: 2024-08-21

## 2024-08-21 RX ORDER — ESCITALOPRAM OXALATE 20 MG/1
20 TABLET ORAL DAILY
Qty: 30 TABLET | Refills: 1 | OUTPATIENT
Start: 2024-08-21

## 2024-08-21 RX ORDER — ROSUVASTATIN CALCIUM 5 MG/1
5 TABLET, COATED ORAL NIGHTLY
Qty: 90 TABLET | Refills: 1 | OUTPATIENT
Start: 2024-08-21

## 2024-08-21 RX ORDER — LEVOTHYROXINE SODIUM 50 UG/1
50 TABLET ORAL
Qty: 90 TABLET | Refills: 0 | Status: SHIPPED | OUTPATIENT
Start: 2024-08-21

## 2024-08-21 RX ORDER — ZOLPIDEM TARTRATE 5 MG/1
5 TABLET ORAL NIGHTLY PRN
Qty: 30 TABLET | Refills: 2 | OUTPATIENT
Start: 2024-08-21

## 2024-08-21 RX ORDER — FUROSEMIDE 40 MG
40 TABLET ORAL DAILY
Qty: 90 TABLET | Refills: 1 | OUTPATIENT
Start: 2024-08-21

## 2024-08-21 RX ORDER — POTASSIUM CHLORIDE 750 MG/1
10 TABLET, EXTENDED RELEASE ORAL DAILY
Qty: 90 TABLET | Refills: 1 | OUTPATIENT
Start: 2024-08-21

## 2024-08-21 RX ORDER — LEVOTHYROXINE SODIUM 200 UG/1
200 TABLET ORAL
Qty: 90 TABLET | Refills: 0 | Status: SHIPPED | OUTPATIENT
Start: 2024-08-21

## 2024-08-21 NOTE — TELEPHONE ENCOUNTER
Too soon to refill, denied medications. Escitalopram prescribed on 8/14/24 and pantoprazole was sent on 7/9/24 for 90 days with one refill.

## 2024-08-21 NOTE — TELEPHONE ENCOUNTER
Too soon for refill. Medication sent on 7/9/24 for 30 days with two refills. Denied medication request.

## 2024-08-21 NOTE — TELEPHONE ENCOUNTER
Last Office Visit: 7/9/24  Last Refill: 7/9/24  Return to Clinic: 3 months   Protocol: failed   NOV: 10/9/24  Requested Prescriptions     Pending Prescriptions Disp Refills    Potassium Chloride ER 10 MEQ Oral Tab CR 90 tablet 1     Sig: Take 1 tablet (10 mEq total) by mouth daily.    furosemide 40 MG Oral Tab 90 tablet 1     Sig: Take 1 tablet (40 mg total) by mouth daily.    rosuvastatin 5 MG Oral Tab 90 tablet 1     Sig: Take 1 tablet (5 mg total) by mouth nightly.    levothyroxine 50 MCG Oral Tab 90 tablet 0     Sig: Take 1 tablet (50 mcg total) by mouth before breakfast.    levothyroxine (SYNTHROID) 200 MCG Oral Tab 90 tablet 0     Sig: Take 1 tablet (200 mcg total) by mouth before breakfast.    propranolol 10 MG Oral Tab 180 tablet 1     Sig: Take 1 tablet (10 mg total) by mouth 2 (two) times daily.     Denied- potassium chloride, propanolol, furosemide, rosuvastatin- due to being too soon to refill, medications sent with one refill and for 90 days.     Please approve levothyroxine if appropriate.     Thank you!

## 2024-09-05 ENCOUNTER — OFFICE VISIT (OUTPATIENT)
Dept: HEMATOLOGY/ONCOLOGY | Age: 64
End: 2024-09-05
Attending: INTERNAL MEDICINE
Payer: COMMERCIAL

## 2024-09-05 VITALS
RESPIRATION RATE: 18 BRPM | OXYGEN SATURATION: 96 % | BODY MASS INDEX: 28.85 KG/M2 | DIASTOLIC BLOOD PRESSURE: 69 MMHG | WEIGHT: 179.5 LBS | HEART RATE: 79 BPM | TEMPERATURE: 97 F | HEIGHT: 65.98 IN | SYSTOLIC BLOOD PRESSURE: 117 MMHG

## 2024-09-05 VITALS — HEART RATE: 73 BPM | DIASTOLIC BLOOD PRESSURE: 69 MMHG | SYSTOLIC BLOOD PRESSURE: 113 MMHG

## 2024-09-05 DIAGNOSIS — C79.51 MALIGNANT NEOPLASM METASTATIC TO BONE (HCC): ICD-10-CM

## 2024-09-05 DIAGNOSIS — C50.011 MALIGNANT NEOPLASM OF NIPPLE OF RIGHT BREAST IN FEMALE, ESTROGEN RECEPTOR POSITIVE (HCC): ICD-10-CM

## 2024-09-05 DIAGNOSIS — C78.2 BREAST CANCER METASTASIZED TO PLEURA, UNSPECIFIED LATERALITY (HCC): ICD-10-CM

## 2024-09-05 DIAGNOSIS — D50.0 IRON DEFICIENCY ANEMIA SECONDARY TO BLOOD LOSS (CHRONIC): Primary | ICD-10-CM

## 2024-09-05 DIAGNOSIS — C50.919 BREAST CANCER METASTASIZED TO PLEURA, UNSPECIFIED LATERALITY (HCC): ICD-10-CM

## 2024-09-05 DIAGNOSIS — Z17.0 MALIGNANT NEOPLASM OF NIPPLE OF RIGHT BREAST IN FEMALE, ESTROGEN RECEPTOR POSITIVE (HCC): ICD-10-CM

## 2024-09-05 DIAGNOSIS — C78.2 CARCINOMA OF RIGHT BREAST METASTATIC TO PLEURA (HCC): Primary | ICD-10-CM

## 2024-09-05 DIAGNOSIS — D50.0 IRON DEFICIENCY ANEMIA SECONDARY TO BLOOD LOSS (CHRONIC): ICD-10-CM

## 2024-09-05 DIAGNOSIS — C50.911 CARCINOMA OF RIGHT BREAST METASTATIC TO PLEURA (HCC): Primary | ICD-10-CM

## 2024-09-05 LAB
ALBUMIN SERPL-MCNC: 3.8 G/DL (ref 3.4–5)
ALBUMIN/GLOB SERPL: 1.1 {RATIO} (ref 1–2)
ALP LIVER SERPL-CCNC: 116 U/L
ALT SERPL-CCNC: 22 U/L
ANION GAP SERPL CALC-SCNC: 3 MMOL/L (ref 0–18)
AST SERPL-CCNC: 13 U/L (ref 15–37)
BASOPHILS # BLD AUTO: 0.07 X10(3) UL (ref 0–0.2)
BASOPHILS NFR BLD AUTO: 1.1 %
BILIRUB SERPL-MCNC: 0.4 MG/DL (ref 0.1–2)
BUN BLD-MCNC: 11 MG/DL (ref 9–23)
CALCIUM BLD-MCNC: 9.6 MG/DL (ref 8.5–10.1)
CHLORIDE SERPL-SCNC: 109 MMOL/L (ref 98–112)
CO2 SERPL-SCNC: 28 MMOL/L (ref 21–32)
CREAT BLD-MCNC: 0.89 MG/DL
EGFRCR SERPLBLD CKD-EPI 2021: 73 ML/MIN/1.73M2 (ref 60–?)
EOSINOPHIL # BLD AUTO: 0.31 X10(3) UL (ref 0–0.7)
EOSINOPHIL NFR BLD AUTO: 4.9 %
ERYTHROCYTE [DISTWIDTH] IN BLOOD BY AUTOMATED COUNT: 13.3 %
GLOBULIN PLAS-MCNC: 3.4 G/DL (ref 2.8–4.4)
GLUCOSE BLD-MCNC: 168 MG/DL (ref 70–99)
HCT VFR BLD AUTO: 36 %
HGB BLD-MCNC: 12.2 G/DL
IMM GRANULOCYTES # BLD AUTO: 0.01 X10(3) UL (ref 0–1)
IMM GRANULOCYTES NFR BLD: 0.2 %
LYMPHOCYTES # BLD AUTO: 2.04 X10(3) UL (ref 1–4)
LYMPHOCYTES NFR BLD AUTO: 32.4 %
MCH RBC QN AUTO: 31 PG (ref 26–34)
MCHC RBC AUTO-ENTMCNC: 33.9 G/DL (ref 31–37)
MCV RBC AUTO: 91.6 FL
MONOCYTES # BLD AUTO: 0.34 X10(3) UL (ref 0.1–1)
MONOCYTES NFR BLD AUTO: 5.4 %
NEUTROPHILS # BLD AUTO: 3.53 X10 (3) UL (ref 1.5–7.7)
NEUTROPHILS # BLD AUTO: 3.53 X10(3) UL (ref 1.5–7.7)
NEUTROPHILS NFR BLD AUTO: 56 %
OSMOLALITY SERPL CALC.SUM OF ELEC: 293 MOSM/KG (ref 275–295)
PLATELET # BLD AUTO: 166 10(3)UL (ref 150–450)
POTASSIUM SERPL-SCNC: 3.7 MMOL/L (ref 3.5–5.1)
PROT SERPL-MCNC: 7.2 G/DL (ref 6.4–8.2)
RBC # BLD AUTO: 3.93 X10(6)UL
SODIUM SERPL-SCNC: 140 MMOL/L (ref 136–145)
WBC # BLD AUTO: 6.3 X10(3) UL (ref 4–11)

## 2024-09-05 PROCEDURE — 96402 CHEMO HORMON ANTINEOPL SQ/IM: CPT

## 2024-09-05 PROCEDURE — 99215 OFFICE O/P EST HI 40 MIN: CPT | Performed by: NURSE PRACTITIONER

## 2024-09-05 PROCEDURE — 96374 THER/PROPH/DIAG INJ IV PUSH: CPT

## 2024-09-05 PROCEDURE — G2211 COMPLEX E/M VISIT ADD ON: HCPCS | Performed by: NURSE PRACTITIONER

## 2024-09-05 RX ORDER — LAMOTRIGINE 25 MG/1
500 TABLET ORAL ONCE
Status: CANCELLED | OUTPATIENT
Start: 2024-09-05

## 2024-09-05 RX ORDER — LAMOTRIGINE 25 MG/1
500 TABLET ORAL ONCE
Status: COMPLETED | OUTPATIENT
Start: 2024-09-05 | End: 2024-09-05

## 2024-09-05 RX ADMIN — LAMOTRIGINE 500 MG: 25 TABLET ORAL at 15:31:00

## 2024-09-05 NOTE — PROGRESS NOTES
Cancer Center Progress Note    Patient Name: Martha Tobias   YOB: 1960   Medical Record Number: [id number]   CSN: [id number]   Date of visit: 9/5/2024     Chief Complaint/Reason for Visit:  Chief Complaint   Patient presents with    Follow - Up        History of Present Illness: Martha presents today for follow up of metastatic breast cancer and iron deficiency anemia. She is currently receiving monthly fulvestrant, additional oncology history below. Her medical oncologist is Dr. Glenn Munroe. She currently denies any new complaints--no recent fever or chills, no signs of bleeding.      Oncologic History:   Breast Cancer:  5/18/15: Stage IV: ER+, ID+. HER2 - breast cancer  6/15- 1/7/19: Arimidex + GNRH antagonist (Zoladex)   1/7/19-2/22: Faslodex/Abemaciclib   2/22: Faslodex      JOHN:   Transfusion: 5/10/19, 8/20/20,8/2120,12/21/20,12/22/20,12/24/20, 1/8/21,9/10/21,10/22/21.12/6/21, 12/14/21, 12/15/21, 11/30/22  Venofer: 6/26/20, 7/13/20,7/15/20,7/17/20,7/20/20  Infed: 9/25/20,1/18/21, 5/26/21, 8/18/21, 11/8/21, 12/8/21, 4/14/22, 8/3/22, 10/25/22, 1/20/23  Feraheme: 11/22/22, 11/29/22, 5/9/23, 5/16/23, 8/3/23, 2/1/24, 4/8/24      Problem List:  Patient Active Problem List   Diagnosis    Other abnormal blood chemistry    Allergic rhinitis, cause unspecified    Hypothyroidism in adult    Pleural effusion    Hypokalemia    At risk for falling    Nipple lesion    Breast mass in female    Breast cancer metastasized to pleura (HCC)    Malignant neoplasm metastatic to bone (HCC)    Malignant neoplasm of nipple of right breast in female (HCC)    Adhesive capsulitis of right shoulder    Adhesive capsulitis of left shoulder    Malignant neoplasm of female breast (HCC)    Dental infection    Dental abscess    Periapical abscess    Submandibular gland swelling    Myalgia    Osteomyelitis of jaw    Hypertriglyceridemia    Edema of both feet    Burning sensation of feet    Hyperlipidemia, mixed    Lumbar  radiculopathy    Elevated liver function tests    Anxiety    Peripheral polyneuropathy    Dehydration    Heart murmur    Community acquired pneumonia of right lower lobe of lung    Malignant neoplasm of female breast, unspecified estrogen receptor status, unspecified laterality, unspecified site of breast (HCC)    Hypoxia    Metastatic breast cancer    Dizziness    Dehydration    Pancytopenia (HCC)    Iron deficiency anemia due to chronic blood loss    Gastroesophageal reflux disease without esophagitis    Benign neoplasm of cecum    Benign neoplasm of ascending colon    Anemia    Dyslipidemia    Anemia, unspecified type    Grade III hemorrhoids    Grade III hemorrhoids    Chronic obstructive pulmonary disease, unspecified (HCC)    Cirrhosis of liver with ascites (HCC)    External hemorrhoids    Hemangioma of liver    Telangiectasia of extremity    Breast cancer metastasized to pleura, unspecified laterality (HCC)    Iron deficiency anemia secondary to blood loss (chronic)    Lower extremity edema    Rectal bleeding    Rash    Tobacco dependence    Hepatomegaly    Splenomegaly        Medical History:  Past Medical History:    Abdominal distention    Anemia    Anxiety    Back pain    Breast cancer (HCC)    breast    COPD (chronic obstructive pulmonary disease) (HCC)    No continuous O2    Disorder of liver    lesions    Disorder of thyroid    Fatigue    Feeling lonely    Hemorrhoids    High cholesterol    History of 2019 novel coronavirus disease (COVID-19)    ASYMPTOMATIC.  NO HOSPITALIZATION    History of blood transfusion    Hyperthyroidism    Leg swelling    Neuropathy    Personal history of antineoplastic chemotherapy    oral    Pneumonia due to organism    PONV (postoperative nausea and vomiting)    Rash    Shortness of breath    Only exertion    Stress    Visual impairment    glasses    Weight gain       Surgical History:  Past Surgical History:   Procedure Laterality Date    Colonoscopy      Colonoscopy N/A  10/26/2021    Procedure: COLONOSCOPY with biopsy, Cold Polypectomy & Clips x 5 placement /ESOPHAGOGASTRODUODENOSCOPY (EGD) with Biopsy;  Surgeon: Oscar Harp MD;  Location:  ENDOSCOPY    Oral surgery  2017    [facility],biopsy  05/2015       Allergies:  Allergies   Allergen Reactions    Radiology Contrast Iodinated Dyes HIVES and ITCHING    Iodine (Topical) OTHER (SEE COMMENTS)       Family History:  Family History   Adopted: Yes   Family history unknown: Yes       Social History:  Social History     Socioeconomic History    Marital status:      Spouse name: Not on file    Number of children: Not on file    Years of education: Not on file    Highest education level: Not on file   Occupational History    Not on file   Tobacco Use    Smoking status: Light Smoker     Current packs/day: 0.00     Types: Cigarettes    Smokeless tobacco: Never    Tobacco comments:     3-5 cig/ day   Vaping Use    Vaping status: Never Used   Substance and Sexual Activity    Alcohol use: Not Currently     Alcohol/week: 0.0 standard drinks of alcohol     Comment: 2 drinks/yr     Drug use: No    Sexual activity: Not Currently   Other Topics Concern     Service Not Asked    Blood Transfusions Not Asked    Caffeine Concern Yes     Comment: 1 1/2 a day 16oz    Occupational Exposure Not Asked    Hobby Hazards Not Asked    Sleep Concern Not Asked    Stress Concern Not Asked    Weight Concern Not Asked    Special Diet Not Asked    Back Care Not Asked    Exercise No    Bike Helmet Not Asked    Seat Belt Not Asked    Self-Exams Not Asked   Social History Narrative    Not on file     Social Determinants of Health     Financial Resource Strain: Not on file   Food Insecurity: Not on file   Transportation Needs: Not on file   Physical Activity: Not on file   Stress: Not on file   Social Connections: Not on file   Housing Stability: Not on file       Medications:    Current Outpatient Medications:     levothyroxine 50 MCG Oral  Tab, Take 1 tablet (50 mcg total) by mouth before breakfast., Disp: 90 tablet, Rfl: 0    levothyroxine (SYNTHROID) 200 MCG Oral Tab, Take 1 tablet (200 mcg total) by mouth before breakfast., Disp: 90 tablet, Rfl: 0    escitalopram 20 MG Oral Tab, TAKE 1 TABLET(20 MG) BY MOUTH DAILY, Disp: 30 tablet, Rfl: 1    ALPRAZolam 0.5 MG Oral Tab, Take 1 tablet (0.5 mg total) by mouth 2 (two) times daily as needed for Sleep or Anxiety., Disp: 60 tablet, Rfl: 0    rosuvastatin 5 MG Oral Tab, Take 1 tablet (5 mg total) by mouth nightly., Disp: 90 tablet, Rfl: 1    Potassium Chloride ER 10 MEQ Oral Tab CR, Take 1 tablet (10 mEq total) by mouth daily., Disp: 90 tablet, Rfl: 1    pantoprazole 40 MG Oral Tab EC, Take 1 tablet (40 mg total) by mouth 2 (two) times daily before meals., Disp: 180 tablet, Rfl: 1    propranolol 10 MG Oral Tab, Take 1 tablet (10 mg total) by mouth 2 (two) times daily., Disp: 180 tablet, Rfl: 1    furosemide 40 MG Oral Tab, Take 1 tablet (40 mg total) by mouth daily., Disp: 90 tablet, Rfl: 1    [START ON 9/9/2024] HYDROcodone-acetaminophen 7.5-325 MG Oral Tab, Take 1-2 tablets by mouth every 8 (eight) hours as needed for Pain., Disp: 90 tablet, Rfl: 0    zolpidem 5 MG Oral Tab, Take 1 tablet (5 mg total) by mouth nightly as needed for Sleep., Disp: 30 tablet, Rfl: 2    ALBUTEROL 108 (90 Base) MCG/ACT Inhalation Aero Soln, USE 2 INHALATIONS EVERY 4 HOURS AS NEEDED FOR SHORTNESS OF BREATH OR WHEEZING, Disp: 25.5 g, Rfl: 1    spironolactone 100 MG Oral Tab, TAKE 1 TABLET DAILY, Disp: 90 tablet, Rfl: 1    OMEGA-3-ACID ETHYL ESTERS 1 g Oral Cap, TAKE 2 CAPSULES TWICE A DAY, Disp: 360 capsule, Rfl: 0    gabapentin 300 MG Oral Cap, Take 1 capsule (300 mg total) by mouth 4 (four) times daily., Disp: 360 capsule, Rfl: 3    Ferrous Sulfate (IRON) 325 (65 Fe) MG Oral Tab, Take by mouth. She does not take daily, Disp: , Rfl:     buPROPion 150 MG Oral Tablet 12 Hr, Take 1 tablet (150 mg total) by mouth 2 (two) times  daily., Disp: , Rfl:     Biotin 2500 MCG Oral Cap, Take 1 capsule by mouth every evening., Disp: , Rfl:     folic acid 800 MCG Oral Tab, Take 1 tablet (800 mcg total) by mouth daily., Disp: , Rfl:     Vitamin B-12 1000 MCG Oral Tab, Take 1 tablet (1,000 mcg total) by mouth daily., Disp: 30 tablet, Rfl: 11    Review of Systems:  A comprehensive 14 point review of systems was completed.  Pertinent positives and negatives noted in the HPI.    Performance Status: ECOG 0 - Fully active, able to carry on all predisease activities without restrictions.    Physical Examination:  General: Patient is alert and oriented x 3, not in acute distress.  Vital Signs: Height: 167.6 cm (5' 5.98\") (09/05 1450)  Weight: 81.4 kg (179 lb 8 oz) (09/05 1450)  BSA (Calculated - sq m): 1.91 sq meters (09/05 1450)  Pulse: 79 (09/05 1450)  BP: 117/69 (09/05 1450)  Temp: 96.9 °F (36.1 °C) (09/05 1450)  Do Not Use - Resp Rate: --  SpO2: 96 % (09/05 1450)  HEENT: Anicteric, conjunctivae and sclerae clear, no oropharyngeal lesion/thrush, mucous membranes are moist   Chest: Clear to auscultation. Respirations unlabored.   Heart: Regular rate and rhythm.   Abdomen: Soft, non-distended, non-tender with present bowel sounds.  Extremities: No edema.  Neurological: Grossly intact.   Lymphatics: There is no palpable lymphadenopathy throughout in the cervical or supraclavicular regions.   Skin: warm, dry, no erythema or rash   Psych/Depression: mood and affect are appropriate.     Labs:     Recent Results (from the past 72 hour(s))   COMP METABOLIC PANEL [E]    Collection Time: 09/05/24  2:49 PM   Result Value Ref Range    Glucose 168 (H) 70 - 99 mg/dL    Sodium 140 136 - 145 mmol/L    Potassium 3.7 3.5 - 5.1 mmol/L    Chloride 109 98 - 112 mmol/L    CO2 28.0 21.0 - 32.0 mmol/L    Anion Gap 3 0 - 18 mmol/L    BUN 11 9 - 23 mg/dL    Creatinine 0.89 0.55 - 1.02 mg/dL    Calcium, Total 9.6 8.5 - 10.1 mg/dL    Calculated Osmolality 293 275 - 295 mOsm/kg     eGFR-Cr 73 >=60 mL/min/1.73m2    AST 13 (L) 15 - 37 U/L    ALT 22 13 - 56 U/L    Alkaline Phosphatase 116 50 - 130 U/L    Bilirubin, Total 0.4 0.1 - 2.0 mg/dL    Total Protein 7.2 6.4 - 8.2 g/dL    Albumin 3.8 3.4 - 5.0 g/dL    Globulin  3.4 2.8 - 4.4 g/dL    A/G Ratio 1.1 1.0 - 2.0    Patient Fasting for CMP? Patient not present    CBC W/DIFF [E]    Collection Time: 09/05/24  2:49 PM   Result Value Ref Range    WBC 6.3 4.0 - 11.0 x10(3) uL    RBC 3.93 3.80 - 5.30 x10(6)uL    HGB 12.2 12.0 - 16.0 g/dL    HCT 36.0 35.0 - 48.0 %    .0 150.0 - 450.0 10(3)uL    MCV 91.6 80.0 - 100.0 fL    MCH 31.0 26.0 - 34.0 pg    MCHC 33.9 31.0 - 37.0 g/dL    RDW 13.3 %    Neutrophil Absolute Prelim 3.53 1.50 - 7.70 x10 (3) uL    Neutrophil Absolute 3.53 1.50 - 7.70 x10(3) uL    Lymphocyte Absolute 2.04 1.00 - 4.00 x10(3) uL    Monocyte Absolute 0.34 0.10 - 1.00 x10(3) uL    Eosinophil Absolute 0.31 0.00 - 0.70 x10(3) uL    Basophil Absolute 0.07 0.00 - 0.20 x10(3) uL    Immature Granulocyte Absolute 0.01 0.00 - 1.00 x10(3) uL    Neutrophil % 56.0 %    Lymphocyte % 32.4 %    Monocyte % 5.4 %    Eosinophil % 4.9 %    Basophil % 1.1 %    Immature Granulocyte % 0.2 %       Impression/Plan    Metastatic breast cancer: initially diagnosed in 5/2015 with ER/ID positive, HER2 negative breast cancer with pleural and bone metastases. Started Arimidex  + GNRH antagonist (Zoladex) until 1/2019 when developed progression in liver metastases. She was switched to fulvestrant and abemaciclib  in 1/2019 and stopped abemaciclib in 2/2022 due to worsening anemia. CBC and CMP reviewed, proceed with fulvestrant today.     Iron deficiency anemia: history of AVMs and hemorrhoids. Last received IV Iron in 4/2024, repeat ferritin < 30, will replace with IV iron ferumoxytol today    Planned Follow Up: 4 weeks follow up with Dr. Munroe, labs and injection    Risk Level: HIGH metastatic breast cancer receiving hormone injection and frequent IV iron  requiring close monitoring     The 21st Century Cures Act makes medical notes like these available to patients in the interest of transparency. Please be advised this is a medical document. Medical documents are intended to carry relevant information, facts as evident, and the clinical opinion of the practitioner. The medical note is intended as peer to peer communication and may appear blunt or direct. It is written in medical language and may contain abbreviations or verbiage that are unfamiliar.     Electronically Signed by:    Praveena Melo, REFUGIO, APRN, NP-C, AOCNP  Nurse Practitioner  [facility]

## 2024-09-05 NOTE — PROGRESS NOTES
Education Record    Learner:  Patient    Disease / Diagnosis: breast CA, anemia     Barriers / Limitations:  None   Comments:    Method:  Brief focused and Discussion   Comments:    General Topics:  Plan of care reviewed   Comments:    Outcome:  Shows understanding   Comments: patient  here for faslodex and feraheme, patient  tolerated treatment, no c/o. Dc'd home in stable condition.

## 2024-09-30 DIAGNOSIS — E78.2 HYPERLIPIDEMIA, MIXED: ICD-10-CM

## 2024-09-30 RX ORDER — ROSUVASTATIN CALCIUM 5 MG/1
5 TABLET, COATED ORAL NIGHTLY
Qty: 90 TABLET | Refills: 3 | OUTPATIENT
Start: 2024-09-30

## 2024-09-30 NOTE — TELEPHONE ENCOUNTER
LOV: 7/9/2024  for: Medication Follow-Up   Patient advised to RTC on:  3 months.   Nov;10/09/2024    Medication Quantity Refills Start End   rosuvastatin 5 MG Oral Tab 90 tablet 1 7/9/2024 --   Sig:   Take 1 tablet (5 mg total) by mouth nightly.

## 2024-10-03 ENCOUNTER — OFFICE VISIT (OUTPATIENT)
Dept: HEMATOLOGY/ONCOLOGY | Age: 64
End: 2024-10-03
Attending: INTERNAL MEDICINE
Payer: COMMERCIAL

## 2024-10-03 VITALS
RESPIRATION RATE: 18 BRPM | SYSTOLIC BLOOD PRESSURE: 162 MMHG | HEART RATE: 82 BPM | BODY MASS INDEX: 28.61 KG/M2 | DIASTOLIC BLOOD PRESSURE: 71 MMHG | WEIGHT: 178 LBS | HEIGHT: 65.98 IN | OXYGEN SATURATION: 98 % | TEMPERATURE: 96 F

## 2024-10-03 DIAGNOSIS — C50.911 CARCINOMA OF RIGHT BREAST METASTATIC TO PLEURA (HCC): Primary | ICD-10-CM

## 2024-10-03 DIAGNOSIS — Z17.0 MALIGNANT NEOPLASM OF NIPPLE OF RIGHT BREAST IN FEMALE, ESTROGEN RECEPTOR POSITIVE (HCC): ICD-10-CM

## 2024-10-03 DIAGNOSIS — C78.2 CARCINOMA OF RIGHT BREAST METASTATIC TO PLEURA (HCC): Primary | ICD-10-CM

## 2024-10-03 DIAGNOSIS — C50.919 BREAST CANCER METASTASIZED TO PLEURA, UNSPECIFIED LATERALITY (HCC): ICD-10-CM

## 2024-10-03 DIAGNOSIS — C78.2 BREAST CANCER METASTASIZED TO PLEURA, UNSPECIFIED LATERALITY (HCC): ICD-10-CM

## 2024-10-03 DIAGNOSIS — C50.011 MALIGNANT NEOPLASM OF NIPPLE OF RIGHT BREAST IN FEMALE, ESTROGEN RECEPTOR POSITIVE (HCC): ICD-10-CM

## 2024-10-03 DIAGNOSIS — D50.0 IRON DEFICIENCY ANEMIA SECONDARY TO BLOOD LOSS (CHRONIC): ICD-10-CM

## 2024-10-03 DIAGNOSIS — C79.51 MALIGNANT NEOPLASM METASTATIC TO BONE (HCC): ICD-10-CM

## 2024-10-03 LAB
ALBUMIN SERPL-MCNC: 3.8 G/DL (ref 3.4–5)
ALBUMIN/GLOB SERPL: 1.1 {RATIO} (ref 1–2)
ALP LIVER SERPL-CCNC: 120 U/L
ALT SERPL-CCNC: 19 U/L
ANION GAP SERPL CALC-SCNC: 4 MMOL/L (ref 0–18)
AST SERPL-CCNC: 12 U/L (ref 15–37)
BASOPHILS # BLD AUTO: 0.09 X10(3) UL (ref 0–0.2)
BASOPHILS NFR BLD AUTO: 1.5 %
BILIRUB SERPL-MCNC: 0.5 MG/DL (ref 0.1–2)
BUN BLD-MCNC: 12 MG/DL (ref 9–23)
CALCIUM BLD-MCNC: 9.5 MG/DL (ref 8.5–10.1)
CHLORIDE SERPL-SCNC: 105 MMOL/L (ref 98–112)
CO2 SERPL-SCNC: 29 MMOL/L (ref 21–32)
CREAT BLD-MCNC: 0.95 MG/DL
DEPRECATED HBV CORE AB SER IA-ACNC: 87 NG/ML
EGFRCR SERPLBLD CKD-EPI 2021: 68 ML/MIN/1.73M2 (ref 60–?)
EOSINOPHIL # BLD AUTO: 0.28 X10(3) UL (ref 0–0.7)
EOSINOPHIL NFR BLD AUTO: 4.6 %
ERYTHROCYTE [DISTWIDTH] IN BLOOD BY AUTOMATED COUNT: 13.8 %
FASTING STATUS PATIENT QL REPORTED: NO
GLOBULIN PLAS-MCNC: 3.4 G/DL (ref 2.8–4.4)
GLUCOSE BLD-MCNC: 165 MG/DL (ref 70–99)
HCT VFR BLD AUTO: 38.8 %
HGB BLD-MCNC: 13 G/DL
IMM GRANULOCYTES # BLD AUTO: 0.01 X10(3) UL (ref 0–1)
IMM GRANULOCYTES NFR BLD: 0.2 %
IRON SATN MFR SERPL: 21 %
IRON SERPL-MCNC: 72 UG/DL
LYMPHOCYTES # BLD AUTO: 1.95 X10(3) UL (ref 1–4)
LYMPHOCYTES NFR BLD AUTO: 32.3 %
MCH RBC QN AUTO: 31.3 PG (ref 26–34)
MCHC RBC AUTO-ENTMCNC: 33.5 G/DL (ref 31–37)
MCV RBC AUTO: 93.5 FL
MONOCYTES # BLD AUTO: 0.35 X10(3) UL (ref 0.1–1)
MONOCYTES NFR BLD AUTO: 5.8 %
NEUTROPHILS # BLD AUTO: 3.35 X10 (3) UL (ref 1.5–7.7)
NEUTROPHILS # BLD AUTO: 3.35 X10(3) UL (ref 1.5–7.7)
NEUTROPHILS NFR BLD AUTO: 55.6 %
OSMOLALITY SERPL CALC.SUM OF ELEC: 289 MOSM/KG (ref 275–295)
PLATELET # BLD AUTO: 165 10(3)UL (ref 150–450)
POTASSIUM SERPL-SCNC: 3.8 MMOL/L (ref 3.5–5.1)
PROT SERPL-MCNC: 7.2 G/DL (ref 6.4–8.2)
RBC # BLD AUTO: 4.15 X10(6)UL
SODIUM SERPL-SCNC: 138 MMOL/L (ref 136–145)
TOTAL IRON BINDING CAPACITY: 348 UG/DL (ref 250–425)
TRANSFERRIN SERPL-MCNC: 256 MG/DL (ref 250–380)
WBC # BLD AUTO: 6 X10(3) UL (ref 4–11)

## 2024-10-03 PROCEDURE — 96374 THER/PROPH/DIAG INJ IV PUSH: CPT

## 2024-10-03 PROCEDURE — 96402 CHEMO HORMON ANTINEOPL SQ/IM: CPT

## 2024-10-03 PROCEDURE — 99215 OFFICE O/P EST HI 40 MIN: CPT | Performed by: INTERNAL MEDICINE

## 2024-10-03 PROCEDURE — G2211 COMPLEX E/M VISIT ADD ON: HCPCS | Performed by: INTERNAL MEDICINE

## 2024-10-03 RX ORDER — LAMOTRIGINE 25 MG/1
500 TABLET ORAL ONCE
Status: CANCELLED | OUTPATIENT
Start: 2024-10-03

## 2024-10-03 RX ORDER — LAMOTRIGINE 25 MG/1
500 TABLET ORAL ONCE
Status: COMPLETED | OUTPATIENT
Start: 2024-10-03 | End: 2024-10-03

## 2024-10-03 RX ADMIN — LAMOTRIGINE 500 MG: 25 TABLET ORAL at 15:33:00

## 2024-10-03 NOTE — PROGRESS NOTES
Cancer Center Progress Note  Patient Name: Martha Tobias   YOB: 1960   Medical Record Number: [id number]     Attending Physician: Glenn Munroe M.D.     Date of Visit:10/3/2024      Chief Complaint:  Chief Complaint   Patient presents with    Follow - Up        Oncologic History:  Martha Tobias is a 64 year old female with limited PMH and limited prior access to the medical system admitted on 5/18 c/o a 3 week history of shortness of breath. She was found to have a large R pleural effusion. She underwent therapeutic thoracentesis. Cytology was unrevealing. When the fluid reaccumulated, she was taken to the OR for thoracostomy with pleural biopsy. In the OR, it was noted that her bilateral breasts were abnormal, concerning for breast masses. The thoracostomy was performed and the pleura appeared abnormal. It was biopsied revealing metastatic breast cancer. Pleurodesis was also performed. Upon questioning, the patient first noted breast abnormalities 2 years prior to admission. She had not seen a physician in several years. She denied symptoms elsewhere.     Progression was noted on CT and she was transitioned to Faslodex and Abeniciclib.    She had difficulty tolerating the Abemiciclib due to diarrhea and cytopenias.    She was admitted to the hospital from 12/21/20 to 12/24/20 with severe anemia after restarting the Abemaciclib. She was transfused and underwent MRI enterography. No bleeding source, other than her hemorrhoids was found. She was discharged to follow up with GI. The Abemaciclib was discontinued.   Interestingly, her liver lesions on MRI were described as stable hemangiomas, where they had appeared to be enlarging metastatic deposits on non-contrast CT.     Biopsy of the liver revealed cavernous hemangioma.    She was hospitalized from 12/14/21 to 12/19/21 and diagnosed with cirrhosis. She required paracentesis for improved comfort. Cytology was negative for malignancy. She was  transfused and given IV iron for her iron deficiency. EGD revealed esophageal varices and portal gastropathy. Her push enteroscopy and capsule endoscopy revealed small bowel AVMs and erosions/ulcers.  She was discharged to be evaluated by hepatology, ongoing follow up with GI, follow up here for ongoing care of her metastatic breast cancer.    History of Present Illness:  Pt is here for follow up. She feels well. No pain. No bleeding.       Performance Status:  ECOG 0    Past Medical History:  Past Medical History:    Abdominal distention    Anemia    Anxiety    Back pain    Breast cancer (HCC)    breast    COPD (chronic obstructive pulmonary disease) (HCC)    No continuous O2    Disorder of liver    lesions    Disorder of thyroid    Fatigue    Feeling lonely    Hemorrhoids    High cholesterol    History of 2019 novel coronavirus disease (COVID-19)    ASYMPTOMATIC.  NO HOSPITALIZATION    History of blood transfusion    Hyperthyroidism    Leg swelling    Neuropathy    Personal history of antineoplastic chemotherapy    oral    Pneumonia due to organism    PONV (postoperative nausea and vomiting)    Rash    Shortness of breath    Only exertion    Stress    Visual impairment    glasses    Weight gain       Past Surgical History:  Past Surgical History:   Procedure Laterality Date    Colonoscopy      Colonoscopy N/A 10/26/2021    Procedure: COLONOSCOPY with biopsy, Cold Polypectomy & Clips x 5 placement /ESOPHAGOGASTRODUODENOSCOPY (EGD) with Biopsy;  Surgeon: Oscar Harp MD;  Location:  ENDOSCOPY    Oral surgery  2017    Thoracotomy,ltd,biopsy  05/2015       Family History:  Family History   Adopted: Yes   Family history unknown: Yes       Social History:  Social History     Socioeconomic History    Marital status:      Spouse name: Not on file    Number of children: Not on file    Years of education: Not on file    Highest education level: Not on file   Occupational History    Not on file   Tobacco Use     Smoking status: Light Smoker     Current packs/day: 0.00     Types: Cigarettes    Smokeless tobacco: Never    Tobacco comments:     3-5 cig/ day   Vaping Use    Vaping status: Never Used   Substance and Sexual Activity    Alcohol use: Not Currently     Alcohol/week: 0.0 standard drinks of alcohol     Comment: 2 drinks/yr     Drug use: No    Sexual activity: Not Currently   Other Topics Concern     Service Not Asked    Blood Transfusions Not Asked    Caffeine Concern Yes     Comment: 1 1/2 a day 16oz    Occupational Exposure Not Asked    Hobby Hazards Not Asked    Sleep Concern Not Asked    Stress Concern Not Asked    Weight Concern Not Asked    Special Diet Not Asked    Back Care Not Asked    Exercise No    Bike Helmet Not Asked    Seat Belt Not Asked    Self-Exams Not Asked   Social History Narrative    Not on file     Social Determinants of Health     Financial Resource Strain: Not on file   Food Insecurity: Not on file   Transportation Needs: Not on file   Physical Activity: Not on file   Stress: Not on file   Social Connections: Not on file   Housing Stability: Not on file       Current Medications:    Current Outpatient Medications:     levothyroxine 50 MCG Oral Tab, Take 1 tablet (50 mcg total) by mouth before breakfast., Disp: 90 tablet, Rfl: 0    levothyroxine (SYNTHROID) 200 MCG Oral Tab, Take 1 tablet (200 mcg total) by mouth before breakfast., Disp: 90 tablet, Rfl: 0    escitalopram 20 MG Oral Tab, TAKE 1 TABLET(20 MG) BY MOUTH DAILY, Disp: 30 tablet, Rfl: 1    ALPRAZolam 0.5 MG Oral Tab, Take 1 tablet (0.5 mg total) by mouth 2 (two) times daily as needed for Sleep or Anxiety., Disp: 60 tablet, Rfl: 0    rosuvastatin 5 MG Oral Tab, Take 1 tablet (5 mg total) by mouth nightly., Disp: 90 tablet, Rfl: 1    Potassium Chloride ER 10 MEQ Oral Tab CR, Take 1 tablet (10 mEq total) by mouth daily., Disp: 90 tablet, Rfl: 1    pantoprazole 40 MG Oral Tab EC, Take 1 tablet (40 mg total) by mouth 2 (two)  times daily before meals., Disp: 180 tablet, Rfl: 1    propranolol 10 MG Oral Tab, Take 1 tablet (10 mg total) by mouth 2 (two) times daily., Disp: 180 tablet, Rfl: 1    furosemide 40 MG Oral Tab, Take 1 tablet (40 mg total) by mouth daily., Disp: 90 tablet, Rfl: 1    HYDROcodone-acetaminophen 7.5-325 MG Oral Tab, Take 1-2 tablets by mouth every 8 (eight) hours as needed for Pain., Disp: 90 tablet, Rfl: 0    zolpidem 5 MG Oral Tab, Take 1 tablet (5 mg total) by mouth nightly as needed for Sleep., Disp: 30 tablet, Rfl: 2    ALBUTEROL 108 (90 Base) MCG/ACT Inhalation Aero Soln, USE 2 INHALATIONS EVERY 4 HOURS AS NEEDED FOR SHORTNESS OF BREATH OR WHEEZING, Disp: 25.5 g, Rfl: 1    spironolactone 100 MG Oral Tab, TAKE 1 TABLET DAILY, Disp: 90 tablet, Rfl: 1    OMEGA-3-ACID ETHYL ESTERS 1 g Oral Cap, TAKE 2 CAPSULES TWICE A DAY, Disp: 360 capsule, Rfl: 0    gabapentin 300 MG Oral Cap, Take 1 capsule (300 mg total) by mouth 4 (four) times daily., Disp: 360 capsule, Rfl: 3    Ferrous Sulfate (IRON) 325 (65 Fe) MG Oral Tab, Take by mouth. She does not take daily, Disp: , Rfl:     buPROPion 150 MG Oral Tablet 12 Hr, Take 1 tablet (150 mg total) by mouth 2 (two) times daily., Disp: , Rfl:     Biotin 2500 MCG Oral Cap, Take 1 capsule by mouth every evening., Disp: , Rfl:     folic acid 800 MCG Oral Tab, Take 1 tablet (800 mcg total) by mouth daily., Disp: , Rfl:     Vitamin B-12 1000 MCG Oral Tab, Take 1 tablet (1,000 mcg total) by mouth daily., Disp: 30 tablet, Rfl: 11    Allergies:  Allergies   Allergen Reactions    Radiology Contrast Iodinated Dyes HIVES and ITCHING    Iodine (Topical) OTHER (SEE COMMENTS)        Review of Systems:    Constitutional No fevers, chills, night sweats, excessive fatigue or weight loss.   Eyes No significant visual difficulties. No diplopia. No yellowing.   Hematologic/Lymphatic No easy bruising or bleeding.  Denies tender or palpable lymph nodes.   Respiratory No dyspnea, pleuritic chest pain,  cough or hemoptysis.   Cardiovascular No anginal chest pain, palpitations or orthopnea.   Gastrointestinal No nausea, vomiting, diarrhea, GI bleeding, or constipation. NL appetite, No Early Satiety.   Genitorurinary No hematuria, dysuria, abnormal bleeding.   Integumentary No yellowing.   Neurologic No headache, blurred vision, and no areas of focal weakness. Normal gait.   Psychiatric No insomnia, depression, erica or mood swings.         Vital Signs:  There were no vitals taken for this visit.  Height: 167.6 cm (5' 5.98\") (10/03 1426)  Weight: 80.7 kg (178 lb) (10/03 1426)  BSA (Calculated - sq m): 1.9 sq meters (10/03 1426)  Pulse: 82 (10/03 1426)  BP: 162/71 (10/03 1426)  Temp: 96.2 °F (35.7 °C) (10/03 1426)  Do Not Use - Resp Rate: --  SpO2: 98 % (10/03 1426)        Physical Examination:    Constitutional Normal - Mood and affect appropriate. Appears close to chronological age. Well nourished. Well developed.   Eyes Normal - Conjunctivae and sclerae are clear and without icterus. Pupils are reactive and equal.   Hematologic/Lymphatic Normal - No petechiae or purpura.  No tender or palpable lymph nodes in the cervical, supraclavicular, axillary or inguinal area.   Respiratory Normal - Lungs CTA, no rhonchi or wheezing.   Cardiovascular Normal - RRR, no murmurs, gallops or rubs.   Abdomen Non-tender, moderately distended, no masses, ascites or hepatosplenomegaly. Stable umbilical hernia.   Extremities Normal - No C/C/E    Integumentary Diffuse angioma-like rash of the arms and torso. Small hemangioma of the R eye.No Jaundice   Neurologic Normal - No sensory or motor deficits, normal cerebellar function, normal gait, cranial nerves intact.   Psychiatric Normal - A&Ox3, Coherent speech. Verbalizes understanding of our discussions today.           Labs:  Recent Labs     10/03/24  1428   RBC 4.15   HGB 13.0   HCT 38.8   MCV 93.5   MCH 31.3   MCHC 33.5   RDW 13.8   NEPRELIM 3.35   WBC 6.0   .0     Recent Labs      10/03/24  1428    H   BUN 12   CREATSERUM 0.95   CA 9.5   ALB 3.8      K 3.8      CO2 29.0   ALKPHO 120   AST 12 L   ALT 19   BILT 0.5   TP 7.2     Radiology:  .        Pathology:  Final Diagnosis:   Dominant mass, left lobe of liver, ultrasound-guided biopsy:  -Fragments of hemangioma.  (See comment.)         Impression and Plan:  Breast Cancer: Stage IV, ER+ MS+ HER-2 Neg.  She was transitioned to Faslodex and Abemaciclib. Abemaciclib dose reduced for diarrhea and rash, and then held for pancytopenia. After restarting, she was hospitalized for severe anemia that was likely related to bleeding, iron deficiency, and the chemo. MRI suggests that the liver lesions have not progressed, as suspected on the non-con CT.  Discontinued the Abemaciclib and continuing Faslodex. Repeat MRI again raised the question of liver metastases. In an effort to definitively determine whether these were metastases, Liver biopsy was accomplished that revealed hemangioma. CT Chest and MRI Abd were stable, indicating that her breast cancer is controlled on Faslodex alone.  Will continue Faslodex. Repeat imaging is stable. The patient reports that her family has enquired about BCRA testing. The patient is very low risk for carrying a mutation, having an ER+ MS+ HER2 negative cancer diagnosed after the age of 50. Testing was not indicated prior to the Islam of PARP inhibitors, that can be used to treat metastatic breast cancer. Testing is normal. Continue Faslodex. Will premedicate with PO benadryl for her rash.    Bone Metastases: Stable.  Discontinued Xgeva due to her teeth issues.     Cirrhosis: Unclear etiology, though likely PITTS.     Anemia: Iron deficiency and ongoing blood loss. Secondary to hemorrhoids and AVMs. She has Varices, as well, but they were not bleeding, and did not require banding while she was in the hospital.   Hgb is better today. Follow iron levels closely. Monitor for active bleeding. Replete  iron stores for her ferritin<30. Will give a dose of Ferraheme for her recent low ferritin.     Rash w/ hemangioma of the eyelid, hemangiomas of the Liver, severe hemorrhoids: She saw hepatology and the rash has improved.      Planned Follow Up:  4 weeks for MD visit with labs.     I spent * minutes face to face with the patient.  More than 50% of that time was spent counseling the patient and/or on coordination of care.  The diagnosis, prognosis, and general treatment was explained to the patient and the family.    Electronically Signed by:    Glenn Munroe M.D.  [facility]

## 2024-10-03 NOTE — PROGRESS NOTES
Education Record    Learner:  Patient    Disease / Diagnosis: here for feraheme and faslodex    Barriers / Limitations:  None    Method:  Brief focused, printed material and  reinforcement    General Topics:  Plan of care reviewed    Outcome: patient ambulatory. No complaints. Tolerated infusion and injections. Gets appts from [facility]. Shows understanding. Discharged in stable condition

## 2024-10-31 ENCOUNTER — OFFICE VISIT (OUTPATIENT)
Dept: HEMATOLOGY/ONCOLOGY | Age: 64
End: 2024-10-31
Attending: INTERNAL MEDICINE
Payer: COMMERCIAL

## 2024-10-31 ENCOUNTER — APPOINTMENT (OUTPATIENT)
Dept: HEMATOLOGY/ONCOLOGY | Age: 64
End: 2024-10-31
Attending: INTERNAL MEDICINE
Payer: COMMERCIAL

## 2024-10-31 VITALS
SYSTOLIC BLOOD PRESSURE: 145 MMHG | BODY MASS INDEX: 29.09 KG/M2 | TEMPERATURE: 96 F | HEIGHT: 65.98 IN | RESPIRATION RATE: 18 BRPM | HEART RATE: 75 BPM | OXYGEN SATURATION: 98 % | DIASTOLIC BLOOD PRESSURE: 69 MMHG | WEIGHT: 181 LBS

## 2024-10-31 DIAGNOSIS — Z17.0 MALIGNANT NEOPLASM OF NIPPLE OF RIGHT BREAST IN FEMALE, ESTROGEN RECEPTOR POSITIVE (HCC): ICD-10-CM

## 2024-10-31 DIAGNOSIS — C78.2 BREAST CANCER METASTASIZED TO PLEURA, UNSPECIFIED LATERALITY (HCC): ICD-10-CM

## 2024-10-31 DIAGNOSIS — C78.2 CARCINOMA OF RIGHT BREAST METASTATIC TO PLEURA (HCC): Primary | ICD-10-CM

## 2024-10-31 DIAGNOSIS — C50.911 CARCINOMA OF RIGHT BREAST METASTATIC TO PLEURA (HCC): Primary | ICD-10-CM

## 2024-10-31 DIAGNOSIS — C50.919 BREAST CANCER METASTASIZED TO PLEURA, UNSPECIFIED LATERALITY (HCC): ICD-10-CM

## 2024-10-31 DIAGNOSIS — C50.011 MALIGNANT NEOPLASM OF NIPPLE OF RIGHT BREAST IN FEMALE, ESTROGEN RECEPTOR POSITIVE (HCC): ICD-10-CM

## 2024-10-31 DIAGNOSIS — C79.51 MALIGNANT NEOPLASM METASTATIC TO BONE (HCC): ICD-10-CM

## 2024-10-31 DIAGNOSIS — D50.0 IRON DEFICIENCY ANEMIA SECONDARY TO BLOOD LOSS (CHRONIC): ICD-10-CM

## 2024-10-31 LAB
ALBUMIN SERPL-MCNC: 3.7 G/DL (ref 3.4–5)
ALBUMIN/GLOB SERPL: 1.2 {RATIO} (ref 1–2)
ALP LIVER SERPL-CCNC: 120 U/L
ALT SERPL-CCNC: 26 U/L
ANION GAP SERPL CALC-SCNC: 0 MMOL/L (ref 0–18)
AST SERPL-CCNC: 14 U/L (ref 15–37)
BASOPHILS # BLD AUTO: 0.07 X10(3) UL (ref 0–0.2)
BASOPHILS NFR BLD AUTO: 1.2 %
BILIRUB SERPL-MCNC: 0.4 MG/DL (ref 0.1–2)
BUN BLD-MCNC: 10 MG/DL (ref 9–23)
CALCIUM BLD-MCNC: 9.4 MG/DL (ref 8.5–10.1)
CHLORIDE SERPL-SCNC: 109 MMOL/L (ref 98–112)
CO2 SERPL-SCNC: 31 MMOL/L (ref 21–32)
CREAT BLD-MCNC: 0.91 MG/DL
DEPRECATED HBV CORE AB SER IA-ACNC: 169 NG/ML
EGFRCR SERPLBLD CKD-EPI 2021: 71 ML/MIN/1.73M2 (ref 60–?)
EOSINOPHIL # BLD AUTO: 0.28 X10(3) UL (ref 0–0.7)
EOSINOPHIL NFR BLD AUTO: 4.7 %
ERYTHROCYTE [DISTWIDTH] IN BLOOD BY AUTOMATED COUNT: 14.1 %
GLOBULIN PLAS-MCNC: 3.2 G/DL (ref 2.8–4.4)
GLUCOSE BLD-MCNC: 190 MG/DL (ref 70–99)
HCT VFR BLD AUTO: 38.8 %
HGB BLD-MCNC: 12.9 G/DL
IMM GRANULOCYTES # BLD AUTO: 0.02 X10(3) UL (ref 0–1)
IMM GRANULOCYTES NFR BLD: 0.3 %
IRON SATN MFR SERPL: 23 %
IRON SERPL-MCNC: 75 UG/DL
LYMPHOCYTES # BLD AUTO: 1.92 X10(3) UL (ref 1–4)
LYMPHOCYTES NFR BLD AUTO: 31.9 %
MCH RBC QN AUTO: 32.1 PG (ref 26–34)
MCHC RBC AUTO-ENTMCNC: 33.2 G/DL (ref 31–37)
MCV RBC AUTO: 96.5 FL
MONOCYTES # BLD AUTO: 0.34 X10(3) UL (ref 0.1–1)
MONOCYTES NFR BLD AUTO: 5.7 %
NEUTROPHILS # BLD AUTO: 3.38 X10 (3) UL (ref 1.5–7.7)
NEUTROPHILS # BLD AUTO: 3.38 X10(3) UL (ref 1.5–7.7)
NEUTROPHILS NFR BLD AUTO: 56.2 %
OSMOLALITY SERPL CALC.SUM OF ELEC: 294 MOSM/KG (ref 275–295)
PLATELET # BLD AUTO: 151 10(3)UL (ref 150–450)
POTASSIUM SERPL-SCNC: 4.2 MMOL/L (ref 3.5–5.1)
PROT SERPL-MCNC: 6.9 G/DL (ref 6.4–8.2)
RBC # BLD AUTO: 4.02 X10(6)UL
SODIUM SERPL-SCNC: 140 MMOL/L (ref 136–145)
TOTAL IRON BINDING CAPACITY: 331 UG/DL (ref 250–425)
TRANSFERRIN SERPL-MCNC: 226 MG/DL (ref 250–380)
WBC # BLD AUTO: 6 X10(3) UL (ref 4–11)

## 2024-10-31 PROCEDURE — G2211 COMPLEX E/M VISIT ADD ON: HCPCS | Performed by: INTERNAL MEDICINE

## 2024-10-31 PROCEDURE — 96402 CHEMO HORMON ANTINEOPL SQ/IM: CPT

## 2024-10-31 PROCEDURE — 99214 OFFICE O/P EST MOD 30 MIN: CPT | Performed by: INTERNAL MEDICINE

## 2024-10-31 RX ORDER — LAMOTRIGINE 25 MG/1
500 TABLET ORAL ONCE
Status: COMPLETED | OUTPATIENT
Start: 2024-10-31 | End: 2024-10-31

## 2024-10-31 RX ORDER — LAMOTRIGINE 25 MG/1
500 TABLET ORAL ONCE
Status: CANCELLED | OUTPATIENT
Start: 2024-10-31

## 2024-10-31 RX ADMIN — LAMOTRIGINE 500 MG: 25 TABLET ORAL at 15:31:00

## 2024-10-31 NOTE — PROGRESS NOTES
Cancer Center Progress Note  Patient Name: Martha Tobias   YOB: 1960   Medical Record Number: [id number]     Attending Physician: Glenn Munroe M.D.     Date of Visit:10/31/2024      Chief Complaint:  Chief Complaint   Patient presents with    Follow - Up        Oncologic History:  Martha Tobias is a 64 year old female with limited PMH and limited prior access to the medical system admitted on 5/18 c/o a 3 week history of shortness of breath. She was found to have a large R pleural effusion. She underwent therapeutic thoracentesis. Cytology was unrevealing. When the fluid reaccumulated, she was taken to the OR for thoracostomy with pleural biopsy. In the OR, it was noted that her bilateral breasts were abnormal, concerning for breast masses. The thoracostomy was performed and the pleura appeared abnormal. It was biopsied revealing metastatic breast cancer. Pleurodesis was also performed. Upon questioning, the patient first noted breast abnormalities 2 years prior to admission. She had not seen a physician in several years. She denied symptoms elsewhere.     Progression was noted on CT and she was transitioned to Faslodex and Abeniciclib.    She had difficulty tolerating the Abemiciclib due to diarrhea and cytopenias.    She was admitted to the hospital from 12/21/20 to 12/24/20 with severe anemia after restarting the Abemaciclib. She was transfused and underwent MRI enterography. No bleeding source, other than her hemorrhoids was found. She was discharged to follow up with GI. The Abemaciclib was discontinued.   Interestingly, her liver lesions on MRI were described as stable hemangiomas, where they had appeared to be enlarging metastatic deposits on non-contrast CT.     Biopsy of the liver revealed cavernous hemangioma.    She was hospitalized from 12/14/21 to 12/19/21 and diagnosed with cirrhosis. She required paracentesis for improved comfort. Cytology was negative for malignancy. She was  transfused and given IV iron for her iron deficiency. EGD revealed esophageal varices and portal gastropathy. Her push enteroscopy and capsule endoscopy revealed small bowel AVMs and erosions/ulcers.  She was discharged to be evaluated by hepatology, ongoing follow up with GI, follow up here for ongoing care of her metastatic breast cancer.    History of Present Illness:  Pt is here for follow up. She feels well. No pain. No bleeding.       Performance Status:  ECOG 0    Past Medical History:  Past Medical History:    Abdominal distention    Anemia    Anxiety    Back pain    Breast cancer (HCC)    breast    COPD (chronic obstructive pulmonary disease) (HCC)    No continuous O2    Disorder of liver    lesions    Disorder of thyroid    Fatigue    Feeling lonely    Hemorrhoids    High cholesterol    History of 2019 novel coronavirus disease (COVID-19)    ASYMPTOMATIC.  NO HOSPITALIZATION    History of blood transfusion    Hyperthyroidism    Leg swelling    Neuropathy    Personal history of antineoplastic chemotherapy    oral    Pneumonia due to organism    PONV (postoperative nausea and vomiting)    Rash    Shortness of breath    Only exertion    Stress    Visual impairment    glasses    Weight gain       Past Surgical History:  Past Surgical History:   Procedure Laterality Date    Colonoscopy      Colonoscopy N/A 10/26/2021    Procedure: COLONOSCOPY with biopsy, Cold Polypectomy & Clips x 5 placement /ESOPHAGOGASTRODUODENOSCOPY (EGD) with Biopsy;  Surgeon: Oscar Harp MD;  Location:  ENDOSCOPY    Oral surgery  2017    Thoracotomy,ltd,biopsy  05/2015       Family History:  Family History   Adopted: Yes   Family history unknown: Yes       Social History:  Social History     Socioeconomic History    Marital status:      Spouse name: Not on file    Number of children: Not on file    Years of education: Not on file    Highest education level: Not on file   Occupational History    Not on file   Tobacco Use     Smoking status: Light Smoker     Current packs/day: 0.00     Types: Cigarettes    Smokeless tobacco: Never    Tobacco comments:     3-5 cig/ day   Vaping Use    Vaping status: Never Used   Substance and Sexual Activity    Alcohol use: Not Currently     Alcohol/week: 0.0 standard drinks of alcohol     Comment: 2 drinks/yr     Drug use: No    Sexual activity: Not Currently   Other Topics Concern     Service Not Asked    Blood Transfusions Not Asked    Caffeine Concern Yes     Comment: 1 1/2 a day 16oz    Occupational Exposure Not Asked    Hobby Hazards Not Asked    Sleep Concern Not Asked    Stress Concern Not Asked    Weight Concern Not Asked    Special Diet Not Asked    Back Care Not Asked    Exercise No    Bike Helmet Not Asked    Seat Belt Not Asked    Self-Exams Not Asked   Social History Narrative    Not on file     Social Drivers of Health     Financial Resource Strain: Not on file   Food Insecurity: Not on file   Transportation Needs: Not on file   Physical Activity: Not on file   Stress: Not on file   Social Connections: Not on file   Housing Stability: Not on file       Current Medications:    Current Outpatient Medications:     zolpidem 5 MG Oral Tab, Take 1 tablet (5 mg total) by mouth nightly as needed for Sleep., Disp: 30 tablet, Rfl: 2    rosuvastatin 5 MG Oral Tab, Take 1 tablet (5 mg total) by mouth nightly., Disp: 90 tablet, Rfl: 0    propranolol 10 MG Oral Tab, Take 1 tablet (10 mg total) by mouth 2 (two) times daily., Disp: 180 tablet, Rfl: 0    pantoprazole 40 MG Oral Tab EC, Take 1 tablet (40 mg total) by mouth 2 (two) times daily before meals., Disp: 180 tablet, Rfl: 0    levothyroxine 50 MCG Oral Tab, Take 1 tablet (50 mcg total) by mouth before breakfast., Disp: 90 tablet, Rfl: 1    levothyroxine (SYNTHROID) 200 MCG Oral Tab, Take 1 tablet (200 mcg total) by mouth before breakfast., Disp: 90 tablet, Rfl: 1    furosemide 40 MG Oral Tab, Take 1 tablet (40 mg total) by mouth daily.,  Disp: 90 tablet, Rfl: 1    escitalopram 20 MG Oral Tab, Take 1 tablet (20 mg total) by mouth daily., Disp: 90 tablet, Rfl: 1    ALPRAZolam 0.5 MG Oral Tab, Take 1 tablet (0.5 mg total) by mouth 2 (two) times daily as needed for Sleep or Anxiety., Disp: 60 tablet, Rfl: 2    Potassium Chloride ER 10 MEQ Oral Tab CR, Take 1 tablet (10 mEq total) by mouth daily., Disp: 90 tablet, Rfl: 1    HYDROcodone-acetaminophen 7.5-325 MG Oral Tab, Take 1 tablet by mouth every 8 (eight) hours as needed for Pain., Disp: 90 tablet, Rfl: 0    [START ON 11/9/2024] HYDROcodone-acetaminophen 7.5-325 MG Oral Tab, Take 1 tablet by mouth every 8 (eight) hours as needed for Pain., Disp: 90 tablet, Rfl: 0    [START ON 12/10/2024] HYDROcodone-acetaminophen 7.5-325 MG Oral Tab, Take 1 tablet by mouth every 8 (eight) hours as needed for Pain., Disp: 90 tablet, Rfl: 0    ALBUTEROL 108 (90 Base) MCG/ACT Inhalation Aero Soln, USE 2 INHALATIONS EVERY 4 HOURS AS NEEDED FOR SHORTNESS OF BREATH OR WHEEZING, Disp: 25.5 g, Rfl: 1    spironolactone 100 MG Oral Tab, TAKE 1 TABLET DAILY, Disp: 90 tablet, Rfl: 1    OMEGA-3-ACID ETHYL ESTERS 1 g Oral Cap, TAKE 2 CAPSULES TWICE A DAY, Disp: 360 capsule, Rfl: 0    gabapentin 300 MG Oral Cap, Take 1 capsule (300 mg total) by mouth 4 (four) times daily., Disp: 360 capsule, Rfl: 3    Ferrous Sulfate (IRON) 325 (65 Fe) MG Oral Tab, Take by mouth. She does not take daily, Disp: , Rfl:     buPROPion 150 MG Oral Tablet 12 Hr, Take 1 tablet (150 mg total) by mouth 2 (two) times daily., Disp: , Rfl:     Biotin 2500 MCG Oral Cap, Take 1 capsule by mouth every evening., Disp: , Rfl:     folic acid 800 MCG Oral Tab, Take 1 tablet (800 mcg total) by mouth daily., Disp: , Rfl:     Vitamin B-12 1000 MCG Oral Tab, Take 1 tablet (1,000 mcg total) by mouth daily., Disp: 30 tablet, Rfl: 11    Allergies:  Allergies   Allergen Reactions    Radiology Contrast Iodinated Dyes HIVES and ITCHING    Iodine (Topical) OTHER (SEE COMMENTS)         Review of Systems:    Constitutional No fevers, chills, night sweats, excessive fatigue or weight loss.   Eyes No significant visual difficulties. No diplopia. No yellowing.   Hematologic/Lymphatic No easy bruising or bleeding.  Denies tender or palpable lymph nodes.   Respiratory No dyspnea, pleuritic chest pain, cough or hemoptysis.   Cardiovascular No anginal chest pain, palpitations or orthopnea.   Gastrointestinal No nausea, vomiting, diarrhea, GI bleeding, or constipation. NL appetite, No Early Satiety.   Genitorurinary No hematuria, dysuria, abnormal bleeding.   Integumentary No yellowing.   Neurologic No headache, blurred vision, and no areas of focal weakness. Normal gait.   Psychiatric No insomnia, depression, erica or mood swings.         Vital Signs:  There were no vitals taken for this visit.  Height: 167.6 cm (5' 5.98\") (10/31 1442)  Weight: 82.1 kg (181 lb) (10/31 1442)  BSA (Calculated - sq m): 1.92 sq meters (10/31 1442)  Pulse: 75 (10/31 1442)  BP: 145/69 (10/31 1442)  Temp: 96.2 °F (35.7 °C) (10/31 1442)  Do Not Use - Resp Rate: --  SpO2: 98 % (10/31 1442)        Physical Examination:    Constitutional Normal - Mood and affect appropriate. Appears close to chronological age. Well nourished. Well developed.   Eyes Normal - Conjunctivae and sclerae are clear and without icterus. Pupils are reactive and equal.   Hematologic/Lymphatic Normal - No petechiae or purpura.  No tender or palpable lymph nodes in the cervical, supraclavicular, axillary or inguinal area.   Respiratory Normal - Lungs CTA, no rhonchi or wheezing.   Cardiovascular Normal - RRR, no murmurs, gallops or rubs.   Abdomen Non-tender, moderately distended, no masses, ascites or hepatosplenomegaly. Stable umbilical hernia.   Extremities Normal - No C/C/E    Integumentary Diffuse angioma-like rash of the arms and torso. Small hemangioma of the R eye.No Jaundice   Neurologic Normal - No sensory or motor deficits, normal cerebellar  function, normal gait, cranial nerves intact.   Psychiatric Normal - A&Ox3, Coherent speech. Verbalizes understanding of our discussions today.           Labs:  Recent Labs     10/31/24  1442   RBC 4.02   HGB 12.9   HCT 38.8   MCV 96.5   MCH 32.1   MCHC 33.2   RDW 14.1   NEPRELIM 3.38   WBC 6.0   .0     Recent Labs     10/31/24  1442    H   BUN 10   CREATSERUM 0.91   CA 9.4   ALB 3.7      K 4.2      CO2 31.0   ALKPHO 120   AST 14 L   ALT 26   BILT 0.4   TP 6.9      Latest Reference Range & Units 10/31/24 14:42   Iron, Serum 50 - 170 ug/dL 75   Transferrin 250 - 380 mg/dL 226 (L)   Iron Bind.Cap.(TIBC) 250 - 425 ug/dL 331   Iron Saturation 15 - 50 % 23   FERRITIN 50 - 306 ng/mL 169   (L): Data is abnormally low    Radiology:  .        Pathology:  Final Diagnosis:   Dominant mass, left lobe of liver, ultrasound-guided biopsy:  -Fragments of hemangioma.  (See comment.)         Impression and Plan:  Breast Cancer: Stage IV, ER+ AZ+ HER-2 Neg.  She was transitioned to Faslodex and Abemaciclib. Abemaciclib dose reduced for diarrhea and rash, and then held for pancytopenia. After restarting, she was hospitalized for severe anemia that was likely related to bleeding, iron deficiency, and the chemo. MRI suggests that the liver lesions have not progressed, as suspected on the non-con CT.  Discontinued the Abemaciclib and continuing Faslodex. Repeat MRI again raised the question of liver metastases. In an effort to definitively determine whether these were metastases, Liver biopsy was accomplished that revealed hemangioma. CT Chest and MRI Abd were stable, indicating that her breast cancer is controlled on Faslodex alone.  Will continue Faslodex. Repeat imaging is stable. The patient reports that her family has enquired about BCRA testing. The patient is very low risk for carrying a mutation, having an ER+ AZ+ HER2 negative cancer diagnosed after the age of 50. Testing was not indicated prior to the  Congregation of PARP inhibitors, that can be used to treat metastatic breast cancer. Testing is normal. Continue Faslodex. Will premedicate with PO benadryl for her rash.    Bone Metastases: Stable.  Discontinued Xgeva due to her teeth issues.     Cirrhosis: Unclear etiology, though likely PITTS.     Anemia: Iron deficiency and ongoing blood loss. Secondary to hemorrhoids and AVMs. She has Varices, as well, but they were not bleeding, and did not require banding while she was in the hospital.   Hgb is better today. Follow iron levels closely. Monitor for active bleeding. Replete iron stores for her ferritin<50.     Rash w/ hemangioma of the eyelid, hemangiomas of the Liver, severe hemorrhoids: She saw hepatology and the rash has improved.      Planned Follow Up:  4 weeks for MD visit with labs.     I spent * minutes face to face with the patient.  More than 50% of that time was spent counseling the patient and/or on coordination of care.  The diagnosis, prognosis, and general treatment was explained to the patient and the family.    Electronically Signed by:    Glenn Munroe M.D.  [facility]

## 2024-11-26 DIAGNOSIS — J43.9 PULMONARY EMPHYSEMA, UNSPECIFIED EMPHYSEMA TYPE (HCC): ICD-10-CM

## 2024-11-26 RX ORDER — ALBUTEROL SULFATE 90 UG/1
2 INHALANT RESPIRATORY (INHALATION) EVERY 4 HOURS PRN
Qty: 25.5 G | Refills: 1 | Status: SHIPPED | OUTPATIENT
Start: 2024-11-26

## 2024-11-26 NOTE — TELEPHONE ENCOUNTER
LOV: 10/09/2024 for: Medication Follow-Up   Patient advised to RTC on:  3 months.   Nov:01/15/2024    Medication Quantity Refills Start End   ALBUTEROL 108 (90 Base) MCG/ACT Inhalation Aero Soln 25.5 g 1 5/31/2024 --   Sig:   USE 2 INHALATIONS EVERY 4 HOURS AS NEEDED FOR SHORTNESS OF BREATH OR WHEEZING

## 2024-11-27 ENCOUNTER — OFFICE VISIT (OUTPATIENT)
Dept: HEMATOLOGY/ONCOLOGY | Age: 64
End: 2024-11-27
Attending: INTERNAL MEDICINE
Payer: COMMERCIAL

## 2024-11-27 VITALS
RESPIRATION RATE: 18 BRPM | WEIGHT: 180 LBS | OXYGEN SATURATION: 96 % | TEMPERATURE: 98 F | DIASTOLIC BLOOD PRESSURE: 57 MMHG | HEIGHT: 65.98 IN | HEART RATE: 74 BPM | SYSTOLIC BLOOD PRESSURE: 131 MMHG | BODY MASS INDEX: 28.93 KG/M2

## 2024-11-27 DIAGNOSIS — C50.919 BREAST CANCER METASTASIZED TO PLEURA, UNSPECIFIED LATERALITY (HCC): ICD-10-CM

## 2024-11-27 DIAGNOSIS — D50.0 IRON DEFICIENCY ANEMIA SECONDARY TO BLOOD LOSS (CHRONIC): ICD-10-CM

## 2024-11-27 DIAGNOSIS — C78.2 CARCINOMA OF RIGHT BREAST METASTATIC TO PLEURA (HCC): Primary | ICD-10-CM

## 2024-11-27 DIAGNOSIS — C80.1 NEUROPATHY ASSOCIATED WITH CANCER (HCC): ICD-10-CM

## 2024-11-27 DIAGNOSIS — C79.51 MALIGNANT NEOPLASM METASTATIC TO BONE (HCC): ICD-10-CM

## 2024-11-27 DIAGNOSIS — Z17.0 MALIGNANT NEOPLASM OF NIPPLE OF RIGHT BREAST IN FEMALE, ESTROGEN RECEPTOR POSITIVE (HCC): ICD-10-CM

## 2024-11-27 DIAGNOSIS — G63 NEUROPATHY ASSOCIATED WITH CANCER (HCC): ICD-10-CM

## 2024-11-27 DIAGNOSIS — C78.2 BREAST CANCER METASTASIZED TO PLEURA, UNSPECIFIED LATERALITY (HCC): ICD-10-CM

## 2024-11-27 DIAGNOSIS — C50.011 MALIGNANT NEOPLASM OF NIPPLE OF RIGHT BREAST IN FEMALE, ESTROGEN RECEPTOR POSITIVE (HCC): ICD-10-CM

## 2024-11-27 DIAGNOSIS — C50.911 CARCINOMA OF RIGHT BREAST METASTATIC TO PLEURA (HCC): Primary | ICD-10-CM

## 2024-11-27 LAB
ALBUMIN SERPL-MCNC: 4.5 G/DL (ref 3.2–4.8)
ALBUMIN/GLOB SERPL: 1.8 {RATIO} (ref 1–2)
ALP LIVER SERPL-CCNC: 140 U/L
ALT SERPL-CCNC: 18 U/L
ANION GAP SERPL CALC-SCNC: 4 MMOL/L (ref 0–18)
AST SERPL-CCNC: 17 U/L (ref ?–34)
BASOPHILS # BLD AUTO: 0.08 X10(3) UL (ref 0–0.2)
BASOPHILS NFR BLD AUTO: 1.2 %
BILIRUB SERPL-MCNC: 0.4 MG/DL (ref 0.2–1.1)
BUN BLD-MCNC: 10 MG/DL (ref 9–23)
CALCIUM BLD-MCNC: 9.8 MG/DL (ref 8.7–10.4)
CHLORIDE SERPL-SCNC: 110 MMOL/L (ref 98–112)
CO2 SERPL-SCNC: 25 MMOL/L (ref 21–32)
CREAT BLD-MCNC: 0.77 MG/DL
DEPRECATED HBV CORE AB SER IA-ACNC: 142 NG/ML
EGFRCR SERPLBLD CKD-EPI 2021: 87 ML/MIN/1.73M2 (ref 60–?)
EOSINOPHIL # BLD AUTO: 0.29 X10(3) UL (ref 0–0.7)
EOSINOPHIL NFR BLD AUTO: 4.4 %
ERYTHROCYTE [DISTWIDTH] IN BLOOD BY AUTOMATED COUNT: 13.5 %
FASTING STATUS PATIENT QL REPORTED: NO
GLOBULIN PLAS-MCNC: 2.5 G/DL (ref 2–3.5)
GLUCOSE BLD-MCNC: 112 MG/DL (ref 70–99)
HCT VFR BLD AUTO: 38.5 %
HGB BLD-MCNC: 12.9 G/DL
IMM GRANULOCYTES # BLD AUTO: 0.02 X10(3) UL (ref 0–1)
IMM GRANULOCYTES NFR BLD: 0.3 %
IRON SATN MFR SERPL: 23 %
IRON SERPL-MCNC: 75 UG/DL
LYMPHOCYTES # BLD AUTO: 2.01 X10(3) UL (ref 1–4)
LYMPHOCYTES NFR BLD AUTO: 30.8 %
MCH RBC QN AUTO: 31.9 PG (ref 26–34)
MCHC RBC AUTO-ENTMCNC: 33.5 G/DL (ref 31–37)
MCV RBC AUTO: 95.3 FL
MONOCYTES # BLD AUTO: 0.4 X10(3) UL (ref 0.1–1)
MONOCYTES NFR BLD AUTO: 6.1 %
NEUTROPHILS # BLD AUTO: 3.72 X10 (3) UL (ref 1.5–7.7)
NEUTROPHILS # BLD AUTO: 3.72 X10(3) UL (ref 1.5–7.7)
NEUTROPHILS NFR BLD AUTO: 57.2 %
OSMOLALITY SERPL CALC.SUM OF ELEC: 288 MOSM/KG (ref 275–295)
PLATELET # BLD AUTO: 157 10(3)UL (ref 150–450)
POTASSIUM SERPL-SCNC: 3.6 MMOL/L (ref 3.5–5.1)
PROT SERPL-MCNC: 7 G/DL (ref 5.7–8.2)
RBC # BLD AUTO: 4.04 X10(6)UL
SODIUM SERPL-SCNC: 139 MMOL/L (ref 136–145)
TOTAL IRON BINDING CAPACITY: 327 UG/DL (ref 250–425)
TRANSFERRIN SERPL-MCNC: 230 MG/DL (ref 250–380)
WBC # BLD AUTO: 6.5 X10(3) UL (ref 4–11)

## 2024-11-27 PROCEDURE — G2211 COMPLEX E/M VISIT ADD ON: HCPCS | Performed by: NURSE PRACTITIONER

## 2024-11-27 PROCEDURE — 99215 OFFICE O/P EST HI 40 MIN: CPT | Performed by: NURSE PRACTITIONER

## 2024-11-27 PROCEDURE — 96402 CHEMO HORMON ANTINEOPL SQ/IM: CPT

## 2024-11-27 RX ORDER — LAMOTRIGINE 25 MG/1
500 TABLET ORAL ONCE
Status: CANCELLED | OUTPATIENT
Start: 2024-12-21

## 2024-11-27 RX ORDER — LAMOTRIGINE 25 MG/1
500 TABLET ORAL ONCE
Status: COMPLETED | OUTPATIENT
Start: 2024-11-27 | End: 2024-11-27

## 2024-11-27 RX ADMIN — LAMOTRIGINE 500 MG: 25 TABLET ORAL at 14:41:00

## 2024-11-27 NOTE — PROGRESS NOTES
Cancer Center ANP Visit Note    Patient Name: Martha Tobias   YOB: 1960   Medical Record Number: [id number]   Date of visit: 11/27/2024     Chief Complaint/Reason for Visit:  Chief Complaint   Patient presents with    Follow - Up      Oncologic history:     Breast Cancer:  5/18/15: Stage IV: ER+, NJ+. HER2 - breast cancer  6/15- 1/7/19: Arimidex + GNRH antagonist (Zoladex)   1/7/19-2/22: Faslodex/Abemaciclib   2/22: Faslodex     History of Present Illness:     Martha Tobias is a 64 year old patient of Dr. Munroe with the above oncologic history who is being treated with fulvestrant for metastatic breast cancer. Presents today for follow up.     Patient reports she is doing well today, does not report any new  effects of treatment. Has had neuropathy since beginning treatment however is well managed with gabapentin and neuropathy footwear. She denies any recent fever/chills, denies any nausea, vomiting, or diarrhea. Denies any new body aches/pain or abdominal pain.     Reviewed available chart notes, labs, and imaging.    History:     Past medical, surgical, social and family history reviewed with the patient and updated as appropriate in the chart.    Allergies:  Allergies[1]    Medications:    Current Outpatient Medications:     albuterol 108 (90 Base) MCG/ACT Inhalation Aero Soln, USE 2 INHALATIONS EVERY 4 HOURS AS NEEDED FOR SHORTNESS OF BREATH OR WHEEZING, Disp: 25.5 g, Rfl: 1    zolpidem 5 MG Oral Tab, Take 1 tablet (5 mg total) by mouth nightly as needed for Sleep., Disp: 30 tablet, Rfl: 2    rosuvastatin 5 MG Oral Tab, Take 1 tablet (5 mg total) by mouth nightly., Disp: 90 tablet, Rfl: 0    propranolol 10 MG Oral Tab, Take 1 tablet (10 mg total) by mouth 2 (two) times daily., Disp: 180 tablet, Rfl: 0    pantoprazole 40 MG Oral Tab EC, Take 1 tablet (40 mg total) by mouth 2 (two) times daily before meals., Disp: 180 tablet, Rfl: 0    levothyroxine 50 MCG Oral Tab, Take 1 tablet (50 mcg total)  by mouth before breakfast., Disp: 90 tablet, Rfl: 1    levothyroxine (SYNTHROID) 200 MCG Oral Tab, Take 1 tablet (200 mcg total) by mouth before breakfast., Disp: 90 tablet, Rfl: 1    furosemide 40 MG Oral Tab, Take 1 tablet (40 mg total) by mouth daily., Disp: 90 tablet, Rfl: 1    escitalopram 20 MG Oral Tab, Take 1 tablet (20 mg total) by mouth daily., Disp: 90 tablet, Rfl: 1    ALPRAZolam 0.5 MG Oral Tab, Take 1 tablet (0.5 mg total) by mouth 2 (two) times daily as needed for Sleep or Anxiety., Disp: 60 tablet, Rfl: 2    Potassium Chloride ER 10 MEQ Oral Tab CR, Take 1 tablet (10 mEq total) by mouth daily., Disp: 90 tablet, Rfl: 1    HYDROcodone-acetaminophen 7.5-325 MG Oral Tab, Take 1 tablet by mouth every 8 (eight) hours as needed for Pain., Disp: 90 tablet, Rfl: 0    [START ON 12/10/2024] HYDROcodone-acetaminophen 7.5-325 MG Oral Tab, Take 1 tablet by mouth every 8 (eight) hours as needed for Pain., Disp: 90 tablet, Rfl: 0    spironolactone 100 MG Oral Tab, TAKE 1 TABLET DAILY, Disp: 90 tablet, Rfl: 1    OMEGA-3-ACID ETHYL ESTERS 1 g Oral Cap, TAKE 2 CAPSULES TWICE A DAY, Disp: 360 capsule, Rfl: 0    gabapentin 300 MG Oral Cap, Take 1 capsule (300 mg total) by mouth 4 (four) times daily., Disp: 360 capsule, Rfl: 3    Ferrous Sulfate (IRON) 325 (65 Fe) MG Oral Tab, Take by mouth. She does not take daily, Disp: , Rfl:     buPROPion 150 MG Oral Tablet 12 Hr, Take 1 tablet (150 mg total) by mouth 2 (two) times daily., Disp: , Rfl:     Biotin 2500 MCG Oral Cap, Take 1 capsule by mouth every evening., Disp: , Rfl:     folic acid 800 MCG Oral Tab, Take 1 tablet (800 mcg total) by mouth daily., Disp: , Rfl:     Vitamin B-12 1000 MCG Oral Tab, Take 1 tablet (1,000 mcg total) by mouth daily., Disp: 30 tablet, Rfl: 11    Review of Systems:  A comprehensive 14 point review of systems was completed.  Pertinent positives and negatives noted in the HPI.    Performance Status: ECOG 0    Vital Signs:  /57 (BP Location:  Left arm, Patient Position: Sitting, Cuff Size: large)   Pulse 74   Temp 97.5 °F (36.4 °C) (Tympanic)   Resp 18   Ht 1.676 m (5' 5.98\")   Wt 81.6 kg (180 lb)   SpO2 96%   BMI 29.07 kg/m²     Physical Examination:  General: Well developed, casually dressed, appropriately groomed, alert and oriented x 3, not in acute distress.  HEENT: Anicteric, conjunctivae and sclerae clear, no oropharyngeal lesion/thrush, mucous membranes are moist.   Chest: Clear to auscultation, respirations unlabored.  Heart: Regular rate and rhythm, normal S1/2. No murmur.   Extremities: No edema.  Neurological: Grossly intact.   Skin: Warm, Dry, No erythema, No rash  Psych/Depression: mood and affect are appropriate.     Labs:     Recent Results (from the past 72 hours)   CBC W Differential W Platelet    Collection Time: 11/27/24  1:57 PM   Result Value Ref Range    WBC 6.5 4.0 - 11.0 x10(3) uL    RBC 4.04 3.80 - 5.30 x10(6)uL    HGB 12.9 12.0 - 16.0 g/dL    HCT 38.5 35.0 - 48.0 %    .0 150.0 - 450.0 10(3)uL    MCV 95.3 80.0 - 100.0 fL    MCH 31.9 26.0 - 34.0 pg    MCHC 33.5 31.0 - 37.0 g/dL    RDW 13.5 %    Neutrophil Absolute Prelim 3.72 1.50 - 7.70 x10 (3) uL    Neutrophil Absolute 3.72 1.50 - 7.70 x10(3) uL    Lymphocyte Absolute 2.01 1.00 - 4.00 x10(3) uL    Monocyte Absolute 0.40 0.10 - 1.00 x10(3) uL    Eosinophil Absolute 0.29 0.00 - 0.70 x10(3) uL    Basophil Absolute 0.08 0.00 - 0.20 x10(3) uL    Immature Granulocyte Absolute 0.02 0.00 - 1.00 x10(3) uL    Neutrophil % 57.2 %    Lymphocyte % 30.8 %    Monocyte % 6.1 %    Eosinophil % 4.4 %    Basophil % 1.2 %    Immature Granulocyte % 0.3 %   Comp Metabolic Panel (14)    Collection Time: 11/27/24  1:57 PM   Result Value Ref Range    Glucose 112 (H) 70 - 99 mg/dL    Sodium 139 136 - 145 mmol/L    Potassium 3.6 3.5 - 5.1 mmol/L    Chloride 110 98 - 112 mmol/L    CO2 25.0 21.0 - 32.0 mmol/L    Anion Gap 4 0 - 18 mmol/L    BUN 10 9 - 23 mg/dL    Creatinine 0.77 0.55 - 1.02  mg/dL    Calcium, Total 9.8 8.7 - 10.4 mg/dL    Calculated Osmolality 288 275 - 295 mOsm/kg    eGFR-Cr 87 >=60 mL/min/1.73m2    AST 17 <34 U/L    ALT 18 10 - 49 U/L    Alkaline Phosphatase 140 (H) 50 - 130 U/L    Bilirubin, Total 0.4 0.2 - 1.1 mg/dL    Total Protein 7.0 5.7 - 8.2 g/dL    Albumin 4.5 3.2 - 4.8 g/dL    Globulin  2.5 2.0 - 3.5 g/dL    A/G Ratio 1.8 1.0 - 2.0    Patient Fasting for CMP? No      Impression/Plan    Metastatic breast cancer: Receiving fulvestrant, tolerating treatment well. Labs reviewed, will proceed with injection today as planned. Will discuss with Dr Munroe the plan for imaging moving forward.     Anemia: due to iron deficiency, hemoglobin is stable today, will continue to monitor with iron studies at next visit    Emotional Well Being:  I have assessed the patient's emotional well-being and any concerns about anxiety or depression.  We discussed issues of distress, coping difficulties and social support systems and currently there are no active problems.    Planned Follow Up: MD follow up and treatment on 12/26    Risk Level: HIGH - metastatic breast cancer receiving systemic therapy requiring intervention and close monitoring     The 21st Century Cures Act makes medical notes like these available to patients in the interest of transparency. Please be advised this is a medical document. Medical documents are intended to carry relevant information, facts as evident, and the clinical opinion of the practitioner. The medical note is intended as peer to peer communication and may appear blunt or direct. It is written in medical language and may contain abbreviations or verbiage that are unfamiliar.     Electronically Signed by:    Marsha Maloney, REFUGIO, NP-C  Nurse Practitioner  [facility]          [1]   Allergies  Allergen Reactions    Radiology Contrast Iodinated Dyes HIVES and ITCHING    Iodine (Topical) OTHER (SEE COMMENTS)

## 2024-11-27 NOTE — PROGRESS NOTES
Education Record    Learner:  Patient    Disease / Diagnosis: here for faslodex    Barriers / Limitations:  None    Method:  Brief focused, printed material and  reinforcement    General Topics:  Plan of care reviewed    Outcome: patient ambulatory. No complaints. Tolerated injection. Gets appts from mychart. Shows understanding. Discharged in stable condition

## 2024-11-29 ENCOUNTER — APPOINTMENT (OUTPATIENT)
Dept: HEMATOLOGY/ONCOLOGY | Age: 64
End: 2024-11-29
Attending: INTERNAL MEDICINE
Payer: COMMERCIAL

## 2024-12-17 DIAGNOSIS — R16.1 SPLENOMEGALY: ICD-10-CM

## 2024-12-20 RX ORDER — SPIRONOLACTONE 100 MG/1
100 TABLET, FILM COATED ORAL DAILY
Qty: 90 TABLET | Refills: 1 | Status: SHIPPED | OUTPATIENT
Start: 2024-12-20

## 2024-12-20 NOTE — TELEPHONE ENCOUNTER
Requested Prescriptions     Pending Prescriptions Disp Refills    SPIRONOLACTONE 100 MG Oral Tab [Pharmacy Med Name: SPIRONOLACTONE TABS 100MG] 90 tablet 3     Sig: TAKE 1 TABLET DAILY     Last refill 4/18/24 #90 x 1   LOV 10/9/24  Future Appointments   Date Time Provider Department Center                 1/15/2025  3:00 PM Tracey Christianson MD [facility] [place] [facility] [id number]     Refill protocol passed

## 2024-12-26 ENCOUNTER — OFFICE VISIT (OUTPATIENT)
Age: 64
End: 2024-12-26
Attending: INTERNAL MEDICINE
Payer: COMMERCIAL

## 2024-12-26 VITALS
BODY MASS INDEX: 28.77 KG/M2 | WEIGHT: 179 LBS | SYSTOLIC BLOOD PRESSURE: 163 MMHG | HEIGHT: 65.98 IN | TEMPERATURE: 97 F | RESPIRATION RATE: 18 BRPM | DIASTOLIC BLOOD PRESSURE: 69 MMHG | OXYGEN SATURATION: 97 % | HEART RATE: 72 BPM

## 2024-12-26 DIAGNOSIS — C79.51 MALIGNANT NEOPLASM METASTATIC TO BONE (HCC): Primary | ICD-10-CM

## 2024-12-26 DIAGNOSIS — C50.919 BREAST CANCER METASTASIZED TO PLEURA, UNSPECIFIED LATERALITY (HCC): ICD-10-CM

## 2024-12-26 DIAGNOSIS — C78.2 BREAST CANCER METASTASIZED TO PLEURA, UNSPECIFIED LATERALITY (HCC): ICD-10-CM

## 2024-12-26 DIAGNOSIS — C50.011 MALIGNANT NEOPLASM OF NIPPLE OF RIGHT BREAST IN FEMALE, ESTROGEN RECEPTOR POSITIVE (HCC): ICD-10-CM

## 2024-12-26 DIAGNOSIS — D50.0 IRON DEFICIENCY ANEMIA SECONDARY TO BLOOD LOSS (CHRONIC): ICD-10-CM

## 2024-12-26 DIAGNOSIS — C50.911 CARCINOMA OF RIGHT BREAST METASTATIC TO PLEURA (HCC): Primary | ICD-10-CM

## 2024-12-26 DIAGNOSIS — Z17.0 MALIGNANT NEOPLASM OF NIPPLE OF RIGHT BREAST IN FEMALE, ESTROGEN RECEPTOR POSITIVE (HCC): ICD-10-CM

## 2024-12-26 DIAGNOSIS — C78.2 CARCINOMA OF RIGHT BREAST METASTATIC TO PLEURA (HCC): ICD-10-CM

## 2024-12-26 DIAGNOSIS — C79.51 MALIGNANT NEOPLASM METASTATIC TO BONE (HCC): ICD-10-CM

## 2024-12-26 DIAGNOSIS — C50.911 CARCINOMA OF RIGHT BREAST METASTATIC TO PLEURA (HCC): ICD-10-CM

## 2024-12-26 DIAGNOSIS — C78.2 CARCINOMA OF RIGHT BREAST METASTATIC TO PLEURA (HCC): Primary | ICD-10-CM

## 2024-12-26 LAB
ALBUMIN SERPL-MCNC: 4.7 G/DL (ref 3.2–4.8)
ALBUMIN/GLOB SERPL: 2 {RATIO} (ref 1–2)
ALP LIVER SERPL-CCNC: 135 U/L
ALT SERPL-CCNC: 11 U/L
ANION GAP SERPL CALC-SCNC: 5 MMOL/L (ref 0–18)
AST SERPL-CCNC: 14 U/L (ref ?–34)
BASOPHILS # BLD AUTO: 0.08 X10(3) UL (ref 0–0.2)
BASOPHILS NFR BLD AUTO: 1.3 %
BILIRUB SERPL-MCNC: 0.4 MG/DL (ref 0.2–1.1)
BUN BLD-MCNC: 9 MG/DL (ref 9–23)
CALCIUM BLD-MCNC: 9.7 MG/DL (ref 8.7–10.4)
CHLORIDE SERPL-SCNC: 108 MMOL/L (ref 98–112)
CO2 SERPL-SCNC: 28 MMOL/L (ref 21–32)
CREAT BLD-MCNC: 0.86 MG/DL
DEPRECATED HBV CORE AB SER IA-ACNC: 102 NG/ML
EGFRCR SERPLBLD CKD-EPI 2021: 76 ML/MIN/1.73M2 (ref 60–?)
EOSINOPHIL # BLD AUTO: 0.3 X10(3) UL (ref 0–0.7)
EOSINOPHIL NFR BLD AUTO: 5 %
ERYTHROCYTE [DISTWIDTH] IN BLOOD BY AUTOMATED COUNT: 13.1 %
FASTING STATUS PATIENT QL REPORTED: NO
GLOBULIN PLAS-MCNC: 2.4 G/DL (ref 2–3.5)
GLUCOSE BLD-MCNC: 111 MG/DL (ref 70–99)
HCT VFR BLD AUTO: 38.7 %
HGB BLD-MCNC: 13.2 G/DL
IMM GRANULOCYTES # BLD AUTO: 0.02 X10(3) UL (ref 0–1)
IMM GRANULOCYTES NFR BLD: 0.3 %
IRON SATN MFR SERPL: 16 %
IRON SERPL-MCNC: 52 UG/DL
LYMPHOCYTES # BLD AUTO: 2.1 X10(3) UL (ref 1–4)
LYMPHOCYTES NFR BLD AUTO: 35.3 %
MCH RBC QN AUTO: 32.7 PG (ref 26–34)
MCHC RBC AUTO-ENTMCNC: 34.1 G/DL (ref 31–37)
MCV RBC AUTO: 95.8 FL
MONOCYTES # BLD AUTO: 0.35 X10(3) UL (ref 0.1–1)
MONOCYTES NFR BLD AUTO: 5.9 %
NEUTROPHILS # BLD AUTO: 3.1 X10 (3) UL (ref 1.5–7.7)
NEUTROPHILS # BLD AUTO: 3.1 X10(3) UL (ref 1.5–7.7)
NEUTROPHILS NFR BLD AUTO: 52.2 %
OSMOLALITY SERPL CALC.SUM OF ELEC: 291 MOSM/KG (ref 275–295)
PLATELET # BLD AUTO: 167 10(3)UL (ref 150–450)
POTASSIUM SERPL-SCNC: 3.6 MMOL/L (ref 3.5–5.1)
PROT SERPL-MCNC: 7.1 G/DL (ref 5.7–8.2)
RBC # BLD AUTO: 4.04 X10(6)UL
SODIUM SERPL-SCNC: 141 MMOL/L (ref 136–145)
TOTAL IRON BINDING CAPACITY: 328 UG/DL (ref 250–425)
TRANSFERRIN SERPL-MCNC: 224 MG/DL (ref 250–380)
WBC # BLD AUTO: 6 X10(3) UL (ref 4–11)

## 2024-12-26 RX ORDER — LAMOTRIGINE 25 MG/1
500 TABLET ORAL ONCE
Status: CANCELLED | OUTPATIENT
Start: 2024-12-26

## 2024-12-26 RX ORDER — LAMOTRIGINE 25 MG/1
500 TABLET ORAL ONCE
Status: COMPLETED | OUTPATIENT
Start: 2024-12-26 | End: 2024-12-26

## 2024-12-26 RX ADMIN — LAMOTRIGINE 500 MG: 25 TABLET ORAL at 15:00:00

## 2024-12-26 NOTE — PROGRESS NOTES
Education Record    Learner:  Patient    Disease / Diagnosis: here for faslodex    Barriers / Limitations:  None    Method:  Brief focused, and  reinforcement    General Topics:  Plan of care reviewed    Outcome:  patient ambulatory. No complaints. Tolerated injection. Gets appts from mychart. Shows understanding. Discharged in stable condition

## 2024-12-26 NOTE — PROGRESS NOTES
Cancer Center Progress Note    Patient Name: Martha Tobias   YOB: 1960   Medical Record Number: [id number]   Date of visit: 12/26/2024     Chief Complaint/Reason for Visit:  Chief Complaint   Patient presents with    Follow - Up      History of Present Illness: Martha presents today for follow up of metastatic breast cancer and iron deficiency anemia. She is currently receiving monthly fulvestrant, additional oncology history below. Her medical oncologist is Dr. Glenn Munroe. She currently denies any new complaints--no recent fever or chills, no new pain.      Oncologic History:     Breast Cancer:  5/18/15: Stage IV: ER+, AL+. HER2 - breast cancer  6/15- 1/7/19: Arimidex + GNRH antagonist (Zoladex)   1/7/19-2/22: Faslodex/Abemaciclib   2/22: Faslodex      JOHN:   Transfusion: 5/10/19, 8/20/20,8/2120,12/21/20,12/22/20,12/24/20, 1/8/21,9/10/21,10/22/21.12/6/21, 12/14/21, 12/15/21, 11/30/22  Venofer: 6/26/20, 7/13/20,7/15/20,7/17/20,7/20/20  Infed: 9/25/20,1/18/21, 5/26/21, 8/18/21, 11/8/21, 12/8/21, 4/14/22, 8/3/22, 10/25/22, 1/20/23  Feraheme: 11/22/22, 11/29/22, 5/9/23, 5/16/23, 8/3/23, 2/1/24, 4/8/24, 9/5/24, 10/3/24    Problem List:  Patient Active Problem List   Diagnosis    Other abnormal blood chemistry    Allergic rhinitis, cause unspecified    Hypothyroidism in adult    Pleural effusion    Hypokalemia    At risk for falling    Nipple lesion    Breast mass in female    Breast cancer metastasized to pleura (HCC)    Malignant neoplasm metastatic to bone (HCC)    Malignant neoplasm of nipple of right breast in female (HCC)    Adhesive capsulitis of right shoulder    Adhesive capsulitis of left shoulder    Malignant neoplasm of female breast (HCC)    Dental infection    Dental abscess    Periapical abscess    Submandibular gland swelling    Myalgia    Osteomyelitis of jaw    Hypertriglyceridemia    Edema of both feet    Burning sensation of feet    Hyperlipidemia, mixed    Lumbar radiculopathy     Elevated liver function tests    Anxiety    Peripheral polyneuropathy    Dehydration    Heart murmur    Community acquired pneumonia of right lower lobe of lung    Malignant neoplasm of female breast, unspecified estrogen receptor status, unspecified laterality, unspecified site of breast (HCC)    Hypoxia    Metastatic breast cancer    Dizziness    Dehydration    Pancytopenia (HCC)    Iron deficiency anemia due to chronic blood loss    Gastroesophageal reflux disease without esophagitis    Benign neoplasm of cecum    Benign neoplasm of ascending colon    Anemia    Dyslipidemia    Anemia, unspecified type    Grade III hemorrhoids    Grade III hemorrhoids    Chronic obstructive pulmonary disease, unspecified (HCC)    Cirrhosis of liver with ascites (HCC)    External hemorrhoids    Hemangioma of liver    Telangiectasia of extremity    Breast cancer metastasized to pleura, unspecified laterality (HCC)    Iron deficiency anemia secondary to blood loss (chronic)    Lower extremity edema    Rectal bleeding    Rash    Tobacco dependence    Hepatomegaly    Splenomegaly        Medical History:  Past Medical History:    Abdominal distention    Anemia    Anxiety    Back pain    Breast cancer (HCC)    breast    COPD (chronic obstructive pulmonary disease) (HCC)    No continuous O2    Disorder of liver    lesions    Disorder of thyroid    Fatigue    Feeling lonely    Hemorrhoids    High cholesterol    History of 2019 novel coronavirus disease (COVID-19)    ASYMPTOMATIC.  NO HOSPITALIZATION    History of blood transfusion    Hyperthyroidism    Leg swelling    Neuropathy    Personal history of antineoplastic chemotherapy    oral    Pneumonia due to organism    PONV (postoperative nausea and vomiting)    Rash    Shortness of breath    Only exertion    Stress    Visual impairment    glasses    Weight gain       Surgical History:  Past Surgical History:   Procedure Laterality Date    Colonoscopy      Colonoscopy N/A 10/26/2021     Procedure: COLONOSCOPY with biopsy, Cold Polypectomy & Clips x 5 placement /ESOPHAGOGASTRODUODENOSCOPY (EGD) with Biopsy;  Surgeon: Oscar Harp MD;  Location:  ENDOSCOPY    Oral surgery  [address] [place] [address],[facility],biopsy  05/2015       Allergies:  Allergies   Allergen Reactions    Radiology Contrast Iodinated Dyes HIVES and ITCHING    Iodine (Topical) OTHER (SEE COMMENTS)       Family History:  Family History   Adopted: Yes   Family history unknown: Yes       Social History:  Social History     Socioeconomic History    Marital status:      Spouse name: Not on file    Number of children: Not on file    Years of education: Not on file    Highest education level: Not on file   Occupational History    Not on file   Tobacco Use    Smoking status: Light Smoker     Current packs/day: 0.00     Types: Cigarettes    Smokeless tobacco: Never    Tobacco comments:     3-5 cig/ day   Vaping Use    Vaping status: Never Used   Substance and Sexual Activity    Alcohol use: Not Currently     Alcohol/week: 0.0 standard drinks of alcohol     Comment: 2 drinks/yr     Drug use: No    Sexual activity: Not Currently   Other Topics Concern     Service Not Asked    Blood Transfusions Not Asked    Caffeine Concern Yes     Comment: 1 1/2 a day 16oz    Occupational Exposure Not Asked    Hobby Hazards Not Asked    Sleep Concern Not Asked    Stress Concern Not Asked    Weight Concern Not Asked    Special Diet Not Asked    Back Care Not Asked    Exercise No    Bike Helmet Not Asked    Seat Belt Not Asked    Self-Exams Not Asked   Social History Narrative    Not on file     Social Drivers of Health     Financial Resource Strain: Not on file   Food Insecurity: Not on file   Transportation Needs: Not on file   Physical Activity: Not on file   Stress: Not on file   Social Connections: Not on file   Housing Stability: Not on file       Medications:    Current Outpatient Medications:     spironolactone 100 MG Oral Tab, TAKE 1 TABLET  DAILY, Disp: 90 tablet, Rfl: 1    albuterol 108 (90 Base) MCG/ACT Inhalation Aero Soln, USE 2 INHALATIONS EVERY 4 HOURS AS NEEDED FOR SHORTNESS OF BREATH OR WHEEZING, Disp: 25.5 g, Rfl: 1    zolpidem 5 MG Oral Tab, Take 1 tablet (5 mg total) by mouth nightly as needed for Sleep., Disp: 30 tablet, Rfl: 2    rosuvastatin 5 MG Oral Tab, Take 1 tablet (5 mg total) by mouth nightly., Disp: 90 tablet, Rfl: 0    propranolol 10 MG Oral Tab, Take 1 tablet (10 mg total) by mouth 2 (two) times daily., Disp: 180 tablet, Rfl: 0    pantoprazole 40 MG Oral Tab EC, Take 1 tablet (40 mg total) by mouth 2 (two) times daily before meals., Disp: 180 tablet, Rfl: 0    levothyroxine 50 MCG Oral Tab, Take 1 tablet (50 mcg total) by mouth before breakfast., Disp: 90 tablet, Rfl: 1    levothyroxine (SYNTHROID) 200 MCG Oral Tab, Take 1 tablet (200 mcg total) by mouth before breakfast., Disp: 90 tablet, Rfl: 1    furosemide 40 MG Oral Tab, Take 1 tablet (40 mg total) by mouth daily., Disp: 90 tablet, Rfl: 1    escitalopram 20 MG Oral Tab, Take 1 tablet (20 mg total) by mouth daily., Disp: 90 tablet, Rfl: 1    ALPRAZolam 0.5 MG Oral Tab, Take 1 tablet (0.5 mg total) by mouth 2 (two) times daily as needed for Sleep or Anxiety., Disp: 60 tablet, Rfl: 2    Potassium Chloride ER 10 MEQ Oral Tab CR, Take 1 tablet (10 mEq total) by mouth daily., Disp: 90 tablet, Rfl: 1    HYDROcodone-acetaminophen 7.5-325 MG Oral Tab, Take 1 tablet by mouth every 8 (eight) hours as needed for Pain., Disp: 90 tablet, Rfl: 0    OMEGA-3-ACID ETHYL ESTERS 1 g Oral Cap, TAKE 2 CAPSULES TWICE A DAY, Disp: 360 capsule, Rfl: 0    gabapentin 300 MG Oral Cap, Take 1 capsule (300 mg total) by mouth 4 (four) times daily., Disp: 360 capsule, Rfl: 3    Ferrous Sulfate (IRON) 325 (65 Fe) MG Oral Tab, Take by mouth. She does not take daily, Disp: , Rfl:     buPROPion 150 MG Oral Tablet 12 Hr, Take 1 tablet (150 mg total) by mouth 2 (two) times daily., Disp: , Rfl:     Biotin 2500  MCG Oral Cap, Take 1 capsule by mouth every evening., Disp: , Rfl:     folic acid 800 MCG Oral Tab, Take 1 tablet (800 mcg total) by mouth daily., Disp: , Rfl:     Vitamin B-12 1000 MCG Oral Tab, Take 1 tablet (1,000 mcg total) by mouth daily., Disp: 30 tablet, Rfl: 11    Review of Systems:  A comprehensive 14 point review of systems was completed.  Pertinent positives and negatives noted in the HPI.    Performance Status: ECOG 0 - Fully active, able to carry on all predisease activities without restrictions.    Physical Examination:  General: Patient is alert and oriented x 3, not in acute distress.  Vital Signs: Height: 167.6 cm (5' 5.98\") (12/26 1426)  Weight: 81.2 kg (179 lb) (12/26 1426)  BSA (Calculated - sq m): 1.91 sq meters (12/26 1426)  Pulse: 72 (12/26 1426)  BP: 163/69 (12/26 1426)  Temp: 97.3 °F (36.3 °C) (12/26 1426)  Do Not Use - Resp Rate: --  SpO2: 97 % (12/26 1426)  HEENT: Anicteric, conjunctivae and sclerae clear, no oropharyngeal lesion/thrush, mucous membranes are moist   Chest: Clear to auscultation. Respirations unlabored.   Heart: Regular rate and rhythm.   Abdomen: Soft, non-distended, non-tender with present bowel sounds.  Extremities: No edema.  Neurological: Grossly intact.   Lymphatics: There is no palpable lymphadenopathy throughout in the cervical or supraclavicular regions.   Skin: warm, dry, no erythema or rash   Psych/Depression: mood and affect are appropriate.     Labs:     Recent Results (from the past 72 hours)   CBC W Differential W Platelet    Collection Time: 12/26/24  2:14 PM   Result Value Ref Range    WBC 6.0 4.0 - 11.0 x10(3) uL    RBC 4.04 3.80 - 5.30 x10(6)uL    HGB 13.2 12.0 - 16.0 g/dL    HCT 38.7 35.0 - 48.0 %    .0 150.0 - 450.0 10(3)uL    MCV 95.8 80.0 - 100.0 fL    MCH 32.7 26.0 - 34.0 pg    MCHC 34.1 31.0 - 37.0 g/dL    RDW 13.1 %    Neutrophil Absolute Prelim 3.10 1.50 - 7.70 x10 (3) uL    Neutrophil Absolute 3.10 1.50 - 7.70 x10(3) uL    Lymphocyte  Absolute 2.10 1.00 - 4.00 x10(3) uL    Monocyte Absolute 0.35 0.10 - 1.00 x10(3) uL    Eosinophil Absolute 0.30 0.00 - 0.70 x10(3) uL    Basophil Absolute 0.08 0.00 - 0.20 x10(3) uL    Immature Granulocyte Absolute 0.02 0.00 - 1.00 x10(3) uL    Neutrophil % 52.2 %    Lymphocyte % 35.3 %    Monocyte % 5.9 %    Eosinophil % 5.0 %    Basophil % 1.3 %    Immature Granulocyte % 0.3 %   Comp Metabolic Panel (14)    Collection Time: 12/26/24  2:14 PM   Result Value Ref Range    Glucose 111 (H) 70 - 99 mg/dL    Sodium 141 136 - 145 mmol/L    Potassium 3.6 3.5 - 5.1 mmol/L    Chloride 108 98 - 112 mmol/L    CO2 28.0 21.0 - 32.0 mmol/L    Anion Gap 5 0 - 18 mmol/L    BUN 9 9 - 23 mg/dL    Creatinine 0.86 0.55 - 1.02 mg/dL    Calcium, Total 9.7 8.7 - 10.4 mg/dL    Calculated Osmolality 291 275 - 295 mOsm/kg    eGFR-Cr 76 >=60 mL/min/1.73m2    AST 14 <34 U/L    ALT 11 10 - 49 U/L    Alkaline Phosphatase 135 (H) 50 - 130 U/L    Bilirubin, Total 0.4 0.2 - 1.1 mg/dL    Total Protein 7.1 5.7 - 8.2 g/dL    Albumin 4.7 3.2 - 4.8 g/dL    Globulin  2.4 2.0 - 3.5 g/dL    A/G Ratio 2.0 1.0 - 2.0    Patient Fasting for CMP? No        Impression/Plan    Metastatic breast cancer: initially diagnosed in 5/2015 with ER/DE positive, HER2 negative breast cancer with pleural and bone metastases. Started Arimidex  + GNRH antagonist (Zoladex) until 1/2019 when developed progression in liver metastases. She was switched to fulvestrant and abemaciclib  in 1/2019 and stopped abemaciclib in 2/2022 due to worsening anemia. CBC and CMP reviewed, proceed with fulvestrant today.     Iron deficiency anemia: history of AVMs and hemorrhoids. Last received IV Iron in 10/2024, repeat ferritin pending today    Planned Follow Up: 4 weeks follow up with Dr. Munroe, labs and injection    Risk Level: HIGH metastatic breast cancer receiving hormone injection and frequent IV iron requiring close monitoring     The 21st Century Cures Act makes medical notes like  these available to patients in the interest of transparency. Please be advised this is a medical document. Medical documents are intended to carry relevant information, facts as evident, and the clinical opinion of the practitioner. The medical note is intended as peer to peer communication and may appear blunt or direct. It is written in medical language and may contain abbreviations or verbiage that are unfamiliar.     Electronically Signed by:    Praveena Melo DNP, APRN, NP-C, AOCNP  Nurse Practitioner  [facility]

## 2025-01-02 DIAGNOSIS — E78.2 HYPERLIPIDEMIA, MIXED: ICD-10-CM

## 2025-01-03 RX ORDER — PANTOPRAZOLE SODIUM 40 MG/1
40 TABLET, DELAYED RELEASE ORAL
Qty: 180 TABLET | Refills: 0 | Status: SHIPPED | OUTPATIENT
Start: 2025-01-03

## 2025-01-03 RX ORDER — ROSUVASTATIN CALCIUM 5 MG/1
5 TABLET, COATED ORAL NIGHTLY
Qty: 90 TABLET | Refills: 0 | Status: SHIPPED | OUTPATIENT
Start: 2025-01-03

## 2025-01-03 NOTE — TELEPHONE ENCOUNTER
Did not pass protocol  Last office visit   Last refill was: , __tabs  Next appointment:     Please sign pended medication if appropriate

## 2025-01-09 RX ORDER — PROPRANOLOL HYDROCHLORIDE 10 MG/1
10 TABLET ORAL 2 TIMES DAILY
Qty: 180 TABLET | Refills: 0 | Status: SHIPPED | OUTPATIENT
Start: 2025-01-09

## 2025-01-09 NOTE — TELEPHONE ENCOUNTER
Last Office Visit: 10/9/24   Last Refill: 10/9/24  Return to Clinic: 3 months   Protocol: failed   NOV: 1/15/24  Requested Prescriptions     Pending Prescriptions Disp Refills    PROPRANOLOL 10 MG Oral Tab [Pharmacy Med Name: PROPRANOLOL HCL TABS 10MG] 180 tablet 0     Sig: TAKE 1 TABLET TWICE A DAY     Signed Prescriptions Disp Refills    pantoprazole 40 MG Oral Tab  tablet 0     Sig: TAKE 1 TABLET TWICE A DAY BEFORE MEALS     Authorizing Provider: DARNELL YBARRA     Ordering User: LORENZO LONG    rosuvastatin 5 MG Oral Tab 90 tablet 0     Sig: TAKE 1 TABLET NIGHTLY     Authorizing Provider: DARNELL YBARRA     Ordering User: LORENZO LONG         Please approve if appropriate.     Thank you!

## 2025-01-15 RX ORDER — GABAPENTIN 300 MG/1
300 CAPSULE ORAL 4 TIMES DAILY
Qty: 360 CAPSULE | Refills: 3 | Status: SHIPPED | OUTPATIENT
Start: 2025-01-15

## 2025-01-22 ENCOUNTER — TELEPHONE (OUTPATIENT)
Dept: FAMILY MEDICINE CLINIC | Facility: CLINIC | Age: 65
End: 2025-01-22

## 2025-01-22 NOTE — TELEPHONE ENCOUNTER
Left a message on voicemail to call back.    [facility] called and states they have received a refill for patient's Ferrous Sulfate take 325 mg and direction states she takes it daily.  She does not take daily.  [facility] calling for clarification.      Please confirm with patient how she is taking her Ferrous Sulfate, daily? If not, how often?    Then will need to resend script with a new direction.

## 2025-01-23 ENCOUNTER — OFFICE VISIT (OUTPATIENT)
Age: 65
End: 2025-01-23
Attending: INTERNAL MEDICINE
Payer: COMMERCIAL

## 2025-01-23 VITALS
RESPIRATION RATE: 18 BRPM | TEMPERATURE: 97 F | HEIGHT: 65.98 IN | BODY MASS INDEX: 28.61 KG/M2 | OXYGEN SATURATION: 97 % | DIASTOLIC BLOOD PRESSURE: 77 MMHG | WEIGHT: 178 LBS | HEART RATE: 79 BPM | SYSTOLIC BLOOD PRESSURE: 133 MMHG

## 2025-01-23 DIAGNOSIS — C78.2 CARCINOMA OF RIGHT BREAST METASTATIC TO PLEURA (HCC): ICD-10-CM

## 2025-01-23 DIAGNOSIS — C78.2 BREAST CANCER METASTASIZED TO PLEURA, UNSPECIFIED LATERALITY (HCC): ICD-10-CM

## 2025-01-23 DIAGNOSIS — C50.911 CARCINOMA OF RIGHT BREAST METASTATIC TO PLEURA (HCC): ICD-10-CM

## 2025-01-23 DIAGNOSIS — C50.011 MALIGNANT NEOPLASM OF NIPPLE OF RIGHT BREAST IN FEMALE, ESTROGEN RECEPTOR POSITIVE (HCC): ICD-10-CM

## 2025-01-23 DIAGNOSIS — Z17.0 MALIGNANT NEOPLASM OF NIPPLE OF RIGHT BREAST IN FEMALE, ESTROGEN RECEPTOR POSITIVE (HCC): ICD-10-CM

## 2025-01-23 DIAGNOSIS — D50.0 IRON DEFICIENCY ANEMIA SECONDARY TO BLOOD LOSS (CHRONIC): ICD-10-CM

## 2025-01-23 DIAGNOSIS — C79.51 MALIGNANT NEOPLASM METASTATIC TO BONE (HCC): Primary | ICD-10-CM

## 2025-01-23 DIAGNOSIS — C50.911 CARCINOMA OF RIGHT BREAST METASTATIC TO PLEURA (HCC): Primary | ICD-10-CM

## 2025-01-23 DIAGNOSIS — C50.919 BREAST CANCER METASTASIZED TO PLEURA, UNSPECIFIED LATERALITY (HCC): ICD-10-CM

## 2025-01-23 DIAGNOSIS — C78.2 CARCINOMA OF RIGHT BREAST METASTATIC TO PLEURA (HCC): Primary | ICD-10-CM

## 2025-01-23 DIAGNOSIS — C79.51 MALIGNANT NEOPLASM METASTATIC TO BONE (HCC): ICD-10-CM

## 2025-01-23 LAB
ALBUMIN SERPL-MCNC: 4.8 G/DL (ref 3.2–4.8)
ALBUMIN/GLOB SERPL: 1.8 {RATIO} (ref 1–2)
ALP LIVER SERPL-CCNC: 137 U/L
ALT SERPL-CCNC: 12 U/L
ANION GAP SERPL CALC-SCNC: 5 MMOL/L (ref 0–18)
AST SERPL-CCNC: 18 U/L (ref ?–34)
BASOPHILS # BLD AUTO: 0.08 X10(3) UL (ref 0–0.2)
BASOPHILS NFR BLD AUTO: 1.4 %
BILIRUB SERPL-MCNC: 0.8 MG/DL (ref 0.2–1.1)
BUN BLD-MCNC: 9 MG/DL (ref 9–23)
CALCIUM BLD-MCNC: 9.9 MG/DL (ref 8.7–10.6)
CHLORIDE SERPL-SCNC: 108 MMOL/L (ref 98–112)
CO2 SERPL-SCNC: 27 MMOL/L (ref 21–32)
CREAT BLD-MCNC: 0.8 MG/DL
DEPRECATED HBV CORE AB SER IA-ACNC: 130 NG/ML
EGFRCR SERPLBLD CKD-EPI 2021: 83 ML/MIN/1.73M2 (ref 60–?)
EOSINOPHIL # BLD AUTO: 0.3 X10(3) UL (ref 0–0.7)
EOSINOPHIL NFR BLD AUTO: 5.1 %
ERYTHROCYTE [DISTWIDTH] IN BLOOD BY AUTOMATED COUNT: 12.9 %
GLOBULIN PLAS-MCNC: 2.6 G/DL (ref 2–3.5)
GLUCOSE BLD-MCNC: 104 MG/DL (ref 70–99)
HCT VFR BLD AUTO: 40.8 %
HGB BLD-MCNC: 13.7 G/DL
IMM GRANULOCYTES # BLD AUTO: 0.01 X10(3) UL (ref 0–1)
IMM GRANULOCYTES NFR BLD: 0.2 %
IRON SATN MFR SERPL: 32 %
IRON SERPL-MCNC: 104 UG/DL
LYMPHOCYTES # BLD AUTO: 1.67 X10(3) UL (ref 1–4)
LYMPHOCYTES NFR BLD AUTO: 28.4 %
MCH RBC QN AUTO: 32.1 PG (ref 26–34)
MCHC RBC AUTO-ENTMCNC: 33.6 G/DL (ref 31–37)
MCV RBC AUTO: 95.6 FL
MONOCYTES # BLD AUTO: 0.32 X10(3) UL (ref 0.1–1)
MONOCYTES NFR BLD AUTO: 5.4 %
NEUTROPHILS # BLD AUTO: 3.51 X10 (3) UL (ref 1.5–7.7)
NEUTROPHILS # BLD AUTO: 3.51 X10(3) UL (ref 1.5–7.7)
NEUTROPHILS NFR BLD AUTO: 59.5 %
OSMOLALITY SERPL CALC.SUM OF ELEC: 289 MOSM/KG (ref 275–295)
PLATELET # BLD AUTO: 161 10(3)UL (ref 150–450)
POTASSIUM SERPL-SCNC: 3.9 MMOL/L (ref 3.5–5.1)
PROT SERPL-MCNC: 7.4 G/DL (ref 5.7–8.2)
RBC # BLD AUTO: 4.27 X10(6)UL
SODIUM SERPL-SCNC: 140 MMOL/L (ref 136–145)
TOTAL IRON BINDING CAPACITY: 325 UG/DL (ref 250–425)
TRANSFERRIN SERPL-MCNC: 243 MG/DL (ref 250–380)
WBC # BLD AUTO: 5.9 X10(3) UL (ref 4–11)

## 2025-01-23 RX ORDER — LAMOTRIGINE 25 MG/1
500 TABLET ORAL ONCE
Status: COMPLETED | OUTPATIENT
Start: 2025-01-23 | End: 2025-01-23

## 2025-01-23 RX ORDER — LAMOTRIGINE 25 MG/1
500 TABLET ORAL ONCE
Status: CANCELLED | OUTPATIENT
Start: 2025-01-23

## 2025-01-23 RX ADMIN — LAMOTRIGINE 500 MG: 25 TABLET ORAL at 15:03:00

## 2025-01-23 NOTE — PROGRESS NOTES
Cancer Center Progress Note  Patient Name: Martha Tobias   YOB: 1960   Medical Record Number: [id number]     Attending Physician: Glenn Munroe M.D.     Date of Visit: 1/23/2025      Chief Complaint:  Chief Complaint   Patient presents with    Follow - Up        Oncologic History:  Martha Tobias is a 64 year old female with limited PMH and limited prior access to the medical system admitted on 5/18 c/o a 3 week history of shortness of breath. She was found to have a large R pleural effusion. She underwent therapeutic thoracentesis. Cytology was unrevealing. When the fluid reaccumulated, she was taken to the OR for thoracostomy with pleural biopsy. In the OR, it was noted that her bilateral breasts were abnormal, concerning for breast masses. The thoracostomy was performed and the pleura appeared abnormal. It was biopsied revealing metastatic breast cancer. Pleurodesis was also performed. Upon questioning, the patient first noted breast abnormalities 2 years prior to admission. She had not seen a physician in several years. She denied symptoms elsewhere.     Progression was noted on CT and she was transitioned to Faslodex and Abeniciclib.    She had difficulty tolerating the Abemiciclib due to diarrhea and cytopenias.    She was admitted to the hospital from 12/21/20 to 12/24/20 with severe anemia after restarting the Abemaciclib. She was transfused and underwent MRI enterography. No bleeding source, other than her hemorrhoids was found. She was discharged to follow up with GI. The Abemaciclib was discontinued.   Interestingly, her liver lesions on MRI were described as stable hemangiomas, where they had appeared to be enlarging metastatic deposits on non-contrast CT.     Biopsy of the liver revealed cavernous hemangioma.    She was hospitalized from 12/14/21 to 12/19/21 and diagnosed with cirrhosis. She required paracentesis for improved comfort. Cytology was negative for malignancy. She was  transfused and given IV iron for her iron deficiency. EGD revealed esophageal varices and portal gastropathy. Her push enteroscopy and capsule endoscopy revealed small bowel AVMs and erosions/ulcers.  She was discharged to be evaluated by hepatology, ongoing follow up with GI, follow up here for ongoing care of her metastatic breast cancer.    History of Present Illness:  Pt is here for follow up. She feels well.      Performance Status:  ECOG 0    Past Medical History:  Past Medical History:    Abdominal distention    Anemia    Anxiety    Back pain    Breast cancer (HCC)    breast    COPD (chronic obstructive pulmonary disease) (HCC)    No continuous O2    Disorder of liver    lesions    Disorder of thyroid    Fatigue    Feeling lonely    Hemorrhoids    High cholesterol    History of 2019 novel coronavirus disease (COVID-19)    ASYMPTOMATIC.  NO HOSPITALIZATION    History of blood transfusion    Hyperthyroidism    Leg swelling    Neuropathy    Personal history of antineoplastic chemotherapy    oral    Pneumonia due to organism    PONV (postoperative nausea and vomiting)    Rash    Shortness of breath    Only exertion    Stress    Visual impairment    glasses    Weight gain       Past Surgical History:  Past Surgical History:   Procedure Laterality Date    Colonoscopy      Colonoscopy N/A 10/26/2021    Procedure: COLONOSCOPY with biopsy, Cold Polypectomy & Clips x 5 placement /ESOPHAGOGASTRODUODENOSCOPY (EGD) with Biopsy;  Surgeon: Oscar Harp MD;  Location:  ENDOSCOPY    Oral surgery  2017    [facility],biopsy  05/2015       Family History:  Family History   Adopted: Yes   Family history unknown: Yes       Social History:  Social History     Socioeconomic History    Marital status:      Spouse name: Not on file    Number of children: Not on file    Years of education: Not on file    Highest education level: Not on file   Occupational History    Not on file   Tobacco Use    Smoking status: Light  Smoker     Current packs/day: 0.00     Types: Cigarettes    Smokeless tobacco: Never    Tobacco comments:     3-5 cig/ day   Vaping Use    Vaping status: Never Used   Substance and Sexual Activity    Alcohol use: Not Currently     Alcohol/week: 0.0 standard drinks of alcohol     Comment: 2 drinks/yr     Drug use: No    Sexual activity: Not Currently   Other Topics Concern     Service Not Asked    Blood Transfusions Not Asked    Caffeine Concern Yes     Comment: 1 1/2 a day 16oz    Occupational Exposure Not Asked    Hobby Hazards Not Asked    Sleep Concern Not Asked    Stress Concern Not Asked    Weight Concern Not Asked    Special Diet Not Asked    Back Care Not Asked    Exercise No    Bike Helmet Not Asked    Seat Belt Not Asked    Self-Exams Not Asked   Social History Narrative    Not on file     Social Drivers of Health     Financial Resource Strain: Not on file   Food Insecurity: Not on file   Transportation Needs: Not on file   Physical Activity: Not on file   Stress: Not on file   Social Connections: Not on file   Housing Stability: Not on file       Current Medications:    Current Outpatient Medications:     HYDROcodone-acetaminophen 7.5-325 MG Oral Tab, Take 1 tablet by mouth every 8 (eight) hours as needed for Pain., Disp: , Rfl:     zolpidem 5 MG Oral Tab, Take 1 tablet (5 mg total) by mouth nightly as needed for Sleep., Disp: 30 tablet, Rfl: 2    rosuvastatin 5 MG Oral Tab, Take 1 tablet (5 mg total) by mouth nightly., Disp: 90 tablet, Rfl: 1    propranolol 10 MG Oral Tab, Take 1 tablet (10 mg total) by mouth 2 (two) times daily., Disp: 180 tablet, Rfl: 1    pantoprazole 40 MG Oral Tab EC, Take 1 tablet (40 mg total) by mouth 2 (two) times daily before meals., Disp: 180 tablet, Rfl: 1    levothyroxine 50 MCG Oral Tab, Take 1 tablet (50 mcg total) by mouth before breakfast., Disp: 90 tablet, Rfl: 1    levothyroxine (SYNTHROID) 200 MCG Oral Tab, Take 1 tablet (200 mcg total) by mouth before  breakfast., Disp: 90 tablet, Rfl: 1    escitalopram 20 MG Oral Tab, Take 1 tablet (20 mg total) by mouth daily., Disp: 90 tablet, Rfl: 0    furosemide 40 MG Oral Tab, Take 1 tablet (40 mg total) by mouth daily., Disp: 90 tablet, Rfl: 1    Potassium Chloride ER 10 MEQ Oral Tab CR, Take 1 tablet (10 mEq total) by mouth daily., Disp: 90 tablet, Rfl: 1    ALPRAZolam 0.5 MG Oral Tab, Take 1 tablet (0.5 mg total) by mouth 2 (two) times daily as needed for Sleep or Anxiety., Disp: 60 tablet, Rfl: 2    albuterol 108 (90 Base) MCG/ACT Inhalation Aero Soln, Inhale 2 puffs into the lungs every 4 (four) hours as needed for Wheezing or Shortness of Breath., Disp: 3 each, Rfl: 1    gabapentin 300 MG Oral Cap, Take 1 capsule (300 mg total) by mouth 4 (four) times daily., Disp: 360 capsule, Rfl: 3    Ferrous Sulfate (IRON) 325 (65 Fe) MG Oral Tab, Take 325 mg by mouth daily. She does not take daily, Disp: 90 tablet, Rfl: 1    fluticasone propionate 50 MCG/ACT Nasal Suspension, 2 sprays by Each Nare route daily., Disp: 3 each, Rfl: 0    Naloxone HCl 4 MG/0.1ML Nasal Liquid, 4 mg by Intranasal route as needed (May repeat as needed in other nostril if symptoms persist). If patient remains unresponsive, repeat dose in other nostril 2-5 minutes after first dose., Disp: 1 kit, Rfl: 0    HYDROcodone-acetaminophen 7.5-325 MG Oral Tab, Take 1 tablet by mouth every 8 (eight) hours as needed for Pain., Disp: 30 tablet, Rfl: 0    [START ON 2/15/2025] HYDROcodone-acetaminophen 7.5-325 MG Oral Tab, Take 1 tablet by mouth every 8 (eight) hours as needed for Pain., Disp: 30 tablet, Rfl: 0    [START ON 3/18/2025] HYDROcodone-acetaminophen 7.5-325 MG Oral Tab, Take 1 tablet by mouth every 8 (eight) hours as needed for Pain., Disp: 30 tablet, Rfl: 0    folic acid 1 MG Oral Tab, Take 1 tablet (1 mg total) by mouth daily., Disp: 90 tablet, Rfl: 1    spironolactone 100 MG Oral Tab, TAKE 1 TABLET DAILY, Disp: 90 tablet, Rfl: 1    OMEGA-3-ACID ETHYL  ESTERS 1 g Oral Cap, TAKE 2 CAPSULES TWICE A DAY, Disp: 360 capsule, Rfl: 0    buPROPion 150 MG Oral Tablet 12 Hr, Take 1 tablet (150 mg total) by mouth 2 (two) times daily., Disp: , Rfl:     Biotin 2500 MCG Oral Cap, Take 1 capsule by mouth every evening., Disp: , Rfl:     folic acid 800 MCG Oral Tab, Take 1 tablet (800 mcg total) by mouth daily., Disp: , Rfl:     Vitamin B-12 1000 MCG Oral Tab, Take 1 tablet (1,000 mcg total) by mouth daily., Disp: 30 tablet, Rfl: 11    Allergies:  Allergies   Allergen Reactions    Radiology Contrast Iodinated Dyes HIVES and ITCHING    Iodine (Topical) OTHER (SEE COMMENTS)        Review of Systems:    Constitutional No fevers, chills, night sweats, excessive fatigue or weight loss.   Eyes No significant visual difficulties. No diplopia. No yellowing.   Hematologic/Lymphatic No easy bruising or bleeding.  Denies tender or palpable lymph nodes.   Respiratory No dyspnea, pleuritic chest pain, cough or hemoptysis.   Cardiovascular No anginal chest pain, palpitations or orthopnea.   Gastrointestinal No nausea, vomiting, diarrhea, GI bleeding, or constipation. NL appetite, No Early Satiety.   Genitorurinary No hematuria, dysuria, abnormal bleeding.   Integumentary No yellowing.   Neurologic No headache, blurred vision, and no areas of focal weakness. Normal gait.   Psychiatric No insomnia, depression, erica or mood swings.         Vital Signs:  /77 (BP Location: Left arm, Patient Position: Sitting, Cuff Size: adult)   Pulse 79   Temp 96.9 °F (36.1 °C) (Tympanic)   Resp 18   Ht 1.676 m (5' 5.98\")   Wt 80.7 kg (178 lb)   SpO2 97%   BMI 28.74 kg/m²   Height: --  Weight: --  BSA (Calculated - sq m): --  Pulse: --  BP: --  Temp: --  Do Not Use - Resp Rate: --  SpO2: --        Physical Examination:    Constitutional Normal - Mood and affect appropriate. Appears close to chronological age. Well nourished. Well developed.   Eyes Normal - Conjunctivae and sclerae are clear and  without icterus. Pupils are reactive and equal.   Hematologic/Lymphatic Normal - No petechiae or purpura.  No tender or palpable lymph nodes in the cervical, supraclavicular, axillary or inguinal area.   Respiratory Normal - Lungs CTA, no rhonchi or wheezing.   Cardiovascular Normal - RRR, no murmurs, gallops or rubs.   Abdomen Non-tender, moderately distended, no masses, ascites or hepatosplenomegaly. Stable umbilical hernia.   Extremities Normal - No C/C/E    Integumentary Diffuse angioma-like rash of the arms and torso. Small hemangioma of the R eye.No Jaundice   Neurologic Normal - No sensory or motor deficits, normal cerebellar function, normal gait, cranial nerves intact.   Psychiatric Normal - A&Ox3, Coherent speech. Verbalizes understanding of our discussions today.           Labs:   Latest Reference Range & Units 01/23/25 14:46   Sodium 136 - 145 mmol/L 140   Potassium 3.5 - 5.1 mmol/L 3.9   Chloride 98 - 112 mmol/L 108   Carbon Dioxide, Total 21.0 - 32.0 mmol/L 27.0   BUN 9 - 23 mg/dL 9   CREATININE 0.55 - 1.02 mg/dL 0.80   CALCIUM 8.7 - 10.6 mg/dL 9.9   EGFR >=60 mL/min/1.73m2 83   ANION GAP 0 - 18 mmol/L 5   CALCULATED OSMOLALITY 275 - 295 mOsm/kg 289   ALKALINE PHOSPHATASE 50 - 130 U/L 137 (H)   AST (SGOT) <34 U/L 18   ALT (SGPT) 10 - 49 U/L 12   Total Bilirubin 0.2 - 1.1 mg/dL 0.8   Globulin 2.0 - 3.5 g/dL 2.6   Iron, Serum 50 - 170 ug/dL 104   Transferrin 250 - 380 mg/dL 243 (L)   Iron Bind.Cap.(TIBC) 250 - 425 ug/dL 325   Iron Saturation 15 - 50 % 32   FERRITIN 50 - 306 ng/mL 130   (H): Data is abnormally high  (L): Data is abnormally low     Latest Reference Range & Units 01/23/25 14:46   WBC 4.0 - 11.0 x10(3) uL 5.9   Hemoglobin 12.0 - 16.0 g/dL 13.7   Hematocrit 35.0 - 48.0 % 40.8   Platelet Count 150.0 - 450.0 10(3)uL 161.0   RBC 3.80 - 5.30 x10(6)uL 4.27   MCH 26.0 - 34.0 pg 32.1   MCHC 31.0 - 37.0 g/dL 33.6   MCV 80.0 - 100.0 fL 95.6   RDW % 12.9   Prelim Neutrophil Abs 1.50 - 7.70 x10 (3) uL  3.51   Neutrophils Absolute 1.50 - 7.70 x10(3) uL 3.51   Lymphocytes Absolute 1.00 - 4.00 x10(3) uL 1.67   Monocytes Absolute 0.10 - 1.00 x10(3) uL 0.32   Eosinophils Absolute 0.00 - 0.70 x10(3) uL 0.30   Basophils Absolute 0.00 - 0.20 x10(3) uL 0.08   Immature Granulocyte Absolute 0.00 - 1.00 x10(3) uL 0.01   Neutrophils % % 59.5   Lymphocytes % % 28.4   Monocytes % % 5.4   Eosinophils % % 5.1   Basophils % % 1.4   Immature Granulocyte % % 0.2     Radiology:  .        Pathology:  Final Diagnosis:   Dominant mass, left lobe of liver, ultrasound-guided biopsy:  -Fragments of hemangioma.  (See comment.)         Impression and Plan:  Breast Cancer: Stage IV, ER+ NJ+ HER-2 Neg.  She was transitioned to Faslodex and Abemaciclib. Abemaciclib dose reduced for diarrhea and rash, and then held for pancytopenia. After restarting, she was hospitalized for severe anemia that was likely related to bleeding, iron deficiency, and the chemo. MRI suggests that the liver lesions have not progressed, as suspected on the non-con CT.  Discontinued the Abemaciclib and continuing Faslodex. Repeat MRI again raised the question of liver metastases. In an effort to definitively determine whether these were metastases, Liver biopsy was accomplished that revealed hemangioma. CT Chest and MRI Abd were stable, indicating that her breast cancer is controlled on Faslodex alone.  Will continue Faslodex. Repeat imaging is stable. The patient reports that her family has enquired about BCRA testing. The patient is very low risk for carrying a mutation, having an ER+ NJ+ HER2 negative cancer diagnosed after the age of 50. Testing was not indicated prior to the [place] of PARP inhibitors, that can be used to treat metastatic breast cancer. Testing is normal. Continue Faslodex. Will premedicate with PO benadryl for her rash.    Bone Metastases: Stable.  Discontinued Xgeva due to her teeth issues.     Cirrhosis: Unclear etiology, though likely PITTS.      Anemia: Iron deficiency and ongoing blood loss. Secondary to hemorrhoids and AVMs. She has Varices, as well, but they were not bleeding, and did not require banding while she was in the hospital.   Hgb is better today. Follow iron levels closely. Monitor for active bleeding. Replete iron stores for her ferritin<50.     Rash w/ hemangioma of the eyelid, hemangiomas of the Liver, severe hemorrhoids: She saw hepatology and the rash has improved.      Planned Follow Up:  4 weeks for MD visit with labs.     I spent * minutes face to face with the patient.  More than 50% of that time was spent counseling the patient and/or on coordination of care.  The diagnosis, prognosis, and general treatment was explained to the patient and the family.    Electronically Signed by:    Glenn Munroe M.D.  [facility]

## 2025-02-20 ENCOUNTER — APPOINTMENT (OUTPATIENT)
Age: 65
End: 2025-02-20
Attending: INTERNAL MEDICINE
Payer: COMMERCIAL

## 2025-02-20 ENCOUNTER — OFFICE VISIT (OUTPATIENT)
Age: 65
End: 2025-02-20
Attending: INTERNAL MEDICINE
Payer: COMMERCIAL

## 2025-02-20 ENCOUNTER — SOCIAL WORK SERVICES (OUTPATIENT)
Age: 65
End: 2025-02-20

## 2025-02-20 VITALS
HEIGHT: 65.98 IN | SYSTOLIC BLOOD PRESSURE: 146 MMHG | OXYGEN SATURATION: 98 % | HEART RATE: 74 BPM | BODY MASS INDEX: 28.77 KG/M2 | DIASTOLIC BLOOD PRESSURE: 63 MMHG | WEIGHT: 179 LBS | TEMPERATURE: 97 F | RESPIRATION RATE: 18 BRPM

## 2025-02-20 DIAGNOSIS — C50.919 BREAST CANCER METASTASIZED TO PLEURA, UNSPECIFIED LATERALITY (HCC): ICD-10-CM

## 2025-02-20 DIAGNOSIS — C78.2 BREAST CANCER METASTASIZED TO PLEURA, UNSPECIFIED LATERALITY (HCC): ICD-10-CM

## 2025-02-20 DIAGNOSIS — C79.51 MALIGNANT NEOPLASM METASTATIC TO BONE (HCC): ICD-10-CM

## 2025-02-20 DIAGNOSIS — D50.0 IRON DEFICIENCY ANEMIA SECONDARY TO BLOOD LOSS (CHRONIC): ICD-10-CM

## 2025-02-20 DIAGNOSIS — C78.2 CARCINOMA OF RIGHT BREAST METASTATIC TO PLEURA (HCC): Primary | ICD-10-CM

## 2025-02-20 DIAGNOSIS — Z17.0 MALIGNANT NEOPLASM OF NIPPLE OF RIGHT BREAST IN FEMALE, ESTROGEN RECEPTOR POSITIVE (HCC): ICD-10-CM

## 2025-02-20 DIAGNOSIS — C50.911 CARCINOMA OF RIGHT BREAST METASTATIC TO PLEURA (HCC): Primary | ICD-10-CM

## 2025-02-20 DIAGNOSIS — C50.011 MALIGNANT NEOPLASM OF NIPPLE OF RIGHT BREAST IN FEMALE, ESTROGEN RECEPTOR POSITIVE (HCC): ICD-10-CM

## 2025-02-20 LAB
ALBUMIN SERPL-MCNC: 4.6 G/DL (ref 3.2–4.8)
ALBUMIN/GLOB SERPL: 1.8 {RATIO} (ref 1–2)
ALP LIVER SERPL-CCNC: 132 U/L
ALT SERPL-CCNC: 11 U/L
ANION GAP SERPL CALC-SCNC: 6 MMOL/L (ref 0–18)
AST SERPL-CCNC: 16 U/L (ref ?–34)
BASOPHILS # BLD AUTO: 0.09 X10(3) UL (ref 0–0.2)
BASOPHILS NFR BLD AUTO: 1.5 %
BILIRUB SERPL-MCNC: 0.5 MG/DL (ref 0.2–1.1)
BUN BLD-MCNC: 10 MG/DL (ref 9–23)
CALCIUM BLD-MCNC: 9.4 MG/DL (ref 8.7–10.6)
CHLORIDE SERPL-SCNC: 104 MMOL/L (ref 98–112)
CO2 SERPL-SCNC: 28 MMOL/L (ref 21–32)
CREAT BLD-MCNC: 0.87 MG/DL
DEPRECATED HBV CORE AB SER IA-ACNC: 106 NG/ML
EGFRCR SERPLBLD CKD-EPI 2021: 75 ML/MIN/1.73M2 (ref 60–?)
EOSINOPHIL # BLD AUTO: 0.29 X10(3) UL (ref 0–0.7)
EOSINOPHIL NFR BLD AUTO: 4.7 %
ERYTHROCYTE [DISTWIDTH] IN BLOOD BY AUTOMATED COUNT: 13.2 %
FASTING STATUS PATIENT QL REPORTED: NO
GLOBULIN PLAS-MCNC: 2.6 G/DL (ref 2–3.5)
GLUCOSE BLD-MCNC: 93 MG/DL (ref 70–99)
HCT VFR BLD AUTO: 38.8 %
HGB BLD-MCNC: 13 G/DL
IMM GRANULOCYTES # BLD AUTO: 0.02 X10(3) UL (ref 0–1)
IMM GRANULOCYTES NFR BLD: 0.3 %
IRON SATN MFR SERPL: 21 %
IRON SERPL-MCNC: 69 UG/DL
LYMPHOCYTES # BLD AUTO: 1.96 X10(3) UL (ref 1–4)
LYMPHOCYTES NFR BLD AUTO: 32.1 %
MCH RBC QN AUTO: 32 PG (ref 26–34)
MCHC RBC AUTO-ENTMCNC: 33.5 G/DL (ref 31–37)
MCV RBC AUTO: 95.6 FL
MONOCYTES # BLD AUTO: 0.3 X10(3) UL (ref 0.1–1)
MONOCYTES NFR BLD AUTO: 4.9 %
NEUTROPHILS # BLD AUTO: 3.45 X10 (3) UL (ref 1.5–7.7)
NEUTROPHILS # BLD AUTO: 3.45 X10(3) UL (ref 1.5–7.7)
NEUTROPHILS NFR BLD AUTO: 56.5 %
OSMOLALITY SERPL CALC.SUM OF ELEC: 285 MOSM/KG (ref 275–295)
PLATELET # BLD AUTO: 172 10(3)UL (ref 150–450)
POTASSIUM SERPL-SCNC: 3.5 MMOL/L (ref 3.5–5.1)
PROT SERPL-MCNC: 7.2 G/DL (ref 5.7–8.2)
RBC # BLD AUTO: 4.06 X10(6)UL
SODIUM SERPL-SCNC: 138 MMOL/L (ref 136–145)
TOTAL IRON BINDING CAPACITY: 326 UG/DL (ref 250–425)
TRANSFERRIN SERPL-MCNC: 226 MG/DL (ref 250–380)
WBC # BLD AUTO: 6.1 X10(3) UL (ref 4–11)

## 2025-02-20 RX ORDER — LAMOTRIGINE 25 MG/1
500 TABLET ORAL ONCE
Status: COMPLETED | OUTPATIENT
Start: 2025-02-20 | End: 2025-02-20

## 2025-02-20 RX ORDER — LAMOTRIGINE 25 MG/1
500 TABLET ORAL ONCE
Status: CANCELLED | OUTPATIENT
Start: 2025-02-20

## 2025-02-20 RX ADMIN — LAMOTRIGINE 500 MG: 25 TABLET ORAL at 15:47:00

## 2025-02-20 NOTE — PROGRESS NOTES
No Complaints.Cancer Center Progress Note  Patient Name: Martha Tobias   YOB: 1960   Medical Record Number: [id number]     Attending Physician: Glenn Munroe M.D.     Date of Visit: 2/20/2025      Chief Complaint:  No chief complaint on file.       Oncologic History:  Martha Tobias is a 64 year old female with limited PMH and limited prior access to the medical system admitted on 5/18 c/o a 3 week history of shortness of breath. She was found to have a large R pleural effusion. She underwent therapeutic thoracentesis. Cytology was unrevealing. When the fluid reaccumulated, she was taken to the OR for thoracostomy with pleural biopsy. In the OR, it was noted that her bilateral breasts were abnormal, concerning for breast masses. The thoracostomy was performed and the pleura appeared abnormal. It was biopsied revealing metastatic breast cancer. Pleurodesis was also performed. Upon questioning, the patient first noted breast abnormalities 2 years prior to admission. She had not seen a physician in several years. She denied symptoms elsewhere.     Progression was noted on CT and she was transitioned to Faslodex and Abeniciclib.    She had difficulty tolerating the Abemiciclib due to diarrhea and cytopenias.    She was admitted to the hospital from 12/21/20 to 12/24/20 with severe anemia after restarting the Abemaciclib. She was transfused and underwent MRI enterography. No bleeding source, other than her hemorrhoids was found. She was discharged to follow up with GI. The Abemaciclib was discontinued.   Interestingly, her liver lesions on MRI were described as stable hemangiomas, where they had appeared to be enlarging metastatic deposits on non-contrast CT.     Biopsy of the liver revealed cavernous hemangioma.    She was hospitalized from 12/14/21 to 12/19/21 and diagnosed with cirrhosis. She required paracentesis for improved comfort. Cytology was negative for malignancy. She was transfused and  given IV iron for her iron deficiency. EGD revealed esophageal varices and portal gastropathy. Her push enteroscopy and capsule endoscopy revealed small bowel AVMs and erosions/ulcers.  She was discharged to be evaluated by hepatology, ongoing follow up with GI, follow up here for ongoing care of her metastatic breast cancer.    History of Present Illness:  Pt is here for follow up. No complaints.      Performance Status:  ECOG 0    Past Medical History:  Past Medical History:    Abdominal distention    Anemia    Anxiety    Back pain    Breast cancer (HCC)    breast    COPD (chronic obstructive pulmonary disease) (HCC)    No continuous O2    Disorder of liver    lesions    Disorder of thyroid    Fatigue    Feeling lonely    Hemorrhoids    High cholesterol    History of 2019 novel coronavirus disease (COVID-19)    ASYMPTOMATIC.  NO HOSPITALIZATION    History of blood transfusion    Hyperthyroidism    Leg swelling    Neuropathy    Personal history of antineoplastic chemotherapy    oral    Pneumonia due to organism    PONV (postoperative nausea and vomiting)    Rash    Shortness of breath    Only exertion    Stress    Visual impairment    glasses    Weight gain       Past Surgical History:  Past Surgical History:   Procedure Laterality Date    Colonoscopy      Colonoscopy N/A 10/26/2021    Procedure: COLONOSCOPY with biopsy, Cold Polypectomy & Clips x 5 placement /ESOPHAGOGASTRODUODENOSCOPY (EGD) with Biopsy;  Surgeon: Oscar Harp MD;  Location:  ENDOSCOPY    Oral surgery  2017    [facility],biopsy  05/2015       Family History:  Family History   Adopted: Yes   Family history unknown: Yes       Social History:  Social History     Socioeconomic History    Marital status:      Spouse name: Not on file    Number of children: Not on file    Years of education: Not on file    Highest education level: Not on file   Occupational History    Not on file   Tobacco Use    Smoking status: Light Smoker      Current packs/day: 0.00     Types: Cigarettes    Smokeless tobacco: Never    Tobacco comments:     3-5 cig/ day   Vaping Use    Vaping status: Never Used   Substance and Sexual Activity    Alcohol use: Not Currently     Alcohol/week: 0.0 standard drinks of alcohol     Comment: 2 drinks/yr     Drug use: No    Sexual activity: Not Currently   Other Topics Concern     Service Not Asked    Blood Transfusions Not Asked    Caffeine Concern Yes     Comment: 1 1/2 a day 16oz    Occupational Exposure Not Asked    Hobby Hazards Not Asked    Sleep Concern Not Asked    Stress Concern Not Asked    Weight Concern Not Asked    Special Diet Not Asked    Back Care Not Asked    Exercise No    Bike Helmet Not Asked    Seat Belt Not Asked    Self-Exams Not Asked   Social History Narrative    Not on file     Social Drivers of Health     Food Insecurity: Not on file   Transportation Needs: Not on file   Housing Stability: Not on file       Current Medications:    Current Outpatient Medications:     [START ON 3/18/2025] HYDROcodone-acetaminophen 7.5-325 MG Oral Tab, Take 1 tablet by mouth every 8 (eight) hours as needed for Pain., Disp: 90 tablet, Rfl: 0    HYDROcodone-acetaminophen 7.5-325 MG Oral Tab, Take 1 tablet by mouth every 8 (eight) hours as needed for Pain., Disp: 90 tablet, Rfl: 0    HYDROcodone-acetaminophen 7.5-325 MG Oral Tab, Take 1 tablet by mouth every 8 (eight) hours as needed for Pain., Disp: , Rfl:     zolpidem 5 MG Oral Tab, Take 1 tablet (5 mg total) by mouth nightly as needed for Sleep., Disp: 30 tablet, Rfl: 2    rosuvastatin 5 MG Oral Tab, Take 1 tablet (5 mg total) by mouth nightly., Disp: 90 tablet, Rfl: 1    propranolol 10 MG Oral Tab, Take 1 tablet (10 mg total) by mouth 2 (two) times daily., Disp: 180 tablet, Rfl: 1    pantoprazole 40 MG Oral Tab EC, Take 1 tablet (40 mg total) by mouth 2 (two) times daily before meals., Disp: 180 tablet, Rfl: 1    levothyroxine 50 MCG Oral Tab, Take 1 tablet (50 mcg  total) by mouth before breakfast., Disp: 90 tablet, Rfl: 1    levothyroxine (SYNTHROID) 200 MCG Oral Tab, Take 1 tablet (200 mcg total) by mouth before breakfast., Disp: 90 tablet, Rfl: 1    escitalopram 20 MG Oral Tab, Take 1 tablet (20 mg total) by mouth daily., Disp: 90 tablet, Rfl: 0    furosemide 40 MG Oral Tab, Take 1 tablet (40 mg total) by mouth daily., Disp: 90 tablet, Rfl: 1    Potassium Chloride ER 10 MEQ Oral Tab CR, Take 1 tablet (10 mEq total) by mouth daily., Disp: 90 tablet, Rfl: 1    ALPRAZolam 0.5 MG Oral Tab, Take 1 tablet (0.5 mg total) by mouth 2 (two) times daily as needed for Sleep or Anxiety., Disp: 60 tablet, Rfl: 2    albuterol 108 (90 Base) MCG/ACT Inhalation Aero Soln, Inhale 2 puffs into the lungs every 4 (four) hours as needed for Wheezing or Shortness of Breath., Disp: 3 each, Rfl: 1    gabapentin 300 MG Oral Cap, Take 1 capsule (300 mg total) by mouth 4 (four) times daily., Disp: 360 capsule, Rfl: 3    Ferrous Sulfate (IRON) 325 (65 Fe) MG Oral Tab, Take 325 mg by mouth daily. She does not take daily, Disp: 90 tablet, Rfl: 1    fluticasone propionate 50 MCG/ACT Nasal Suspension, 2 sprays by Each Nare route daily., Disp: 3 each, Rfl: 0    Naloxone HCl 4 MG/0.1ML Nasal Liquid, 4 mg by Intranasal route as needed (May repeat as needed in other nostril if symptoms persist). If patient remains unresponsive, repeat dose in other nostril 2-5 minutes after first dose., Disp: 1 kit, Rfl: 0    folic acid 1 MG Oral Tab, Take 1 tablet (1 mg total) by mouth daily., Disp: 90 tablet, Rfl: 1    spironolactone 100 MG Oral Tab, TAKE 1 TABLET DAILY, Disp: 90 tablet, Rfl: 1    OMEGA-3-ACID ETHYL ESTERS 1 g Oral Cap, TAKE 2 CAPSULES TWICE A DAY, Disp: 360 capsule, Rfl: 0    buPROPion 150 MG Oral Tablet 12 Hr, Take 1 tablet (150 mg total) by mouth 2 (two) times daily., Disp: , Rfl:     Biotin 2500 MCG Oral Cap, Take 1 capsule by mouth every evening., Disp: , Rfl:     folic acid 800 MCG Oral Tab, Take 1  tablet (800 mcg total) by mouth daily., Disp: , Rfl:     Vitamin B-12 1000 MCG Oral Tab, Take 1 tablet (1,000 mcg total) by mouth daily., Disp: 30 tablet, Rfl: 11    Allergies:  Allergies   Allergen Reactions    Radiology Contrast Iodinated Dyes HIVES and ITCHING    Iodine (Topical) OTHER (SEE COMMENTS)        Review of Systems:    Constitutional No fevers, chills, night sweats, excessive fatigue or weight loss.   Eyes No significant visual difficulties. No diplopia. No yellowing.   Hematologic/Lymphatic No easy bruising or bleeding.  Denies tender or palpable lymph nodes.   Respiratory No dyspnea, pleuritic chest pain, cough or hemoptysis.   Cardiovascular No anginal chest pain, palpitations or orthopnea.   Gastrointestinal No nausea, vomiting, diarrhea, GI bleeding, or constipation. NL appetite, No Early Satiety.   Genitorurinary No hematuria, dysuria, abnormal bleeding.   Integumentary No yellowing.   Neurologic No headache, blurred vision, and no areas of focal weakness. Normal gait.   Psychiatric No insomnia, depression, erica or mood swings.         Vital Signs:  There were no vitals taken for this visit.  Height: --  Weight: --  BSA (Calculated - sq m): --  Pulse: --  BP: --  Temp: --  Do Not Use - Resp Rate: --  SpO2: --        Physical Examination:    Constitutional Normal - Mood and affect appropriate. Appears close to chronological age. Well nourished. Well developed.   Eyes Normal - Conjunctivae and sclerae are clear and without icterus. Pupils are reactive and equal.   Hematologic/Lymphatic Normal - No petechiae or purpura.  No tender or palpable lymph nodes in the cervical, supraclavicular, axillary or inguinal area.   Respiratory Normal - Lungs CTA, no rhonchi or wheezing.   Cardiovascular Normal - RRR, no murmurs, gallops or rubs.   Abdomen Non-tender, moderately distended, no masses, ascites or hepatosplenomegaly. Stable umbilical hernia.   Extremities Normal - No C/C/E    Integumentary Diffuse  angioma-like rash of the arms and torso. Small hemangioma of the R eye.No Jaundice   Neurologic Normal - No sensory or motor deficits, normal cerebellar function, normal gait, cranial nerves intact.   Psychiatric Normal - A&Ox3, Coherent speech. Verbalizes understanding of our discussions today.           Labs:  Recent Labs     02/20/25  1450   RBC 4.06   HGB 13.0   HCT 38.8   MCV 95.6   MCH 32.0   MCHC 33.5   RDW 13.2   NEPRELIM 3.45   WBC 6.1   .0     Recent Labs     02/20/25  1450   GLU 93   BUN 10   CREATSERUM 0.87   CA 9.4   ALB 4.6      K 3.5      CO2 28.0   ALKPHO 132 H   AST 16   ALT 11   BILT 0.5   TP 7.2      Latest Reference Range & Units 02/20/25 14:50   Iron, Serum 50 - 170 ug/dL 69   Transferrin 250 - 380 mg/dL 226 (L)   Iron Bind.Cap.(TIBC) 250 - 425 ug/dL 326   Iron Saturation 15 - 50 % 21   FERRITIN 50 - 306 ng/mL 106   (L): Data is abnormally low      Radiology:  .        Pathology:  Final Diagnosis:   Dominant mass, left lobe of liver, ultrasound-guided biopsy:  -Fragments of hemangioma.  (See comment.)         Impression and Plan:  Breast Cancer: Stage IV, ER+ CT+ HER-2 Neg.  She was transitioned to Faslodex and Abemaciclib. Abemaciclib dose reduced for diarrhea and rash, and then held for pancytopenia. After restarting, she was hospitalized for severe anemia that was likely related to bleeding, iron deficiency, and the chemo. MRI suggests that the liver lesions have not progressed, as suspected on the non-con CT.  Discontinued the Abemaciclib and continuing Faslodex. Repeat MRI again raised the question of liver metastases. In an effort to definitively determine whether these were metastases, Liver biopsy was accomplished that revealed hemangioma. CT Chest and MRI Abd were stable, indicating that her breast cancer is controlled on Faslodex alone.  Will continue Faslodex. Repeat imaging is stable. The patient reports that her family has enquired about BCRA testing. The patient  is very low risk for carrying a mutation, having an ER+ VT+ HER2 negative cancer diagnosed after the age of 50. Testing was not indicated prior to the Gnosticist of PARP inhibitors, that can be used to treat metastatic breast cancer. Testing is normal. Continue Faslodex. Will premedicate with PO benadryl for her rash.    Bone Metastases: Stable.  Discontinued Xgeva due to her teeth issues.     Cirrhosis: Unclear etiology, though likely PITTS.     Anemia: Iron deficiency and ongoing blood loss. Secondary to hemorrhoids and AVMs. She has Varices, as well, but they were not bleeding, and did not require banding while she was in the hospital.   Hgb is better today. Follow iron levels closely. Monitor for active bleeding. Replete iron stores for her ferritin<50.     Rash w/ hemangioma of the eyelid, hemangiomas of the Liver, severe hemorrhoids: She saw hepatology and the rash has improved.      Planned Follow Up:  4 weeks for MD visit with labs.     I spent * minutes face to face with the patient.  More than 50% of that time was spent counseling the patient and/or on coordination of care.  The diagnosis, prognosis, and general treatment was explained to the patient and the family.    Electronically Signed by:    Glenn Munroe M.D.  [facility]

## 2025-02-20 NOTE — PROGRESS NOTES
Pt here for follow up and injection.   No complaints.    Outpatient Oncology Care Plan  Problem list:  knowledge deficit    Problems related to:    disease/disease progression    Interventions:  provided general teaching    Expected outcomes:  understands plan of care    Progress towards outcome:  making progress    Education Record    Learner:  Patient  Barriers / Limitations:  None  Method:  Brief focused  Outcome:  Shows understanding  Comments:

## 2025-02-25 ENCOUNTER — APPOINTMENT (OUTPATIENT)
Age: 65
End: 2025-02-25
Attending: INTERNAL MEDICINE
Payer: COMMERCIAL

## 2025-02-25 NOTE — TELEPHONE ENCOUNTER
I called and spoke to Tomi MTZ with [facility] . The prescription for the Ferrous Sulfate was cancelled on 01/22/25 per our office waiting for clarification of directions from patient.

## 2025-03-20 ENCOUNTER — OFFICE VISIT (OUTPATIENT)
Age: 65
End: 2025-03-20
Attending: INTERNAL MEDICINE
Payer: COMMERCIAL

## 2025-03-20 VITALS
TEMPERATURE: 97 F | OXYGEN SATURATION: 98 % | HEART RATE: 75 BPM | HEIGHT: 65.98 IN | BODY MASS INDEX: 28.93 KG/M2 | WEIGHT: 180 LBS | SYSTOLIC BLOOD PRESSURE: 142 MMHG | DIASTOLIC BLOOD PRESSURE: 76 MMHG | RESPIRATION RATE: 18 BRPM

## 2025-03-20 DIAGNOSIS — Z17.0 MALIGNANT NEOPLASM OF NIPPLE OF RIGHT BREAST IN FEMALE, ESTROGEN RECEPTOR POSITIVE (HCC): ICD-10-CM

## 2025-03-20 DIAGNOSIS — C50.011 MALIGNANT NEOPLASM OF NIPPLE OF RIGHT BREAST IN FEMALE, ESTROGEN RECEPTOR POSITIVE (HCC): ICD-10-CM

## 2025-03-20 DIAGNOSIS — C50.919 BREAST CANCER METASTASIZED TO PLEURA, UNSPECIFIED LATERALITY (HCC): ICD-10-CM

## 2025-03-20 DIAGNOSIS — D50.0 IRON DEFICIENCY ANEMIA SECONDARY TO BLOOD LOSS (CHRONIC): ICD-10-CM

## 2025-03-20 DIAGNOSIS — C50.911 CARCINOMA OF RIGHT BREAST METASTATIC TO PLEURA (HCC): ICD-10-CM

## 2025-03-20 DIAGNOSIS — C79.51 MALIGNANT NEOPLASM METASTATIC TO BONE (HCC): ICD-10-CM

## 2025-03-20 DIAGNOSIS — C78.2 BREAST CANCER METASTASIZED TO PLEURA, UNSPECIFIED LATERALITY (HCC): ICD-10-CM

## 2025-03-20 DIAGNOSIS — C50.911 CARCINOMA OF RIGHT BREAST METASTATIC TO PLEURA (HCC): Primary | ICD-10-CM

## 2025-03-20 DIAGNOSIS — C78.2 CARCINOMA OF RIGHT BREAST METASTATIC TO PLEURA (HCC): Primary | ICD-10-CM

## 2025-03-20 DIAGNOSIS — C78.2 CARCINOMA OF RIGHT BREAST METASTATIC TO PLEURA (HCC): ICD-10-CM

## 2025-03-20 LAB
ALBUMIN SERPL-MCNC: 4.7 G/DL (ref 3.2–4.8)
ALBUMIN/GLOB SERPL: 1.8 {RATIO} (ref 1–2)
ALP LIVER SERPL-CCNC: 134 U/L
ALT SERPL-CCNC: 17 U/L
ANION GAP SERPL CALC-SCNC: 8 MMOL/L (ref 0–18)
AST SERPL-CCNC: 21 U/L (ref ?–34)
BASOPHILS # BLD AUTO: 0.08 X10(3) UL (ref 0–0.2)
BASOPHILS NFR BLD AUTO: 1.4 %
BILIRUB SERPL-MCNC: 0.4 MG/DL (ref 0.2–1.1)
BUN BLD-MCNC: 9 MG/DL (ref 9–23)
CALCIUM BLD-MCNC: 10.1 MG/DL (ref 8.7–10.6)
CHLORIDE SERPL-SCNC: 104 MMOL/L (ref 98–112)
CO2 SERPL-SCNC: 26 MMOL/L (ref 21–32)
CREAT BLD-MCNC: 0.78 MG/DL
DEPRECATED HBV CORE AB SER IA-ACNC: 76 NG/ML
EGFRCR SERPLBLD CKD-EPI 2021: 86 ML/MIN/1.73M2 (ref 60–?)
EOSINOPHIL # BLD AUTO: 0.3 X10(3) UL (ref 0–0.7)
EOSINOPHIL NFR BLD AUTO: 5.2 %
ERYTHROCYTE [DISTWIDTH] IN BLOOD BY AUTOMATED COUNT: 13.5 %
FASTING STATUS PATIENT QL REPORTED: NO
GLOBULIN PLAS-MCNC: 2.6 G/DL (ref 2–3.5)
GLUCOSE BLD-MCNC: 99 MG/DL (ref 70–99)
HCT VFR BLD AUTO: 37.9 %
HGB BLD-MCNC: 12.8 G/DL
IMM GRANULOCYTES # BLD AUTO: 0.02 X10(3) UL (ref 0–1)
IMM GRANULOCYTES NFR BLD: 0.3 %
IRON SATN MFR SERPL: 16 %
IRON SERPL-MCNC: 56 UG/DL
LYMPHOCYTES # BLD AUTO: 2.16 X10(3) UL (ref 1–4)
LYMPHOCYTES NFR BLD AUTO: 37.2 %
MCH RBC QN AUTO: 31.8 PG (ref 26–34)
MCHC RBC AUTO-ENTMCNC: 33.8 G/DL (ref 31–37)
MCV RBC AUTO: 94 FL
MONOCYTES # BLD AUTO: 0.27 X10(3) UL (ref 0.1–1)
MONOCYTES NFR BLD AUTO: 4.6 %
NEUTROPHILS # BLD AUTO: 2.98 X10 (3) UL (ref 1.5–7.7)
NEUTROPHILS # BLD AUTO: 2.98 X10(3) UL (ref 1.5–7.7)
NEUTROPHILS NFR BLD AUTO: 51.3 %
OSMOLALITY SERPL CALC.SUM OF ELEC: 285 MOSM/KG (ref 275–295)
PLATELET # BLD AUTO: 165 10(3)UL (ref 150–450)
POTASSIUM SERPL-SCNC: 4 MMOL/L (ref 3.5–5.1)
PROT SERPL-MCNC: 7.3 G/DL (ref 5.7–8.2)
RBC # BLD AUTO: 4.03 X10(6)UL
SODIUM SERPL-SCNC: 138 MMOL/L (ref 136–145)
TOTAL IRON BINDING CAPACITY: 351 UG/DL (ref 250–425)
TRANSFERRIN SERPL-MCNC: 245 MG/DL (ref 250–380)
WBC # BLD AUTO: 5.8 X10(3) UL (ref 4–11)

## 2025-03-20 RX ORDER — LAMOTRIGINE 25 MG/1
500 TABLET ORAL ONCE
Status: COMPLETED | OUTPATIENT
Start: 2025-03-20 | End: 2025-03-20

## 2025-03-20 RX ORDER — LAMOTRIGINE 25 MG/1
500 TABLET ORAL ONCE
Status: CANCELLED | OUTPATIENT
Start: 2025-03-20

## 2025-03-20 RX ADMIN — LAMOTRIGINE 500 MG: 25 TABLET ORAL at 15:28:00

## 2025-03-20 NOTE — PROGRESS NOTES
Education Record    Learner:  Patient    Disease / Diagnosis: here for faslodex    Barriers / Limitations:  None    Method:  Brief focused, and  reinforcement    General Topics:  Plan of care reviewed    Outcome: patient ambulatory. No complaints. Tolerated injection. Will get next appt from [facility]. Shows understanding. Discharged in stable condition

## 2025-03-21 NOTE — PROGRESS NOTES
No Complaints.Cancer Center Progress Note  Patient Name: Martha Tobias   YOB: 1960   Medical Record Number: [id number]     Attending Physician: Glenn Munroe M.D.     Date of Visit: 3/20/25      Chief Complaint:  Chief Complaint   Patient presents with    Follow - Up        Oncologic History:  Martha Tobias is a 64 year old female with limited PMH and limited prior access to the medical system admitted on 5/18 c/o a 3 week history of shortness of breath. She was found to have a large R pleural effusion. She underwent therapeutic thoracentesis. Cytology was unrevealing. When the fluid reaccumulated, she was taken to the OR for thoracostomy with pleural biopsy. In the OR, it was noted that her bilateral breasts were abnormal, concerning for breast masses. The thoracostomy was performed and the pleura appeared abnormal. It was biopsied revealing metastatic breast cancer. Pleurodesis was also performed. Upon questioning, the patient first noted breast abnormalities 2 years prior to admission. She had not seen a physician in several years. She denied symptoms elsewhere.     Progression was noted on CT and she was transitioned to Faslodex and Abeniciclib.    She had difficulty tolerating the Abemiciclib due to diarrhea and cytopenias.    She was admitted to the hospital from 12/21/20 to 12/24/20 with severe anemia after restarting the Abemaciclib. She was transfused and underwent MRI enterography. No bleeding source, other than her hemorrhoids was found. She was discharged to follow up with GI. The Abemaciclib was discontinued.   Interestingly, her liver lesions on MRI were described as stable hemangiomas, where they had appeared to be enlarging metastatic deposits on non-contrast CT.     Biopsy of the liver revealed cavernous hemangioma.    She was hospitalized from 12/14/21 to 12/19/21 and diagnosed with cirrhosis. She required paracentesis for improved comfort. Cytology was negative for  malignancy. She was transfused and given IV iron for her iron deficiency. EGD revealed esophageal varices and portal gastropathy. Her push enteroscopy and capsule endoscopy revealed small bowel AVMs and erosions/ulcers.  She was discharged to be evaluated by hepatology, ongoing follow up with GI, follow up here for ongoing care of her metastatic breast cancer.    History of Present Illness:  Pt is here for follow up. No complaints.      Performance Status:  ECOG 0    Past Medical History:  Past Medical History:    Abdominal distention    Anemia    Anxiety    Back pain    Breast cancer (HCC)    breast    COPD (chronic obstructive pulmonary disease) (HCC)    No continuous O2    Disorder of liver    lesions    Disorder of thyroid    Fatigue    Feeling lonely    Hemorrhoids    High cholesterol    History of 2019 novel coronavirus disease (COVID-19)    ASYMPTOMATIC.  NO HOSPITALIZATION    History of blood transfusion    Hyperthyroidism    Leg swelling    Neuropathy    Personal history of antineoplastic chemotherapy    oral    Pneumonia due to organism    PONV (postoperative nausea and vomiting)    Rash    Shortness of breath    Only exertion    Stress    Visual impairment    glasses    Weight gain       Past Surgical History:  Past Surgical History:   Procedure Laterality Date    Colonoscopy      Colonoscopy N/A 10/26/2021    Procedure: COLONOSCOPY with biopsy, Cold Polypectomy & Clips x 5 placement /ESOPHAGOGASTRODUODENOSCOPY (EGD) with Biopsy;  Surgeon: Oscar Harp MD;  Location:  ENDOSCOPY    Oral surgery  2017    Thoracotomy,ltd,biopsy  05/2015       Family History:  Family History   Adopted: Yes   Family history unknown: Yes       Social History:  Social History     Socioeconomic History    Marital status:      Spouse name: Not on file    Number of children: Not on file    Years of education: Not on file    Highest education level: Not on file   Occupational History    Not on file   Tobacco Use     Smoking status: Light Smoker     Current packs/day: 0.00     Types: Cigarettes    Smokeless tobacco: Never    Tobacco comments:     3-5 cig/ day   Vaping Use    Vaping status: Never Used   Substance and Sexual Activity    Alcohol use: Not Currently     Alcohol/week: 0.0 standard drinks of alcohol     Comment: 2 drinks/yr     Drug use: No    Sexual activity: Not Currently   Other Topics Concern     Service Not Asked    Blood Transfusions Not Asked    Caffeine Concern Yes     Comment: 1 1/2 a day 16oz    Occupational Exposure Not Asked    Hobby Hazards Not Asked    Sleep Concern Not Asked    Stress Concern Not Asked    Weight Concern Not Asked    Special Diet Not Asked    Back Care Not Asked    Exercise No    Bike Helmet Not Asked    Seat Belt Not Asked    Self-Exams Not Asked   Social History Narrative    Not on file     Social Drivers of Health     Food Insecurity: Not on file   Transportation Needs: Not on file   Housing Stability: Not on file       Current Medications:    Current Outpatient Medications:     HYDROcodone-acetaminophen 7.5-325 MG Oral Tab, Take 1 tablet by mouth every 8 (eight) hours as needed for Pain., Disp: 90 tablet, Rfl: 0    HYDROcodone-acetaminophen 7.5-325 MG Oral Tab, Take 1 tablet by mouth every 8 (eight) hours as needed for Pain., Disp: , Rfl:     zolpidem 5 MG Oral Tab, Take 1 tablet (5 mg total) by mouth nightly as needed for Sleep., Disp: 30 tablet, Rfl: 2    rosuvastatin 5 MG Oral Tab, Take 1 tablet (5 mg total) by mouth nightly., Disp: 90 tablet, Rfl: 1    propranolol 10 MG Oral Tab, Take 1 tablet (10 mg total) by mouth 2 (two) times daily., Disp: 180 tablet, Rfl: 1    pantoprazole 40 MG Oral Tab EC, Take 1 tablet (40 mg total) by mouth 2 (two) times daily before meals., Disp: 180 tablet, Rfl: 1    levothyroxine 50 MCG Oral Tab, Take 1 tablet (50 mcg total) by mouth before breakfast., Disp: 90 tablet, Rfl: 1    levothyroxine (SYNTHROID) 200 MCG Oral Tab, Take 1 tablet (200 mcg  total) by mouth before breakfast., Disp: 90 tablet, Rfl: 1    escitalopram 20 MG Oral Tab, Take 1 tablet (20 mg total) by mouth daily., Disp: 90 tablet, Rfl: 0    furosemide 40 MG Oral Tab, Take 1 tablet (40 mg total) by mouth daily., Disp: 90 tablet, Rfl: 1    Potassium Chloride ER 10 MEQ Oral Tab CR, Take 1 tablet (10 mEq total) by mouth daily., Disp: 90 tablet, Rfl: 1    ALPRAZolam 0.5 MG Oral Tab, Take 1 tablet (0.5 mg total) by mouth 2 (two) times daily as needed for Sleep or Anxiety., Disp: 60 tablet, Rfl: 2    albuterol 108 (90 Base) MCG/ACT Inhalation Aero Soln, Inhale 2 puffs into the lungs every 4 (four) hours as needed for Wheezing or Shortness of Breath., Disp: 3 each, Rfl: 1    gabapentin 300 MG Oral Cap, Take 1 capsule (300 mg total) by mouth 4 (four) times daily., Disp: 360 capsule, Rfl: 3    Ferrous Sulfate (IRON) 325 (65 Fe) MG Oral Tab, Take 325 mg by mouth daily. She does not take daily, Disp: 90 tablet, Rfl: 1    fluticasone propionate 50 MCG/ACT Nasal Suspension, 2 sprays by Each Nare route daily., Disp: 3 each, Rfl: 0    Naloxone HCl 4 MG/0.1ML Nasal Liquid, 4 mg by Intranasal route as needed (May repeat as needed in other nostril if symptoms persist). If patient remains unresponsive, repeat dose in other nostril 2-5 minutes after first dose., Disp: 1 kit, Rfl: 0    folic acid 1 MG Oral Tab, Take 1 tablet (1 mg total) by mouth daily., Disp: 90 tablet, Rfl: 1    spironolactone 100 MG Oral Tab, TAKE 1 TABLET DAILY, Disp: 90 tablet, Rfl: 1    OMEGA-3-ACID ETHYL ESTERS 1 g Oral Cap, TAKE 2 CAPSULES TWICE A DAY, Disp: 360 capsule, Rfl: 0    buPROPion 150 MG Oral Tablet 12 Hr, Take 1 tablet (150 mg total) by mouth 2 (two) times daily., Disp: , Rfl:     Biotin 2500 MCG Oral Cap, Take 1 capsule by mouth every evening., Disp: , Rfl:     folic acid 800 MCG Oral Tab, Take 1 tablet (800 mcg total) by mouth daily., Disp: , Rfl:     Vitamin B-12 1000 MCG Oral Tab, Take 1 tablet (1,000 mcg total) by mouth  daily., Disp: 30 tablet, Rfl: 11    Allergies:  Allergies   Allergen Reactions    Radiology Contrast Iodinated Dyes HIVES and ITCHING    Iodine (Topical) OTHER (SEE COMMENTS)        Review of Systems:    Constitutional No fevers, chills, night sweats, excessive fatigue or weight loss.   Eyes No significant visual difficulties. No diplopia. No yellowing.   Hematologic/Lymphatic No easy bruising or bleeding.  Denies tender or palpable lymph nodes.   Respiratory No dyspnea, pleuritic chest pain, cough or hemoptysis.   Cardiovascular No anginal chest pain, palpitations or orthopnea.   Gastrointestinal No nausea, vomiting, diarrhea, GI bleeding, or constipation. NL appetite, No Early Satiety.   Genitorurinary No hematuria, dysuria, abnormal bleeding.   Integumentary No yellowing.   Neurologic No headache, blurred vision, and no areas of focal weakness. Normal gait.   Psychiatric No insomnia, depression, erica or mood swings.         Vital Signs:  /76 (BP Location: Left arm, Patient Position: Sitting, Cuff Size: large)   Pulse 75   Temp 96.8 °F (36 °C) (Tympanic)   Resp 18   Ht 1.676 m (5' 5.98\")   Wt 81.6 kg (180 lb)   SpO2 98%   BMI 29.07 kg/m²   Height: 167.6 cm (5' 5.98\") (03/20 1452)  Weight: 81.6 kg (180 lb) (03/20 1452)  BSA (Calculated - sq m): 1.91 sq meters (03/20 1452)  Pulse: 75 (03/20 1452)  BP: 142/76 (03/20 1452)  Temp: 96.8 °F (36 °C) (03/20 1452)  Do Not Use - Resp Rate: --  SpO2: 98 % (03/20 1452)        Physical Examination:    Constitutional Normal - Mood and affect appropriate. Appears close to chronological age. Well nourished. Well developed.   Eyes Normal - Conjunctivae and sclerae are clear and without icterus. Pupils are reactive and equal.   Hematologic/Lymphatic Normal - No petechiae or purpura.  No tender or palpable lymph nodes in the cervical, supraclavicular, axillary or inguinal area.   Respiratory Normal - Lungs CTA, no rhonchi or wheezing.   Cardiovascular Normal - RRR, no  murmurs, gallops or rubs.   Abdomen Non-tender, moderately distended, no masses, ascites or hepatosplenomegaly. Stable umbilical hernia.   Extremities Normal - No C/C/E    Integumentary Diffuse angioma-like rash of the arms and torso. Small hemangioma of the R eye.No Jaundice   Neurologic Normal - No sensory or motor deficits, normal cerebellar function, normal gait, cranial nerves intact.   Psychiatric Normal - A&Ox3, Coherent speech. Verbalizes understanding of our discussions today.           Labs:  Recent Labs     03/20/25  1452   RBC 4.03   HGB 12.8   HCT 37.9   MCV 94.0   MCH 31.8   MCHC 33.8   RDW 13.5   NEPRELIM 2.98   WBC 5.8   .0     Recent Labs     03/20/25  1452   GLU 99   BUN 9   CREATSERUM 0.78   CA 10.1   ALB 4.7      K 4.0      CO2 26.0   ALKPHO 134 H   AST 21   ALT 17   BILT 0.4   TP 7.3      Latest Reference Range & Units 03/20/25 14:52   Iron, Serum 50 - 170 ug/dL 56   Transferrin 250 - 380 mg/dL 245 (L)   Iron Bind.Cap.(TIBC) 250 - 425 ug/dL 351   Iron Saturation 15 - 50 % 16   FERRITIN 50 - 306 ng/mL 76   (L): Data is abnormally low      Radiology:  .        Pathology:  Final Diagnosis:   Dominant mass, left lobe of liver, ultrasound-guided biopsy:  -Fragments of hemangioma.  (See comment.)         Impression and Plan:  Breast Cancer: Stage IV, ER+ RI+ HER-2 Neg.  She was transitioned to Faslodex and Abemaciclib. Abemaciclib dose reduced for diarrhea and rash, and then held for pancytopenia. After restarting, she was hospitalized for severe anemia that was likely related to bleeding, iron deficiency, and the chemo. MRI suggests that the liver lesions have not progressed, as suspected on the non-con CT.  Discontinued the Abemaciclib and continuing Faslodex. Repeat MRI again raised the question of liver metastases. In an effort to definitively determine whether these were metastases, Liver biopsy was accomplished that revealed hemangioma. CT Chest and MRI Abd were stable,  indicating that her breast cancer is controlled on Faslodex alone.  Will continue Faslodex. Repeat imaging is stable. The patient reports that her family has enquired about BCRA testing. The patient is very low risk for carrying a mutation, having an ER+ PA+ HER2 negative cancer diagnosed after the age of 50. Testing was not indicated prior to the Buddhist of PARP inhibitors, that can be used to treat metastatic breast cancer. Testing is normal. Continue Faslodex. Will premedicate with PO benadryl for her rash.    Bone Metastases: Stable.  Discontinued Xgeva due to her teeth issues.     Cirrhosis: Unclear etiology, though likely PITTS.     Anemia: Iron deficiency and ongoing blood loss. Secondary to hemorrhoids and AVMs. She has Varices, as well, but they were not bleeding, and did not require banding while she was in the hospital.   Hgb is better today. Follow iron levels closely. Monitor for active bleeding. Replete iron stores for her ferritin<50.     Rash w/ hemangioma of the eyelid, hemangiomas of the Liver, severe hemorrhoids: She saw hepatology and the rash has improved.      Planned Follow Up:  4 weeks for MD visit with labs.     I spent * minutes face to face with the patient.  More than 50% of that time was spent counseling the patient and/or on coordination of care.  The diagnosis, prognosis, and general treatment was explained to the patient and the family.    Electronically Signed by:    Glenn Munroe M.D.  [facility]

## 2025-03-28 RX ORDER — FLUTICASONE PROPIONATE 50 MCG
2 SPRAY, SUSPENSION (ML) NASAL DAILY
Qty: 48 G | Refills: 3 | Status: SHIPPED | OUTPATIENT
Start: 2025-03-28

## 2025-03-28 NOTE — TELEPHONE ENCOUNTER
LOV: 1/15/2025  for: Medication Follow-Up   Patient advised to RTC on:  3 months.     Medication Quantity Refills Start End   fluticasone propionate 50 MCG/ACT Nasal Suspension 3 each 0 1/15/2025 1/10/2026   Si sprays by Each Nare route edward

## 2025-04-17 ENCOUNTER — OFFICE VISIT (OUTPATIENT)
Age: 65
End: 2025-04-17
Attending: INTERNAL MEDICINE
Payer: COMMERCIAL

## 2025-04-17 ENCOUNTER — APPOINTMENT (OUTPATIENT)
Age: 65
End: 2025-04-17
Attending: INTERNAL MEDICINE
Payer: COMMERCIAL

## 2025-04-17 VITALS
HEART RATE: 77 BPM | TEMPERATURE: 97 F | SYSTOLIC BLOOD PRESSURE: 141 MMHG | BODY MASS INDEX: 29.01 KG/M2 | WEIGHT: 180.5 LBS | HEIGHT: 66.06 IN | OXYGEN SATURATION: 99 % | RESPIRATION RATE: 18 BRPM | DIASTOLIC BLOOD PRESSURE: 77 MMHG

## 2025-04-17 DIAGNOSIS — C79.51 MALIGNANT NEOPLASM METASTATIC TO BONE (HCC): ICD-10-CM

## 2025-04-17 DIAGNOSIS — D50.0 IRON DEFICIENCY ANEMIA SECONDARY TO BLOOD LOSS (CHRONIC): ICD-10-CM

## 2025-04-17 DIAGNOSIS — C78.2 BREAST CANCER METASTASIZED TO PLEURA, UNSPECIFIED LATERALITY (HCC): ICD-10-CM

## 2025-04-17 DIAGNOSIS — C50.911 CARCINOMA OF RIGHT BREAST METASTATIC TO PLEURA (HCC): Primary | ICD-10-CM

## 2025-04-17 DIAGNOSIS — C50.919 BREAST CANCER METASTASIZED TO PLEURA, UNSPECIFIED LATERALITY (HCC): ICD-10-CM

## 2025-04-17 DIAGNOSIS — Z17.0 MALIGNANT NEOPLASM OF NIPPLE OF RIGHT BREAST IN FEMALE, ESTROGEN RECEPTOR POSITIVE (HCC): ICD-10-CM

## 2025-04-17 DIAGNOSIS — C78.2 CARCINOMA OF RIGHT BREAST METASTATIC TO PLEURA (HCC): Primary | ICD-10-CM

## 2025-04-17 DIAGNOSIS — C50.011 MALIGNANT NEOPLASM OF NIPPLE OF RIGHT BREAST IN FEMALE, ESTROGEN RECEPTOR POSITIVE (HCC): ICD-10-CM

## 2025-04-17 LAB
ALBUMIN SERPL-MCNC: 4.9 G/DL (ref 3.2–4.8)
ALBUMIN/GLOB SERPL: 1.9 {RATIO} (ref 1–2)
ALP LIVER SERPL-CCNC: 139 U/L (ref 50–130)
ALT SERPL-CCNC: 15 U/L (ref 10–49)
ANION GAP SERPL CALC-SCNC: 6 MMOL/L (ref 0–18)
AST SERPL-CCNC: 17 U/L (ref ?–34)
BASOPHILS # BLD AUTO: 0.08 X10(3) UL (ref 0–0.2)
BASOPHILS NFR BLD AUTO: 1.4 %
BILIRUB SERPL-MCNC: 0.4 MG/DL (ref 0.2–1.1)
BUN BLD-MCNC: 7 MG/DL (ref 9–23)
CALCIUM BLD-MCNC: 9.9 MG/DL (ref 8.7–10.6)
CHLORIDE SERPL-SCNC: 104 MMOL/L (ref 98–112)
CO2 SERPL-SCNC: 29 MMOL/L (ref 21–32)
CREAT BLD-MCNC: 0.8 MG/DL (ref 0.55–1.02)
DEPRECATED HBV CORE AB SER IA-ACNC: 68 NG/ML (ref 50–306)
EGFRCR SERPLBLD CKD-EPI 2021: 83 ML/MIN/1.73M2 (ref 60–?)
EOSINOPHIL # BLD AUTO: 0.24 X10(3) UL (ref 0–0.7)
EOSINOPHIL NFR BLD AUTO: 4.1 %
ERYTHROCYTE [DISTWIDTH] IN BLOOD BY AUTOMATED COUNT: 13.1 %
FASTING STATUS PATIENT QL REPORTED: NO
GLOBULIN PLAS-MCNC: 2.6 G/DL (ref 2–3.5)
GLUCOSE BLD-MCNC: 101 MG/DL (ref 70–99)
HCT VFR BLD AUTO: 38.7 % (ref 35–48)
HGB BLD-MCNC: 13.1 G/DL (ref 12–16)
IMM GRANULOCYTES # BLD AUTO: 0.02 X10(3) UL (ref 0–1)
IMM GRANULOCYTES NFR BLD: 0.3 %
IRON SATN MFR SERPL: 12 % (ref 15–50)
IRON SERPL-MCNC: 43 UG/DL (ref 50–170)
LYMPHOCYTES # BLD AUTO: 1.91 X10(3) UL (ref 1–4)
LYMPHOCYTES NFR BLD AUTO: 32.8 %
MCH RBC QN AUTO: 31.6 PG (ref 26–34)
MCHC RBC AUTO-ENTMCNC: 33.9 G/DL (ref 31–37)
MCV RBC AUTO: 93.3 FL (ref 80–100)
MONOCYTES # BLD AUTO: 0.29 X10(3) UL (ref 0.1–1)
MONOCYTES NFR BLD AUTO: 5 %
NEUTROPHILS # BLD AUTO: 3.29 X10 (3) UL (ref 1.5–7.7)
NEUTROPHILS # BLD AUTO: 3.29 X10(3) UL (ref 1.5–7.7)
NEUTROPHILS NFR BLD AUTO: 56.4 %
OSMOLALITY SERPL CALC.SUM OF ELEC: 286 MOSM/KG (ref 275–295)
PLATELET # BLD AUTO: 175 10(3)UL (ref 150–450)
POTASSIUM SERPL-SCNC: 4 MMOL/L (ref 3.5–5.1)
PROT SERPL-MCNC: 7.5 G/DL (ref 5.7–8.2)
RBC # BLD AUTO: 4.15 X10(6)UL (ref 3.8–5.3)
SODIUM SERPL-SCNC: 139 MMOL/L (ref 136–145)
TOTAL IRON BINDING CAPACITY: 355 UG/DL (ref 250–425)
TRANSFERRIN SERPL-MCNC: 256 MG/DL (ref 250–380)
WBC # BLD AUTO: 5.8 X10(3) UL (ref 4–11)

## 2025-04-17 RX ORDER — LAMOTRIGINE 25 MG/1
500 TABLET ORAL ONCE
Status: CANCELLED | OUTPATIENT
Start: 2025-04-17

## 2025-04-17 RX ORDER — LAMOTRIGINE 25 MG/1
500 TABLET ORAL ONCE
Status: COMPLETED | OUTPATIENT
Start: 2025-04-17 | End: 2025-04-17

## 2025-04-17 RX ADMIN — LAMOTRIGINE 500 MG: 25 TABLET ORAL at 14:52:00

## 2025-04-17 NOTE — PROGRESS NOTES
No Complaints.Cancer Center Progress Note  Patient Name: Martha Tobias   YOB: 1960   Medical Record Number: [id number]     Attending Physician: Glenn Munroe M.D.     Date of Visit: 4/17/2025        Chief Complaint:  Chief Complaint   Patient presents with    Follow - Up        Oncologic History:  Martha Tobias is a 64 year old female with limited PMH and limited prior access to the medical system admitted on 5/18 c/o a 3 week history of shortness of breath. She was found to have a large R pleural effusion. She underwent therapeutic thoracentesis. Cytology was unrevealing. When the fluid reaccumulated, she was taken to the OR for thoracostomy with pleural biopsy. In the OR, it was noted that her bilateral breasts were abnormal, concerning for breast masses. The thoracostomy was performed and the pleura appeared abnormal. It was biopsied revealing metastatic breast cancer. Pleurodesis was also performed. Upon questioning, the patient first noted breast abnormalities 2 years prior to admission. She had not seen a physician in several years. She denied symptoms elsewhere.     Progression was noted on CT and she was transitioned to Faslodex and Abeniciclib.    She had difficulty tolerating the Abemiciclib due to diarrhea and cytopenias.    She was admitted to the hospital from 12/21/20 to 12/24/20 with severe anemia after restarting the Abemaciclib. She was transfused and underwent MRI enterography. No bleeding source, other than her hemorrhoids was found. She was discharged to follow up with GI. The Abemaciclib was discontinued.   Interestingly, her liver lesions on MRI were described as stable hemangiomas, where they had appeared to be enlarging metastatic deposits on non-contrast CT.     Biopsy of the liver revealed cavernous hemangioma.    She was hospitalized from 12/14/21 to 12/19/21 and diagnosed with cirrhosis. She required paracentesis for improved comfort. Cytology was negative for  malignancy. She was transfused and given IV iron for her iron deficiency. EGD revealed esophageal varices and portal gastropathy. Her push enteroscopy and capsule endoscopy revealed small bowel AVMs and erosions/ulcers.  She was discharged to be evaluated by hepatology, ongoing follow up with GI, follow up here for ongoing care of her metastatic breast cancer.    History of Present Illness:  Pt is here for follow up. She feels well      Performance Status:  ECOG 0    Past Medical History:  Past Medical History:    Abdominal distention    Anemia    Anxiety    Back pain    Breast cancer (HCC)    breast    COPD (chronic obstructive pulmonary disease) (HCC)    No continuous O2    Disorder of liver    lesions    Disorder of thyroid    Fatigue    Feeling lonely    Hemorrhoids    High cholesterol    History of 2019 novel coronavirus disease (COVID-19)    ASYMPTOMATIC.  NO HOSPITALIZATION    History of blood transfusion    Hyperthyroidism    Leg swelling    Neuropathy    Personal history of antineoplastic chemotherapy    oral    Pneumonia due to organism    PONV (postoperative nausea and vomiting)    Rash    Shortness of breath    Only exertion    Stress    Visual impairment    glasses    Weight gain       Past Surgical History:  Past Surgical History:   Procedure Laterality Date    Colonoscopy      Colonoscopy N/A 10/26/2021    Procedure: COLONOSCOPY with biopsy, Cold Polypectomy & Clips x 5 placement /ESOPHAGOGASTRODUODENOSCOPY (EGD) with Biopsy;  Surgeon: Oscar Harp MD;  Location:  ENDOSCOPY    Oral surgery  2017    [facility],biopsy  05/2015       Family History:  Family History   Adopted: Yes   Family history unknown: Yes       Social History:  Social History     Socioeconomic History    Marital status:      Spouse name: Not on file    Number of children: Not on file    Years of education: Not on file    Highest education level: Not on file   Occupational History    Not on file   Tobacco Use     Smoking status: Light Smoker     Current packs/day: 0.00     Types: Cigarettes    Smokeless tobacco: Never    Tobacco comments:     3-5 cig/ day   Vaping Use    Vaping status: Never Used   Substance and Sexual Activity    Alcohol use: Not Currently     Alcohol/week: 0.0 standard drinks of alcohol     Comment: 2 drinks/yr     Drug use: No    Sexual activity: Not Currently   Other Topics Concern     Service Not Asked    Blood Transfusions Not Asked    Caffeine Concern Yes     Comment: 1 1/2 a day 16oz    Occupational Exposure Not Asked    Hobby Hazards Not Asked    Sleep Concern Not Asked    Stress Concern Not Asked    Weight Concern Not Asked    Special Diet Not Asked    Back Care Not Asked    Exercise No    Bike Helmet Not Asked    Seat Belt Not Asked    Self-Exams Not Asked   Social History Narrative    Not on file     Social Drivers of Health     Food Insecurity: Not on file   Transportation Needs: Not on file   Housing Stability: Not on file       Current Medications:    Current Outpatient Medications:     FLUTICASONE PROPIONATE 50 MCG/ACT Nasal Suspension, USE 2 SPRAYS IN EACH NOSTRIL DAILY, Disp: 48 g, Rfl: 3    HYDROcodone-acetaminophen 7.5-325 MG Oral Tab, Take 1 tablet by mouth every 8 (eight) hours as needed for Pain., Disp: 90 tablet, Rfl: 0    HYDROcodone-acetaminophen 7.5-325 MG Oral Tab, Take 1 tablet by mouth every 8 (eight) hours as needed for Pain., Disp: , Rfl:     zolpidem 5 MG Oral Tab, Take 1 tablet (5 mg total) by mouth nightly as needed for Sleep., Disp: 30 tablet, Rfl: 2    rosuvastatin 5 MG Oral Tab, Take 1 tablet (5 mg total) by mouth nightly., Disp: 90 tablet, Rfl: 1    propranolol 10 MG Oral Tab, Take 1 tablet (10 mg total) by mouth 2 (two) times daily., Disp: 180 tablet, Rfl: 1    pantoprazole 40 MG Oral Tab EC, Take 1 tablet (40 mg total) by mouth 2 (two) times daily before meals., Disp: 180 tablet, Rfl: 1    levothyroxine 50 MCG Oral Tab, Take 1 tablet (50 mcg total) by mouth  before breakfast., Disp: 90 tablet, Rfl: 1    levothyroxine (SYNTHROID) 200 MCG Oral Tab, Take 1 tablet (200 mcg total) by mouth before breakfast., Disp: 90 tablet, Rfl: 1    escitalopram 20 MG Oral Tab, Take 1 tablet (20 mg total) by mouth daily., Disp: 90 tablet, Rfl: 0    furosemide 40 MG Oral Tab, Take 1 tablet (40 mg total) by mouth daily., Disp: 90 tablet, Rfl: 1    Potassium Chloride ER 10 MEQ Oral Tab CR, Take 1 tablet (10 mEq total) by mouth daily., Disp: 90 tablet, Rfl: 1    ALPRAZolam 0.5 MG Oral Tab, Take 1 tablet (0.5 mg total) by mouth 2 (two) times daily as needed for Sleep or Anxiety., Disp: 60 tablet, Rfl: 2    albuterol 108 (90 Base) MCG/ACT Inhalation Aero Soln, Inhale 2 puffs into the lungs every 4 (four) hours as needed for Wheezing or Shortness of Breath., Disp: 3 each, Rfl: 1    gabapentin 300 MG Oral Cap, Take 1 capsule (300 mg total) by mouth 4 (four) times daily., Disp: 360 capsule, Rfl: 3    Ferrous Sulfate (IRON) 325 (65 Fe) MG Oral Tab, Take 325 mg by mouth daily. She does not take daily, Disp: 90 tablet, Rfl: 1    Naloxone HCl 4 MG/0.1ML Nasal Liquid, 4 mg by Intranasal route as needed (May repeat as needed in other nostril if symptoms persist). If patient remains unresponsive, repeat dose in other nostril 2-5 minutes after first dose., Disp: 1 kit, Rfl: 0    folic acid 1 MG Oral Tab, Take 1 tablet (1 mg total) by mouth daily., Disp: 90 tablet, Rfl: 1    spironolactone 100 MG Oral Tab, TAKE 1 TABLET DAILY, Disp: 90 tablet, Rfl: 1    OMEGA-3-ACID ETHYL ESTERS 1 g Oral Cap, TAKE 2 CAPSULES TWICE A DAY, Disp: 360 capsule, Rfl: 0    buPROPion 150 MG Oral Tablet 12 Hr, Take 1 tablet (150 mg total) by mouth in the morning and 1 tablet (150 mg total) before bedtime., Disp: , Rfl:     Biotin 2500 MCG Oral Cap, Take 1 capsule by mouth every evening., Disp: , Rfl:     folic acid 800 MCG Oral Tab, Take 1 tablet (800 mcg total) by mouth in the morning., Disp: , Rfl:     Vitamin B-12 1000 MCG Oral  Tab, Take 1 tablet (1,000 mcg total) by mouth daily., Disp: 30 tablet, Rfl: 11    Allergies:  Allergies   Allergen Reactions    Radiology Contrast Iodinated Dyes HIVES and ITCHING    Iodine (Topical) OTHER (SEE COMMENTS)        Review of Systems:    Constitutional No fevers, chills, night sweats, excessive fatigue or weight loss.   Eyes No significant visual difficulties. No diplopia. No yellowing.   Hematologic/Lymphatic No easy bruising or bleeding.  Denies tender or palpable lymph nodes.   Respiratory No dyspnea, pleuritic chest pain, cough or hemoptysis.   Cardiovascular No anginal chest pain, palpitations or orthopnea.   Gastrointestinal No nausea, vomiting, diarrhea, GI bleeding, or constipation. NL appetite, No Early Satiety.   Genitorurinary No hematuria, dysuria, abnormal bleeding.   Integumentary No yellowing.   Neurologic No headache, blurred vision, and no areas of focal weakness. Normal gait.   Psychiatric No insomnia, depression, erica or mood swings.         Vital Signs:  /77 (BP Location: Left arm, Patient Position: Sitting, Cuff Size: adult)   Pulse 77   Temp 97.3 °F (36.3 °C) (Temporal)   Resp 18   Ht 1.678 m (5' 6.06\")   Wt 81.9 kg (180 lb 8 oz)   SpO2 99%   BMI 29.08 kg/m²   Height: 167.8 cm (5' 6.06\") (04/17 1422)  Weight: 81.9 kg (180 lb 8 oz) (04/17 1422)  BSA (Calculated - sq m): 1.92 sq meters (04/17 1422)  Pulse: 77 (04/17 1422)  BP: 141/77 (04/17 1422)  Temp: 97.3 °F (36.3 °C) (04/17 1422)  Do Not Use - Resp Rate: --  SpO2: 99 % (04/17 1422)        Physical Examination:    Constitutional Normal - Mood and affect appropriate. Appears close to chronological age. Well nourished. Well developed.   Eyes Normal - Conjunctivae and sclerae are clear and without icterus. Pupils are reactive and equal.   Hematologic/Lymphatic Normal - No petechiae or purpura.  No tender or palpable lymph nodes in the cervical, supraclavicular, axillary or inguinal area.   Respiratory Normal - Lungs CTA,  no rhonchi or wheezing.   Cardiovascular Normal - RRR, no murmurs, gallops or rubs.   Abdomen Non-tender, moderately distended, no masses, ascites or hepatosplenomegaly. Stable umbilical hernia.   Extremities Normal - No C/C/E    Integumentary Diffuse angioma-like rash of the arms and torso. Small hemangioma of the R eye.No Jaundice   Neurologic Normal - No sensory or motor deficits, normal cerebellar function, normal gait, cranial nerves intact.   Psychiatric Normal - A&Ox3, Coherent speech. Verbalizes understanding of our discussions today.           Labs:   Latest Reference Range & Units 04/17/25 14:12   Sodium 136 - 145 mmol/L 139   Potassium 3.5 - 5.1 mmol/L 4.0   Chloride 98 - 112 mmol/L 104   Carbon Dioxide, Total 21.0 - 32.0 mmol/L 29.0   BUN 9 - 23 mg/dL 7 (L)   CREATININE 0.55 - 1.02 mg/dL 0.80   CALCIUM 8.7 - 10.6 mg/dL 9.9   EGFR >=60 mL/min/1.73m2 83   ANION GAP 0 - 18 mmol/L 6   CALCULATED OSMOLALITY 275 - 295 mOsm/kg 286   ALKALINE PHOSPHATASE 50 - 130 U/L 139 (H)   AST (SGOT) <34 U/L 17   ALT (SGPT) 10 - 49 U/L 15   Total Bilirubin 0.2 - 1.1 mg/dL 0.4   Globulin 2.0 - 3.5 g/dL 2.6   Iron, Serum 50 - 170 ug/dL 43 (L)   Transferrin 250 - 380 mg/dL 256   Iron Bind.Cap.(TIBC) 250 - 425 ug/dL 355   Iron Saturation 15 - 50 % 12 (L)   FERRITIN 50 - 306 ng/mL 68   (L): Data is abnormally low  (H): Data is abnormally high     Latest Reference Range & Units 04/17/25 14:12   A/G Ratio 1.0 - 2.0  1.9   PROTEIN, TOTAL 5.7 - 8.2 g/dL 7.5   Albumin 3.2 - 4.8 g/dL 4.9 (H)   (H): Data is abnormally high     Latest Reference Range & Units 04/17/25 14:12   WBC 4.0 - 11.0 x10(3) uL 5.8   Hemoglobin 12.0 - 16.0 g/dL 13.1   Hematocrit 35.0 - 48.0 % 38.7   Platelet Count 150.0 - 450.0 10(3)uL 175.0   RBC 3.80 - 5.30 x10(6)uL 4.15   MCH 26.0 - 34.0 pg 31.6   MCHC 31.0 - 37.0 g/dL 33.9   MCV 80.0 - 100.0 fL 93.3   RDW % 13.1   Prelim Neutrophil Abs 1.50 - 7.70 x10 (3) uL 3.29   Neutrophils Absolute 1.50 - 7.70 x10(3) uL 3.29    Lymphocytes Absolute 1.00 - 4.00 x10(3) uL 1.91   Monocytes Absolute 0.10 - 1.00 x10(3) uL 0.29   Eosinophils Absolute 0.00 - 0.70 x10(3) uL 0.24   Basophils Absolute 0.00 - 0.20 x10(3) uL 0.08   Immature Granulocyte Absolute 0.00 - 1.00 x10(3) uL 0.02   Neutrophils % % 56.4   Lymphocytes % % 32.8   Monocytes % % 5.0   Eosinophils % % 4.1   Basophils % % 1.4   Immature Granulocyte % % 0.3       Radiology:  .        Pathology:  Final Diagnosis:   Dominant mass, left lobe of liver, ultrasound-guided biopsy:  -Fragments of hemangioma.  (See comment.)         Impression and Plan:  Breast Cancer: Stage IV, ER+ WA+ HER-2 Neg.  She was transitioned to Faslodex and Abemaciclib. Abemaciclib dose reduced for diarrhea and rash, and then held for pancytopenia. After restarting, she was hospitalized for severe anemia that was likely related to bleeding, iron deficiency, and the chemo. MRI suggests that the liver lesions have not progressed, as suspected on the non-con CT.  Discontinued the Abemaciclib and continuing Faslodex. Repeat MRI again raised the question of liver metastases. In an effort to definitively determine whether these were metastases, Liver biopsy was accomplished that revealed hemangioma. CT Chest and MRI Abd were stable, indicating that her breast cancer is controlled on Faslodex alone.  Will continue Faslodex. Repeat imaging is stable. The patient reports that her family has enquired about BCRA testing. The patient is very low risk for carrying a mutation, having an ER+ WA+ HER2 negative cancer diagnosed after the age of 50. Testing was not indicated prior to the [employer] of PARP inhibitors, that can be used to treat metastatic breast cancer. Testing is normal. Continue Faslodex. Will premedicate with PO benadryl for her rash.    Bone Metastases: Stable.  Discontinued Xgeva due to her teeth issues.     Cirrhosis: Unclear etiology, though likely PITTS.     Anemia: Iron deficiency and ongoing blood loss.  Secondary to hemorrhoids and AVMs. She has Varices, as well, but they were not bleeding, and did not require banding while she was in the hospital.   Hgb is adequate today. Follow iron levels closely. Monitor for active bleeding. Replete iron stores for her ferritin<50.     Rash w/ hemangioma of the eyelid, hemangiomas of the Liver, severe hemorrhoids: She saw hepatology and the rash has improved.      Planned Follow Up:  4 weeks for MD visit with labs.     I spent * minutes face to face with the patient.  More than 50% of that time was spent counseling the patient and/or on coordination of care.  The diagnosis, prognosis, and general treatment was explained to the patient and the family.    Electronically Signed by:    Glenn Munroe M.D.  [facility]

## 2025-04-17 NOTE — PROGRESS NOTES
Pt here for C80 D1 Drug(s): Faslodex injection.  Arrives Ambulating independently, accompanied by Self     Patient was evaluated today by MD and Treatment Nurse.    Oral medications included in this regimen:  no    Patient confirms comprehension of cancer treatment schedule:  yes    Pregnancy screening:  Denies possibility of pregnancy    Modifications in dose or schedule:  No    Medications appearance and physical integrity checked by RN: yes.    Chemotherapy IV pump settings verified by 2 RNs:  n/a - cancer treatment injection administered.  Frequency of blood return and site check throughout administration: Prior to administration and At completion of therapy     Infusion/treatment outcome:  patient tolerated treatment without incident    Education Record    Learner:  Patient  Barriers / Limitations:  None  Method:  Discussion  Education / instructions given:  Reviewed plan of care and follow up appts.  Outcome:  Shows understanding    Discharged Home, Ambulating independently, accompanied by:Self    Patient/family verbalized understanding of future appointments: by MyChart messaging

## 2025-05-15 ENCOUNTER — OFFICE VISIT (OUTPATIENT)
Age: 65
End: 2025-05-15
Attending: INTERNAL MEDICINE
Payer: COMMERCIAL

## 2025-05-15 ENCOUNTER — APPOINTMENT (OUTPATIENT)
Age: 65
End: 2025-05-15
Attending: INTERNAL MEDICINE
Payer: COMMERCIAL

## 2025-05-15 ENCOUNTER — NURSE ONLY (OUTPATIENT)
Age: 65
End: 2025-05-15
Attending: INTERNAL MEDICINE
Payer: COMMERCIAL

## 2025-05-15 VITALS
DIASTOLIC BLOOD PRESSURE: 73 MMHG | BODY MASS INDEX: 28.28 KG/M2 | RESPIRATION RATE: 18 BRPM | OXYGEN SATURATION: 97 % | TEMPERATURE: 97 F | SYSTOLIC BLOOD PRESSURE: 153 MMHG | HEART RATE: 85 BPM | WEIGHT: 176 LBS | HEIGHT: 66.06 IN

## 2025-05-15 DIAGNOSIS — C78.2 CARCINOMA OF RIGHT BREAST METASTATIC TO PLEURA (HCC): ICD-10-CM

## 2025-05-15 DIAGNOSIS — C50.911 CARCINOMA OF RIGHT BREAST METASTATIC TO PLEURA (HCC): ICD-10-CM

## 2025-05-15 DIAGNOSIS — C50.919 BREAST CANCER METASTASIZED TO PLEURA, UNSPECIFIED LATERALITY (HCC): ICD-10-CM

## 2025-05-15 DIAGNOSIS — C79.51 MALIGNANT NEOPLASM METASTATIC TO BONE (HCC): ICD-10-CM

## 2025-05-15 DIAGNOSIS — Z17.0 MALIGNANT NEOPLASM OF NIPPLE OF RIGHT BREAST IN FEMALE, ESTROGEN RECEPTOR POSITIVE (HCC): ICD-10-CM

## 2025-05-15 DIAGNOSIS — C78.2 CARCINOMA OF RIGHT BREAST METASTATIC TO PLEURA (HCC): Primary | ICD-10-CM

## 2025-05-15 DIAGNOSIS — C50.011 MALIGNANT NEOPLASM OF NIPPLE OF RIGHT BREAST IN FEMALE, ESTROGEN RECEPTOR POSITIVE (HCC): ICD-10-CM

## 2025-05-15 DIAGNOSIS — R16.0 LIVER MASSES: ICD-10-CM

## 2025-05-15 DIAGNOSIS — C50.911 CARCINOMA OF RIGHT BREAST METASTATIC TO PLEURA (HCC): Primary | ICD-10-CM

## 2025-05-15 DIAGNOSIS — C78.2 BREAST CANCER METASTASIZED TO PLEURA, UNSPECIFIED LATERALITY (HCC): ICD-10-CM

## 2025-05-15 DIAGNOSIS — C78.2 METASTASIS TO PLEURA (HCC): ICD-10-CM

## 2025-05-15 DIAGNOSIS — D50.0 IRON DEFICIENCY ANEMIA SECONDARY TO BLOOD LOSS (CHRONIC): ICD-10-CM

## 2025-05-15 DIAGNOSIS — C79.51 MALIGNANT NEOPLASM METASTATIC TO BONE (HCC): Primary | ICD-10-CM

## 2025-05-15 LAB
ALBUMIN SERPL-MCNC: 4.9 G/DL (ref 3.2–4.8)
ALBUMIN/GLOB SERPL: 2 {RATIO} (ref 1–2)
ALP LIVER SERPL-CCNC: 136 U/L (ref 50–130)
ALT SERPL-CCNC: 14 U/L (ref 10–49)
ANION GAP SERPL CALC-SCNC: 7 MMOL/L (ref 0–18)
AST SERPL-CCNC: 17 U/L (ref ?–34)
BASOPHILS # BLD AUTO: 0.08 X10(3) UL (ref 0–0.2)
BASOPHILS NFR BLD AUTO: 1.2 %
BILIRUB SERPL-MCNC: 0.6 MG/DL (ref 0.2–1.1)
BUN BLD-MCNC: 9 MG/DL (ref 9–23)
CALCIUM BLD-MCNC: 9.9 MG/DL (ref 8.7–10.6)
CHLORIDE SERPL-SCNC: 106 MMOL/L (ref 98–112)
CO2 SERPL-SCNC: 28 MMOL/L (ref 21–32)
CREAT BLD-MCNC: 0.84 MG/DL (ref 0.55–1.02)
DEPRECATED HBV CORE AB SER IA-ACNC: 71 NG/ML (ref 50–306)
EGFRCR SERPLBLD CKD-EPI 2021: 79 ML/MIN/1.73M2 (ref 60–?)
EOSINOPHIL # BLD AUTO: 0.23 X10(3) UL (ref 0–0.7)
EOSINOPHIL NFR BLD AUTO: 3.5 %
ERYTHROCYTE [DISTWIDTH] IN BLOOD BY AUTOMATED COUNT: 13.2 %
FASTING STATUS PATIENT QL REPORTED: NO
GLOBULIN PLAS-MCNC: 2.5 G/DL (ref 2–3.5)
GLUCOSE BLD-MCNC: 123 MG/DL (ref 70–99)
HCT VFR BLD AUTO: 40.5 % (ref 35–48)
HGB BLD-MCNC: 13.5 G/DL (ref 12–16)
IMM GRANULOCYTES # BLD AUTO: 0.02 X10(3) UL (ref 0–1)
IMM GRANULOCYTES NFR BLD: 0.3 %
IRON SATN MFR SERPL: 25 % (ref 15–50)
IRON SERPL-MCNC: 88 UG/DL (ref 50–170)
LYMPHOCYTES # BLD AUTO: 1.91 X10(3) UL (ref 1–4)
LYMPHOCYTES NFR BLD AUTO: 29.1 %
MCH RBC QN AUTO: 31.4 PG (ref 26–34)
MCHC RBC AUTO-ENTMCNC: 33.3 G/DL (ref 31–37)
MCV RBC AUTO: 94.2 FL (ref 80–100)
MONOCYTES # BLD AUTO: 0.36 X10(3) UL (ref 0.1–1)
MONOCYTES NFR BLD AUTO: 5.5 %
NEUTROPHILS # BLD AUTO: 3.96 X10 (3) UL (ref 1.5–7.7)
NEUTROPHILS # BLD AUTO: 3.96 X10(3) UL (ref 1.5–7.7)
NEUTROPHILS NFR BLD AUTO: 60.4 %
OSMOLALITY SERPL CALC.SUM OF ELEC: 292 MOSM/KG (ref 275–295)
PLATELET # BLD AUTO: 168 10(3)UL (ref 150–450)
POTASSIUM SERPL-SCNC: 4.3 MMOL/L (ref 3.5–5.1)
PROT SERPL-MCNC: 7.4 G/DL (ref 5.7–8.2)
RBC # BLD AUTO: 4.3 X10(6)UL (ref 3.8–5.3)
SODIUM SERPL-SCNC: 141 MMOL/L (ref 136–145)
TOTAL IRON BINDING CAPACITY: 357 UG/DL (ref 250–425)
TRANSFERRIN SERPL-MCNC: 254 MG/DL (ref 250–380)
WBC # BLD AUTO: 6.6 X10(3) UL (ref 4–11)

## 2025-05-15 RX ORDER — LAMOTRIGINE 25 MG/1
500 TABLET ORAL ONCE
Status: COMPLETED | OUTPATIENT
Start: 2025-05-15 | End: 2025-05-15

## 2025-05-15 RX ORDER — LAMOTRIGINE 25 MG/1
500 TABLET ORAL ONCE
Status: CANCELLED | OUTPATIENT
Start: 2025-05-15

## 2025-05-15 RX ADMIN — LAMOTRIGINE 500 MG: 25 TABLET ORAL at 15:00:00

## 2025-05-15 NOTE — PROGRESS NOTES
Cancer Center Progress Note    Patient Name: Martha Tobias   YOB: 1960   Medical Record Number: [id number]   Date of visit: 5/15/2025    Chief Complaint/Reason for Visit:  Chief Complaint   Patient presents with    Follow - Up    Injection      History of Present Illness: Martha presents today for follow up of metastatic breast cancer and iron deficiency anemia. She is currently receiving monthly fulvestrant, additional oncology history below. Her medical oncologist is Dr. Glenn Munroe.     She currently denies any new complaints--no recent fever or chills, no new pain.      Oncologic History:     Breast Cancer-  5/18/15: Stage IV: ER+, AL+. HER2 - breast cancer  6/15- 1/7/19: Arimidex + GNRH antagonist (Zoladex)   1/7/19-2/22: Faslodex/Abemaciclib   2/22: Faslodex      JOHN: -  Last PRBC transfusion: 11/2022  Venofer: 6/26/20, 7/13/20,7/15/20,7/17/20,7/20/20  Infed: 9/25/20,1/18/21, 5/26/21, 8/18/21, 11/8/21, 12/8/21, 4/14/22, 8/3/22, 10/25/22, 1/20/23  Feraheme: 11/22/22, 11/29/22, 5/9/23, 5/16/23, 8/3/23, 2/1/24, 4/8/24, 9/5/24, 10/3/24    Problem List:  Patient Active Problem List   Diagnosis    Other abnormal blood chemistry    Allergic rhinitis, cause unspecified    Hypothyroidism in adult    Pleural effusion    Hypokalemia    At risk for falling    Nipple lesion    Breast mass in female    Breast cancer metastasized to pleura (HCC)    Malignant neoplasm metastatic to bone (HCC)    Malignant neoplasm of nipple of right breast in female (HCC)    Adhesive capsulitis of right shoulder    Adhesive capsulitis of left shoulder    Malignant neoplasm of female breast (HCC)    Dental infection    Dental abscess    Periapical abscess    Submandibular gland swelling    Myalgia    Osteomyelitis of jaw    Hypertriglyceridemia    Edema of both feet    Burning sensation of feet    Hyperlipidemia, mixed    Lumbar radiculopathy    Elevated liver function tests    Anxiety    Peripheral polyneuropathy     Dehydration    Heart murmur    Community acquired pneumonia of right lower lobe of lung    Malignant neoplasm of female breast, unspecified estrogen receptor status, unspecified laterality, unspecified site of breast (HCC)    Hypoxia    Metastatic breast cancer    Dizziness    Dehydration    Pancytopenia (HCC)    Iron deficiency anemia due to chronic blood loss    Gastroesophageal reflux disease without esophagitis    Benign neoplasm of cecum    Benign neoplasm of ascending colon    Anemia    Dyslipidemia    Anemia, unspecified type    Grade III hemorrhoids    Grade III hemorrhoids    Chronic obstructive pulmonary disease, unspecified (HCC)    Cirrhosis of liver with ascites (HCC)    External hemorrhoids    Hemangioma of liver    Telangiectasia of extremity    Breast cancer metastasized to pleura, unspecified laterality (HCC)    Iron deficiency anemia secondary to blood loss (chronic)    Lower extremity edema    Rectal bleeding    Rash    Tobacco dependence    Hepatomegaly    Splenomegaly        Medical History:  Past Medical History:    Abdominal distention    Anemia    Anxiety    Back pain    Breast cancer (HCC)    breast    COPD (chronic obstructive pulmonary disease) (HCC)    No continuous O2    Disorder of liver    lesions    Disorder of thyroid    Fatigue    Feeling lonely    Hemorrhoids    High cholesterol    History of 2019 novel coronavirus disease (COVID-19)    ASYMPTOMATIC.  NO HOSPITALIZATION    History of blood transfusion    Hyperthyroidism    Leg swelling    Neuropathy    Personal history of antineoplastic chemotherapy    oral    Pneumonia due to organism    PONV (postoperative nausea and vomiting)    Rash    Shortness of breath    Only exertion    Stress    Visual impairment    glasses    Weight gain       Surgical History:  Past Surgical History:   Procedure Laterality Date    Colonoscopy      Colonoscopy N/A 10/26/2021    Procedure: COLONOSCOPY with biopsy, Cold Polypectomy & Clips x 5 placement  /ESOPHAGOGASTRODUODENOSCOPY (EGD) with Biopsy;  Surgeon: Oscar Harp MD;  Location:  ENDOSCOPY    Oral surgery  2017    Thoracotomy,ltd,biopsy  05/2015       Allergies:  Allergies   Allergen Reactions    Radiology Contrast Iodinated Dyes HIVES and ITCHING    Iodine (Topical) OTHER (SEE COMMENTS)       Family History:  Family History   Adopted: Yes   Family history unknown: Yes       Social History:  Social History     Socioeconomic History    Marital status:      Spouse name: Not on file    Number of children: Not on file    Years of education: Not on file    Highest education level: Not on file   Occupational History    Not on file   Tobacco Use    Smoking status: Light Smoker     Current packs/day: 0.00     Types: Cigarettes    Smokeless tobacco: Never    Tobacco comments:     3-5 cig/ day   Vaping Use    Vaping status: Never Used   Substance and Sexual Activity    Alcohol use: Not Currently     Alcohol/week: 0.0 standard drinks of alcohol     Comment: 2 drinks/yr     Drug use: No    Sexual activity: Not Currently   Other Topics Concern     Service Not Asked    Blood Transfusions Not Asked    Caffeine Concern Yes     Comment: 1 1/2 a day 16oz    Occupational Exposure Not Asked    Hobby Hazards Not Asked    Sleep Concern Not Asked    Stress Concern Not Asked    Weight Concern Not Asked    Special Diet Not Asked    Back Care Not Asked    Exercise No    Bike Helmet Not Asked    Seat Belt Not Asked    Self-Exams Not Asked   Social History Narrative    Not on file     Social Drivers of Health     Food Insecurity: Not on file   Transportation Needs: Not on file   Housing Stability: Not on file       Medications:    Current Outpatient Medications:     FLUTICASONE PROPIONATE 50 MCG/ACT Nasal Suspension, USE 2 SPRAYS IN EACH NOSTRIL DAILY, Disp: 48 g, Rfl: 3    HYDROcodone-acetaminophen 7.5-325 MG Oral Tab, Take 1 tablet by mouth every 8 (eight) hours as needed for Pain., Disp: , Rfl:     zolpidem 5  MG Oral Tab, Take 1 tablet (5 mg total) by mouth nightly as needed for Sleep., Disp: 30 tablet, Rfl: 2    rosuvastatin 5 MG Oral Tab, Take 1 tablet (5 mg total) by mouth nightly., Disp: 90 tablet, Rfl: 1    propranolol 10 MG Oral Tab, Take 1 tablet (10 mg total) by mouth 2 (two) times daily., Disp: 180 tablet, Rfl: 1    pantoprazole 40 MG Oral Tab EC, Take 1 tablet (40 mg total) by mouth 2 (two) times daily before meals., Disp: 180 tablet, Rfl: 1    levothyroxine 50 MCG Oral Tab, Take 1 tablet (50 mcg total) by mouth before breakfast., Disp: 90 tablet, Rfl: 1    levothyroxine (SYNTHROID) 200 MCG Oral Tab, Take 1 tablet (200 mcg total) by mouth before breakfast., Disp: 90 tablet, Rfl: 1    escitalopram 20 MG Oral Tab, Take 1 tablet (20 mg total) by mouth daily., Disp: 90 tablet, Rfl: 0    furosemide 40 MG Oral Tab, Take 1 tablet (40 mg total) by mouth daily., Disp: 90 tablet, Rfl: 1    Potassium Chloride ER 10 MEQ Oral Tab CR, Take 1 tablet (10 mEq total) by mouth daily., Disp: 90 tablet, Rfl: 1    ALPRAZolam 0.5 MG Oral Tab, Take 1 tablet (0.5 mg total) by mouth 2 (two) times daily as needed for Sleep or Anxiety., Disp: 60 tablet, Rfl: 2    albuterol 108 (90 Base) MCG/ACT Inhalation Aero Soln, Inhale 2 puffs into the lungs every 4 (four) hours as needed for Wheezing or Shortness of Breath., Disp: 3 each, Rfl: 1    gabapentin 300 MG Oral Cap, Take 1 capsule (300 mg total) by mouth 4 (four) times daily., Disp: 360 capsule, Rfl: 3    Ferrous Sulfate (IRON) 325 (65 Fe) MG Oral Tab, Take 325 mg by mouth daily. She does not take daily, Disp: 90 tablet, Rfl: 1    Naloxone HCl 4 MG/0.1ML Nasal Liquid, 4 mg by Intranasal route as needed (May repeat as needed in other nostril if symptoms persist). If patient remains unresponsive, repeat dose in other nostril 2-5 minutes after first dose., Disp: 1 kit, Rfl: 0    folic acid 1 MG Oral Tab, Take 1 tablet (1 mg total) by mouth daily., Disp: 90 tablet, Rfl: 1    spironolactone 100  MG Oral Tab, TAKE 1 TABLET DAILY, Disp: 90 tablet, Rfl: 1    OMEGA-3-ACID ETHYL ESTERS 1 g Oral Cap, TAKE 2 CAPSULES TWICE A DAY, Disp: 360 capsule, Rfl: 0    buPROPion 150 MG Oral Tablet 12 Hr, Take 1 tablet (150 mg total) by mouth in the morning and 1 tablet (150 mg total) before bedtime., Disp: , Rfl:     Biotin 2500 MCG Oral Cap, Take 1 capsule by mouth every evening., Disp: , Rfl:     Vitamin B-12 1000 MCG Oral Tab, Take 1 tablet (1,000 mcg total) by mouth daily., Disp: 30 tablet, Rfl: 11    folic acid 800 MCG Oral Tab, Take 1 tablet (800 mcg total) by mouth in the morning. (Patient not taking: Reported on 5/15/2025), Disp: , Rfl:     Review of Systems:  A comprehensive 14 point review of systems was completed.  Pertinent positives and negatives noted in the HPI.    Performance Status: ECOG 0 - Fully active, able to carry on all predisease activities without restrictions.    Physical Examination:  General: Patient is alert and oriented x 3, not in acute distress.  Vital Signs: Height: 167.8 cm (5' 6.06\") (05/15 1417)  Weight: 79.8 kg (176 lb) (05/15 1417)  BSA (Calculated - sq m): 1.9 sq meters (05/15 1417)  Pulse: 85 (05/15 1417)  BP: 153/73 (05/15 1417)  Temp: 96.8 °F (36 °C) (05/15 1417)  Do Not Use - Resp Rate: --  SpO2: 97 % (05/15 1417)  HEENT: Anicteric, conjunctivae and sclerae clear, no oropharyngeal lesion/thrush, mucous membranes are moist   Chest: Clear to auscultation. Respirations unlabored.   Heart: Regular rate and rhythm.   Abdomen: Soft, non-distended, non-tender with present bowel sounds.  Extremities: Trace bilateral lower extremity edema right > left  Neurological: Grossly intact.   Lymphatics: There is no palpable lymphadenopathy throughout in the cervical or supraclavicular regions.   Skin: warm, dry, no erythema or rash   Psych/Depression: mood and affect are appropriate.     Labs:     Recent Results (from the past 72 hours)   CBC W Differential W Platelet    Collection Time: 05/15/25   2:21 PM   Result Value Ref Range    WBC 6.6 4.0 - 11.0 x10(3) uL    RBC 4.30 3.80 - 5.30 x10(6)uL    HGB 13.5 12.0 - 16.0 g/dL    HCT 40.5 35.0 - 48.0 %    .0 150.0 - 450.0 10(3)uL    MCV 94.2 80.0 - 100.0 fL    MCH 31.4 26.0 - 34.0 pg    MCHC 33.3 31.0 - 37.0 g/dL    RDW 13.2 %    Neutrophil Absolute Prelim 3.96 1.50 - 7.70 x10 (3) uL    Neutrophil Absolute 3.96 1.50 - 7.70 x10(3) uL    Lymphocyte Absolute 1.91 1.00 - 4.00 x10(3) uL    Monocyte Absolute 0.36 0.10 - 1.00 x10(3) uL    Eosinophil Absolute 0.23 0.00 - 0.70 x10(3) uL    Basophil Absolute 0.08 0.00 - 0.20 x10(3) uL    Immature Granulocyte Absolute 0.02 0.00 - 1.00 x10(3) uL    Neutrophil % 60.4 %    Lymphocyte % 29.1 %    Monocyte % 5.5 %    Eosinophil % 3.5 %    Basophil % 1.2 %    Immature Granulocyte % 0.3 %   Comp Metabolic Panel (14)    Collection Time: 05/15/25  2:21 PM   Result Value Ref Range    Glucose 123 (H) 70 - 99 mg/dL    Sodium 141 136 - 145 mmol/L    Potassium 4.3 3.5 - 5.1 mmol/L    Chloride 106 98 - 112 mmol/L    CO2 28.0 21.0 - 32.0 mmol/L    Anion Gap 7 0 - 18 mmol/L    BUN 9 9 - 23 mg/dL    Creatinine 0.84 0.55 - 1.02 mg/dL    Calcium, Total 9.9 8.7 - 10.6 mg/dL    Calculated Osmolality 292 275 - 295 mOsm/kg    eGFR-Cr 79 >=60 mL/min/1.73m2    AST 17 <34 U/L    ALT 14 10 - 49 U/L    Alkaline Phosphatase 136 (H) 50 - 130 U/L    Bilirubin, Total 0.6 0.2 - 1.1 mg/dL    Total Protein 7.4 5.7 - 8.2 g/dL    Albumin 4.9 (H) 3.2 - 4.8 g/dL    Globulin  2.5 2.0 - 3.5 g/dL    A/G Ratio 2.0 1.0 - 2.0    Patient Fasting for CMP? No          Impression/Plan    Metastatic breast cancer: initially diagnosed in 5/2015 with ER/AL positive, HER2 negative breast cancer with pleural and bone metastases. Started Arimidex  + GNRH antagonist (Zoladex) until 1/2019 when developed progression in liver metastases. She was switched to fulvestrant and abemaciclib in 1/2019 and stopped abemaciclib in 2/2022 due to worsening anemia. CBC and CMP reviewed,  proceed with fulvestrant today. Last scans in 2023, repeat CT chest and MRI abdomen ordered, discussed scheduling with patient.     Iron deficiency anemia: history of AVMs and hemorrhoids. Last received IV Iron in 10/2024, repeat ferritin pending today. Will likely need IV iron at next infusion, ferritin has been declining over past several months, last 68 on 4/17/2025.    Planned Follow Up: 4 weeks follow up with Dr. Munroe, labs and injection    Risk Level: HIGH metastatic breast cancer receiving hormone injection and frequent IV iron requiring close monitoring     The 21st Century Cures Act makes medical notes like these available to patients in the interest of transparency. Please be advised this is a medical document. Medical documents are intended to carry relevant information, facts as evident, and the clinical opinion of the practitioner. The medical note is intended as peer to peer communication and may appear blunt or direct. It is written in medical language and may contain abbreviations or verbiage that are unfamiliar.     Electronically Signed by:    Praveena Melo, REFUGIO, APRN, NP-C, AOCNP  Nurse Practitioner  [facility]

## 2025-05-15 NOTE — PROGRESS NOTES
Outpatient Oncology Care Plan  Problem list:  fatigue  peripheral neuropathy    Problems related to:    disease/disease progression    Interventions:  provided general teaching    Expected outcomes:  understands plan of care    Progress towards outcome:  making progress    Education Record    Learner:  Patient  Barriers / Limitations:  None  Method:  Brief focused and Reinforcement  Outcome:  Shows understanding  Comments:    Patient here for follow up and Faslodex injections. She c/o neuropathy and fair appetite. She also states she wakes up a couple times during the night. She was prescribed Zolpidem to help.

## 2025-06-12 ENCOUNTER — OFFICE VISIT (OUTPATIENT)
Age: 65
End: 2025-06-12
Attending: INTERNAL MEDICINE
Payer: COMMERCIAL

## 2025-06-12 ENCOUNTER — NURSE ONLY (OUTPATIENT)
Age: 65
End: 2025-06-12
Attending: INTERNAL MEDICINE
Payer: COMMERCIAL

## 2025-06-12 VITALS
SYSTOLIC BLOOD PRESSURE: 114 MMHG | HEART RATE: 83 BPM | TEMPERATURE: 97 F | BODY MASS INDEX: 29 KG/M2 | OXYGEN SATURATION: 96 % | WEIGHT: 178.5 LBS | DIASTOLIC BLOOD PRESSURE: 70 MMHG | RESPIRATION RATE: 18 BRPM

## 2025-06-12 DIAGNOSIS — C50.919 BREAST CANCER METASTASIZED TO PLEURA, UNSPECIFIED LATERALITY (HCC): ICD-10-CM

## 2025-06-12 DIAGNOSIS — C50.011 MALIGNANT NEOPLASM OF NIPPLE OF RIGHT BREAST IN FEMALE, ESTROGEN RECEPTOR POSITIVE (HCC): ICD-10-CM

## 2025-06-12 DIAGNOSIS — C79.51 MALIGNANT NEOPLASM METASTATIC TO BONE (HCC): ICD-10-CM

## 2025-06-12 DIAGNOSIS — C78.2 BREAST CANCER METASTASIZED TO PLEURA, UNSPECIFIED LATERALITY (HCC): Primary | ICD-10-CM

## 2025-06-12 DIAGNOSIS — C50.919 BREAST CANCER METASTASIZED TO PLEURA, UNSPECIFIED LATERALITY (HCC): Primary | ICD-10-CM

## 2025-06-12 DIAGNOSIS — C78.2 CARCINOMA OF RIGHT BREAST METASTATIC TO PLEURA (HCC): Primary | ICD-10-CM

## 2025-06-12 DIAGNOSIS — Z51.11 ENCOUNTER FOR CHEMOTHERAPY MANAGEMENT: ICD-10-CM

## 2025-06-12 DIAGNOSIS — C78.2 BREAST CANCER METASTASIZED TO PLEURA, UNSPECIFIED LATERALITY (HCC): ICD-10-CM

## 2025-06-12 DIAGNOSIS — Z17.0 MALIGNANT NEOPLASM OF NIPPLE OF RIGHT BREAST IN FEMALE, ESTROGEN RECEPTOR POSITIVE (HCC): ICD-10-CM

## 2025-06-12 DIAGNOSIS — D50.0 IRON DEFICIENCY ANEMIA SECONDARY TO BLOOD LOSS (CHRONIC): ICD-10-CM

## 2025-06-12 DIAGNOSIS — C50.911 CARCINOMA OF RIGHT BREAST METASTATIC TO PLEURA (HCC): Primary | ICD-10-CM

## 2025-06-12 LAB
ALBUMIN SERPL-MCNC: 4.7 G/DL (ref 3.2–4.8)
ALBUMIN/GLOB SERPL: 1.8 {RATIO} (ref 1–2)
ALP LIVER SERPL-CCNC: 144 U/L (ref 50–130)
ALT SERPL-CCNC: 19 U/L (ref 10–49)
ANION GAP SERPL CALC-SCNC: 7 MMOL/L (ref 0–18)
AST SERPL-CCNC: 19 U/L (ref ?–34)
BASOPHILS # BLD AUTO: 0.1 X10(3) UL (ref 0–0.2)
BASOPHILS NFR BLD AUTO: 1.4 %
BILIRUB SERPL-MCNC: 0.4 MG/DL (ref 0.2–1.1)
BUN BLD-MCNC: 7 MG/DL (ref 9–23)
CALCIUM BLD-MCNC: 9.8 MG/DL (ref 8.7–10.6)
CHLORIDE SERPL-SCNC: 107 MMOL/L (ref 98–112)
CO2 SERPL-SCNC: 24 MMOL/L (ref 21–32)
CREAT BLD-MCNC: 0.8 MG/DL (ref 0.55–1.02)
DEPRECATED HBV CORE AB SER IA-ACNC: 75 NG/ML (ref 50–306)
EGFRCR SERPLBLD CKD-EPI 2021: 83 ML/MIN/1.73M2 (ref 60–?)
EOSINOPHIL # BLD AUTO: 0.3 X10(3) UL (ref 0–0.7)
EOSINOPHIL NFR BLD AUTO: 4.2 %
ERYTHROCYTE [DISTWIDTH] IN BLOOD BY AUTOMATED COUNT: 13.3 %
GLOBULIN PLAS-MCNC: 2.6 G/DL (ref 2–3.5)
GLUCOSE BLD-MCNC: 109 MG/DL (ref 70–99)
HCT VFR BLD AUTO: 39.7 % (ref 35–48)
HGB BLD-MCNC: 13.2 G/DL (ref 12–16)
IMM GRANULOCYTES # BLD AUTO: 0.03 X10(3) UL (ref 0–1)
IMM GRANULOCYTES NFR BLD: 0.4 %
IRON SATN MFR SERPL: 33 % (ref 15–50)
IRON SERPL-MCNC: 114 UG/DL (ref 50–170)
LYMPHOCYTES # BLD AUTO: 2.65 X10(3) UL (ref 1–4)
LYMPHOCYTES NFR BLD AUTO: 37 %
MCH RBC QN AUTO: 31.1 PG (ref 26–34)
MCHC RBC AUTO-ENTMCNC: 33.2 G/DL (ref 31–37)
MCV RBC AUTO: 93.4 FL (ref 80–100)
MONOCYTES # BLD AUTO: 0.42 X10(3) UL (ref 0.1–1)
MONOCYTES NFR BLD AUTO: 5.9 %
NEUTROPHILS # BLD AUTO: 3.66 X10 (3) UL (ref 1.5–7.7)
NEUTROPHILS # BLD AUTO: 3.66 X10(3) UL (ref 1.5–7.7)
NEUTROPHILS NFR BLD AUTO: 51.1 %
OSMOLALITY SERPL CALC.SUM OF ELEC: 285 MOSM/KG (ref 275–295)
PLATELET # BLD AUTO: 177 10(3)UL (ref 150–450)
POTASSIUM SERPL-SCNC: 4.4 MMOL/L (ref 3.5–5.1)
PROT SERPL-MCNC: 7.3 G/DL (ref 5.7–8.2)
RBC # BLD AUTO: 4.25 X10(6)UL (ref 3.8–5.3)
SODIUM SERPL-SCNC: 138 MMOL/L (ref 136–145)
TOTAL IRON BINDING CAPACITY: 345 UG/DL (ref 250–425)
TRANSFERRIN SERPL-MCNC: 251 MG/DL (ref 250–380)
WBC # BLD AUTO: 7.2 X10(3) UL (ref 4–11)

## 2025-06-12 RX ORDER — LAMOTRIGINE 25 MG/1
500 TABLET ORAL ONCE
Status: CANCELLED | OUTPATIENT
Start: 2025-07-06

## 2025-06-12 RX ORDER — LAMOTRIGINE 25 MG/1
500 TABLET ORAL ONCE
Status: COMPLETED | OUTPATIENT
Start: 2025-06-12 | End: 2025-06-12

## 2025-06-12 RX ADMIN — LAMOTRIGINE 500 MG: 25 TABLET ORAL at 14:55:00

## 2025-06-12 NOTE — PROGRESS NOTES
No Complaints.Cancer Center Progress Note  Patient Name: Martha Tobias   YOB: 1960   Medical Record Number: [id number]     Attending Physician: Glenn Munroe M.D.     Date of Visit: 6/12/25    Chief Complaint:  Chief Complaint   Patient presents with    Follow - Up        Oncologic History:  Martha Tobias is a 64 year old female with limited PMH and limited prior access to the medical system admitted on 5/18 c/o a 3 week history of shortness of breath. She was found to have a large R pleural effusion. She underwent therapeutic thoracentesis. Cytology was unrevealing. When the fluid reaccumulated, she was taken to the OR for thoracostomy with pleural biopsy. In the OR, it was noted that her bilateral breasts were abnormal, concerning for breast masses. The thoracostomy was performed and the pleura appeared abnormal. It was biopsied revealing metastatic breast cancer. Pleurodesis was also performed. Upon questioning, the patient first noted breast abnormalities 2 years prior to admission. She had not seen a physician in several years. She denied symptoms elsewhere.     Progression was noted on CT and she was transitioned to Faslodex and Abeniciclib.    She had difficulty tolerating the Abemiciclib due to diarrhea and cytopenias.    She was admitted to the hospital from 12/21/20 to 12/24/20 with severe anemia after restarting the Abemaciclib. She was transfused and underwent MRI enterography. No bleeding source, other than her hemorrhoids was found. She was discharged to follow up with GI. The Abemaciclib was discontinued.   Interestingly, her liver lesions on MRI were described as stable hemangiomas, where they had appeared to be enlarging metastatic deposits on non-contrast CT.     Biopsy of the liver revealed cavernous hemangioma.    She was hospitalized from 12/14/21 to 12/19/21 and diagnosed with cirrhosis. She required paracentesis for improved comfort. Cytology was negative for malignancy.  She was transfused and given IV iron for her iron deficiency. EGD revealed esophageal varices and portal gastropathy. Her push enteroscopy and capsule endoscopy revealed small bowel AVMs and erosions/ulcers.  She was discharged to be evaluated by hepatology, ongoing follow up with GI, follow up here for ongoing care of her metastatic breast cancer.    History of Present Illness:  Pt is here for follow up. She feels well      Performance Status:  ECOG 0    Past Medical History:  Past Medical History:    Abdominal distention    Anemia    Anxiety    Back pain    Breast cancer (HCC)    breast    COPD (chronic obstructive pulmonary disease) (HCC)    No continuous O2    Disorder of liver    lesions    Disorder of thyroid    Fatigue    Feeling lonely    Hemorrhoids    High cholesterol    History of 2019 novel coronavirus disease (COVID-19)    ASYMPTOMATIC.  NO HOSPITALIZATION    History of blood transfusion    Hyperthyroidism    Leg swelling    Neuropathy    Personal history of antineoplastic chemotherapy    oral    Pneumonia due to organism    PONV (postoperative nausea and vomiting)    Rash    Shortness of breath    Only exertion    Stress    Visual impairment    glasses    Weight gain       Past Surgical History:  Past Surgical History:   Procedure Laterality Date    Colonoscopy      Colonoscopy N/A 10/26/2021    Procedure: COLONOSCOPY with biopsy, Cold Polypectomy & Clips x 5 placement /ESOPHAGOGASTRODUODENOSCOPY (EGD) with Biopsy;  Surgeon: Oscar Harp MD;  Location:  ENDOSCOPY    Oral surgery  2017    [facility],biopsy  05/2015       Family History:  Family History   Adopted: Yes   Family history unknown: Yes       Social History:  Social History     Socioeconomic History    Marital status:      Spouse name: Not on file    Number of children: Not on file    Years of education: Not on file    Highest education level: Not on file   Occupational History    Not on file   Tobacco Use    Smoking  status: Light Smoker     Current packs/day: 0.00     Types: Cigarettes    Smokeless tobacco: Never    Tobacco comments:     3-5 cig/ day   Vaping Use    Vaping status: Never Used   Substance and Sexual Activity    Alcohol use: Not Currently     Alcohol/week: 0.0 standard drinks of alcohol     Comment: 2 drinks/yr     Drug use: No    Sexual activity: Not Currently   Other Topics Concern     Service Not Asked    Blood Transfusions Not Asked    Caffeine Concern Yes     Comment: 1 1/2 a day 16oz    Occupational Exposure Not Asked    Hobby Hazards Not Asked    Sleep Concern Not Asked    Stress Concern Not Asked    Weight Concern Not Asked    Special Diet Not Asked    Back Care Not Asked    Exercise No    Bike Helmet Not Asked    Seat Belt Not Asked    Self-Exams Not Asked   Social History Narrative    Not on file     Social Drivers of Health     Food Insecurity: Not on file   Transportation Needs: Not on file   Housing Stability: Not on file       Current Medications:    Current Outpatient Medications:     FLUTICASONE PROPIONATE 50 MCG/ACT Nasal Suspension, USE 2 SPRAYS IN EACH NOSTRIL DAILY, Disp: 48 g, Rfl: 3    HYDROcodone-acetaminophen 7.5-325 MG Oral Tab, Take 1 tablet by mouth every 8 (eight) hours as needed for Pain., Disp: , Rfl:     zolpidem 5 MG Oral Tab, Take 1 tablet (5 mg total) by mouth nightly as needed for Sleep., Disp: 30 tablet, Rfl: 2    rosuvastatin 5 MG Oral Tab, Take 1 tablet (5 mg total) by mouth nightly., Disp: 90 tablet, Rfl: 1    propranolol 10 MG Oral Tab, Take 1 tablet (10 mg total) by mouth 2 (two) times daily., Disp: 180 tablet, Rfl: 1    pantoprazole 40 MG Oral Tab EC, Take 1 tablet (40 mg total) by mouth 2 (two) times daily before meals., Disp: 180 tablet, Rfl: 1    levothyroxine 50 MCG Oral Tab, Take 1 tablet (50 mcg total) by mouth before breakfast., Disp: 90 tablet, Rfl: 1    levothyroxine (SYNTHROID) 200 MCG Oral Tab, Take 1 tablet (200 mcg total) by mouth before breakfast.,  Disp: 90 tablet, Rfl: 1    escitalopram 20 MG Oral Tab, Take 1 tablet (20 mg total) by mouth daily., Disp: 90 tablet, Rfl: 0    furosemide 40 MG Oral Tab, Take 1 tablet (40 mg total) by mouth daily., Disp: 90 tablet, Rfl: 1    Potassium Chloride ER 10 MEQ Oral Tab CR, Take 1 tablet (10 mEq total) by mouth daily., Disp: 90 tablet, Rfl: 1    ALPRAZolam 0.5 MG Oral Tab, Take 1 tablet (0.5 mg total) by mouth 2 (two) times daily as needed for Sleep or Anxiety., Disp: 60 tablet, Rfl: 2    albuterol 108 (90 Base) MCG/ACT Inhalation Aero Soln, Inhale 2 puffs into the lungs every 4 (four) hours as needed for Wheezing or Shortness of Breath., Disp: 3 each, Rfl: 1    gabapentin 300 MG Oral Cap, Take 1 capsule (300 mg total) by mouth 4 (four) times daily., Disp: 360 capsule, Rfl: 3    Ferrous Sulfate (IRON) 325 (65 Fe) MG Oral Tab, Take 325 mg by mouth daily. She does not take daily, Disp: 90 tablet, Rfl: 1    Naloxone HCl 4 MG/0.1ML Nasal Liquid, 4 mg by Intranasal route as needed (May repeat as needed in other nostril if symptoms persist). If patient remains unresponsive, repeat dose in other nostril 2-5 minutes after first dose., Disp: 1 kit, Rfl: 0    folic acid 1 MG Oral Tab, Take 1 tablet (1 mg total) by mouth daily., Disp: 90 tablet, Rfl: 1    spironolactone 100 MG Oral Tab, TAKE 1 TABLET DAILY, Disp: 90 tablet, Rfl: 1    OMEGA-3-ACID ETHYL ESTERS 1 g Oral Cap, TAKE 2 CAPSULES TWICE A DAY, Disp: 360 capsule, Rfl: 0    buPROPion 150 MG Oral Tablet 12 Hr, Take 1 tablet (150 mg total) by mouth in the morning and 1 tablet (150 mg total) before bedtime., Disp: , Rfl:     Biotin 2500 MCG Oral Cap, Take 1 capsule by mouth every evening., Disp: , Rfl:     folic acid 800 MCG Oral Tab, Take 1 tablet (800 mcg total) by mouth in the morning. (Patient not taking: Reported on 5/15/2025), Disp: , Rfl:     Vitamin B-12 1000 MCG Oral Tab, Take 1 tablet (1,000 mcg total) by mouth daily., Disp: 30 tablet, Rfl: 11    Allergies:  Allergies    Allergen Reactions    Radiology Contrast Iodinated Dyes HIVES and ITCHING    Iodine (Topical) OTHER (SEE COMMENTS)        Review of Systems:    Constitutional No fevers, chills, night sweats, excessive fatigue or weight loss.   Eyes No significant visual difficulties. No diplopia. No yellowing.   Hematologic/Lymphatic No easy bruising or bleeding.  Denies tender or palpable lymph nodes.   Respiratory No dyspnea, pleuritic chest pain, cough or hemoptysis.   Cardiovascular No anginal chest pain, palpitations or orthopnea.   Gastrointestinal No nausea, vomiting, diarrhea, GI bleeding, or constipation. NL appetite, No Early Satiety.   Genitorurinary No hematuria, dysuria, abnormal bleeding.   Integumentary No yellowing.   Neurologic No headache, blurred vision, and no areas of focal weakness. Normal gait.   Psychiatric No insomnia, depression, erica or mood swings.         Vital Signs:  There were no vitals taken for this visit.  Height: --  Weight: 81 kg (178 lb 8 oz) (06/12 1427)  BSA (Calculated - sq m): --  Pulse: 83 (06/12 1427)  BP: 114/70 (06/12 1427)  Temp: 97 °F (36.1 °C) (06/12 1427)  Do Not Use - Resp Rate: --  SpO2: 96 % (06/12 1427)        Physical Examination:    Constitutional Normal - Mood and affect appropriate. Appears close to chronological age. Well nourished. Well developed.   Eyes Normal - Conjunctivae and sclerae are clear and without icterus. Pupils are reactive and equal.   Hematologic/Lymphatic Normal - No petechiae or purpura.  No tender or palpable lymph nodes in the cervical, supraclavicular, axillary or inguinal area.   Respiratory Normal - Lungs CTA, no rhonchi or wheezing.   Cardiovascular Normal - RRR, no murmurs, gallops or rubs.   Abdomen Non-tender, moderately distended, no masses, ascites or hepatosplenomegaly. Stable umbilical hernia.   Extremities Normal - No C/C/E    Integumentary Diffuse angioma-like rash of the arms and torso. Small hemangioma of the R eye.No Jaundice    Neurologic Normal - No sensory or motor deficits, normal cerebellar function, normal gait, cranial nerves intact.   Psychiatric Normal - A&Ox3, Coherent speech. Verbalizes understanding of our discussions today.           Labs:  Recent Labs     06/12/25  1426   RBC 4.25   HGB 13.2   HCT 39.7   MCV 93.4   MCH 31.1   MCHC 33.2   RDW 13.3   NEPRELIM 3.66   WBC 7.2   .0     CMP and Iron studies are pending.     Radiology:  .        Pathology:  Final Diagnosis:   Dominant mass, left lobe of liver, ultrasound-guided biopsy:  -Fragments of hemangioma.  (See comment.)         Impression and Plan:  Breast Cancer: Stage IV, ER+ HI+ HER-2 Neg.  She was transitioned to Faslodex and Abemaciclib. Abemaciclib dose reduced for diarrhea and rash, and then held for pancytopenia. After restarting, she was hospitalized for severe anemia that was likely related to bleeding, iron deficiency, and the chemo. MRI suggests that the liver lesions have not progressed, as suspected on the non-con CT.  Discontinued the Abemaciclib and continuing Faslodex. Repeat MRI again raised the question of liver metastases. In an effort to definitively determine whether these were metastases, Liver biopsy was accomplished that revealed hemangioma. CT Chest and MRI Abd were stable, indicating that her breast cancer is controlled on Faslodex alone.  Will continue Faslodex. Repeat imaging is stable. The patient reports that her family has enquired about BCRA testing. The patient is very low risk for carrying a mutation, having an ER+ HI+ HER2 negative cancer diagnosed after the age of 50. Testing was not indicated prior to the S[employer] of PARP inhibitors, that can be used to treat metastatic breast cancer. Testing is normal. Continue Faslodex. Will premedicate with PO benadryl for her rash.    Bone Metastases: Stable.  Discontinued Xgeva due to her teeth issues.     Cirrhosis: Unclear etiology, though likely PITTS.     Anemia: Iron deficiency and ongoing  blood loss. Secondary to hemorrhoids and AVMs. She has Varices, as well, but they were not bleeding, and did not require banding while she was in the hospital.   Hgb is adequate today. Follow iron levels closely. Monitor for active bleeding. Replete iron stores for her ferritin<50.     Rash w/ hemangioma of the eyelid, hemangiomas of the Liver, severe hemorrhoids: She saw hepatology and the rash has improved.      Planned Follow Up:  4 weeks for MD visit with labs.     I spent * minutes face to face with the patient.  More than 50% of that time was spent counseling the patient and/or on coordination of care.  The diagnosis, prognosis, and general treatment was explained to the patient and the family.    Electronically Signed by:    Glenn Munroe M.D.  [facility]

## 2025-06-12 NOTE — PROGRESS NOTES
Education Record    Learner:  Patient    Pt here for: Faslodex injection    Barriers / Limitations:  None    Method:  Brief focused, printed material and  reinforcement    General Topics:  Plan of care reviewed    Outcome: Pt tolerated injections with no c/o.

## 2025-07-10 ENCOUNTER — OFFICE VISIT (OUTPATIENT)
Age: 65
End: 2025-07-10
Attending: INTERNAL MEDICINE
Payer: COMMERCIAL

## 2025-07-10 ENCOUNTER — NURSE ONLY (OUTPATIENT)
Age: 65
End: 2025-07-10
Attending: INTERNAL MEDICINE
Payer: COMMERCIAL

## 2025-07-10 VITALS
SYSTOLIC BLOOD PRESSURE: 147 MMHG | DIASTOLIC BLOOD PRESSURE: 68 MMHG | RESPIRATION RATE: 18 BRPM | BODY MASS INDEX: 28.21 KG/M2 | TEMPERATURE: 96 F | WEIGHT: 175.5 LBS | HEART RATE: 82 BPM | OXYGEN SATURATION: 98 % | HEIGHT: 66.06 IN

## 2025-07-10 DIAGNOSIS — Z17.0 MALIGNANT NEOPLASM OF NIPPLE OF RIGHT BREAST IN FEMALE, ESTROGEN RECEPTOR POSITIVE (HCC): ICD-10-CM

## 2025-07-10 DIAGNOSIS — C79.51 MALIGNANT NEOPLASM METASTATIC TO BONE (HCC): ICD-10-CM

## 2025-07-10 DIAGNOSIS — C50.919 BREAST CANCER METASTASIZED TO PLEURA, UNSPECIFIED LATERALITY (HCC): Primary | ICD-10-CM

## 2025-07-10 DIAGNOSIS — C78.2 CARCINOMA OF RIGHT BREAST METASTATIC TO PLEURA (HCC): Primary | ICD-10-CM

## 2025-07-10 DIAGNOSIS — D50.0 IRON DEFICIENCY ANEMIA SECONDARY TO BLOOD LOSS (CHRONIC): ICD-10-CM

## 2025-07-10 DIAGNOSIS — C78.2 BREAST CANCER METASTASIZED TO PLEURA, UNSPECIFIED LATERALITY (HCC): Primary | ICD-10-CM

## 2025-07-10 DIAGNOSIS — Z51.11 ENCOUNTER FOR CHEMOTHERAPY MANAGEMENT: ICD-10-CM

## 2025-07-10 DIAGNOSIS — C78.2 BREAST CANCER METASTASIZED TO PLEURA, UNSPECIFIED LATERALITY (HCC): ICD-10-CM

## 2025-07-10 DIAGNOSIS — C50.919 BREAST CANCER METASTASIZED TO PLEURA, UNSPECIFIED LATERALITY (HCC): ICD-10-CM

## 2025-07-10 DIAGNOSIS — C50.011 MALIGNANT NEOPLASM OF NIPPLE OF RIGHT BREAST IN FEMALE, ESTROGEN RECEPTOR POSITIVE (HCC): ICD-10-CM

## 2025-07-10 DIAGNOSIS — C50.911 CARCINOMA OF RIGHT BREAST METASTATIC TO PLEURA (HCC): Primary | ICD-10-CM

## 2025-07-10 LAB
ALBUMIN SERPL-MCNC: 4.7 G/DL (ref 3.2–4.8)
ALBUMIN/GLOB SERPL: 2 {RATIO} (ref 1–2)
ALP LIVER SERPL-CCNC: 132 U/L (ref 50–130)
ALT SERPL-CCNC: 14 U/L (ref 10–49)
ANION GAP SERPL CALC-SCNC: 8 MMOL/L (ref 0–18)
AST SERPL-CCNC: 16 U/L (ref ?–34)
BASOPHILS # BLD AUTO: 0.08 X10(3) UL (ref 0–0.2)
BASOPHILS NFR BLD AUTO: 1.2 %
BILIRUB SERPL-MCNC: 0.6 MG/DL (ref 0.2–1.1)
BUN BLD-MCNC: 8 MG/DL (ref 9–23)
CALCIUM BLD-MCNC: 9.7 MG/DL (ref 8.7–10.6)
CHLORIDE SERPL-SCNC: 105 MMOL/L (ref 98–112)
CO2 SERPL-SCNC: 26 MMOL/L (ref 21–32)
CREAT BLD-MCNC: 0.98 MG/DL (ref 0.55–1.02)
DEPRECATED HBV CORE AB SER IA-ACNC: 75 NG/ML (ref 50–306)
EGFRCR SERPLBLD CKD-EPI 2021: 65 ML/MIN/1.73M2 (ref 60–?)
EOSINOPHIL # BLD AUTO: 0.24 X10(3) UL (ref 0–0.7)
EOSINOPHIL NFR BLD AUTO: 3.5 %
ERYTHROCYTE [DISTWIDTH] IN BLOOD BY AUTOMATED COUNT: 13.4 %
GLOBULIN PLAS-MCNC: 2.3 G/DL (ref 2–3.5)
GLUCOSE BLD-MCNC: 157 MG/DL (ref 70–99)
HCT VFR BLD AUTO: 39.4 % (ref 35–48)
HGB BLD-MCNC: 13.1 G/DL (ref 12–16)
IMM GRANULOCYTES # BLD AUTO: 0.02 X10(3) UL (ref 0–1)
IMM GRANULOCYTES NFR BLD: 0.3 %
IRON SATN MFR SERPL: 22 % (ref 15–50)
IRON SERPL-MCNC: 75 UG/DL (ref 50–170)
LYMPHOCYTES # BLD AUTO: 2.21 X10(3) UL (ref 1–4)
LYMPHOCYTES NFR BLD AUTO: 32.5 %
MCH RBC QN AUTO: 31 PG (ref 26–34)
MCHC RBC AUTO-ENTMCNC: 33.2 G/DL (ref 31–37)
MCV RBC AUTO: 93.1 FL (ref 80–100)
MONOCYTES # BLD AUTO: 0.32 X10(3) UL (ref 0.1–1)
MONOCYTES NFR BLD AUTO: 4.7 %
NEUTROPHILS # BLD AUTO: 3.94 X10 (3) UL (ref 1.5–7.7)
NEUTROPHILS # BLD AUTO: 3.94 X10(3) UL (ref 1.5–7.7)
NEUTROPHILS NFR BLD AUTO: 57.8 %
OSMOLALITY SERPL CALC.SUM OF ELEC: 290 MOSM/KG (ref 275–295)
PLATELET # BLD AUTO: 189 10(3)UL (ref 150–450)
POTASSIUM SERPL-SCNC: 3.7 MMOL/L (ref 3.5–5.1)
PROT SERPL-MCNC: 7 G/DL (ref 5.7–8.2)
RBC # BLD AUTO: 4.23 X10(6)UL (ref 3.8–5.3)
SODIUM SERPL-SCNC: 139 MMOL/L (ref 136–145)
TOTAL IRON BINDING CAPACITY: 340 UG/DL (ref 250–425)
TRANSFERRIN SERPL-MCNC: 252 MG/DL (ref 250–380)
WBC # BLD AUTO: 6.8 X10(3) UL (ref 4–11)

## 2025-07-10 RX ORDER — LAMOTRIGINE 25 MG/1
500 TABLET ORAL ONCE
Status: CANCELLED | OUTPATIENT
Start: 2025-07-10

## 2025-07-10 RX ORDER — LAMOTRIGINE 25 MG/1
500 TABLET ORAL ONCE
Status: COMPLETED | OUTPATIENT
Start: 2025-07-10 | End: 2025-07-10

## 2025-07-10 RX ADMIN — LAMOTRIGINE 500 MG: 25 TABLET ORAL at 15:17:00

## 2025-07-10 NOTE — PROGRESS NOTES
No Complaints.Cancer Center Progress Note  Patient Name: Martha Tobias   YOB: 1960   Medical Record Number: [id number]     Attending Physician: Glenn Munroe M.D.     Date of Visit: 7/10/2025      Chief Complaint:  No chief complaint on file.       Oncologic History:  Martha Tobias is a 64 year old female with limited PMH and limited prior access to the medical system admitted on 5/18 c/o a 3 week history of shortness of breath. She was found to have a large R pleural effusion. She underwent therapeutic thoracentesis. Cytology was unrevealing. When the fluid reaccumulated, she was taken to the OR for thoracostomy with pleural biopsy. In the OR, it was noted that her bilateral breasts were abnormal, concerning for breast masses. The thoracostomy was performed and the pleura appeared abnormal. It was biopsied revealing metastatic breast cancer. Pleurodesis was also performed. Upon questioning, the patient first noted breast abnormalities 2 years prior to admission. She had not seen a physician in several years. She denied symptoms elsewhere.     Progression was noted on CT and she was transitioned to Faslodex and Abeniciclib.    She had difficulty tolerating the Abemiciclib due to diarrhea and cytopenias.    She was admitted to the hospital from 12/21/20 to 12/24/20 with severe anemia after restarting the Abemaciclib. She was transfused and underwent MRI enterography. No bleeding source, other than her hemorrhoids was found. She was discharged to follow up with GI. The Abemaciclib was discontinued.   Interestingly, her liver lesions on MRI were described as stable hemangiomas, where they had appeared to be enlarging metastatic deposits on non-contrast CT.     Biopsy of the liver revealed cavernous hemangioma.    She was hospitalized from 12/14/21 to 12/19/21 and diagnosed with cirrhosis. She required paracentesis for improved comfort. Cytology was negative for malignancy. She was transfused and  given IV iron for her iron deficiency. EGD revealed esophageal varices and portal gastropathy. Her push enteroscopy and capsule endoscopy revealed small bowel AVMs and erosions/ulcers.  She was discharged to be evaluated by hepatology, ongoing follow up with GI, follow up here for ongoing care of her metastatic breast cancer.    History of Present Illness:  Pt is here for follow up. She feels well      Performance Status:  ECOG 0    Past Medical History:  Past Medical History:    Abdominal distention    Anemia    Anxiety    Back pain    Breast cancer (HCC)    breast    COPD (chronic obstructive pulmonary disease) (HCC)    No continuous O2    Disorder of liver    lesions    Disorder of thyroid    Fatigue    Feeling lonely    Hemorrhoids    High cholesterol    History of 2019 novel coronavirus disease (COVID-19)    ASYMPTOMATIC.  NO HOSPITALIZATION    History of blood transfusion    Hyperthyroidism    Leg swelling    Neuropathy    Personal history of antineoplastic chemotherapy    oral    Pneumonia due to organism    PONV (postoperative nausea and vomiting)    Rash    Shortness of breath    Only exertion    Stress    Visual impairment    glasses    Weight gain       Past Surgical History:  Past Surgical History:   Procedure Laterality Date    Colonoscopy      Colonoscopy N/A 10/26/2021    Procedure: COLONOSCOPY with biopsy, Cold Polypectomy & Clips x 5 placement /ESOPHAGOGASTRODUODENOSCOPY (EGD) with Biopsy;  Surgeon: Oscar Harp MD;  Location:  ENDOSCOPY    Oral surgery  2017    [facility]biopsy  05/2015       Family History:  Family History   Adopted: Yes   Family history unknown: Yes       Social History:  Social History     Socioeconomic History    Marital status:      Spouse name: Not on file    Number of children: Not on file    Years of education: Not on file    Highest education level: Not on file   Occupational History    Not on file   Tobacco Use    Smoking status: Light Smoker      Current packs/day: 0.00     Types: Cigarettes    Smokeless tobacco: Never    Tobacco comments:     3-5 cig/ day   Vaping Use    Vaping status: Never Used   Substance and Sexual Activity    Alcohol use: Not Currently     Alcohol/week: 0.0 standard drinks of alcohol     Comment: 2 drinks/yr     Drug use: No    Sexual activity: Not Currently   Other Topics Concern     Service Not Asked    Blood Transfusions Not Asked    Caffeine Concern Yes     Comment: 1 1/2 a day 16oz    Occupational Exposure Not Asked    Hobby Hazards Not Asked    Sleep Concern Not Asked    Stress Concern Not Asked    Weight Concern Not Asked    Special Diet Not Asked    Back Care Not Asked    Exercise No    Bike Helmet Not Asked    Seat Belt Not Asked    Self-Exams Not Asked   Social History Narrative    Not on file     Social Drivers of Health     Food Insecurity: Not on file   Transportation Needs: Not on file   Housing Stability: Not on file       Current Medications:    Current Outpatient Medications:     FLUTICASONE PROPIONATE 50 MCG/ACT Nasal Suspension, USE 2 SPRAYS IN EACH NOSTRIL DAILY, Disp: 48 g, Rfl: 3    HYDROcodone-acetaminophen 7.5-325 MG Oral Tab, Take 1 tablet by mouth every 8 (eight) hours as needed for Pain., Disp: , Rfl:     zolpidem 5 MG Oral Tab, Take 1 tablet (5 mg total) by mouth nightly as needed for Sleep., Disp: 30 tablet, Rfl: 2    rosuvastatin 5 MG Oral Tab, Take 1 tablet (5 mg total) by mouth nightly., Disp: 90 tablet, Rfl: 1    propranolol 10 MG Oral Tab, Take 1 tablet (10 mg total) by mouth 2 (two) times daily., Disp: 180 tablet, Rfl: 1    pantoprazole 40 MG Oral Tab EC, Take 1 tablet (40 mg total) by mouth 2 (two) times daily before meals., Disp: 180 tablet, Rfl: 1    levothyroxine 50 MCG Oral Tab, Take 1 tablet (50 mcg total) by mouth before breakfast., Disp: 90 tablet, Rfl: 1    levothyroxine (SYNTHROID) 200 MCG Oral Tab, Take 1 tablet (200 mcg total) by mouth before breakfast., Disp: 90 tablet, Rfl: 1     escitalopram 20 MG Oral Tab, Take 1 tablet (20 mg total) by mouth daily., Disp: 90 tablet, Rfl: 0    furosemide 40 MG Oral Tab, Take 1 tablet (40 mg total) by mouth daily., Disp: 90 tablet, Rfl: 1    Potassium Chloride ER 10 MEQ Oral Tab CR, Take 1 tablet (10 mEq total) by mouth daily., Disp: 90 tablet, Rfl: 1    ALPRAZolam 0.5 MG Oral Tab, Take 1 tablet (0.5 mg total) by mouth 2 (two) times daily as needed for Sleep or Anxiety., Disp: 60 tablet, Rfl: 2    albuterol 108 (90 Base) MCG/ACT Inhalation Aero Soln, Inhale 2 puffs into the lungs every 4 (four) hours as needed for Wheezing or Shortness of Breath., Disp: 3 each, Rfl: 1    gabapentin 300 MG Oral Cap, Take 1 capsule (300 mg total) by mouth 4 (four) times daily., Disp: 360 capsule, Rfl: 3    Ferrous Sulfate (IRON) 325 (65 Fe) MG Oral Tab, Take 325 mg by mouth daily. She does not take daily, Disp: 90 tablet, Rfl: 1    Naloxone HCl 4 MG/0.1ML Nasal Liquid, 4 mg by Intranasal route as needed (May repeat as needed in other nostril if symptoms persist). If patient remains unresponsive, repeat dose in other nostril 2-5 minutes after first dose., Disp: 1 kit, Rfl: 0    folic acid 1 MG Oral Tab, Take 1 tablet (1 mg total) by mouth daily., Disp: 90 tablet, Rfl: 1    spironolactone 100 MG Oral Tab, TAKE 1 TABLET DAILY, Disp: 90 tablet, Rfl: 1    OMEGA-3-ACID ETHYL ESTERS 1 g Oral Cap, TAKE 2 CAPSULES TWICE A DAY, Disp: 360 capsule, Rfl: 0    buPROPion 150 MG Oral Tablet 12 Hr, Take 1 tablet (150 mg total) by mouth in the morning and 1 tablet (150 mg total) before bedtime., Disp: , Rfl:     Biotin 2500 MCG Oral Cap, Take 1 capsule by mouth every evening., Disp: , Rfl:     folic acid 800 MCG Oral Tab, Take 1 tablet (800 mcg total) by mouth in the morning., Disp: , Rfl:     Vitamin B-12 1000 MCG Oral Tab, Take 1 tablet (1,000 mcg total) by mouth daily., Disp: 30 tablet, Rfl: 11    Allergies:  Allergies   Allergen Reactions    Radiology Contrast Iodinated Dyes HIVES and  ITCHING    Iodine (Topical) OTHER (SEE COMMENTS)        Review of Systems:    Constitutional No fevers, chills, night sweats, excessive fatigue or weight loss.   Eyes No significant visual difficulties. No diplopia. No yellowing.   Hematologic/Lymphatic No easy bruising or bleeding.  Denies tender or palpable lymph nodes.   Respiratory No dyspnea, pleuritic chest pain, cough or hemoptysis.   Cardiovascular No anginal chest pain, palpitations or orthopnea.   Gastrointestinal No nausea, vomiting, diarrhea, GI bleeding, or constipation. NL appetite, No Early Satiety.   Genitorurinary No hematuria, dysuria, abnormal bleeding.   Integumentary No yellowing.   Neurologic No headache, blurred vision, and no areas of focal weakness. Normal gait.   Psychiatric No insomnia, depression, erica or mood swings.         Vital Signs:   Day 1,  Cycle 83  07/10/25   Height 1.678 m (5' 6.06\")   Weight 79.6 kg (175 lb 8 oz)   BSA (Calculated - sq m) 1.89 sq meters   BMI (Calculated) 28.27 kg/m2   /68   Pulse 82   BP Location Left arm   Patient Position Sitting   Temp 96 °F (35.6 °C) !   Pain Score 8   !: Data is abnormal        Physical Examination:    Constitutional Normal - Mood and affect appropriate. Appears close to chronological age. Well nourished. Well developed.   Eyes Normal - Conjunctivae and sclerae are clear and without icterus. Pupils are reactive and equal.   Hematologic/Lymphatic Normal - No petechiae or purpura.  No tender or palpable lymph nodes in the cervical, supraclavicular, axillary or inguinal area.   Respiratory Normal - Lungs CTA, no rhonchi or wheezing.   Cardiovascular Normal - RRR, no murmurs, gallops or rubs.   Abdomen Non-tender, moderately distended, no masses, ascites or hepatosplenomegaly. Stable umbilical hernia.   Extremities Normal - No C/C/E    Integumentary Diffuse angioma-like rash of the arms and torso. Small hemangioma of the R eye.No Jaundice   Neurologic Normal - No sensory or motor  deficits, normal cerebellar function, normal gait, cranial nerves intact.   Psychiatric Normal - A&Ox3, Coherent speech. Verbalizes understanding of our discussions today.           Labs:  Recent Labs     07/10/25  1359   RBC 4.23   HGB 13.1   HCT 39.4   MCV 93.1   MCH 31.0   MCHC 33.2   RDW 13.4   NEPRELIM 3.94   WBC 6.8   .0     Recent Labs     07/10/25  1359    H   BUN 8 L   CREATSERUM 0.98   CA 9.7   ALB 4.7      K 3.7      CO2 26.0   ALKPHO 132 H   AST 16   ALT 14   BILT 0.6   TP 7.0       Radiology:  .        Pathology:  Final Diagnosis:   Dominant mass, left lobe of liver, ultrasound-guided biopsy:  -Fragments of hemangioma.  (See comment.)         Impression and Plan:  Breast Cancer: Stage IV, ER+ KY+ HER-2 Neg.  She was transitioned to Faslodex and Abemaciclib. Abemaciclib dose reduced for diarrhea and rash, and then held for pancytopenia. After restarting, she was hospitalized for severe anemia that was likely related to bleeding, iron deficiency, and the chemo. MRI suggests that the liver lesions have not progressed, as suspected on the non-con CT.  Discontinued the Abemaciclib and continuing Faslodex. Repeat MRI again raised the question of liver metastases. In an effort to definitively determine whether these were metastases, Liver biopsy was accomplished that revealed hemangioma. CT Chest and MRI Abd were stable, indicating that her breast cancer is controlled on Faslodex alone.  Will continue Faslodex. Repeat imaging is stable. The patient reports that her family has enquired about BCRA testing. The patient is very low risk for carrying a mutation, having an ER+ KY+ HER2 negative cancer diagnosed after the age of 50. Testing was not indicated prior to the Congregational of PARP inhibitors, that can be used to treat metastatic breast cancer. Testing is normal. Continue Faslodex. Will premedicate with PO benadryl for her rash.    Bone Metastases: Stable.  Discontinued Xgeva due to her  teeth issues.     Cirrhosis: Unclear etiology, though likely PITTS.     Anemia: Iron deficiency and ongoing blood loss. Secondary to hemorrhoids and AVMs. She has Varices, as well, but they were not bleeding, and did not require banding while she was in the hospital.   Hgb is adequate today. Follow iron levels closely. Monitor for active bleeding. Replete iron stores for her ferritin<50.     Rash w/ hemangioma of the eyelid, hemangiomas of the Liver, severe hemorrhoids: She saw hepatology and the rash has improved.      Planned Follow Up:  4 weeks for MD visit with labs.     I spent * minutes face to face with the patient.  More than 50% of that time was spent counseling the patient and/or on coordination of care.  The diagnosis, prognosis, and general treatment was explained to the patient and the family.    Electronically Signed by:    Glenn Munroe M.D.  [facility]

## 2025-07-10 NOTE — PROGRESS NOTES
Pt here for C83D1 Drug(s)Faslodex.  Arrives Ambulating independently, accompanied by Spouse     Patient was evaluated today by MD and Treatment Nurse.    Oral medications included in this regimen:  no    Patient confirms comprehension of cancer treatment schedule:  yes    Pregnancy screening:  Denies possibility of pregnancy    Modifications in dose or schedule:  No    Medications appearance and physical integrity checked by RN: yes.    Chemotherapy IV pump settings verified by 2 RNs:  n/a - cancer treatment injection administered.  Frequency of blood return and site check throughout administration: Prior to administration and At completion of therapy     Infusion/treatment outcome:  patient tolerated treatment without incident    Education Record    Learner:  Patient and Spouse  Barriers / Limitations:  None  Method:  Brief focused and Discussion  Education / instructions given:  Plan of care and next appointments reviewed.  Outcome:  Shows understanding    Discharged Home, Ambulating independently, accompanied by:Spouse in stable condition, no new complaints.    Patient/family verbalized understanding of future appointments: by [employer] messaging

## 2025-07-31 DIAGNOSIS — J43.9 PULMONARY EMPHYSEMA, UNSPECIFIED EMPHYSEMA TYPE (HCC): ICD-10-CM

## 2025-07-31 RX ORDER — ALBUTEROL SULFATE 90 UG/1
2 INHALANT RESPIRATORY (INHALATION) EVERY 4 HOURS PRN
Qty: 25.5 G | Refills: 0 | Status: SHIPPED | OUTPATIENT
Start: 2025-07-31

## 2025-08-07 ENCOUNTER — OFFICE VISIT (OUTPATIENT)
Facility: LOCATION | Age: 65
End: 2025-08-07
Attending: INTERNAL MEDICINE

## 2025-08-07 ENCOUNTER — NURSE ONLY (OUTPATIENT)
Facility: LOCATION | Age: 65
End: 2025-08-07
Attending: INTERNAL MEDICINE

## 2025-08-07 VITALS
TEMPERATURE: 97 F | SYSTOLIC BLOOD PRESSURE: 150 MMHG | RESPIRATION RATE: 18 BRPM | BODY MASS INDEX: 28.77 KG/M2 | DIASTOLIC BLOOD PRESSURE: 68 MMHG | WEIGHT: 179 LBS | HEART RATE: 78 BPM | OXYGEN SATURATION: 97 % | HEIGHT: 66.06 IN

## 2025-08-07 DIAGNOSIS — C79.51 MALIGNANT NEOPLASM METASTATIC TO BONE (HCC): ICD-10-CM

## 2025-08-07 DIAGNOSIS — C78.2 CARCINOMA OF RIGHT BREAST METASTATIC TO PLEURA (HCC): Primary | ICD-10-CM

## 2025-08-07 DIAGNOSIS — C50.911 CARCINOMA OF RIGHT BREAST METASTATIC TO PLEURA (HCC): Primary | ICD-10-CM

## 2025-08-07 DIAGNOSIS — C50.919 BREAST CANCER METASTASIZED TO PLEURA, UNSPECIFIED LATERALITY (HCC): ICD-10-CM

## 2025-08-07 DIAGNOSIS — C50.911 CARCINOMA OF RIGHT BREAST METASTATIC TO PLEURA (HCC): ICD-10-CM

## 2025-08-07 DIAGNOSIS — C78.2 BREAST CANCER METASTASIZED TO PLEURA, UNSPECIFIED LATERALITY (HCC): ICD-10-CM

## 2025-08-07 DIAGNOSIS — D50.0 IRON DEFICIENCY ANEMIA SECONDARY TO BLOOD LOSS (CHRONIC): ICD-10-CM

## 2025-08-07 DIAGNOSIS — Z51.11 ENCOUNTER FOR CHEMOTHERAPY MANAGEMENT: ICD-10-CM

## 2025-08-07 DIAGNOSIS — Z17.0 MALIGNANT NEOPLASM OF NIPPLE OF RIGHT BREAST IN FEMALE, ESTROGEN RECEPTOR POSITIVE (HCC): ICD-10-CM

## 2025-08-07 DIAGNOSIS — C78.2 BREAST CANCER METASTASIZED TO PLEURA, UNSPECIFIED LATERALITY (HCC): Primary | ICD-10-CM

## 2025-08-07 DIAGNOSIS — C50.011 MALIGNANT NEOPLASM OF NIPPLE OF RIGHT BREAST IN FEMALE, ESTROGEN RECEPTOR POSITIVE (HCC): ICD-10-CM

## 2025-08-07 DIAGNOSIS — C50.919 BREAST CANCER METASTASIZED TO PLEURA, UNSPECIFIED LATERALITY (HCC): Primary | ICD-10-CM

## 2025-08-07 DIAGNOSIS — C78.2 CARCINOMA OF RIGHT BREAST METASTATIC TO PLEURA (HCC): ICD-10-CM

## 2025-08-07 LAB
ALBUMIN SERPL-MCNC: 4.7 G/DL (ref 3.2–4.8)
ALBUMIN/GLOB SERPL: 2 (ref 1–2)
ALP LIVER SERPL-CCNC: 143 U/L (ref 50–130)
ALT SERPL-CCNC: 14 U/L (ref 10–49)
ANION GAP SERPL CALC-SCNC: 11 MMOL/L (ref 0–18)
AST SERPL-CCNC: 16 U/L (ref ?–34)
BASOPHILS # BLD AUTO: 0.08 X10(3) UL (ref 0–0.2)
BASOPHILS NFR BLD AUTO: 1.3 %
BILIRUB SERPL-MCNC: 0.4 MG/DL (ref 0.2–1.1)
BUN BLD-MCNC: 11 MG/DL (ref 9–23)
CALCIUM BLD-MCNC: 9.5 MG/DL (ref 8.7–10.6)
CHLORIDE SERPL-SCNC: 106 MMOL/L (ref 98–112)
CO2 SERPL-SCNC: 24 MMOL/L (ref 21–32)
CREAT BLD-MCNC: 0.81 MG/DL (ref 0.55–1.02)
DEPRECATED HBV CORE AB SER IA-ACNC: 56 NG/ML (ref 50–306)
EGFRCR SERPLBLD CKD-EPI 2021: 82 ML/MIN/1.73M2 (ref 60–?)
EOSINOPHIL # BLD AUTO: 0.28 X10(3) UL (ref 0–0.7)
EOSINOPHIL NFR BLD AUTO: 4.7 %
ERYTHROCYTE [DISTWIDTH] IN BLOOD BY AUTOMATED COUNT: 14.3 %
FASTING STATUS PATIENT QL REPORTED: NO
GLOBULIN PLAS-MCNC: 2.4 G/DL (ref 2–3.5)
GLUCOSE BLD-MCNC: 109 MG/DL (ref 70–99)
HCT VFR BLD AUTO: 37.8 % (ref 35–48)
HGB BLD-MCNC: 12.7 G/DL (ref 12–16)
IMM GRANULOCYTES # BLD AUTO: 0.02 X10(3) UL (ref 0–1)
IMM GRANULOCYTES NFR BLD: 0.3 %
IRON SATN MFR SERPL: 18 % (ref 15–50)
IRON SERPL-MCNC: 62 UG/DL (ref 50–170)
LYMPHOCYTES # BLD AUTO: 1.83 X10(3) UL (ref 1–4)
LYMPHOCYTES NFR BLD AUTO: 30.6 %
MCH RBC QN AUTO: 31.4 PG (ref 26–34)
MCHC RBC AUTO-ENTMCNC: 33.6 G/DL (ref 31–37)
MCV RBC AUTO: 93.3 FL (ref 80–100)
MONOCYTES # BLD AUTO: 0.33 X10(3) UL (ref 0.1–1)
MONOCYTES NFR BLD AUTO: 5.5 %
NEUTROPHILS # BLD AUTO: 3.45 X10 (3) UL (ref 1.5–7.7)
NEUTROPHILS # BLD AUTO: 3.45 X10(3) UL (ref 1.5–7.7)
NEUTROPHILS NFR BLD AUTO: 57.6 %
OSMOLALITY SERPL CALC.SUM OF ELEC: 292 MOSM/KG (ref 275–295)
PLATELET # BLD AUTO: 182 10(3)UL (ref 150–450)
POTASSIUM SERPL-SCNC: 3.6 MMOL/L (ref 3.5–5.1)
PROT SERPL-MCNC: 7.1 G/DL (ref 5.7–8.2)
RBC # BLD AUTO: 4.05 X10(6)UL (ref 3.8–5.3)
SODIUM SERPL-SCNC: 141 MMOL/L (ref 136–145)
TOTAL IRON BINDING CAPACITY: 354 UG/DL (ref 250–425)
TRANSFERRIN SERPL-MCNC: 289 MG/DL (ref 250–380)
WBC # BLD AUTO: 6 X10(3) UL (ref 4–11)

## 2025-08-07 RX ORDER — LAMOTRIGINE 25 MG/1
500 TABLET ORAL ONCE
Status: COMPLETED | OUTPATIENT
Start: 2025-08-07 | End: 2025-08-07

## 2025-08-07 RX ORDER — LAMOTRIGINE 25 MG/1
500 TABLET ORAL ONCE
Status: CANCELLED | OUTPATIENT
Start: 2025-08-07

## 2025-08-07 RX ADMIN — LAMOTRIGINE 500 MG: 25 TABLET ORAL at 14:27:00

## 2025-08-14 DIAGNOSIS — M79.605 PAIN IN BOTH LOWER EXTREMITIES: Primary | ICD-10-CM

## 2025-08-14 DIAGNOSIS — C50.911 CARCINOMA OF RIGHT BREAST METASTATIC TO PLEURA (HCC): ICD-10-CM

## 2025-08-14 DIAGNOSIS — F51.01 PRIMARY INSOMNIA: ICD-10-CM

## 2025-08-14 DIAGNOSIS — C78.2 CARCINOMA OF RIGHT BREAST METASTATIC TO PLEURA (HCC): ICD-10-CM

## 2025-08-14 DIAGNOSIS — F41.9 ANXIETY: ICD-10-CM

## 2025-08-14 DIAGNOSIS — M79.604 PAIN IN BOTH LOWER EXTREMITIES: Primary | ICD-10-CM

## 2025-08-14 DIAGNOSIS — E78.2 HYPERLIPIDEMIA, MIXED: ICD-10-CM

## 2025-08-14 RX ORDER — ESCITALOPRAM OXALATE 20 MG/1
20 TABLET ORAL DAILY
Qty: 90 TABLET | Refills: 0 | Status: SHIPPED | OUTPATIENT
Start: 2025-08-14

## 2025-08-14 RX ORDER — HYDROCODONE BITARTRATE AND ACETAMINOPHEN 7.5; 325 MG/1; MG/1
1 TABLET ORAL EVERY 8 HOURS PRN
Qty: 90 TABLET | Refills: 0 | Status: SHIPPED | OUTPATIENT
Start: 2025-08-14

## 2025-08-14 RX ORDER — ZOLPIDEM TARTRATE 5 MG/1
5 TABLET ORAL NIGHTLY PRN
Qty: 30 TABLET | Refills: 0 | Status: SHIPPED | OUTPATIENT
Start: 2025-08-14

## 2025-08-15 RX ORDER — OMEGA-3-ACID ETHYL ESTERS 1 G/1
2 CAPSULE, LIQUID FILLED ORAL 2 TIMES DAILY
Qty: 120 CAPSULE | Refills: 0 | Status: SHIPPED | OUTPATIENT
Start: 2025-08-15

## 2025-08-17 DIAGNOSIS — E03.9 HYPOTHYROIDISM IN ADULT: ICD-10-CM

## 2025-08-19 RX ORDER — LEVOTHYROXINE SODIUM 200 UG/1
200 TABLET ORAL
Qty: 90 TABLET | Refills: 0 | OUTPATIENT
Start: 2025-08-19

## 2025-08-19 RX ORDER — LEVOTHYROXINE SODIUM 50 UG/1
50 TABLET ORAL
Qty: 90 TABLET | Refills: 0 | OUTPATIENT
Start: 2025-08-19

## (undated) DIAGNOSIS — R79.89 ELEVATED LIVER FUNCTION TESTS: ICD-10-CM

## (undated) DIAGNOSIS — R18.8 CIRRHOSIS OF LIVER WITH ASCITES, UNSPECIFIED HEPATIC CIRRHOSIS TYPE (HCC): ICD-10-CM

## (undated) DIAGNOSIS — K74.60 CIRRHOSIS OF LIVER WITH ASCITES, UNSPECIFIED HEPATIC CIRRHOSIS TYPE (HCC): ICD-10-CM

## (undated) DIAGNOSIS — R16.0 HEPATOMEGALY: ICD-10-CM

## (undated) DIAGNOSIS — R16.0 ENLARGED LIVER: Primary | ICD-10-CM

## (undated) DEVICE — SNARE CAPTIFLEX MICRO-OVL OLY

## (undated) DEVICE — SUTURE PLAIN GUT 3-0 FS-2

## (undated) DEVICE — SOL  .9 1000ML BTL

## (undated) DEVICE — 1200CC GUARDIAN II: Brand: GUARDIAN

## (undated) DEVICE — SYRINGE 10ML LL CONTRL SYRINGE

## (undated) DEVICE — GAUZE SPONGES,12 PLY: Brand: CURITY

## (undated) DEVICE — 3M™ RED DOT™ MONITORING ELECTRODE WITH FOAM TAPE AND STICKY GEL, 50/BAG, 20/CASE, 72/PLT 2570: Brand: RED DOT™

## (undated) DEVICE — ENDOSCOPY PACK - LOWER: Brand: MEDLINE INDUSTRIES, INC.

## (undated) DEVICE — HEAD AND NECK CDS-LF: Brand: MEDLINE INDUSTRIES, INC.

## (undated) DEVICE — MEDI-VAC YANKAUER SUCTION HANDLE W/BULBOUS TIP: Brand: CARDINAL HEALTH

## (undated) DEVICE — FILTERLINE NASAL ADULT O2/CO2

## (undated) DEVICE — ST. TONGUE BLADES: Brand: DEROYAL

## (undated) DEVICE — DRAPE HALF 40X58 DYNJP2410

## (undated) DEVICE — STANDARD HYPODERMIC NEEDLE,POLYPROPYLENE HUB: Brand: MONOJECT

## (undated) DEVICE — ADVANCE CAPSULE DELIVERY DEVIC

## (undated) DEVICE — MASK ETCO2 PANORAMIC

## (undated) DEVICE — PAD SACRAL SPAN AID

## (undated) DEVICE — FORCEP BIOPSY RJ4 LG CAP W/ND

## (undated) DEVICE — REM POLYHESIVE ADULT PATIENT RETURN ELECTRODE: Brand: VALLEYLAB

## (undated) DEVICE — SUTURE CHROMIC GUT 3-0 FS-2

## (undated) DEVICE — NON-ADHERENT PAD PREPACK: Brand: TELFA

## (undated) DEVICE — KENDALL SCD EXPRESS SLEEVES, KNEE LENGTH, MEDIUM: Brand: KENDALL SCD

## (undated) DEVICE — THROAT PACKING X-RAY DETECT

## (undated) DEVICE — MEDI-VAC NON-CONDUCTIVE SUCTION TUBING: Brand: CARDINAL HEALTH

## (undated) DEVICE — Device: Brand: DEFENDO AIR/WATER/SUCTION AND BIOPSY VALVE

## (undated) DEVICE — STRL PENROSE DRAIN 18" X 1/2": Brand: CARDINAL HEALTH

## (undated) DEVICE — GLOVE ORTHO ALOETOUCH SZ 7

## (undated) DEVICE — ENDOSCOPY PACK UPPER: Brand: MEDLINE INDUSTRIES, INC.

## (undated) DEVICE — CLIP LGT 11MM OPEN 2.8MM 235CM

## (undated) DEVICE — TRAP 4 CPTR CHMBR N EZ INLN

## (undated) DEVICE — CAPSULE ENDO PILL CAM SB3

## (undated) DEVICE — MEDI-VAC SUCTION HANDLE REGULAR CAPACITY: Brand: CARDINAL HEALTH

## (undated) NOTE — LETTER
Date: 9/30/2021    Patient Name: Pedro Salmon          To Whom it may concern:     This letter has been written at the patient's request.  The patient has reported to us that she was sick and she reported that she is feeling better and her condition has re

## (undated) NOTE — LETTER
05/13/21        Layla Del Rosario  [address] [address]      Dear Nikhil Han,    [facility] records indicate that you have outstanding lab work and or testing that was ordered for you and has not yet been completed:  Orders Placed This Encounter

## (undated) NOTE — LETTER
21    Patient: Alvaro Espinal  : 1960 Visit date: 1/15/2021    Dear  Phil Powers MD    Thank you for referring Alvaro Espinal to my practice. Please find my assessment and plan below.           Sincerely,       Penelope Eastman MD   CC:

## (undated) NOTE — LETTER
20    Patient: Bailey Enamorado  : 1960 Visit date: 2020    Dear  Miko Wall MD,    Thank you for referring Bailey Enamorado to my practice. Please find my assessment and plan below.        History of Present Illness   53-year-old female who

## (undated) NOTE — LETTER
[facility] FOR BLOOD OR BLOOD COMPONENTS      In the course of your treatment, it may become necessary to administer a transfusion of blood or blood components.  This form provides basic information concerning this proc explain the alternatives to you if it has not already been done. I,Martha Tobias, have read/had read to me the above. I understand the matters bearing on the decision whether or not to authorize a transfusion of blood or blood components.  I have no questi

## (undated) NOTE — Clinical Note
Cody Dolan. This very complicated lady has breast cancer that is controlled on hormonal therapy alone.  She has large hemangiomas in the liver (at one point thought to be mets, but biopsy proved otherwise), Hepatomegaly, hemangiomas and a strange rash of th

## (undated) NOTE — LETTER
Printed: 2019    Patient Name: Philip Falk  : 1960   Medical Record #: [id number]    Consent to [facility], understand that I have been diagnosed with metastatic breast cancer.     I understand that the treatment understand that I can contact my oncologist, Dr Perry Connelly, or my Cancer Care Team at any time if I have questions, by calling [facility] at [phone]. Additional written information will be given to me prior to start of therapy.     Levi

## (undated) NOTE — LETTER
To Whom It May Concern:    Tatiana Darden has been under our care regarding ongoing medical issues. Because of this, she has been required to restrict her physical activities.     She may resume her usual activities, including work, on January 18, 2022 wit

## (undated) NOTE — MR AVS SNAPSHOT
[facility], [address] [id number]               Thank you for choosing us for your health care visit with Sophia Byrnes MD.  We are glad to serve you and happy to provide you with this rosen [facility] in [facility])    [address]   [phone]              Allergies as of Jan 21, 2017     Radiology Contrast Iodinated Dyes Hives, Itching, Shortness of Breath Hours:  24-hours Phone:  [phone]    - Amoxicillin-Pot Clavulanate 875-125 MG Tabs  - Neomycin-Polymyxin-HC [id number] Susp      You can get these medications from any pharmacy     Bring a paper prescription for each of these medications    - hydroco

## (undated) NOTE — LETTER
20    Patient: Jeane Fitzgerald  : 1960 Visit date: 2020    Dear  Juliet Ballesteros MD,    Thank you for referring Jeane Fitzgerald to my practice. Please find my assessment and plan below.        History of Present Illness   33-year-old female who any bleeding since August 12, 2020. She will continue dietary fiber and hydration to minimize constipation. She wishes to avoid hemorrhoidal surgery as she does not have any time off from work.     Catherine Chucrhill and her  have no further questions at this t

## (undated) NOTE — MR AVS SNAPSHOT
[facility] [address], [address] [address] [phone]               Thank you for choosing us for your health care visit with Mohan Shaffer MD.  We are glad to serve you and happy to provide you with this rosen Start escitalopram 10 mg one half of the tablet once a day for 4 days then 1 tablet daily. Use alprazolam as needed. See food Dr.  if needed. Continue ear drops to the left ear as needed. .            Allergies as of May 06, 2017     Radiology Contrast - escitalopram 10 MG Tabs            MyChart     Visit MyChart  You can access your MyChart to more actively manage your health care and view more details from this visit by going to [email or web address].   If you've recently had a stay at the [facility] Don’t forget strength training with weights and resistance Set goals and track your progress   You don’t need to join a gym. Home exercises work great.  Put more priority on exercise in your life                    Visit [facility] online at

## (undated) NOTE — LETTER
[facility]  Harshatalisha Ashfordnuha 63 [address] [facility], [address] [place] [address]    Consent for Operation    Date: __________________    Time: _______________    1.  I authorize the performance upon Mireya Flores the following operation:    Procedure(s):  Removal three teeth lo procedure has been videotaped, the surgeon will obtain the original videotape. The hospital will not be responsible for storage or maintenance of this tape.     6. For the purpose of advancing medical education, I consent to the admittance of observers to t STATEMENTS REQUIRING INSERTION OR COMPLETION WERE FILLED IN.     Signature of Patient:   ___________________________    When the patient is a minor or mentally incompetent to give consent:  Signature of person authorized to consent for patient: ____________ drugs/illegal medications). Failure to inform my anesthesiologist about these medicines may increase my risk of anesthetic complications. · If I am allergic to anything or have had a reaction to anesthesia before.     3. I understand how the anesthesia med I have discussed the procedure and information above with the patient (or patient’s representative) and answered their questions. The patient or their representative has agreed to have anesthesia services.     _______________________________________________

## (undated) NOTE — LETTER
Date: 9/30/2021    Patient Name: Christiano Wilkerson          To Whom it may concern: This letter has been written at the patient's request.  The above patient has missed work on 9/27/2021-9/29/2021 due to illness.     The patient may return to work on 9/30/20

## (undated) NOTE — LETTER
Printed: 2019    Patient Name: Lacey Mccoy  : 1960   Medical Record #: [id number]    Consent to Cancer Treatment    I, Lacey Mccoy, understand that I have been diagnosed with metastatic breast cancer.     I understand that the treatment sug questions have been answered to my satisfaction. I understand that I can contact my oncologist, Dr Farnaz Vincent, or my Cancer Care Team at any time if I have questions, by calling [facility] at [phone].    Additional written information will be

## (undated) NOTE — LETTER
3/8/2019    [address] [address]    Dear Ms. Coleman Lunch,     [facility] office has been trying to contact you to schedule a visit with your doctor to address important healthcare needs.   It is important that you contact our office at

## (undated) NOTE — LETTER
Date: 11/9/2018    Patient Name: Bobbi Cruz          To Whom it may concern: This letter has been written at the patient's request. The above patient was seen at the [facility] for treatment of an acute illness.   Please excuse patient f

## (undated) NOTE — LETTER
[facility]  Lena Canela 63 [address] [facility], [address] [place] [address]    Consent for Operation    Date: __________________    Time: _______________    1.  I authorize the performance upon Guru Craft the following operation:    Extraction of three teeth, incision a procedure has been videotaped, the surgeon will obtain the original videotape. The hospital will not be responsible for storage or maintenance of this tape.     6. For the purpose of advancing medical education, I consent to the admittance of observers to t STATEMENTS REQUIRING INSERTION OR COMPLETION WERE FILLED IN.     Signature of Patient:   ___________________________    When the patient is a minor or mentally incompetent to give consent:  Signature of person authorized to consent for patient: ____________ drugs/illegal medications). Failure to inform my anesthesiologist about these medicines may increase my risk of anesthetic complications. · If I am allergic to anything or have had a reaction to anesthesia before.     3. I understand how the anesthesia med I have discussed the procedure and information above with the patient (or patient’s representative) and answered their questions. The patient or their representative has agreed to have anesthesia services.     _______________________________________________

## (undated) NOTE — LETTER
Your patient was recently seen at the [facility] for a hospital follow-up visit. The visit note is attached. Please contact the clinic with any questions at [phone].     Thank you,  NINA Rod

## (undated) NOTE — LETTER
21    Patient: Palak Lambert  : 1960 Visit date: 2021    Dear  Carlos Carroll MD    Thank you for referring Palak Lambert to my practice. Please find my assessment and plan below.           Sincerely,       Christi Katz MD   CC: N

## (undated) NOTE — MR AVS SNAPSHOT
After Visit Summary   2/17/2017    Guru Venancio    MRN: [id number]           Diagnoses this Visit     Bone metastases [facility])    -  Primary     Breast cancer metastasized to pleura, right ([facility] [address]         Breast cancer metastasized to pleura, unspecifie

## (undated) NOTE — LETTER
Date: 11/15/2018    Patient Name: Arelis Wray          To Whom it may concern: This letter has been written at the patient's request. The above patient was seen at the [facility] for treatment of an acute illness.   Please excuse patient

## (undated) NOTE — LETTER
Date: 8/17/2022    Patient Name: Cliff Carroll          To Whom it may concern: This letter has been written at the patient's request. The above patient was seen at the [facility] for treatment of a medical condition. This patient should be excused from attending work this week Monday-Friday for illness.         Sincerely,      NINA Munoz

## (undated) NOTE — MR AVS SNAPSHOT
[facility] HEART AND SURGICAL [place] [address] [address] [facility]  [phone]                    After Visit Summary   5/4/2023    Cody Huerta   MRN: [id number]           Visit Information     Date & Time  5/4/2023 12:00 PM Provider  ES [address] in [address] Phone  [phone]      Your Vital Signs Were     Smoking Status   Light Smoker             Allergies as of 5/4/2023  Review status set to In Progress on 5/4/2023       Noted Reaction Type Reactions    Radiology Contrast Iodinated Dyes 09/03/2015    HIVES, ITCHING    Iodine (topical) 10/17/2017    OTHER (SEE COMMENTS)      Your Current Medications        Dosage    gabapentin 300 MG Oral Cap Take 1 capsule (300 mg total) by mouth 4 (four) times daily. FUROSEMIDE 40 MG Oral Tab TAKE 1 TABLET DAILY    SPIRONOLACTONE 100 MG Oral Tab TAKE 1 TABLET DAILY    PANTOPRAZOLE 40 MG Oral Tab EC TAKE 1 TABLET TWICE A DAY BEFORE MEALS    levothyroxine 175 MCG Oral Tab Take 1 tablet (175 mcg total) by mouth before breakfast.    Potassium Chloride ER 10 MEQ Oral Tab CR Take 1 tablet (10 mEq total) by mouth daily. escitalopram 20 MG Oral Tab Take 1 tablet (20 mg total) by mouth daily. ALPRAZolam 0.25 MG Oral Tab Take 1 tablet (0.25 mg total) by mouth 2 (two) times daily as needed for Anxiety. HYDROcodone-acetaminophen 7.5-325 MG Oral Tab Take 1-2 tablets by mouth every 8 (eight) hours as needed. rosuvastatin 5 MG Oral Tab Take 1 tablet (5 mg total) by mouth nightly. FLOWFLEX COVID-19 AG HOME TEST In Vitro Kit     PROPRANOLOL 10 MG Oral Tab TAKE 1 TABLET(10 MG) BY MOUTH TWICE DAILY    omega-3-acid ethyl esters 1 g Oral Cap Take 2 capsules (2 g total) by mouth 2 (two) times daily. albuterol 108 (90 Base) MCG/ACT Inhalation Aero Soln Inhale 2 puffs into the lungs every 4 (four) hours as needed for Wheezing or Shortness of Breath. Ferrous Sulfate (IRON) 325 (65 Fe) MG Oral Tab Take by mouth.  She does not take daily    buPROPion 150 MG Oral Tablet 12 Hr Take 1 tablet (150 mg total) by mouth 2 (two) times daily. Biotin 2500 MCG Oral Cap Take 1 capsule by mouth every evening. folic acid 325 MCG Oral Tab Take 1 tablet (800 mcg total) by mouth daily. Vitamin B-12 1000 MCG Oral Tab Take 1 tablet (1,000 mcg total) by mouth daily. Diagnoses for This Visit    Breast cancer metastasized to pleura, unspecified laterality   [5822413]  -  Primary  Malignant neoplasm of nipple of right breast in female, estrogen receptor positive   [3212282]    Malignant neoplasm metastatic to bone   [532956]    Carcinoma of right breast metastatic to pleura   [[id number]]             Future Appointments        Provider Department    5/31/2023 5:30 PM [address], [address] [place] [address]    6/1/2023 11:15 AM Ludwig [address] [facility] in [place]    6/1/2023 11:30 AM ES [place] in [place]      To Do List     Wednesday May 31, 2023 5:30 PM   Appointment with Elio Boothe at [address] [facility],[address], [place], Jennifer PEÑA ([phone]  [address] [place] [address]      Thursday June 01, 2023 11:15 AM   Appointment with Harrison Grijalva at [facility] in [place] ()      [address] [place] [address]      Thursday June 01, 2023 11:30 AM   Appointment with ES Rosado at [facility] in [place] ()      Javier Castaneda can access your MyChart to more actively manage your health care and view more details from this visit by going to [email or web address]. If you've recently had a stay at the Hospital you can access your discharge instructions in MyChart by going to Visits < Admission Summaries.  If you've been to the Emergency Department or your doctor's office, you can view your past visit information in MyChart by going to Visits < Visit Summaries. MyChart questions? Call [phone] for help. [employer] is NOT to be used for urgent needs. For medical emergencies, dial 911.

## (undated) NOTE — LETTER
Pre Surgical Consent  Yoel Skinner, DO     1.  I authorize the performance upon myself of the following operation,  ESOPHAGOGASTRODUODENOSCOPY (EGD) CAPSULE ENDOSCOPY  to be performed by or under the directio

## (undated) NOTE — LETTER
[address] [address]  [address] [facility] [address], [address] [facility]  Authorization for Surgical Operation and Procedure     Date:___________                                                                                                         Time:__________ and allergic reactions, hemolytic reactions, transmission of diseases such as Hepatitis, AIDS and Cytomegalovirus (CMV) and fluid overload. In the event that I wish to have an autologous transfusion of my own blood, or a directed donor transfusion.   I austyn recovery period ends for purposes of reinstating the DNAR order.   10. Patients having a sterilization procedure: I understand that if the procedure is successful the results will be permanent and it will therefore be impossible for me to inseminate, jens and do additional procedures as necessary. Some examples are: Starting or using an “IV” to give me medicine, fluids or blood during my procedure, and having a breathing tube placed to help me breathe when I’m asleep (intubation).  In the event that my heart potential complications include headache, bleeding, infection, seizure, irregular heart rhythms, and nerve injury.     I can change my mind about having anesthesia services at any time before I get the medicine.    __________________________________________

## (undated) NOTE — MR AVS SNAPSHOT
After Visit Summary   1/12/2017    Oswaldo Ji    MRN: [id number]           Diagnoses this Visit     Breast cancer metastasized to pleura, right [facility])    -  Primary     [facility]         Malignant neoplasm of nipple of right breast in Wednesday April 12, 2017     Imaging:  CT CHEST+ABDOMEN+PELVIS(CPT=71250/32850)        Friday April 14, 2017 3:30 PM     Appointment with ES VILLALAB RN1 at [facility] in [place] [phone])   [facility] Total Protein 7.4  6.1-8.3  g/dL Final    Albumin 3.7  3.5-4.8  g/dL Final    Sodium 141  136-144  mmol/L Final    Potassium 3.7  3.6-5.1  mmol/L Final    Chloride 108  101-111  mmol/L Final    CO2 26.0  22.0-32.0  mmol/L Final         Result Summary for discharge instructions in [place] by going to Visits < Admission Summaries. If you've been to the Emergency Department or your doctor's office, you can view your past visit information in [employer] by going to Visits < Visit Summaries. [place] questions?

## (undated) NOTE — LETTER
[facility] Testing Department  Phone: [phone]  Right Fax: [phone]    ADDRESSEE INFORMATION: SENDER INFORMATION:   To:   Dr. Conroy Setting From: Adam Wallace RN     Department: Pre-Admission Testing   Fax Number: [phone] Date: 3/26/202 notify us by telephone and return the original message to us at [address], [address] via the [facility].   10/5/2018

## (undated) NOTE — LETTER
[address]  [address] [facility] [address], [address] [facility]  Authorization for Surgical Operation and Procedure     Date:___________                                                                                                         Time:__________ fever and allergic reactions, hemolytic reactions, transmission of diseases such as Hepatitis, AIDS and Cytomegalovirus (CMV) and fluid overload. In the event that I wish to have an autologous transfusion of my own blood, or a directed donor transfusion. applicable recovery period ends for purposes of reinstating the DNAR order.   10. Patients having a sterilization procedure: I understand that if the procedure is successful the results will be permanent and it will therefore be impossible for me to insemin medicine and do additional procedures as necessary. Some examples are: Starting or using an “IV” to give me medicine, fluids or blood during my procedure, and having a breathing tube placed to help me breathe when I’m asleep (intubation).  In the event that but potential complications include headache, bleeding, infection, seizure, irregular heart rhythms, and nerve injury.     I can change my mind about having anesthesia services at any time before I get the medicine.    ______________________________________

## (undated) NOTE — MR AVS SNAPSHOT
After Visit Summary   4/13/2017    Mireya Palmazita    MRN: [id number]           Diagnoses this Visit     Breast cancer metastasized to pleura, right [facility])         [facility]         Malignant neoplasm of nipple of right breast in female [facility] The following orders were created for panel order CBC WITH DIFFERENTIAL WITH PLATELET.   Procedure                               Abnormality         Status                     ---------                               -----------         ------ office, you can view your past visit information in [place] by going to Visits < Visit Summaries. [place] questions? Call [phone] for help. [place] is NOT to be used for urgent needs. For medical emergencies, dial 911.

## (undated) NOTE — LETTER
[address]  [address] [facility], [address] [facility]  Authorization for Surgical Operation and Procedure   Date:___________ agencies, I may still be subject to ill effects as a result of receiving a blood transfusion and/or blood products.   The following are some, but not all, of the potential risks that can occur: fever and allergic reactions, hemolytic reactions, transmission and during the recovery period unless otherwise explicitly stated by me (or a person authorized to consent on my behalf).  The surgeon or my attending physician will determine when the applicable recovery period ends for purposes of reinstating the DNAR ord